# Patient Record
Sex: MALE | Race: WHITE | NOT HISPANIC OR LATINO
[De-identification: names, ages, dates, MRNs, and addresses within clinical notes are randomized per-mention and may not be internally consistent; named-entity substitution may affect disease eponyms.]

---

## 2020-03-09 ENCOUNTER — APPOINTMENT (OUTPATIENT)
Dept: UROLOGY | Facility: CLINIC | Age: 85
End: 2020-03-09
Payer: MEDICARE

## 2020-03-09 DIAGNOSIS — Z86.39 PERSONAL HISTORY OF OTHER ENDOCRINE, NUTRITIONAL AND METABOLIC DISEASE: ICD-10-CM

## 2020-03-09 DIAGNOSIS — Z83.3 FAMILY HISTORY OF DIABETES MELLITUS: ICD-10-CM

## 2020-03-09 DIAGNOSIS — Z86.79 PERSONAL HISTORY OF OTHER DISEASES OF THE CIRCULATORY SYSTEM: ICD-10-CM

## 2020-03-09 DIAGNOSIS — Z78.9 OTHER SPECIFIED HEALTH STATUS: ICD-10-CM

## 2020-03-09 DIAGNOSIS — Z87.81 PERSONAL HISTORY OF (HEALED) TRAUMATIC FRACTURE: ICD-10-CM

## 2020-03-09 PROBLEM — Z00.00 ENCOUNTER FOR PREVENTIVE HEALTH EXAMINATION: Status: ACTIVE | Noted: 2020-03-09

## 2020-03-09 PROCEDURE — 99203 OFFICE O/P NEW LOW 30 MIN: CPT

## 2020-03-09 RX ORDER — TAMSULOSIN HYDROCHLORIDE 0.4 MG/1
0.4 CAPSULE ORAL
Refills: 0 | Status: ACTIVE | COMMUNITY

## 2020-03-09 RX ORDER — FINASTERIDE 5 MG/1
5 TABLET, FILM COATED ORAL
Refills: 0 | Status: ACTIVE | COMMUNITY

## 2020-03-09 NOTE — ASSESSMENT
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

## 2020-03-09 NOTE — HISTORY OF PRESENT ILLNESS
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 \par Patient daughter states he moved from [facility] recently and now needs to re-establish care. Patient states he currently awakens 3-4 times per night to urinarte and  states he feels his stream is weakened. Patient has no other significant  history, specifically denies hx of Prostate CA or biopsies, renal cancer, or bladder cancer and denies any history of hematuria. No family history of prostate cancer. Patient denies history of ureteral stones. \par \par Patient recently released from rehab following right hip fx managed non-operatively. \par Social History: Patient lives with daughter. Denies smoking.\par \par

## 2020-03-09 NOTE — END OF VISIT
[FreeTextEntry3] : Agree with the above documentation as outlined by the resident MD which I have addended as necessary.\par

## 2021-08-31 ENCOUNTER — APPOINTMENT (OUTPATIENT)
Dept: UROLOGY | Facility: CLINIC | Age: 86
End: 2021-08-31
Payer: MEDICARE

## 2021-08-31 VITALS — WEIGHT: 157 LBS | BODY MASS INDEX: 26.8 KG/M2 | HEIGHT: 64 IN

## 2021-08-31 PROCEDURE — 99214 OFFICE O/P EST MOD 30 MIN: CPT

## 2021-09-01 NOTE — PHYSICAL EXAM
[General Appearance - Well Developed] : well developed [General Appearance - Well Nourished] : well nourished [Normal Appearance] : normal appearance [Well Groomed] : well groomed [General Appearance - In No Acute Distress] : no acute distress [Abdomen Soft] : soft [Abdomen Tenderness] : non-tender [Costovertebral Angle Tenderness] : no ~M costovertebral angle tenderness [Urethral Meatus] : meatus normal [Penis Abnormality] : normal uncircumcised penis [Urinary Bladder Findings] : the bladder was normal on palpation [Scrotum] : the scrotum was normal [Testes Tenderness] : no tenderness of the testes [Testes Mass (___cm)] : there were no testicular masses [] : no respiratory distress [Respiration, Rhythm And Depth] : normal respiratory rhythm and effort [Exaggerated Use Of Accessory Muscles For Inspiration] : no accessory muscle use [Oriented To Time, Place, And Person] : oriented to person, place, and time [Affect] : the affect was normal [Mood] : the mood was normal [Not Anxious] : not anxious [FreeTextEntry1] : Ambulating with walker

## 2021-09-01 NOTE — ASSESSMENT
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  Now with phimosis.\par \par \par -Continue tamsulosin and finasteride\par -Start Clotrimazole/betamethasone all usage, dosage, mechanism of action, and adverse events reviewed\par -Follow-up 6 weeks to review\par Consider future office dorsal slit w Dr Thomas, would prefer not to circ to avoid OR

## 2021-09-01 NOTE — HISTORY OF PRESENT ILLNESS
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 \par Social History: Patient lives with daughter. Denies smoking.\par \par

## 2021-10-12 ENCOUNTER — APPOINTMENT (OUTPATIENT)
Dept: UROLOGY | Facility: CLINIC | Age: 86
End: 2021-10-12
Payer: MEDICARE

## 2021-10-12 VITALS — HEIGHT: 64 IN | BODY MASS INDEX: 26.8 KG/M2 | WEIGHT: 157 LBS

## 2021-10-12 DIAGNOSIS — R39.9 UNSPECIFIED SYMPTOMS AND SIGNS INVOLVING THE GENITOURINARY SYSTEM: ICD-10-CM

## 2021-10-12 PROCEDURE — 99214 OFFICE O/P EST MOD 30 MIN: CPT

## 2021-10-12 RX ORDER — CLOPIDOGREL 75 MG/1
75 TABLET, FILM COATED ORAL
Refills: 0 | Status: ACTIVE | COMMUNITY

## 2021-10-12 RX ORDER — SIMVASTATIN 20 MG/1
20 TABLET, FILM COATED ORAL
Refills: 0 | Status: ACTIVE | COMMUNITY

## 2021-10-12 NOTE — ASSESSMENT
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  Now with phimosis improving but not resolving with clotrimazole/betamethasone.\par \par -Continue tamsulosin and finasteride for BPH/LUTS, stable. not having side effects doing well\par -Follow-up with Dr Thomas to discuss dorsal slit procedure

## 2021-10-12 NOTE — HISTORY OF PRESENT ILLNESS
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  He states there is only minimal improvement after taking this medication for roughly 1 month.\par LUTS stable.\par \par Previously:\par Patient presents today complaining of difficulty urinating secondary to his foreskin, which he says is extremely tight and he cannot roll it back.\par \par PVR today 119\par \par Patient recently released from rehab following right hip fx managed non-operatively. \par Social History: Patient lives with daughter. Denies smoking.

## 2021-10-12 NOTE — PHYSICAL EXAM
[General Appearance - Well Developed] : well developed [General Appearance - Well Nourished] : well nourished [Normal Appearance] : normal appearance [Well Groomed] : well groomed [General Appearance - In No Acute Distress] : no acute distress [Abdomen Soft] : soft [Abdomen Tenderness] : non-tender [Costovertebral Angle Tenderness] : no ~M costovertebral angle tenderness [Urethral Meatus] : meatus normal [Penis Abnormality] : normal circumcised penis [Urinary Bladder Findings] : the bladder was normal on palpation [Scrotum] : the scrotum was normal [Testes Mass (___cm)] : there were no testicular masses [] : no respiratory distress [Respiration, Rhythm And Depth] : normal respiratory rhythm and effort [Exaggerated Use Of Accessory Muscles For Inspiration] : no accessory muscle use [Oriented To Time, Place, And Person] : oriented to person, place, and time [Affect] : the affect was normal [Mood] : the mood was normal [Not Anxious] : not anxious [Normal Station and Gait] : the gait and station were normal for the patient's age [No Focal Deficits] : no focal deficits [Femoral Lymph Nodes Enlarged Bilaterally] : femoral [Inguinal Lymph Nodes Enlarged Bilaterally] : inguinal [FreeTextEntry1] : Phimosis

## 2021-12-13 ENCOUNTER — APPOINTMENT (OUTPATIENT)
Dept: UROLOGY | Facility: CLINIC | Age: 86
End: 2021-12-13
Payer: MEDICARE

## 2021-12-13 VITALS
SYSTOLIC BLOOD PRESSURE: 144 MMHG | HEIGHT: 66 IN | DIASTOLIC BLOOD PRESSURE: 64 MMHG | WEIGHT: 156 LBS | BODY MASS INDEX: 25.07 KG/M2 | HEART RATE: 65 BPM

## 2021-12-13 DIAGNOSIS — Z80.9 FAMILY HISTORY OF MALIGNANT NEOPLASM, UNSPECIFIED: ICD-10-CM

## 2021-12-13 PROCEDURE — 99214 OFFICE O/P EST MOD 30 MIN: CPT

## 2021-12-13 RX ORDER — INSULIN GLARGINE 100 [IU]/ML
100 INJECTION, SOLUTION SUBCUTANEOUS
Refills: 0 | Status: COMPLETED | COMMUNITY
End: 2021-12-13

## 2021-12-13 NOTE — LETTER BODY
[Dear  ___] : Dear  [unfilled], [Courtesy Letter:] : I had the pleasure of seeing your patient, [unfilled], in my office today. [Please see my note below.] : Please see my note below. [Sincerely,] : Sincerely, [FreeTextEntry2] : Javan Hoang MD\par [address] [place] [address]\[place] [address]

## 2021-12-13 NOTE — LETTER HEADER
[FreeTextEntry3] : Tariq Thomas M.D.\par Director Emeritus of Urology\par [facility]\par [address] [place] [address], [address]\[place] [address]

## 2021-12-13 NOTE — ASSESSMENT
[FreeTextEntry1] : He would benefit from a dorsal slit.  He understands this is not a circumcision and cosmetically is not the best but at his age he is not worried about cosmesis.  It would enable him to expose the head of the penis to both clean it and take care of any discharge and if coughing that he needs to catheter we be able to access the urethra.\par \par He is on the Plavix but his daughter tells me his cardiac stents and bypass were over 10 years ago so theoretically he should be able to stop it for a week before and a few days after.  Though speak to his internist if necessary his cardiologist and if the answer is yes they will call and we will schedule him for the dorsal slit.\par \par It would take a couple of days to a week to get authorization from his CP anyway and then to rush and do it before the end of the year on Navneet week does not really make much sense.  So we will schedule him for sometime in January preferably sooner than later assuming we get a note from his PCP that he can come off the Plavix.  He will have to be off of Plavix for 7 days probably will be able to start it 2 or 3 days later\par \par Risk benefits and alternatives discussed at length and they like to proceed

## 2021-12-13 NOTE — HISTORY OF PRESENT ILLNESS
[FreeTextEntry1] : Chris is a 89-year-old male being followed by Dr. Wen for almost 9 months.  They have been giving him various ointments to try and relax his phimosis enough that he could pull the skin back and treat the balanitis and does not work.  He is sent here for subspecialty opinion and consideration for dorsal slit [Currently Experiencing ___] :  [unfilled]

## 2021-12-13 NOTE — PHYSICAL EXAM
[General Appearance - Well Developed] : well developed [General Appearance - Well Nourished] : well nourished [Normal Appearance] : normal appearance [Well Groomed] : well groomed [General Appearance - In No Acute Distress] : no acute distress [Testes Tenderness] : no tenderness of the testes [] : no respiratory distress [Respiration, Rhythm And Depth] : normal respiratory rhythm and effort [Exaggerated Use Of Accessory Muscles For Inspiration] : no accessory muscle use [Oriented To Time, Place, And Person] : oriented to person, place, and time [Affect] : the affect was normal [Mood] : the mood was normal [Not Anxious] : not anxious [FreeTextEntry1] : Walks slowly with difficulty using a walker

## 2022-01-04 ENCOUNTER — NON-APPOINTMENT (OUTPATIENT)
Age: 87
End: 2022-01-04

## 2022-01-13 ENCOUNTER — APPOINTMENT (OUTPATIENT)
Dept: UROLOGY | Facility: CLINIC | Age: 87
End: 2022-01-13
Payer: MEDICARE

## 2022-01-13 VITALS
HEIGHT: 62 IN | BODY MASS INDEX: 28.71 KG/M2 | DIASTOLIC BLOOD PRESSURE: 57 MMHG | WEIGHT: 156 LBS | HEART RATE: 71 BPM | SYSTOLIC BLOOD PRESSURE: 133 MMHG

## 2022-01-13 PROCEDURE — 54001 SLITTING OF PREPUCE: CPT

## 2022-01-19 ENCOUNTER — NON-APPOINTMENT (OUTPATIENT)
Age: 87
End: 2022-01-19

## 2022-01-27 ENCOUNTER — APPOINTMENT (OUTPATIENT)
Dept: UROLOGY | Facility: CLINIC | Age: 87
End: 2022-01-27
Payer: MEDICARE

## 2022-01-27 VITALS
HEART RATE: 56 BPM | SYSTOLIC BLOOD PRESSURE: 149 MMHG | WEIGHT: 166 LBS | DIASTOLIC BLOOD PRESSURE: 68 MMHG | HEIGHT: 61 IN | BODY MASS INDEX: 31.34 KG/M2

## 2022-01-27 PROCEDURE — 11042 DBRDMT SUBQ TIS 1ST 20SQCM/<: CPT

## 2022-01-27 PROCEDURE — 99024 POSTOP FOLLOW-UP VISIT: CPT

## 2022-01-27 PROCEDURE — 99213 OFFICE O/P EST LOW 20 MIN: CPT | Mod: 24

## 2022-01-27 NOTE — ASSESSMENT
[FreeTextEntry1] : The area was debrided until getting close to healthy tissue as he is on Plavix I did not want to have bleeding.  I hope to do the rest with wet-to-dry's and I showed him to order to do that.  She will try and change his dressings whenever he urinates.  I will see him back next week to make sure he is healing well

## 2022-01-27 NOTE — LETTER BODY
[Dear  ___] : Dear  [unfilled], [Courtesy Letter:] : I had the pleasure of seeing your patient, [unfilled], in my office today. [Please see my note below.] : Please see my note below. [Sincerely,] : Sincerely, [FreeTextEntry2] : Javan Hoang MD\par [address] [place] [address]\[place] [address]

## 2022-01-27 NOTE — HISTORY OF PRESENT ILLNESS
[FreeTextEntry1] : Chris is a 89-year-old male who underwent a dorsal slit for phimosis and balanitis 2 weeks ago.  The tissue was poorly vascularized with minimal bleeding we had spoke with him by phone he comes in now for postop check.\par \par Daughter tells me is doing well urinating better the wound has some tissue that looks like it is not healing but there is been bleeding. [Currently Experiencing ___] :  [unfilled]

## 2022-01-27 NOTE — PHYSICAL EXAM
[General Appearance - Well Developed] : well developed [General Appearance - Well Nourished] : well nourished [Normal Appearance] : normal appearance [Well Groomed] : well groomed [General Appearance - In No Acute Distress] : no acute distress [] : no respiratory distress [Respiration, Rhythm And Depth] : normal respiratory rhythm and effort [Exaggerated Use Of Accessory Muscles For Inspiration] : no accessory muscle use [Oriented To Time, Place, And Person] : oriented to person, place, and time [Affect] : the affect was normal [Mood] : the mood was normal [Not Anxious] : not anxious [FreeTextEntry1] : Walks slowly with difficulty using a walker

## 2022-01-27 NOTE — LETTER HEADER
[FreeTextEntry3] : Tariq Thomas M.D.\par Director Emeritus of Urology\par [facility]\par [address] [place] [address], [address]\[place] [address]

## 2022-02-11 ENCOUNTER — APPOINTMENT (OUTPATIENT)
Dept: UROLOGY | Facility: CLINIC | Age: 87
End: 2022-02-11
Payer: MEDICARE

## 2022-02-11 VITALS
HEIGHT: 61 IN | HEART RATE: 63 BPM | WEIGHT: 162 LBS | BODY MASS INDEX: 30.58 KG/M2 | SYSTOLIC BLOOD PRESSURE: 138 MMHG | DIASTOLIC BLOOD PRESSURE: 67 MMHG

## 2022-02-11 DIAGNOSIS — N47.1 PHIMOSIS: ICD-10-CM

## 2022-02-11 DIAGNOSIS — N48.1 BALANITIS: ICD-10-CM

## 2022-02-11 DIAGNOSIS — I96 GANGRENE, NOT ELSEWHERE CLASSIFIED: ICD-10-CM

## 2022-02-11 PROCEDURE — 99213 OFFICE O/P EST LOW 20 MIN: CPT

## 2022-02-11 NOTE — LETTER BODY
[Dear  ___] : Dear  [unfilled], [Courtesy Letter:] : I had the pleasure of seeing your patient, [unfilled], in my office today. [Please see my note below.] : Please see my note below. [Sincerely,] : Sincerely, [FreeTextEntry2] : Javan Hoang MD\par [address] [place] [address]\[place] [address]

## 2022-02-11 NOTE — PHYSICAL EXAM
[General Appearance - Well Developed] : well developed [General Appearance - Well Nourished] : well nourished [Normal Appearance] : normal appearance [Well Groomed] : well groomed [General Appearance - In No Acute Distress] : no acute distress [Abdomen Soft] : soft [Abdomen Tenderness] : non-tender [Urethral Meatus] : meatus normal [Urinary Bladder Findings] : the bladder was normal on palpation [Scrotum] : the scrotum was normal [Edema] : no peripheral edema [] : no respiratory distress [Respiration, Rhythm And Depth] : normal respiratory rhythm and effort [Exaggerated Use Of Accessory Muscles For Inspiration] : no accessory muscle use [Oriented To Time, Place, And Person] : oriented to person, place, and time [Affect] : the affect was normal [Mood] : the mood was normal [Not Anxious] : not anxious [FreeTextEntry1] : Ambulating with walker

## 2022-02-11 NOTE — END OF VISIT
[FreeTextEntry3] : I, Dr. Thomas, personally performed the evaluation and management (E/M) services for this established patient who presents today with (a) new problem(s)/exacerbation of (an) existing condition(s).  That E/M includes conducting the examination, assessing all new/exacerbated conditions, and establishing a new plan of care.  Today, my ACP, Pastor Stinson was here to observe my evaluation and management services for this new problem/exacerbated condition to be followed going forward.

## 2022-02-11 NOTE — HISTORY OF PRESENT ILLNESS
[Currently Experiencing ___] :  [unfilled] [FreeTextEntry1] : Chris is a 89-year-old male who underwent a dorsal slit for phimosis and balanitis on 1/13/2022.  The tissue was poorly vascularized with minimal bleeding.  At his last visit on 1/27/2022 he underwent debridement of some dead tissue.\par \par His daughter states that he has been healing well without any issues.  He is satisfied with the procedure and is voiding well much better than before at all., \par

## 2022-02-11 NOTE — LETTER HEADER
[FreeTextEntry3] : Tariq Thomas M.D.\par Director Emeritus of Urology\par [facility]/Cl\par [address] [place] [address], [address]\[place] [address]

## 2022-02-11 NOTE — ASSESSMENT
[FreeTextEntry1] : Significant improvement since last visit.  Area well-healing with some areas that are healing via secondary intention.  Continue bacitracin and follow-up in 1 month.  If it is healed well and not an issue and then not concerned they were told they can opt to cancel the appointment as it is difficult for them to get him in here

## 2022-03-28 ENCOUNTER — APPOINTMENT (OUTPATIENT)
Dept: UROLOGY | Facility: CLINIC | Age: 87
End: 2022-03-28

## 2022-06-24 ENCOUNTER — OUTPATIENT (OUTPATIENT)
Dept: OUTPATIENT SERVICES | Facility: HOSPITAL | Age: 87
LOS: 1 days | Discharge: HOME | End: 2022-06-24

## 2022-06-24 ENCOUNTER — RESULT REVIEW (OUTPATIENT)
Age: 87
End: 2022-06-24

## 2022-06-24 DIAGNOSIS — R05.9 COUGH, UNSPECIFIED: ICD-10-CM

## 2022-06-24 PROCEDURE — 71046 X-RAY EXAM CHEST 2 VIEWS: CPT | Mod: 26

## 2022-12-03 ENCOUNTER — NON-APPOINTMENT (OUTPATIENT)
Age: 87
End: 2022-12-03

## 2022-12-04 ENCOUNTER — INPATIENT (INPATIENT)
Facility: HOSPITAL | Age: 87
LOS: 1 days | Discharge: HOME HEALTH | End: 2022-12-06
Attending: HOSPITALIST | Admitting: HOSPITALIST

## 2022-12-04 VITALS
DIASTOLIC BLOOD PRESSURE: 69 MMHG | HEART RATE: 63 BPM | OXYGEN SATURATION: 98 % | SYSTOLIC BLOOD PRESSURE: 110 MMHG | TEMPERATURE: 97 F | RESPIRATION RATE: 18 BRPM

## 2022-12-04 LAB
ALBUMIN SERPL ELPH-MCNC: 3.6 G/DL — SIGNIFICANT CHANGE UP (ref 3.5–5.2)
ALP SERPL-CCNC: 60 U/L — SIGNIFICANT CHANGE UP (ref 30–115)
ALT FLD-CCNC: 20 U/L — SIGNIFICANT CHANGE UP (ref 0–41)
ANION GAP SERPL CALC-SCNC: 11 MMOL/L — SIGNIFICANT CHANGE UP (ref 7–14)
AST SERPL-CCNC: 31 U/L — SIGNIFICANT CHANGE UP (ref 0–41)
BASOPHILS # BLD AUTO: 0.02 K/UL — SIGNIFICANT CHANGE UP (ref 0–0.2)
BASOPHILS NFR BLD AUTO: 0.2 % — SIGNIFICANT CHANGE UP (ref 0–1)
BILIRUB SERPL-MCNC: 0.5 MG/DL — SIGNIFICANT CHANGE UP (ref 0.2–1.2)
BUN SERPL-MCNC: 53 MG/DL — HIGH (ref 10–20)
CALCIUM SERPL-MCNC: 8.3 MG/DL — LOW (ref 8.4–10.5)
CHLORIDE SERPL-SCNC: 94 MMOL/L — LOW (ref 98–110)
CO2 SERPL-SCNC: 24 MMOL/L — SIGNIFICANT CHANGE UP (ref 17–32)
CREAT SERPL-MCNC: 1.8 MG/DL — HIGH (ref 0.7–1.5)
EGFR: 34 ML/MIN/1.73M2 — LOW
EOSINOPHIL # BLD AUTO: 0.03 K/UL — SIGNIFICANT CHANGE UP (ref 0–0.7)
EOSINOPHIL NFR BLD AUTO: 0.3 % — SIGNIFICANT CHANGE UP (ref 0–8)
FLUAV AG NPH QL: DETECTED
FLUBV AG NPH QL: SIGNIFICANT CHANGE UP
GLUCOSE BLDC GLUCOMTR-MCNC: 253 MG/DL — HIGH (ref 70–99)
GLUCOSE SERPL-MCNC: 232 MG/DL — HIGH (ref 70–99)
HCT VFR BLD CALC: 34 % — LOW (ref 42–52)
HGB BLD-MCNC: 11.5 G/DL — LOW (ref 14–18)
IMM GRANULOCYTES NFR BLD AUTO: 0.6 % — HIGH (ref 0.1–0.3)
LACTATE SERPL-SCNC: 1.4 MMOL/L — SIGNIFICANT CHANGE UP (ref 0.7–2)
LYMPHOCYTES # BLD AUTO: 2.83 K/UL — SIGNIFICANT CHANGE UP (ref 1.2–3.4)
LYMPHOCYTES # BLD AUTO: 27.6 % — SIGNIFICANT CHANGE UP (ref 20.5–51.1)
MAGNESIUM SERPL-MCNC: 1.5 MG/DL — LOW (ref 1.8–2.4)
MCHC RBC-ENTMCNC: 29.3 PG — SIGNIFICANT CHANGE UP (ref 27–31)
MCHC RBC-ENTMCNC: 33.8 G/DL — SIGNIFICANT CHANGE UP (ref 32–37)
MCV RBC AUTO: 86.7 FL — SIGNIFICANT CHANGE UP (ref 80–94)
MONOCYTES # BLD AUTO: 1.06 K/UL — HIGH (ref 0.1–0.6)
MONOCYTES NFR BLD AUTO: 10.3 % — HIGH (ref 1.7–9.3)
NEUTROPHILS # BLD AUTO: 6.26 K/UL — SIGNIFICANT CHANGE UP (ref 1.4–6.5)
NEUTROPHILS NFR BLD AUTO: 61 % — SIGNIFICANT CHANGE UP (ref 42.2–75.2)
NRBC # BLD: 0 /100 WBCS — SIGNIFICANT CHANGE UP (ref 0–0)
NT-PROBNP SERPL-SCNC: 1067 PG/ML — HIGH (ref 0–300)
PLATELET # BLD AUTO: 142 K/UL — SIGNIFICANT CHANGE UP (ref 130–400)
POTASSIUM SERPL-MCNC: 4.5 MMOL/L — SIGNIFICANT CHANGE UP (ref 3.5–5)
POTASSIUM SERPL-SCNC: 4.5 MMOL/L — SIGNIFICANT CHANGE UP (ref 3.5–5)
PROT SERPL-MCNC: 5.7 G/DL — LOW (ref 6–8)
RBC # BLD: 3.92 M/UL — LOW (ref 4.7–6.1)
RBC # FLD: 13.4 % — SIGNIFICANT CHANGE UP (ref 11.5–14.5)
RSV RNA NPH QL NAA+NON-PROBE: SIGNIFICANT CHANGE UP
SARS-COV-2 RNA SPEC QL NAA+PROBE: SIGNIFICANT CHANGE UP
SODIUM SERPL-SCNC: 129 MMOL/L — LOW (ref 135–146)
TROPONIN T SERPL-MCNC: 0.03 NG/ML — CRITICAL HIGH
WBC # BLD: 10.26 K/UL — SIGNIFICANT CHANGE UP (ref 4.8–10.8)
WBC # FLD AUTO: 10.26 K/UL — SIGNIFICANT CHANGE UP (ref 4.8–10.8)

## 2022-12-04 PROCEDURE — 71045 X-RAY EXAM CHEST 1 VIEW: CPT | Mod: 26

## 2022-12-04 PROCEDURE — 93010 ELECTROCARDIOGRAM REPORT: CPT

## 2022-12-04 PROCEDURE — 99285 EMERGENCY DEPT VISIT HI MDM: CPT | Mod: FS

## 2022-12-04 RX ORDER — DULOXETINE HYDROCHLORIDE 30 MG/1
60 CAPSULE, DELAYED RELEASE ORAL DAILY
Refills: 0 | Status: DISCONTINUED | OUTPATIENT
Start: 2022-12-04 | End: 2022-12-06

## 2022-12-04 RX ORDER — TRAMADOL HYDROCHLORIDE 50 MG/1
50 TABLET ORAL
Refills: 0 | Status: DISCONTINUED | OUTPATIENT
Start: 2022-12-04 | End: 2022-12-06

## 2022-12-04 RX ORDER — LEVOTHYROXINE SODIUM 125 MCG
25 TABLET ORAL DAILY
Refills: 0 | Status: DISCONTINUED | OUTPATIENT
Start: 2022-12-04 | End: 2022-12-06

## 2022-12-04 RX ORDER — CHLORHEXIDINE GLUCONATE 213 G/1000ML
1 SOLUTION TOPICAL
Refills: 0 | Status: DISCONTINUED | OUTPATIENT
Start: 2022-12-04 | End: 2022-12-06

## 2022-12-04 RX ORDER — GLUCAGON INJECTION, SOLUTION 0.5 MG/.1ML
1 INJECTION, SOLUTION SUBCUTANEOUS ONCE
Refills: 0 | Status: DISCONTINUED | OUTPATIENT
Start: 2022-12-04 | End: 2022-12-06

## 2022-12-04 RX ORDER — SODIUM CHLORIDE 9 MG/ML
1000 INJECTION, SOLUTION INTRAVENOUS
Refills: 0 | Status: DISCONTINUED | OUTPATIENT
Start: 2022-12-04 | End: 2022-12-06

## 2022-12-04 RX ORDER — TAMSULOSIN HYDROCHLORIDE 0.4 MG/1
0.4 CAPSULE ORAL AT BEDTIME
Refills: 0 | Status: DISCONTINUED | OUTPATIENT
Start: 2022-12-04 | End: 2022-12-06

## 2022-12-04 RX ORDER — CLOPIDOGREL BISULFATE 75 MG/1
75 TABLET, FILM COATED ORAL DAILY
Refills: 0 | Status: DISCONTINUED | OUTPATIENT
Start: 2022-12-04 | End: 2022-12-06

## 2022-12-04 RX ORDER — SODIUM CHLORIDE 9 MG/ML
1000 INJECTION INTRAMUSCULAR; INTRAVENOUS; SUBCUTANEOUS ONCE
Refills: 0 | Status: COMPLETED | OUTPATIENT
Start: 2022-12-04 | End: 2022-12-04

## 2022-12-04 RX ORDER — INSULIN GLARGINE 100 [IU]/ML
10 INJECTION, SOLUTION SUBCUTANEOUS EVERY MORNING
Refills: 0 | Status: DISCONTINUED | OUTPATIENT
Start: 2022-12-04 | End: 2022-12-06

## 2022-12-04 RX ORDER — FINASTERIDE 5 MG/1
5 TABLET, FILM COATED ORAL DAILY
Refills: 0 | Status: DISCONTINUED | OUTPATIENT
Start: 2022-12-04 | End: 2022-12-06

## 2022-12-04 RX ORDER — MAGNESIUM SULFATE 500 MG/ML
2 VIAL (ML) INJECTION ONCE
Refills: 0 | Status: COMPLETED | OUTPATIENT
Start: 2022-12-04 | End: 2022-12-04

## 2022-12-04 RX ORDER — METOPROLOL TARTRATE 50 MG
25 TABLET ORAL
Refills: 0 | Status: DISCONTINUED | OUTPATIENT
Start: 2022-12-04 | End: 2022-12-06

## 2022-12-04 RX ORDER — SIMVASTATIN 20 MG/1
20 TABLET, FILM COATED ORAL AT BEDTIME
Refills: 0 | Status: DISCONTINUED | OUTPATIENT
Start: 2022-12-04 | End: 2022-12-06

## 2022-12-04 RX ORDER — HEPARIN SODIUM 5000 [USP'U]/ML
5000 INJECTION INTRAVENOUS; SUBCUTANEOUS EVERY 8 HOURS
Refills: 0 | Status: DISCONTINUED | OUTPATIENT
Start: 2022-12-04 | End: 2022-12-06

## 2022-12-04 RX ADMIN — HEPARIN SODIUM 5000 UNIT(S): 5000 INJECTION INTRAVENOUS; SUBCUTANEOUS at 22:22

## 2022-12-04 RX ADMIN — Medication 75 MILLIGRAM(S): at 22:18

## 2022-12-04 RX ADMIN — SODIUM CHLORIDE 1000 MILLILITER(S): 9 INJECTION INTRAMUSCULAR; INTRAVENOUS; SUBCUTANEOUS at 19:09

## 2022-12-04 RX ADMIN — SIMVASTATIN 20 MILLIGRAM(S): 20 TABLET, FILM COATED ORAL at 22:18

## 2022-12-04 RX ADMIN — TAMSULOSIN HYDROCHLORIDE 0.4 MILLIGRAM(S): 0.4 CAPSULE ORAL at 22:18

## 2022-12-04 RX ADMIN — Medication 25 GRAM(S): at 17:29

## 2022-12-04 NOTE — ED ADULT TRIAGE NOTE - CHIEF COMPLAINT QUOTE
Patient BIBA from urgent care for hypotension (90/50), cough, and fatigue since Thursday. Patient was hypoxic on scene 88% on RA improved with 4LNC. Patient denies SOB, CP. Daughter reports subjective fever yesterday.

## 2022-12-04 NOTE — ED PROVIDER NOTE - NS ED ATTENDING STATEMENT MOD
Home
This was a shared visit with the CRISTINA. I reviewed and verified the documentation and independently performed the documented:

## 2022-12-04 NOTE — ED PROVIDER NOTE - NS ED ROS FT
Review of Systems:  	•	CONSTITUTIONAL - no fever, no diaphoresis, no chills  	•	SKIN - no rash  	•	HEMATOLOGIC - no bleeding, no bruising  	•	EYES - no eye pain, no blurry vision  	•	ENT - no change in hearing, no sore throat, no ear pain or tinnitus  	•	RESPIRATORY - + shortness of breath, + cough  	•	CARDIAC - no chest pain, no palpitations  	•	GI - no abd pain, no nausea, no vomiting, no diarrhea, no constipation  	•	GENITO-URINARY - no discharge, no dysuria; no hematuria, no increased urinary frequency  	•	MUSCULOSKELETAL - no joint paint, no swelling, no redness  	•	NEUROLOGIC - + weakness, no headache, no paresthesias, no LOC  	•	PSYCH - no anxiety, non suicidal, non homicidal, no hallucination, no depression

## 2022-12-04 NOTE — H&P ADULT - CONVERSATION DETAILS
Goals of Care Conversation done with pt. Discussed FULL CODE VS DNR/DNI. Pt would like to think about DNR/DNI but for now wants to stay full code.  Pt AAOX3 and knows why his is in the hospital

## 2022-12-04 NOTE — H&P ADULT - NSHPPHYSICALEXAM_GEN_ALL_CORE
PHYSICAL EXAM:  GENERAL: NAD, well-groomed, well-developed, on NC  HEAD:  Atraumatic, Normocephalic  EYES: EOMI, conjunctiva and sclera clear  NECK: Supple, No JVD,  HEART: Regular rate and rhythm;   RESPIRATORY: CTA B/L, No W/R/R  ABDOMEN: Soft, Nontender, Nondistended;   NEUROLOGY: A&Ox3,   EXTREMITIES: No clubbing, cyanosis, or edema

## 2022-12-04 NOTE — H&P ADULT - ASSESSMENT
89 yo M with PMHx of HTN, HLD, hypothyroidism, BPH, DM presents to the Ed for evaluation of his SOB.     #Influenza A   SOB, fever, cough   Tamiflu (currently renally dosed)     #BRINA   likely prerenal - patient had diarrhea   check urine studies   IVfs   Monitor BMP     #BPH   c/w home meds    #HTN  c/w home meds     #DM  insulin 10 units once daily   monitor FS     #Misc  - DVT Prophylaxis: heparin   - GI Prophylaxis: not indicated   - Diet: CC  - Activity: AAT  - IV Fluids: NS

## 2022-12-04 NOTE — H&P ADULT - TIME BILLING
HPI as above.  Interval history: Pt seen and examined at bedside. No cp or sob. Pt is feeling better today  Vital Signs (24 Hrs):  T(C): 36.8 (12-05-22 @ 07:26), Max: 36.8 (12-05-22 @ 07:26)  HR: 60 (12-05-22 @ 07:26) (60 - 78)  BP: 116/56 (12-05-22 @ 07:26) (110/69 - 147/66)  RR: 18 (12-05-22 @ 07:26) (18 - 18)  SpO2: 93% (12-05-22 @ 07:26) (93% - 98%)  Wt(kg): --  Daily     Daily     I&O's Summary    PHYSICAL EXAM:  GENERAL: NAD, well-developed  HEAD:  Atraumatic, Normocephalic  EYES: EOMI, PERRLA, conjunctiva and sclera clear  NECK: Supple, No JVD  CHEST/LUNG: ronchi  HEART: Regular rate and rhythm; No murmurs, rubs, or gallops  ABDOMEN: Soft, Nontender, Nondistended; Bowel sounds present  EXTREMITIES:  2+ Peripheral Pulses, No clubbing, cyanosis, or edema  PSYCH: AAOx3  NEUROLOGY: non-focal  SKIN: No rashes or lesions  Labs reviewed  Imaging reviewed independently and reviewed read  < from: Xray Chest 1 View-PORTABLE IMMEDIATE (Xray Chest 1 View-PORTABLE IMMEDIATE .) (12.04.22 @ 16:23) >    Impression:    Low lung volume.    Status post a median sternotomy.    Right basilar atelectatic change.    < end of copied text >      EKG reviewed independently and reviewed read  < from: 12 Lead ECG (12.05.22 @ 01:53) >    Diagnosis Line Normal sinus rhythmwith sinus arrhythmia  Normal ECG    < end of copied text >      Plan  89 yo M with PMHx of HTN, HLD, hypothyroidism, BPH, DM presents to the Ed for evaluation of his SOB.     #Influenza A, no pneumonia on cxr  SOB, fever, cough   Tamiflu (currently renally dosed)   send procal  on 3LNC  wean o2    #DAVID on ckd resolving   likely prerenal - patient had diarrhea   check urine studies   IVfs DCed  Monitor BMP   1.8-->1.3    #troponemia likley 2/2 ischemic demand form David and influenza  - check echo  - dimer  - stable     #BPH   c/w home meds    #HTN  c/w home meds     #DM  insulin 10 units once daily   monitor FS       #Progress Note Handoff  Pending (specify): wean o2, follow up procal, DAVID,e cho, dimer  Family discussion: house staff dw family   Disposition: home vs snf, PT    time spent on review of labs, imaging studies, old records, obtaining history, personally examining patient, counselling and communicating with patient, entering orders for medications/tests/etc, discussions with other health care providers, documentation in electronic health records, independent interpretation of labs, imaging/procedure results and care coordination.

## 2022-12-04 NOTE — ED PROVIDER NOTE - ATTENDING APP SHARED VISIT CONTRIBUTION OF CARE
89yM p/w cough and fatigue x 4d - seen at urgent care and was hypoxic to high 80s and hypotensive to 90s/50s.  Pt afebrile in the ED but family reports fever yesterday.  Pt currently denying CP or SOB.

## 2022-12-04 NOTE — ED PROVIDER NOTE - CLINICAL SUMMARY MEDICAL DECISION MAKING FREE TEXT BOX
89yM p/w SOB, fatigue and new hypoxia.  Pt normotensive and not in resp distress on ED arrival and hypoxia resolved w/ 4L NC.  CXR w/o ptx or pna.  Labs w/ BRINA and flu A+, also hypomagnesemia and elevated troponin.  Mg repleted and pt admitted for further care.

## 2022-12-04 NOTE — H&P ADULT - NSHPLABSRESULTS_GEN_ALL_CORE
11.5   10.26 )-----------( 142      ( 04 Dec 2022 16:24 )             34.0       12-04    129<L>  |  94<L>  |  53<H>  ----------------------------<  232<H>  4.5   |  24  |  1.8<H>    Ca    8.3<L>      04 Dec 2022 16:24  Mg     1.5     12-04    TPro  5.7<L>  /  Alb  3.6  /  TBili  0.5  /  DBili  x   /  AST  31  /  ALT  20  /  AlkPhos  60  12-04                      Lactate Trend  12-04 @ 16:24 Lactate:1.4       CARDIAC MARKERS ( 04 Dec 2022 16:24 )  x     / 0.03 ng/mL / x     / x     / x            CAPILLARY BLOOD GLUCOSE

## 2022-12-04 NOTE — ED PROVIDER NOTE - CARE PLAN
Principal Discharge DX:	Influenza A  Secondary Diagnosis:	Hypoxia  Secondary Diagnosis:	BRINA (acute kidney injury)   1 Principal Discharge DX:	Influenza A  Secondary Diagnosis:	BRINA (acute kidney injury)  Secondary Diagnosis:	Hypomagnesemia  Secondary Diagnosis:	Elevated troponin

## 2022-12-04 NOTE — ED PROVIDER NOTE - OBJECTIVE STATEMENT
89 year old male with pmhx of cabg, stents, on asa and plavix, thyroid disease (on synthroid), dm, htn, and hld present to ed with cough, congestion and sob since thursday. + weakness. Pt went to UC and was noted to have oxygen saturation of 88%. Pt denies chest pain, abd pain, nausea, vomiting, diarrhea, or urinary symptoms.

## 2022-12-04 NOTE — H&P ADULT - HISTORY OF PRESENT ILLNESS
89 yo M with PMHx of HTN, HLD, hypothyroidism, BPH, DM presents to the Ed for evaluation of his SOB. On Thursday 12/1 patient developed diarrhea which resolved in a couple of days. he also had subjective fever on Friday 12/2. He also had a productive cough the past few days and felt sob. Presented to an urgent care who found him to be hypoxic and sent him to Ed.   In Ed, pt was placed on 4L NC, sating 98%. BP and HR stable. Placed on tele which shows frequent PACs. CXR shows stable atelectasis R base and low lung volumes. Influenza A+. Not vaccinated for flu. Labs: Cr 1.8, Mg 1.5, trop 0.03, BNP 1k, Na 129, Hb 11.5.   Received NS bolus and IV mg

## 2022-12-05 LAB
A1C WITH ESTIMATED AVERAGE GLUCOSE RESULT: 10.2 % — HIGH (ref 4–5.6)
ALBUMIN SERPL ELPH-MCNC: 3.3 G/DL — LOW (ref 3.5–5.2)
ALP SERPL-CCNC: 59 U/L — SIGNIFICANT CHANGE UP (ref 30–115)
ALT FLD-CCNC: 17 U/L — SIGNIFICANT CHANGE UP (ref 0–41)
ANION GAP SERPL CALC-SCNC: 10 MMOL/L — SIGNIFICANT CHANGE UP (ref 7–14)
AST SERPL-CCNC: 26 U/L — SIGNIFICANT CHANGE UP (ref 0–41)
BASOPHILS # BLD AUTO: 0.01 K/UL — SIGNIFICANT CHANGE UP (ref 0–0.2)
BASOPHILS NFR BLD AUTO: 0.1 % — SIGNIFICANT CHANGE UP (ref 0–1)
BILIRUB SERPL-MCNC: 0.4 MG/DL — SIGNIFICANT CHANGE UP (ref 0.2–1.2)
BUN SERPL-MCNC: 43 MG/DL — HIGH (ref 10–20)
CALCIUM SERPL-MCNC: 8.1 MG/DL — LOW (ref 8.4–10.5)
CHLORIDE SERPL-SCNC: 98 MMOL/L — SIGNIFICANT CHANGE UP (ref 98–110)
CO2 SERPL-SCNC: 22 MMOL/L — SIGNIFICANT CHANGE UP (ref 17–32)
CREAT SERPL-MCNC: 1.3 MG/DL — SIGNIFICANT CHANGE UP (ref 0.7–1.5)
D DIMER BLD IA.RAPID-MCNC: <150 NG/ML DDU — SIGNIFICANT CHANGE UP (ref 0–230)
EGFR: 50 ML/MIN/1.73M2 — LOW
EOSINOPHIL # BLD AUTO: 0.02 K/UL — SIGNIFICANT CHANGE UP (ref 0–0.7)
EOSINOPHIL NFR BLD AUTO: 0.3 % — SIGNIFICANT CHANGE UP (ref 0–8)
ESTIMATED AVERAGE GLUCOSE: 246 MG/DL — HIGH (ref 68–114)
GLUCOSE BLDC GLUCOMTR-MCNC: 225 MG/DL — HIGH (ref 70–99)
GLUCOSE BLDC GLUCOMTR-MCNC: 253 MG/DL — HIGH (ref 70–99)
GLUCOSE BLDC GLUCOMTR-MCNC: 261 MG/DL — HIGH (ref 70–99)
GLUCOSE SERPL-MCNC: 251 MG/DL — HIGH (ref 70–99)
HCT VFR BLD CALC: 30.7 % — LOW (ref 42–52)
HGB BLD-MCNC: 10.5 G/DL — LOW (ref 14–18)
IMM GRANULOCYTES NFR BLD AUTO: 0.5 % — HIGH (ref 0.1–0.3)
LYMPHOCYTES # BLD AUTO: 2.33 K/UL — SIGNIFICANT CHANGE UP (ref 1.2–3.4)
LYMPHOCYTES # BLD AUTO: 30 % — SIGNIFICANT CHANGE UP (ref 20.5–51.1)
MAGNESIUM SERPL-MCNC: 2.1 MG/DL — SIGNIFICANT CHANGE UP (ref 1.8–2.4)
MCHC RBC-ENTMCNC: 29.7 PG — SIGNIFICANT CHANGE UP (ref 27–31)
MCHC RBC-ENTMCNC: 34.2 G/DL — SIGNIFICANT CHANGE UP (ref 32–37)
MCV RBC AUTO: 87 FL — SIGNIFICANT CHANGE UP (ref 80–94)
MONOCYTES # BLD AUTO: 0.76 K/UL — HIGH (ref 0.1–0.6)
MONOCYTES NFR BLD AUTO: 9.8 % — HIGH (ref 1.7–9.3)
NEUTROPHILS # BLD AUTO: 4.6 K/UL — SIGNIFICANT CHANGE UP (ref 1.4–6.5)
NEUTROPHILS NFR BLD AUTO: 59.3 % — SIGNIFICANT CHANGE UP (ref 42.2–75.2)
NRBC # BLD: 0 /100 WBCS — SIGNIFICANT CHANGE UP (ref 0–0)
PLATELET # BLD AUTO: 144 K/UL — SIGNIFICANT CHANGE UP (ref 130–400)
POTASSIUM SERPL-MCNC: 4.4 MMOL/L — SIGNIFICANT CHANGE UP (ref 3.5–5)
POTASSIUM SERPL-SCNC: 4.4 MMOL/L — SIGNIFICANT CHANGE UP (ref 3.5–5)
PROT SERPL-MCNC: 5.2 G/DL — LOW (ref 6–8)
RBC # BLD: 3.53 M/UL — LOW (ref 4.7–6.1)
RBC # FLD: 13.3 % — SIGNIFICANT CHANGE UP (ref 11.5–14.5)
SODIUM SERPL-SCNC: 130 MMOL/L — LOW (ref 135–146)
T4 FREE SERPL-MCNC: 1.4 NG/DL — SIGNIFICANT CHANGE UP (ref 0.9–1.8)
TROPONIN T SERPL-MCNC: 0.02 NG/ML — HIGH
TROPONIN T SERPL-MCNC: 0.02 NG/ML — HIGH
TSH SERPL-MCNC: 1.35 UIU/ML — SIGNIFICANT CHANGE UP (ref 0.27–4.2)
WBC # BLD: 7.76 K/UL — SIGNIFICANT CHANGE UP (ref 4.8–10.8)
WBC # FLD AUTO: 7.76 K/UL — SIGNIFICANT CHANGE UP (ref 4.8–10.8)

## 2022-12-05 PROCEDURE — 99497 ADVNCD CARE PLAN 30 MIN: CPT | Mod: 25

## 2022-12-05 PROCEDURE — 99233 SBSQ HOSP IP/OBS HIGH 50: CPT

## 2022-12-05 PROCEDURE — 93010 ELECTROCARDIOGRAM REPORT: CPT

## 2022-12-05 RX ORDER — SODIUM CHLORIDE 9 MG/ML
1000 INJECTION, SOLUTION INTRAVENOUS
Refills: 0 | Status: DISCONTINUED | OUTPATIENT
Start: 2022-12-05 | End: 2022-12-05

## 2022-12-05 RX ADMIN — Medication 30 MILLIGRAM(S): at 05:07

## 2022-12-05 RX ADMIN — Medication 25 MICROGRAM(S): at 05:07

## 2022-12-05 RX ADMIN — Medication 25 MILLIGRAM(S): at 17:45

## 2022-12-05 RX ADMIN — Medication 30 MILLIGRAM(S): at 17:45

## 2022-12-05 RX ADMIN — CLOPIDOGREL BISULFATE 75 MILLIGRAM(S): 75 TABLET, FILM COATED ORAL at 11:41

## 2022-12-05 RX ADMIN — INSULIN GLARGINE 10 UNIT(S): 100 INJECTION, SOLUTION SUBCUTANEOUS at 08:34

## 2022-12-05 RX ADMIN — Medication 25 MILLIGRAM(S): at 05:07

## 2022-12-05 RX ADMIN — HEPARIN SODIUM 5000 UNIT(S): 5000 INJECTION INTRAVENOUS; SUBCUTANEOUS at 05:08

## 2022-12-05 RX ADMIN — HEPARIN SODIUM 5000 UNIT(S): 5000 INJECTION INTRAVENOUS; SUBCUTANEOUS at 23:05

## 2022-12-05 RX ADMIN — HEPARIN SODIUM 5000 UNIT(S): 5000 INJECTION INTRAVENOUS; SUBCUTANEOUS at 14:55

## 2022-12-05 RX ADMIN — SODIUM CHLORIDE 50 MILLILITER(S): 9 INJECTION, SOLUTION INTRAVENOUS at 09:49

## 2022-12-05 RX ADMIN — TAMSULOSIN HYDROCHLORIDE 0.4 MILLIGRAM(S): 0.4 CAPSULE ORAL at 23:05

## 2022-12-05 RX ADMIN — FINASTERIDE 5 MILLIGRAM(S): 5 TABLET, FILM COATED ORAL at 11:41

## 2022-12-05 RX ADMIN — DULOXETINE HYDROCHLORIDE 60 MILLIGRAM(S): 30 CAPSULE, DELAYED RELEASE ORAL at 11:42

## 2022-12-05 RX ADMIN — SIMVASTATIN 20 MILLIGRAM(S): 20 TABLET, FILM COATED ORAL at 23:05

## 2022-12-05 NOTE — ED ADULT NURSE REASSESSMENT NOTE - NS ED NURSE REASSESS COMMENT FT1
Received pt from previous nurse,  Pt AxOx3. Safety measures maintained. Call bell within reach. Hourly rounding maintained.

## 2022-12-05 NOTE — ED ADULT NURSE REASSESSMENT NOTE - NS ED NURSE REASSESS COMMENT FT1
PT noted sleeping throughout the night, no complaints or issues noted at this time, hourly rounding done, safety precautions maintained, bed alarm on. all needs attended

## 2022-12-05 NOTE — PHYSICAL THERAPY INITIAL EVALUATION ADULT - PERTINENT HX OF CURRENT PROBLEM, REHAB EVAL
89 yo M with PMHx of HTN, HLD, hypothyroidism, BPH, DM presents to the Ed for evaluation of his SOB. On Thursday 12/1 patient developed diarrhea which resolved in a couple of days. he also had subjective fever on Friday 12/2. He also had a productive cough the past few days and felt sob. Presented to an urgent care who found him to be hypoxic and sent him to Ed.   In Ed, pt was placed on 4L NC, sating 98%. BP and HR stable. Placed on tele which shows frequent PACs. CXR shows stable atelectasis R base and low lung volumes. Influenza A+. Not vaccinated for flu. Labs: Cr 1.8, Mg 1.5, trop 0.03, BNP 1k, Na 129, Hb 11.5.   Received NS bolus and IV mg

## 2022-12-05 NOTE — PHYSICAL THERAPY INITIAL EVALUATION ADULT - GENERAL OBSERVATIONS, REHAB EVAL
[id number] Pt received and left semifowlers in bed, NAD, pt agreeable to PT session +3L O2 NC +tele +SpO2

## 2022-12-05 NOTE — PHYSICAL THERAPY INITIAL EVALUATION ADULT - ADDITIONAL COMMENTS
Pt lives home with daughter, 3 WARREN, remains on 1st floor. Pt has HHA 8 hours/day, daily, requires assistance with ADLs and ambulates with RW.

## 2022-12-06 ENCOUNTER — TRANSCRIPTION ENCOUNTER (OUTPATIENT)
Age: 87
End: 2022-12-06

## 2022-12-06 VITALS
OXYGEN SATURATION: 97 % | DIASTOLIC BLOOD PRESSURE: 79 MMHG | HEART RATE: 69 BPM | SYSTOLIC BLOOD PRESSURE: 171 MMHG | RESPIRATION RATE: 18 BRPM | TEMPERATURE: 97 F

## 2022-12-06 LAB
ALBUMIN SERPL ELPH-MCNC: 3.5 G/DL — SIGNIFICANT CHANGE UP (ref 3.5–5.2)
ALP SERPL-CCNC: 59 U/L — SIGNIFICANT CHANGE UP (ref 30–115)
ALT FLD-CCNC: 19 U/L — SIGNIFICANT CHANGE UP (ref 0–41)
ANION GAP SERPL CALC-SCNC: 12 MMOL/L — SIGNIFICANT CHANGE UP (ref 7–14)
APPEARANCE UR: CLEAR — SIGNIFICANT CHANGE UP
AST SERPL-CCNC: 27 U/L — SIGNIFICANT CHANGE UP (ref 0–41)
BACTERIA # UR AUTO: NEGATIVE — SIGNIFICANT CHANGE UP
BASOPHILS # BLD AUTO: 0.02 K/UL — SIGNIFICANT CHANGE UP (ref 0–0.2)
BASOPHILS NFR BLD AUTO: 0.2 % — SIGNIFICANT CHANGE UP (ref 0–1)
BILIRUB SERPL-MCNC: 0.4 MG/DL — SIGNIFICANT CHANGE UP (ref 0.2–1.2)
BILIRUB UR-MCNC: NEGATIVE — SIGNIFICANT CHANGE UP
BUN SERPL-MCNC: 31 MG/DL — HIGH (ref 10–20)
CALCIUM SERPL-MCNC: 8.6 MG/DL — SIGNIFICANT CHANGE UP (ref 8.4–10.5)
CHLORIDE SERPL-SCNC: 105 MMOL/L — SIGNIFICANT CHANGE UP (ref 98–110)
CO2 SERPL-SCNC: 23 MMOL/L — SIGNIFICANT CHANGE UP (ref 17–32)
COLOR SPEC: YELLOW — SIGNIFICANT CHANGE UP
CREAT SERPL-MCNC: 1 MG/DL — SIGNIFICANT CHANGE UP (ref 0.7–1.5)
DIFF PNL FLD: NEGATIVE — SIGNIFICANT CHANGE UP
EGFR: 68 ML/MIN/1.73M2 — SIGNIFICANT CHANGE UP
EOSINOPHIL # BLD AUTO: 0.03 K/UL — SIGNIFICANT CHANGE UP (ref 0–0.7)
EOSINOPHIL NFR BLD AUTO: 0.3 % — SIGNIFICANT CHANGE UP (ref 0–8)
EPI CELLS # UR: 1 /HPF — SIGNIFICANT CHANGE UP (ref 0–5)
GLUCOSE BLDC GLUCOMTR-MCNC: 194 MG/DL — HIGH (ref 70–99)
GLUCOSE BLDC GLUCOMTR-MCNC: 195 MG/DL — HIGH (ref 70–99)
GLUCOSE BLDC GLUCOMTR-MCNC: 236 MG/DL — HIGH (ref 70–99)
GLUCOSE BLDC GLUCOMTR-MCNC: 253 MG/DL — HIGH (ref 70–99)
GLUCOSE SERPL-MCNC: 206 MG/DL — HIGH (ref 70–99)
GLUCOSE UR QL: ABNORMAL
HCT VFR BLD CALC: 34.9 % — LOW (ref 42–52)
HGB BLD-MCNC: 11.8 G/DL — LOW (ref 14–18)
HYALINE CASTS # UR AUTO: 2 /LPF — SIGNIFICANT CHANGE UP (ref 0–7)
IMM GRANULOCYTES NFR BLD AUTO: 0.8 % — HIGH (ref 0.1–0.3)
KETONES UR-MCNC: SIGNIFICANT CHANGE UP
LEUKOCYTE ESTERASE UR-ACNC: NEGATIVE — SIGNIFICANT CHANGE UP
LYMPHOCYTES # BLD AUTO: 26.8 % — SIGNIFICANT CHANGE UP (ref 20.5–51.1)
LYMPHOCYTES # BLD AUTO: 3.14 K/UL — SIGNIFICANT CHANGE UP (ref 1.2–3.4)
MAGNESIUM SERPL-MCNC: 1.8 MG/DL — SIGNIFICANT CHANGE UP (ref 1.8–2.4)
MCHC RBC-ENTMCNC: 29.4 PG — SIGNIFICANT CHANGE UP (ref 27–31)
MCHC RBC-ENTMCNC: 33.8 G/DL — SIGNIFICANT CHANGE UP (ref 32–37)
MCV RBC AUTO: 87 FL — SIGNIFICANT CHANGE UP (ref 80–94)
MONOCYTES # BLD AUTO: 0.99 K/UL — HIGH (ref 0.1–0.6)
MONOCYTES NFR BLD AUTO: 8.5 % — SIGNIFICANT CHANGE UP (ref 1.7–9.3)
NEUTROPHILS # BLD AUTO: 7.43 K/UL — HIGH (ref 1.4–6.5)
NEUTROPHILS NFR BLD AUTO: 63.4 % — SIGNIFICANT CHANGE UP (ref 42.2–75.2)
NITRITE UR-MCNC: NEGATIVE — SIGNIFICANT CHANGE UP
NRBC # BLD: 0 /100 WBCS — SIGNIFICANT CHANGE UP (ref 0–0)
PH UR: 6 — SIGNIFICANT CHANGE UP (ref 5–8)
PLATELET # BLD AUTO: 174 K/UL — SIGNIFICANT CHANGE UP (ref 130–400)
POTASSIUM SERPL-MCNC: 4.9 MMOL/L — SIGNIFICANT CHANGE UP (ref 3.5–5)
POTASSIUM SERPL-SCNC: 4.9 MMOL/L — SIGNIFICANT CHANGE UP (ref 3.5–5)
PROCALCITONIN SERPL-MCNC: 0.16 NG/ML — HIGH (ref 0.02–0.1)
PROT SERPL-MCNC: 5.6 G/DL — LOW (ref 6–8)
PROT UR-MCNC: ABNORMAL
RBC # BLD: 4.01 M/UL — LOW (ref 4.7–6.1)
RBC # FLD: 13.2 % — SIGNIFICANT CHANGE UP (ref 11.5–14.5)
RBC CASTS # UR COMP ASSIST: 4 /HPF — SIGNIFICANT CHANGE UP (ref 0–4)
SODIUM SERPL-SCNC: 140 MMOL/L — SIGNIFICANT CHANGE UP (ref 135–146)
SP GR SPEC: 1.02 — SIGNIFICANT CHANGE UP (ref 1.01–1.03)
UROBILINOGEN FLD QL: SIGNIFICANT CHANGE UP
WBC # BLD: 11.7 K/UL — HIGH (ref 4.8–10.8)
WBC # FLD AUTO: 11.7 K/UL — HIGH (ref 4.8–10.8)
WBC UR QL: 1 /HPF — SIGNIFICANT CHANGE UP (ref 0–5)

## 2022-12-06 PROCEDURE — 71045 X-RAY EXAM CHEST 1 VIEW: CPT | Mod: 26

## 2022-12-06 PROCEDURE — 99239 HOSP IP/OBS DSCHRG MGMT >30: CPT

## 2022-12-06 RX ORDER — DEXTROSE 50 % IN WATER 50 %
25 SYRINGE (ML) INTRAVENOUS ONCE
Refills: 0 | Status: DISCONTINUED | OUTPATIENT
Start: 2022-12-06 | End: 2022-12-06

## 2022-12-06 RX ORDER — LOPERAMIDE HCL 2 MG
4 TABLET ORAL ONCE
Refills: 0 | Status: COMPLETED | OUTPATIENT
Start: 2022-12-06 | End: 2022-12-06

## 2022-12-06 RX ORDER — DEXTROSE 50 % IN WATER 50 %
12.5 SYRINGE (ML) INTRAVENOUS ONCE
Refills: 0 | Status: DISCONTINUED | OUTPATIENT
Start: 2022-12-06 | End: 2022-12-06

## 2022-12-06 RX ORDER — INSULIN LISPRO 100/ML
VIAL (ML) SUBCUTANEOUS
Refills: 0 | Status: DISCONTINUED | OUTPATIENT
Start: 2022-12-06 | End: 2022-12-06

## 2022-12-06 RX ORDER — DEXTROSE 50 % IN WATER 50 %
15 SYRINGE (ML) INTRAVENOUS ONCE
Refills: 0 | Status: DISCONTINUED | OUTPATIENT
Start: 2022-12-06 | End: 2022-12-06

## 2022-12-06 RX ORDER — LOPERAMIDE HCL 2 MG
2 TABLET ORAL THREE TIMES A DAY
Refills: 0 | Status: DISCONTINUED | OUTPATIENT
Start: 2022-12-06 | End: 2022-12-06

## 2022-12-06 RX ORDER — GUAIFENESIN/DEXTROMETHORPHAN 600MG-30MG
20 TABLET, EXTENDED RELEASE 12 HR ORAL
Qty: 420 | Refills: 0
Start: 2022-12-06 | End: 2022-12-12

## 2022-12-06 RX ADMIN — Medication 4 MILLIGRAM(S): at 17:22

## 2022-12-06 RX ADMIN — Medication 25 MILLIGRAM(S): at 05:54

## 2022-12-06 RX ADMIN — Medication 25 MICROGRAM(S): at 05:54

## 2022-12-06 RX ADMIN — HEPARIN SODIUM 5000 UNIT(S): 5000 INJECTION INTRAVENOUS; SUBCUTANEOUS at 05:53

## 2022-12-06 RX ADMIN — Medication 75 MILLIGRAM(S): at 17:22

## 2022-12-06 RX ADMIN — DULOXETINE HYDROCHLORIDE 60 MILLIGRAM(S): 30 CAPSULE, DELAYED RELEASE ORAL at 13:33

## 2022-12-06 RX ADMIN — Medication 25 MILLIGRAM(S): at 17:22

## 2022-12-06 RX ADMIN — FINASTERIDE 5 MILLIGRAM(S): 5 TABLET, FILM COATED ORAL at 13:34

## 2022-12-06 RX ADMIN — CLOPIDOGREL BISULFATE 75 MILLIGRAM(S): 75 TABLET, FILM COATED ORAL at 13:34

## 2022-12-06 RX ADMIN — Medication 30 MILLIGRAM(S): at 05:55

## 2022-12-06 RX ADMIN — INSULIN GLARGINE 10 UNIT(S): 100 INJECTION, SOLUTION SUBCUTANEOUS at 08:37

## 2022-12-06 RX ADMIN — HEPARIN SODIUM 5000 UNIT(S): 5000 INJECTION INTRAVENOUS; SUBCUTANEOUS at 13:34

## 2022-12-06 RX ADMIN — Medication 3: at 17:22

## 2022-12-06 NOTE — DISCHARGE NOTE NURSING/CASE MANAGEMENT/SOCIAL WORK - MODE OF TRANSPORTATION
Topical Retinoid Pregnancy And Lactation Text: This medication is Pregnancy Category C. It is unknown if this medication is excreted in breast milk. Birth Control Pills Counseling: Birth Control Pill Counseling: I discussed with the patient the potential side effects of OCPs including but not limited to increased risk of stroke, heart attack, thrombophlebitis, deep venous thrombosis, hepatic adenomas, breast changes, GI upset, headaches, and depression.  The patient verbalized understanding of the proper use and possible adverse effects of OCPs. All of the patient's questions and concerns were addressed. Azithromycin Pregnancy And Lactation Text: This medication is considered safe during pregnancy and is also secreted in breast milk. Bactrim Counseling:  I discussed with the patient the risks of sulfa antibiotics including but not limited to GI upset, allergic reaction, drug rash, diarrhea, dizziness, photosensitivity, and yeast infections.  Rarely, more serious reactions can occur including but not limited to aplastic anemia, agranulocytosis, methemoglobinemia, blood dyscrasias, liver or kidney failure, lung infiltrates or desquamative/blistering drug rashes. Minocycline Counseling: Patient advised regarding possible photosensitivity and discoloration of the teeth, skin, lips, tongue and gums.  Patient instructed to avoid sunlight, if possible.  When exposed to sunlight, patients should wear protective clothing, sunglasses, and sunscreen.  The patient was instructed to call the office immediately if the following severe adverse effects occur:  hearing changes, easy bruising/bleeding, severe headache, or vision changes.  The patient verbalized understanding of the proper use and possible adverse effects of minocycline.  All of the patient's questions and concerns were addressed. Doxycycline Pregnancy And Lactation Text: This medication is Pregnancy Category D and not consider safe during pregnancy. It is also excreted in breast milk but is considered safe for shorter treatment courses. Dapsone Pregnancy And Lactation Text: This medication is Pregnancy Category C and is not considered safe during pregnancy or breast feeding. Ambulette Topical Clindamycin Counseling: Patient counseled that this medication may cause skin irritation or allergic reactions.  In the event of skin irritation, the patient was advised to reduce the amount of the drug applied or use it less frequently.   The patient verbalized understanding of the proper use and possible adverse effects of clindamycin.  All of the patient's questions and concerns were addressed. Detail Level: Zone Doxycycline Counseling:  Patient counseled regarding possible photosensitivity and increased risk for sunburn.  Patient instructed to avoid sunlight, if possible.  When exposed to sunlight, patients should wear protective clothing, sunglasses, and sunscreen.  The patient was instructed to call the office immediately if the following severe adverse effects occur:  hearing changes, easy bruising/bleeding, severe headache, or vision changes.  The patient verbalized understanding of the proper use and possible adverse effects of doxycycline.  All of the patient's questions and concerns were addressed. Spironolactone Counseling: Patient advised regarding risks of diarrhea, abdominal pain, hyperkalemia, birth defects (for female patients), liver toxicity and renal toxicity. The patient may need blood work to monitor liver and kidney function and potassium levels while on therapy. The patient verbalized understanding of the proper use and possible adverse effects of spironolactone.  All of the patient's questions and concerns were addressed. Tazorac Pregnancy And Lactation Text: This medication is not safe during pregnancy. It is unknown if this medication is excreted in breast milk. Use Enhanced Medication Counseling?: No Spironolactone Pregnancy And Lactation Text: This medication can cause feminization of the male fetus and should be avoided during pregnancy. The active metabolite is also found in breast milk. Isotretinoin Pregnancy And Lactation Text: This medication is Pregnancy Category X and is considered extremely dangerous during pregnancy. It is unknown if it is excreted in breast milk. Benzoyl Peroxide Counseling: Patient counseled that medicine may cause skin irritation and bleach clothing.  In the event of skin irritation, the patient was advised to reduce the amount of the drug applied or use it less frequently.   The patient verbalized understanding of the proper use and possible adverse effects of benzoyl peroxide.  All of the patient's questions and concerns were addressed. Topical Clindamycin Pregnancy And Lactation Text: This medication is Pregnancy Category B and is considered safe during pregnancy. It is unknown if it is excreted in breast milk. Tetracycline Counseling: Patient counseled regarding possible photosensitivity and increased risk for sunburn.  Patient instructed to avoid sunlight, if possible.  When exposed to sunlight, patients should wear protective clothing, sunglasses, and sunscreen.  The patient was instructed to call the office immediately if the following severe adverse effects occur:  hearing changes, easy bruising/bleeding, severe headache, or vision changes.  The patient verbalized understanding of the proper use and possible adverse effects of tetracycline.  All of the patient's questions and concerns were addressed. Patient understands to avoid pregnancy while on therapy due to potential birth defects. Minocycline Pregnancy And Lactation Text: This medication is Pregnancy Category D and not consider safe during pregnancy. It is also excreted in breast milk. Dapsone Counseling: I discussed with the patient the risks of dapsone including but not limited to hemolytic anemia, agranulocytosis, rashes, methemoglobinemia, kidney failure, peripheral neuropathy, headaches, GI upset, and liver toxicity.  Patients who start dapsone require monitoring including baseline LFTs and weekly CBCs for the first month, then every month thereafter.  The patient verbalized understanding of the proper use and possible adverse effects of dapsone.  All of the patient's questions and concerns were addressed. Erythromycin Counseling:  I discussed with the patient the risks of erythromycin including but not limited to GI upset, allergic reaction, drug rash, diarrhea, increase in liver enzymes, and yeast infections. Tazorac Counseling:  Patient advised that medication is irritating and drying.  Patient may need to apply sparingly and wash off after an hour before eventually leaving it on overnight.  The patient verbalized understanding of the proper use and possible adverse effects of tazorac.  All of the patient's questions and concerns were addressed. Azithromycin Counseling:  I discussed with the patient the risks of azithromycin including but not limited to GI upset, allergic reaction, drug rash, diarrhea, and yeast infections. Topical Retinoid counseling:  Patient advised to apply a pea-sized amount only at bedtime and wait 30 minutes after washing their face before applying.  If too drying, patient may add a non-comedogenic moisturizer. The patient verbalized understanding of the proper use and possible adverse effects of retinoids.  All of the patient's questions and concerns were addressed. Topical Sulfur Applications Pregnancy And Lactation Text: This medication is Pregnancy Category C and has an unknown safety profile during pregnancy. It is unknown if this topical medication is excreted in breast milk. High Dose Vitamin A Pregnancy And Lactation Text: High dose vitamin A therapy is contraindicated during pregnancy and breast feeding. Bactrim Pregnancy And Lactation Text: This medication is Pregnancy Category D and is known to cause fetal risk.  It is also excreted in breast milk. Isotretinoin Counseling: Patient should get monthly blood tests, not donate blood, not drive at night if vision affected, not share medication, and not undergo elective surgery for 6 months after tx completed. Side effects reviewed, pt to contact office should one occur. Birth Control Pills Pregnancy And Lactation Text: This medication should be avoided if pregnant and for the first 30 days post-partum. Erythromycin Pregnancy And Lactation Text: This medication is Pregnancy Category B and is considered safe during pregnancy. It is also excreted in breast milk. Topical Sulfur Applications Counseling: Topical Sulfur Counseling: Patient counseled that this medication may cause skin irritation or allergic reactions.  In the event of skin irritation, the patient was advised to reduce the amount of the drug applied or use it less frequently.   The patient verbalized understanding of the proper use and possible adverse effects of topical sulfur application.  All of the patient's questions and concerns were addressed. Benzoyl Peroxide Pregnancy And Lactation Text: This medication is Pregnancy Category C. It is unknown if benzoyl peroxide is excreted in breast milk. High Dose Vitamin A Counseling: Side effects reviewed, pt to contact office should one occur.

## 2022-12-06 NOTE — DISCHARGE NOTE PROVIDER - ATTENDING DISCHARGE PHYSICAL EXAMINATION:
Attending attestation  Attending DC note  Pt seen and examined at bedside. No cp or sob. Pt feels better. Pt needs o2 87 on RA. ambulated 2-3 Lnc 93-97%  vitals, labs, exam stable  Hospital course as above.  Plan dw pt and agreed to plan  Medically cleared for DC. Med recc completed.  MARY resident. Spent 32 mins on case

## 2022-12-06 NOTE — DISCHARGE NOTE PROVIDER - HOSPITAL COURSE
89 yo M with PMHx of HTN, HLD, hypothyroidism, BPH, DM presents to the Ed for evaluation of his SOB. On Thursday 12/1 patient developed diarrhea which resolved in a couple of days. he also had subjective fever on Friday 12/2. He also had a productive cough the past few days and felt sob. Presented to an urgent care who found him to be hypoxic and sent him to Ed.   In Ed, pt was placed on 4L NC, sating 98%. BP and HR stable. Placed on tele which shows frequent PACs. CXR shows stable atelectasis R base and low lung volumes. Influenza A+. Not vaccinated for flu. Labs: Cr 1.8, Mg 1.5, trop 0.03, BNP 1k, Na 129, Hb 11.5.   Received NS bolus and IV mg  Started tamiflu, renally dosed, sunil resolved, cr down to 1.0 12/6, c/o some urinary frequency ua/cx sent, oxygen 87 ORA at rest, otherwise stable for dc home with home O2 89 yo M with PMHx of HTN, HLD, hypothyroidism, BPH, DM presents to the Ed for evaluation of his SOB. On Thursday 12/1 patient developed diarrhea which resolved in a couple of days. he also had subjective fever on Friday 12/2. He also had a productive cough the past few days and felt sob. Presented to an urgent care who found him to be hypoxic and sent him to Ed.   In Ed, pt was placed on 4L NC, sating 98%. BP and HR stable. Placed on tele which shows frequent PACs. CXR shows stable atelectasis R base and low lung volumes. Influenza A+. Not vaccinated for flu. Labs: Cr 1.8, Mg 1.5, trop 0.03, BNP 1k, Na 129, Hb 11.5.   Received NS bolus and IV mg  Started tamiflu, renally dosed, brina resolved, cr down to 1.0 12/6, c/o some urinary frequency ua/cx sent, oxygen 87 ORA at rest, otherwise stable for dc home with home O2  Troponin remaind stable, joyaley 2/2 ischemic demnd from infection and BRINA, no cp. No events on cardiac telemonitoring. Pt can do outpt echo. dimer neg.

## 2022-12-06 NOTE — DISCHARGE NOTE PROVIDER - CARE PROVIDER_API CALL
Javan Rios)  [address] [facility]  [address] [place] [address]  Phone: [phone]  Fax: [phone]  Follow Up Time: 2 weeks

## 2022-12-06 NOTE — DISCHARGE NOTE PROVIDER - NSDCMRMEDTOKEN_GEN_ALL_CORE_FT
celecoxib 100 mg oral capsule: 1 cap(s) orally once a day, As Needed  clopidogrel 75 mg oral tablet: 1 tab(s) orally once a day  DULoxetine 60 mg oral delayed release capsule: 1 cap(s) orally once a day  finasteride 5 mg oral tablet: 1 tab(s) orally once a day  levothyroxine 25 mcg (0.025 mg) oral tablet: 1 tab(s) orally once a day  Metoprolol Tartrate 25 mg oral tablet: 1 tab(s) orally 2 times a day  oseltamivir 75 mg oral capsule: 1 cap(s) orally 2 times a day  simvastatin 20 mg oral tablet: 1 tab(s) orally once a day (at bedtime)  tamsulosin 0.4 mg oral capsule: 1 cap(s) orally once a day  Toujeo SoloStar 300 units/mL subcutaneous solution: 12 unit(s) subcutaneous once a day  traMADol 50 mg oral tablet: 1 tab(s) orally 2 times a day, As Needed   celecoxib 100 mg oral capsule: 1 cap(s) orally once a day, As Needed  clopidogrel 75 mg oral tablet: 1 tab(s) orally once a day  DULoxetine 60 mg oral delayed release capsule: 1 cap(s) orally once a day  finasteride 5 mg oral tablet: 1 tab(s) orally once a day  levothyroxine 25 mcg (0.025 mg) oral tablet: 1 tab(s) orally once a day  Metoprolol Tartrate 25 mg oral tablet: 1 tab(s) orally 2 times a day  oseltamivir 75 mg oral capsule: 1 cap(s) orally 2 times a day  Robitussin Cough + Chest Congestion DM 20 mg-200 mg/20 mL oral liquid: 20 milliliter(s) orally 3 times a day, As Needed   simvastatin 20 mg oral tablet: 1 tab(s) orally once a day (at bedtime)  tamsulosin 0.4 mg oral capsule: 1 cap(s) orally once a day  Toujeo SoloStar 300 units/mL subcutaneous solution: 12 unit(s) subcutaneous once a day  traMADol 50 mg oral tablet: 1 tab(s) orally 2 times a day, As Needed

## 2022-12-06 NOTE — DISCHARGE NOTE NURSING/CASE MANAGEMENT/SOCIAL WORK - NSDCPEFALRISK_GEN_ALL_CORE
For information on Fall & Injury Prevention, visit: [email or web address] OR  [email or web address] OR  https://www.cdc.gov/steadi/patient.html

## 2022-12-06 NOTE — DISCHARGE NOTE NURSING/CASE MANAGEMENT/SOCIAL WORK - PATIENT PORTAL LINK FT
You can access the FollowMyHealth Patient Portal offered by [facility] by registering at the following website: [email or web address]. By joining [facility]’s FollowMyHealth portal, you will also be able to view your health information using other applications (apps) compatible with our system.

## 2022-12-06 NOTE — DISCHARGE NOTE PROVIDER - NSDCHHATTENDCERT_GEN_ALL_CORE
Problem: Physical Therapy Goal  Goal: Physical Therapy Goal  Description: Goals to be met by: 20     Patient will increase functional independence with mobility by performin. Supine to sit with Stand-by Assistance  2. Sit to supine with Stand-by Assistance  3. Rolling to Left and Right with Modified [place].  4. Sit to stand transfer with Supervision and RW  5. Gait  x 50 feet with Contact Guard Assistance using Rolling Walker.   6. Wheelchair propulsion x200 feet with Supervision using bilateral uppper extremities    Outcome: Ongoing, Progressing   Pt will benefit from further therapy in order to return to PLOF.   My signature below certifies that the above stated patient is homebound and upon completion of the Face-To-Face encounter, has the need for intermittent skilled nursing, physical therapy and/or speech or occupational therapy services in their home for their current diagnosis as outlined in their initial plan of care. These services will continue to be monitored by myself or another physician.

## 2022-12-06 NOTE — DISCHARGE NOTE PROVIDER - NSDCCPCAREPLAN_GEN_ALL_CORE_FT
PRINCIPAL DISCHARGE DIAGNOSIS  Diagnosis: Influenza A  Assessment and Plan of Treatment: Shortness of breath  Shortness of breath (dyspnea) means you have trouble breathing and could indicate a medical problem. Causes include lung disease, heart disease, low amount of red blood cells (anemia), poor physical fitness, being overweight, smoking, etc. Your health care provider today may not be able to find a cause for your shortness of breath after your exam. In this case, it is important to have a follow-up exam with your primary care physician as instructed. If medicines were prescribed, take them as directed for the full length of time directed. Refrain from tobacco products.  SEEK IMMEDIATE MEDICAL CARE IF YOU HAVE ANY OF THE FOLLOWING SYMPTOMS: worsening shortness of breath, chest pain, back pain, abdominal pain, fever, coughing up blood, lightheadedness/dizziness.      SECONDARY DISCHARGE DIAGNOSES  Diagnosis: BRINA (acute kidney injury)  Assessment and Plan of Treatment: You were noted to have a temporary insult to your kidney function either at the time that you arrived at the hospital or during your stay here. We have monitored your kidney function with blood work during your time here and you are at a level that no longer requires continued hospital level care, but we do recommend that you follow up to continually have your kidney function checked. You can follow up with your primary care doctor, or, if recommended in the discharge paperwork, you should follow up with a Kidney specialist called a Nephrologist.     PRINCIPAL DISCHARGE DIAGNOSIS  Diagnosis: Influenza A  Assessment and Plan of Treatment: Shortness of breath  Shortness of breath (dyspnea) means you have trouble breathing and could indicate a medical problem. Causes include lung disease, heart disease, low amount of red blood cells (anemia), poor physical fitness, being overweight, smoking, etc. Your health care provider today may not be able to find a cause for your shortness of breath after your exam. In this case, it is important to have a follow-up exam with your primary care physician as instructed. If medicines were prescribed, take them as directed for the full length of time directed. Refrain from tobacco products.  SEEK IMMEDIATE MEDICAL CARE IF YOU HAVE ANY OF THE FOLLOWING SYMPTOMS: worsening shortness of breath, chest pain, back pain, abdominal pain, fever, coughing up blood, lightheadedness/dizziness.      SECONDARY DISCHARGE DIAGNOSES  Diagnosis: BRINA (acute kidney injury)  Assessment and Plan of Treatment: You were noted to have a temporary insult to your kidney function either at the time that you arrived at the hospital or during your stay here. We have monitored your kidney function with blood work during your time here and you are at a level that no longer requires continued hospital level care, but we do recommend that you follow up to continually have your kidney function checked. You can follow up with your primary care doctor, or, if recommended in the discharge paperwork, you should follow up with a Kidney specialist called a Nephrologist.    Diagnosis: Elevated troponin  Assessment and Plan of Treatment: You had a mild elevation in your heart enzyme which was likely secondary to your kidney injury which resolved and infection. Please follow up with your primary care physician or cardiologist for an echo (heart ultrasound)

## 2022-12-09 LAB
-  AMIKACIN: SIGNIFICANT CHANGE UP
-  AMOXICILLIN/CLAVULANIC ACID: SIGNIFICANT CHANGE UP
-  AMPICILLIN/SULBACTAM: SIGNIFICANT CHANGE UP
-  AMPICILLIN: SIGNIFICANT CHANGE UP
-  AMPICILLIN: SIGNIFICANT CHANGE UP
-  AZTREONAM: SIGNIFICANT CHANGE UP
-  CEFAZOLIN: SIGNIFICANT CHANGE UP
-  CEFEPIME: SIGNIFICANT CHANGE UP
-  CEFOXITIN: SIGNIFICANT CHANGE UP
-  CEFTRIAXONE: SIGNIFICANT CHANGE UP
-  CIPROFLOXACIN: SIGNIFICANT CHANGE UP
-  CIPROFLOXACIN: SIGNIFICANT CHANGE UP
-  ERTAPENEM: SIGNIFICANT CHANGE UP
-  GENTAMICIN: SIGNIFICANT CHANGE UP
-  IMIPENEM: SIGNIFICANT CHANGE UP
-  LEVOFLOXACIN: SIGNIFICANT CHANGE UP
-  LEVOFLOXACIN: SIGNIFICANT CHANGE UP
-  MEROPENEM: SIGNIFICANT CHANGE UP
-  NITROFURANTOIN: SIGNIFICANT CHANGE UP
-  NITROFURANTOIN: SIGNIFICANT CHANGE UP
-  PIPERACILLIN/TAZOBACTAM: SIGNIFICANT CHANGE UP
-  TETRACYCLINE: SIGNIFICANT CHANGE UP
-  TOBRAMYCIN: SIGNIFICANT CHANGE UP
-  TRIMETHOPRIM/SULFAMETHOXAZOLE: SIGNIFICANT CHANGE UP
-  VANCOMYCIN: SIGNIFICANT CHANGE UP
CULTURE RESULTS: SIGNIFICANT CHANGE UP
METHOD TYPE: SIGNIFICANT CHANGE UP
METHOD TYPE: SIGNIFICANT CHANGE UP
ORGANISM # SPEC MICROSCOPIC CNT: SIGNIFICANT CHANGE UP
SPECIMEN SOURCE: SIGNIFICANT CHANGE UP

## 2022-12-10 LAB
CULTURE RESULTS: SIGNIFICANT CHANGE UP
CULTURE RESULTS: SIGNIFICANT CHANGE UP
SPECIMEN SOURCE: SIGNIFICANT CHANGE UP
SPECIMEN SOURCE: SIGNIFICANT CHANGE UP

## 2022-12-12 DIAGNOSIS — E11.9 TYPE 2 DIABETES MELLITUS WITHOUT COMPLICATIONS: ICD-10-CM

## 2022-12-12 DIAGNOSIS — R35.0 FREQUENCY OF MICTURITION: ICD-10-CM

## 2022-12-12 DIAGNOSIS — Z79.890 HORMONE REPLACEMENT THERAPY: ICD-10-CM

## 2022-12-12 DIAGNOSIS — Z20.822 CONTACT WITH AND (SUSPECTED) EXPOSURE TO COVID-19: ICD-10-CM

## 2022-12-12 DIAGNOSIS — E03.9 HYPOTHYROIDISM, UNSPECIFIED: ICD-10-CM

## 2022-12-12 DIAGNOSIS — B97.89 OTHER VIRAL AGENTS AS THE CAUSE OF DISEASES CLASSIFIED ELSEWHERE: ICD-10-CM

## 2022-12-12 DIAGNOSIS — E83.42 HYPOMAGNESEMIA: ICD-10-CM

## 2022-12-12 DIAGNOSIS — R06.02 SHORTNESS OF BREATH: ICD-10-CM

## 2022-12-12 DIAGNOSIS — I10 ESSENTIAL (PRIMARY) HYPERTENSION: ICD-10-CM

## 2022-12-12 DIAGNOSIS — N40.1 BENIGN PROSTATIC HYPERPLASIA WITH LOWER URINARY TRACT SYMPTOMS: ICD-10-CM

## 2022-12-12 DIAGNOSIS — Z95.1 PRESENCE OF AORTOCORONARY BYPASS GRAFT: ICD-10-CM

## 2022-12-12 DIAGNOSIS — Z87.891 PERSONAL HISTORY OF NICOTINE DEPENDENCE: ICD-10-CM

## 2022-12-12 DIAGNOSIS — Z79.82 LONG TERM (CURRENT) USE OF ASPIRIN: ICD-10-CM

## 2022-12-12 DIAGNOSIS — E78.5 HYPERLIPIDEMIA, UNSPECIFIED: ICD-10-CM

## 2022-12-12 DIAGNOSIS — N17.9 ACUTE KIDNEY FAILURE, UNSPECIFIED: ICD-10-CM

## 2022-12-12 DIAGNOSIS — Z79.02 LONG TERM (CURRENT) USE OF ANTITHROMBOTICS/ANTIPLATELETS: ICD-10-CM

## 2022-12-12 DIAGNOSIS — J10.1 INFLUENZA DUE TO OTHER IDENTIFIED INFLUENZA VIRUS WITH OTHER RESPIRATORY MANIFESTATIONS: ICD-10-CM

## 2022-12-13 ENCOUNTER — INPATIENT (INPATIENT)
Facility: HOSPITAL | Age: 87
LOS: 9 days | Discharge: NURSING, REHAB OR LONG-TERM CARE | End: 2022-12-23
Attending: INTERNAL MEDICINE | Admitting: INTERNAL MEDICINE

## 2022-12-13 VITALS
SYSTOLIC BLOOD PRESSURE: 144 MMHG | HEART RATE: 96 BPM | OXYGEN SATURATION: 93 % | DIASTOLIC BLOOD PRESSURE: 67 MMHG | WEIGHT: 149.91 LBS | TEMPERATURE: 97 F | RESPIRATION RATE: 18 BRPM

## 2022-12-13 DIAGNOSIS — E87.1 HYPO-OSMOLALITY AND HYPONATREMIA: ICD-10-CM

## 2022-12-13 DIAGNOSIS — E83.42 HYPOMAGNESEMIA: ICD-10-CM

## 2022-12-13 DIAGNOSIS — Z79.4 LONG TERM (CURRENT) USE OF INSULIN: ICD-10-CM

## 2022-12-13 DIAGNOSIS — Z95.1 PRESENCE OF AORTOCORONARY BYPASS GRAFT: ICD-10-CM

## 2022-12-13 DIAGNOSIS — Z95.1 PRESENCE OF AORTOCORONARY BYPASS GRAFT: Chronic | ICD-10-CM

## 2022-12-13 DIAGNOSIS — J96.01 ACUTE RESPIRATORY FAILURE WITH HYPOXIA: ICD-10-CM

## 2022-12-13 DIAGNOSIS — N40.0 BENIGN PROSTATIC HYPERPLASIA WITHOUT LOWER URINARY TRACT SYMPTOMS: ICD-10-CM

## 2022-12-13 DIAGNOSIS — J15.20 PNEUMONIA DUE TO STAPHYLOCOCCUS, UNSPECIFIED: ICD-10-CM

## 2022-12-13 DIAGNOSIS — J18.9 PNEUMONIA, UNSPECIFIED ORGANISM: ICD-10-CM

## 2022-12-13 DIAGNOSIS — R64 CACHEXIA: ICD-10-CM

## 2022-12-13 DIAGNOSIS — E78.5 HYPERLIPIDEMIA, UNSPECIFIED: ICD-10-CM

## 2022-12-13 DIAGNOSIS — E11.65 TYPE 2 DIABETES MELLITUS WITH HYPERGLYCEMIA: ICD-10-CM

## 2022-12-13 DIAGNOSIS — I10 ESSENTIAL (PRIMARY) HYPERTENSION: ICD-10-CM

## 2022-12-13 DIAGNOSIS — J15.6 PNEUMONIA DUE TO OTHER GRAM-NEGATIVE BACTERIA: ICD-10-CM

## 2022-12-13 DIAGNOSIS — J10.08 INFLUENZA DUE TO OTHER IDENTIFIED INFLUENZA VIRUS WITH OTHER SPECIFIED PNEUMONIA: ICD-10-CM

## 2022-12-13 DIAGNOSIS — E88.09 OTHER DISORDERS OF PLASMA-PROTEIN METABOLISM, NOT ELSEWHERE CLASSIFIED: ICD-10-CM

## 2022-12-13 DIAGNOSIS — N17.9 ACUTE KIDNEY FAILURE, UNSPECIFIED: ICD-10-CM

## 2022-12-13 DIAGNOSIS — Z98.61 CORONARY ANGIOPLASTY STATUS: ICD-10-CM

## 2022-12-13 DIAGNOSIS — Z20.822 CONTACT WITH AND (SUSPECTED) EXPOSURE TO COVID-19: ICD-10-CM

## 2022-12-13 DIAGNOSIS — J15.4 PNEUMONIA DUE TO OTHER STREPTOCOCCI: ICD-10-CM

## 2022-12-13 DIAGNOSIS — I25.10 ATHEROSCLEROTIC HEART DISEASE OF NATIVE CORONARY ARTERY WITHOUT ANGINA PECTORIS: ICD-10-CM

## 2022-12-13 DIAGNOSIS — E03.9 HYPOTHYROIDISM, UNSPECIFIED: ICD-10-CM

## 2022-12-13 DIAGNOSIS — Z87.891 PERSONAL HISTORY OF NICOTINE DEPENDENCE: ICD-10-CM

## 2022-12-13 DIAGNOSIS — E86.1 HYPOVOLEMIA: ICD-10-CM

## 2022-12-13 DIAGNOSIS — A41.9 SEPSIS, UNSPECIFIED ORGANISM: ICD-10-CM

## 2022-12-13 DIAGNOSIS — I35.0 NONRHEUMATIC AORTIC (VALVE) STENOSIS: ICD-10-CM

## 2022-12-13 DIAGNOSIS — R19.7 DIARRHEA, UNSPECIFIED: ICD-10-CM

## 2022-12-13 LAB
ALBUMIN SERPL ELPH-MCNC: 3.3 G/DL — LOW (ref 3.5–5.2)
ALP SERPL-CCNC: 111 U/L — SIGNIFICANT CHANGE UP (ref 30–115)
ALT FLD-CCNC: 15 U/L — SIGNIFICANT CHANGE UP (ref 0–41)
ANION GAP SERPL CALC-SCNC: 13 MMOL/L — SIGNIFICANT CHANGE UP (ref 7–14)
APPEARANCE UR: CLEAR — SIGNIFICANT CHANGE UP
AST SERPL-CCNC: 16 U/L — SIGNIFICANT CHANGE UP (ref 0–41)
BACTERIA # UR AUTO: NEGATIVE — SIGNIFICANT CHANGE UP
BASOPHILS # BLD AUTO: 0 K/UL — SIGNIFICANT CHANGE UP (ref 0–0.2)
BASOPHILS NFR BLD AUTO: 0 % — SIGNIFICANT CHANGE UP (ref 0–1)
BILIRUB SERPL-MCNC: 0.7 MG/DL — SIGNIFICANT CHANGE UP (ref 0.2–1.2)
BILIRUB UR-MCNC: NEGATIVE — SIGNIFICANT CHANGE UP
BUN SERPL-MCNC: 24 MG/DL — HIGH (ref 10–20)
CALCIUM SERPL-MCNC: 9.1 MG/DL — SIGNIFICANT CHANGE UP (ref 8.4–10.5)
CHLORIDE SERPL-SCNC: 96 MMOL/L — LOW (ref 98–110)
CO2 SERPL-SCNC: 25 MMOL/L — SIGNIFICANT CHANGE UP (ref 17–32)
COLOR SPEC: YELLOW — SIGNIFICANT CHANGE UP
CREAT SERPL-MCNC: 1.1 MG/DL — SIGNIFICANT CHANGE UP (ref 0.7–1.5)
DIFF PNL FLD: NEGATIVE — SIGNIFICANT CHANGE UP
EGFR: 61 ML/MIN/1.73M2 — SIGNIFICANT CHANGE UP
EOSINOPHIL # BLD AUTO: 0 K/UL — SIGNIFICANT CHANGE UP (ref 0–0.7)
EOSINOPHIL NFR BLD AUTO: 0 % — SIGNIFICANT CHANGE UP (ref 0–8)
EPI CELLS # UR: 1 /HPF — SIGNIFICANT CHANGE UP (ref 0–5)
FLUAV AG NPH QL: DETECTED
FLUBV AG NPH QL: SIGNIFICANT CHANGE UP
GIANT PLATELETS BLD QL SMEAR: PRESENT — SIGNIFICANT CHANGE UP
GLUCOSE BLDC GLUCOMTR-MCNC: 357 MG/DL — HIGH (ref 70–99)
GLUCOSE SERPL-MCNC: 366 MG/DL — HIGH (ref 70–99)
GLUCOSE UR QL: ABNORMAL
HCT VFR BLD CALC: 34.1 % — LOW (ref 42–52)
HGB BLD-MCNC: 11 G/DL — LOW (ref 14–18)
HYALINE CASTS # UR AUTO: 1 /LPF — SIGNIFICANT CHANGE UP (ref 0–7)
KETONES UR-MCNC: ABNORMAL
LACTATE SERPL-SCNC: 1.6 MMOL/L — SIGNIFICANT CHANGE UP (ref 0.7–2)
LEUKOCYTE ESTERASE UR-ACNC: NEGATIVE — SIGNIFICANT CHANGE UP
LIDOCAIN IGE QN: 13 U/L — SIGNIFICANT CHANGE UP (ref 7–60)
LYMPHOCYTES # BLD AUTO: 12.9 % — LOW (ref 20.5–51.1)
LYMPHOCYTES # BLD AUTO: 3.05 K/UL — SIGNIFICANT CHANGE UP (ref 1.2–3.4)
MAGNESIUM SERPL-MCNC: 1.4 MG/DL — LOW (ref 1.8–2.4)
MANUAL SMEAR VERIFICATION: SIGNIFICANT CHANGE UP
MCHC RBC-ENTMCNC: 28.6 PG — SIGNIFICANT CHANGE UP (ref 27–31)
MCHC RBC-ENTMCNC: 32.3 G/DL — SIGNIFICANT CHANGE UP (ref 32–37)
MCV RBC AUTO: 88.8 FL — SIGNIFICANT CHANGE UP (ref 80–94)
MONOCYTES # BLD AUTO: 1.02 K/UL — HIGH (ref 0.1–0.6)
MONOCYTES NFR BLD AUTO: 4.3 % — SIGNIFICANT CHANGE UP (ref 1.7–9.3)
NEUTROPHILS # BLD AUTO: 19.39 K/UL — HIGH (ref 1.4–6.5)
NEUTROPHILS NFR BLD AUTO: 81.9 % — HIGH (ref 42.2–75.2)
NITRITE UR-MCNC: NEGATIVE — SIGNIFICANT CHANGE UP
PH UR: 6 — SIGNIFICANT CHANGE UP (ref 5–8)
PLAT MORPH BLD: NORMAL — SIGNIFICANT CHANGE UP
PLATELET # BLD AUTO: 355 K/UL — SIGNIFICANT CHANGE UP (ref 130–400)
POTASSIUM SERPL-MCNC: 5 MMOL/L — SIGNIFICANT CHANGE UP (ref 3.5–5)
POTASSIUM SERPL-SCNC: 5 MMOL/L — SIGNIFICANT CHANGE UP (ref 3.5–5)
PROT SERPL-MCNC: 5.8 G/DL — LOW (ref 6–8)
PROT UR-MCNC: ABNORMAL
RBC # BLD: 3.84 M/UL — LOW (ref 4.7–6.1)
RBC # FLD: 13.2 % — SIGNIFICANT CHANGE UP (ref 11.5–14.5)
RBC BLD AUTO: NORMAL — SIGNIFICANT CHANGE UP
RBC CASTS # UR COMP ASSIST: 5 /HPF — HIGH (ref 0–4)
RSV RNA NPH QL NAA+NON-PROBE: SIGNIFICANT CHANGE UP
SARS-COV-2 RNA SPEC QL NAA+PROBE: SIGNIFICANT CHANGE UP
SODIUM SERPL-SCNC: 134 MMOL/L — LOW (ref 135–146)
SP GR SPEC: >1.05 (ref 1.01–1.03)
TROPONIN T SERPL-MCNC: 0.01 NG/ML — SIGNIFICANT CHANGE UP
UROBILINOGEN FLD QL: SIGNIFICANT CHANGE UP
VARIANT LYMPHS # BLD: 0.9 % — SIGNIFICANT CHANGE UP (ref 0–5)
WBC # BLD: 23.67 K/UL — HIGH (ref 4.8–10.8)
WBC # FLD AUTO: 23.67 K/UL — HIGH (ref 4.8–10.8)
WBC UR QL: 1 /HPF — SIGNIFICANT CHANGE UP (ref 0–5)

## 2022-12-13 PROCEDURE — 93010 ELECTROCARDIOGRAM REPORT: CPT

## 2022-12-13 PROCEDURE — 99223 1ST HOSP IP/OBS HIGH 75: CPT

## 2022-12-13 PROCEDURE — 99285 EMERGENCY DEPT VISIT HI MDM: CPT | Mod: FS,CS

## 2022-12-13 PROCEDURE — 74177 CT ABD & PELVIS W/CONTRAST: CPT | Mod: 26,MA

## 2022-12-13 PROCEDURE — 71045 X-RAY EXAM CHEST 1 VIEW: CPT | Mod: 26

## 2022-12-13 RX ORDER — ACETAMINOPHEN 500 MG
650 TABLET ORAL EVERY 6 HOURS
Refills: 0 | Status: DISCONTINUED | OUTPATIENT
Start: 2022-12-13 | End: 2022-12-23

## 2022-12-13 RX ORDER — ENOXAPARIN SODIUM 100 MG/ML
40 INJECTION SUBCUTANEOUS EVERY 24 HOURS
Refills: 0 | Status: DISCONTINUED | OUTPATIENT
Start: 2022-12-13 | End: 2022-12-23

## 2022-12-13 RX ORDER — INSULIN GLARGINE 100 [IU]/ML
12 INJECTION, SOLUTION SUBCUTANEOUS
Qty: 0 | Refills: 0 | DISCHARGE

## 2022-12-13 RX ORDER — CEFEPIME 1 G/1
2000 INJECTION, POWDER, FOR SOLUTION INTRAMUSCULAR; INTRAVENOUS ONCE
Refills: 0 | Status: COMPLETED | OUTPATIENT
Start: 2022-12-13 | End: 2022-12-13

## 2022-12-13 RX ORDER — AZITHROMYCIN 500 MG/1
500 TABLET, FILM COATED ORAL ONCE
Refills: 0 | Status: COMPLETED | OUTPATIENT
Start: 2022-12-13 | End: 2022-12-13

## 2022-12-13 RX ORDER — DULOXETINE HYDROCHLORIDE 30 MG/1
60 CAPSULE, DELAYED RELEASE ORAL DAILY
Refills: 0 | Status: DISCONTINUED | OUTPATIENT
Start: 2022-12-13 | End: 2022-12-23

## 2022-12-13 RX ORDER — TAMSULOSIN HYDROCHLORIDE 0.4 MG/1
1 CAPSULE ORAL
Qty: 0 | Refills: 0 | DISCHARGE

## 2022-12-13 RX ORDER — INSULIN GLARGINE 100 [IU]/ML
15 INJECTION, SOLUTION SUBCUTANEOUS
Qty: 0 | Refills: 0 | DISCHARGE

## 2022-12-13 RX ORDER — TAMSULOSIN HYDROCHLORIDE 0.4 MG/1
0.4 CAPSULE ORAL AT BEDTIME
Refills: 0 | Status: DISCONTINUED | OUTPATIENT
Start: 2022-12-13 | End: 2022-12-23

## 2022-12-13 RX ORDER — MAGNESIUM SULFATE 500 MG/ML
2 VIAL (ML) INJECTION ONCE
Refills: 0 | Status: COMPLETED | OUTPATIENT
Start: 2022-12-13 | End: 2022-12-13

## 2022-12-13 RX ORDER — CLOPIDOGREL BISULFATE 75 MG/1
75 TABLET, FILM COATED ORAL DAILY
Refills: 0 | Status: DISCONTINUED | OUTPATIENT
Start: 2022-12-13 | End: 2022-12-23

## 2022-12-13 RX ORDER — METOPROLOL TARTRATE 50 MG
25 TABLET ORAL
Refills: 0 | Status: DISCONTINUED | OUTPATIENT
Start: 2022-12-13 | End: 2022-12-23

## 2022-12-13 RX ORDER — INSULIN LISPRO 100/ML
5 VIAL (ML) SUBCUTANEOUS
Refills: 0 | Status: DISCONTINUED | OUTPATIENT
Start: 2022-12-13 | End: 2022-12-23

## 2022-12-13 RX ORDER — LEVOTHYROXINE SODIUM 125 MCG
50 TABLET ORAL DAILY
Refills: 0 | Status: DISCONTINUED | OUTPATIENT
Start: 2022-12-13 | End: 2022-12-23

## 2022-12-13 RX ORDER — SIMVASTATIN 20 MG/1
1 TABLET, FILM COATED ORAL
Qty: 0 | Refills: 0 | DISCHARGE

## 2022-12-13 RX ORDER — FINASTERIDE 5 MG/1
1 TABLET, FILM COATED ORAL
Qty: 0 | Refills: 0 | DISCHARGE

## 2022-12-13 RX ORDER — LEVOTHYROXINE SODIUM 125 MCG
1 TABLET ORAL
Qty: 0 | Refills: 0 | DISCHARGE

## 2022-12-13 RX ORDER — INSULIN GLARGINE 100 [IU]/ML
15 INJECTION, SOLUTION SUBCUTANEOUS AT BEDTIME
Refills: 0 | Status: DISCONTINUED | OUTPATIENT
Start: 2022-12-13 | End: 2022-12-23

## 2022-12-13 RX ORDER — CELECOXIB 200 MG/1
100 CAPSULE ORAL DAILY
Refills: 0 | Status: DISCONTINUED | OUTPATIENT
Start: 2022-12-13 | End: 2022-12-23

## 2022-12-13 RX ORDER — CEFEPIME 1 G/1
2000 INJECTION, POWDER, FOR SOLUTION INTRAMUSCULAR; INTRAVENOUS EVERY 12 HOURS
Refills: 0 | Status: DISCONTINUED | OUTPATIENT
Start: 2022-12-14 | End: 2022-12-14

## 2022-12-13 RX ORDER — VANCOMYCIN HCL 1 G
1000 VIAL (EA) INTRAVENOUS EVERY 12 HOURS
Refills: 0 | Status: DISCONTINUED | OUTPATIENT
Start: 2022-12-14 | End: 2022-12-14

## 2022-12-13 RX ORDER — VANCOMYCIN HCL 1 G
1000 VIAL (EA) INTRAVENOUS ONCE
Refills: 0 | Status: COMPLETED | OUTPATIENT
Start: 2022-12-13 | End: 2022-12-13

## 2022-12-13 RX ORDER — SODIUM CHLORIDE 9 MG/ML
1000 INJECTION, SOLUTION INTRAVENOUS ONCE
Refills: 0 | Status: COMPLETED | OUTPATIENT
Start: 2022-12-13 | End: 2022-12-13

## 2022-12-13 RX ORDER — METOPROLOL TARTRATE 50 MG
1 TABLET ORAL
Qty: 0 | Refills: 0 | DISCHARGE

## 2022-12-13 RX ORDER — SIMVASTATIN 20 MG/1
20 TABLET, FILM COATED ORAL AT BEDTIME
Refills: 0 | Status: DISCONTINUED | OUTPATIENT
Start: 2022-12-13 | End: 2022-12-23

## 2022-12-13 RX ORDER — INSULIN LISPRO 100/ML
VIAL (ML) SUBCUTANEOUS
Refills: 0 | Status: DISCONTINUED | OUTPATIENT
Start: 2022-12-13 | End: 2022-12-23

## 2022-12-13 RX ORDER — DULOXETINE HYDROCHLORIDE 30 MG/1
1 CAPSULE, DELAYED RELEASE ORAL
Qty: 0 | Refills: 0 | DISCHARGE

## 2022-12-13 RX ORDER — FINASTERIDE 5 MG/1
5 TABLET, FILM COATED ORAL DAILY
Refills: 0 | Status: DISCONTINUED | OUTPATIENT
Start: 2022-12-13 | End: 2022-12-23

## 2022-12-13 RX ADMIN — SODIUM CHLORIDE 1000 MILLILITER(S): 9 INJECTION, SOLUTION INTRAVENOUS at 13:00

## 2022-12-13 RX ADMIN — Medication 250 MILLIGRAM(S): at 19:14

## 2022-12-13 RX ADMIN — INSULIN GLARGINE 15 UNIT(S): 100 INJECTION, SOLUTION SUBCUTANEOUS at 20:38

## 2022-12-13 RX ADMIN — CLOPIDOGREL BISULFATE 75 MILLIGRAM(S): 75 TABLET, FILM COATED ORAL at 22:44

## 2022-12-13 RX ADMIN — Medication 25 GRAM(S): at 20:14

## 2022-12-13 RX ADMIN — CEFEPIME 100 MILLIGRAM(S): 1 INJECTION, POWDER, FOR SOLUTION INTRAMUSCULAR; INTRAVENOUS at 18:38

## 2022-12-13 RX ADMIN — AZITHROMYCIN 255 MILLIGRAM(S): 500 TABLET, FILM COATED ORAL at 17:40

## 2022-12-13 NOTE — H&P ADULT - NSHPLABSRESULTS_GEN_ALL_CORE
CT Abdomen and Pelvis w/ IV Cont (12.13.22 @ 14:47)  1. No CT evidence of an acute abdominopelvic pathology.  2. Patchy bibasilar consolidations, suspicious for multifocal pneumonia in the appropriate clinical setting. Follow-up to resolution is recommended.

## 2022-12-13 NOTE — H&P ADULT - NSICDXPASTMEDICALHX_GEN_ALL_CORE_FT
PAST MEDICAL HISTORY:  BPH (benign prostatic hyperplasia)     CAD (coronary artery disease)     Diabetes mellitus     HLD (hyperlipidemia)     HTN (hypertension)     Hypothyroidism

## 2022-12-13 NOTE — H&P ADULT - NSHPPHYSICALEXAM_GEN_ALL_CORE
VITALS  T(C): 36.1 (12-13-22 @ 15:26), Max: 36.2 (12-13-22 @ 12:18)  HR: 95 (12-13-22 @ 15:26) (95 - 96)  BP: 160/70 (12-13-22 @ 15:26) (144/67 - 160/70)  RR: 18 (12-13-22 @ 15:26) (18 - 18)  SpO2: 97% (12-13-22 @ 15:26) (93% - 97%)    PHYSICAL EXAM  GENERAL: no acute respiratory distress on 4L NC; lying flat in bed  NEURO: Somnolent, but easily arousable; oriented x2 (place and person), PERRLA, EOMI, moves all extremities  HEAD:  Atraumatic, Normocephalic  EYES: conjunctiva and sclera clear  NECK: Supple, No JVD, no lymphadenopathy, no thyromegaly  CHEST/LUNG: bibasilar rhonchi  HEART: Regular rate and rhythm; No murmurs, rubs, or gallops  ABDOMEN: Soft, Nontender, Nondistended; Bowel sounds present, no masses.  EXTREMITIES:  2+ Peripheral Pulses, No clubbing, cyanosis, or edema  SKIN: Warm, dry, intact, no rashes or lesions

## 2022-12-13 NOTE — H&P ADULT - NSHPSOCIALHISTORY_GEN_ALL_CORE
Currently retired. Lives w/ daughter (Shayna). Ambulates w/ rolling walker. Reportedly able to most if not all ADL's independently. However, pt does have HHA 8hrs x 7days per week.

## 2022-12-13 NOTE — H&P ADULT - ASSESSMENT
89M w/ h/o CAD (s/p CABG and PCI), HTN, DLD, DM, BPH, and hypothyroidism p/w lethargy. Admitted for pneumonia.    #Pneumonia  Initially p/w lethargy. Sepsis present on admission (WBC 23.67, HR 95, Lactate 1.6). CTAP demonstrated bibasilar consolidations in visualized portion of lungs. Admitted last week for Influenza A. Overall concerning for post-viral bacterial pneumonia.  - c/w cefepime 2g IV Q12H (adjusted by CrCl)  - c/w vancomycin 1g IV Q12H (obtain trough before 4th dose on 12/5 AM)  - wean O2 as tolerated (currently on 4L NC)  - obtain MRSA PCR  - obtain strep and legionella urine antigen  - obtain procalcitonin  - ID consulted; f/u recs    #Insulin-Dependent Diabetes Mellitus  A1c 10.2% (Dec 2022). Managed via [facility] 12u qd. Last admission, pt on Lantus 10u qhs, but FS still poorly controlled.  on admission, but low suspicion for DKA (AG 13 and bicarb 25).  - Monitor FS TIDAC and QHS  - c/w Lantus 15U SQ QHS  - c/w Lispro 5U SQ TIDAC  - c/w low-dose ISS     #Coronary Artery Disease  #Hypertension  #Dyslipidemia  Known h/o CAD. S/P PCI x3. S/P CABG in mid-2000's. No reported h/o CHF.  - c/w clopidogrel 75mg PO QD  - c/w simvastatin 20mg PO QHS  - c/w Lopressor 25mg PO BID    #BPH  - c/w tamsulosin 0.4mg PO QHS  - c/w finasteride 5mg PO QD    DVT PPX: Lovenox 40mg SQ QD  GI PPX: none indicated  DIET: DASH/TLC + CC  ACTIVITY: IAT  CODE STATUS: Full Code  DISPOSITION: From Home    PENDING: ID consult; improvement in lethargy and dyspnea 89M w/ h/o CAD (s/p CABG and PCI), HTN, DLD, DM, BPH, and hypothyroidism p/w lethargy. Admitted for pneumonia.    #Pneumonia  Initially p/w lethargy. Sepsis present on admission (WBC 23.67, HR 95, Lactate 1.6). CTAP demonstrated bibasilar consolidations in visualized portion of lungs. Admitted last week for Influenza A. Overall concerning for post-viral bacterial pneumonia.  - c/w cefepime 2g IV Q12H (adjusted by CrCl)  - c/w vancomycin 1g IV Q12H (obtain trough before 4th dose on 12/5 AM)  - wean O2 as tolerated (currently on 4L NC)  - f/u BCx and UCx  - obtain MRSA PCR  - obtain strep and legionella urine antigen  - obtain procalcitonin  - ID consulted; f/u recs    #Insulin-Dependent Diabetes Mellitus  A1c 10.2% (Dec 2022). Managed via [facility]jeo 12u qd. Last admission, pt on Lantus 10u qhs, but FS still poorly controlled.  on admission, but low suspicion for DKA (AG 13 and bicarb 25).  - Monitor FS TIDAC and QHS  - c/w Lantus 15U SQ QHS  - c/w Lispro 5U SQ TIDAC  - c/w low-dose ISS     #Coronary Artery Disease  #Hypertension  #Dyslipidemia  Known h/o CAD. S/P PCI x3. S/P CABG in mid-2000's. No reported h/o CHF.  - c/w clopidogrel 75mg PO QD  - c/w simvastatin 20mg PO QHS  - c/w Lopressor 25mg PO BID    #BPH  - c/w tamsulosin 0.4mg PO QHS  - c/w finasteride 5mg PO QD    DVT PPX: Lovenox 40mg SQ QD  GI PPX: none indicated  DIET: DASH/TLC + CC  ACTIVITY: IAT  CODE STATUS: Full Code  DISPOSITION: From Home    PENDING: ID consult; improvement in lethargy and dyspnea 89M w/ h/o CAD (s/p CABG and PCI), HTN, DLD, DM, BPH, and hypothyroidism p/w lethargy. Admitted for pneumonia.    #Pneumonia  Initially p/w lethargy. Sepsis present on admission (WBC 23.67, HR 95, Lactate 1.6). CTAP demonstrated bibasilar consolidations in visualized portion of lungs. Admitted last week for Influenza A. Overall concerning for post-viral bacterial pneumonia.  - c/w cefepime 2g IV Q12H (adjusted by CrCl)  - c/w vancomycin 1g IV Q12H (obtain trough before 4th dose on 12/5 AM)  - wean O2 as tolerated (currently on 4L NC)  - f/u BCx and UCx  - obtain MRSA PCR  - obtain strep and legionella urine antigen  - obtain procalcitonin, ESR, and CRP  - obtain TTE and BNP (r/o cardiac etiology of pt's dyspnea)  - ID consulted; f/u recs    #Insulin-Dependent Diabetes Mellitus  A1c 10.2% (Dec 2022). Managed via Toujeo 12u qd. Last admission, pt on Lantus 10u qhs, but FS still poorly controlled.  on admission, but low suspicion for DKA (AG 13 and bicarb 25).  - Monitor FS TIDAC and QHS  - c/w Lantus 15U SQ QHS  - c/w Lispro 5U SQ TIDAC  - c/w low-dose ISS     #Coronary Artery Disease  #Hypertension  #Dyslipidemia  Known h/o CAD. S/P PCI x3. S/P CABG in mid-2000's. No reported h/o CHF.  - c/w clopidogrel 75mg PO QD  - c/w simvastatin 20mg PO QHS  - c/w Lopressor 25mg PO BID    #BPH  - c/w tamsulosin 0.4mg PO QHS  - c/w finasteride 5mg PO QD    DVT PPX: Lovenox 40mg SQ QD  GI PPX: none indicated  DIET: DASH/TLC + CC  ACTIVITY: IAT  CODE STATUS: Full Code  DISPOSITION: From Home    PENDING: ID consult; improvement in lethargy and dyspnea

## 2022-12-13 NOTE — ED PROVIDER NOTE - OBJECTIVE STATEMENT
89 yo M CAD, HTN, DLD, DM, BPH, and hypothyroidism recently admitted for pneumoniona presenting with persistent coughing, decreased appetite, abdominal pain, and generalized weakness since being discharged from hospital. Symptoms are moderate. No alleviating/aggravating factors. No cp, sob, fever, chills, nausea, vomiting, diarrhea, back pain, urinary symptoms, headache, dizziness, or paresthesias.

## 2022-12-13 NOTE — ED PROVIDER NOTE - CLINICAL SUMMARY MEDICAL DECISION MAKING FREE TEXT BOX
89-year-old male with a past medical history of hypertension, hyperlipidemia, hypothyroidism, diabetes, presents with abdominal pain for last 4 days as well as worsening cough.  Recently discharged from the hospital after admitted for influenza.  Discharged on 2 L nasal cannula per daughter at bedside.  Patient's oxygen now 93% on 4 L nasal cannula.  Per the daughter has had to increase over the last couple days.  Decreased oral intake but no vomiting.  Also lower abdominal pain.  Has shortness of breath but no chest pain.  On exam frail-appearing, on 4 L nasal cannula, minimal tachypnea, coarse breath sounds bilaterally, no wheezes.  Heart regular no murmurs, abdomen soft, nondistended, minimal bilateral lower quadrant tenderness, no rebound or guarding.  Labs with leukocytosis to 23,000, hyperglycemia without signs of DKA, mild dehydration on labs, imaging consistent with pneumonia.  Will give empiric broad-spectrum antibiotics, IV fluids, admit to medicine.  Work of breathing now improved after IV fluid rehydration and no more tachypnea.  Feel the patient is clinically stable for the floor.  EKG reassuring as above.

## 2022-12-13 NOTE — ED PROVIDER NOTE - PHYSICAL EXAMINATION
VITAL SIGNS: I have reviewed nursing notes and confirm.  CONSTITUTIONAL: Patient is in no acute distress.  SKIN: Skin exam is warm and dry, no acute rash.  HEAD: Normocephalic; atraumatic.  EYES: PERRL, EOM intact; conjunctiva and sclera clear.  ENT: No nasal discharge; airway clear.   NECK: Supple; non tender.  CARD: S1, S2 normal; no murmurs, gallops, or rubs. Regular rate and rhythm.  RESP: Coarse breath sounds bilaterally.   ABD: Normal bowel sounds; soft; non-distended; +mild lower abdominal tenderness. No rebound tenderness or guarding.   EXT: Normal ROM. No edema.  LYMPH: No acute cervical adenopathy.  NEURO: Alert, oriented. Grossly unremarkable. No focal deficits.  PSYCH: Cooperative, appropriate.

## 2022-12-13 NOTE — ED ADULT NURSE NOTE - OBJECTIVE STATEMENT
p/w abd pain x 4 days  also reports a cough  pt lives at home with daughter who is a primary historian

## 2022-12-13 NOTE — H&P ADULT - ATTENDING COMMENTS
provided delia mireles with a list of mortuary/ homes per request 89 YO M w/ a PMH of CAD s/p CABG/PCI, HTN, DLD, DM2, BPH, and hypothyroidism who was BIBEMS for eval of ABD pain for the past x 4 days. Associated w/ worsening non-productive cough and dyspnea. ROS is negative except as above.     In the ED, CT-AP w/ IV contrast was negative for acute process in the ABD, did show bibasilar consolidations of the lungs. UA negative. Pt hypoxic to 93% on RA, started on NC and IV ABXs (Cefe/Vanc/Azithro) in the ED.     FMHx:   -No family Hx of early cardiac death, CAD, asthma, or genetic disorders identified    Physical exam shows pt in NAD. VSS, afebrile, not hypoxic on 3L NC. A&Ox3. Neuro exam without deficits, motor/sensory intact, no dysarthria, no facial asymmetry. Muscle strength/sensation intact. Crackles noted throughout all lung fileds. RRR, no M/G/R. ABD is soft and non-tender, normoactive BSs. LEs without swelling. No rashes. Labs and radiology as above.     Acute hypoxic respiratory failure + cough, concerning for post-viral bacterial pneumonia, rule out cardiac process; Sepsis present on admission. ESR/CRP. Echo. BNP. Procal. Supplemental O2 PRN. Pt received IVFs (LR) in the ED. C/w IV ABXs (Vanc/Cefe). Supplemental O2 PRN.     Diabetes mellitus with hyperglycemia. A1c. FSs. Insulin standing/correctional dosing    Magnesium deficiency. Replace. No QTc prolongation present.     Normocytic anemia, at baseline. Pt denies bleeding symptoms. Replace as necessary.     Hypoalbuminemia, from poor oral intake. Nutrition eval.     Hx of CAD s/p CABG/PCI, HTN, DLD, BPH, and hypothyroidism. Restart home meds, except as stated above. DVT PPX. Inform PCP of pt's admission to hospital. My note supersedes the residents note.     Date seen by Attendin22 89 YO M w/ a PMH of CAD s/p CABG/PCI, HTN, DLD, DM2, BPH, and hypothyroidism who was BIBEMS for eval of ABD pain for the past x 4 days. Associated w/ worsening non-productive cough and dyspnea. ROS is negative except as above.     In the ED, CT-AP w/ IV contrast was negative for acute process in the ABD, did show bibasilar consolidations of the lungs. UA negative. Pt hypoxic to 93% on RA, started on NC and IV ABXs (Cefe/Vanc/Azithro) in the ED.     FMHx:   -No family Hx of early cardiac death, CAD, asthma, or genetic disorders identified    Physical exam shows pt in NAD. VSS, afebrile, not hypoxic on 3L NC. A&Ox3. Neuro exam without deficits, motor/sensory intact, no dysarthria, no facial asymmetry. Muscle strength/sensation intact. Crackles noted throughout all lung fileds. RRR, no M/G/R. ABD is soft and non-tender, normoactive BSs. LEs without swelling. No rashes. Labs and radiology as above.     Acute hypoxic respiratory failure + cough, concerning for post-viral bacterial pneumonia, rule out cardiac process; Sepsis present on admission. ESR/CRP. Echo. BNP. RVP. Procal. Supplemental O2 PRN. Pt received IVFs (LR) in the ED. C/w IV ABXs (Vanc/Cefe). Supplemental O2 PRN.     Diabetes mellitus with hyperglycemia. A1c. FSs. Insulin standing/correctional dosing    Magnesium deficiency. Replace. No QTc prolongation present.     Normocytic anemia, at baseline. Pt denies bleeding symptoms. Replace as necessary.     Hypoalbuminemia, from poor oral intake. Nutrition eval.     Hx of CAD s/p CABG/PCI, HTN, DLD, BPH, and hypothyroidism. Restart home meds, except as stated above. DVT PPX. Inform PCP of pt's admission to hospital. My note supersedes the residents note.     Date seen by Attendin22

## 2022-12-13 NOTE — H&P ADULT - HISTORY OF PRESENT ILLNESS
89M w/ h/o CAD (s/p CABG and PCI), HTN, DLD, DM, BPH, and hypothyroidism p/w lethargy. History largely obtained from daughter at bedside. Patient initially admitted last week for dyspnea. Found to have Influenza A. Treated w/ Tamiflu and then discharged w/ portable oxygen (2L) after two days. Since discharge, daughter reports pt has been lethargic with decreased PO intake and worsening cough. Also reported some abdominal pain earlier today, which has since resolved. No reported fevers, chills, CP, palpitations, n/v/d, dysuria, or LE swelling.    In ED, pt HD stable on vitals. Saturating 93% on 4L NC (increased from 2L by EMS en route to ED). Labs significant for WBC 23.67. CTAP demonstrated no acute intra-abdominal pathology, but demonstrated bibasilar consolidations in visualized portion of lungs, suspicious for multifocal pneumonia. UA negative for infection. Admitted to medicine for pneumonia. 89M w/ h/o CAD (s/p CABG and PCI), HTN, DLD, DM, BPH, and hypothyroidism p/w lethargy. History largely obtained from daughter at bedside. Patient initially admitted last week for dyspnea. Found to have Influenza A. Treated w/ Tamiflu and then discharged w/ portable oxygen (2L) after two days. Since discharge, daughter reports pt has been lethargic with decreased PO intake and worsening cough. Also reported some abdominal pain earlier today, which has since resolved. No reported fevers, chills, CP, palpitations, n/v/d, dysuria, or LE swelling.    In ED, pt HD stable on vitals. Saturating 93% on 4L NC (increased from 2L by EMS en route to ED). Labs significant for WBC 23.67. CTAP demonstrated no acute intra-abdominal pathology, but demonstrated bibasilar consolidations in visualized portion of lungs, suspicious for multifocal pneumonia. UA negative for infection. S/P 1L LR bolus and IV abx (vancomycin cefepime, and azithromycin). Admitted to medicine for pneumonia.

## 2022-12-14 ENCOUNTER — TRANSCRIPTION ENCOUNTER (OUTPATIENT)
Age: 87
End: 2022-12-14

## 2022-12-14 LAB
ALBUMIN SERPL ELPH-MCNC: 3.2 G/DL — LOW (ref 3.5–5.2)
ALP SERPL-CCNC: 134 U/L — HIGH (ref 30–115)
ALT FLD-CCNC: 22 U/L — SIGNIFICANT CHANGE UP (ref 0–41)
ANION GAP SERPL CALC-SCNC: 15 MMOL/L — HIGH (ref 7–14)
AST SERPL-CCNC: 37 U/L — SIGNIFICANT CHANGE UP (ref 0–41)
BASOPHILS # BLD AUTO: 0.05 K/UL — SIGNIFICANT CHANGE UP (ref 0–0.2)
BASOPHILS NFR BLD AUTO: 0.2 % — SIGNIFICANT CHANGE UP (ref 0–1)
BILIRUB SERPL-MCNC: 0.6 MG/DL — SIGNIFICANT CHANGE UP (ref 0.2–1.2)
BUN SERPL-MCNC: 19 MG/DL — SIGNIFICANT CHANGE UP (ref 10–20)
CALCIUM SERPL-MCNC: 9.3 MG/DL — SIGNIFICANT CHANGE UP (ref 8.4–10.5)
CHLORIDE SERPL-SCNC: 94 MMOL/L — LOW (ref 98–110)
CO2 SERPL-SCNC: 25 MMOL/L — SIGNIFICANT CHANGE UP (ref 17–32)
CREAT SERPL-MCNC: 1 MG/DL — SIGNIFICANT CHANGE UP (ref 0.7–1.5)
CRP SERPL-MCNC: 291 MG/L — HIGH
EGFR: 68 ML/MIN/1.73M2 — SIGNIFICANT CHANGE UP
EOSINOPHIL # BLD AUTO: 0.14 K/UL — SIGNIFICANT CHANGE UP (ref 0–0.7)
EOSINOPHIL NFR BLD AUTO: 0.6 % — SIGNIFICANT CHANGE UP (ref 0–8)
ERYTHROCYTE [SEDIMENTATION RATE] IN BLOOD: 118 MM/HR — HIGH (ref 0–10)
GLUCOSE BLDC GLUCOMTR-MCNC: 133 MG/DL — HIGH (ref 70–99)
GLUCOSE BLDC GLUCOMTR-MCNC: 240 MG/DL — HIGH (ref 70–99)
GLUCOSE BLDC GLUCOMTR-MCNC: 260 MG/DL — HIGH (ref 70–99)
GLUCOSE SERPL-MCNC: 276 MG/DL — HIGH (ref 70–99)
HCT VFR BLD CALC: 34.4 % — LOW (ref 42–52)
HGB BLD-MCNC: 11.4 G/DL — LOW (ref 14–18)
IMM GRANULOCYTES NFR BLD AUTO: 1.1 % — HIGH (ref 0.1–0.3)
LYMPHOCYTES # BLD AUTO: 15.8 % — LOW (ref 20.5–51.1)
LYMPHOCYTES # BLD AUTO: 3.71 K/UL — HIGH (ref 1.2–3.4)
MAGNESIUM SERPL-MCNC: 1.9 MG/DL — SIGNIFICANT CHANGE UP (ref 1.8–2.4)
MCHC RBC-ENTMCNC: 29.4 PG — SIGNIFICANT CHANGE UP (ref 27–31)
MCHC RBC-ENTMCNC: 33.1 G/DL — SIGNIFICANT CHANGE UP (ref 32–37)
MCV RBC AUTO: 88.7 FL — SIGNIFICANT CHANGE UP (ref 80–94)
MONOCYTES # BLD AUTO: 1.57 K/UL — HIGH (ref 0.1–0.6)
MONOCYTES NFR BLD AUTO: 6.7 % — SIGNIFICANT CHANGE UP (ref 1.7–9.3)
MRSA PCR RESULT.: NEGATIVE — SIGNIFICANT CHANGE UP
NEUTROPHILS # BLD AUTO: 17.8 K/UL — HIGH (ref 1.4–6.5)
NEUTROPHILS NFR BLD AUTO: 75.6 % — HIGH (ref 42.2–75.2)
NRBC # BLD: 0 /100 WBCS — SIGNIFICANT CHANGE UP (ref 0–0)
NT-PROBNP SERPL-SCNC: 3643 PG/ML — HIGH (ref 0–300)
PHOSPHATE SERPL-MCNC: 2.9 MG/DL — SIGNIFICANT CHANGE UP (ref 2.1–4.9)
PLATELET # BLD AUTO: 345 K/UL — SIGNIFICANT CHANGE UP (ref 130–400)
POTASSIUM SERPL-MCNC: 5 MMOL/L — SIGNIFICANT CHANGE UP (ref 3.5–5)
POTASSIUM SERPL-SCNC: 5 MMOL/L — SIGNIFICANT CHANGE UP (ref 3.5–5)
PROT SERPL-MCNC: 5.9 G/DL — LOW (ref 6–8)
RBC # BLD: 3.88 M/UL — LOW (ref 4.7–6.1)
RBC # FLD: 13.5 % — SIGNIFICANT CHANGE UP (ref 11.5–14.5)
SODIUM SERPL-SCNC: 134 MMOL/L — LOW (ref 135–146)
WBC # BLD: 23.54 K/UL — HIGH (ref 4.8–10.8)
WBC # FLD AUTO: 23.54 K/UL — HIGH (ref 4.8–10.8)

## 2022-12-14 PROCEDURE — 93306 TTE W/DOPPLER COMPLETE: CPT | Mod: 26

## 2022-12-14 PROCEDURE — 99232 SBSQ HOSP IP/OBS MODERATE 35: CPT

## 2022-12-14 RX ORDER — CEFTRIAXONE 500 MG/1
INJECTION, POWDER, FOR SOLUTION INTRAMUSCULAR; INTRAVENOUS
Refills: 0 | Status: COMPLETED | OUTPATIENT
Start: 2022-12-14 | End: 2022-12-20

## 2022-12-14 RX ORDER — CEFTRIAXONE 500 MG/1
1000 INJECTION, POWDER, FOR SOLUTION INTRAMUSCULAR; INTRAVENOUS EVERY 24 HOURS
Refills: 0 | Status: COMPLETED | OUTPATIENT
Start: 2022-12-15 | End: 2022-12-19

## 2022-12-14 RX ORDER — INFLUENZA VIRUS VACCINE 15; 15; 15; 15 UG/.5ML; UG/.5ML; UG/.5ML; UG/.5ML
0.7 SUSPENSION INTRAMUSCULAR ONCE
Refills: 0 | Status: DISCONTINUED | OUTPATIENT
Start: 2022-12-14 | End: 2022-12-23

## 2022-12-14 RX ORDER — AMLODIPINE BESYLATE 2.5 MG/1
5 TABLET ORAL ONCE
Refills: 0 | Status: COMPLETED | OUTPATIENT
Start: 2022-12-14 | End: 2022-12-14

## 2022-12-14 RX ORDER — CEFTRIAXONE 500 MG/1
1000 INJECTION, POWDER, FOR SOLUTION INTRAMUSCULAR; INTRAVENOUS ONCE
Refills: 0 | Status: COMPLETED | OUTPATIENT
Start: 2022-12-14 | End: 2022-12-14

## 2022-12-14 RX ORDER — LINEZOLID 600 MG/300ML
600 INJECTION, SOLUTION INTRAVENOUS ONCE
Refills: 0 | Status: COMPLETED | OUTPATIENT
Start: 2022-12-14 | End: 2022-12-14

## 2022-12-14 RX ORDER — FUROSEMIDE 40 MG
20 TABLET ORAL ONCE
Refills: 0 | Status: COMPLETED | OUTPATIENT
Start: 2022-12-14 | End: 2022-12-14

## 2022-12-14 RX ORDER — LINEZOLID 600 MG/300ML
INJECTION, SOLUTION INTRAVENOUS
Refills: 0 | Status: DISCONTINUED | OUTPATIENT
Start: 2022-12-14 | End: 2022-12-21

## 2022-12-14 RX ORDER — LINEZOLID 600 MG/300ML
600 INJECTION, SOLUTION INTRAVENOUS EVERY 12 HOURS
Refills: 0 | Status: DISCONTINUED | OUTPATIENT
Start: 2022-12-15 | End: 2022-12-21

## 2022-12-14 RX ADMIN — DULOXETINE HYDROCHLORIDE 60 MILLIGRAM(S): 30 CAPSULE, DELAYED RELEASE ORAL at 11:16

## 2022-12-14 RX ADMIN — CEFEPIME 100 MILLIGRAM(S): 1 INJECTION, POWDER, FOR SOLUTION INTRAMUSCULAR; INTRAVENOUS at 05:30

## 2022-12-14 RX ADMIN — Medication 100 MILLIGRAM(S): at 14:14

## 2022-12-14 RX ADMIN — TAMSULOSIN HYDROCHLORIDE 0.4 MILLIGRAM(S): 0.4 CAPSULE ORAL at 22:50

## 2022-12-14 RX ADMIN — FINASTERIDE 5 MILLIGRAM(S): 5 TABLET, FILM COATED ORAL at 11:15

## 2022-12-14 RX ADMIN — AMLODIPINE BESYLATE 5 MILLIGRAM(S): 2.5 TABLET ORAL at 10:39

## 2022-12-14 RX ADMIN — INSULIN GLARGINE 15 UNIT(S): 100 INJECTION, SOLUTION SUBCUTANEOUS at 22:18

## 2022-12-14 RX ADMIN — ENOXAPARIN SODIUM 40 MILLIGRAM(S): 100 INJECTION SUBCUTANEOUS at 05:32

## 2022-12-14 RX ADMIN — CLOPIDOGREL BISULFATE 75 MILLIGRAM(S): 75 TABLET, FILM COATED ORAL at 11:15

## 2022-12-14 RX ADMIN — Medication 25 MILLIGRAM(S): at 17:08

## 2022-12-14 RX ADMIN — Medication 2: at 13:21

## 2022-12-14 RX ADMIN — SIMVASTATIN 20 MILLIGRAM(S): 20 TABLET, FILM COATED ORAL at 22:50

## 2022-12-14 RX ADMIN — LINEZOLID 300 MILLIGRAM(S): 600 INJECTION, SOLUTION INTRAVENOUS at 16:44

## 2022-12-14 RX ADMIN — Medication 4: at 09:04

## 2022-12-14 RX ADMIN — Medication 25 MILLIGRAM(S): at 05:30

## 2022-12-14 RX ADMIN — Medication 5 UNIT(S): at 09:02

## 2022-12-14 RX ADMIN — Medication 5 UNIT(S): at 17:08

## 2022-12-14 RX ADMIN — CEFTRIAXONE 100 MILLIGRAM(S): 500 INJECTION, POWDER, FOR SOLUTION INTRAMUSCULAR; INTRAVENOUS at 16:44

## 2022-12-14 RX ADMIN — Medication 50 MICROGRAM(S): at 05:30

## 2022-12-14 RX ADMIN — Medication 3: at 17:08

## 2022-12-14 RX ADMIN — Medication 20 MILLIGRAM(S): at 10:39

## 2022-12-14 RX ADMIN — Medication 250 MILLIGRAM(S): at 05:29

## 2022-12-14 NOTE — CONSULT NOTE ADULT - ASSESSMENT
89M admitted last week for dyspnea. Found to have Influenza A. Treated w/ Tamiflu and then discharged w/ portable oxygen (2L) after two days. Since discharge lethargic with decreased PO intake and worsening cough. Also reported some abdominal pain earlier today, which has since resolved. No reported fevers, chills.    CTAP : bibasilar consolidations.. 89M admitted last week for dyspnea. Found to have Influenza A. Treated w/ Tamiflu and then discharged w/ portable oxygen (2L) after two days. Since discharge lethargic with decreased PO intake and worsening cough. Also reported some abdominal pain earlier today, which has since resolved. No reported fevers, chills.    CTAP : bibasilar consolidations.    IMPRESSION;  Post influenza bibasilar bacterial PNA    RECOMMENDATIONS;  Urine for strep pneumonia/legionella antigen  Nares ORSA  Sputum gm stain, cultures  BCx  no droplet precautions  Linezolid 600 mg iv q12h  Rocephin 1 gm iv q24h  Doxycycline 100 mg iv q12h

## 2022-12-14 NOTE — CONSULT NOTE ADULT - SUBJECTIVE AND OBJECTIVE BOX
CHEL CROFT  89y, Male  Allergy: No Known Allergies      All historical available data reviewed.    HPI:  89M w/ h/o CAD (s/p CABG and PCI), HTN, DLD, DM, BPH, and hypothyroidism p/w lethargy. History largely obtained from daughter at bedside. Patient initially admitted last week for dyspnea. Found to have Influenza A. Treated w/ Tamiflu and then discharged w/ portable oxygen (2L) after two days. Since discharge, daughter reports pt has been lethargic with decreased PO intake and worsening cough. Also reported some abdominal pain earlier today, which has since resolved. No reported fevers, chills, CP, palpitations, n/v/d, dysuria, or LE swelling.    In ED, pt HD stable on vitals. Saturating 93% on 4L NC (increased from 2L by EMS en route to ED). Labs significant for WBC 23.67. CTAP demonstrated no acute intra-abdominal pathology, but demonstrated bibasilar consolidations in visualized portion of lungs, suspicious for multifocal pneumonia. UA negative for infection. S/P 1L LR bolus and IV abx (vancomycin cefepime, and azithromycin). Admitted to medicine for pneumonia. (13 Dec 2022 22:05)    FAMILY HISTORY:    PAST MEDICAL & SURGICAL HISTORY:  HTN (hypertension)      HLD (hyperlipidemia)      BPH (benign prostatic hyperplasia)      CAD (coronary artery disease)      Diabetes mellitus      Hypothyroidism      S/P CABG (coronary artery bypass graft)            VITALS:  T(F): 98.1, Max: 98.1 (12-14-22 @ 05:56)  HR: 108  BP: 158/88  RR: 18Vital Signs Last 24 Hrs  T(C): 36.7 (14 Dec 2022 05:56), Max: 36.7 (14 Dec 2022 05:56)  T(F): 98.1 (14 Dec 2022 05:56), Max: 98.1 (14 Dec 2022 05:56)  HR: 108 (14 Dec 2022 05:56) (95 - 109)  BP: 158/88 (14 Dec 2022 05:56) (144/67 - 160/70)  BP(mean): --  RR: 18 (14 Dec 2022 05:56) (18 - 18)  SpO2: 98% (14 Dec 2022 05:56) (93% - 98%)    Parameters below as of 13 Dec 2022 15:26    O2 Flow (L/min): 4      TESTS & MEASUREMENTS:                        11.0   23.67 )-----------( 355      ( 13 Dec 2022 12:44 )             34.1     12-13    134<L>  |  96<L>  |  24<H>  ----------------------------<  366<H>  5.0   |  25  |  1.1    Ca    9.1      13 Dec 2022 12:44  Mg     1.4     12-13    TPro  5.8<L>  /  Alb  3.3<L>  /  TBili  0.7  /  DBili  x   /  AST  16  /  ALT  15  /  AlkPhos  111  12-13    LIVER FUNCTIONS - ( 13 Dec 2022 12:44 )  Alb: 3.3 g/dL / Pro: 5.8 g/dL / ALK PHOS: 111 U/L / ALT: 15 U/L / AST: 16 U/L / GGT: x             Urinalysis Basic - ( 13 Dec 2022 18:19 )    Color: Yellow / Appearance: Clear / SG: >1.050 / pH: x  Gluc: x / Ketone: Small  / Bili: Negative / Urobili: <2 mg/dL   Blood: x / Protein: 30 mg/dL / Nitrite: Negative   Leuk Esterase: Negative / RBC: 5 /HPF / WBC 1 /HPF   Sq Epi: x / Non Sq Epi: 1 /HPF / Bacteria: Negative          RADIOLOGY & ADDITIONAL TESTS:  Personal review of radiological diagnostics performed  Echo and EKG results noted when applicable.     MEDICATIONS:  acetaminophen     Tablet .. 650 milliGRAM(s) Oral every 6 hours PRN  cefepime   IVPB 2000 milliGRAM(s) IV Intermittent every 12 hours  celecoxib 100 milliGRAM(s) Oral daily PRN  clopidogrel Tablet 75 milliGRAM(s) Oral daily  DULoxetine 60 milliGRAM(s) Oral daily  enoxaparin Injectable 40 milliGRAM(s) SubCutaneous every 24 hours  finasteride 5 milliGRAM(s) Oral daily  insulin glargine Injectable (LANTUS) 15 Unit(s) SubCutaneous at bedtime  insulin lispro (ADMELOG) corrective regimen sliding scale   SubCutaneous three times a day before meals  insulin lispro Injectable (ADMELOG) 5 Unit(s) SubCutaneous three times a day before meals  levothyroxine 50 MICROGram(s) Oral daily  metoprolol tartrate 25 milliGRAM(s) Oral two times a day  simvastatin 20 milliGRAM(s) Oral at bedtime  tamsulosin 0.4 milliGRAM(s) Oral at bedtime  vancomycin  IVPB 1000 milliGRAM(s) IV Intermittent every 12 hours      ANTIBIOTICS:  cefepime   IVPB 2000 milliGRAM(s) IV Intermittent every 12 hours  vancomycin  IVPB 1000 milliGRAM(s) IV Intermittent every 12 hours

## 2022-12-14 NOTE — CONSULT NOTE ADULT - SKIN/BREAST
ED notes do have pt. Following up today with ENT, Dr. Dalton is overbooked today and pt. Lives in [place] (with Dr. Dalton being on-call last night she was not contacted regarding this patient and it seems a bit far to ask patient to travel next week).  The ED discharge instructions have them to follow up with Dr. Escudero today so will forward to his office to see what Dr. Escudero recommends for visit timing.  Thank you.    Will also forward to pt.'s cardiologist asking them to please contact patient and advise if pt. Can hold coumadin into Monday with his epistaxis.    Pt. Has a left sided rhinorocket in place and was sent home with a nasal clamp in case it rebleeds, Dr. Dalton usually waits at least 3 days to remove nasal packing especially as pt. Is on Warfarin.   negative

## 2022-12-14 NOTE — PATIENT PROFILE ADULT - FALL HARM RISK - HARM RISK INTERVENTIONS

## 2022-12-15 LAB
ALBUMIN SERPL ELPH-MCNC: 2.6 G/DL — LOW (ref 3.5–5.2)
ALP SERPL-CCNC: 103 U/L — SIGNIFICANT CHANGE UP (ref 30–115)
ALT FLD-CCNC: 33 U/L — SIGNIFICANT CHANGE UP (ref 0–41)
ANION GAP SERPL CALC-SCNC: 10 MMOL/L — SIGNIFICANT CHANGE UP (ref 7–14)
AST SERPL-CCNC: 53 U/L — HIGH (ref 0–41)
BASOPHILS # BLD AUTO: 0.03 K/UL — SIGNIFICANT CHANGE UP (ref 0–0.2)
BASOPHILS NFR BLD AUTO: 0.2 % — SIGNIFICANT CHANGE UP (ref 0–1)
BILIRUB SERPL-MCNC: 0.6 MG/DL — SIGNIFICANT CHANGE UP (ref 0.2–1.2)
BUN SERPL-MCNC: 22 MG/DL — HIGH (ref 10–20)
CALCIUM SERPL-MCNC: 8.8 MG/DL — SIGNIFICANT CHANGE UP (ref 8.4–10.5)
CHLORIDE SERPL-SCNC: 95 MMOL/L — LOW (ref 98–110)
CO2 SERPL-SCNC: 24 MMOL/L — SIGNIFICANT CHANGE UP (ref 17–32)
CREAT SERPL-MCNC: 1.1 MG/DL — SIGNIFICANT CHANGE UP (ref 0.7–1.5)
CULTURE RESULTS: SIGNIFICANT CHANGE UP
EGFR: 61 ML/MIN/1.73M2 — SIGNIFICANT CHANGE UP
EOSINOPHIL # BLD AUTO: 0.17 K/UL — SIGNIFICANT CHANGE UP (ref 0–0.7)
EOSINOPHIL NFR BLD AUTO: 0.9 % — SIGNIFICANT CHANGE UP (ref 0–8)
GLUCOSE BLDC GLUCOMTR-MCNC: 128 MG/DL — HIGH (ref 70–99)
GLUCOSE BLDC GLUCOMTR-MCNC: 136 MG/DL — HIGH (ref 70–99)
GLUCOSE BLDC GLUCOMTR-MCNC: 171 MG/DL — HIGH (ref 70–99)
GLUCOSE BLDC GLUCOMTR-MCNC: 246 MG/DL — HIGH (ref 70–99)
GLUCOSE SERPL-MCNC: 221 MG/DL — HIGH (ref 70–99)
HCT VFR BLD CALC: 31.4 % — LOW (ref 42–52)
HGB BLD-MCNC: 10.1 G/DL — LOW (ref 14–18)
IMM GRANULOCYTES NFR BLD AUTO: 1.5 % — HIGH (ref 0.1–0.3)
LYMPHOCYTES # BLD AUTO: 21.2 % — SIGNIFICANT CHANGE UP (ref 20.5–51.1)
LYMPHOCYTES # BLD AUTO: 4.01 K/UL — HIGH (ref 1.2–3.4)
MAGNESIUM SERPL-MCNC: 1.6 MG/DL — LOW (ref 1.8–2.4)
MCHC RBC-ENTMCNC: 28.3 PG — SIGNIFICANT CHANGE UP (ref 27–31)
MCHC RBC-ENTMCNC: 32.2 G/DL — SIGNIFICANT CHANGE UP (ref 32–37)
MCV RBC AUTO: 88 FL — SIGNIFICANT CHANGE UP (ref 80–94)
MONOCYTES # BLD AUTO: 1.42 K/UL — HIGH (ref 0.1–0.6)
MONOCYTES NFR BLD AUTO: 7.5 % — SIGNIFICANT CHANGE UP (ref 1.7–9.3)
NEUTROPHILS # BLD AUTO: 13.01 K/UL — HIGH (ref 1.4–6.5)
NEUTROPHILS NFR BLD AUTO: 68.7 % — SIGNIFICANT CHANGE UP (ref 42.2–75.2)
NRBC # BLD: 0 /100 WBCS — SIGNIFICANT CHANGE UP (ref 0–0)
PLATELET # BLD AUTO: 337 K/UL — SIGNIFICANT CHANGE UP (ref 130–400)
POTASSIUM SERPL-MCNC: 4.6 MMOL/L — SIGNIFICANT CHANGE UP (ref 3.5–5)
POTASSIUM SERPL-SCNC: 4.6 MMOL/L — SIGNIFICANT CHANGE UP (ref 3.5–5)
PROT SERPL-MCNC: 4.9 G/DL — LOW (ref 6–8)
RBC # BLD: 3.57 M/UL — LOW (ref 4.7–6.1)
RBC # FLD: 13.2 % — SIGNIFICANT CHANGE UP (ref 11.5–14.5)
SODIUM SERPL-SCNC: 129 MMOL/L — LOW (ref 135–146)
SPECIMEN SOURCE: SIGNIFICANT CHANGE UP
WBC # BLD: 18.93 K/UL — HIGH (ref 4.8–10.8)
WBC # FLD AUTO: 18.93 K/UL — HIGH (ref 4.8–10.8)

## 2022-12-15 PROCEDURE — 99233 SBSQ HOSP IP/OBS HIGH 50: CPT

## 2022-12-15 RX ORDER — ONDANSETRON 8 MG/1
4 TABLET, FILM COATED ORAL ONCE
Refills: 0 | Status: COMPLETED | OUTPATIENT
Start: 2022-12-15 | End: 2022-12-15

## 2022-12-15 RX ORDER — MAGNESIUM SULFATE 500 MG/ML
2 VIAL (ML) INJECTION ONCE
Refills: 0 | Status: COMPLETED | OUTPATIENT
Start: 2022-12-15 | End: 2022-12-15

## 2022-12-15 RX ADMIN — Medication 100 MILLIGRAM(S): at 05:06

## 2022-12-15 RX ADMIN — LINEZOLID 300 MILLIGRAM(S): 600 INJECTION, SOLUTION INTRAVENOUS at 17:16

## 2022-12-15 RX ADMIN — Medication 25 GRAM(S): at 14:03

## 2022-12-15 RX ADMIN — FINASTERIDE 5 MILLIGRAM(S): 5 TABLET, FILM COATED ORAL at 11:46

## 2022-12-15 RX ADMIN — ENOXAPARIN SODIUM 40 MILLIGRAM(S): 100 INJECTION SUBCUTANEOUS at 05:11

## 2022-12-15 RX ADMIN — CEFTRIAXONE 100 MILLIGRAM(S): 500 INJECTION, POWDER, FOR SOLUTION INTRAMUSCULAR; INTRAVENOUS at 12:34

## 2022-12-15 RX ADMIN — INSULIN GLARGINE 15 UNIT(S): 100 INJECTION, SOLUTION SUBCUTANEOUS at 21:22

## 2022-12-15 RX ADMIN — Medication 25 MILLIGRAM(S): at 05:10

## 2022-12-15 RX ADMIN — Medication 5 UNIT(S): at 08:33

## 2022-12-15 RX ADMIN — Medication 5 UNIT(S): at 12:08

## 2022-12-15 RX ADMIN — ONDANSETRON 4 MILLIGRAM(S): 8 TABLET, FILM COATED ORAL at 06:59

## 2022-12-15 RX ADMIN — CLOPIDOGREL BISULFATE 75 MILLIGRAM(S): 75 TABLET, FILM COATED ORAL at 11:44

## 2022-12-15 RX ADMIN — Medication 100 MILLIGRAM(S): at 17:16

## 2022-12-15 RX ADMIN — Medication 5 UNIT(S): at 17:15

## 2022-12-15 RX ADMIN — DULOXETINE HYDROCHLORIDE 60 MILLIGRAM(S): 30 CAPSULE, DELAYED RELEASE ORAL at 11:45

## 2022-12-15 RX ADMIN — SIMVASTATIN 20 MILLIGRAM(S): 20 TABLET, FILM COATED ORAL at 22:51

## 2022-12-15 RX ADMIN — Medication 2: at 08:33

## 2022-12-15 RX ADMIN — Medication 25 MILLIGRAM(S): at 17:16

## 2022-12-15 RX ADMIN — Medication 50 MICROGRAM(S): at 05:11

## 2022-12-15 RX ADMIN — LINEZOLID 300 MILLIGRAM(S): 600 INJECTION, SOLUTION INTRAVENOUS at 06:22

## 2022-12-15 RX ADMIN — TAMSULOSIN HYDROCHLORIDE 0.4 MILLIGRAM(S): 0.4 CAPSULE ORAL at 22:51

## 2022-12-16 LAB
ANION GAP SERPL CALC-SCNC: 10 MMOL/L — SIGNIFICANT CHANGE UP (ref 7–14)
BASOPHILS # BLD AUTO: 0.08 K/UL — SIGNIFICANT CHANGE UP (ref 0–0.2)
BASOPHILS NFR BLD AUTO: 0.5 % — SIGNIFICANT CHANGE UP (ref 0–1)
BUN SERPL-MCNC: 20 MG/DL — SIGNIFICANT CHANGE UP (ref 10–20)
CALCIUM SERPL-MCNC: 9 MG/DL — SIGNIFICANT CHANGE UP (ref 8.4–10.5)
CHLORIDE SERPL-SCNC: 92 MMOL/L — LOW (ref 98–110)
CO2 SERPL-SCNC: 26 MMOL/L — SIGNIFICANT CHANGE UP (ref 17–32)
CREAT SERPL-MCNC: 1.3 MG/DL — SIGNIFICANT CHANGE UP (ref 0.7–1.5)
EGFR: 50 ML/MIN/1.73M2 — LOW
EOSINOPHIL # BLD AUTO: 0.3 K/UL — SIGNIFICANT CHANGE UP (ref 0–0.7)
EOSINOPHIL NFR BLD AUTO: 1.9 % — SIGNIFICANT CHANGE UP (ref 0–8)
GLUCOSE BLDC GLUCOMTR-MCNC: 103 MG/DL — HIGH (ref 70–99)
GLUCOSE BLDC GLUCOMTR-MCNC: 150 MG/DL — HIGH (ref 70–99)
GLUCOSE BLDC GLUCOMTR-MCNC: 193 MG/DL — HIGH (ref 70–99)
GLUCOSE BLDC GLUCOMTR-MCNC: 232 MG/DL — HIGH (ref 70–99)
GLUCOSE SERPL-MCNC: 187 MG/DL — HIGH (ref 70–99)
HCT VFR BLD CALC: 32.8 % — LOW (ref 42–52)
HGB BLD-MCNC: 10.8 G/DL — LOW (ref 14–18)
IMM GRANULOCYTES NFR BLD AUTO: 2.4 % — HIGH (ref 0.1–0.3)
LYMPHOCYTES # BLD AUTO: 26.9 % — SIGNIFICANT CHANGE UP (ref 20.5–51.1)
LYMPHOCYTES # BLD AUTO: 4.2 K/UL — HIGH (ref 1.2–3.4)
MAGNESIUM SERPL-MCNC: 1.9 MG/DL — SIGNIFICANT CHANGE UP (ref 1.8–2.4)
MCHC RBC-ENTMCNC: 29 PG — SIGNIFICANT CHANGE UP (ref 27–31)
MCHC RBC-ENTMCNC: 32.9 G/DL — SIGNIFICANT CHANGE UP (ref 32–37)
MCV RBC AUTO: 87.9 FL — SIGNIFICANT CHANGE UP (ref 80–94)
MONOCYTES # BLD AUTO: 1.46 K/UL — HIGH (ref 0.1–0.6)
MONOCYTES NFR BLD AUTO: 9.4 % — HIGH (ref 1.7–9.3)
NEUTROPHILS # BLD AUTO: 9.2 K/UL — HIGH (ref 1.4–6.5)
NEUTROPHILS NFR BLD AUTO: 58.9 % — SIGNIFICANT CHANGE UP (ref 42.2–75.2)
NRBC # BLD: 0 /100 WBCS — SIGNIFICANT CHANGE UP (ref 0–0)
PLATELET # BLD AUTO: 355 K/UL — SIGNIFICANT CHANGE UP (ref 130–400)
POTASSIUM SERPL-MCNC: 4.6 MMOL/L — SIGNIFICANT CHANGE UP (ref 3.5–5)
POTASSIUM SERPL-SCNC: 4.6 MMOL/L — SIGNIFICANT CHANGE UP (ref 3.5–5)
PROCALCITONIN SERPL-MCNC: 0.16 NG/ML — HIGH (ref 0.02–0.1)
RBC # BLD: 3.73 M/UL — LOW (ref 4.7–6.1)
RBC # FLD: 13.5 % — SIGNIFICANT CHANGE UP (ref 11.5–14.5)
SODIUM SERPL-SCNC: 128 MMOL/L — LOW (ref 135–146)
WBC # BLD: 15.61 K/UL — HIGH (ref 4.8–10.8)
WBC # FLD AUTO: 15.61 K/UL — HIGH (ref 4.8–10.8)

## 2022-12-16 PROCEDURE — 99233 SBSQ HOSP IP/OBS HIGH 50: CPT

## 2022-12-16 RX ORDER — SODIUM CHLORIDE 9 MG/ML
1000 INJECTION INTRAMUSCULAR; INTRAVENOUS; SUBCUTANEOUS
Refills: 0 | Status: DISCONTINUED | OUTPATIENT
Start: 2022-12-16 | End: 2022-12-23

## 2022-12-16 RX ADMIN — Medication 100 MILLIGRAM(S): at 17:17

## 2022-12-16 RX ADMIN — Medication 600 MILLIGRAM(S): at 01:53

## 2022-12-16 RX ADMIN — Medication 5 UNIT(S): at 11:42

## 2022-12-16 RX ADMIN — Medication 100 MILLIGRAM(S): at 05:12

## 2022-12-16 RX ADMIN — SIMVASTATIN 20 MILLIGRAM(S): 20 TABLET, FILM COATED ORAL at 21:11

## 2022-12-16 RX ADMIN — Medication 25 MILLIGRAM(S): at 17:17

## 2022-12-16 RX ADMIN — FINASTERIDE 5 MILLIGRAM(S): 5 TABLET, FILM COATED ORAL at 11:43

## 2022-12-16 RX ADMIN — Medication 50 MICROGRAM(S): at 05:11

## 2022-12-16 RX ADMIN — SODIUM CHLORIDE 60 MILLILITER(S): 9 INJECTION INTRAMUSCULAR; INTRAVENOUS; SUBCUTANEOUS at 13:53

## 2022-12-16 RX ADMIN — INSULIN GLARGINE 15 UNIT(S): 100 INJECTION, SOLUTION SUBCUTANEOUS at 21:38

## 2022-12-16 RX ADMIN — LINEZOLID 300 MILLIGRAM(S): 600 INJECTION, SOLUTION INTRAVENOUS at 17:17

## 2022-12-16 RX ADMIN — Medication 5 UNIT(S): at 08:41

## 2022-12-16 RX ADMIN — Medication 25 MILLIGRAM(S): at 05:11

## 2022-12-16 RX ADMIN — CLOPIDOGREL BISULFATE 75 MILLIGRAM(S): 75 TABLET, FILM COATED ORAL at 11:44

## 2022-12-16 RX ADMIN — Medication 1: at 08:41

## 2022-12-16 RX ADMIN — TAMSULOSIN HYDROCHLORIDE 0.4 MILLIGRAM(S): 0.4 CAPSULE ORAL at 21:11

## 2022-12-16 RX ADMIN — CEFTRIAXONE 100 MILLIGRAM(S): 500 INJECTION, POWDER, FOR SOLUTION INTRAMUSCULAR; INTRAVENOUS at 12:02

## 2022-12-16 RX ADMIN — DULOXETINE HYDROCHLORIDE 60 MILLIGRAM(S): 30 CAPSULE, DELAYED RELEASE ORAL at 11:43

## 2022-12-16 NOTE — PROGRESS NOTE ADULT - SKIN
warm and dry/color normal/normal/no rashes/no ulcers
warm and dry/color normal/normal/no rashes/no ulcers

## 2022-12-16 NOTE — PROGRESS NOTE ADULT - TIME BILLING
Counseled staff about diagnostic testing and treatment plan.
89M w/ h/o CAD (s/p CABG and PCI), HTN, DLD, DM, BPH, and hypothyroidism p/w lethargy.     Suspected complex PNA  Recent Influenza  CAD  Hypothyroidism    Continue Linezolid 600 mg iv q12h, Rocephin 1 gm iv q24h, Doxycycline 100 mg iv q12h  Send procal  Send urine legio & strep  Supplemental O2 as required to maintain  sat 89-93  Sputum cx   Continue home meds for chronic  comorbid conditions  Get PT  Handoff: Expected inpatient care exceeds 48 hrs.
Counseled patient about diagnostic testing and treatment plan. All questions answered. Abnormal lab/radiographical/microbiological data reviewed.

## 2022-12-16 NOTE — PHYSICAL THERAPY INITIAL EVALUATION ADULT - SPECIFY REASON(S)
PT returned at bedside for therapy however, pt is asleep, daughter requesting if pt can be seen next day. PT explained plan of care, daughter
900 am Pt seen at bedside ; still having breakfast, discussed plan/ goal today. Will f/u to complete eval.

## 2022-12-17 LAB
ANION GAP SERPL CALC-SCNC: 13 MMOL/L — SIGNIFICANT CHANGE UP (ref 7–14)
BASOPHILS # BLD AUTO: 0.07 K/UL — SIGNIFICANT CHANGE UP (ref 0–0.2)
BASOPHILS NFR BLD AUTO: 0.5 % — SIGNIFICANT CHANGE UP (ref 0–1)
BUN SERPL-MCNC: 15 MG/DL — SIGNIFICANT CHANGE UP (ref 10–20)
CALCIUM SERPL-MCNC: 8.5 MG/DL — SIGNIFICANT CHANGE UP (ref 8.4–10.5)
CHLORIDE SERPL-SCNC: 92 MMOL/L — LOW (ref 98–110)
CK SERPL-CCNC: 24 U/L — SIGNIFICANT CHANGE UP (ref 0–225)
CO2 SERPL-SCNC: 24 MMOL/L — SIGNIFICANT CHANGE UP (ref 17–32)
CREAT SERPL-MCNC: 1.1 MG/DL — SIGNIFICANT CHANGE UP (ref 0.7–1.5)
EGFR: 61 ML/MIN/1.73M2 — SIGNIFICANT CHANGE UP
EOSINOPHIL # BLD AUTO: 0.24 K/UL — SIGNIFICANT CHANGE UP (ref 0–0.7)
EOSINOPHIL NFR BLD AUTO: 1.9 % — SIGNIFICANT CHANGE UP (ref 0–8)
GLUCOSE BLDC GLUCOMTR-MCNC: 110 MG/DL — HIGH (ref 70–99)
GLUCOSE BLDC GLUCOMTR-MCNC: 135 MG/DL — HIGH (ref 70–99)
GLUCOSE BLDC GLUCOMTR-MCNC: 188 MG/DL — HIGH (ref 70–99)
GLUCOSE BLDC GLUCOMTR-MCNC: 248 MG/DL — HIGH (ref 70–99)
GLUCOSE SERPL-MCNC: 236 MG/DL — HIGH (ref 70–99)
GRAM STN FLD: SIGNIFICANT CHANGE UP
HCT VFR BLD CALC: 31.4 % — LOW (ref 42–52)
HGB BLD-MCNC: 10.7 G/DL — LOW (ref 14–18)
IMM GRANULOCYTES NFR BLD AUTO: 3 % — HIGH (ref 0.1–0.3)
LYMPHOCYTES # BLD AUTO: 32.2 % — SIGNIFICANT CHANGE UP (ref 20.5–51.1)
LYMPHOCYTES # BLD AUTO: 4.14 K/UL — HIGH (ref 1.2–3.4)
MAGNESIUM SERPL-MCNC: 1.6 MG/DL — LOW (ref 1.8–2.4)
MCHC RBC-ENTMCNC: 29.5 PG — SIGNIFICANT CHANGE UP (ref 27–31)
MCHC RBC-ENTMCNC: 34.1 G/DL — SIGNIFICANT CHANGE UP (ref 32–37)
MCV RBC AUTO: 86.5 FL — SIGNIFICANT CHANGE UP (ref 80–94)
MONOCYTES # BLD AUTO: 1.23 K/UL — HIGH (ref 0.1–0.6)
MONOCYTES NFR BLD AUTO: 9.6 % — HIGH (ref 1.7–9.3)
NEUTROPHILS # BLD AUTO: 6.79 K/UL — HIGH (ref 1.4–6.5)
NEUTROPHILS NFR BLD AUTO: 52.8 % — SIGNIFICANT CHANGE UP (ref 42.2–75.2)
NRBC # BLD: 0 /100 WBCS — SIGNIFICANT CHANGE UP (ref 0–0)
PLATELET # BLD AUTO: 352 K/UL — SIGNIFICANT CHANGE UP (ref 130–400)
POTASSIUM SERPL-MCNC: 4.4 MMOL/L — SIGNIFICANT CHANGE UP (ref 3.5–5)
POTASSIUM SERPL-SCNC: 4.4 MMOL/L — SIGNIFICANT CHANGE UP (ref 3.5–5)
RBC # BLD: 3.63 M/UL — LOW (ref 4.7–6.1)
RBC # FLD: 13.5 % — SIGNIFICANT CHANGE UP (ref 11.5–14.5)
SODIUM SERPL-SCNC: 129 MMOL/L — LOW (ref 135–146)
SPECIMEN SOURCE: SIGNIFICANT CHANGE UP
WBC # BLD: 12.85 K/UL — HIGH (ref 4.8–10.8)
WBC # FLD AUTO: 12.85 K/UL — HIGH (ref 4.8–10.8)

## 2022-12-17 PROCEDURE — 71045 X-RAY EXAM CHEST 1 VIEW: CPT | Mod: 26

## 2022-12-17 PROCEDURE — 99233 SBSQ HOSP IP/OBS HIGH 50: CPT

## 2022-12-17 RX ORDER — MAGNESIUM SULFATE 500 MG/ML
2 VIAL (ML) INJECTION ONCE
Refills: 0 | Status: COMPLETED | OUTPATIENT
Start: 2022-12-17 | End: 2022-12-17

## 2022-12-17 RX ADMIN — INSULIN GLARGINE 15 UNIT(S): 100 INJECTION, SOLUTION SUBCUTANEOUS at 22:10

## 2022-12-17 RX ADMIN — Medication 5 UNIT(S): at 08:36

## 2022-12-17 RX ADMIN — Medication 25 GRAM(S): at 14:09

## 2022-12-17 RX ADMIN — CEFTRIAXONE 100 MILLIGRAM(S): 500 INJECTION, POWDER, FOR SOLUTION INTRAMUSCULAR; INTRAVENOUS at 12:14

## 2022-12-17 RX ADMIN — ENOXAPARIN SODIUM 40 MILLIGRAM(S): 100 INJECTION SUBCUTANEOUS at 04:44

## 2022-12-17 RX ADMIN — CLOPIDOGREL BISULFATE 75 MILLIGRAM(S): 75 TABLET, FILM COATED ORAL at 12:03

## 2022-12-17 RX ADMIN — SIMVASTATIN 20 MILLIGRAM(S): 20 TABLET, FILM COATED ORAL at 22:11

## 2022-12-17 RX ADMIN — LINEZOLID 300 MILLIGRAM(S): 600 INJECTION, SOLUTION INTRAVENOUS at 05:32

## 2022-12-17 RX ADMIN — Medication 50 MICROGRAM(S): at 05:32

## 2022-12-17 RX ADMIN — Medication 25 MILLIGRAM(S): at 17:36

## 2022-12-17 RX ADMIN — Medication 100 MILLIGRAM(S): at 05:32

## 2022-12-17 RX ADMIN — Medication 2: at 08:36

## 2022-12-17 RX ADMIN — DULOXETINE HYDROCHLORIDE 60 MILLIGRAM(S): 30 CAPSULE, DELAYED RELEASE ORAL at 12:03

## 2022-12-17 RX ADMIN — FINASTERIDE 5 MILLIGRAM(S): 5 TABLET, FILM COATED ORAL at 12:03

## 2022-12-17 RX ADMIN — LINEZOLID 300 MILLIGRAM(S): 600 INJECTION, SOLUTION INTRAVENOUS at 17:37

## 2022-12-17 RX ADMIN — Medication 600 MILLIGRAM(S): at 12:30

## 2022-12-17 RX ADMIN — TAMSULOSIN HYDROCHLORIDE 0.4 MILLIGRAM(S): 0.4 CAPSULE ORAL at 22:10

## 2022-12-17 RX ADMIN — Medication 25 MILLIGRAM(S): at 05:32

## 2022-12-17 RX ADMIN — Medication 100 MILLIGRAM(S): at 20:06

## 2022-12-18 LAB
ANION GAP SERPL CALC-SCNC: 10 MMOL/L — SIGNIFICANT CHANGE UP (ref 7–14)
BASOPHILS # BLD AUTO: 0.11 K/UL — SIGNIFICANT CHANGE UP (ref 0–0.2)
BASOPHILS NFR BLD AUTO: 0.7 % — SIGNIFICANT CHANGE UP (ref 0–1)
BUN SERPL-MCNC: 13 MG/DL — SIGNIFICANT CHANGE UP (ref 10–20)
CALCIUM SERPL-MCNC: 8.6 MG/DL — SIGNIFICANT CHANGE UP (ref 8.4–10.5)
CHLORIDE SERPL-SCNC: 92 MMOL/L — LOW (ref 98–110)
CO2 SERPL-SCNC: 27 MMOL/L — SIGNIFICANT CHANGE UP (ref 17–32)
CREAT SERPL-MCNC: 1 MG/DL — SIGNIFICANT CHANGE UP (ref 0.7–1.5)
EGFR: 68 ML/MIN/1.73M2 — SIGNIFICANT CHANGE UP
EOSINOPHIL # BLD AUTO: 0.28 K/UL — SIGNIFICANT CHANGE UP (ref 0–0.7)
EOSINOPHIL NFR BLD AUTO: 1.9 % — SIGNIFICANT CHANGE UP (ref 0–8)
GLUCOSE BLDC GLUCOMTR-MCNC: 121 MG/DL — HIGH (ref 70–99)
GLUCOSE BLDC GLUCOMTR-MCNC: 169 MG/DL — HIGH (ref 70–99)
GLUCOSE BLDC GLUCOMTR-MCNC: 270 MG/DL — HIGH (ref 70–99)
GLUCOSE SERPL-MCNC: 244 MG/DL — HIGH (ref 70–99)
HCT VFR BLD CALC: 32 % — LOW (ref 42–52)
HGB BLD-MCNC: 10.9 G/DL — LOW (ref 14–18)
IMM GRANULOCYTES NFR BLD AUTO: 3.4 % — HIGH (ref 0.1–0.3)
LEGIONELLA AG UR QL: NEGATIVE — SIGNIFICANT CHANGE UP
LYMPHOCYTES # BLD AUTO: 27.4 % — SIGNIFICANT CHANGE UP (ref 20.5–51.1)
LYMPHOCYTES # BLD AUTO: 4.09 K/UL — HIGH (ref 1.2–3.4)
MAGNESIUM SERPL-MCNC: 1.7 MG/DL — LOW (ref 1.8–2.4)
MCHC RBC-ENTMCNC: 29.4 PG — SIGNIFICANT CHANGE UP (ref 27–31)
MCHC RBC-ENTMCNC: 34.1 G/DL — SIGNIFICANT CHANGE UP (ref 32–37)
MCV RBC AUTO: 86.3 FL — SIGNIFICANT CHANGE UP (ref 80–94)
MONOCYTES # BLD AUTO: 1.65 K/UL — HIGH (ref 0.1–0.6)
MONOCYTES NFR BLD AUTO: 11 % — HIGH (ref 1.7–9.3)
NEUTROPHILS # BLD AUTO: 8.3 K/UL — HIGH (ref 1.4–6.5)
NEUTROPHILS NFR BLD AUTO: 55.6 % — SIGNIFICANT CHANGE UP (ref 42.2–75.2)
NRBC # BLD: 0 /100 WBCS — SIGNIFICANT CHANGE UP (ref 0–0)
PLATELET # BLD AUTO: 355 K/UL — SIGNIFICANT CHANGE UP (ref 130–400)
POTASSIUM SERPL-MCNC: 4.6 MMOL/L — SIGNIFICANT CHANGE UP (ref 3.5–5)
POTASSIUM SERPL-SCNC: 4.6 MMOL/L — SIGNIFICANT CHANGE UP (ref 3.5–5)
RBC # BLD: 3.71 M/UL — LOW (ref 4.7–6.1)
RBC # FLD: 13.4 % — SIGNIFICANT CHANGE UP (ref 11.5–14.5)
S PNEUM AG UR QL: NEGATIVE — SIGNIFICANT CHANGE UP
SODIUM SERPL-SCNC: 129 MMOL/L — LOW (ref 135–146)
WBC # BLD: 14.94 K/UL — HIGH (ref 4.8–10.8)
WBC # FLD AUTO: 14.94 K/UL — HIGH (ref 4.8–10.8)

## 2022-12-18 PROCEDURE — 99233 SBSQ HOSP IP/OBS HIGH 50: CPT

## 2022-12-18 RX ORDER — MAGNESIUM SULFATE 500 MG/ML
1 VIAL (ML) INJECTION ONCE
Refills: 0 | Status: COMPLETED | OUTPATIENT
Start: 2022-12-18 | End: 2022-12-18

## 2022-12-18 RX ADMIN — Medication 50 MICROGRAM(S): at 05:27

## 2022-12-18 RX ADMIN — Medication 25 MILLIGRAM(S): at 05:27

## 2022-12-18 RX ADMIN — Medication 100 MILLIGRAM(S): at 05:27

## 2022-12-18 RX ADMIN — Medication 100 MILLIGRAM(S): at 17:01

## 2022-12-18 RX ADMIN — Medication 100 GRAM(S): at 17:02

## 2022-12-18 RX ADMIN — CLOPIDOGREL BISULFATE 75 MILLIGRAM(S): 75 TABLET, FILM COATED ORAL at 14:54

## 2022-12-18 RX ADMIN — Medication 5 UNIT(S): at 08:43

## 2022-12-18 RX ADMIN — DULOXETINE HYDROCHLORIDE 60 MILLIGRAM(S): 30 CAPSULE, DELAYED RELEASE ORAL at 14:55

## 2022-12-18 RX ADMIN — Medication 1: at 12:20

## 2022-12-18 RX ADMIN — FINASTERIDE 5 MILLIGRAM(S): 5 TABLET, FILM COATED ORAL at 14:55

## 2022-12-18 RX ADMIN — LINEZOLID 300 MILLIGRAM(S): 600 INJECTION, SOLUTION INTRAVENOUS at 06:22

## 2022-12-18 RX ADMIN — ENOXAPARIN SODIUM 40 MILLIGRAM(S): 100 INJECTION SUBCUTANEOUS at 05:27

## 2022-12-18 RX ADMIN — CEFTRIAXONE 100 MILLIGRAM(S): 500 INJECTION, POWDER, FOR SOLUTION INTRAMUSCULAR; INTRAVENOUS at 14:58

## 2022-12-18 RX ADMIN — Medication 5 UNIT(S): at 12:20

## 2022-12-18 RX ADMIN — Medication 3: at 08:43

## 2022-12-19 LAB
ANION GAP SERPL CALC-SCNC: 8 MMOL/L — SIGNIFICANT CHANGE UP (ref 7–14)
BASOPHILS # BLD AUTO: 0.1 K/UL — SIGNIFICANT CHANGE UP (ref 0–0.2)
BASOPHILS NFR BLD AUTO: 0.7 % — SIGNIFICANT CHANGE UP (ref 0–1)
BUN SERPL-MCNC: 11 MG/DL — SIGNIFICANT CHANGE UP (ref 10–20)
CALCIUM SERPL-MCNC: 8.9 MG/DL — SIGNIFICANT CHANGE UP (ref 8.4–10.5)
CHLORIDE SERPL-SCNC: 92 MMOL/L — LOW (ref 98–110)
CO2 SERPL-SCNC: 28 MMOL/L — SIGNIFICANT CHANGE UP (ref 17–32)
CREAT SERPL-MCNC: 0.9 MG/DL — SIGNIFICANT CHANGE UP (ref 0.7–1.5)
CULTURE RESULTS: SIGNIFICANT CHANGE UP
EGFR: 77 ML/MIN/1.73M2 — SIGNIFICANT CHANGE UP
EOSINOPHIL # BLD AUTO: 0.26 K/UL — SIGNIFICANT CHANGE UP (ref 0–0.7)
EOSINOPHIL NFR BLD AUTO: 1.8 % — SIGNIFICANT CHANGE UP (ref 0–8)
GLUCOSE BLDC GLUCOMTR-MCNC: 100 MG/DL — HIGH (ref 70–99)
GLUCOSE BLDC GLUCOMTR-MCNC: 150 MG/DL — HIGH (ref 70–99)
GLUCOSE BLDC GLUCOMTR-MCNC: 161 MG/DL — HIGH (ref 70–99)
GLUCOSE BLDC GLUCOMTR-MCNC: 201 MG/DL — HIGH (ref 70–99)
GLUCOSE BLDC GLUCOMTR-MCNC: 244 MG/DL — HIGH (ref 70–99)
GLUCOSE BLDC GLUCOMTR-MCNC: 308 MG/DL — HIGH (ref 70–99)
GLUCOSE SERPL-MCNC: 213 MG/DL — HIGH (ref 70–99)
HCT VFR BLD CALC: 34.1 % — LOW (ref 42–52)
HGB BLD-MCNC: 11.2 G/DL — LOW (ref 14–18)
IMM GRANULOCYTES NFR BLD AUTO: 3.5 % — HIGH (ref 0.1–0.3)
LYMPHOCYTES # BLD AUTO: 29.1 % — SIGNIFICANT CHANGE UP (ref 20.5–51.1)
LYMPHOCYTES # BLD AUTO: 4.28 K/UL — HIGH (ref 1.2–3.4)
MAGNESIUM SERPL-MCNC: 1.5 MG/DL — LOW (ref 1.8–2.4)
MCHC RBC-ENTMCNC: 28.7 PG — SIGNIFICANT CHANGE UP (ref 27–31)
MCHC RBC-ENTMCNC: 32.8 G/DL — SIGNIFICANT CHANGE UP (ref 32–37)
MCV RBC AUTO: 87.4 FL — SIGNIFICANT CHANGE UP (ref 80–94)
MONOCYTES # BLD AUTO: 1.37 K/UL — HIGH (ref 0.1–0.6)
MONOCYTES NFR BLD AUTO: 9.3 % — SIGNIFICANT CHANGE UP (ref 1.7–9.3)
NEUTROPHILS # BLD AUTO: 8.18 K/UL — HIGH (ref 1.4–6.5)
NEUTROPHILS NFR BLD AUTO: 55.6 % — SIGNIFICANT CHANGE UP (ref 42.2–75.2)
NRBC # BLD: 0 /100 WBCS — SIGNIFICANT CHANGE UP (ref 0–0)
PLATELET # BLD AUTO: 370 K/UL — SIGNIFICANT CHANGE UP (ref 130–400)
POTASSIUM SERPL-MCNC: 4.8 MMOL/L — SIGNIFICANT CHANGE UP (ref 3.5–5)
POTASSIUM SERPL-SCNC: 4.8 MMOL/L — SIGNIFICANT CHANGE UP (ref 3.5–5)
RBC # BLD: 3.9 M/UL — LOW (ref 4.7–6.1)
RBC # FLD: 13.3 % — SIGNIFICANT CHANGE UP (ref 11.5–14.5)
SARS-COV-2 RNA SPEC QL NAA+PROBE: SIGNIFICANT CHANGE UP
SODIUM SERPL-SCNC: 128 MMOL/L — LOW (ref 135–146)
SPECIMEN SOURCE: SIGNIFICANT CHANGE UP
WBC # BLD: 14.7 K/UL — HIGH (ref 4.8–10.8)
WBC # FLD AUTO: 14.7 K/UL — HIGH (ref 4.8–10.8)

## 2022-12-19 PROCEDURE — 99233 SBSQ HOSP IP/OBS HIGH 50: CPT

## 2022-12-19 RX ORDER — METHYLPREDNISOLONE 4 MG
1 TABLET ORAL
Qty: 0 | Refills: 0 | DISCHARGE
Start: 2022-12-19 | End: 2022-12-23

## 2022-12-19 RX ORDER — METHYLPREDNISOLONE 4 MG
500 TABLET ORAL THREE TIMES A DAY
Refills: 0 | Status: DISCONTINUED | OUTPATIENT
Start: 2022-12-19 | End: 2022-12-21

## 2022-12-19 RX ADMIN — LINEZOLID 300 MILLIGRAM(S): 600 INJECTION, SOLUTION INTRAVENOUS at 05:17

## 2022-12-19 RX ADMIN — DULOXETINE HYDROCHLORIDE 60 MILLIGRAM(S): 30 CAPSULE, DELAYED RELEASE ORAL at 11:22

## 2022-12-19 RX ADMIN — Medication 500 MILLIGRAM(S): at 18:05

## 2022-12-19 RX ADMIN — SIMVASTATIN 20 MILLIGRAM(S): 20 TABLET, FILM COATED ORAL at 21:45

## 2022-12-19 RX ADMIN — Medication 5 UNIT(S): at 11:31

## 2022-12-19 RX ADMIN — Medication 500 MILLIGRAM(S): at 21:45

## 2022-12-19 RX ADMIN — INSULIN GLARGINE 15 UNIT(S): 100 INJECTION, SOLUTION SUBCUTANEOUS at 00:47

## 2022-12-19 RX ADMIN — Medication 50 MICROGRAM(S): at 05:20

## 2022-12-19 RX ADMIN — TAMSULOSIN HYDROCHLORIDE 0.4 MILLIGRAM(S): 0.4 CAPSULE ORAL at 00:47

## 2022-12-19 RX ADMIN — SIMVASTATIN 20 MILLIGRAM(S): 20 TABLET, FILM COATED ORAL at 00:31

## 2022-12-19 RX ADMIN — CEFTRIAXONE 100 MILLIGRAM(S): 500 INJECTION, POWDER, FOR SOLUTION INTRAMUSCULAR; INTRAVENOUS at 16:17

## 2022-12-19 RX ADMIN — ENOXAPARIN SODIUM 40 MILLIGRAM(S): 100 INJECTION SUBCUTANEOUS at 05:21

## 2022-12-19 RX ADMIN — Medication 500 MILLIGRAM(S): at 11:22

## 2022-12-19 RX ADMIN — Medication 25 MILLIGRAM(S): at 05:21

## 2022-12-19 RX ADMIN — LINEZOLID 300 MILLIGRAM(S): 600 INJECTION, SOLUTION INTRAVENOUS at 18:05

## 2022-12-19 RX ADMIN — INSULIN GLARGINE 15 UNIT(S): 100 INJECTION, SOLUTION SUBCUTANEOUS at 21:44

## 2022-12-19 RX ADMIN — Medication 100 MILLIGRAM(S): at 18:05

## 2022-12-19 RX ADMIN — FINASTERIDE 5 MILLIGRAM(S): 5 TABLET, FILM COATED ORAL at 11:21

## 2022-12-19 RX ADMIN — Medication 2: at 08:56

## 2022-12-19 RX ADMIN — Medication 2: at 11:31

## 2022-12-19 RX ADMIN — CLOPIDOGREL BISULFATE 75 MILLIGRAM(S): 75 TABLET, FILM COATED ORAL at 11:21

## 2022-12-19 RX ADMIN — Medication 100 MILLIGRAM(S): at 06:39

## 2022-12-19 RX ADMIN — TAMSULOSIN HYDROCHLORIDE 0.4 MILLIGRAM(S): 0.4 CAPSULE ORAL at 21:44

## 2022-12-19 RX ADMIN — Medication 25 MILLIGRAM(S): at 18:05

## 2022-12-19 RX ADMIN — Medication 5 UNIT(S): at 08:56

## 2022-12-19 NOTE — PHYSICAL THERAPY INITIAL EVALUATION ADULT - PERTINENT HX OF CURRENT PROBLEM, REHAB EVAL
Pt lives with daughter in private house with 3 steps to enter; has a rolling walker and ambulates independently X short distances at home. Will cont to f/u to complete eval.
89 YO M w/ a PMH of CAD s/p CABG/PCI, HTN, DLD, DM2, BPH, and hypothyroidism who was BIBEMS for eval of ABD pain for the past x 4 days. Associated w/ worsening non-productive cough and dyspnea. ROS is negative except as above.   In the ED, CT-AP w/ IV contrast was negative for acute process in the ABD, did show bibasilar consolidations of the lungs. UA negative. Pt hypoxic to 93% on RA, started on NC and IV ABXs (Cefe/Vanc/Azithro) in the ED.

## 2022-12-19 NOTE — PHYSICAL THERAPY INITIAL EVALUATION ADULT - LEVEL OF INDEPENDENCE, REHAB EVAL
Patient Education   Bronchitis, Antibiotic Treatment (Adult)    Bronchitis is an infection of the air passages (bronchial tubes) in your lungs. It often occurs when you have a cold. This illness is contagious during the first few days and is spread through the air by coughing and sneezing, or by direct contact (touching the sick person and then touching your own eyes, nose, or mouth).  Symptoms of bronchitis include cough with mucus (phlegm) and low-grade fever. Bronchitis usually lasts 7 to 14 days. Mild cases can be treated with simple home remedies. More severe infection is treated with an antibiotic.  Home care  Follow these guidelines when caring for yourself at home:    If your symptoms are severe, rest at home for the first 2 to 3 days. When you go back to your usual activities, don't let yourself get too tired.    Don't smoke. Also stay away from secondhand smoke.    You may use over-the-counter medicines to control fever or pain (tylenol only), unless another medicine was prescribed.    Your appetite may be low, so a light diet is fine. Stay well hydrated by drinking 6 to 8 glasses of fluids per day. This includes water, soft drinks, sports drinks, juices, tea, or soup. Extra fluids will help loosen mucus in your nose and lungs.    Over-the-counter cough, cold, and sore-throat medicines will not shorten the length of the illness, but they may be helpful to reduce your symptoms. Don't use decongestants if you have high blood pressure.  I recommend mucinex (guaifenesin) to help loosen the mucous.    Finish all antibiotic medicine. Do this even if you are feeling better after only a few days.    Use the inhaler four times daily as needed for wheezing or dry cough.  Follow-up care  Follow up with your healthcare provider, or as advised. If you had an X-ray or ECG (electrocardiogram), a specialist will review it. You will be told of any new test results that may affect your care.  If you are age 65 or older,  if you smoke, or if you have a chronic lung disease or condition that affects your immune system, ask your healthcare provider about getting a pneumococcal vaccine and a yearly flu shot (influenza vaccine).  When to seek medical advice  Call your healthcare provider right away if any of these occur:    Fever of 100.4 F (38 C) or higher, or as directed by your healthcare provider    Coughing up more sputum    Weakness, drowsiness, headache, facial pain, ear pain, or a stiff neck     Call 911  Call 911 if any of these occur.    Coughing up blood    Weakness, drowsiness, headache, or stiff neck that get worse    Trouble breathing, wheezing, or pain with breathing   Date Last Reviewed: 6/1/2018 2000-2018 [employer]. [address] [place] [address], [place] [address]. All rights reserved. This information is not intended as a substitute for professional medical care. Always follow your healthcare professional's instructions.          moderate assist (50% patients effort)

## 2022-12-19 NOTE — PHYSICAL THERAPY INITIAL EVALUATION ADULT - FUNCTIONAL LIMITATIONS, PT EVAL
Resent lt green and gold top tube per lab request. Pt sitting up in bed, medicated as ordered. Sitter at bedside   self-care/home management/community/leisure

## 2022-12-19 NOTE — PHYSICAL THERAPY INITIAL EVALUATION ADULT - GENERAL OBSERVATIONS, REHAB EVAL
Patient encountered lying in bed. Daughter in room. Requires verbal encouragement needed to participate

## 2022-12-20 LAB
ANION GAP SERPL CALC-SCNC: 13 MMOL/L — SIGNIFICANT CHANGE UP (ref 7–14)
BUN SERPL-MCNC: 12 MG/DL — SIGNIFICANT CHANGE UP (ref 10–20)
CALCIUM SERPL-MCNC: 8.8 MG/DL — SIGNIFICANT CHANGE UP (ref 8.4–10.5)
CHLORIDE SERPL-SCNC: 94 MMOL/L — LOW (ref 98–110)
CO2 SERPL-SCNC: 24 MMOL/L — SIGNIFICANT CHANGE UP (ref 17–32)
CREAT SERPL-MCNC: 0.9 MG/DL — SIGNIFICANT CHANGE UP (ref 0.7–1.5)
EGFR: 77 ML/MIN/1.73M2 — SIGNIFICANT CHANGE UP
GLUCOSE BLDC GLUCOMTR-MCNC: 167 MG/DL — HIGH (ref 70–99)
GLUCOSE BLDC GLUCOMTR-MCNC: 211 MG/DL — HIGH (ref 70–99)
GLUCOSE BLDC GLUCOMTR-MCNC: 223 MG/DL — HIGH (ref 70–99)
GLUCOSE BLDC GLUCOMTR-MCNC: 81 MG/DL — SIGNIFICANT CHANGE UP (ref 70–99)
GLUCOSE SERPL-MCNC: 132 MG/DL — HIGH (ref 70–99)
HCT VFR BLD CALC: 34.6 % — LOW (ref 42–52)
HGB BLD-MCNC: 10.8 G/DL — LOW (ref 14–18)
MCHC RBC-ENTMCNC: 28.2 PG — SIGNIFICANT CHANGE UP (ref 27–31)
MCHC RBC-ENTMCNC: 31.2 G/DL — LOW (ref 32–37)
MCV RBC AUTO: 90.3 FL — SIGNIFICANT CHANGE UP (ref 80–94)
NRBC # BLD: 0 /100 WBCS — SIGNIFICANT CHANGE UP (ref 0–0)
PLATELET # BLD AUTO: 353 K/UL — SIGNIFICANT CHANGE UP (ref 130–400)
POTASSIUM SERPL-MCNC: 4.9 MMOL/L — SIGNIFICANT CHANGE UP (ref 3.5–5)
POTASSIUM SERPL-SCNC: 4.9 MMOL/L — SIGNIFICANT CHANGE UP (ref 3.5–5)
RBC # BLD: 3.83 M/UL — LOW (ref 4.7–6.1)
RBC # FLD: 13.3 % — SIGNIFICANT CHANGE UP (ref 11.5–14.5)
SODIUM SERPL-SCNC: 131 MMOL/L — LOW (ref 135–146)
WBC # BLD: 14.75 K/UL — HIGH (ref 4.8–10.8)
WBC # FLD AUTO: 14.75 K/UL — HIGH (ref 4.8–10.8)

## 2022-12-20 PROCEDURE — 99233 SBSQ HOSP IP/OBS HIGH 50: CPT

## 2022-12-20 RX ORDER — CEFTRIAXONE 500 MG/1
1000 INJECTION, POWDER, FOR SOLUTION INTRAMUSCULAR; INTRAVENOUS ONCE
Refills: 0 | Status: COMPLETED | OUTPATIENT
Start: 2022-12-20 | End: 2022-12-20

## 2022-12-20 RX ORDER — CEFTRIAXONE 500 MG/1
1000 INJECTION, POWDER, FOR SOLUTION INTRAMUSCULAR; INTRAVENOUS EVERY 24 HOURS
Refills: 0 | Status: DISCONTINUED | OUTPATIENT
Start: 2022-12-21 | End: 2022-12-21

## 2022-12-20 RX ORDER — LACTOBACILLUS ACIDOPHILUS 100MM CELL
1 CAPSULE ORAL
Qty: 0 | Refills: 0 | DISCHARGE
Start: 2022-12-20

## 2022-12-20 RX ORDER — CEFTRIAXONE 500 MG/1
INJECTION, POWDER, FOR SOLUTION INTRAMUSCULAR; INTRAVENOUS
Refills: 0 | Status: DISCONTINUED | OUTPATIENT
Start: 2022-12-20 | End: 2022-12-21

## 2022-12-20 RX ORDER — LACTOBACILLUS ACIDOPHILUS 100MM CELL
1 CAPSULE ORAL DAILY
Refills: 0 | Status: DISCONTINUED | OUTPATIENT
Start: 2022-12-20 | End: 2022-12-23

## 2022-12-20 RX ORDER — LOPERAMIDE HCL 2 MG
2 TABLET ORAL THREE TIMES A DAY
Refills: 0 | Status: DISCONTINUED | OUTPATIENT
Start: 2022-12-20 | End: 2022-12-21

## 2022-12-20 RX ORDER — CEFTRIAXONE 500 MG/1
1 INJECTION, POWDER, FOR SOLUTION INTRAMUSCULAR; INTRAVENOUS
Qty: 0 | Refills: 0 | DISCHARGE
Start: 2022-12-20 | End: 2022-12-21

## 2022-12-20 RX ORDER — LOPERAMIDE HCL 2 MG
4 TABLET ORAL ONCE
Refills: 0 | Status: COMPLETED | OUTPATIENT
Start: 2022-12-20 | End: 2022-12-20

## 2022-12-20 RX ADMIN — TAMSULOSIN HYDROCHLORIDE 0.4 MILLIGRAM(S): 0.4 CAPSULE ORAL at 21:22

## 2022-12-20 RX ADMIN — Medication 4 MILLIGRAM(S): at 11:19

## 2022-12-20 RX ADMIN — Medication 2: at 08:47

## 2022-12-20 RX ADMIN — Medication 2: at 11:21

## 2022-12-20 RX ADMIN — LINEZOLID 300 MILLIGRAM(S): 600 INJECTION, SOLUTION INTRAVENOUS at 05:15

## 2022-12-20 RX ADMIN — DULOXETINE HYDROCHLORIDE 60 MILLIGRAM(S): 30 CAPSULE, DELAYED RELEASE ORAL at 11:23

## 2022-12-20 RX ADMIN — INSULIN GLARGINE 15 UNIT(S): 100 INJECTION, SOLUTION SUBCUTANEOUS at 21:21

## 2022-12-20 RX ADMIN — FINASTERIDE 5 MILLIGRAM(S): 5 TABLET, FILM COATED ORAL at 11:22

## 2022-12-20 RX ADMIN — ENOXAPARIN SODIUM 40 MILLIGRAM(S): 100 INJECTION SUBCUTANEOUS at 05:14

## 2022-12-20 RX ADMIN — Medication 100 MILLIGRAM(S): at 18:37

## 2022-12-20 RX ADMIN — CEFTRIAXONE 1000 MILLIGRAM(S): 500 INJECTION, POWDER, FOR SOLUTION INTRAMUSCULAR; INTRAVENOUS at 11:18

## 2022-12-20 RX ADMIN — Medication 1 TABLET(S): at 11:27

## 2022-12-20 RX ADMIN — Medication 50 MICROGRAM(S): at 05:15

## 2022-12-20 RX ADMIN — Medication 5 UNIT(S): at 11:21

## 2022-12-20 RX ADMIN — Medication 100 MILLIGRAM(S): at 05:15

## 2022-12-20 RX ADMIN — LINEZOLID 300 MILLIGRAM(S): 600 INJECTION, SOLUTION INTRAVENOUS at 18:37

## 2022-12-20 RX ADMIN — Medication 5 UNIT(S): at 08:46

## 2022-12-20 RX ADMIN — Medication 500 MILLIGRAM(S): at 05:15

## 2022-12-20 RX ADMIN — SIMVASTATIN 20 MILLIGRAM(S): 20 TABLET, FILM COATED ORAL at 21:22

## 2022-12-20 RX ADMIN — CLOPIDOGREL BISULFATE 75 MILLIGRAM(S): 75 TABLET, FILM COATED ORAL at 11:22

## 2022-12-20 RX ADMIN — Medication 25 MILLIGRAM(S): at 05:15

## 2022-12-20 RX ADMIN — Medication 500 MILLIGRAM(S): at 15:10

## 2022-12-21 LAB
ANION GAP SERPL CALC-SCNC: 13 MMOL/L — SIGNIFICANT CHANGE UP (ref 7–14)
ANISOCYTOSIS BLD QL: SLIGHT — SIGNIFICANT CHANGE UP
BASOPHILS # BLD AUTO: 0 K/UL — SIGNIFICANT CHANGE UP (ref 0–0.2)
BASOPHILS NFR BLD AUTO: 0 % — SIGNIFICANT CHANGE UP (ref 0–1)
BUN SERPL-MCNC: 14 MG/DL — SIGNIFICANT CHANGE UP (ref 10–20)
CALCIUM SERPL-MCNC: 8.6 MG/DL — SIGNIFICANT CHANGE UP (ref 8.4–10.5)
CHLORIDE SERPL-SCNC: 91 MMOL/L — LOW (ref 98–110)
CO2 SERPL-SCNC: 25 MMOL/L — SIGNIFICANT CHANGE UP (ref 17–32)
CREAT SERPL-MCNC: 1 MG/DL — SIGNIFICANT CHANGE UP (ref 0.7–1.5)
EGFR: 68 ML/MIN/1.73M2 — SIGNIFICANT CHANGE UP
EOSINOPHIL # BLD AUTO: 0.14 K/UL — SIGNIFICANT CHANGE UP (ref 0–0.7)
EOSINOPHIL NFR BLD AUTO: 0.9 % — SIGNIFICANT CHANGE UP (ref 0–8)
GLUCOSE BLDC GLUCOMTR-MCNC: 105 MG/DL — HIGH (ref 70–99)
GLUCOSE BLDC GLUCOMTR-MCNC: 141 MG/DL — HIGH (ref 70–99)
GLUCOSE BLDC GLUCOMTR-MCNC: 191 MG/DL — HIGH (ref 70–99)
GLUCOSE BLDC GLUCOMTR-MCNC: 71 MG/DL — SIGNIFICANT CHANGE UP (ref 70–99)
GLUCOSE SERPL-MCNC: 171 MG/DL — HIGH (ref 70–99)
HCT VFR BLD CALC: 33.5 % — LOW (ref 42–52)
HGB BLD-MCNC: 10.5 G/DL — LOW (ref 14–18)
LYMPHOCYTES # BLD AUTO: 21.7 % — SIGNIFICANT CHANGE UP (ref 20.5–51.1)
LYMPHOCYTES # BLD AUTO: 3.5 K/UL — HIGH (ref 1.2–3.4)
MAGNESIUM SERPL-MCNC: 1.3 MG/DL — LOW (ref 1.8–2.4)
MANUAL SMEAR VERIFICATION: SIGNIFICANT CHANGE UP
MCHC RBC-ENTMCNC: 28.2 PG — SIGNIFICANT CHANGE UP (ref 27–31)
MCHC RBC-ENTMCNC: 31.3 G/DL — LOW (ref 32–37)
MCV RBC AUTO: 90.1 FL — SIGNIFICANT CHANGE UP (ref 80–94)
METAMYELOCYTES # FLD: 1.7 % — HIGH (ref 0–0)
MICROCYTES BLD QL: SLIGHT — SIGNIFICANT CHANGE UP
MONOCYTES # BLD AUTO: 1.26 K/UL — HIGH (ref 0.1–0.6)
MONOCYTES NFR BLD AUTO: 7.8 % — SIGNIFICANT CHANGE UP (ref 1.7–9.3)
MYELOCYTES NFR BLD: 0.9 % — HIGH (ref 0–0)
NEUTROPHILS # BLD AUTO: 10.79 K/UL — HIGH (ref 1.4–6.5)
NEUTROPHILS NFR BLD AUTO: 66.1 % — SIGNIFICANT CHANGE UP (ref 42.2–75.2)
NEUTS BAND # BLD: 0.9 % — SIGNIFICANT CHANGE UP (ref 0–6)
PLAT MORPH BLD: NORMAL — SIGNIFICANT CHANGE UP
PLATELET # BLD AUTO: 349 K/UL — SIGNIFICANT CHANGE UP (ref 130–400)
POIKILOCYTOSIS BLD QL AUTO: SLIGHT — SIGNIFICANT CHANGE UP
POLYCHROMASIA BLD QL SMEAR: SLIGHT — SIGNIFICANT CHANGE UP
POTASSIUM SERPL-MCNC: 4.9 MMOL/L — SIGNIFICANT CHANGE UP (ref 3.5–5)
POTASSIUM SERPL-SCNC: 4.9 MMOL/L — SIGNIFICANT CHANGE UP (ref 3.5–5)
RBC # BLD: 3.72 M/UL — LOW (ref 4.7–6.1)
RBC # FLD: 13.4 % — SIGNIFICANT CHANGE UP (ref 11.5–14.5)
RBC BLD AUTO: ABNORMAL
SODIUM SERPL-SCNC: 129 MMOL/L — LOW (ref 135–146)
SPHEROCYTES BLD QL SMEAR: SLIGHT — SIGNIFICANT CHANGE UP
WBC # BLD: 16.11 K/UL — HIGH (ref 4.8–10.8)
WBC # FLD AUTO: 16.11 K/UL — HIGH (ref 4.8–10.8)

## 2022-12-21 PROCEDURE — 99233 SBSQ HOSP IP/OBS HIGH 50: CPT

## 2022-12-21 RX ORDER — MAGNESIUM SULFATE 500 MG/ML
2 VIAL (ML) INJECTION ONCE
Refills: 0 | Status: COMPLETED | OUTPATIENT
Start: 2022-12-21 | End: 2022-12-21

## 2022-12-21 RX ADMIN — Medication 600 MILLIGRAM(S): at 05:19

## 2022-12-21 RX ADMIN — Medication 25 MILLIGRAM(S): at 18:37

## 2022-12-21 RX ADMIN — Medication 500 MILLIGRAM(S): at 05:16

## 2022-12-21 RX ADMIN — LINEZOLID 300 MILLIGRAM(S): 600 INJECTION, SOLUTION INTRAVENOUS at 05:17

## 2022-12-21 RX ADMIN — FINASTERIDE 5 MILLIGRAM(S): 5 TABLET, FILM COATED ORAL at 12:36

## 2022-12-21 RX ADMIN — Medication 25 GRAM(S): at 14:18

## 2022-12-21 RX ADMIN — Medication 500 MILLIGRAM(S): at 14:18

## 2022-12-21 RX ADMIN — Medication 50 MICROGRAM(S): at 05:16

## 2022-12-21 RX ADMIN — Medication 1: at 08:27

## 2022-12-21 RX ADMIN — Medication 100 MILLIGRAM(S): at 05:18

## 2022-12-21 RX ADMIN — ENOXAPARIN SODIUM 40 MILLIGRAM(S): 100 INJECTION SUBCUTANEOUS at 05:16

## 2022-12-21 RX ADMIN — TAMSULOSIN HYDROCHLORIDE 0.4 MILLIGRAM(S): 0.4 CAPSULE ORAL at 22:12

## 2022-12-21 RX ADMIN — Medication 25 MILLIGRAM(S): at 05:15

## 2022-12-21 RX ADMIN — SIMVASTATIN 20 MILLIGRAM(S): 20 TABLET, FILM COATED ORAL at 22:13

## 2022-12-21 RX ADMIN — CLOPIDOGREL BISULFATE 75 MILLIGRAM(S): 75 TABLET, FILM COATED ORAL at 12:35

## 2022-12-21 RX ADMIN — Medication 5 UNIT(S): at 08:28

## 2022-12-21 RX ADMIN — Medication 1 TABLET(S): at 12:35

## 2022-12-21 RX ADMIN — DULOXETINE HYDROCHLORIDE 60 MILLIGRAM(S): 30 CAPSULE, DELAYED RELEASE ORAL at 12:35

## 2022-12-21 RX ADMIN — INSULIN GLARGINE 15 UNIT(S): 100 INJECTION, SOLUTION SUBCUTANEOUS at 22:13

## 2022-12-22 ENCOUNTER — TRANSCRIPTION ENCOUNTER (OUTPATIENT)
Age: 87
End: 2022-12-22

## 2022-12-22 LAB
ANION GAP SERPL CALC-SCNC: 8 MMOL/L — SIGNIFICANT CHANGE UP (ref 7–14)
BASOPHILS # BLD AUTO: 0.1 K/UL — SIGNIFICANT CHANGE UP (ref 0–0.2)
BASOPHILS NFR BLD AUTO: 0.6 % — SIGNIFICANT CHANGE UP (ref 0–1)
BUN SERPL-MCNC: 17 MG/DL — SIGNIFICANT CHANGE UP (ref 10–20)
C DIFF BY PCR RESULT: SIGNIFICANT CHANGE UP
CALCIUM SERPL-MCNC: 8.8 MG/DL — SIGNIFICANT CHANGE UP (ref 8.4–10.4)
CHLORIDE SERPL-SCNC: 92 MMOL/L — LOW (ref 98–110)
CO2 SERPL-SCNC: 27 MMOL/L — SIGNIFICANT CHANGE UP (ref 17–32)
CREAT SERPL-MCNC: 1.1 MG/DL — SIGNIFICANT CHANGE UP (ref 0.7–1.5)
EGFR: 61 ML/MIN/1.73M2 — SIGNIFICANT CHANGE UP
EOSINOPHIL # BLD AUTO: 0.23 K/UL — SIGNIFICANT CHANGE UP (ref 0–0.7)
EOSINOPHIL NFR BLD AUTO: 1.3 % — SIGNIFICANT CHANGE UP (ref 0–8)
GI PCR PANEL: SIGNIFICANT CHANGE UP
GLUCOSE BLDC GLUCOMTR-MCNC: 134 MG/DL — HIGH (ref 70–99)
GLUCOSE BLDC GLUCOMTR-MCNC: 204 MG/DL — HIGH (ref 70–99)
GLUCOSE BLDC GLUCOMTR-MCNC: 75 MG/DL — SIGNIFICANT CHANGE UP (ref 70–99)
GLUCOSE BLDC GLUCOMTR-MCNC: 96 MG/DL — SIGNIFICANT CHANGE UP (ref 70–99)
GLUCOSE SERPL-MCNC: 110 MG/DL — HIGH (ref 70–99)
HCT VFR BLD CALC: 35.3 % — LOW (ref 42–52)
HGB BLD-MCNC: 11.2 G/DL — LOW (ref 14–18)
IMM GRANULOCYTES NFR BLD AUTO: 2.9 % — HIGH (ref 0.1–0.3)
LYMPHOCYTES # BLD AUTO: 29 % — SIGNIFICANT CHANGE UP (ref 20.5–51.1)
LYMPHOCYTES # BLD AUTO: 4.96 K/UL — HIGH (ref 1.2–3.4)
MAGNESIUM SERPL-MCNC: 1.8 MG/DL — SIGNIFICANT CHANGE UP (ref 1.8–2.4)
MCHC RBC-ENTMCNC: 28.4 PG — SIGNIFICANT CHANGE UP (ref 27–31)
MCHC RBC-ENTMCNC: 31.7 G/DL — LOW (ref 32–37)
MCV RBC AUTO: 89.6 FL — SIGNIFICANT CHANGE UP (ref 80–94)
MONOCYTES # BLD AUTO: 1.59 K/UL — HIGH (ref 0.1–0.6)
MONOCYTES NFR BLD AUTO: 9.3 % — SIGNIFICANT CHANGE UP (ref 1.7–9.3)
NEUTROPHILS # BLD AUTO: 9.76 K/UL — HIGH (ref 1.4–6.5)
NEUTROPHILS NFR BLD AUTO: 56.9 % — SIGNIFICANT CHANGE UP (ref 42.2–75.2)
NRBC # BLD: 0 /100 WBCS — SIGNIFICANT CHANGE UP (ref 0–0)
PLATELET # BLD AUTO: 378 K/UL — SIGNIFICANT CHANGE UP (ref 130–400)
POTASSIUM SERPL-MCNC: 4.9 MMOL/L — SIGNIFICANT CHANGE UP (ref 3.5–5)
POTASSIUM SERPL-SCNC: 4.9 MMOL/L — SIGNIFICANT CHANGE UP (ref 3.5–5)
RBC # BLD: 3.94 M/UL — LOW (ref 4.7–6.1)
RBC # FLD: 13.5 % — SIGNIFICANT CHANGE UP (ref 11.5–14.5)
SODIUM SERPL-SCNC: 127 MMOL/L — LOW (ref 135–146)
WBC # BLD: 17.13 K/UL — HIGH (ref 4.8–10.8)
WBC # FLD AUTO: 17.13 K/UL — HIGH (ref 4.8–10.8)

## 2022-12-22 PROCEDURE — 99232 SBSQ HOSP IP/OBS MODERATE 35: CPT

## 2022-12-22 RX ORDER — SODIUM CHLORIDE 9 MG/ML
1000 INJECTION INTRAMUSCULAR; INTRAVENOUS; SUBCUTANEOUS
Refills: 0 | Status: DISCONTINUED | OUTPATIENT
Start: 2022-12-22 | End: 2022-12-23

## 2022-12-22 RX ADMIN — ENOXAPARIN SODIUM 40 MILLIGRAM(S): 100 INJECTION SUBCUTANEOUS at 06:38

## 2022-12-22 RX ADMIN — CLOPIDOGREL BISULFATE 75 MILLIGRAM(S): 75 TABLET, FILM COATED ORAL at 13:08

## 2022-12-22 RX ADMIN — FINASTERIDE 5 MILLIGRAM(S): 5 TABLET, FILM COATED ORAL at 13:08

## 2022-12-22 RX ADMIN — Medication 50 MICROGRAM(S): at 06:37

## 2022-12-22 RX ADMIN — SIMVASTATIN 20 MILLIGRAM(S): 20 TABLET, FILM COATED ORAL at 21:44

## 2022-12-22 RX ADMIN — Medication 25 MILLIGRAM(S): at 06:37

## 2022-12-22 RX ADMIN — Medication 25 MILLIGRAM(S): at 17:40

## 2022-12-22 RX ADMIN — DULOXETINE HYDROCHLORIDE 60 MILLIGRAM(S): 30 CAPSULE, DELAYED RELEASE ORAL at 13:08

## 2022-12-22 RX ADMIN — TAMSULOSIN HYDROCHLORIDE 0.4 MILLIGRAM(S): 0.4 CAPSULE ORAL at 21:43

## 2022-12-22 RX ADMIN — Medication 1 TABLET(S): at 13:07

## 2022-12-22 RX ADMIN — Medication 2: at 17:37

## 2022-12-22 RX ADMIN — SODIUM CHLORIDE 75 MILLILITER(S): 9 INJECTION INTRAMUSCULAR; INTRAVENOUS; SUBCUTANEOUS at 21:44

## 2022-12-22 NOTE — DISCHARGE NOTE PROVIDER - CARE PROVIDER_API CALL
Javan Rios)  [address] [facility]  [address] [place] [address]  Phone: [phone]  Fax: [phone]  Follow Up Time: 1 week

## 2022-12-22 NOTE — DISCHARGE NOTE PROVIDER - NSDCMRMEDTOKEN_GEN_ALL_CORE_FT
Pt called in reporting she continues to have pain from fissure. Pt reports she has been using compound ointment for approx 5 days now. Educated pt on continuing HF diet, increasing water intake, will send colace to preferred pharmacy to aid with continued hard stools and warm sitz. Pt to f/u with MD 04/30. Pt verbalized all understanding.   celecoxib 100 mg oral capsule: 1 cap(s) orally once a day, As Needed  clopidogrel 75 mg oral tablet: 1 tab(s) orally once a day  DULoxetine 60 mg oral delayed release capsule: 1 cap(s) orally once a day  finasteride 5 mg oral tablet: 1 tab(s) orally once a day  guaiFENesin 600 mg oral tablet, extended release: 1 tab(s) orally every 12 hours, As needed, Cough  lactobacillus acidophilus oral capsule: 1 tab(s) orally once a day  levothyroxine 50 mcg (0.05 mg) oral tablet: 1 tab(s) orally once a day  magnesium gluconate 500 mg oral tablet: 1 tab(s) orally 3 times a day  Metoprolol Tartrate 25 mg oral tablet: 1 tab(s) orally 2 times a day  simvastatin 20 mg oral tablet: 1 tab(s) orally once a day (at bedtime)  tamsulosin 0.4 mg oral capsule: 1 cap(s) orally once a day  [place] SoloStar 300 units/mL subcutaneous solution: 12 unit(s) subcutaneous once a day (in the morning)  traMADol 50 mg oral tablet: 1 tab(s) orally 2 times a day, As Needed   celecoxib 100 mg oral capsule: 1 cap(s) orally once a day, As Needed  clopidogrel 75 mg oral tablet: 1 tab(s) orally once a day  DULoxetine 60 mg oral delayed release capsule: 1 cap(s) orally once a day  finasteride 5 mg oral tablet: 1 tab(s) orally once a day  guaiFENesin 600 mg oral tablet, extended release: 1 tab(s) orally every 12 hours, As needed, Cough  lactobacillus acidophilus oral capsule: 1 tab(s) orally once a day  levothyroxine 50 mcg (0.05 mg) oral tablet: 1 tab(s) orally once a day  Metoprolol Tartrate 25 mg oral tablet: 1 tab(s) orally 2 times a day  simvastatin 20 mg oral tablet: 1 tab(s) orally once a day (at bedtime)  tamsulosin 0.4 mg oral capsule: 1 cap(s) orally once a day  [place] SoloStar 300 units/mL subcutaneous solution: 12 unit(s) subcutaneous once a day (in the morning)  traMADol 50 mg oral tablet: 1 tab(s) orally 2 times a day, As Needed

## 2022-12-22 NOTE — DISCHARGE NOTE PROVIDER - HOSPITAL COURSE
89 yo M with PMHx of HTN, HLD, hypothyroidism, BPH, DM presents to the Ed for evaluation of his SOB. On Thursday 12/1 patient developed diarrhea which resolved in a couple of days. he also had subjective fever on Friday 12/2. He also had a productive cough the past few days and felt sob. Presented to an urgent care who found him to be hypoxic and sent him to Ed.   In Ed, pt was placed on 4L NC, sating 98%. BP and HR stable. Placed on tele which shows frequent PACs. CXR shows stable atelectasis R base and low lung volumes. Influenza A+. Not vaccinated for flu. Labs: Cr 1.8, Mg 1.5, trop 0.03, BNP 1k, Na 129, Hb 11.5.   Received NS bolus and IV mg    Hospital Course:  Patient presented with hypoxic resp failure secondary to bilateral bacterial PNA. Received a course of Doxy/Rocephin/Linezolid and his PNA resolved, with a   slight increase his home O2 (2 -->3L). Patient noted to be hyponatremic however it is mild and stable, likely chronic. He developed non-septic  diarrhea after the ABx course (negative for C. Diff) .

## 2022-12-22 NOTE — DISCHARGE NOTE PROVIDER - NSDCCPCAREPLAN_GEN_ALL_CORE_FT
PRINCIPAL DISCHARGE DIAGNOSIS  Diagnosis: Multifocal pneumonia  Assessment and Plan of Treatment:   Please take your medications as directed. Don’t skip doses. Follow up with your primary care physician within 7 days.    Coughing up mucus is normal. Don’t use medicines to suppress your cough unless your cough is dry, painful, or interferes with your sleep. Get plenty of rest until your fever, shortness of breath, and chest pain go away. Plan to get a flu shot every year. Ask your primary care doctor about pneumonia vaccines.  Seek immediate medical attention if you experience chest pain, trouble breathing, blue lips or fingernails, fever of 100.4°F  (38°C) or higher, yellow, green, bloody, or smelly sputum, more than normal mucus production, vomiting or diarrhea.

## 2022-12-23 ENCOUNTER — TRANSCRIPTION ENCOUNTER (OUTPATIENT)
Age: 87
End: 2022-12-23

## 2022-12-23 VITALS
RESPIRATION RATE: 18 BRPM | TEMPERATURE: 98 F | OXYGEN SATURATION: 97 % | DIASTOLIC BLOOD PRESSURE: 69 MMHG | SYSTOLIC BLOOD PRESSURE: 162 MMHG | HEART RATE: 86 BPM

## 2022-12-23 LAB
ANION GAP SERPL CALC-SCNC: 11 MMOL/L — SIGNIFICANT CHANGE UP (ref 7–14)
BASOPHILS # BLD AUTO: 0.11 K/UL — SIGNIFICANT CHANGE UP (ref 0–0.2)
BASOPHILS NFR BLD AUTO: 0.6 % — SIGNIFICANT CHANGE UP (ref 0–1)
BUN SERPL-MCNC: 16 MG/DL — SIGNIFICANT CHANGE UP (ref 10–20)
CALCIUM SERPL-MCNC: 8.7 MG/DL — SIGNIFICANT CHANGE UP (ref 8.4–10.5)
CHLORIDE SERPL-SCNC: 97 MMOL/L — LOW (ref 98–110)
CO2 SERPL-SCNC: 25 MMOL/L — SIGNIFICANT CHANGE UP (ref 17–32)
CREAT SERPL-MCNC: 1 MG/DL — SIGNIFICANT CHANGE UP (ref 0.7–1.5)
EGFR: 68 ML/MIN/1.73M2 — SIGNIFICANT CHANGE UP
EOSINOPHIL # BLD AUTO: 0.27 K/UL — SIGNIFICANT CHANGE UP (ref 0–0.7)
EOSINOPHIL NFR BLD AUTO: 1.6 % — SIGNIFICANT CHANGE UP (ref 0–8)
GLUCOSE BLDC GLUCOMTR-MCNC: 152 MG/DL — HIGH (ref 70–99)
GLUCOSE BLDC GLUCOMTR-MCNC: 155 MG/DL — HIGH (ref 70–99)
GLUCOSE BLDC GLUCOMTR-MCNC: 194 MG/DL — HIGH (ref 70–99)
GLUCOSE BLDC GLUCOMTR-MCNC: 95 MG/DL — SIGNIFICANT CHANGE UP (ref 70–99)
GLUCOSE SERPL-MCNC: 71 MG/DL — SIGNIFICANT CHANGE UP (ref 70–99)
HCT VFR BLD CALC: 34 % — LOW (ref 42–52)
HGB BLD-MCNC: 10.6 G/DL — LOW (ref 14–18)
IMM GRANULOCYTES NFR BLD AUTO: 2.8 % — HIGH (ref 0.1–0.3)
LYMPHOCYTES # BLD AUTO: 32 % — SIGNIFICANT CHANGE UP (ref 20.5–51.1)
LYMPHOCYTES # BLD AUTO: 5.47 K/UL — HIGH (ref 1.2–3.4)
MAGNESIUM SERPL-MCNC: 1.7 MG/DL — LOW (ref 1.8–2.4)
MCHC RBC-ENTMCNC: 28.3 PG — SIGNIFICANT CHANGE UP (ref 27–31)
MCHC RBC-ENTMCNC: 31.2 G/DL — LOW (ref 32–37)
MCV RBC AUTO: 90.9 FL — SIGNIFICANT CHANGE UP (ref 80–94)
MONOCYTES # BLD AUTO: 1.52 K/UL — HIGH (ref 0.1–0.6)
MONOCYTES NFR BLD AUTO: 8.9 % — SIGNIFICANT CHANGE UP (ref 1.7–9.3)
NEUTROPHILS # BLD AUTO: 9.23 K/UL — HIGH (ref 1.4–6.5)
NEUTROPHILS NFR BLD AUTO: 54.1 % — SIGNIFICANT CHANGE UP (ref 42.2–75.2)
NRBC # BLD: 0 /100 WBCS — SIGNIFICANT CHANGE UP (ref 0–0)
PLATELET # BLD AUTO: 350 K/UL — SIGNIFICANT CHANGE UP (ref 130–400)
POTASSIUM SERPL-MCNC: 4.7 MMOL/L — SIGNIFICANT CHANGE UP (ref 3.5–5)
POTASSIUM SERPL-SCNC: 4.7 MMOL/L — SIGNIFICANT CHANGE UP (ref 3.5–5)
RBC # BLD: 3.74 M/UL — LOW (ref 4.7–6.1)
RBC # FLD: 13.4 % — SIGNIFICANT CHANGE UP (ref 11.5–14.5)
SARS-COV-2 RNA SPEC QL NAA+PROBE: SIGNIFICANT CHANGE UP
SODIUM SERPL-SCNC: 133 MMOL/L — LOW (ref 135–146)
WBC # BLD: 17.07 K/UL — HIGH (ref 4.8–10.8)
WBC # FLD AUTO: 17.07 K/UL — HIGH (ref 4.8–10.8)

## 2022-12-23 PROCEDURE — 99239 HOSP IP/OBS DSCHRG MGMT >30: CPT

## 2022-12-23 RX ADMIN — Medication 30 MILLILITER(S): at 13:12

## 2022-12-23 RX ADMIN — FINASTERIDE 5 MILLIGRAM(S): 5 TABLET, FILM COATED ORAL at 13:11

## 2022-12-23 RX ADMIN — SIMVASTATIN 20 MILLIGRAM(S): 20 TABLET, FILM COATED ORAL at 21:39

## 2022-12-23 RX ADMIN — TAMSULOSIN HYDROCHLORIDE 0.4 MILLIGRAM(S): 0.4 CAPSULE ORAL at 21:39

## 2022-12-23 RX ADMIN — Medication 5 UNIT(S): at 17:31

## 2022-12-23 RX ADMIN — SODIUM CHLORIDE 75 MILLILITER(S): 9 INJECTION INTRAMUSCULAR; INTRAVENOUS; SUBCUTANEOUS at 05:33

## 2022-12-23 RX ADMIN — CLOPIDOGREL BISULFATE 75 MILLIGRAM(S): 75 TABLET, FILM COATED ORAL at 13:11

## 2022-12-23 RX ADMIN — DULOXETINE HYDROCHLORIDE 60 MILLIGRAM(S): 30 CAPSULE, DELAYED RELEASE ORAL at 13:11

## 2022-12-23 RX ADMIN — Medication 25 MILLIGRAM(S): at 05:33

## 2022-12-23 RX ADMIN — INSULIN GLARGINE 15 UNIT(S): 100 INJECTION, SOLUTION SUBCUTANEOUS at 22:06

## 2022-12-23 RX ADMIN — ENOXAPARIN SODIUM 40 MILLIGRAM(S): 100 INJECTION SUBCUTANEOUS at 05:39

## 2022-12-23 RX ADMIN — Medication 50 MICROGRAM(S): at 05:33

## 2022-12-23 RX ADMIN — Medication 1 TABLET(S): at 13:11

## 2022-12-23 RX ADMIN — Medication 1: at 17:30

## 2022-12-23 NOTE — PROGRESS NOTE ADULT - SUBJECTIVE AND OBJECTIVE BOX
CHEL CROFT 89y Male  MRN#: [id number]   Hospital Day: 9d    SUBJECTIVE  Patient is a 89y old Male who presents with a chief complaint of lethargy (22 Dec 2022 14:37)  Currently admitted to medicine with the primary diagnosis of Multifocal pneumonia      INTERVAL HPI AND OVERNIGHT EVENTS:  Patient was examined and seen at bedside. This morning he is resting comfortably in bed and reports no issues or overnight events.    OBJECTIVE  PAST MEDICAL & SURGICAL HISTORY  HTN (hypertension)    HLD (hyperlipidemia)    BPH (benign prostatic hyperplasia)    CAD (coronary artery disease)    Diabetes mellitus    Hypothyroidism    S/P CABG (coronary artery bypass graft)      ALLERGIES:  No Known Allergies    MEDICATIONS:  STANDING MEDICATIONS  clopidogrel Tablet 75 milliGRAM(s) Oral daily  DULoxetine 60 milliGRAM(s) Oral daily  enoxaparin Injectable 40 milliGRAM(s) SubCutaneous every 24 hours  finasteride 5 milliGRAM(s) Oral daily  influenza  Vaccine (HIGH DOSE) 0.7 milliLiter(s) IntraMuscular once  insulin glargine Injectable (LANTUS) 15 Unit(s) SubCutaneous at bedtime  insulin lispro (ADMELOG) corrective regimen sliding scale   SubCutaneous three times a day before meals  insulin lispro Injectable (ADMELOG) 5 Unit(s) SubCutaneous three times a day before meals  lactobacillus acidophilus 1 Tablet(s) Oral daily  levothyroxine 50 MICROGram(s) Oral daily  metoprolol tartrate 25 milliGRAM(s) Oral two times a day  simvastatin 20 milliGRAM(s) Oral at bedtime  sodium chloride 0.9%. 1000 milliLiter(s) IV Continuous <Continuous>  sodium chloride 0.9%. 1000 milliLiter(s) IV Continuous <Continuous>  tamsulosin 0.4 milliGRAM(s) Oral at bedtime    PRN MEDICATIONS  acetaminophen     Tablet .. 650 milliGRAM(s) Oral every 6 hours PRN  celecoxib 100 milliGRAM(s) Oral daily PRN  guaiFENesin  milliGRAM(s) Oral every 12 hours PRN      VITAL SIGNS: Last 24 Hours  T(C): 36.8 (22 Dec 2022 08:37), Max: 36.8 (22 Dec 2022 08:37)  T(F): 98.3 (22 Dec 2022 08:37), Max: 98.3 (22 Dec 2022 08:37)  HR: 77 (22 Dec 2022 08:37) (70 - 82)  BP: 102/57 (22 Dec 2022 08:37) (102/57 - 129/59)  BP(mean): --  RR: 18 (22 Dec 2022 08:37) (18 - 18)  SpO2: 98% (22 Dec 2022 00:17) (98% - 98%)    LABS:                        11.2   17.13 )-----------( 378      ( 22 Dec 2022 06:58 )             35.3     12-22    127<L>  |  92<L>  |  17  ----------------------------<  110<H>  4.9   |  27  |  1.1    Ca    8.8      22 Dec 2022 06:58  Mg     1.8     12-22        RADIOLOGY:  < from: Xray Chest 1 View- PORTABLE-Routine (Xray Chest 1 View- PORTABLE-Routine in AM.) (12.17.22 @ 08:55) >  FINDINGS/  IMPRESSION:    Low lung volumes. Bibasal opacities similar to prior exam. No   pneumothorax.    Stable cardiomediastinal silhouette.    Unchanged osseous structures.      < end of copied text >      PHYSICAL EXAM:  CONSTITUTIONAL: Nauseous today, AAOx1  HEAD: Atraumatic, normocephalic  EYES: EOM intact, PERRLA, conjunctiva and sclera clear  ENT: moist mucous membranes  PULMONARY: Clear to auscultation bilaterally  CARDIOVASCULAR: Regular rate and rhythm  GASTROINTESTINAL: Soft, non-tender, non-distended; bowel sounds present  MUSCULOSKELETAL: no edema  NEUROLOGY: non-focal  SKIN: warm and dry    ASSESSMENT and PLAN    89M w/ h/o CAD (s/p CABG and PCI), HTN, DLD, DM, BPH, and hypothyroidism p/w lethargy. Admitted for pneumonia.      #Hyponatremia  - Stable   - Na+ low but stable 129-131 ( 12/16 and 12/19). Na 131 (12/20)  - Monitor BMP  - Monitor fluid status     #Diarrhea  - First episode yesterday. None Today. Likely secondary to magnesium supplementation. Mag d/c'd  - slight  white count increase, afebrile, non tender abdomen. C-Diff (-). Likely secondary to diarrhea.     # Post-Influenza A Bilateral Pneumonia  # Acute hypoxic respiratory failure, discharge on 2L but now requiring 3-L  - On 3L NC  - Slight uptrend today on WBC likely related to GI process.   - completed IV Linezolid 600 mg iv q12h (total of 7 days)  - completed Rocephin 1 gm iv q24h (total of 7 days)  - completed Doxycycline 100 mg iv q12h (total of 7 days)  - Blood Cx: negative   - Urine Cx: > 3 organisms growing but probably contaminants   - MRSA negative   - strep and legionella urine antigen (-)  -  CXR stable  - Updated daughter bedside today     #Insulin-Dependent Diabetes Mellitus  - A1c 10.2% (Dec 2022). Managed via Toujeo 12u qd. Last admission, pt on Lantus 10u qhs, but FS still poorly controlled.  on admission, but low suspicion for DKA (AG 13 and bicarb 25).  - Monitor FS TIDAC and QHS  - c/w Lantus 15U SQ QHS  - c/w Lispro 5U SQ TIDAC  - c/w low-dose ISS     # Hypothyroidism  - TSH WNL, c/w levothyroxine     #Coronary Artery Disease  #Hypertension  #Dyslipidemia  - Known h/o CAD. S/P PCI x3. S/P CABG in mid-2000's. No reported h/o CHF.  - c/w clopidogrel 75mg PO QD  - c/w simvastatin 20mg PO QHS  - c/w Lopressor 25mg PO BID    #BPH  - c/w tamsulosin 0.4mg PO QHS  - c/w finasteride 5mg PO QD    #DVT PPX: Lovenox 40mg SQ QD  #GI PPX: none indicated  #DIET: DASH/TLC + CC  #ACTIVITY: IAT  #CODE STATUS: Full Code; daughter at bedside wants everything to be done for her father  #DISPOSITION: Anticipated discharge for tomorrow. Family updated bedside.       
Progress Note:  Provider Speciality                            Hospitalist      CHEL CROFT MRN-[id number] 89y Male     CHIEF PRESENTING COMPLAINT:  Patient is a 89y old  Male who presents with a chief complaint of lethargy (16 Dec 2022 09:37)        SUBJECTIVE:  Patient was seen and examined at bedside.  No new complaints described by patient in morning rounds.   No significant overnight events reported.     HISTORY OF PRESENTING ILLNESS:  HPI:  89M w/ h/o CAD (s/p CABG and PCI), HTN, DLD, DM, BPH, and hypothyroidism p/w lethargy. History largely obtained from daughter at bedside. Patient initially admitted last week for dyspnea. Found to have Influenza A. Treated w/ Tamiflu and then discharged w/ portable oxygen (2L) after two days. Since discharge, daughter reports pt has been lethargic with decreased PO intake and worsening cough. Also reported some abdominal pain earlier today, which has since resolved. No reported fevers, chills, CP, palpitations, n/v/d, dysuria, or LE swelling.    In ED, pt HD stable on vitals. Saturating 93% on 4L NC (increased from 2L by EMS en route to ED). Labs significant for WBC 23.67. CTAP demonstrated no acute intra-abdominal pathology, but demonstrated bibasilar consolidations in visualized portion of lungs, suspicious for multifocal pneumonia. UA negative for infection. S/P 1L LR bolus and IV abx (vancomycin cefepime, and azithromycin). Admitted to medicine for pneumonia. (13 Dec 2022 22:05)        REVIEW OF SYSTEMS:  Patient denies  headache, fever, chills. Denies chest pain, shortness of breath, palpitation. Denies nausea, vomiting, abdominal pain or diarrhoea, Denies dysuria.   At least 10 systems were reviewed in ROS. All systems reviewed  are within normal limits except for the complaints as described in Subjective.    PAST MEDICAL & SURGICAL HISTORY:  PAST MEDICAL & SURGICAL HISTORY:  HTN (hypertension)      HLD (hyperlipidemia)      BPH (benign prostatic hyperplasia)      CAD (coronary artery disease)      Diabetes mellitus      Hypothyroidism      S/P CABG (coronary artery bypass graft)              VITAL SIGNS:  Vital Signs Last 24 Hrs  T(C): 35.6 (20 Dec 2022 08:07), Max: 36.9 (20 Dec 2022 00:34)  T(F): 96.1 (20 Dec 2022 08:07), Max: 98.4 (20 Dec 2022 00:34)  HR: 74 (20 Dec 2022 08:07) (64 - 75)  BP: 148/64 (20 Dec 2022 08:07) (134/68 - 148/64)  BP(mean): --  RR: 18 (20 Dec 2022 08:07) (18 - 18)  SpO2: --            PHYSICAL EXAMINATION:  Not in acute distress  General: No icterus  HEENT:   no JVD.  Heart: S1+S2 audible  Lungs: bilateral  moderate air entry, no wheezing, no crepitations.  Abdomen: Soft, non-tender, non-distended , no  rigidity or guarding.  CNS: Awake , oriented to name, demented  Extremities:  No edema            CONSULTS:  Consultant(s) Notes Reviewed by me.   Care Discussed with Consultants/Other Providers where required.        MEDICATIONS:  MEDICATIONS  (STANDING):  cefTRIAXone   IVPB      cefTRIAXone   IVPB 1000 milliGRAM(s) IV Intermittent every 24 hours  clopidogrel Tablet 75 milliGRAM(s) Oral daily  doxycycline IVPB      doxycycline IVPB 100 milliGRAM(s) IV Intermittent every 12 hours  DULoxetine 60 milliGRAM(s) Oral daily  enoxaparin Injectable 40 milliGRAM(s) SubCutaneous every 24 hours  finasteride 5 milliGRAM(s) Oral daily  influenza  Vaccine (HIGH DOSE) 0.7 milliLiter(s) IntraMuscular once  insulin glargine Injectable (LANTUS) 15 Unit(s) SubCutaneous at bedtime  insulin lispro (ADMELOG) corrective regimen sliding scale   SubCutaneous three times a day before meals  insulin lispro Injectable (ADMELOG) 5 Unit(s) SubCutaneous three times a day before meals  levothyroxine 50 MICROGram(s) Oral daily  linezolid  IVPB 600 milliGRAM(s) IV Intermittent every 12 hours  linezolid  IVPB      metoprolol tartrate 25 milliGRAM(s) Oral two times a day  simvastatin 20 milliGRAM(s) Oral at bedtime  sodium chloride 0.9%. 1000 milliLiter(s) (60 mL/Hr) IV Continuous <Continuous>  tamsulosin 0.4 milliGRAM(s) Oral at bedtime    MEDICATIONS  (PRN):  acetaminophen     Tablet .. 650 milliGRAM(s) Oral every 6 hours PRN Temp greater or equal to 38C (100.4F), Mild Pain (1 - 3)  celecoxib 100 milliGRAM(s) Oral daily PRN Severe Pain (7 - 10)  guaiFENesin  milliGRAM(s) Oral every 12 hours PRN Cough            ASSESSMENT:  89M w/ h/o CAD (s/p CABG and PCI), HTN, DLD, DM, BPH, and hypothyroidism p/w lethargy.     Suspected complex PNA  BRINA-prerenal  Hyponatremia, hypovolemic  Hypomagnesemia  Recent Influenza  CAD  Hypothyroidism    Linezolid 600 mg iv q12h, Rocephin 1 gm iv q24h, Doxycycline 100 mg iv q12h.  Completed for 7 days on 12/19  Leucocytosis  improved from 23 to 14. Procal 0.18  New onset Diarrhoea 4 bouts today. Will avoid sending C diff for now as low clinical suspicion, if gets worse despite supportive care then will rule out   Urine legio & strep, sputum cx which are are unremarkable for acute findings   Supplemental O2 as required to maintain  sat 89-93  BRINA-prerenal & Hyponatremia, hypovolemic. Improved  Continue home meds for chronic  comorbid conditions  Get PT follow up today  Handoff: Anticipated for discharge tomorrow to SNF , held for new onset diarrhoea today    Total time spent to complete patient's bedside assessment, physical examination, review medical chart including labs & imaging, discuss medical plan of care with housestaff was more than 35 minutes        
CHEL CROFT 89y Male  MRN#: [id number]   Hospital Day: 6d    SUBJECTIVE  Patient is a 89y old Male who presents with a chief complaint of lethargy (19 Dec 2022 14:27)  Currently admitted to medicine with the primary diagnosis of Multifocal pneumonia      INTERVAL HPI AND OVERNIGHT EVENTS:  Patient was examined and seen at bedside. This morning he is resting comfortably in bed and reports no issues or overnight events.    OBJECTIVE  PAST MEDICAL & SURGICAL HISTORY  HTN (hypertension)    HLD (hyperlipidemia)    BPH (benign prostatic hyperplasia)    CAD (coronary artery disease)    Diabetes mellitus    Hypothyroidism    S/P CABG (coronary artery bypass graft)      ALLERGIES:  No Known Allergies    MEDICATIONS:  STANDING MEDICATIONS  clopidogrel Tablet 75 milliGRAM(s) Oral daily  doxycycline IVPB      doxycycline IVPB 100 milliGRAM(s) IV Intermittent every 12 hours  DULoxetine 60 milliGRAM(s) Oral daily  enoxaparin Injectable 40 milliGRAM(s) SubCutaneous every 24 hours  finasteride 5 milliGRAM(s) Oral daily  influenza  Vaccine (HIGH DOSE) 0.7 milliLiter(s) IntraMuscular once  insulin glargine Injectable (LANTUS) 15 Unit(s) SubCutaneous at bedtime  insulin lispro (ADMELOG) corrective regimen sliding scale   SubCutaneous three times a day before meals  insulin lispro Injectable (ADMELOG) 5 Unit(s) SubCutaneous three times a day before meals  levothyroxine 50 MICROGram(s) Oral daily  linezolid  IVPB      linezolid  IVPB 600 milliGRAM(s) IV Intermittent every 12 hours  magnesium gluconate 500 milliGRAM(s) Oral three times a day  metoprolol tartrate 25 milliGRAM(s) Oral two times a day  simvastatin 20 milliGRAM(s) Oral at bedtime  sodium chloride 0.9%. 1000 milliLiter(s) IV Continuous <Continuous>  tamsulosin 0.4 milliGRAM(s) Oral at bedtime    PRN MEDICATIONS  acetaminophen     Tablet .. 650 milliGRAM(s) Oral every 6 hours PRN  celecoxib 100 milliGRAM(s) Oral daily PRN  guaiFENesin  milliGRAM(s) Oral every 12 hours PRN      VITAL SIGNS: Last 24 Hours  T(C): 36.1 (19 Dec 2022 09:30), Max: 36.3 (19 Dec 2022 05:40)  T(F): 97 (19 Dec 2022 09:30), Max: 97.4 (19 Dec 2022 05:40)  HR: 65 (19 Dec 2022 09:30) (65 - 79)  BP: 132/69 (19 Dec 2022 09:30) (130/- - 132/69)  BP(mean): --  RR: 18 (19 Dec 2022 09:30) (18 - 18)  SpO2: --    LABS:                        11.2   14.70 )-----------( 370      ( 19 Dec 2022 06:11 )             34.1     12-19    128<L>  |  92<L>  |  11  ----------------------------<  213<H>  4.8   |  28  |  0.9    Ca    8.9      19 Dec 2022 06:11  Mg     1.5     12-19                Culture - Sputum (collected 17 Dec 2022 13:49)  Source: .Sputum Sputum  Gram Stain (17 Dec 2022 22:32):    Few Squamous epithelial cells per low power field    Few polymorphonuclear leukocytes per low power field    Few Yeast per oil power field  Final Report (19 Dec 2022 16:19):    Normal Respiratory Roxana present          RADIOLOGY:  < from: Xray Chest 1 View- PORTABLE-Routine (Xray Chest 1 View- PORTABLE-Routine in AM.) (12.17.22 @ 08:55) >  FINDINGS/  IMPRESSION:    Low lung volumes. Bibasal opacities similar to prior exam. No   pneumothorax.    Stable cardiomediastinal silhouette.    Unchanged osseous structures.      < end of copied text >      PHYSICAL EXAM:  CONSTITUTIONAL: No acute distress, AAOx1  HEAD: Atraumatic, normocephalic  EYES: EOM intact, PERRLA, conjunctiva and sclera clear  ENT: moist mucous membranes  PULMONARY: Clear to auscultation bilaterally  CARDIOVASCULAR: Regular rate and rhythm  GASTROINTESTINAL: Soft, non-tender, non-distended; bowel sounds present  MUSCULOSKELETAL: no edema  NEUROLOGY: non-focal  SKIN: warm and dry    ASSESSMENT and PLAN    89M w/ h/o CAD (s/p CABG and PCI), HTN, DLD, DM, BPH, and hypothyroidism p/w lethargy. Admitted for pneumonia.      #Hyponatremia  - Stable   - Na+ 128 ( 12/16 and 12/19).   - Monitor BMP  - Monitor fluid status     # Post-Influenza A Bilateral Pneumonia  # Acute hypoxic respiratory failure, discharge on 2L but now requiring 3-L  - On 3L NC  - WBC trending down   - c/w IV Linezolid 600 mg iv q12h (total of 7 days)  - c/w Rocephin 1 gm iv q24h (total of 7 days)  - c/w Doxycycline 100 mg iv q12h (total of 7 days)  - Blood Cx: negative   - Urine Cx: > 3 organisms growing but probably contaminants   - MRSA negative   - strep and legionella urine antigen (-)  -  CXR stable  - Updated daughter bedside today     #Insulin-Dependent Diabetes Mellitus  - A1c 10.2% (Dec 2022). Managed via Toujeo 12u qd. Last admission, pt on Lantus 10u qhs, but FS still poorly controlled.  on admission, but low suspicion for DKA (AG 13 and bicarb 25).  - Monitor FS TIDAC and QHS  - c/w Lantus 15U SQ QHS  - c/w Lispro 5U SQ TIDAC  - c/w low-dose ISS     # Hypothyroidism  - TSH WNL, c/w levothyroxine     #Coronary Artery Disease  #Hypertension  #Dyslipidemia  - Known h/o CAD. S/P PCI x3. S/P CABG in mid-2000's. No reported h/o CHF.  - c/w clopidogrel 75mg PO QD  - c/w simvastatin 20mg PO QHS  - c/w Lopressor 25mg PO BID    #BPH  - c/w tamsulosin 0.4mg PO QHS  - c/w finasteride 5mg PO QD    #DVT PPX: Lovenox 40mg SQ QD  #GI PPX: none indicated  #DIET: DASH/TLC + CC  #ACTIVITY: IAT  #CODE STATUS: Full Code; daughter at bedside wants everything to be done for her father  #DISPOSITION: Pending placement  
Progress Note:  Provider Speciality                            Hospitalist      CHEL CROFT MRN-[id number] 89y Male     CHIEF PRESENTING COMPLAINT:  Patient is a 89y old  Male who presents with a chief complaint of lethargy (16 Dec 2022 09:37)        SUBJECTIVE:  Patient was seen and examined at bedside.  No new complaints described by patient in morning rounds.   No significant overnight events reported.     HISTORY OF PRESENTING ILLNESS:  HPI:  89M w/ h/o CAD (s/p CABG and PCI), HTN, DLD, DM, BPH, and hypothyroidism p/w lethargy. History largely obtained from daughter at bedside. Patient initially admitted last week for dyspnea. Found to have Influenza A. Treated w/ Tamiflu and then discharged w/ portable oxygen (2L) after two days. Since discharge, daughter reports pt has been lethargic with decreased PO intake and worsening cough. Also reported some abdominal pain earlier today, which has since resolved. No reported fevers, chills, CP, palpitations, n/v/d, dysuria, or LE swelling.    In ED, pt HD stable on vitals. Saturating 93% on 4L NC (increased from 2L by EMS en route to ED). Labs significant for WBC 23.67. CTAP demonstrated no acute intra-abdominal pathology, but demonstrated bibasilar consolidations in visualized portion of lungs, suspicious for multifocal pneumonia. UA negative for infection. S/P 1L LR bolus and IV abx (vancomycin cefepime, and azithromycin). Admitted to medicine for pneumonia. (13 Dec 2022 22:05)        REVIEW OF SYSTEMS:  Patient denies  headache, fever, chills. Denies chest pain, shortness of breath, palpitation. Denies nausea, vomiting, abdominal pain or diarrhoea, Denies dysuria.   At least 10 systems were reviewed in ROS. All systems reviewed  are within normal limits except for the complaints as described in Subjective.    PAST MEDICAL & SURGICAL HISTORY:  PAST MEDICAL & SURGICAL HISTORY:  HTN (hypertension)      HLD (hyperlipidemia)      BPH (benign prostatic hyperplasia)      CAD (coronary artery disease)      Diabetes mellitus      Hypothyroidism      S/P CABG (coronary artery bypass graft)              VITAL SIGNS:  Vital Signs Last 24 Hrs  T(C): 36.6 (17 Dec 2022 08:00), Max: 36.6 (17 Dec 2022 08:00)  T(F): 97.9 (17 Dec 2022 08:00), Max: 97.9 (17 Dec 2022 08:00)  HR: 72 (17 Dec 2022 08:00) (70 - 89)  BP: 147/97 (17 Dec 2022 08:00) (136/61 - 147/97)  BP(mean): --  RR: 18 (17 Dec 2022 08:00) (18 - 18)  SpO2: 95% (17 Dec 2022 08:00) (95% - 98%)    Parameters below as of 17 Dec 2022 08:00  Patient On (Oxygen Delivery Method): nasal cannula  O2 Flow (L/min): 3            PHYSICAL EXAMINATION:  Not in acute distress  General: No icterus  HEENT:   no JVD.  Heart: S1+S2 audible  Lungs: bilateral  moderate air entry, no wheezing, no crepitations.  Abdomen: Soft, non-tender, non-distended , no  rigidity or guarding.  CNS: Awake , oriented to name, demented  Extremities:  No edema            CONSULTS:  Consultant(s) Notes Reviewed by me.   Care Discussed with Consultants/Other Providers where required.        MEDICATIONS:  MEDICATIONS  (STANDING):  cefTRIAXone   IVPB      cefTRIAXone   IVPB 1000 milliGRAM(s) IV Intermittent every 24 hours  clopidogrel Tablet 75 milliGRAM(s) Oral daily  doxycycline IVPB      doxycycline IVPB 100 milliGRAM(s) IV Intermittent every 12 hours  DULoxetine 60 milliGRAM(s) Oral daily  enoxaparin Injectable 40 milliGRAM(s) SubCutaneous every 24 hours  finasteride 5 milliGRAM(s) Oral daily  influenza  Vaccine (HIGH DOSE) 0.7 milliLiter(s) IntraMuscular once  insulin glargine Injectable (LANTUS) 15 Unit(s) SubCutaneous at bedtime  insulin lispro (ADMELOG) corrective regimen sliding scale   SubCutaneous three times a day before meals  insulin lispro Injectable (ADMELOG) 5 Unit(s) SubCutaneous three times a day before meals  levothyroxine 50 MICROGram(s) Oral daily  linezolid  IVPB 600 milliGRAM(s) IV Intermittent every 12 hours  linezolid  IVPB      metoprolol tartrate 25 milliGRAM(s) Oral two times a day  simvastatin 20 milliGRAM(s) Oral at bedtime  sodium chloride 0.9%. 1000 milliLiter(s) (60 mL/Hr) IV Continuous <Continuous>  tamsulosin 0.4 milliGRAM(s) Oral at bedtime    MEDICATIONS  (PRN):  acetaminophen     Tablet .. 650 milliGRAM(s) Oral every 6 hours PRN Temp greater or equal to 38C (100.4F), Mild Pain (1 - 3)  celecoxib 100 milliGRAM(s) Oral daily PRN Severe Pain (7 - 10)  guaiFENesin  milliGRAM(s) Oral every 12 hours PRN Cough            ASSESSMENT:  89M w/ h/o CAD (s/p CABG and PCI), HTN, DLD, DM, BPH, and hypothyroidism p/w lethargy.     Suspected complex PNA  BRINA-prerenal  Hyponatremia, hypovolemic  Hypomagnesemia  Recent Influenza  CAD  Hypothyroidism    Continue Linezolid 600 mg iv q12h, Rocephin 1 gm iv q24h, Doxycycline 100 mg iv q12h  Leucocytosis  improving from 23 to 13. Procal 0.18  Follow urine legio & strep, sputum cx which are still pending  Supplemental O2 as required to maintain  sat 89-93  BRINA-prerenal & Hyponatremia, hypovolemic. Improved  Continue home meds for chronic  comorbid conditions  Get PT  Handoff: Anticipated for discharge in 24-48 for SNF pending medical improvement of dyspnea    Total time spent to complete patient's bedside assessment, physical examination, review medical chart including labs & imaging, discuss medical plan of care with housestaff was more than 35 minutes        
CHEL CROFT 89y Male  MRN#: [id number]   Hospital Day: 7d    SUBJECTIVE  Patient is a 89y old Male who presents with a chief complaint of lethargy (20 Dec 2022 14:26)  Currently admitted to medicine with the primary diagnosis of Multifocal pneumonia      INTERVAL HPI AND OVERNIGHT EVENTS:  Patient was examined and seen at bedside. This morning he is resting comfortably in bed and reports no issues or overnight events.    OBJECTIVE  PAST MEDICAL & SURGICAL HISTORY  HTN (hypertension)    HLD (hyperlipidemia)    BPH (benign prostatic hyperplasia)    CAD (coronary artery disease)    Diabetes mellitus    Hypothyroidism    S/P CABG (coronary artery bypass graft)      ALLERGIES:  No Known Allergies    MEDICATIONS:  STANDING MEDICATIONS  cefTRIAXone   IVPB      clopidogrel Tablet 75 milliGRAM(s) Oral daily  doxycycline IVPB      doxycycline IVPB 100 milliGRAM(s) IV Intermittent every 12 hours  DULoxetine 60 milliGRAM(s) Oral daily  enoxaparin Injectable 40 milliGRAM(s) SubCutaneous every 24 hours  finasteride 5 milliGRAM(s) Oral daily  influenza  Vaccine (HIGH DOSE) 0.7 milliLiter(s) IntraMuscular once  insulin glargine Injectable (LANTUS) 15 Unit(s) SubCutaneous at bedtime  insulin lispro (ADMELOG) corrective regimen sliding scale   SubCutaneous three times a day before meals  insulin lispro Injectable (ADMELOG) 5 Unit(s) SubCutaneous three times a day before meals  lactobacillus acidophilus 1 Tablet(s) Oral daily  levothyroxine 50 MICROGram(s) Oral daily  linezolid  IVPB 600 milliGRAM(s) IV Intermittent every 12 hours  linezolid  IVPB      magnesium gluconate 500 milliGRAM(s) Oral three times a day  metoprolol tartrate 25 milliGRAM(s) Oral two times a day  simvastatin 20 milliGRAM(s) Oral at bedtime  sodium chloride 0.9%. 1000 milliLiter(s) IV Continuous <Continuous>  tamsulosin 0.4 milliGRAM(s) Oral at bedtime    PRN MEDICATIONS  acetaminophen     Tablet .. 650 milliGRAM(s) Oral every 6 hours PRN  celecoxib 100 milliGRAM(s) Oral daily PRN  guaiFENesin  milliGRAM(s) Oral every 12 hours PRN  loperamide Liquid 2 milliGRAM(s) Oral three times a day PRN      VITAL SIGNS: Last 24 Hours  T(C): 35.6 (20 Dec 2022 08:07), Max: 36.9 (20 Dec 2022 00:34)  T(F): 96.1 (20 Dec 2022 08:07), Max: 98.4 (20 Dec 2022 00:34)  HR: 74 (20 Dec 2022 08:07) (64 - 75)  BP: 148/64 (20 Dec 2022 08:07) (134/68 - 148/64)  BP(mean): --  RR: 18 (20 Dec 2022 08:07) (18 - 18)  SpO2: --    LABS:                        10.8   14.75 )-----------( 353      ( 20 Dec 2022 06:34 )             34.6     12-20    131<L>  |  94<L>  |  12  ----------------------------<  132<H>  4.9   |  24  |  0.9    Ca    8.8      20 Dec 2022 06:34  Mg     1.5     12-19        RADIOLOGY:  < from: Xray Chest 1 View- PORTABLE-Routine (Xray Chest 1 View- PORTABLE-Routine in AM.) (12.17.22 @ 08:55) >  FINDINGS/  IMPRESSION:    Low lung volumes. Bibasal opacities similar to prior exam. No   pneumothorax.    Stable cardiomediastinal silhouette.    Unchanged osseous structures.      < end of copied text >      PHYSICAL EXAM:  CONSTITUTIONAL: No acute distress, AAOx1  HEAD: Atraumatic, normocephalic  EYES: EOM intact, PERRLA, conjunctiva and sclera clear  ENT: moist mucous membranes  PULMONARY: Clear to auscultation bilaterally  CARDIOVASCULAR: Regular rate and rhythm  GASTROINTESTINAL: Soft, non-tender, non-distended; bowel sounds present  MUSCULOSKELETAL: no edema  NEUROLOGY: non-focal  SKIN: warm and dry    ASSESSMENT and PLAN    89M w/ h/o CAD (s/p CABG and PCI), HTN, DLD, DM, BPH, and hypothyroidism p/w lethargy. Admitted for pneumonia.      #Hyponatremia  - Stable   - Na+ 128 ( 12/16 and 12/19). Na 131 (12/20)  - Monitor BMP  - Monitor fluid status   - Diarrhea today. Stable white count, afebrile, non tender abdomen. Low suspicion for C-Diff. will monitor     # Post-Influenza A Bilateral Pneumonia  # Acute hypoxic respiratory failure, discharge on 2L but now requiring 3-L  - On 3L NC  - WBC trending down   - c/w IV Linezolid 600 mg iv q12h (total of 7 days)  - c/w Rocephin 1 gm iv q24h (total of 7 days)  - c/w Doxycycline 100 mg iv q12h (total of 7 days)  - Blood Cx: negative   - Urine Cx: > 3 organisms growing but probably contaminants   - MRSA negative   - strep and legionella urine antigen (-)  -  CXR stable  - Updated daughter bedside today     #Insulin-Dependent Diabetes Mellitus  - A1c 10.2% (Dec 2022). Managed via Toujeo 12u qd. Last admission, pt on Lantus 10u qhs, but FS still poorly controlled.  on admission, but low suspicion for DKA (AG 13 and bicarb 25).  - Monitor FS TIDAC and QHS  - c/w Lantus 15U SQ QHS  - c/w Lispro 5U SQ TIDAC  - c/w low-dose ISS     # Hypothyroidism  - TSH WNL, c/w levothyroxine     #Coronary Artery Disease  #Hypertension  #Dyslipidemia  - Known h/o CAD. S/P PCI x3. S/P CABG in mid-2000's. No reported h/o CHF.  - c/w clopidogrel 75mg PO QD  - c/w simvastatin 20mg PO QHS  - c/w Lopressor 25mg PO BID    #BPH  - c/w tamsulosin 0.4mg PO QHS  - c/w finasteride 5mg PO QD    #DVT PPX: Lovenox 40mg SQ QD  #GI PPX: none indicated  #DIET: DASH/TLC + CC  #ACTIVITY: IAT  #CODE STATUS: Full Code; daughter at bedside wants everything to be done for her father  #DISPOSITION: Pending placement  
CHEL CROFT 89y Male  MRN#: [id number]   CODE STATUS:__Full______    Hospital Day: 4d    Patient is currently admitted with the primary diagnosis of secondary bacterial pneumonia.       SUBJECTIVE:    Patient seen and examined at bedside this am. Patient is still having wet cough. However, is stable on 3L of NC.     OBJECTIVE:  PAST MEDICAL & SURGICAL HISTORY:  HTN (hypertension)    HLD (hyperlipidemia)    BPH (benign prostatic hyperplasia)    CAD (coronary artery disease)    Diabetes mellitus    Hypothyroidism    S/P CABG (coronary artery bypass graft)              ALLERGIES:  No Known Allergies                                HOME MEDICATIONS:  Home Medications:  celecoxib 100 mg oral capsule: 1 cap(s) orally once a day, As Needed (13 Dec 2022 22:03)  clopidogrel 75 mg oral tablet: 1 tab(s) orally once a day (13 Dec 2022 22:03)  DULoxetine 60 mg oral delayed release capsule: 1 cap(s) orally once a day (13 Dec 2022 22:03)  finasteride 5 mg oral tablet: 1 tab(s) orally once a day (13 Dec 2022 22:03)  levothyroxine 50 mcg (0.05 mg) oral tablet: 1 tab(s) orally once a day (13 Dec 2022 22:03)  Metoprolol Tartrate 25 mg oral tablet: 1 tab(s) orally 2 times a day (13 Dec 2022 22:03)  simvastatin 20 mg oral tablet: 1 tab(s) orally once a day (at bedtime) (13 Dec 2022 22:03)  tamsulosin 0.4 mg oral capsule: 1 cap(s) orally once a day (13 Dec 2022 22:03)  Toujeo SoloStar 300 units/mL subcutaneous solution: 12 unit(s) subcutaneous once a day (in the morning) (13 Dec 2022 22:03)  traMADol 50 mg oral tablet: 1 tab(s) orally 2 times a day, As Needed (13 Dec 2022 22:03)                           MEDICATIONS:  STANDING MEDICATIONS  cefTRIAXone   IVPB      cefTRIAXone   IVPB 1000 milliGRAM(s) IV Intermittent every 24 hours  clopidogrel Tablet 75 milliGRAM(s) Oral daily  doxycycline IVPB      doxycycline IVPB 100 milliGRAM(s) IV Intermittent every 12 hours  DULoxetine 60 milliGRAM(s) Oral daily  enoxaparin Injectable 40 milliGRAM(s) SubCutaneous every 24 hours  finasteride 5 milliGRAM(s) Oral daily  influenza  Vaccine (HIGH DOSE) 0.7 milliLiter(s) IntraMuscular once  insulin glargine Injectable (LANTUS) 15 Unit(s) SubCutaneous at bedtime  insulin lispro (ADMELOG) corrective regimen sliding scale   SubCutaneous three times a day before meals  insulin lispro Injectable (ADMELOG) 5 Unit(s) SubCutaneous three times a day before meals  levothyroxine 50 MICROGram(s) Oral daily  linezolid  IVPB 600 milliGRAM(s) IV Intermittent every 12 hours  linezolid  IVPB      metoprolol tartrate 25 milliGRAM(s) Oral two times a day  simvastatin 20 milliGRAM(s) Oral at bedtime  sodium chloride 0.9%. 1000 milliLiter(s) IV Continuous <Continuous>  tamsulosin 0.4 milliGRAM(s) Oral at bedtime    PRN MEDICATIONS  acetaminophen     Tablet .. 650 milliGRAM(s) Oral every 6 hours PRN  celecoxib 100 milliGRAM(s) Oral daily PRN  guaiFENesin  milliGRAM(s) Oral every 12 hours PRN    VITAL SIGNS: Last 24 Hours  T(C): 36.6 (17 Dec 2022 08:00), Max: 36.6 (17 Dec 2022 08:00)  T(F): 97.9 (17 Dec 2022 08:00), Max: 97.9 (17 Dec 2022 08:00)  HR: 72 (17 Dec 2022 08:00) (70 - 89)  BP: 147/97 (17 Dec 2022 08:00) (136/61 - 147/97)  BP(mean): --  RR: 18 (17 Dec 2022 08:00) (18 - 18)  SpO2: 95% (17 Dec 2022 08:00) (95% - 98%)      12-16-22 @ 07:01  -  12-17-22 @ 07:00  --------------------------------------------------------  IN: 420 mL / OUT: 0 mL / NET: 420 mL      LABS:                        10.8   15.61 )-----------( 355      ( 16 Dec 2022 08:50 )             32.8     12-16    128<L>  |  92<L>  |  20  ----------------------------<  187<H>  4.6   |  26  |  1.3    Ca    9.0      16 Dec 2022 08:50  Mg     1.9     12-16      PHYSICAL EXAM:  General: Resting comfortably in no acute painful distress  HEENT: Normocephalic, atraumatic  LUNGS: Clear to auscultation bilaterally, no wheezes, rales, rhonchi  HEART: S1S2 present, regular rate and rhythm, no murmurs, rubs, gallops  ABDOMEN: Soft, nontender, nondistended  EXT: No edema                                       
CHEL CROFT 89y Male  MRN#: [id number]   Hospital Day: 8d    SUBJECTIVE  Patient is a 89y old Male who presents with a chief complaint of lethargy (20 Dec 2022 16:04)  Currently admitted to medicine with the primary diagnosis of Multifocal pneumonia      INTERVAL HPI AND OVERNIGHT EVENTS:  Patient was examined and seen at bedside. This morning he is resting comfortably in bed and reports no issues or overnight events.    OBJECTIVE  PAST MEDICAL & SURGICAL HISTORY  HTN (hypertension)    HLD (hyperlipidemia)    BPH (benign prostatic hyperplasia)    CAD (coronary artery disease)    Diabetes mellitus    Hypothyroidism    S/P CABG (coronary artery bypass graft)      ALLERGIES:  No Known Allergies    MEDICATIONS:  STANDING MEDICATIONS  clopidogrel Tablet 75 milliGRAM(s) Oral daily  DULoxetine 60 milliGRAM(s) Oral daily  enoxaparin Injectable 40 milliGRAM(s) SubCutaneous every 24 hours  finasteride 5 milliGRAM(s) Oral daily  influenza  Vaccine (HIGH DOSE) 0.7 milliLiter(s) IntraMuscular once  insulin glargine Injectable (LANTUS) 15 Unit(s) SubCutaneous at bedtime  insulin lispro (ADMELOG) corrective regimen sliding scale   SubCutaneous three times a day before meals  insulin lispro Injectable (ADMELOG) 5 Unit(s) SubCutaneous three times a day before meals  lactobacillus acidophilus 1 Tablet(s) Oral daily  levothyroxine 50 MICROGram(s) Oral daily  magnesium gluconate 500 milliGRAM(s) Oral three times a day  metoprolol tartrate 25 milliGRAM(s) Oral two times a day  simvastatin 20 milliGRAM(s) Oral at bedtime  sodium chloride 0.9%. 1000 milliLiter(s) IV Continuous <Continuous>  tamsulosin 0.4 milliGRAM(s) Oral at bedtime    PRN MEDICATIONS  acetaminophen     Tablet .. 650 milliGRAM(s) Oral every 6 hours PRN  celecoxib 100 milliGRAM(s) Oral daily PRN  guaiFENesin  milliGRAM(s) Oral every 12 hours PRN      VITAL SIGNS: Last 24 Hours  T(C): 35.7 (21 Dec 2022 07:14), Max: 36.7 (20 Dec 2022 16:00)  T(F): 96.3 (21 Dec 2022 07:14), Max: 98 (20 Dec 2022 16:00)  HR: 90 (21 Dec 2022 07:14) (89 - 91)  BP: 131/63 (21 Dec 2022 07:14) (112/59 - 131/63)  BP(mean): --  RR: 18 (21 Dec 2022 07:14) (18 - 18)  SpO2: --    LABS:                        10.5   16.11 )-----------( 349      ( 21 Dec 2022 07:08 )             33.5     12-21    129<L>  |  91<L>  |  14  ----------------------------<  171<H>  4.9   |  25  |  1.0    Ca    8.6      21 Dec 2022 07:08  Mg     1.3     12-21              RADIOLOGY:  < from: Xray Chest 1 View- PORTABLE-Routine (Xray Chest 1 View- PORTABLE-Routine in AM.) (12.17.22 @ 08:55) >  FINDINGS/  IMPRESSION:    Low lung volumes. Bibasal opacities similar to prior exam. No   pneumothorax.    Stable cardiomediastinal silhouette.    Unchanged osseous structures.      < end of copied text >      PHYSICAL EXAM:  CONSTITUTIONAL: Nauseous today, AAOx1  HEAD: Atraumatic, normocephalic  EYES: EOM intact, PERRLA, conjunctiva and sclera clear  ENT: moist mucous membranes  PULMONARY: Clear to auscultation bilaterally  CARDIOVASCULAR: Regular rate and rhythm  GASTROINTESTINAL: Soft, non-tender, non-distended; bowel sounds present  MUSCULOSKELETAL: no edema  NEUROLOGY: non-focal  SKIN: warm and dry    ASSESSMENT and PLAN    89M w/ h/o CAD (s/p CABG and PCI), HTN, DLD, DM, BPH, and hypothyroidism p/w lethargy. Admitted for pneumonia.      #Hyponatremia  - Stable   - Na+ stable 129-131 ( 12/16 and 12/19). Na 131 (12/20)  - Monitor BMP  - Monitor fluid status     #Diarrhea  - First episode yesterday.  - slight  white count increase, afebrile, non tender abdomen. C-Diff r/o    # Post-Influenza A Bilateral Pneumonia  # Acute hypoxic respiratory failure, discharge on 2L but now requiring 3-L  - On 3L NC  - Slight uptrend today on WBC likely related to GI process.   - c/w IV Linezolid 600 mg iv q12h (total of 7 days)  - c/w Rocephin 1 gm iv q24h (total of 7 days)  - c/w Doxycycline 100 mg iv q12h (total of 7 days)  - Blood Cx: negative   - Urine Cx: > 3 organisms growing but probably contaminants   - MRSA negative   - strep and legionella urine antigen (-)  -  CXR stable  - Updated daughter bedside today     #Insulin-Dependent Diabetes Mellitus  - A1c 10.2% (Dec 2022). Managed via Toujeo 12u qd. Last admission, pt on Lantus 10u qhs, but FS still poorly controlled.  on admission, but low suspicion for DKA (AG 13 and bicarb 25).  - Monitor FS TIDAC and QHS  - c/w Lantus 15U SQ QHS  - c/w Lispro 5U SQ TIDAC  - c/w low-dose ISS     # Hypothyroidism  - TSH WNL, c/w levothyroxine     #Coronary Artery Disease  #Hypertension  #Dyslipidemia  - Known h/o CAD. S/P PCI x3. S/P CABG in mid-2000's. No reported h/o CHF.  - c/w clopidogrel 75mg PO QD  - c/w simvastatin 20mg PO QHS  - c/w Lopressor 25mg PO BID    #BPH  - c/w tamsulosin 0.4mg PO QHS  - c/w finasteride 5mg PO QD    #DVT PPX: Lovenox 40mg SQ QD  #GI PPX: none indicated  #DIET: DASH/TLC + CC  #ACTIVITY: IAT  #CODE STATUS: Full Code; daughter at bedside wants everything to be done for her father  #DISPOSITION: Pending placement  
Progress Note:  Provider Speciality                            Hospitalist      CHEL CROFT MRN-[id number] 89y Male     CHIEF PRESENTING COMPLAINT:  Patient is a 89y old  Male who presents with a chief complaint of lethargy (16 Dec 2022 09:37)        SUBJECTIVE:  Patient was seen and examined at bedside.  No new complaints described by patient in morning rounds.   No significant overnight events reported.     HISTORY OF PRESENTING ILLNESS:  HPI:  89M w/ h/o CAD (s/p CABG and PCI), HTN, DLD, DM, BPH, and hypothyroidism p/w lethargy. History largely obtained from daughter at bedside. Patient initially admitted last week for dyspnea. Found to have Influenza A. Treated w/ Tamiflu and then discharged w/ portable oxygen (2L) after two days. Since discharge, daughter reports pt has been lethargic with decreased PO intake and worsening cough. Also reported some abdominal pain earlier today, which has since resolved. No reported fevers, chills, CP, palpitations, n/v/d, dysuria, or LE swelling.    In ED, pt HD stable on vitals. Saturating 93% on 4L NC (increased from 2L by EMS en route to ED). Labs significant for WBC 23.67. CTAP demonstrated no acute intra-abdominal pathology, but demonstrated bibasilar consolidations in visualized portion of lungs, suspicious for multifocal pneumonia. UA negative for infection. S/P 1L LR bolus and IV abx (vancomycin cefepime, and azithromycin). Admitted to medicine for pneumonia. (13 Dec 2022 22:05)        REVIEW OF SYSTEMS:  Patient denies  headache, fever, chills. Denies chest pain, shortness of breath, palpitation. Denies nausea, vomiting, abdominal pain or diarrhoea, Denies dysuria.   At least 10 systems were reviewed in ROS. All systems reviewed  are within normal limits except for the complaints as described in Subjective.    PAST MEDICAL & SURGICAL HISTORY:  PAST MEDICAL & SURGICAL HISTORY:  HTN (hypertension)      HLD (hyperlipidemia)      BPH (benign prostatic hyperplasia)      CAD (coronary artery disease)      Diabetes mellitus      Hypothyroidism      S/P CABG (coronary artery bypass graft)              VITAL SIGNS:  Vital Signs Last 24 Hrs  T(C): 36.2 (18 Dec 2022 00:00), Max: 36.3 (17 Dec 2022 15:39)  T(F): 97.2 (18 Dec 2022 00:00), Max: 97.3 (17 Dec 2022 15:39)  HR: 91 (18 Dec 2022 05:25) (70 - 95)  BP: 143/66 (18 Dec 2022 05:25) (143/66 - 172/75)  BP(mean): --  RR: 18 (18 Dec 2022 00:00) (18 - 20)  SpO2: 98% (17 Dec 2022 15:39) (98% - 98%)    Parameters below as of 17 Dec 2022 15:39  Patient On (Oxygen Delivery Method): nasal cannula  O2 Flow (L/min): 3            PHYSICAL EXAMINATION:  Not in acute distress  General: No icterus  HEENT:   no JVD.  Heart: S1+S2 audible  Lungs: bilateral  moderate air entry, no wheezing, no crepitations.  Abdomen: Soft, non-tender, non-distended , no  rigidity or guarding.  CNS: Awake , oriented to name, demented  Extremities:  No edema            CONSULTS:  Consultant(s) Notes Reviewed by me.   Care Discussed with Consultants/Other Providers where required.        MEDICATIONS:  MEDICATIONS  (STANDING):  cefTRIAXone   IVPB      cefTRIAXone   IVPB 1000 milliGRAM(s) IV Intermittent every 24 hours  clopidogrel Tablet 75 milliGRAM(s) Oral daily  doxycycline IVPB      doxycycline IVPB 100 milliGRAM(s) IV Intermittent every 12 hours  DULoxetine 60 milliGRAM(s) Oral daily  enoxaparin Injectable 40 milliGRAM(s) SubCutaneous every 24 hours  finasteride 5 milliGRAM(s) Oral daily  influenza  Vaccine (HIGH DOSE) 0.7 milliLiter(s) IntraMuscular once  insulin glargine Injectable (LANTUS) 15 Unit(s) SubCutaneous at bedtime  insulin lispro (ADMELOG) corrective regimen sliding scale   SubCutaneous three times a day before meals  insulin lispro Injectable (ADMELOG) 5 Unit(s) SubCutaneous three times a day before meals  levothyroxine 50 MICROGram(s) Oral daily  linezolid  IVPB 600 milliGRAM(s) IV Intermittent every 12 hours  linezolid  IVPB      metoprolol tartrate 25 milliGRAM(s) Oral two times a day  simvastatin 20 milliGRAM(s) Oral at bedtime  sodium chloride 0.9%. 1000 milliLiter(s) (60 mL/Hr) IV Continuous <Continuous>  tamsulosin 0.4 milliGRAM(s) Oral at bedtime    MEDICATIONS  (PRN):  acetaminophen     Tablet .. 650 milliGRAM(s) Oral every 6 hours PRN Temp greater or equal to 38C (100.4F), Mild Pain (1 - 3)  celecoxib 100 milliGRAM(s) Oral daily PRN Severe Pain (7 - 10)  guaiFENesin  milliGRAM(s) Oral every 12 hours PRN Cough            ASSESSMENT:  89M w/ h/o CAD (s/p CABG and PCI), HTN, DLD, DM, BPH, and hypothyroidism p/w lethargy.     Suspected complex PNA  BRINA-prerenal  Hyponatremia, hypovolemic  Hypomagnesemia  Recent Influenza  CAD  Hypothyroidism    Continue Linezolid 600 mg iv q12h, Rocephin 1 gm iv q24h, Doxycycline 100 mg iv q12h  Leucocytosis  improving from 23 to 14. Procal 0.18  Follow urine legio & strep, sputum cx which are still pending  Supplemental O2 as required to maintain  sat 89-93  BRINA-prerenal & Hyponatremia, hypovolemic. Improved  Continue home meds for chronic  comorbid conditions  Get PT  Handoff: Anticipated for discharge tomorrow to SNF pending medical improvement of dyspnea    Total time spent to complete patient's bedside assessment, physical examination, review medical chart including labs & imaging, discuss medical plan of care with housestaff was more than 35 minutes        
Progress Note:  Provider Speciality                            Hospitalist      CHEL CROFT MRN-[id number] 89y Male     CHIEF PRESENTING COMPLAINT:  Patient is a 89y old  Male who presents with a chief complaint of lethargy (16 Dec 2022 09:37)        SUBJECTIVE:  Patient was seen and examined at bedside.  No new complaints described by patient in morning rounds.   No significant overnight events reported.     HISTORY OF PRESENTING ILLNESS:  HPI:  89M w/ h/o CAD (s/p CABG and PCI), HTN, DLD, DM, BPH, and hypothyroidism p/w lethargy. History largely obtained from daughter at bedside. Patient initially admitted last week for dyspnea. Found to have Influenza A. Treated w/ Tamiflu and then discharged w/ portable oxygen (2L) after two days. Since discharge, daughter reports pt has been lethargic with decreased PO intake and worsening cough. Also reported some abdominal pain earlier today, which has since resolved. No reported fevers, chills, CP, palpitations, n/v/d, dysuria, or LE swelling.    In ED, pt HD stable on vitals. Saturating 93% on 4L NC (increased from 2L by EMS en route to ED). Labs significant for WBC 23.67. CTAP demonstrated no acute intra-abdominal pathology, but demonstrated bibasilar consolidations in visualized portion of lungs, suspicious for multifocal pneumonia. UA negative for infection. S/P 1L LR bolus and IV abx (vancomycin cefepime, and azithromycin). Admitted to medicine for pneumonia. (13 Dec 2022 22:05)        REVIEW OF SYSTEMS:  Patient denies  headache, fever, chills. Denies chest pain, shortness of breath, palpitation. Denies nausea, vomiting, abdominal pain or diarrhoea, Denies dysuria.   At least 10 systems were reviewed in ROS. All systems reviewed  are within normal limits except for the complaints as described in Subjective.    PAST MEDICAL & SURGICAL HISTORY:  PAST MEDICAL & SURGICAL HISTORY:  HTN (hypertension)      HLD (hyperlipidemia)      BPH (benign prostatic hyperplasia)      CAD (coronary artery disease)      Diabetes mellitus      Hypothyroidism      S/P CABG (coronary artery bypass graft)              VITAL SIGNS:  Vital Signs Last 24 Hrs  T(C): 36.3 (16 Dec 2022 08:00), Max: 36.8 (16 Dec 2022 00:00)  T(F): 97.4 (16 Dec 2022 08:00), Max: 98.3 (16 Dec 2022 00:00)  HR: 67 (16 Dec 2022 08:00) (67 - 90)  BP: 145/67 (16 Dec 2022 08:00) (140/63 - 182/82)  BP(mean): --  RR: 18 (16 Dec 2022 08:00) (18 - 18)  SpO2: 96% (16 Dec 2022 08:00) (95% - 96%)    Parameters below as of 16 Dec 2022 08:00  Patient On (Oxygen Delivery Method): nasal cannula  O2 Flow (L/min): 3            PHYSICAL EXAMINATION:  Not in acute distress  General: No icterus  HEENT:   no JVD.  Heart: S1+S2 audible  Lungs: bilateral  moderate air entry, no wheezing, no crepitations.  Abdomen: Soft, non-tender, non-distended , no  rigidity or guarding.  CNS: Awake , oriented to name, demented  Extremities:  No edema            CONSULTS:  Consultant(s) Notes Reviewed by me.   Care Discussed with Consultants/Other Providers where required.        MEDICATIONS:  MEDICATIONS  (STANDING):  cefTRIAXone   IVPB      cefTRIAXone   IVPB 1000 milliGRAM(s) IV Intermittent every 24 hours  clopidogrel Tablet 75 milliGRAM(s) Oral daily  doxycycline IVPB      doxycycline IVPB 100 milliGRAM(s) IV Intermittent every 12 hours  DULoxetine 60 milliGRAM(s) Oral daily  enoxaparin Injectable 40 milliGRAM(s) SubCutaneous every 24 hours  finasteride 5 milliGRAM(s) Oral daily  influenza  Vaccine (HIGH DOSE) 0.7 milliLiter(s) IntraMuscular once  insulin glargine Injectable (LANTUS) 15 Unit(s) SubCutaneous at bedtime  insulin lispro (ADMELOG) corrective regimen sliding scale   SubCutaneous three times a day before meals  insulin lispro Injectable (ADMELOG) 5 Unit(s) SubCutaneous three times a day before meals  levothyroxine 50 MICROGram(s) Oral daily  linezolid  IVPB 600 milliGRAM(s) IV Intermittent every 12 hours  linezolid  IVPB      metoprolol tartrate 25 milliGRAM(s) Oral two times a day  simvastatin 20 milliGRAM(s) Oral at bedtime  sodium chloride 0.9%. 1000 milliLiter(s) (60 mL/Hr) IV Continuous <Continuous>  tamsulosin 0.4 milliGRAM(s) Oral at bedtime    MEDICATIONS  (PRN):  acetaminophen     Tablet .. 650 milliGRAM(s) Oral every 6 hours PRN Temp greater or equal to 38C (100.4F), Mild Pain (1 - 3)  celecoxib 100 milliGRAM(s) Oral daily PRN Severe Pain (7 - 10)  guaiFENesin  milliGRAM(s) Oral every 12 hours PRN Cough            ASSESSMENT:  89M w/ h/o CAD (s/p CABG and PCI), HTN, DLD, DM, BPH, and hypothyroidism p/w lethargy.     Suspected complex PNA  BRINA-prerenal  Hyponatremia, hypovolemic  Recent Influenza  CAD  Hypothyroidism    Continue Linezolid 600 mg iv q12h, Rocephin 1 gm iv q24h, Doxycycline 100 mg iv q12h  Procal 0.18  Follow urine legio & strep  Supplemental O2 as required to maintain  sat 89-93  Sputum cx   BRINA-prerenal & Hyponatremia, hypovolemic. NS for 10 hrs only  Continue home meds for chronic  comorbid conditions  Get PT  Handoff: Expected inpatient care exceeds further  48 hrs.    Total time spent to complete patient's bedside assessment, physical examination, review medical chart including labs & imaging, discuss medical plan of care with housestaff was more than 35 minutes        
Progress Note:  Provider Speciality                            Hospitalist      CHEL CROFT MRN-[id number] 89y Male     CHIEF PRESENTING COMPLAINT:  Patient is a 89y old  Male who presents with a chief complaint of lethargy (16 Dec 2022 09:37)        SUBJECTIVE:  Patient was seen and examined at bedside.  No new complaints described by patient in morning rounds.   No significant overnight events reported.     HISTORY OF PRESENTING ILLNESS:  HPI:  89M w/ h/o CAD (s/p CABG and PCI), HTN, DLD, DM, BPH, and hypothyroidism p/w lethargy. History largely obtained from daughter at bedside. Patient initially admitted last week for dyspnea. Found to have Influenza A. Treated w/ Tamiflu and then discharged w/ portable oxygen (2L) after two days. Since discharge, daughter reports pt has been lethargic with decreased PO intake and worsening cough. Also reported some abdominal pain earlier today, which has since resolved. No reported fevers, chills, CP, palpitations, n/v/d, dysuria, or LE swelling.    In ED, pt HD stable on vitals. Saturating 93% on 4L NC (increased from 2L by EMS en route to ED). Labs significant for WBC 23.67. CTAP demonstrated no acute intra-abdominal pathology, but demonstrated bibasilar consolidations in visualized portion of lungs, suspicious for multifocal pneumonia. UA negative for infection. S/P 1L LR bolus and IV abx (vancomycin cefepime, and azithromycin). Admitted to medicine for pneumonia. (13 Dec 2022 22:05)        REVIEW OF SYSTEMS:  Patient denies  headache, fever, chills. Denies chest pain, shortness of breath, palpitation. Denies nausea, vomiting, abdominal pain or diarrhoea, Denies dysuria.   At least 10 systems were reviewed in ROS. All systems reviewed  are within normal limits except for the complaints as described in Subjective.    PAST MEDICAL & SURGICAL HISTORY:  PAST MEDICAL & SURGICAL HISTORY:  HTN (hypertension)      HLD (hyperlipidemia)      BPH (benign prostatic hyperplasia)      CAD (coronary artery disease)      Diabetes mellitus      Hypothyroidism      S/P CABG (coronary artery bypass graft)              VITAL SIGNS:  Vital Signs Last 24 Hrs  T(C): 36.1 (19 Dec 2022 09:30), Max: 36.5 (18 Dec 2022 15:47)  T(F): 97 (19 Dec 2022 09:30), Max: 97.7 (18 Dec 2022 15:47)  HR: 65 (19 Dec 2022 09:30) (65 - 84)  BP: 132/69 (19 Dec 2022 09:30) (130/- - 141/67)  BP(mean): --  RR: 18 (19 Dec 2022 09:30) (18 - 18)  SpO2: --          PHYSICAL EXAMINATION:  Not in acute distress  General: No icterus  HEENT:   no JVD.  Heart: S1+S2 audible  Lungs: bilateral  moderate air entry, no wheezing, no crepitations.  Abdomen: Soft, non-tender, non-distended , no  rigidity or guarding.  CNS: Awake , oriented to name, demented  Extremities:  No edema            CONSULTS:  Consultant(s) Notes Reviewed by me.   Care Discussed with Consultants/Other Providers where required.        MEDICATIONS:  MEDICATIONS  (STANDING):  cefTRIAXone   IVPB      cefTRIAXone   IVPB 1000 milliGRAM(s) IV Intermittent every 24 hours  clopidogrel Tablet 75 milliGRAM(s) Oral daily  doxycycline IVPB      doxycycline IVPB 100 milliGRAM(s) IV Intermittent every 12 hours  DULoxetine 60 milliGRAM(s) Oral daily  enoxaparin Injectable 40 milliGRAM(s) SubCutaneous every 24 hours  finasteride 5 milliGRAM(s) Oral daily  influenza  Vaccine (HIGH DOSE) 0.7 milliLiter(s) IntraMuscular once  insulin glargine Injectable (LANTUS) 15 Unit(s) SubCutaneous at bedtime  insulin lispro (ADMELOG) corrective regimen sliding scale   SubCutaneous three times a day before meals  insulin lispro Injectable (ADMELOG) 5 Unit(s) SubCutaneous three times a day before meals  levothyroxine 50 MICROGram(s) Oral daily  linezolid  IVPB 600 milliGRAM(s) IV Intermittent every 12 hours  linezolid  IVPB      metoprolol tartrate 25 milliGRAM(s) Oral two times a day  simvastatin 20 milliGRAM(s) Oral at bedtime  sodium chloride 0.9%. 1000 milliLiter(s) (60 mL/Hr) IV Continuous <Continuous>  tamsulosin 0.4 milliGRAM(s) Oral at bedtime    MEDICATIONS  (PRN):  acetaminophen     Tablet .. 650 milliGRAM(s) Oral every 6 hours PRN Temp greater or equal to 38C (100.4F), Mild Pain (1 - 3)  celecoxib 100 milliGRAM(s) Oral daily PRN Severe Pain (7 - 10)  guaiFENesin  milliGRAM(s) Oral every 12 hours PRN Cough            ASSESSMENT:  89M w/ h/o CAD (s/p CABG and PCI), HTN, DLD, DM, BPH, and hypothyroidism p/w lethargy.     Suspected complex PNA  BRINA-prerenal  Hyponatremia, hypovolemic  Hypomagnesemia  Recent Influenza  CAD  Hypothyroidism    Continue Linezolid 600 mg iv q12h, Rocephin 1 gm iv q24h, Doxycycline 100 mg iv q12h. ID follow up today  Leucocytosis  improving from 23 to 14. Procal 0.18  Follow urine legio & strep, sputum cx which are still pending  Supplemental O2 as required to maintain  sat 89-93  BRINA-prerenal & Hyponatremia, hypovolemic. Improved  Continue home meds for chronic  comorbid conditions  Get PT follow up today  Handoff: Anticipated for discharge today to SNF vs [facility] pending PT, ID recs    Total time spent to complete patient's bedside assessment, physical examination, review medical chart including labs & imaging, discuss medical plan of care with housestaff was more than 35 minutes        
  LANG CHEL  89y Male    Patient is a 89y old  Male who presents with a chief complaint of SOB and lethargy    INTERVAL HPI/OVERNIGHT EVENTS:    ROS: Pt denies fever, chills, SOB, chest pain but admits to lethargy.    Overnight events: No acute events overnight.    Vital Signs Last 24 Hrs  T(C): 35.3 (14 Dec 2022 07:21), Max: 36.7 (14 Dec 2022 05:56)  T(F): 95.6 (14 Dec 2022 07:21), Max: 98.1 (14 Dec 2022 05:56)  HR: 94 (14 Dec 2022 14:28) (81 - 109)  BP: 148/62 (14 Dec 2022 14:28) (148/62 - 177/78)  BP(mean): --  RR: 18 (14 Dec 2022 14:28) (18 - 20)  SpO2: 95% (14 Dec 2022 14:28) (95% - 100%)    Parameters below as of 14 Dec 2022 14:28  Patient On (Oxygen Delivery Method): nasal cannula  O2 Flow (L/min): 3    No Known Allergies    MEDICATIONS  (STANDING):  cefTRIAXone   IVPB      clopidogrel Tablet 75 milliGRAM(s) Oral daily  doxycycline IVPB      DULoxetine 60 milliGRAM(s) Oral daily  enoxaparin Injectable 40 milliGRAM(s) SubCutaneous every 24 hours  finasteride 5 milliGRAM(s) Oral daily  influenza  Vaccine (HIGH DOSE) 0.7 milliLiter(s) IntraMuscular once  insulin glargine Injectable (LANTUS) 15 Unit(s) SubCutaneous at bedtime  insulin lispro (ADMELOG) corrective regimen sliding scale   SubCutaneous three times a day before meals  insulin lispro Injectable (ADMELOG) 5 Unit(s) SubCutaneous three times a day before meals  levothyroxine 50 MICROGram(s) Oral daily  linezolid  IVPB      metoprolol tartrate 25 milliGRAM(s) Oral two times a day  simvastatin 20 milliGRAM(s) Oral at bedtime  tamsulosin 0.4 milliGRAM(s) Oral at bedtime    MEDICATIONS  (PRN):  acetaminophen     Tablet .. 650 milliGRAM(s) Oral every 6 hours PRN Temp greater or equal to 38C (100.4F), Mild Pain (1 - 3)  celecoxib 100 milliGRAM(s) Oral daily PRN Severe Pain (7 - 10)      PHYSICAL EXAM:  General Appearance: NAD on 3L NC, normal for age and gender. 	  Neck: Normal JVP, no bruit.   Eyes: Conjunctiva clear, Extra Ocular muscles intact. No scleral icterus.  ENMT: Moist oral mucosa. No oral lesion.  Cardiovascular: Regular rate and rhythm S1 S2, No JVD. Systolic murmur.  Respiratory: Decreased breath sounds at bases. Poor air entry. No wheezes, rales or rhonchi.  Psychiatry: Alert and oriented x 3, Mood & affect appropriate.  Gastrointestinal:  Soft, Non-tender, Non-distended.  Neurologic: Non-focal deficits.  Musculoskeletal/ extremities: Normal range of motion, No clubbing, cyanosis or edema.  Vascular: Peripheral pulses palpable bilaterally.  Skin/Integumen: No rashes, No ecchymoses, No cyanosis.    LABS:                        11.4   23.54 )-----------( 345      ( 14 Dec 2022 05:42 )             34.4     12-14    134<L>  |  94<L>  |  19  ----------------------------<  276<H>  5.0   |  25  |  1.0    Ca    9.3      14 Dec 2022 05:42  Phos  2.9     12-14  Mg     1.9     12-14    TPro  5.9<L>  /  Alb  3.2<L>  /  TBili  0.6  /  DBili  x   /  AST  37  /  ALT  22  /  AlkPhos  134<H>  12-14          RADIOLOGY & ADDITIONAL TESTS:            
CHEL CROFT 89y Male  MRN#: [id number]   CODE STATUS:__Full______    Hospital Day: 2d    Patient is currently admitted with the primary diagnosis of multifocal secondary bacterial pneumonia.     SUBJECTIVE:    Patient seen and examined at bedside this am. Patient is stable and has no new complaints.     OBJECTIVE:  PAST MEDICAL & SURGICAL HISTORY:  HTN (hypertension)    HLD (hyperlipidemia)    BPH (benign prostatic hyperplasia)    CAD (coronary artery disease)    Diabetes mellitus    Hypothyroidism    S/P CABG (coronary artery bypass graft)                              ALLERGIES:  No Known Allergies                               HOME MEDICATIONS:  Home Medications:  celecoxib 100 mg oral capsule: 1 cap(s) orally once a day, As Needed (13 Dec 2022 22:03)  clopidogrel 75 mg oral tablet: 1 tab(s) orally once a day (13 Dec 2022 22:03)  DULoxetine 60 mg oral delayed release capsule: 1 cap(s) orally once a day (13 Dec 2022 22:03)  finasteride 5 mg oral tablet: 1 tab(s) orally once a day (13 Dec 2022 22:03)  levothyroxine 50 mcg (0.05 mg) oral tablet: 1 tab(s) orally once a day (13 Dec 2022 22:03)  Metoprolol Tartrate 25 mg oral tablet: 1 tab(s) orally 2 times a day (13 Dec 2022 22:03)  simvastatin 20 mg oral tablet: 1 tab(s) orally once a day (at bedtime) (13 Dec 2022 22:03)  tamsulosin 0.4 mg oral capsule: 1 cap(s) orally once a day (13 Dec 2022 22:03)  Toujeo SoloStar 300 units/mL subcutaneous solution: 12 unit(s) subcutaneous once a day (in the morning) (13 Dec 2022 22:03)  traMADol 50 mg oral tablet: 1 tab(s) orally 2 times a day, As Needed (13 Dec 2022 22:03)                           MEDICATIONS:  STANDING MEDICATIONS  cefTRIAXone   IVPB      cefTRIAXone   IVPB 1000 milliGRAM(s) IV Intermittent every 24 hours  clopidogrel Tablet 75 milliGRAM(s) Oral daily  doxycycline IVPB      doxycycline IVPB 100 milliGRAM(s) IV Intermittent every 12 hours  DULoxetine 60 milliGRAM(s) Oral daily  enoxaparin Injectable 40 milliGRAM(s) SubCutaneous every 24 hours  finasteride 5 milliGRAM(s) Oral daily  influenza  Vaccine (HIGH DOSE) 0.7 milliLiter(s) IntraMuscular once  insulin glargine Injectable (LANTUS) 15 Unit(s) SubCutaneous at bedtime  insulin lispro (ADMELOG) corrective regimen sliding scale   SubCutaneous three times a day before meals  insulin lispro Injectable (ADMELOG) 5 Unit(s) SubCutaneous three times a day before meals  levothyroxine 50 MICROGram(s) Oral daily  linezolid  IVPB 600 milliGRAM(s) IV Intermittent every 12 hours  linezolid  IVPB      metoprolol tartrate 25 milliGRAM(s) Oral two times a day  simvastatin 20 milliGRAM(s) Oral at bedtime  tamsulosin 0.4 milliGRAM(s) Oral at bedtime    PRN MEDICATIONS  acetaminophen     Tablet .. 650 milliGRAM(s) Oral every 6 hours PRN  celecoxib 100 milliGRAM(s) Oral daily PRN                             VITAL SIGNS: Last 24 Hours  T(C): 36.2 (15 Dec 2022 08:00), Max: 36.8 (15 Dec 2022 00:00)  T(F): 97.2 (15 Dec 2022 08:00), Max: 98.2 (15 Dec 2022 00:00)  HR: 72 (15 Dec 2022 08:00) (72 - 97)  BP: 131/61 (15 Dec 2022 08:00) (128/60 - 148/62)  BP(mean): --  RR: 18 (15 Dec 2022 08:00) (18 - 18)  SpO2: 96% (15 Dec 2022 08:00) (94% - 96%)      12-14-22 @ 07:01  -  12-15-22 @ 07:00  --------------------------------------------------------  IN: 0 mL / OUT: 600 mL / NET: -600 mL    12-15-22 @ 07:01  -  12-15-22 @ 11:28  --------------------------------------------------------  IN: 180 mL / OUT: 0 mL / NET: 180 mL                                  LABS:                        10.1   18.93 )-----------( 337      ( 15 Dec 2022 08:21 )             31.4     12-15    129<L>  |  95<L>  |  22<H>  ----------------------------<  221<H>  4.6   |  24  |  1.1    Ca    8.8      15 Dec 2022 08:21  Phos  2.9     12-14  Mg     1.6     12-15    TPro  4.9<L>  /  Alb  2.6<L>  /  TBili  0.6  /  DBili  x   /  AST  53<H>  /  ALT  33  /  AlkPhos  103  12-15      Urinalysis Basic - ( 13 Dec 2022 18:19 )    Color: Yellow / Appearance: Clear / SG: >1.050 / pH: x  Gluc: x / Ketone: Small  / Bili: Negative / Urobili: <2 mg/dL   Blood: x / Protein: 30 mg/dL / Nitrite: Negative   Leuk Esterase: Negative / RBC: 5 /HPF / WBC 1 /HPF   Sq Epi: x / Non Sq Epi: 1 /HPF / Bacteria: Negative    Culture - Blood (collected 13 Dec 2022 17:58)  Source: .Blood Blood  Preliminary Report (15 Dec 2022 01:02):    No growth to date.    Culture - Blood (collected 13 Dec 2022 17:58)  Source: .Blood Blood  Preliminary Report (15 Dec 2022 01:02):    No growth to date.        CARDIAC MARKERS ( 13 Dec 2022 12:44 )  x     / 0.01 ng/mL / x     / x     / x                     PHYSICAL EXAM:  General: Resting comfortably in no acute painful distress  HEENT: Normocephalic, atraumatic  LUNGS: Clear to auscultation bilaterally, no wheezes, rales, rhonchi  HEART: S1S2 present, regular rate and rhythm, no murmurs, rubs, gallops  ABDOMEN: Soft, nontender, nondistended  EXT: No edema                                        
SUBJECTIVE:    Patient is a 89y old Male who presents with a chief complaint of lethargy (21 Dec 2022 11:17)    Currently admitted to medicine with the primary diagnosis of Multifocal pneumonia       Today is hospital day 8d.     PAST MEDICAL & SURGICAL HISTORY  HTN (hypertension)    HLD (hyperlipidemia)    BPH (benign prostatic hyperplasia)    CAD (coronary artery disease)    Diabetes mellitus    Hypothyroidism    S/P CABG (coronary artery bypass graft)      ALLERGIES:  No Known Allergies    MEDICATIONS:  STANDING MEDICATIONS  clopidogrel Tablet 75 milliGRAM(s) Oral daily  DULoxetine 60 milliGRAM(s) Oral daily  enoxaparin Injectable 40 milliGRAM(s) SubCutaneous every 24 hours  finasteride 5 milliGRAM(s) Oral daily  influenza  Vaccine (HIGH DOSE) 0.7 milliLiter(s) IntraMuscular once  insulin glargine Injectable (LANTUS) 15 Unit(s) SubCutaneous at bedtime  insulin lispro (ADMELOG) corrective regimen sliding scale   SubCutaneous three times a day before meals  insulin lispro Injectable (ADMELOG) 5 Unit(s) SubCutaneous three times a day before meals  lactobacillus acidophilus 1 Tablet(s) Oral daily  levothyroxine 50 MICROGram(s) Oral daily  magnesium gluconate 500 milliGRAM(s) Oral three times a day  metoprolol tartrate 25 milliGRAM(s) Oral two times a day  simvastatin 20 milliGRAM(s) Oral at bedtime  sodium chloride 0.9%. 1000 milliLiter(s) IV Continuous <Continuous>  tamsulosin 0.4 milliGRAM(s) Oral at bedtime    PRN MEDICATIONS  acetaminophen     Tablet .. 650 milliGRAM(s) Oral every 6 hours PRN  celecoxib 100 milliGRAM(s) Oral daily PRN  guaiFENesin  milliGRAM(s) Oral every 12 hours PRN    VITALS:   T(F): 98.1  HR: 82  BP: 129/59  RR: 18  SpO2: --    LABS:                        10.5   16.11 )-----------( 349      ( 21 Dec 2022 07:08 )             33.5     12-21    129<L>  |  91<L>  |  14  ----------------------------<  171<H>  4.9   |  25  |  1.0    Ca    8.6      21 Dec 2022 07:08  Mg     1.3     12-21                    RADIOLOGY:    PHYSICAL EXAM:  GEN: No acute distress  LUNGS: Clear to auscultation bilaterally   HEART: S1/S2 present. RRR.   ABD/ GI: Soft, non-tender, non-distended. Bowel sounds present  EXT: NC/NC/NE/2+PP/JIMENES  NEURO: AAOX3    
SUBJECTIVE:    Patient is a 89y old Male who presents with a chief complaint of lethargy (22 Dec 2022 10:43)    Currently admitted to medicine with the primary diagnosis of Multifocal pneumonia       Today is hospital day 9d.     PAST MEDICAL & SURGICAL HISTORY  HTN (hypertension)    HLD (hyperlipidemia)    BPH (benign prostatic hyperplasia)    CAD (coronary artery disease)    Diabetes mellitus    Hypothyroidism    S/P CABG (coronary artery bypass graft)      ALLERGIES:  No Known Allergies    MEDICATIONS:  STANDING MEDICATIONS  clopidogrel Tablet 75 milliGRAM(s) Oral daily  DULoxetine 60 milliGRAM(s) Oral daily  enoxaparin Injectable 40 milliGRAM(s) SubCutaneous every 24 hours  finasteride 5 milliGRAM(s) Oral daily  influenza  Vaccine (HIGH DOSE) 0.7 milliLiter(s) IntraMuscular once  insulin glargine Injectable (LANTUS) 15 Unit(s) SubCutaneous at bedtime  insulin lispro (ADMELOG) corrective regimen sliding scale   SubCutaneous three times a day before meals  insulin lispro Injectable (ADMELOG) 5 Unit(s) SubCutaneous three times a day before meals  lactobacillus acidophilus 1 Tablet(s) Oral daily  levothyroxine 50 MICROGram(s) Oral daily  metoprolol tartrate 25 milliGRAM(s) Oral two times a day  simvastatin 20 milliGRAM(s) Oral at bedtime  sodium chloride 0.9%. 1000 milliLiter(s) IV Continuous <Continuous>  tamsulosin 0.4 milliGRAM(s) Oral at bedtime    PRN MEDICATIONS  acetaminophen     Tablet .. 650 milliGRAM(s) Oral every 6 hours PRN  celecoxib 100 milliGRAM(s) Oral daily PRN  guaiFENesin  milliGRAM(s) Oral every 12 hours PRN    VITALS:   T(F): 98.3  HR: 77  BP: 102/57  RR: 18  SpO2: 98%    LABS:                        11.2   17.13 )-----------( 378      ( 22 Dec 2022 06:58 )             35.3     12-22    127<L>  |  92<L>  |  17  ----------------------------<  110<H>  4.9   |  27  |  1.1    Ca    8.8      22 Dec 2022 06:58  Mg     1.8     12-22                    RADIOLOGY:    PHYSICAL EXAM:  GEN: No acute distress  LUNGS: Clear to auscultation bilaterally   HEART: S1/S2 present. RRR.   ABD/ GI: Soft, non-tender, non-distended. Bowel sounds present  EXT: NC/NC/NE/2+PP/JIMENES  NEURO: AAOX3    
SUBJECTIVE:    Patient is a 89y old Male who presents with a chief complaint of lethargy (22 Dec 2022 15:50)    Currently admitted to medicine with the primary diagnosis of Multifocal pneumonia       Today is hospital day 10d.     PAST MEDICAL & SURGICAL HISTORY  HTN (hypertension)    HLD (hyperlipidemia)    BPH (benign prostatic hyperplasia)    CAD (coronary artery disease)    Diabetes mellitus    Hypothyroidism    S/P CABG (coronary artery bypass graft)      ALLERGIES:  No Known Allergies    MEDICATIONS:  STANDING MEDICATIONS  clopidogrel Tablet 75 milliGRAM(s) Oral daily  DULoxetine 60 milliGRAM(s) Oral daily  enoxaparin Injectable 40 milliGRAM(s) SubCutaneous every 24 hours  finasteride 5 milliGRAM(s) Oral daily  influenza  Vaccine (HIGH DOSE) 0.7 milliLiter(s) IntraMuscular once  insulin glargine Injectable (LANTUS) 15 Unit(s) SubCutaneous at bedtime  insulin lispro (ADMELOG) corrective regimen sliding scale   SubCutaneous three times a day before meals  insulin lispro Injectable (ADMELOG) 5 Unit(s) SubCutaneous three times a day before meals  lactobacillus acidophilus 1 Tablet(s) Oral daily  levothyroxine 50 MICROGram(s) Oral daily  metoprolol tartrate 25 milliGRAM(s) Oral two times a day  simvastatin 20 milliGRAM(s) Oral at bedtime  sodium chloride 0.9%. 1000 milliLiter(s) IV Continuous <Continuous>  sodium chloride 0.9%. 1000 milliLiter(s) IV Continuous <Continuous>  tamsulosin 0.4 milliGRAM(s) Oral at bedtime    PRN MEDICATIONS  acetaminophen     Tablet .. 650 milliGRAM(s) Oral every 6 hours PRN  aluminum hydroxide/magnesium hydroxide/simethicone Suspension 30 milliLiter(s) Oral every 6 hours PRN  celecoxib 100 milliGRAM(s) Oral daily PRN  guaiFENesin  milliGRAM(s) Oral every 12 hours PRN    VITALS:   T(F): 97  HR: 64  BP: 115/55  RR: 18  SpO2: 97%    LABS:                        10.6   17.07 )-----------( 350      ( 23 Dec 2022 04:30 )             34.0     12-23    133<L>  |  97<L>  |  16  ----------------------------<  71  4.7   |  25  |  1.0    Ca    8.7      23 Dec 2022 04:30  Mg     1.7     12-23                    RADIOLOGY:    PHYSICAL EXAM:  GEN: No acute distress  LUNGS: Clear to auscultation bilaterally   HEART: S1/S2 present. RRR. systolic murmur  ABD/ GI: Soft, non-tender, non-distended. Bowel sounds present  EXT: NC/NC/NE/2+PP/JIMNEES  NEURO: AAOX3    
  CHEL CROFT  89y, Male    All available historical data reviewed    OVERNIGHT EVENTS:  feels well and has no new complaints  No fevers     ROS:  General: Denies rigors, nightsweats  HEENT: Denies headache, rhinorrhea, sore throat, eye pain  CV: Denies CP, palpitations  PULM: Denies wheezing, hemoptysis  GI: Denies hematemesis, hematochezia, melena  : Denies discharge, hematuria  MSK: Denies arthralgias, myalgias  SKIN: Denies rash, lesions  NEURO: Denies paresthesias, weakness  PSYCH: Denies depression, anxiety    VITALS:  T(F): 98.2, Max: 98.2 (12-15-22 @ 00:00)  HR: 95  BP: 135/64  RR: 18Vital Signs Last 24 Hrs  T(C): 36.8 (15 Dec 2022 00:00), Max: 36.8 (15 Dec 2022 00:00)  T(F): 98.2 (15 Dec 2022 00:00), Max: 98.2 (15 Dec 2022 00:00)  HR: 95 (15 Dec 2022 05:00) (84 - 97)  BP: 135/64 (15 Dec 2022 05:00) (128/60 - 148/62)  BP(mean): --  RR: 18 (15 Dec 2022 00:00) (18 - 18)  SpO2: 95% (15 Dec 2022 05:00) (94% - 96%)    Parameters below as of 15 Dec 2022 05:00  Patient On (Oxygen Delivery Method): nasal cannula  O2 Flow (L/min): 3      TESTS & MEASUREMENTS:                        11.4   23.54 )-----------( 345      ( 14 Dec 2022 05:42 )             34.4     12-14    134<L>  |  94<L>  |  19  ----------------------------<  276<H>  5.0   |  25  |  1.0    Ca    9.3      14 Dec 2022 05:42  Phos  2.9     12-14  Mg     1.9     12-14    TPro  5.9<L>  /  Alb  3.2<L>  /  TBili  0.6  /  DBili  x   /  AST  37  /  ALT  22  /  AlkPhos  134<H>  12-14    LIVER FUNCTIONS - ( 14 Dec 2022 05:42 )  Alb: 3.2 g/dL / Pro: 5.9 g/dL / ALK PHOS: 134 U/L / ALT: 22 U/L / AST: 37 U/L / GGT: x             Culture - Blood (collected 12-13-22 @ 17:58)  Source: .Blood Blood  Preliminary Report (12-15-22 @ 01:02):    No growth to date.    Culture - Blood (collected 12-13-22 @ 17:58)  Source: .Blood Blood  Preliminary Report (12-15-22 @ 01:02):    No growth to date.      Urinalysis Basic - ( 13 Dec 2022 18:19 )    Color: Yellow / Appearance: Clear / SG: >1.050 / pH: x  Gluc: x / Ketone: Small  / Bili: Negative / Urobili: <2 mg/dL   Blood: x / Protein: 30 mg/dL / Nitrite: Negative   Leuk Esterase: Negative / RBC: 5 /HPF / WBC 1 /HPF   Sq Epi: x / Non Sq Epi: 1 /HPF / Bacteria: Negative          RADIOLOGY & ADDITIONAL TESTS:  Personal review of radiological diagnostics performed  Echo and EKG results noted when applicable.     MEDICATIONS:  acetaminophen     Tablet .. 650 milliGRAM(s) Oral every 6 hours PRN  cefTRIAXone   IVPB      cefTRIAXone   IVPB 1000 milliGRAM(s) IV Intermittent every 24 hours  celecoxib 100 milliGRAM(s) Oral daily PRN  clopidogrel Tablet 75 milliGRAM(s) Oral daily  doxycycline IVPB      doxycycline IVPB 100 milliGRAM(s) IV Intermittent every 12 hours  DULoxetine 60 milliGRAM(s) Oral daily  enoxaparin Injectable 40 milliGRAM(s) SubCutaneous every 24 hours  finasteride 5 milliGRAM(s) Oral daily  influenza  Vaccine (HIGH DOSE) 0.7 milliLiter(s) IntraMuscular once  insulin glargine Injectable (LANTUS) 15 Unit(s) SubCutaneous at bedtime  insulin lispro (ADMELOG) corrective regimen sliding scale   SubCutaneous three times a day before meals  insulin lispro Injectable (ADMELOG) 5 Unit(s) SubCutaneous three times a day before meals  levothyroxine 50 MICROGram(s) Oral daily  linezolid  IVPB 600 milliGRAM(s) IV Intermittent every 12 hours  linezolid  IVPB      metoprolol tartrate 25 milliGRAM(s) Oral two times a day  simvastatin 20 milliGRAM(s) Oral at bedtime  tamsulosin 0.4 milliGRAM(s) Oral at bedtime      ANTIBIOTICS:  cefTRIAXone   IVPB      cefTRIAXone   IVPB 1000 milliGRAM(s) IV Intermittent every 24 hours  doxycycline IVPB      doxycycline IVPB 100 milliGRAM(s) IV Intermittent every 12 hours  linezolid  IVPB 600 milliGRAM(s) IV Intermittent every 12 hours  linezolid  IVPB        
CHEL CROFT 89y Male  MRN#: [id number]   CODE STATUS:__Full______    Hospital Day: 3d    Patient is currently admitted with the primary diagnosis of secondary bacterial pneumonia.     SUBJECTIVE:    Patient seen and examined at bedside this am. Patient is stable.     OBJECTIVE:  PAST MEDICAL & SURGICAL HISTORY:  HTN (hypertension)    HLD (hyperlipidemia)    BPH (benign prostatic hyperplasia)    CAD (coronary artery disease)    Diabetes mellitus    Hypothyroidism    S/P CABG (coronary artery bypass graft)                                           ALLERGIES:  No Known Allergies                             HOME MEDICATIONS:  Home Medications:  celecoxib 100 mg oral capsule: 1 cap(s) orally once a day, As Needed (13 Dec 2022 22:03)  clopidogrel 75 mg oral tablet: 1 tab(s) orally once a day (13 Dec 2022 22:03)  DULoxetine 60 mg oral delayed release capsule: 1 cap(s) orally once a day (13 Dec 2022 22:03)  finasteride 5 mg oral tablet: 1 tab(s) orally once a day (13 Dec 2022 22:03)  levothyroxine 50 mcg (0.05 mg) oral tablet: 1 tab(s) orally once a day (13 Dec 2022 22:03)  Metoprolol Tartrate 25 mg oral tablet: 1 tab(s) orally 2 times a day (13 Dec 2022 22:03)  simvastatin 20 mg oral tablet: 1 tab(s) orally once a day (at bedtime) (13 Dec 2022 22:03)  tamsulosin 0.4 mg oral capsule: 1 cap(s) orally once a day (13 Dec 2022 22:03)  Toujeo SoloStar 300 units/mL subcutaneous solution: 12 unit(s) subcutaneous once a day (in the morning) (13 Dec 2022 22:03)  traMADol 50 mg oral tablet: 1 tab(s) orally 2 times a day, As Needed (13 Dec 2022 22:03)                           MEDICATIONS:  STANDING MEDICATIONS  cefTRIAXone   IVPB      cefTRIAXone   IVPB 1000 milliGRAM(s) IV Intermittent every 24 hours  clopidogrel Tablet 75 milliGRAM(s) Oral daily  doxycycline IVPB      doxycycline IVPB 100 milliGRAM(s) IV Intermittent every 12 hours  DULoxetine 60 milliGRAM(s) Oral daily  enoxaparin Injectable 40 milliGRAM(s) SubCutaneous every 24 hours  finasteride 5 milliGRAM(s) Oral daily  influenza  Vaccine (HIGH DOSE) 0.7 milliLiter(s) IntraMuscular once  insulin glargine Injectable (LANTUS) 15 Unit(s) SubCutaneous at bedtime  insulin lispro (ADMELOG) corrective regimen sliding scale   SubCutaneous three times a day before meals  insulin lispro Injectable (ADMELOG) 5 Unit(s) SubCutaneous three times a day before meals  levothyroxine 50 MICROGram(s) Oral daily  linezolid  IVPB 600 milliGRAM(s) IV Intermittent every 12 hours  linezolid  IVPB      metoprolol tartrate 25 milliGRAM(s) Oral two times a day  simvastatin 20 milliGRAM(s) Oral at bedtime  sodium chloride 0.9%. 1000 milliLiter(s) IV Continuous <Continuous>  tamsulosin 0.4 milliGRAM(s) Oral at bedtime    PRN MEDICATIONS  acetaminophen     Tablet .. 650 milliGRAM(s) Oral every 6 hours PRN  celecoxib 100 milliGRAM(s) Oral daily PRN  guaiFENesin  milliGRAM(s) Oral every 12 hours PRN                                        VITAL SIGNS: Last 24 Hours  T(C): 36.3 (16 Dec 2022 08:00), Max: 36.8 (16 Dec 2022 00:00)  T(F): 97.4 (16 Dec 2022 08:00), Max: 98.3 (16 Dec 2022 00:00)  HR: 67 (16 Dec 2022 08:00) (67 - 90)  BP: 145/67 (16 Dec 2022 08:00) (140/63 - 182/82)  BP(mean): --  RR: 18 (16 Dec 2022 08:00) (18 - 18)  SpO2: 96% (16 Dec 2022 08:00) (95% - 96%)      12-15-22 @ 07:01  -  12-16-22 @ 07:00  --------------------------------------------------------  IN: 420 mL / OUT: 0 mL / NET: 420 mL    12-16-22 @ 07:01  -  12-16-22 @ 15:53  --------------------------------------------------------  IN: 180 mL / OUT: 0 mL / NET: 180 mL                                        LABS:                        10.8   15.61 )-----------( 355      ( 16 Dec 2022 08:50 )             32.8     12-16    128<L>  |  92<L>  |  20  ----------------------------<  187<H>  4.6   |  26  |  1.3    Ca    9.0      16 Dec 2022 08:50  Mg     1.9     12-16    TPro  4.9<L>  /  Alb  2.6<L>  /  TBili  0.6  /  DBili  x   /  AST  53<H>  /  ALT  33  /  AlkPhos  103  12-15      Culture - Urine (collected 13 Dec 2022 18:19)  Source: Clean Catch Clean Catch (Midstream)  Final Report (15 Dec 2022 12:58):    >=3 organisms. Probable collection contamination.    Culture - Blood (collected 13 Dec 2022 17:58)  Source: .Blood Blood  Preliminary Report (15 Dec 2022 01:02):    No growth to date.    Culture - Blood (collected 13 Dec 2022 17:58)  Source: .Blood Blood  Preliminary Report (15 Dec 2022 01:02):    No growth to date.      PHYSICAL EXAM:  General: Resting comfortably in no acute painful distress  HEENT: Normocephalic, atraumatic  LUNGS: Clear to auscultation bilaterally, no wheezes, rales, rhonchi  HEART: S1S2 present, regular rate and rhythm, no murmurs, rubs, gallops  ABDOMEN: Soft, nontender, nondistended  EXT: No edema                                         
  CHEL CROFT  89y, Male    All available historical data reviewed    OVERNIGHT EVENTS:  feels well and has no new complaints  No fevers     ROS:  General: Denies rigors, nightsweats  HEENT: Denies headache, rhinorrhea, sore throat, eye pain  CV: Denies CP, palpitations  PULM: Denies wheezing, hemoptysis  GI: Denies hematemesis, hematochezia, melena  : Denies discharge, hematuria  MSK: Denies arthralgias, myalgias  SKIN: Denies rash, lesions  NEURO: Denies paresthesias, weakness  PSYCH: Denies depression, anxiety    VITALS:  T(F): 97.4, Max: 98.3 (12-16-22 @ 00:00)  HR: 67  BP: 145/67  RR: 18Vital Signs Last 24 Hrs  T(C): 36.3 (16 Dec 2022 08:00), Max: 36.8 (16 Dec 2022 00:00)  T(F): 97.4 (16 Dec 2022 08:00), Max: 98.3 (16 Dec 2022 00:00)  HR: 67 (16 Dec 2022 08:00) (67 - 90)  BP: 145/67 (16 Dec 2022 08:00) (140/63 - 182/82)  BP(mean): --  RR: 18 (16 Dec 2022 08:00) (18 - 18)  SpO2: 96% (16 Dec 2022 08:00) (95% - 96%)    Parameters below as of 16 Dec 2022 08:00  Patient On (Oxygen Delivery Method): nasal cannula  O2 Flow (L/min): 3      TESTS & MEASUREMENTS:                        10.1   18.93 )-----------( 337      ( 15 Dec 2022 08:21 )             31.4     12-15    129<L>  |  95<L>  |  22<H>  ----------------------------<  221<H>  4.6   |  24  |  1.1    Ca    8.8      15 Dec 2022 08:21  Mg     1.6     12-15    TPro  4.9<L>  /  Alb  2.6<L>  /  TBili  0.6  /  DBili  x   /  AST  53<H>  /  ALT  33  /  AlkPhos  103  12-15    LIVER FUNCTIONS - ( 15 Dec 2022 08:21 )  Alb: 2.6 g/dL / Pro: 4.9 g/dL / ALK PHOS: 103 U/L / ALT: 33 U/L / AST: 53 U/L / GGT: x             Culture - Urine (collected 12-13-22 @ 18:19)  Source: Clean Catch Clean Catch (Midstream)  Final Report (12-15-22 @ 12:58):    >=3 organisms. Probable collection contamination.    Culture - Blood (collected 12-13-22 @ 17:58)  Source: .Blood Blood  Preliminary Report (12-15-22 @ 01:02):    No growth to date.    Culture - Blood (collected 12-13-22 @ 17:58)  Source: .Blood Blood  Preliminary Report (12-15-22 @ 01:02):    No growth to date.            RADIOLOGY & ADDITIONAL TESTS:  Personal review of radiological diagnostics performed  Echo and EKG results noted when applicable.     MEDICATIONS:  acetaminophen     Tablet .. 650 milliGRAM(s) Oral every 6 hours PRN  cefTRIAXone   IVPB      cefTRIAXone   IVPB 1000 milliGRAM(s) IV Intermittent every 24 hours  celecoxib 100 milliGRAM(s) Oral daily PRN  clopidogrel Tablet 75 milliGRAM(s) Oral daily  doxycycline IVPB      doxycycline IVPB 100 milliGRAM(s) IV Intermittent every 12 hours  DULoxetine 60 milliGRAM(s) Oral daily  enoxaparin Injectable 40 milliGRAM(s) SubCutaneous every 24 hours  finasteride 5 milliGRAM(s) Oral daily  guaiFENesin  milliGRAM(s) Oral every 12 hours PRN  influenza  Vaccine (HIGH DOSE) 0.7 milliLiter(s) IntraMuscular once  insulin glargine Injectable (LANTUS) 15 Unit(s) SubCutaneous at bedtime  insulin lispro (ADMELOG) corrective regimen sliding scale   SubCutaneous three times a day before meals  insulin lispro Injectable (ADMELOG) 5 Unit(s) SubCutaneous three times a day before meals  levothyroxine 50 MICROGram(s) Oral daily  linezolid  IVPB 600 milliGRAM(s) IV Intermittent every 12 hours  linezolid  IVPB      metoprolol tartrate 25 milliGRAM(s) Oral two times a day  simvastatin 20 milliGRAM(s) Oral at bedtime  tamsulosin 0.4 milliGRAM(s) Oral at bedtime      ANTIBIOTICS:  cefTRIAXone   IVPB      cefTRIAXone   IVPB 1000 milliGRAM(s) IV Intermittent every 24 hours  doxycycline IVPB      doxycycline IVPB 100 milliGRAM(s) IV Intermittent every 12 hours  linezolid  IVPB 600 milliGRAM(s) IV Intermittent every 12 hours  linezolid  IVPB

## 2022-12-23 NOTE — DISCHARGE NOTE NURSING/CASE MANAGEMENT/SOCIAL WORK - PATIENT PORTAL LINK FT
You can access the FollowMyHealth Patient Portal offered by [facility] by registering at the following website: [email or web address]. By joining [employer]’s FollowMyHealth portal, you will also be able to view your health information using other applications (apps) compatible with our system.

## 2022-12-23 NOTE — PROGRESS NOTE ADULT - ASSESSMENT
89M w/ h/o CAD (s/p CABG and PCI), HTN, DLD, DM, BPH, and hypothyroidism p/w lethargy.     Suspected complex PNA  BRINA-prerenal  Hyponatremia, hypovolemic  Hypomagnesemia  Recent Influenza  CAD  Hypothyroidism    Linezolid 600 mg iv q12h, Rocephin 1 gm iv q24h, Doxycycline 100 mg iv q12h.  Completed for 7 days on 12/19  Leucocytosis  improved from 23 to 14. Procal 0.18  New onset Diarrhoea 4 bouts today.  sending C diff for now. was on mag gluconate Dced  Urine legio & strep, sputum cx which are are unremarkable for acute findings   Supplemental O2 as required to maintain  sat 89-93  BRINA-prerenal & Hyponatremia, hypovolemic. Improved  Continue home meds for chronic  comorbid conditions      DC plan if no C diff  
89M w/ h/o CAD (s/p CABG and PCI), HTN, DLD, DM, BPH, and hypothyroidism p/w lethargy.     Suspected complex PNA  BRINA-prerenal  Hyponatremia, hypovolemic  Hypomagnesemia  Recent Influenza  CAD  Hypothyroidism    Linezolid 600 mg iv q12h, Rocephin 1 gm iv q24h, Doxycycline 100 mg iv q12h.  Completed for 7 days on 12/19  Leucocytosis  still present. Procal 0.18-- patient is not coughing--likely reactive due to diarrhea  New onset Diarrhoea . C diff negative for now. was on mag gluconate Dced------ likely from magnesium gluconate po.  Urine legio & strep, sputum cx which are are unremarkable for acute findings   Supplemental O2 as required to maintain  sat 89-93  BRINA-prerenal & Hyponatremia, hypovolemic.    Hyponatremia-- due to diarrhea-- resolved  Moderate to severe aortic stenosis    DC plan to SNF-- spent more than 30mins--pending covid results    spoke with daughter at bedside-- 
89M w/ h/o CAD (s/p CABG and PCI), HTN, DLD, DM, BPH, and hypothyroidism p/w lethargy.     Suspected complex PNA  BRINA-prerenal  Hyponatremia, hypovolemic  Hypomagnesemia  Recent Influenza  CAD  Hypothyroidism    Linezolid 600 mg iv q12h, Rocephin 1 gm iv q24h, Doxycycline 100 mg iv q12h.  Completed for 7 days on 12/19  Leucocytosis  still present. Procal 0.18-- patient is not coughing--likely reactive due to diarrhea  New onset Diarrhoea 4 bouts today.  sending C diff for now. was on mag gluconate Dced---- C diff was negative-- likely from magnesium gluconate po.  Urine legio & strep, sputum cx which are are unremarkable for acute findings   Supplemental O2 as required to maintain  sat 89-93  BRINA-prerenal & Hyponatremia, hypovolemic. can give fluid   Hyponatremia-- due to diarrhea-- 250cc of fluid before discharge  Moderate to severe aortic stenosis    DC plan to SNF-- spent more than 30mins    spoke with daughter at bedside-- she is trying to record conversation.                  
89M w/ h/o CAD (s/p CABG and PCI), HTN, DLD, DM, BPH, and hypothyroidism p/w lethargy. Admitted for pneumonia.    # Post-Influenza A Bilateral Pneumonia  # Acute hypoxic respiratory failure, discharge on 2L but now requiring 3-4L  - c/w IV antibiotics as per ID  - f/u blood and urine culture  - obtain MRSA PCR  - obtain strep and legionella urine antigen    # HTN, stable  - c/w home BP meds    # DM with hyperglycemia, elevated fsg  - monitor fingerstick glucose, c/w insulin regimen, titrate up for better control    # Coronary Artery Disease  # DLD   - c/w plavix, lopressor, statin    # Hypothyroidism  - TSH WNL, c/w levothyroxine     # BPH  - c/w tamsulosin, finasteride    # DVT prophylaxis - on lovenox subcut  # Code status - Full Code  
89M w/ h/o CAD (s/p CABG and PCI), HTN, DLD, DM, BPH, and hypothyroidism p/w lethargy. Admitted for pneumonia.    PLAN    #Hyponatremia  - Na+ 128 ( 12/16)   - will give gentle hydration   - NS 60 cc/hr for 10 hours   - Monitor BMP  - Monitor fluid status     # Post-Influenza A Bilateral Pneumonia  # Acute hypoxic respiratory failure, discharge on 2L but now requiring 3-4L  - On 3L NC  - WBC trending down   - c/w IV Linezolid 600 mg iv q12h  - c/w Rocephin 1 gm iv q24h  - c/w Doxycycline 100 mg iv q12h  - Blood Cx: negative   - Urine Cx: > 3 organisms growing but probably contaminants   - MRSA negative   - f/u strep and legionella urine antigen  - f/u AM CXR     #Insulin-Dependent Diabetes Mellitus  - A1c 10.2% (Dec 2022). Managed via [facility] 12u qd. Last admission, pt on Lantus 10u qhs, but FS still poorly controlled.  on admission, but low suspicion for DKA (AG 13 and bicarb 25).  - Monitor FS TIDAC and QHS  - c/w Lantus 15U SQ QHS  - c/w Lispro 5U SQ TIDAC  - c/w low-dose ISS     # Hypothyroidism  - TSH WNL, c/w levothyroxine     #Coronary Artery Disease  #Hypertension  #Dyslipidemia  - Known h/o CAD. S/P PCI x3. S/P CABG in mid-2000's. No reported h/o CHF.  - c/w clopidogrel 75mg PO QD  - c/w simvastatin 20mg PO QHS  - c/w Lopressor 25mg PO BID    #BPH  - c/w tamsulosin 0.4mg PO QHS  - c/w finasteride 5mg PO QD    #DVT PPX: Lovenox 40mg SQ QD  #GI PPX: none indicated  #DIET: DASH/TLC + CC  #ACTIVITY: IAT  #CODE STATUS: Full Code; daughter at bedside wants everything to be done for her father  #DISPOSITION: From Home  
· Assessment	  89M admitted last week for dyspnea. Found to have Influenza A. Treated w/ Tamiflu and then discharged w/ portable oxygen (2L) after two days. Since discharge lethargic with decreased PO intake and worsening cough. Also reported some abdominal pain earlier today, which has since resolved. No reported fevers, chills.    CTAP : bibasilar consolidations.    IMPRESSION;  Post influenza bibasilar bacterial PNA  WBC 23.5  Nares ORSA NG  12/13 BCx NG    RECOMMENDATIONS;  Urine for strep pneumonia/legionella antigen  Sputum gm stain, cultures  no droplet precautions  No change in ABx  Linezolid 600 mg iv q12h  Rocephin 1 gm iv q24h  Doxycycline 100 mg iv q12h
89M w/ h/o CAD (s/p CABG and PCI), HTN, DLD, DM, BPH, and hypothyroidism p/w lethargy. Admitted for pneumonia.    PLAN    # Post-Influenza A Bilateral Pneumonia  # Acute hypoxic respiratory failure, discharge on 2L but now requiring 3-4L  - c/w IV Linezolid 600 mg iv q12h  - c/w Rocephin 1 gm iv q24h  - c/w Doxycycline 100 mg iv q12h  - f/u blood and urine culture  - MRSA negative   - f/u strep and legionella urine antigen    #Insulin-Dependent Diabetes Mellitus  - A1c 10.2% (Dec 2022). Managed via [facility] 12u qd. Last admission, pt on Lantus 10u qhs, but FS still poorly controlled.  on admission, but low suspicion for DKA (AG 13 and bicarb 25).  - Monitor FS TIDAC and QHS  - c/w Lantus 15U SQ QHS  - c/w Lispro 5U SQ TIDAC  - c/w low-dose ISS     # Hypothyroidism  - TSH WNL, c/w levothyroxine     #Coronary Artery Disease  #Hypertension  #Dyslipidemia  - Known h/o CAD. S/P PCI x3. S/P CABG in mid-2000's. No reported h/o CHF.  - c/w clopidogrel 75mg PO QD  - c/w simvastatin 20mg PO QHS  - c/w Lopressor 25mg PO BID    #BPH  - c/w tamsulosin 0.4mg PO QHS  - c/w finasteride 5mg PO QD    #DVT PPX: Lovenox 40mg SQ QD  #GI PPX: none indicated  #DIET: DASH/TLC + CC  #ACTIVITY: IAT  #CODE STATUS: Full Code  #DISPOSITION: From Home      
89M w/ h/o CAD (s/p CABG and PCI), HTN, DLD, DM, BPH, and hypothyroidism p/w lethargy. Admitted for pneumonia.    PLAN    #Hyponatremia  - Na+ 128 ( 12/16)   - will give gentle hydration   - NS 60 cc/hr for 10 hours   - Monitor BMP  - Monitor fluid status     # Post-Influenza A Bilateral Pneumonia  # Acute hypoxic respiratory failure, discharge on 2L but now requiring 3-4L  - On 3L NC  - WBC trending down   - c/w IV Linezolid 600 mg iv q12h  - c/w Rocephin 1 gm iv q24h  - c/w Doxycycline 100 mg iv q12h  - Blood Cx: negative   - Urine Cx: > 3 organisms growing but probably contaminants   - MRSA negative   - f/u strep and legionella urine antigen  - f/u AM CXR     #Insulin-Dependent Diabetes Mellitus  - A1c 10.2% (Dec 2022). Managed via [facility] 12u qd. Last admission, pt on Lantus 10u qhs, but FS still poorly controlled.  on admission, but low suspicion for DKA (AG 13 and bicarb 25).  - Monitor FS TIDAC and QHS  - c/w Lantus 15U SQ QHS  - c/w Lispro 5U SQ TIDAC  - c/w low-dose ISS     # Hypothyroidism  - TSH WNL, c/w levothyroxine     #Coronary Artery Disease  #Hypertension  #Dyslipidemia  - Known h/o CAD. S/P PCI x3. S/P CABG in mid-2000's. No reported h/o CHF.  - c/w clopidogrel 75mg PO QD  - c/w simvastatin 20mg PO QHS  - c/w Lopressor 25mg PO BID    #BPH  - c/w tamsulosin 0.4mg PO QHS  - c/w finasteride 5mg PO QD    #DVT PPX: Lovenox 40mg SQ QD  #GI PPX: none indicated  #DIET: DASH/TLC + CC  #ACTIVITY: IAT  #CODE STATUS: Full Code; daughter at bedside wants everything to be done for her father  #DISPOSITION: From Home    
· Assessment	  89M admitted last week for dyspnea. Found to have Influenza A. Treated w/ Tamiflu and then discharged w/ portable oxygen (2L) after two days. Since discharge lethargic with decreased PO intake and worsening cough. Also reported some abdominal pain earlier today, which has since resolved. No reported fevers, chills.    CTAP : bibasilar consolidations.    IMPRESSION;  Clinically doing well  Post influenza bibasilar bacterial PNA  WBC 23.5>18.9  Kishore BENSON NG  12/13 BCx NG    RECOMMENDATIONS;  7 days of present ABx  No change in ABx  Linezolid 600 mg iv q12h  Rocephin 1 gm iv q24h  Doxycycline 100 mg iv q12h  Please do not hesitate to recall ID if any questions arise either through [employer] or through [employer]

## 2022-12-23 NOTE — PROGRESS NOTE ADULT - REASON FOR ADMISSION
lethargy

## 2022-12-23 NOTE — PROGRESS NOTE ADULT - PROVIDER SPECIALTY LIST ADULT
Hospitalist
Infectious Disease
Internal Medicine
Hospitalist
Internal Medicine
Infectious Disease

## 2022-12-24 LAB
CULTURE RESULTS: SIGNIFICANT CHANGE UP
SPECIMEN SOURCE: SIGNIFICANT CHANGE UP

## 2023-01-04 NOTE — CDI QUERY NOTE - NSCDIOTHERTXTBX_GEN_ALL_CORE_HH
DOCUMENTATION CLARIFICATION FORM   Encounter #: [id number]                             Patient’s Name: Chris José  Medical Record #: [id number]                   Admit Date: 2022  : 3-                                        Discharged: 2022  CDI Specialist/: Jeremie                    Contact #: [phone]    Dear Dr. Marinelli ,                  Date: 2023    The Physician’s or Provider’s documentation of the patient’s presentation, evaluation and  medical management, as identified below, may support a diagnosis that is not documented in the medical record.  In order to accurately capture all diagnoses to the greatest degree of specificity reflecting the patient’s actual severity of illness, the documentation in this patient’s medical record requires additional clarification.  Please include more specific documentation, either known or suspected, of a corresponding diagnosis associated with the clinical information described below in your Progress Note and/or Discharge Summary.    CLINICAL INDICATORS   DOCUMENTATION  •	12-3 ED:... 89 yo M...recently admitted for pneumonia presenting with persistent coughing, decreased appetite, abdominal pain, and generalized weakness since being discharged from hospital... imaging consistent with pneumonia.  •	H&P:... Acute hypoxic respiratory failure + cough, concerning for post-viral bacterial pneumonia, rule out cardiac process  •	 ID Consult:... Post influenza bibasilar bacterial PNA  •	12-15 through  Medicine:.... Suspected complex PNA...Recent Influenza  •	 DC Summary:... hypoxic resp failure secondary to bilateral bacterial PNA... Diagnosis: Multifocal pneumonia    IMAGING:  •	 XR Chest Radiology Impression: Basilar opacifications, follow-up as needed.    TREATMENT  •	 Lactated Ringers 1000ml IVF bolus  •	 Vancomycin 1000mg IVPB x 2 doses indicated for PNA  •	 cefepime 2000mg IVPB x 1 dose indicated for PNA  •	 azithromycin 500mg IVPB x 1 dose indicated for PNA  •	 through  Linezolid 600 mg iv q12h indicated for PNA  •	 through  Rocephin 1 gm iv q24h indicated for PNA  •	 through  Doxycycline 100 mg iv q12h indicated for PNA    QUERY  Based on your professional judgment and the clinical indicators, please clarify if post influenza bibasilar bacterial PNA can be further specified as:  •	Post influenza bibasilar bacterial PNA suspected to be associated with Gram Positive Cocci  •	Post influenza bibasilar bacterial PNA suspected to be associated with Gram Negative Bacilli  •	Other (please specify):  •	Unable to clinically determine     Documentation clarification is required for compliance, accuracy in coding and billing, and reporting severity of illness, quality data   and risk of mortality.  --------------------------------------------------------------------------------------------------------------------------------------------  DO NOT REMOVE THIS RECORD WITHOUT FIRST NOTIFYING THE CDI SPECIALIST  This form is NOT a part of the permanent Medical Record.

## 2023-01-04 NOTE — CDI QUERY NOTE - NSCDIOTHERTXTBX_GEN_ALL_CORE_HH
DOCUMENTATION CLARIFICATION FORM     Encounter #: [id number]                             Patient’s Name: Chris José  Medical Record #: [id number]                          Admit Date: 2022  : 3-                                            Discharged: 2022  CDI Specialist/: Jeremie                         Contact #: [phone]    Dear Dr. Burrell,                      Date: 2023                  The Physician’s or Provider’s documentation of the patient’s presentation, evaluation and  medical management, as identified below, may support a diagnosis that is not documented in the medical record.  In order to accurately capture all diagnoses to the greatest degree of specificity reflecting the patient’s actual severity of illness, the documentation in this patient’s medical record requires additional clarification.  Please include more specific documentation, either known or suspected, of a corresponding diagnosis associated with the clinical information described below in your Progress Note and/or Discharge Summary.  CLINICAL INDICATORS    •	12-3 ED:... 89 yo M...recently admitted for pneumonia presenting with persistent coughing, decreased appetite, abdominal pain, and generalized weakness since being discharged from hospital... imaging consistent with pneumonia.  •	H&P:... Acute hypoxic respiratory failure + cough, concerning for post-viral bacterial pneumonia, rule out cardiac process; Sepsis present on admission.  •	 ID Consult:... Post influenza bibasilar bacterial PNA  •	12-15 through  Medicine:.... Suspected complex PNA...Recent Influenza  •	 DC Summary:... hypoxic resp failure secondary to bilateral bacterial PNA... Diagnosis: Multifocal pneumonia    Labs/VS  •	 WBCs 23.67 > 23.54 > 18.93 > 15.61 > 12.85   •	 Lactate level 1.6  •	 T 97.1 HR 96 > T 96.9  HR 95  •	 Influenza A: Positive  Treatment  •	 Lactated Ringers 1000ml IVF bolus  •	 Vancomycin 1000mg IVPB x 2 doses indicated for PNA  •	 cefepime 2000mg IVPB x 1 dose indicated for PNA  •	 azithromycin 500mg IVPB x 1 dose indicated for PNA  •	 through  Linezolid 600 mg iv q12h indicated for PNA  •	 through  Rocephin 1 gm iv q24h indicated for PNA  •	12-14 through 12-21 Doxycycline 100 mg iv q12h indicated for PNA    QUERY  Based on your clinical judgment and the above clinical indicators, please clarify if sepsis documented in the H&P can be further specified as:  •	Sepsis was present on admission and resolved by the time of discharge  •	Sepsis was evaluated and ruled out  •	Other(please specify)_______  •	Unable to clinically determine       Documentation clarification is required for compliance, accuracy in coding and billing, and reporting severity of illness, quality data   and risk of mortality.  --------------------------------------------------------------------------------------------------------------------------------------------  DO NOT REMOVE THIS RECORD WITHOUT FIRST NOTIFYING THE CDI SPECIALIST  This form is NOT a part of the permanent Medical Record.

## 2023-01-04 NOTE — CDI QUERY NOTE - NSCDIOTHERTXTBX_GEN_ALL_CORE_HH
DOCUMENTATION CLARIFICATION FORM   Encounter #: [id number]                             Patient’s Name: Chris José  Medical Record #: [id number]                   Admit Date: 2022  : 3-                                        Discharged: 2022  CDI Specialist/: Jeremie                    Contact #: [phone]    Dear Dr. Marinelli ,                  Date: 2023    The Physician’s or Provider’s documentation of the patient’s presentation, evaluation and  medical management, as identified below, may support a diagnosis that is not documented in the medical record.  In order to accurately capture all diagnoses to the greatest degree of specificity reflecting the patient’s actual severity of illness, the documentation in this patient’s medical record requires additional clarification.  Please include more specific documentation, either known or suspected, of a corresponding diagnosis associated with the clinical information described below in your Progress Note and/or Discharge Summary.    CLINICAL INDICATORS   DOCUMENTATION  •	12-3 ED:... 89 yo M...recently admitted for pneumonia presenting with persistent coughing, decreased appetite, abdominal pain, and generalized weakness since being discharged from hospital... imaging consistent with pneumonia.  •	H&P:... Acute hypoxic respiratory failure + cough, concerning for post-viral bacterial pneumonia, rule out cardiac process  •	 ID Consult:... Post influenza bibasilar bacterial PNA  •	12-15 through  Medicine:.... Suspected complex PNA...Recent Influenza  •	 DC Summary:... hypoxic resp failure secondary to bilateral bacterial PNA... Diagnosis: Multifocal pneumonia    IMAGING:  •	 XR Chest Radiology Impression: Basilar opacifications, follow-up as needed.    Labs  •	 Influenza A: Positive    TREATMENT  •	 Lactated Ringers 1000ml IVF bolus  •	 Vancomycin 1000mg IVPB x 2 doses indicated for PNA  •	 cefepime 2000mg IVPB x 1 dose indicated for PNA  •	 azithromycin 500mg IVPB x 1 dose indicated for PNA  •	 through  Linezolid 600 mg iv q12h indicated for PNA  •	 through  Rocephin 1 gm iv q24h indicated for PNA  •	 through  Doxycycline 100 mg iv q12h indicated for PNA    QUERY  Based on your professional judgment and the clinical indicators, please clarify if the condition for which linezolid, ceftriaxone and doxycycline can be further specified as:  •	Linezolid, ceftriaxone and doxycycline were prescribed for suspected multifactorial pneumonia possibly due to streptococcal,  staphylococcal, and gram negative bacterial infection with active influenza infection that was resolving at the time of discharge  •	Linezolid, ceftriaxone and doxycycline were prescribed for suspected multifactorial pneumonia possibly due to streptococcal,  staphylococcal, and gram negative bacterial infection without active influenza infection that was resolving at the time of discharge  •	Other (please specify):  •	Unable to clinically determine     Documentation clarification is required for compliance, accuracy in coding and billing, and reporting severity of illness, quality data   and risk of mortality.  --------------------------------------------------------------------------------------------------------------------------------------------  DO NOT REMOVE THIS RECORD WITHOUT FIRST NOTIFYING THE CDI SPECIALIST  This form is NOT a part of the permanent Medical Record. DOCUMENTATION CLARIFICATION FORM   Encounter #: [id number]                             Patient’s Name: Chris José  Medical Record #: [id number]                   Admit Date: 2022  : 3-                                        Discharged: 2022  CDI Specialist/: Jeremie                    Contact #: [phone]    Dear Dr. Campbell ,                  Date: 2023    The Physician’s or Provider’s documentation of the patient’s presentation, evaluation and  medical management, as identified below, may support a diagnosis that is not documented in the medical record.  In order to accurately capture all diagnoses to the greatest degree of specificity reflecting the patient’s actual severity of illness, the documentation in this patient’s medical record requires additional clarification.  Please include more specific documentation, either known or suspected, of a corresponding diagnosis associated with the clinical information described below in your Progress Note and/or Discharge Summary.    CLINICAL INDICATORS   DOCUMENTATION  •	12-3 ED:... 89 yo M...recently admitted for pneumonia presenting with persistent coughing, decreased appetite, abdominal pain, and generalized weakness since being discharged from hospital... imaging consistent with pneumonia.  •	H&P:... Acute hypoxic respiratory failure + cough, concerning for post-viral bacterial pneumonia, rule out cardiac process  •	 ID Consult:... Post influenza bibasilar bacterial PNA  •	12-15 through  Medicine:.... Suspected complex PNA...Recent Influenza  •	 DC Summary:... hypoxic resp failure secondary to bilateral bacterial PNA... Diagnosis: Multifocal pneumonia    IMAGING:  •	 XR Chest Radiology Impression: Basilar opacifications, follow-up as needed.    Labs  •	 Influenza A: Positive    TREATMENT  •	 Lactated Ringers 1000ml IVF bolus  •	 Vancomycin 1000mg IVPB x 2 doses indicated for PNA  •	 cefepime 2000mg IVPB x 1 dose indicated for PNA  •	 azithromycin 500mg IVPB x 1 dose indicated for PNA  •	 through  Linezolid 600 mg iv q12h indicated for PNA  •	 through  Rocephin 1 gm iv q24h indicated for PNA  •	 through  Doxycycline 100 mg iv q12h indicated for PNA    QUERY  Based on your professional judgment and the clinical indicators, please clarify if the condition for which linezolid, ceftriaxone and doxycycline can be further specified as:  •	Linezolid, ceftriaxone and doxycycline were prescribed for suspected multifactorial pneumonia possibly due to streptococcal,  staphylococcal, and gram negative bacterial infection with active influenza infection that was resolving at the time of discharge  •	Linezolid, ceftriaxone and doxycycline were prescribed for suspected multifactorial pneumonia possibly due to streptococcal,  staphylococcal, and gram negative bacterial infection without active influenza infection that was resolving at the time of discharge  •	Other (please specify):  •	Unable to clinically determine     Documentation clarification is required for compliance, accuracy in coding and billing, and reporting severity of illness, quality data   and risk of mortality.  --------------------------------------------------------------------------------------------------------------------------------------------  DO NOT REMOVE THIS RECORD WITHOUT FIRST NOTIFYING THE CDI SPECIALIST  This form is NOT a part of the permanent Medical Record.

## 2023-01-04 NOTE — CDI QUERY NOTE - NSCDITREATMDFT_GEN_ALL_CORE_HH
Active Hospital Problems    Diagnosis  POA    *Encephalopathy, metabolic [G93.41]  Yes    Atrial fibrillation with RVR [I48.91]  Yes    Sepsis [A41.9]  Yes    PEDRO (acute kidney injury) [N17.9]  Yes    Iron deficiency anemia [D50.9]  Yes    HTN (hypertension) [I10]  Yes     Patient remains in sinus rhythm.  Increase metoprolol to 50 mg p.o. t.i.d. for improved blood pressure and rate control.  Continue amiodarone and Eliquis at current dosing regimen  Continue to monitor on telemetry.     Dr. Marinelli,

## 2023-01-04 NOTE — CDI QUERY NOTE - NSCDINOTEPOA_GEN_ALL_CORE_FT
Was the condition present on admission? If so, please document in the chart that “(the condition) was present on admission.”

## 2023-01-05 ENCOUNTER — TRANSCRIPTION ENCOUNTER (OUTPATIENT)
Age: 88
End: 2023-01-05

## 2023-03-28 ENCOUNTER — INPATIENT (INPATIENT)
Facility: HOSPITAL | Age: 88
LOS: 5 days | Discharge: NURSING, REHAB OR LONG-TERM CARE | DRG: 52 | End: 2023-04-03
Attending: SURGERY | Admitting: SURGERY
Payer: MEDICARE

## 2023-03-28 VITALS
SYSTOLIC BLOOD PRESSURE: 210 MMHG | OXYGEN SATURATION: 95 % | RESPIRATION RATE: 20 BRPM | DIASTOLIC BLOOD PRESSURE: 70 MMHG | HEART RATE: 66 BPM

## 2023-03-28 DIAGNOSIS — Z95.1 PRESENCE OF AORTOCORONARY BYPASS GRAFT: Chronic | ICD-10-CM

## 2023-03-28 LAB
ALBUMIN SERPL ELPH-MCNC: 4 G/DL — SIGNIFICANT CHANGE UP (ref 3.5–5.2)
ALP SERPL-CCNC: 74 U/L — SIGNIFICANT CHANGE UP (ref 30–115)
ALT FLD-CCNC: 11 U/L — SIGNIFICANT CHANGE UP (ref 0–41)
ANION GAP SERPL CALC-SCNC: 12 MMOL/L — SIGNIFICANT CHANGE UP (ref 7–14)
APTT BLD: 31.4 SEC — SIGNIFICANT CHANGE UP (ref 27–39.2)
AST SERPL-CCNC: 21 U/L — SIGNIFICANT CHANGE UP (ref 0–41)
BASOPHILS # BLD AUTO: 0.08 K/UL — SIGNIFICANT CHANGE UP (ref 0–0.2)
BASOPHILS NFR BLD AUTO: 0.8 % — SIGNIFICANT CHANGE UP (ref 0–1)
BILIRUB SERPL-MCNC: <0.2 MG/DL — SIGNIFICANT CHANGE UP (ref 0.2–1.2)
BUN SERPL-MCNC: 19 MG/DL — SIGNIFICANT CHANGE UP (ref 10–20)
CALCIUM SERPL-MCNC: 8.9 MG/DL — SIGNIFICANT CHANGE UP (ref 8.4–10.5)
CHLORIDE SERPL-SCNC: 102 MMOL/L — SIGNIFICANT CHANGE UP (ref 98–110)
CO2 SERPL-SCNC: 22 MMOL/L — SIGNIFICANT CHANGE UP (ref 17–32)
CREAT SERPL-MCNC: 0.9 MG/DL — SIGNIFICANT CHANGE UP (ref 0.7–1.5)
EGFR: 77 ML/MIN/1.73M2 — SIGNIFICANT CHANGE UP
EOSINOPHIL # BLD AUTO: 0.47 K/UL — SIGNIFICANT CHANGE UP (ref 0–0.7)
EOSINOPHIL NFR BLD AUTO: 4.7 % — SIGNIFICANT CHANGE UP (ref 0–8)
ETHANOL SERPL-MCNC: <10 MG/DL — SIGNIFICANT CHANGE UP
GAS PNL BLDV: SIGNIFICANT CHANGE UP
GLUCOSE SERPL-MCNC: 259 MG/DL — HIGH (ref 70–99)
HCT VFR BLD CALC: 36.8 % — LOW (ref 42–52)
HGB BLD-MCNC: 12.1 G/DL — LOW (ref 14–18)
IMM GRANULOCYTES NFR BLD AUTO: 1.7 % — HIGH (ref 0.1–0.3)
INR BLD: 1.18 RATIO — SIGNIFICANT CHANGE UP (ref 0.65–1.3)
LACTATE SERPL-SCNC: 3.6 MMOL/L — HIGH (ref 0.7–2)
LIDOCAIN IGE QN: 22 U/L — SIGNIFICANT CHANGE UP (ref 7–60)
LYMPHOCYTES # BLD AUTO: 4.22 K/UL — HIGH (ref 1.2–3.4)
LYMPHOCYTES # BLD AUTO: 41.9 % — SIGNIFICANT CHANGE UP (ref 20.5–51.1)
MAGNESIUM SERPL-MCNC: 1.6 MG/DL — LOW (ref 1.8–2.4)
MCHC RBC-ENTMCNC: 29.9 PG — SIGNIFICANT CHANGE UP (ref 27–31)
MCHC RBC-ENTMCNC: 32.9 G/DL — SIGNIFICANT CHANGE UP (ref 32–37)
MCV RBC AUTO: 90.9 FL — SIGNIFICANT CHANGE UP (ref 80–94)
MONOCYTES # BLD AUTO: 0.93 K/UL — HIGH (ref 0.1–0.6)
MONOCYTES NFR BLD AUTO: 9.2 % — SIGNIFICANT CHANGE UP (ref 1.7–9.3)
NEUTROPHILS # BLD AUTO: 4.2 K/UL — SIGNIFICANT CHANGE UP (ref 1.4–6.5)
NEUTROPHILS NFR BLD AUTO: 41.7 % — LOW (ref 42.2–75.2)
NRBC # BLD: 0 /100 WBCS — SIGNIFICANT CHANGE UP (ref 0–0)
NT-PROBNP SERPL-SCNC: 540 PG/ML — HIGH (ref 0–300)
PLATELET # BLD AUTO: 200 K/UL — SIGNIFICANT CHANGE UP (ref 130–400)
POTASSIUM SERPL-MCNC: 5.1 MMOL/L — HIGH (ref 3.5–5)
POTASSIUM SERPL-SCNC: 5.1 MMOL/L — HIGH (ref 3.5–5)
PROT SERPL-MCNC: 6.2 G/DL — SIGNIFICANT CHANGE UP (ref 6–8)
PROTHROM AB SERPL-ACNC: 13.5 SEC — HIGH (ref 9.95–12.87)
RBC # BLD: 4.05 M/UL — LOW (ref 4.7–6.1)
RBC # FLD: 13.7 % — SIGNIFICANT CHANGE UP (ref 11.5–14.5)
SARS-COV-2 RNA SPEC QL NAA+PROBE: SIGNIFICANT CHANGE UP
SODIUM SERPL-SCNC: 136 MMOL/L — SIGNIFICANT CHANGE UP (ref 135–146)
TROPONIN T SERPL-MCNC: <0.01 NG/ML — SIGNIFICANT CHANGE UP
WBC # BLD: 10.07 K/UL — SIGNIFICANT CHANGE UP (ref 4.8–10.8)
WBC # FLD AUTO: 10.07 K/UL — SIGNIFICANT CHANGE UP (ref 4.8–10.8)

## 2023-03-28 PROCEDURE — 73590 X-RAY EXAM OF LOWER LEG: CPT | Mod: 26,RT

## 2023-03-28 PROCEDURE — 71260 CT THORAX DX C+: CPT | Mod: 26,MA

## 2023-03-28 PROCEDURE — 99285 EMERGENCY DEPT VISIT HI MDM: CPT | Mod: FS

## 2023-03-28 PROCEDURE — 72170 X-RAY EXAM OF PELVIS: CPT | Mod: 26

## 2023-03-28 PROCEDURE — 93010 ELECTROCARDIOGRAM REPORT: CPT

## 2023-03-28 PROCEDURE — 73130 X-RAY EXAM OF HAND: CPT | Mod: 26,LT

## 2023-03-28 PROCEDURE — 71045 X-RAY EXAM CHEST 1 VIEW: CPT | Mod: 26

## 2023-03-28 PROCEDURE — 70450 CT HEAD/BRAIN W/O DYE: CPT | Mod: 26,MA

## 2023-03-28 PROCEDURE — 72125 CT NECK SPINE W/O DYE: CPT | Mod: 26,MA

## 2023-03-28 PROCEDURE — 74177 CT ABD & PELVIS W/CONTRAST: CPT | Mod: 26,MA

## 2023-03-28 RX ORDER — TETANUS TOXOID, REDUCED DIPHTHERIA TOXOID AND ACELLULAR PERTUSSIS VACCINE, ADSORBED 5; 2.5; 8; 8; 2.5 [IU]/.5ML; [IU]/.5ML; UG/.5ML; UG/.5ML; UG/.5ML
0.5 SUSPENSION INTRAMUSCULAR ONCE
Refills: 0 | Status: COMPLETED | OUTPATIENT
Start: 2023-03-28 | End: 2023-03-28

## 2023-03-28 RX ORDER — MAGNESIUM SULFATE 500 MG/ML
2 VIAL (ML) INJECTION ONCE
Refills: 0 | Status: COMPLETED | OUTPATIENT
Start: 2023-03-28 | End: 2023-03-28

## 2023-03-28 RX ORDER — METOPROLOL TARTRATE 50 MG
25 TABLET ORAL ONCE
Refills: 0 | Status: COMPLETED | OUTPATIENT
Start: 2023-03-28 | End: 2023-03-28

## 2023-03-28 RX ORDER — ACETAMINOPHEN 500 MG
650 TABLET ORAL ONCE
Refills: 0 | Status: COMPLETED | OUTPATIENT
Start: 2023-03-28 | End: 2023-03-28

## 2023-03-28 RX ADMIN — TETANUS TOXOID, REDUCED DIPHTHERIA TOXOID AND ACELLULAR PERTUSSIS VACCINE, ADSORBED 0.5 MILLILITER(S): 5; 2.5; 8; 8; 2.5 SUSPENSION INTRAMUSCULAR at 22:40

## 2023-03-28 RX ADMIN — Medication 650 MILLIGRAM(S): at 23:12

## 2023-03-28 RX ADMIN — Medication 25 MILLIGRAM(S): at 23:14

## 2023-03-28 RX ADMIN — Medication 650 MILLIGRAM(S): at 23:28

## 2023-03-28 RX ADMIN — Medication 25 GRAM(S): at 23:11

## 2023-03-28 NOTE — CONSULT NOTE ADULT - NS ATTEND AMEND GEN_ALL_CORE FT
Pt with C1 fracture and now cervical epidural hematoma seen on MRI. Pt is doing well in hard c-collar on exam today. MAEW appropriate for age. Cont to hold plavix. Would clarify need for plavix in 89 yo M with cardiology, however, if needed can be restarted in 7 days post injury. Will cont hard c collar for now with likely transition to soft collar. Pt can be OOB with PT in C-collar. No acute intervention. Will cont to follow.

## 2023-03-28 NOTE — CONSULT NOTE ADULT - SUBJECTIVE AND OBJECTIVE BOX
TRAUMA ACTIVATION LEVEL:  CODE / ALERT  / CONSULT  ACTIVATED BY: EMS**  /  ED**  INTUBATED: YES** / NO**      MECHANISM OF INJURY:   [] Blunt     [] MVC	  [x] Fall	  [] Pedestrian Struck	  [] Motorcycle     [] Assault     [] Bicycle collision    [] Sports injury    [] Penetrating    [] Gun Shot Wound      [] Stab Wound    GCS: 15 	E: 4	V: 5	M: 6    HPI:    89yM w/ PMHx of HLD, HTN, BPH, quadruple bypass, and CAD s/p 4 stent placement and DM seen as (Code Trauma / Trauma Alert / Trauma Consult) s/p fall down 10 stairs with +HT, ?LOC, and -AC (patient is on Plavix and ASA). Patient fell down 10 wooden steps while daughter was showering as patient is not supposed to go down steps without supervision Patient is hard of hearing but denies any pain on arrival to ED but had a 5cm hematoma to right frontal scalp along with a skin tear on left dorsal hand and superficial abrasion to right shin. Patient denies any nausea, vomiting, chest pain, or other concerning symptoms. Trauma assessment in ED: ABCs intact , GCS 15 , AAOx3.    Patient is hard of hearing but answers appropriately when questioned.     PAST MEDICAL & SURGICAL HISTORY:  HTN (hypertension)      HLD (hyperlipidemia)      BPH (benign prostatic hyperplasia)      CAD (coronary artery disease)      Diabetes mellitus      Hypothyroidism      S/P CABG (coronary artery bypass graft)          Allergies    No Known Allergies    Intolerances        Home Medications:  celecoxib 100 mg oral capsule: 1 cap(s) orally once a day, As Needed (13 Dec 2022 22:03)  clopidogrel 75 mg oral tablet: 1 tab(s) orally once a day (13 Dec 2022 22:03)  DULoxetine 60 mg oral delayed release capsule: 1 cap(s) orally once a day (13 Dec 2022 22:03)  finasteride 5 mg oral tablet: 1 tab(s) orally once a day (13 Dec 2022 22:03)  guaiFENesin 600 mg oral tablet, extended release: 1 tab(s) orally every 12 hours, As needed, Cough (20 Dec 2022 10:18)  lactobacillus acidophilus oral capsule: 1 tab(s) orally once a day (20 Dec 2022 10:18)  levothyroxine 50 mcg (0.05 mg) oral tablet: 1 tab(s) orally once a day (13 Dec 2022 22:03)  Metoprolol Tartrate 25 mg oral tablet: 1 tab(s) orally 2 times a day (13 Dec 2022 22:03)  simvastatin 20 mg oral tablet: 1 tab(s) orally once a day (at bedtime) (13 Dec 2022 22:03)  tamsulosin 0.4 mg oral capsule: 1 cap(s) orally once a day (13 Dec 2022 22:03)  Toujeo SoloStar 300 units/mL subcutaneous solution: 12 unit(s) subcutaneous once a day (in the morning) (13 Dec 2022 22:03)  traMADol 50 mg oral tablet: 1 tab(s) orally 2 times a day, As Needed (13 Dec 2022 22:03)      ROS: 10-system review is otherwise negative except HPI above.      Primary Survey:    A - airway intact  B - bilateral breath sounds and good chest rise  C - palpable pulses in all extremities  D - GCS 15 on arrival, JIMENES  Exposure obtained    Vital Signs Last 24 Hrs  T(C): 35.2 (28 Mar 2023 22:40), Max: 35.2 (28 Mar 2023 22:40)  T(F): 95.3 (28 Mar 2023 22:40), Max: 95.3 (28 Mar 2023 22:40)  HR: 66 (28 Mar 2023 21:41) (66 - 66)  BP: 210/70 (28 Mar 2023 21:41) (210/70 - 210/70)  BP(mean): --  RR: 20 (28 Mar 2023 21:41) (20 - 20)  SpO2: 95% (28 Mar 2023 21:41) (95% - 95%)    Parameters below as of 28 Mar 2023 21:41  Patient On (Oxygen Delivery Method): nasal cannula  O2 Flow (L/min): 3      Secondary Survey:   General: NAD  HEENT: Normocephalic, atraumatic, EOMI, PEERLA. 5cm right frontal hematoma   Neck: Soft, midline trachea. no c-spine tenderness  Chest: No chest wall tenderness, no subcutaneous emphysema   Cardiac: S1, S2, RRR  Respiratory: Bilateral breath sounds, clear and equal bilaterally  Abdomen: Soft, non-distended, non-tender, no rebound, no guarding.  Groin: Normal appearing, pelvis stable   Ext:  Moving b/l upper and lower extremities. Palpable Radial b/l UE, b/l DP palpable in LE. Left hand dorsal skin tear. Superficial right shin abrasion.   Back: No T/L/S spine tenderness, No palpable runoff/stepoff/deformity  Rectal: No alondra blood, good tone      Labs:  CAPILLARY BLOOD GLUCOSE                              12.1   10.07 )-----------( 200      ( 28 Mar 2023 21:55 )             36.8       Auto Neutrophil %: 41.7 % (03-28-23 @ 21:55)  Auto Immature Granulocyte %: 1.7 % (03-28-23 @ 21:55)    03-28    136  |  102  |  19  ----------------------------<  259<H>  5.1<H>   |  22  |  0.9      Magnesium, Serum: 1.6 mg/dL (03-28-23 @ 21:56)      LFTs:             6.2  | <0.2 | 21       ------------------[74      ( 28 Mar 2023 21:55 )  4.0  | x    | 11          Lipase:22     Amylase:x         Lactate, Blood: 3.6 mmol/L (03-28-23 @ 21:55)  Blood Gas Venous - Lactate: 1.70 mmol/L (03-28-23 @ 21:42)      Coags:     13.50  ----< 1.18    ( 28 Mar 2023 21:55 )     31.4        CARDIAC MARKERS ( 28 Mar 2023 21:56 )  x     / <0.01 ng/mL / x     / x     / x            Alcohol, Blood: <10 mg/dL (03-28-23 @ 21:55)            Alcohol, Blood: <10 mg/dL (03-28-23 @ 21:55)      RADIOLOGY & ADDITIONAL STUDIES:  ---------------------------------------------------------------------------------------

## 2023-03-28 NOTE — ED PROVIDER NOTE - CLINICAL SUMMARY MEDICAL DECISION MAKING FREE TEXT BOX
89-year-old male with history of CAD status post CABG, HTN, HLD, BPH, DM, on Eliquis, presents with fall.  Per EMS, patient fell down 10 steps while his daughter was in the bathroom, patient not supposed to ambulate to the area reportedly.  Patient states he feels fine and denies all pain.  On exam, afebrile, hemodynamically stable, saturating 87% on room air, NAD, elderly, nontoxic appearing, laying comfortably in bed, no WOB, speaking full sentences, head NCAT other than right forehead hematoma, EOMI, anicteric, MMM, no JVD, RRR, nml S1/S2, no m/r/g, lungs CTAB, no w/r/r, abd soft, NT, ND, nml BS, no rebound or guarding, AAO, CN's 3-12 grossly intact, JIMENES spontaneously, no leg cyanosis or edema, left dorsal hand superficial skin tear, right leg abrasion, skin warm, dry, no rashes or hives.  Trauma alert called prior to arrival and comanaged with trauma team.  Low suspicion for extremity fracture, and x-rays negative. Character low suspicion for CVA and no focal or localizing findings. No significant arrhythmia or e/o aortic outflow obstruction. No significant anemia or bleeding or significant gross electrolyte abnormalities. No CP/SOB to suggest ACS or e/o DVT to suggest PE. No fever or specific infectious symptoms. Noted hypoxia, pt with no e/o fluid overload/PNA/PTX or evident cardiopulmonary process at this time. Noted C1 fx, no e/o cord compression, NSGY consulted and no acute intervention. Patient nontoxic appearing, hemodynamically stable. Admitted to trauma for further monitoring, w/u, and care.

## 2023-03-28 NOTE — ED PROVIDER NOTE - CARE PLAN
1 Principal Discharge DX:	Accidental fall on or from stairs or steps  Secondary Diagnosis:	Hypoxia   Principal Discharge DX:	Accidental fall on or from stairs or steps  Secondary Diagnosis:	Hypoxia  Secondary Diagnosis:	Closed C1 fracture   Principal Discharge DX:	Accidental fall on or from stairs or steps  Secondary Diagnosis:	Hypoxia  Secondary Diagnosis:	Closed C1 fracture  Secondary Diagnosis:	Scalp hematoma  Secondary Diagnosis:	Skin abrasion  Secondary Diagnosis:	Hypomagnesemia

## 2023-03-28 NOTE — CONSULT NOTE ADULT - ATTENDING COMMENTS
Patient seen and examined with surgery trauma team in ED and discussed management plans.  History reviewed. Patient awake with obvious localized R frontal hematoma pupils equal and reactive, hard of hearing but answers slowly and appropriately.  Bilateral lower leg contusion  and abrasions no bony deformity. L hand contusion and abrasion.   Final imaging results pending.

## 2023-03-28 NOTE — ED PROVIDER NOTE - PHYSICAL EXAMINATION
Physical Exam    Vital Signs: I have reviewed the initial vital signs.  Constitutional: well-nourished, appears stated age, no acute distress  Eyes: Conjunctiva pink, Sclera clear, EOMI without pain, (+) right pupil is 4mm and left pupil is 2mm with cataract.   Cardiovascular: S1 and S2, regular rate, regular rhythm, well-perfused extremities, radial and pedal pulses equal and 2+ b/l.   Respiratory: unlabored respiratory effort, clear to auscultation bilaterally no wheezing, rales and rhonchi. pt is speaking full sentences. no accessory muscle use.   Gastrointestinal: soft, non-tender, nondistended abdomen, no pulsatile mass, normal bowl sounds, no rebound, no guarding.   Musculoskeletal: supple neck, no lower extremity edema, no calf tenderness, no midline tenderness, no palpable spinal step offs. FROM of b/l upper and lower extremities without pain. no tenderness over the hips.   Integumentary: warm, dry, no rash. (+) skin tear over the left dorsum of the hand, and abrasion over the right shin.   Neurologic: awake, alert, extremities’ motor and sensory functions grossly intact.  Psychiatric: appropriate mood, appropriate affect Physical Exam    Vital Signs: I have reviewed the initial vital signs.  Constitutional: well-nourished, appears stated age, no acute distress  Eyes: Conjunctiva pink, Sclera clear, EOMI without pain, (+) right pupil is 4mm and left pupil is 2mm with cataract.   Cardiovascular: S1 and S2, regular rate, regular rhythm, well-perfused extremities, radial and pedal pulses equal and 2+ b/l.   Respiratory: unlabored respiratory effort, clear to auscultation bilaterally no wheezing, rales and rhonchi. pt is speaking full sentences. no accessory muscle use.   Gastrointestinal: soft, non-tender, nondistended abdomen, no pulsatile mass, normal bowl sounds, no rebound, no guarding.   Musculoskeletal: supple neck, no lower extremity edema, no calf tenderness, no midline tenderness, no palpable spinal step offs. FROM of b/l upper and lower extremities without pain. no tenderness over the hips.   Integumentary: warm, dry, no rash. (+) right frontal scalp hematoma, skin tear over the left dorsum of the hand, and abrasion over the right shin.   Neurologic: awake, alert, extremities’ motor and sensory functions grossly intact.  Psychiatric: appropriate mood, appropriate affect

## 2023-03-28 NOTE — CONSULT NOTE ADULT - SUBJECTIVE AND OBJECTIVE BOX
Neurosurgery  Consult    Patient is a 89y old  Male who presents with a chief complaint of fall    HPI:  89 y/o male with a PMH of CAD s/p CABG on plavix, HTN, HLD, hypothyroidism, BPH, hard of hearing, and DM presents to the ED for evaluation of fall down ten stairs. as per EMS patient was with his daughter and is not allowed to go down the stairs without her but she went to the bathroom and he tried to walk down the stairs himself and fell down ten steps. unknown loc. unknown last tetanus. pt denies all pain and reports he feels fine.          PAST MEDICAL & SURGICAL HISTORY:  HTN (hypertension)  HLD (hyperlipidemia)  BPH (benign prostatic hyperplasia)  CAD (coronary artery disease)  Diabetes mellitus  Hypothyroidism  S/P CABG (coronary artery bypass graft)          FAMILY HISTORY:      Social History: (-) x 3    Allergies    No Known Allergies    Intolerances        MEDICATIONS  (STANDING):    MEDICATIONS  (PRN):      Review of systems:    Constitutional: No fever, weight loss or fatigue    Eyes: No eye pain or discharge  ENMT:  No difficulty hearing; No sinus or throat pain  Neck: No pain or stiffness  Respiratory: No cough, wheezing, chills or hemoptysis  Cardiovascular: No chest pain, palpitations, shortness of breath, dyspnea on exertion  Gastrointestinal: No abdominal pain, nausea, vomiting or hematemesis; No diarrhea or constipation.   Genitourinary: No dysuria, frequency, hematuria or incontinence  Neurological: As per HPI  Skin: No rashes or lesions   Endocrine: No heat or cold intolerance; No hair loss  Musculoskeletal: No joint pain or swelling  Psychiatric: No depression, anxiety, mood swings  Heme/Lymph: No easy bruising or bleeding gums    Vital Signs Last 24 Hrs  T(C): 35.2 (28 Mar 2023 22:40), Max: 35.2 (28 Mar 2023 22:40)  T(F): 95.3 (28 Mar 2023 22:40), Max: 95.3 (28 Mar 2023 22:40)  HR: 71 (28 Mar 2023 23:01) (66 - 71)  BP: 171/75 (28 Mar 2023 23:31) (171/75 - 221/101)  BP(mean): --  RR: 19 (28 Mar 2023 23:01) (19 - 20)  SpO2: 98% (28 Mar 2023 23:01) (95% - 98%)    Parameters below as of 28 Mar 2023 23:01  Patient On (Oxygen Delivery Method): nasal cannula  O2 Flow (L/min): 3      Neurologic Examination:  General:  Appearance is consistent with chronologic age.  No abnormal facies.   General: The patient is oriented to person, place, time and date.  Language with normal repetition, comprehension and naming.    Cranial nerves: intact VA, VFF.  EOMI w/o nystagmus, skew or reported double vision.  PERRL.  No ptosis/weakness of eyelid closure.  Facial sensation is normal with normal bite.  No facial asymmetry.  Very Hard of Hearing  Motor examination:   Normal tone, bulk and range of motion.  No tenderness, twitching, tremors or involuntary movements.  Formal Muscle Strength Testing: (MRC grade R/L) 5/5 UE; 5/5 LE.  No observable drift.  Sensory examination:   Intact to light touch and pinprick, pain, in all extremities.      Labs:   CBC Full  -  ( 28 Mar 2023 21:55 )  WBC Count : 10.07 K/uL  RBC Count : 4.05 M/uL  Hemoglobin : 12.1 g/dL  Hematocrit : 36.8 %  Platelet Count - Automated : 200 K/uL  Mean Cell Volume : 90.9 fL  Mean Cell Hemoglobin : 29.9 pg  Mean Cell Hemoglobin Concentration : 32.9 g/dL  Auto Neutrophil # : 4.20 K/uL  Auto Lymphocyte # : 4.22 K/uL  Auto Monocyte # : 0.93 K/uL  Auto Eosinophil # : 0.47 K/uL  Auto Basophil # : 0.08 K/uL  Auto Neutrophil % : 41.7 %  Auto Lymphocyte % : 41.9 %  Auto Monocyte % : 9.2 %  Auto Eosinophil % : 4.7 %  Auto Basophil % : 0.8 %    03-28    136  |  102  |  19  ----------------------------<  259<H>  5.1<H>   |  22  |  0.9    Ca    8.9      28 Mar 2023 21:55  Mg     1.6     03-28    TPro  6.2  /  Alb  4.0  /  TBili  <0.2  /  DBili  x   /  AST  21  /  ALT  11  /  AlkPhos  74  03-28    LIVER FUNCTIONS - ( 28 Mar 2023 21:55 )  Alb: 4.0 g/dL / Pro: 6.2 g/dL / ALK PHOS: 74 U/L / ALT: 11 U/L / AST: 21 U/L / GGT: x           PT/INR - ( 28 Mar 2023 21:55 )   PT: 13.50 sec;   INR: 1.18 ratio         PTT - ( 28 Mar 2023 21:55 )  PTT:31.4 sec        Neuroimaging:  NCHCT: CT Head No Cont:   ACC: [id number] EXAM:  CT BRAIN   ORDERED BY: MARY JANE KING     PROCEDURE DATE:  03/28/2023          INTERPRETATION:  Clinical History/Reason For Exam: Scalp hematoma.    Technique: Multiple contiguous axial CT images were obtained from the   baseof the skull to the vertex without administration of intravenous   contrast. Axial, coronal and sagittal images were reviewed.    No studies are available for direct comparison.    Findings:    The ventricles, basal cisterns and sulcal pattern are prominent and   compatible with parenchymal volume loss.    The gray-white matter differentiation is preserved.    Scattered areas of hypoattenuation within the periventricular white   matter, nonspecific but likely representing mild microvascular ischemic   changes.    There is no acute mass effect, midline shift or intracranial hemorrhage.    The imaged paranasal sinuses and bilateral mastoid complexes are   unremarkable.    No evidence of a depressed skull fracture. There is a right frontal scalp  hematoma.    Beam hardening artifact is noted overlying the brain stem and posterior   fossa which is inherent to CT in this location.      Impression:  No evidence of intracranial hemorrhage, territorial infarct, or mass   effect.    --- End of Report ---  C SPINE CT SCan  IMPRESSION: C spine CT  Bilateral C1 posterior arch nondisplaced fractures.  Extensive soft tissue calcifications and thickening of the odontoid transverse ligament with associated at least mild to moderate spinal canal stenosis at the same level  Prevertebral soft tissue swelling      Findings were discussed with ISH King on 3/28/2023 at 10:45 PM    --- End of Report ---    TORI HUBBARD MD; Attending Radiologist  This document has been electronically signed. Mar 28 2023 10:37PM (03-28-23 @ 22:23)        Assessment:  This is a 89y Male with h/o CAD s/p  CABG on plavix, HTN, HLD, hypothyroidism, BPH, hard of hearing, and DM prD presents after fall found to have a C1 posterior ring fracture non displaced    Plan: No surgical intervention at this time recommend conservative trt -  Pt should remain in Collar ([place]) at all time for at least 8-12wks          Pain control and muscle relaxant for spasms           Neuro checks q 2-4H          HOB at 30 degrees     Case discussed with Dr Chan  -   03-28-23 @ 23:32           Neurosurgery  Consult    Patient is a 89y old  Male who presents with a chief complaint of fall    HPI:  89 y/o male with a PMH of CAD s/p CABG on plavix, HTN, HLD, hypothyroidism, BPH, hard of hearing, and DM presents to the ED for evaluation of fall down ten stairs. as per EMS patient was with his daughter and is not allowed to go down the stairs without her but she went to the bathroom and he tried to walk down the stairs himself and fell down ten steps. unknown loc. unknown last tetanus. pt denies all pain and reports he feels fine.    Interval Hx; Shortly upon arrival to Trauma bay - pt was moving all extremiites and denied  having pain. Pt was resistant to the [place] collar and attempting to take it off. Now pt is calmer with daughter at bedside. he currently c/o neck pain and headache.           PAST MEDICAL & SURGICAL HISTORY:  HTN (hypertension)  HLD (hyperlipidemia)  BPH (benign prostatic hyperplasia)  CAD (coronary artery disease)  Diabetes mellitus  Hypothyroidism  S/P CABG (coronary artery bypass graft)          FAMILY HISTORY:      Social History: (-) x 3    Allergies    No Known Allergies    Intolerances        MEDICATIONS  (STANDING):    MEDICATIONS  (PRN):      Review of systems:    Constitutional: No fever, weight loss or fatigue    Eyes: No eye pain or discharge  ENMT:  No difficulty hearing; No sinus or throat pain  Neck: No pain or stiffness  Respiratory: No cough, wheezing, chills or hemoptysis  Cardiovascular: No chest pain, palpitations, shortness of breath, dyspnea on exertion  Gastrointestinal: No abdominal pain, nausea, vomiting or hematemesis; No diarrhea or constipation.   Genitourinary: No dysuria, frequency, hematuria or incontinence  Neurological: As per HPI  Skin: No rashes or lesions   Endocrine: No heat or cold intolerance; No hair loss  Musculoskeletal: No joint pain or swelling  Psychiatric: No depression, anxiety, mood swings  Heme/Lymph: No easy bruising or bleeding gums    Vital Signs Last 24 Hrs  T(C): 35.2 (28 Mar 2023 22:40), Max: 35.2 (28 Mar 2023 22:40)  T(F): 95.3 (28 Mar 2023 22:40), Max: 95.3 (28 Mar 2023 22:40)  HR: 71 (28 Mar 2023 23:01) (66 - 71)  BP: 171/75 (28 Mar 2023 23:31) (171/75 - 221/101)  BP(mean): --  RR: 19 (28 Mar 2023 23:01) (19 - 20)  SpO2: 98% (28 Mar 2023 23:01) (95% - 98%)    Parameters below as of 28 Mar 2023 23:01  Patient On (Oxygen Delivery Method): nasal cannula  O2 Flow (L/min): 3      Neurologic Examination:  General:  Appearance is consistent with chronologic age.  No abnormal facies.   General: The patient is oriented to person, place, time and date.  Language with normal repetition, comprehension and naming.    Cranial nerves: intact VA, VFF.  EOMI w/o nystagmus, skew or reported double vision.  PERRL.  No ptosis/weakness of eyelid closure.  Facial sensation is normal with normal bite.  No facial asymmetry.  Very Hard of Hearing  Motor examination:   Normal tone, bulk and range of motion.  No tenderness, twitching, tremors or involuntary movements.  Formal Muscle Strength Testing: (MRC grade R/L) 5/5 UE; 5/5 LE.  No observable drift.  Sensory examination:   Intact to light touch and pinprick, pain, in all extremities.      Labs:   CBC Full  -  ( 28 Mar 2023 21:55 )  WBC Count : 10.07 K/uL  RBC Count : 4.05 M/uL  Hemoglobin : 12.1 g/dL  Hematocrit : 36.8 %  Platelet Count - Automated : 200 K/uL  Mean Cell Volume : 90.9 fL  Mean Cell Hemoglobin : 29.9 pg  Mean Cell Hemoglobin Concentration : 32.9 g/dL  Auto Neutrophil # : 4.20 K/uL  Auto Lymphocyte # : 4.22 K/uL  Auto Monocyte # : 0.93 K/uL  Auto Eosinophil # : 0.47 K/uL  Auto Basophil # : 0.08 K/uL  Auto Neutrophil % : 41.7 %  Auto Lymphocyte % : 41.9 %  Auto Monocyte % : 9.2 %  Auto Eosinophil % : 4.7 %  Auto Basophil % : 0.8 %    03-28    136  |  102  |  19  ----------------------------<  259<H>  5.1<H>   |  22  |  0.9    Ca    8.9      28 Mar 2023 21:55  Mg     1.6     03-28    TPro  6.2  /  Alb  4.0  /  TBili  <0.2  /  DBili  x   /  AST  21  /  ALT  11  /  AlkPhos  74  03-28    LIVER FUNCTIONS - ( 28 Mar 2023 21:55 )  Alb: 4.0 g/dL / Pro: 6.2 g/dL / ALK PHOS: 74 U/L / ALT: 11 U/L / AST: 21 U/L / GGT: x           PT/INR - ( 28 Mar 2023 21:55 )   PT: 13.50 sec;   INR: 1.18 ratio         PTT - ( 28 Mar 2023 21:55 )  PTT:31.4 sec        Neuroimaging:  NCHCT: CT Head No Cont:   ACC: [id number] EXAM:  CT BRAIN   ORDERED BY: MARY JANE HONG     PROCEDURE DATE:  03/28/2023          INTERPRETATION:  Clinical History/Reason For Exam: Scalp hematoma.    Technique: Multiple contiguous axial CT images were obtained from the   baseof the skull to the vertex without administration of intravenous   contrast. Axial, coronal and sagittal images were reviewed.    No studies are available for direct comparison.    Findings:    The ventricles, basal cisterns and sulcal pattern are prominent and   compatible with parenchymal volume loss.    The gray-white matter differentiation is preserved.    Scattered areas of hypoattenuation within the periventricular white   matter, nonspecific but likely representing mild microvascular ischemic   changes.    There is no acute mass effect, midline shift or intracranial hemorrhage.    The imaged paranasal sinuses and bilateral mastoid complexes are   unremarkable.    No evidence of a depressed skull fracture. There is a right frontal scalp  hematoma.    Beam hardening artifact is noted overlying the brain stem and posterior   fossa which is inherent to CT in this location.      Impression:  No evidence of intracranial hemorrhage, territorial infarct, or mass   effect.    --- End of Report ---  C SPINE CT SCan  IMPRESSION: C spine CT  Bilateral C1 posterior arch nondisplaced fractures.  Extensive soft tissue calcifications and thickening of the odontoid transverse ligament with associated at least mild to moderate spinal canal stenosis at the same level  Prevertebral soft tissue swelling      Findings were discussed with ISH Hong on 3/28/2023 at 10:45 PM    --- End of Report ---    TORI HUBBARD MD; Attending Radiologist  This document has been electronically signed. Mar 28 2023 10:37PM (03-28-23 @ 22:23)        Assessment:  This is a 89y Male with h/o CAD s/p  CABG on plavix, HTN, HLD, hypothyroidism, BPH, hard of hearing, and DM prD presents after fall found to have a C1 posterior ring fracture non displaced    Plan: No surgical intervention at this time recommend conservative trt -  Pt should remain in Collar ([employer]) at all time for at least 8-12wks          Pain control and muscle relaxant for spasms           Neuro checks q 2-4H          HOB at 30 degrees     Case discussed with Dr Chan  -   03-28-23 @ 23:32           Neurosurgery  Consult    Patient is a 89y old  Male who presents with a chief complaint of fall    HPI:  89 y/o male with a PMH of CAD s/p CABG on plavix, HTN, HLD, hypothyroidism, BPH, hard of hearing, and DM presents to the ED for evaluation of fall down ten stairs. as per EMS patient was with his daughter and is not allowed to go down the stairs without her but she went to the bathroom and he tried to walk down the stairs himself and fell down ten steps. unknown loc. unknown last tetanus. pt denies all pain and reports he feels fine.    Interval Hx; Shortly upon arrival to Trauma bay - pt was moving all extremities and denied  having pain. Pt was resistant to the [place] collar and attempting to take it off. Now pt is calmer with daughter at bedside. he currently c/o neck pain and headache.   pt denies any pain radiating into arms or tingling in fingers.        PAST MEDICAL & SURGICAL HISTORY:  HTN (hypertension)  HLD (hyperlipidemia)  BPH (benign prostatic hyperplasia)  CAD (coronary artery disease)  Diabetes mellitus  Hypothyroidism  S/P CABG (coronary artery bypass graft)          FAMILY HISTORY:      Social History: (-) x 3    Allergies    No Known Allergies    Intolerances        MEDICATIONS  (STANDING):    MEDICATIONS  (PRN):      Review of systems:    Constitutional: No fever, weight loss or fatigue    Eyes: No eye pain or discharge  ENMT:  No difficulty hearing; No sinus or throat pain  Neck: No pain or stiffness  Respiratory: No cough, wheezing, chills or hemoptysis  Cardiovascular: No chest pain, palpitations, shortness of breath, dyspnea on exertion  Gastrointestinal: No abdominal pain, nausea, vomiting or hematemesis; No diarrhea or constipation.   Genitourinary: No dysuria, frequency, hematuria or incontinence  Neurological: As per HPI  Skin: No rashes or lesions   Endocrine: No heat or cold intolerance; No hair loss  Musculoskeletal: No joint pain or swelling  Psychiatric: No depression, anxiety, mood swings  Heme/Lymph: No easy bruising or bleeding gums    Vital Signs Last 24 Hrs  T(C): 35.2 (28 Mar 2023 22:40), Max: 35.2 (28 Mar 2023 22:40)  T(F): 95.3 (28 Mar 2023 22:40), Max: 95.3 (28 Mar 2023 22:40)  HR: 71 (28 Mar 2023 23:01) (66 - 71)  BP: 171/75 (28 Mar 2023 23:31) (171/75 - 221/101)  BP(mean): --  RR: 19 (28 Mar 2023 23:01) (19 - 20)  SpO2: 98% (28 Mar 2023 23:01) (95% - 98%)    Parameters below as of 28 Mar 2023 23:01  Patient On (Oxygen Delivery Method): nasal cannula  O2 Flow (L/min): 3      Neurologic Examination:  General:  Appearance is consistent with chronologic age.  No abnormal facies.   General: The patient is oriented to person, place, time and date.  Language with normal repetition, comprehension and naming.    Cranial nerves: intact VA, VFF.  EOMI w/o nystagmus, skew or reported double vision.  PERRL.  No ptosis/weakness of eyelid closure.  Facial sensation is normal with normal bite.  No facial asymmetry.  Very Hard of Hearing  Currently in [place] collar. (+) ecchymosis and swelling on right temple area.  Motor examination:   Normal tone, bulk and range of motion.  No tenderness, twitching, tremors or involuntary movements.  Formal Muscle Strength Testing: (MRC grade R/L) 5/5 UE; 5/5 LE.  No observable drift.  Sensory examination:   Intact to light touch and pinprick, pain, in all extremities.      Labs:   CBC Full  -  ( 28 Mar 2023 21:55 )  WBC Count : 10.07 K/uL  RBC Count : 4.05 M/uL  Hemoglobin : 12.1 g/dL  Hematocrit : 36.8 %  Platelet Count - Automated : 200 K/uL  Mean Cell Volume : 90.9 fL  Mean Cell Hemoglobin : 29.9 pg  Mean Cell Hemoglobin Concentration : 32.9 g/dL  Auto Neutrophil # : 4.20 K/uL  Auto Lymphocyte # : 4.22 K/uL  Auto Monocyte # : 0.93 K/uL  Auto Eosinophil # : 0.47 K/uL  Auto Basophil # : 0.08 K/uL  Auto Neutrophil % : 41.7 %  Auto Lymphocyte % : 41.9 %  Auto Monocyte % : 9.2 %  Auto Eosinophil % : 4.7 %  Auto Basophil % : 0.8 %    03-28    136  |  102  |  19  ----------------------------<  259<H>  5.1<H>   |  22  |  0.9    Ca    8.9      28 Mar 2023 21:55  Mg     1.6     03-28    TPro  6.2  /  Alb  4.0  /  TBili  <0.2  /  DBili  x   /  AST  21  /  ALT  11  /  AlkPhos  74  03-28    LIVER FUNCTIONS - ( 28 Mar 2023 21:55 )  Alb: 4.0 g/dL / Pro: 6.2 g/dL / ALK PHOS: 74 U/L / ALT: 11 U/L / AST: 21 U/L / GGT: x           PT/INR - ( 28 Mar 2023 21:55 )   PT: 13.50 sec;   INR: 1.18 ratio         PTT - ( 28 Mar 2023 21:55 )  PTT:31.4 sec        Neuroimaging:  NCHCT: CT Head No Cont:   ACC: [id number] EXAM:  CT BRAIN   ORDERED BY: MARY JANE KING     PROCEDURE DATE:  03/28/2023          INTERPRETATION:  Clinical History/Reason For Exam: Scalp hematoma.    Technique: Multiple contiguous axial CT images were obtained from the   baseof the skull to the vertex without administration of intravenous   contrast. Axial, coronal and sagittal images were reviewed.    No studies are available for direct comparison.    Findings:    The ventricles, basal cisterns and sulcal pattern are prominent and   compatible with parenchymal volume loss.    The gray-white matter differentiation is preserved.    Scattered areas of hypoattenuation within the periventricular white   matter, nonspecific but likely representing mild microvascular ischemic   changes.    There is no acute mass effect, midline shift or intracranial hemorrhage.    The imaged paranasal sinuses and bilateral mastoid complexes are   unremarkable.    No evidence of a depressed skull fracture. There is a right frontal scalp  hematoma.    Beam hardening artifact is noted overlying the brain stem and posterior   fossa which is inherent to CT in this location.      Impression:  No evidence of intracranial hemorrhage, territorial infarct, or mass   effect.    --- End of Report ---  C SPINE CT SCan  IMPRESSION: C spine CT  Bilateral C1 posterior arch nondisplaced fractures.  Extensive soft tissue calcifications and thickening of the odontoid transverse ligament with associated at least mild to moderate spinal canal stenosis at the same level  Prevertebral soft tissue swelling      Findings were discussed with ISH King on 3/28/2023 at 10:45 PM    --- End of Report ---    TORI HUBBARD MD; Attending Radiologist  This document has been electronically signed. Mar 28 2023 10:37PM (03-28-23 @ 22:23)        Assessment:  This is a 89y Male with h/o CAD s/p  CABG on plavix, HTN, HLD, hypothyroidism, BPH, hard of hearing, and DM prD presents after fall found to have a C1 posterior ring fracture non displaced    Plan: No surgical intervention at this time recommend conservative trt -  Pt should remain in Collar ([place] [employer]) at all time for at least 8-12wks          Pain control and muscle relaxant for spasms           Neuro checks q 2-4H          HOB at 30 degrees     Case discussed with Dr. Chan  -   03-28-23 @ 23:32

## 2023-03-28 NOTE — ED PROVIDER NOTE - OBJECTIVE STATEMENT
89 y/o male with a PMH of CAD s/p CABG, on eliquis, HTN, HLD, hypothyroidism, BPH, hard of hearing, and DM presents to the ED for evaluation of fall down ten stairs. as per EMS patient was with his daughter and is not allowed to go down the stairs without her but she went to the bathroom and he tried to walk down the stairs himself and fell down ten steps. unknown loc. unknown last tetanus. pt denies all pain and reports he feels fine. 89 y/o male with a PMH of CAD s/p CABG on plavix, HTN, HLD, hypothyroidism, BPH, hard of hearing, and DM presents to the ED for evaluation of fall down ten stairs. as per EMS patient was with his daughter and is not allowed to go down the stairs without her but she went to the bathroom and he tried to walk down the stairs himself and fell down ten steps. unknown loc. unknown last tetanus. pt denies all pain and reports he feels fine.

## 2023-03-28 NOTE — ED PROVIDER NOTE - ATTENDING APP SHARED VISIT CONTRIBUTION OF CARE
89-year-old male with history of CAD status post CABG, HTN, HLD, BPH, DM, on Eliquis, presents with fall.  Per EMS, patient fell down 10 steps while his daughter was in the bathroom, patient not supposed to ambulate to the area reportedly.  Patient states he feels fine and denies all pain.  On exam, afebrile, hemodynamically stable, saturating 87% on room air, NAD, elderly, nontoxic appearing, laying comfortably in bed, no WOB, speaking full sentences, head NCAT other than right forehead hematoma, EOMI, anicteric, MMM, no JVD, RRR, nml S1/S2, no m/r/g, lungs CTAB, no w/r/r, abd soft, NT, ND, nml BS, no rebound or guarding, AAO, CN's 3-12 grossly intact, JIMENES spontaneously, no leg cyanosis or edema, left dorsal hand superficial skin tear, right leg abrasion, skin warm, dry, no rashes or hives.  Trauma alert called prior to arrival and comanaged with trauma team.  Low suspicion for extremity fracture, and x-rays _______. 89-year-old male with history of CAD status post CABG, HTN, HLD, BPH, DM, on Eliquis, presents with fall.  Per EMS, patient fell down 10 steps while his daughter was in the bathroom, patient not supposed to ambulate to the area reportedly.  Patient states he feels fine and denies all pain.  On exam, afebrile, hemodynamically stable, saturating 87% on room air, NAD, elderly, nontoxic appearing, laying comfortably in bed, no WOB, speaking full sentences, head NCAT other than right forehead hematoma, EOMI, anicteric, MMM, no JVD, RRR, nml S1/S2, no m/r/g, lungs CTAB, no w/r/r, abd soft, NT, ND, nml BS, no rebound or guarding, AAO, CN's 3-12 grossly intact, JIMENES spontaneously, no leg cyanosis or edema, left dorsal hand superficial skin tear, right leg abrasion, skin warm, dry, no rashes or hives.  Trauma alert called prior to arrival and comanaged with trauma team.  Low suspicion for extremity fracture, and x-rays negative. Character low suspicion for CVA and no focal or localizing findings. No significant arrhythmia or e/o aortic outflow obstruction. No significant anemia or bleeding or significant gross electrolyte abnormalities. No CP/SOB to suggest ACS or e/o DVT to suggest PE. No fever or specific infectious symptoms. Noted hypoxia, pt with no e/o fluid overload/PNA/PTX or evident cardiopulmonary process at this time. 89-year-old male with history of CAD status post CABG, HTN, HLD, BPH, DM, on Eliquis, presents with fall.  Per EMS, patient fell down 10 steps while his daughter was in the bathroom, patient not supposed to ambulate to the area reportedly.  Patient states he feels fine and denies all pain.  On exam, afebrile, hemodynamically stable, saturating 87% on room air, NAD, elderly, nontoxic appearing, laying comfortably in bed, no WOB, speaking full sentences, head NCAT other than right forehead hematoma, EOMI, anicteric, MMM, no JVD, RRR, nml S1/S2, no m/r/g, lungs CTAB, no w/r/r, abd soft, NT, ND, nml BS, no rebound or guarding, AAO, CN's 3-12 grossly intact, JIMENES spontaneously, no leg cyanosis or edema, left dorsal hand superficial skin tear, right leg abrasion, skin warm, dry, no rashes or hives.  Trauma alert called prior to arrival and comanaged with trauma team.  Low suspicion for extremity fracture, and x-rays negative. Character low suspicion for CVA and no focal or localizing findings. No significant arrhythmia or e/o aortic outflow obstruction. No significant anemia or bleeding or significant gross electrolyte abnormalities. No CP/SOB to suggest ACS or e/o DVT to suggest PE. No fever or specific infectious symptoms. Noted hypoxia, pt with no e/o fluid overload/PNA/PTX or evident cardiopulmonary process at this time. Noted C1 fx, no e/o cord compression, NSGY consulted and 89-year-old male with history of CAD status post CABG, HTN, HLD, BPH, DM, on Eliquis, presents with fall.  Per EMS, patient fell down 10 steps while his daughter was in the bathroom, patient not supposed to ambulate to the area reportedly.  Patient states he feels fine and denies all pain.  On exam, afebrile, hemodynamically stable, saturating 87% on room air, NAD, elderly, nontoxic appearing, laying comfortably in bed, no WOB, speaking full sentences, head NCAT other than right forehead hematoma, EOMI, anicteric, MMM, no JVD, RRR, nml S1/S2, no m/r/g, lungs CTAB, no w/r/r, abd soft, NT, ND, nml BS, no rebound or guarding, AAO, CN's 3-12 grossly intact, JIMENES spontaneously, no leg cyanosis or edema, left dorsal hand superficial skin tear, right leg abrasion, skin warm, dry, no rashes or hives.  Trauma alert called prior to arrival and comanaged with trauma team.  Low suspicion for extremity fracture, and x-rays negative. Character low suspicion for CVA and no focal or localizing findings. No significant arrhythmia or e/o aortic outflow obstruction. No significant anemia or bleeding or significant gross electrolyte abnormalities. No CP/SOB to suggest ACS or e/o DVT to suggest PE. No fever or specific infectious symptoms. Noted hypoxia, pt with no e/o fluid overload/PNA/PTX or evident cardiopulmonary process at this time. Noted C1 fx, no e/o cord compression, NSGY consulted and no acute intervention. Patient nontoxic appearing, hemodynamically stable. Admitted to trauma for further monitoring, w/u, and care.

## 2023-03-28 NOTE — ED PROVIDER NOTE - PROGRESS NOTE DETAILS
FF: pt bp is elevated and he has not yet taken his night time dose of metoprolol tartate 25mg, will order. FF: I spoke with surg report can admit under dr. dykes

## 2023-03-28 NOTE — ED ADULT NURSE NOTE - NSIMPLEMENTINTERV_GEN_ALL_ED
Implemented All Fall with Harm Risk Interventions:  [place] to call system. Call bell, personal items and telephone within reach. Instruct patient to call for assistance. Room bathroom lighting operational. Non-slip footwear when patient is off stretcher. Physically safe environment: no spills, clutter or unnecessary equipment. Stretcher in lowest position, wheels locked, appropriate side rails in place. Provide visual cue, wrist band, yellow gown, etc. Monitor gait and stability. Monitor for mental status changes and reorient to person, place, and time. Review medications for side effects contributing to fall risk. Reinforce activity limits and safety measures with patient and family. Provide visual clues: red socks.

## 2023-03-28 NOTE — CONSULT NOTE ADULT - ASSESSMENT
ASSESSMENT:  89yM w/ PMHx of HLD, HTN, BPH, quadruple bypass, and CAD s/p 4 stent placement and DM seen as (Code Trauma / Trauma Alert / Trauma Consult) s/p fall down 10 stairs with +HT, ?LOC, and -AC (patient is on Plavix and ASA). Patient fell down 10 wooden steps while daughter was showering as patient is not supposed to go down steps without supervision Patient is hard of hearing but denies any pain on arrival to ED but had a 5cm hematoma to right frontal scalp along with a skin tear on left dorsal hand and superficial abrasion to right shin. Patient denies any nausea, vomiting, chest pain, or other concerning symptoms. Trauma assessment in ED: ABCs intact , GCS 15 , AAOx3.      Injuries identified:   - right frontal 5cm hematoma   - left dorsal hand skin tear  -     PLAN:   - Trauma Labs: (CBC, BMP, Coags, T&S, UA, EtOH level)  Additional studies:  EKG  Utox    Trauma Imaging to include the following:  - CXR, Pelvic Xray  - CT Head,  CT C-spine, CT Max/Face, CT Chest, CT Abd/Pelvis  - Extremity films: left hand, right tib/fib     Additional consultations:  - pending    Disposition pending results of above labs and imaging  Above plan discussed with Trauma attending, Dr. Hunter  , patient, patient family, and ED team  --------------------------------------------------------------------------------------  03-28-23 @ 22:58    TRAUMA SENIOR SPECTRA: [id number]  TRAUMA TEAM SPECTRA: [id number]
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
## 2023-03-29 PROBLEM — I25.10 ATHEROSCLEROTIC HEART DISEASE OF NATIVE CORONARY ARTERY WITHOUT ANGINA PECTORIS: Chronic | Status: ACTIVE | Noted: 2022-12-13

## 2023-03-29 PROBLEM — E11.9 TYPE 2 DIABETES MELLITUS WITHOUT COMPLICATIONS: Chronic | Status: ACTIVE | Noted: 2022-12-13

## 2023-03-29 PROBLEM — E03.9 HYPOTHYROIDISM, UNSPECIFIED: Chronic | Status: ACTIVE | Noted: 2022-12-13

## 2023-03-29 LAB
A1C WITH ESTIMATED AVERAGE GLUCOSE RESULT: 7.6 % — HIGH (ref 4–5.6)
ANION GAP SERPL CALC-SCNC: 10 MMOL/L — SIGNIFICANT CHANGE UP (ref 7–14)
APPEARANCE UR: CLEAR — SIGNIFICANT CHANGE UP
BASOPHILS # BLD AUTO: 0.05 K/UL — SIGNIFICANT CHANGE UP (ref 0–0.2)
BASOPHILS NFR BLD AUTO: 0.5 % — SIGNIFICANT CHANGE UP (ref 0–1)
BILIRUB UR-MCNC: NEGATIVE — SIGNIFICANT CHANGE UP
BUN SERPL-MCNC: 28 MG/DL — HIGH (ref 10–20)
CALCIUM SERPL-MCNC: 8.4 MG/DL — SIGNIFICANT CHANGE UP (ref 8.4–10.5)
CHLORIDE SERPL-SCNC: 100 MMOL/L — SIGNIFICANT CHANGE UP (ref 98–110)
CO2 SERPL-SCNC: 23 MMOL/L — SIGNIFICANT CHANGE UP (ref 17–32)
COLOR SPEC: SIGNIFICANT CHANGE UP
CREAT SERPL-MCNC: 1.5 MG/DL — SIGNIFICANT CHANGE UP (ref 0.7–1.5)
DIFF PNL FLD: ABNORMAL
EGFR: 42 ML/MIN/1.73M2 — LOW
EOSINOPHIL # BLD AUTO: 0.13 K/UL — SIGNIFICANT CHANGE UP (ref 0–0.7)
EOSINOPHIL NFR BLD AUTO: 1.3 % — SIGNIFICANT CHANGE UP (ref 0–8)
ESTIMATED AVERAGE GLUCOSE: 171 MG/DL — HIGH (ref 68–114)
GLUCOSE BLDC GLUCOMTR-MCNC: 253 MG/DL — HIGH (ref 70–99)
GLUCOSE BLDC GLUCOMTR-MCNC: 256 MG/DL — HIGH (ref 70–99)
GLUCOSE BLDC GLUCOMTR-MCNC: 314 MG/DL — HIGH (ref 70–99)
GLUCOSE SERPL-MCNC: 304 MG/DL — HIGH (ref 70–99)
GLUCOSE UR QL: ABNORMAL
HCT VFR BLD CALC: 25.4 % — LOW (ref 42–52)
HGB BLD-MCNC: 8.2 G/DL — LOW (ref 14–18)
IMM GRANULOCYTES NFR BLD AUTO: 0.4 % — HIGH (ref 0.1–0.3)
KETONES UR-MCNC: SIGNIFICANT CHANGE UP
LEUKOCYTE ESTERASE UR-ACNC: NEGATIVE — SIGNIFICANT CHANGE UP
LYMPHOCYTES # BLD AUTO: 2.8 K/UL — SIGNIFICANT CHANGE UP (ref 1.2–3.4)
LYMPHOCYTES # BLD AUTO: 28.6 % — SIGNIFICANT CHANGE UP (ref 20.5–51.1)
MAGNESIUM SERPL-MCNC: 1.9 MG/DL — SIGNIFICANT CHANGE UP (ref 1.8–2.4)
MCHC RBC-ENTMCNC: 29.3 PG — SIGNIFICANT CHANGE UP (ref 27–31)
MCHC RBC-ENTMCNC: 32.3 G/DL — SIGNIFICANT CHANGE UP (ref 32–37)
MCV RBC AUTO: 90.7 FL — SIGNIFICANT CHANGE UP (ref 80–94)
MONOCYTES # BLD AUTO: 1.43 K/UL — HIGH (ref 0.1–0.6)
MONOCYTES NFR BLD AUTO: 14.6 % — HIGH (ref 1.7–9.3)
NEUTROPHILS # BLD AUTO: 5.33 K/UL — SIGNIFICANT CHANGE UP (ref 1.4–6.5)
NEUTROPHILS NFR BLD AUTO: 54.6 % — SIGNIFICANT CHANGE UP (ref 42.2–75.2)
NITRITE UR-MCNC: NEGATIVE — SIGNIFICANT CHANGE UP
NRBC # BLD: 0 /100 WBCS — SIGNIFICANT CHANGE UP (ref 0–0)
PH UR: 7.5 — SIGNIFICANT CHANGE UP (ref 5–8)
PHOSPHATE SERPL-MCNC: 3.9 MG/DL — SIGNIFICANT CHANGE UP (ref 2.1–4.9)
PLATELET # BLD AUTO: 177 K/UL — SIGNIFICANT CHANGE UP (ref 130–400)
POTASSIUM SERPL-MCNC: 5.6 MMOL/L — HIGH (ref 3.5–5)
POTASSIUM SERPL-SCNC: 5.6 MMOL/L — HIGH (ref 3.5–5)
PROT UR-MCNC: ABNORMAL
RBC # BLD: 2.8 M/UL — LOW (ref 4.7–6.1)
RBC # FLD: 13.9 % — SIGNIFICANT CHANGE UP (ref 11.5–14.5)
SODIUM SERPL-SCNC: 133 MMOL/L — LOW (ref 135–146)
SP GR SPEC: 1.02 — SIGNIFICANT CHANGE UP (ref 1.01–1.03)
UROBILINOGEN FLD QL: SIGNIFICANT CHANGE UP
WBC # BLD: 9.78 K/UL — SIGNIFICANT CHANGE UP (ref 4.8–10.8)
WBC # FLD AUTO: 9.78 K/UL — SIGNIFICANT CHANGE UP (ref 4.8–10.8)

## 2023-03-29 PROCEDURE — 83735 ASSAY OF MAGNESIUM: CPT

## 2023-03-29 PROCEDURE — 84133 ASSAY OF URINE POTASSIUM: CPT

## 2023-03-29 PROCEDURE — 70498 CT ANGIOGRAPHY NECK: CPT

## 2023-03-29 PROCEDURE — 36430 TRANSFUSION BLD/BLD COMPNT: CPT

## 2023-03-29 PROCEDURE — 97166 OT EVAL MOD COMPLEX 45 MIN: CPT | Mod: GO

## 2023-03-29 PROCEDURE — 80048 BASIC METABOLIC PNL TOTAL CA: CPT

## 2023-03-29 PROCEDURE — 84156 ASSAY OF PROTEIN URINE: CPT

## 2023-03-29 PROCEDURE — U0005: CPT

## 2023-03-29 PROCEDURE — 82962 GLUCOSE BLOOD TEST: CPT

## 2023-03-29 PROCEDURE — 70496 CT ANGIOGRAPHY HEAD: CPT

## 2023-03-29 PROCEDURE — 72141 MRI NECK SPINE W/O DYE: CPT | Mod: 26

## 2023-03-29 PROCEDURE — 70498 CT ANGIOGRAPHY NECK: CPT | Mod: 26

## 2023-03-29 PROCEDURE — 97530 THERAPEUTIC ACTIVITIES: CPT | Mod: GP

## 2023-03-29 PROCEDURE — 99291 CRITICAL CARE FIRST HOUR: CPT

## 2023-03-29 PROCEDURE — 93005 ELECTROCARDIOGRAM TRACING: CPT

## 2023-03-29 PROCEDURE — 97163 PT EVAL HIGH COMPLEX 45 MIN: CPT | Mod: GP

## 2023-03-29 PROCEDURE — 99222 1ST HOSP IP/OBS MODERATE 55: CPT

## 2023-03-29 PROCEDURE — 93010 ELECTROCARDIOGRAM REPORT: CPT

## 2023-03-29 PROCEDURE — 86923 COMPATIBILITY TEST ELECTRIC: CPT

## 2023-03-29 PROCEDURE — P9016: CPT

## 2023-03-29 PROCEDURE — 72141 MRI NECK SPINE W/O DYE: CPT

## 2023-03-29 PROCEDURE — 86850 RBC ANTIBODY SCREEN: CPT

## 2023-03-29 PROCEDURE — 84540 ASSAY OF URINE/UREA-N: CPT

## 2023-03-29 PROCEDURE — 81001 URINALYSIS AUTO W/SCOPE: CPT

## 2023-03-29 PROCEDURE — 36415 COLL VENOUS BLD VENIPUNCTURE: CPT

## 2023-03-29 PROCEDURE — 97110 THERAPEUTIC EXERCISES: CPT | Mod: GP

## 2023-03-29 PROCEDURE — 85025 COMPLETE CBC W/AUTO DIFF WBC: CPT

## 2023-03-29 PROCEDURE — 83036 HEMOGLOBIN GLYCOSYLATED A1C: CPT

## 2023-03-29 PROCEDURE — 84100 ASSAY OF PHOSPHORUS: CPT

## 2023-03-29 PROCEDURE — 84300 ASSAY OF URINE SODIUM: CPT

## 2023-03-29 PROCEDURE — 86900 BLOOD TYPING SEROLOGIC ABO: CPT

## 2023-03-29 PROCEDURE — 71045 X-RAY EXAM CHEST 1 VIEW: CPT

## 2023-03-29 PROCEDURE — 82570 ASSAY OF URINE CREATININE: CPT

## 2023-03-29 PROCEDURE — 70496 CT ANGIOGRAPHY HEAD: CPT | Mod: 26

## 2023-03-29 PROCEDURE — U0003: CPT

## 2023-03-29 PROCEDURE — 83935 ASSAY OF URINE OSMOLALITY: CPT

## 2023-03-29 PROCEDURE — 86901 BLOOD TYPING SEROLOGIC RH(D): CPT

## 2023-03-29 RX ORDER — INSULIN LISPRO 100/ML
VIAL (ML) SUBCUTANEOUS AT BEDTIME
Refills: 0 | Status: DISCONTINUED | OUTPATIENT
Start: 2023-03-29 | End: 2023-03-29

## 2023-03-29 RX ORDER — TAMSULOSIN HYDROCHLORIDE 0.4 MG/1
0.4 CAPSULE ORAL AT BEDTIME
Refills: 0 | Status: DISCONTINUED | OUTPATIENT
Start: 2023-03-29 | End: 2023-04-03

## 2023-03-29 RX ORDER — INSULIN LISPRO 100/ML
VIAL (ML) SUBCUTANEOUS AT BEDTIME
Refills: 0 | Status: DISCONTINUED | OUTPATIENT
Start: 2023-03-29 | End: 2023-03-30

## 2023-03-29 RX ORDER — TRAMADOL HYDROCHLORIDE 50 MG/1
50 TABLET ORAL
Refills: 0 | Status: DISCONTINUED | OUTPATIENT
Start: 2023-03-29 | End: 2023-04-03

## 2023-03-29 RX ORDER — SODIUM CHLORIDE 9 MG/ML
1000 INJECTION, SOLUTION INTRAVENOUS
Refills: 0 | Status: DISCONTINUED | OUTPATIENT
Start: 2023-03-29 | End: 2023-03-30

## 2023-03-29 RX ORDER — METOPROLOL TARTRATE 50 MG
25 TABLET ORAL
Refills: 0 | Status: DISCONTINUED | OUTPATIENT
Start: 2023-03-29 | End: 2023-04-03

## 2023-03-29 RX ORDER — LEVOTHYROXINE SODIUM 125 MCG
50 TABLET ORAL DAILY
Refills: 0 | Status: DISCONTINUED | OUTPATIENT
Start: 2023-03-29 | End: 2023-04-03

## 2023-03-29 RX ORDER — ACETAMINOPHEN 500 MG
650 TABLET ORAL EVERY 6 HOURS
Refills: 0 | Status: DISCONTINUED | OUTPATIENT
Start: 2023-03-29 | End: 2023-04-03

## 2023-03-29 RX ORDER — SODIUM CHLORIDE 9 MG/ML
1000 INJECTION, SOLUTION INTRAVENOUS
Refills: 0 | Status: DISCONTINUED | OUTPATIENT
Start: 2023-03-29 | End: 2023-03-29

## 2023-03-29 RX ORDER — ENOXAPARIN SODIUM 100 MG/ML
40 INJECTION SUBCUTANEOUS EVERY 24 HOURS
Refills: 0 | Status: DISCONTINUED | OUTPATIENT
Start: 2023-03-29 | End: 2023-03-29

## 2023-03-29 RX ORDER — CLOPIDOGREL BISULFATE 75 MG/1
75 TABLET, FILM COATED ORAL DAILY
Refills: 0 | Status: DISCONTINUED | OUTPATIENT
Start: 2023-03-29 | End: 2023-03-29

## 2023-03-29 RX ORDER — SIMVASTATIN 20 MG/1
20 TABLET, FILM COATED ORAL AT BEDTIME
Refills: 0 | Status: DISCONTINUED | OUTPATIENT
Start: 2023-03-29 | End: 2023-04-03

## 2023-03-29 RX ORDER — INSULIN GLARGINE 100 [IU]/ML
12 INJECTION, SOLUTION SUBCUTANEOUS AT BEDTIME
Refills: 0 | Status: DISCONTINUED | OUTPATIENT
Start: 2023-03-29 | End: 2023-03-30

## 2023-03-29 RX ORDER — FINASTERIDE 5 MG/1
5 TABLET, FILM COATED ORAL DAILY
Refills: 0 | Status: DISCONTINUED | OUTPATIENT
Start: 2023-03-29 | End: 2023-04-03

## 2023-03-29 RX ORDER — METOPROLOL TARTRATE 50 MG
25 TABLET ORAL
Refills: 0 | Status: DISCONTINUED | OUTPATIENT
Start: 2023-03-29 | End: 2023-03-29

## 2023-03-29 RX ORDER — GLUCAGON INJECTION, SOLUTION 0.5 MG/.1ML
1 INJECTION, SOLUTION SUBCUTANEOUS ONCE
Refills: 0 | Status: DISCONTINUED | OUTPATIENT
Start: 2023-03-29 | End: 2023-03-29

## 2023-03-29 RX ORDER — PANTOPRAZOLE SODIUM 20 MG/1
40 TABLET, DELAYED RELEASE ORAL
Refills: 0 | Status: DISCONTINUED | OUTPATIENT
Start: 2023-03-29 | End: 2023-03-29

## 2023-03-29 RX ORDER — CELECOXIB 200 MG/1
100 CAPSULE ORAL DAILY
Refills: 0 | Status: DISCONTINUED | OUTPATIENT
Start: 2023-03-29 | End: 2023-03-30

## 2023-03-29 RX ORDER — CHLORHEXIDINE GLUCONATE 213 G/1000ML
1 SOLUTION TOPICAL
Refills: 0 | Status: DISCONTINUED | OUTPATIENT
Start: 2023-03-29 | End: 2023-04-03

## 2023-03-29 RX ORDER — DEXTROSE 50 % IN WATER 50 %
15 SYRINGE (ML) INTRAVENOUS ONCE
Refills: 0 | Status: DISCONTINUED | OUTPATIENT
Start: 2023-03-29 | End: 2023-03-29

## 2023-03-29 RX ORDER — DULOXETINE HYDROCHLORIDE 30 MG/1
60 CAPSULE, DELAYED RELEASE ORAL DAILY
Refills: 0 | Status: DISCONTINUED | OUTPATIENT
Start: 2023-03-29 | End: 2023-04-03

## 2023-03-29 RX ORDER — DEXTROSE 50 % IN WATER 50 %
25 SYRINGE (ML) INTRAVENOUS ONCE
Refills: 0 | Status: DISCONTINUED | OUTPATIENT
Start: 2023-03-29 | End: 2023-03-29

## 2023-03-29 RX ADMIN — Medication 650 MILLIGRAM(S): at 18:30

## 2023-03-29 RX ADMIN — CHLORHEXIDINE GLUCONATE 1 APPLICATION(S): 213 SOLUTION TOPICAL at 05:48

## 2023-03-29 RX ADMIN — Medication 650 MILLIGRAM(S): at 12:35

## 2023-03-29 RX ADMIN — CELECOXIB 100 MILLIGRAM(S): 200 CAPSULE ORAL at 12:35

## 2023-03-29 RX ADMIN — Medication 650 MILLIGRAM(S): at 12:34

## 2023-03-29 RX ADMIN — Medication 50 MICROGRAM(S): at 05:48

## 2023-03-29 RX ADMIN — SODIUM CHLORIDE 70 MILLILITER(S): 9 INJECTION, SOLUTION INTRAVENOUS at 13:02

## 2023-03-29 RX ADMIN — Medication 650 MILLIGRAM(S): at 05:48

## 2023-03-29 RX ADMIN — Medication 25 MILLIGRAM(S): at 05:48

## 2023-03-29 RX ADMIN — Medication 650 MILLIGRAM(S): at 23:00

## 2023-03-29 RX ADMIN — CLOPIDOGREL BISULFATE 75 MILLIGRAM(S): 75 TABLET, FILM COATED ORAL at 12:34

## 2023-03-29 RX ADMIN — Medication 2.5 MILLIGRAM(S): at 03:34

## 2023-03-29 RX ADMIN — SIMVASTATIN 20 MILLIGRAM(S): 20 TABLET, FILM COATED ORAL at 22:59

## 2023-03-29 RX ADMIN — ENOXAPARIN SODIUM 40 MILLIGRAM(S): 100 INJECTION SUBCUTANEOUS at 05:48

## 2023-03-29 RX ADMIN — CELECOXIB 100 MILLIGRAM(S): 200 CAPSULE ORAL at 12:34

## 2023-03-29 RX ADMIN — FINASTERIDE 5 MILLIGRAM(S): 5 TABLET, FILM COATED ORAL at 12:34

## 2023-03-29 RX ADMIN — Medication 8: at 22:58

## 2023-03-29 RX ADMIN — Medication 650 MILLIGRAM(S): at 05:50

## 2023-03-29 RX ADMIN — INSULIN GLARGINE 12 UNIT(S): 100 INJECTION, SOLUTION SUBCUTANEOUS at 22:50

## 2023-03-29 RX ADMIN — TAMSULOSIN HYDROCHLORIDE 0.4 MILLIGRAM(S): 0.4 CAPSULE ORAL at 22:59

## 2023-03-29 RX ADMIN — DULOXETINE HYDROCHLORIDE 60 MILLIGRAM(S): 30 CAPSULE, DELAYED RELEASE ORAL at 12:34

## 2023-03-29 NOTE — CONSULT NOTE ADULT - ASSESSMENT
89y Male Q2 neuro checks s/p fall with Bilateral C1 posterior arch nondisplaced fractures.    NEURO:  #Acute pain    -APAP prn    -tramadol 50 bid    -celecoxib 100 milliGRAM(s) Oral daily    -DULoxetine 60 milliGRAM(s) Oral daily    RESP:   #Oxygenation    -wean off NC to RA as tolerated  #Activity    -increase as tolerated    CARDS:   #HTN    -Metoprolol tartrate 25mg PO BID  Labs: Trop neg x1  #HLD    -simvastatin 20mg QD    GI/NUTR:   #Diet, Regular, Soft and Bite Sized     -aspiration precautions, HOB 30  #GI Prophylaxis    -not indicated  #Bowel regimen    -not indicated     /RENAL:   #maintain euvolemia    Labs:          BUN/Cr- 19/0.9  -->          Electrolytes-Na -- // K -- // Mg 1.6 //  Phos -- (03-28 @ 21:56)  #BPH    -Finesteride 5mg QD    -Flomax 0.4 QD       HEME/ONC:   #DVT prophylaxis    -on home plavix, holding home asa    -enoxaparin Injectable    -SCDs    Labs: Hb/Hct:  12.1/36.8  -->                      Plts:  200  -->                 PTT/INR:  31.4/1.18  --->       T&S pending    ID:  #monitor for leukocytosis  WBC- 10.07  --->>  Temp trend- 24hrs T(F): 98.6 (03-29 @ 02:59), Max: 98.6 (03-29 @ 02:59)    ENDO:  #DM    -holding     SG q6 if NPO or Tube feeds    -Glucose goal 140-180    -if above 180 start ISS     HA1C     MSK:     Activity - Ambulate as Tolerated:     Time/Priority:  Routine (03-29-23 @ 02:16)      HOME Medications:      LINES/DRAINS:  PIV, [place]    ADVANCED DIRECTIVES:  Full Code    HCP/Emergency Contact-    INDICATION FOR SICU/SDU: Fall (on) (from) other stairs and steps, initial encounter             DISPO:   Case discussed with attending   89y Male Q2 neuro checks s/p fall with Bilateral C1 posterior arch nondisplaced fractures.    NEURO:  #Acute pain    -APAP prn    -tramadol 50 bid    -celecoxib 100 milliGRAM(s) Oral daily    -DULoxetine 60 milliGRAM(s) Oral daily    RESP:   #Oxygenation    -wean off NC to RA as tolerated  #Activity    -increase as tolerated    CARDS:   #HTN    -Metoprolol tartrate 25mg PO BID  Labs: Trop neg x1  #HLD    -simvastatin 20mg QD    GI/NUTR:   #Diet, Regular, Soft and Bite Sized     -aspiration precautions, HOB 30  #GI Prophylaxis    -not indicated  #Bowel regimen    -not indicated     /RENAL:   #maintain euvolemia    Labs:          BUN/Cr- 19/0.9  -->          Electrolytes-Na -- // K -- // Mg 1.6 //  Phos -- (03-28 @ 21:56)  #BPH    -Finesteride 5mg QD    -Flomax 0.4 QD       HEME/ONC:   #DVT prophylaxis    -on home plavix, holding home asa    -enoxaparin Injectable    -SCDs    Labs: Hb/Hct:  12.1/36.8  -->                      Plts:  200  -->                 PTT/INR:  31.4/1.18  --->       T&S pending    ID:  #monitor for leukocytosis  WBC- 10.07  --->>  Temp trend- 24hrs T(F): 98.6 (03-29 @ 02:59), Max: 98.6 (03-29 @ 02:59)    ENDO:  #DM    -on ISS    -f/u restarting home Toujeo long-acting       HA1C 10.2 (12/22)    MSK:  #Activity - Ambulate as Tolerated:       LINES/DRAINS:  PIV,    ADVANCED DIRECTIVES:  Full Code    HCP/Emergency Contact- Son Miguel Angel [phone]    INDICATION FOR SDU: Q2 neuro checks s/p fall             DISPO:   Case discussed with attending   89y Male Q2 neuro checks s/p fall with Bilateral C1 posterior arch nondisplaced fractures.    NEURO:  #Acute pain    -APAP prn    -tramadol 50 bid    -celecoxib 100 milliGRAM(s) Oral daily    -DULoxetine 60 milliGRAM(s) Oral daily    RESP:   #Oxygenation    -wean off NC to RA as tolerated  #Activity    -increase as tolerated    CARDS:   #HTN    -Metoprolol tartrate 25mg PO BID  Labs: Trop neg x1  #HLD    -simvastatin 20mg QD    GI/NUTR:   #Diet, Regular, Soft and Bite Sized     -aspiration precautions, HOB 30  #GI Prophylaxis    -not indicated  #Bowel regimen    -not indicated     /RENAL:   #maintain euvolemia    Labs:          BUN/Cr- 19/0.9  -->          Electrolytes-Na -- // K -- // Mg 1.6 //  Phos -- (03-28 @ 21:56)  UA: positive trace ketones, proteins, glucose  #BPH    -Finesteride 5mg QD    -Flomax 0.4 QD       HEME/ONC:   #DVT prophylaxis    -on home plavix, holding home asa    -enoxaparin Injectable    -SCDs    Labs: Hb/Hct:  12.1/36.8  -->                      Plts:  200  -->                 PTT/INR:  31.4/1.18  --->       T&S pending    ID:  #monitor for leukocytosis  WBC- 10.07  --->>  Temp trend- 24hrs T(F): 98.6 (03-29 @ 02:59), Max: 98.6 (03-29 @ 02:59)    ENDO:  #DM    -on ISS    -restarted Lantus (Toujeo at home)       HA1C 10.2 (12/22)    MSK:  #Activity - Ambulate as Tolerated:       LINES/DRAINS:  PIV,    ADVANCED DIRECTIVES:  Full Code    HCP/Emergency Contact- Son Miguel Angel [phone]    INDICATION FOR SDU: Q2 neuro checks s/p fall           DISPO:   Case discussed with attending Dr. Cash 89y Male Q2 neuro checks s/p fall with Bilateral C1 posterior arch nondisplaced fractures.    NEURO:  #Acute pain    -APAP prn    -tramadol 50 bid    -celecoxib 100 milliGRAM(s) Oral daily    -DULoxetine 60 milliGRAM(s) Oral daily    RESP:   #Oxygenation    -wean off NC to RA as tolerated  #Activity    -increase as tolerated    CARDS:   #HTN    -Metoprolol tartrate 25mg PO BID  Labs: Trop neg x1  #HLD    -simvastatin 20mg QD    GI/NUTR:   #Diet, Regular, Soft and Bite Sized     -aspiration precautions, HOB 30  #GI Prophylaxis    -not indicated  #Bowel regimen    -not indicated     /RENAL:   #maintain euvolemia    Labs:          BUN/Cr- 19/0.9  -->          Electrolytes-Na -- // K -- // Mg 1.6 //  Phos -- (03-28 @ 21:56)  UA: positive trace ketones, proteins, glucose  #BPH    -Finesteride 5mg QD    -Flomax 0.4 QD       HEME/ONC:   #DVT prophylaxis    -on home plavix, holding home asa    -enoxaparin Injectable    -SCDs    Labs: Hb/Hct:  12.1/36.8  -->                      Plts:  200  -->                 PTT/INR:  31.4/1.18  --->       T&S pending    ID:  #monitor for leukocytosis  WBC- 10.07  --->>  Temp trend- 24hrs T(F): 98.6 (03-29 @ 02:59), Max: 98.6 (03-29 @ 02:59)    ENDO:  #DM    -on ISS    -restarted Lantus (Toujeo at home)       HA1C 10.2 (12/22)  #Hypothyroidism    -Synthroid 50 mcg QD    MSK:  #Activity - Ambulate as Tolerated:       LINES/DRAINS:  PIV,    ADVANCED DIRECTIVES:  Full Code    HCP/Emergency Contact- Son Miguel Angel [phone]    INDICATION FOR SDU: Q2 neuro checks s/p fall           DISPO:   Case discussed with attending Dr. Cash 89y Male Q2 neuro checks s/p fall with Bilateral C1 posterior arch nondisplaced fractures.    NEURO:  #Acute pain    -APAP prn    -tramadol 50 bid    -celecoxib 100 milliGRAM(s) Oral daily    -DULoxetine 60 milliGRAM(s) Oral daily    RESP:   #Oxygenation    -wean off NC to RA as tolerated  #Activity    -increase as tolerated    CARDS:   #HTN    -Metoprolol tartrate 25mg PO BID  Labs: Trop neg x1  #HLD    -simvastatin 20mg QD    GI/NUTR:   #Diet, Regular, Soft and Bite Sized     -aspiration precautions, HOB 30  #GI Prophylaxis    -not indicated  #Bowel regimen    -not indicated     /RENAL:   #maintain euvolemia    Labs:          BUN/Cr- 19/0.9  -->          Electrolytes-Na -- // K -- // Mg 1.6 //  Phos -- (03-28 @ 21:56)  UA: positive trace ketones, proteins, glucose  #BPH    -Finesteride 5mg QD    -Flomax 0.4 QD       HEME/ONC:   #DVT prophylaxis    -on home plavix, holding home asa    -enoxaparin Injectable    -SCDs    Labs: Hb/Hct:  12.1/36.8  -->                      Plts:  200  -->                 PTT/INR:  31.4/1.18  --->       T&S pending    ID:  #monitor for leukocytosis  WBC- 10.07  --->>  Temp trend- 24hrs T(F): 98.6 (03-29 @ 02:59), Max: 98.6 (03-29 @ 02:59)    ENDO:  #DM    -on ISS    -restarted Lantus (Toujeo at home)       HA1C 10.2 (12/22)  #Hypothyroidism    -Synthroid 50 mcg QD    MSK:  #Activity - Ambulate as Tolerated:       LINES/DRAINS:  PIV,    ADVANCED DIRECTIVES:  Full Code    HCP/Emergency Contact- Son Miguel Angel [phone]    INDICATION FOR SDU: Q2 neuro checks s/p fall           DISPO:   Case discussed with attending Dr. Cash  Signed out to primary team, please refer to transfer note

## 2023-03-29 NOTE — H&P ADULT - NSHPPHYSICALEXAM_GEN_ALL_CORE
General: NAD  HEENT: Normocephalic, atraumatic, EOMI, PEERLA. 5cm right frontal hematoma   Neck: Soft, midline trachea. no c-spine tenderness  Chest: No chest wall tenderness, no subcutaneous emphysema   Cardiac: S1, S2, RRR  Respiratory: Bilateral breath sounds, clear and equal bilaterally  Abdomen: Soft, non-distended, non-tender, no rebound, no guarding.  Groin: Normal appearing, pelvis stable   Ext:  Moving b/l upper and lower extremities. Palpable Radial b/l UE, b/l DP palpable in LE. Left hand dorsal skin tear. Superficial right shin abrasion.   Back: No T/L/S spine tenderness, No palpable runoff/stepoff/deformity  Rectal: No alondra blood, good tone

## 2023-03-29 NOTE — PHYSICAL THERAPY INITIAL EVALUATION ADULT - IMPAIRMENTS CONTRIBUTING IMPAIRED BED MOBILITY, REHAB EVAL
c/o dizziness upon sitting at EOB Pt unable to tramaine sitting position, returned back to bed /60 mmHg.  BLANCO Meza made aware./impaired balance/decreased flexibility/pain/decreased strength

## 2023-03-29 NOTE — H&P ADULT - ASSESSMENT
ASSESSMENT:  89yM w/ PMHx of HLD, HTN, BPH, quadruple bypass, and CAD s/p 4 stent placement and DM seen as (Code Trauma / Trauma Alert / Trauma Consult) s/p fall down 10 stairs with +HT, ?LOC, and -AC (patient is on Plavix and ASA). Patient fell down 10 wooden steps while daughter was showering as patient is not supposed to go down steps without supervision Patient is hard of hearing but denies any pain on arrival to ED but had a 5cm hematoma to right frontal scalp along with a skin tear on left dorsal hand and superficial abrasion to right shin. Patient denies any nausea, vomiting, chest pain, or other concerning symptoms. Trauma assessment in ED: ABCs intact , GCS 15 , AAOx3.      Injuries identified:   - right frontal 5cm hematoma   - left dorsal hand skin tear  - bilateral C1 posterior arch nondisplaced fractures    PLAN:   - Admit to surgery   - Neurosurgery recs: no surgical intervention. Faulkner J at all times for about 8-12 weeks. Pain control and muscle relaxants. Neurochecks q2-q4 hrs. HOB 30.   - soft and bite sized diet   - Restart home medications  - DVT ppx  - PT/ rehab   - SW   - IS  - Ambulate as tolerated     Disposition pending results of above labs and imaging  Above plan discussed with Trauma attending, Dr. Hunter  , patient, patient family, and ED team  --------------------------------------------------------------------------------------  03-28-23 @ 22:58    TRAUMA SENIOR SPECTRA: [id number]  TRAUMA TEAM SPECTRA: [id number]

## 2023-03-29 NOTE — H&P ADULT - ATTENDING COMMENTS
Patient seen and examined with surgery trauma team in ED and discussed management plans. see consult.  radiology findings of C1 fracture seen by neuro and admitted to trauma with C collar.

## 2023-03-29 NOTE — CONSULT NOTE ADULT - ASSESSMENT
[facility]  [facility] [address] [place] [address]      Michelle Jang :1950 MRN:[id number]    2022 Time Session Began: 12:00 pm  Time Session Ended: 12:50 pm    Due to COVID-19 precautions, this visit was performed via live interactive two-way Telephone visit with patient's verbal consent.   Clinician Location:Home.  Patient Location: Home.  Verified patient identity:  [x] Yes    Session Type:Therapy 38-52 minutes (11129)    Others Present:     Intervention: Behavioral    Suicide/Homicide/Violence Ideation: No    If Yes, explain:     Current Outpatient Medications   Medication Sig   • venlafaxine XR (EFFEXOR XR) 150 MG 24 hr capsule Take 1 capsule by mouth daily.   • simvastatin (ZOCOR) 40 MG tablet Take 40 mg by mouth nightly.   • nitroGLYcerin (NITROSTAT) 0.4 MG SL tablet Place 0.4 mg under the tongue every 5 minutes as needed.   • aspirin 81 MG chewable tablet Chew 81 mg by mouth every 48 hours.     • carvedilol (COREG) 3.125 MG tablet Take 3.125 mg by mouth 2 times daily as needed.       No current facility-administered medications for this visit.       Change in Medication(s) Reported: No  If Yes, explain:     Patient/Family Education Provided: Yes  Patient/Family Displays Understanding: Yes    If No, explain:     Chief complaint in patient's own words: \"I have been struggling with multiple medical concerns ------- along with the frustrations I am having with doctors who give me too little time to address these issues or answer my questions.\"    Progress Note containing chief complaint and symptoms/problems related to the complaint:    (Data/Action/Response/Plan)    D: Pt struggling with questions about various medical issues.  Is frustrated that some of the doctors she has consulted with do not listen to her or provide needed information.     A: Processed thoughts and feelings.  Explored various options for discussing her concerns  ----- suzi with her PCP and / or her cardiologist.    R: Pt says that she has not been sufficiently assertive in her visits with her doctors and that she needs to be more insistent upon getting the information she requires.    P: Follow up Sept. 1st    Need for Community Resources Assessed: No    Resources Needed: No    If Yes, what resources:     Primary Diagnosis: Generalized Anxiety Disorder    Treatment Plan: Unchanged    Discharge Plan: Strategies Discussed to Maintain Gains    Next Appointment: 9/1/22  Kelvin Youssef LCSW   89M PMHx HTN, BPH, CAD s/p CABG, stent x4, DM2 here with fall, found to have C1 fracture.    IMP  #Unwitnessed fall    cth neg    ct c spine with c1 post arch fxs, mild-mod spinal stenosis    cta neck with possible epidural hematoma, severe stenosis R ICA, R ext carotid  #HTN  #BPH  #CAD  #DM2    PLAN  consider mri c-spine; f/u neurosx to r/o epidural hematoma  trend lactate  orthostatics  PT    Vee  [phone]

## 2023-03-29 NOTE — PHYSICAL THERAPY INITIAL EVALUATION ADULT - ADDITIONAL COMMENTS
Lives with daughter in a pvt house with 3 steps to enter. As per Daughter Pt stays on leveled floor.

## 2023-03-29 NOTE — PHYSICAL THERAPY INITIAL EVALUATION ADULT - PERTINENT HX OF CURRENT PROBLEM, REHAB EVAL
89yM w/ PMHx of HLD, HTN, BPH, quadruple bypass, and CAD s/p 4 stent placement and DM seen as (Code Trauma / Trauma Alert / Trauma Consult) s/p fall down 10 stairs with +HT, ?LOC, and -AC (patient is on Plavix and ASA). Patient fell down 10 wooden steps while daughter was showering as patient is not supposed to go down steps without supervision Patient is hard of hearing but denies any pain on arrival to ED but had a 5cm hematoma to right frontal scalp along with a skin tear on left dorsal hand and superficial abrasion to right shin. Patient denies any nausea, vomiting, chest pain, or other concerning symptoms. Trauma assessment in ED: ABCs intact , GCS 15 , AAOx3. Patient is hard of hearing but answers appropriately when questioned.   SICU consulted for Q2 neuro checks s/p fall with Bilateral C1 posterior arch nondisplaced fractures. Patient states he was walking up the stairs and lost his balance but denies any dizziness prior to the fall. Patient denies LOC. Patient seen on the floor with [place] collar in place. Patient A&O x3, JIMENES with b/l equal strength. Tongue deviation to the right, Left pupil reactive, right pupil nonreactive.  Right frontal hematoma noted. Left dorsal hand dressing in place with skin tear. VS at bedside were HR: 54, /80, O2 100% on 2L NC.

## 2023-03-29 NOTE — PHYSICAL THERAPY INITIAL EVALUATION ADULT - GENERAL OBSERVATIONS, REHAB EVAL
Pt seen from [id number]. Pt encountered in the bed, Craig, +[place] Cervical collar, tele, pulse oxi, BP cuff, O2 @ 2l/min via NC, agreeable for b/s PT, Daughter at b/s.

## 2023-03-29 NOTE — CONSULT NOTE ADULT - SUBJECTIVE AND OBJECTIVE BOX
INTERVAL HPI/OVERNIGHT EVENTS:    SUBJECTIVE: Patient seen and examined at bedside.     89yM w/ PMHx of HLD, HTN, BPH, quadruple bypass, and CAD s/p 4 stent placement and DM seen as (Code Trauma / Trauma Alert / Trauma Consult) s/p fall down 10 stairs with +HT, ?LOC, and -AC (patient is on Plavix and ASA). Patient fell down 10 wooden steps while daughter was showering as patient is not supposed to go down steps without supervision Patient is hard of hearing but denies any pain on arrival to ED but had a 5cm hematoma to right frontal scalp along with a skin tear on left dorsal hand and superficial abrasion to right shin. Patient denies any nausea, vomiting, chest pain, or other concerning symptoms. Trauma assessment in ED: ABCs intact , GCS 15 , AAOx3.    Patient is hard of hearing but answers appropriately when questioned.     OBJECTIVE:    VITAL SIGNS:  Vital Signs Last 24 Hrs  T(C): 36.1 (29 Mar 2023 16:10), Max: 37 (29 Mar 2023 02:59)  T(F): 97 (29 Mar 2023 16:10), Max: 98.6 (29 Mar 2023 02:59)  HR: 58 (29 Mar 2023 16:10) (58 - 78)  BP: 127/66 (29 Mar 2023 16:10) (115/56 - 221/101)  BP(mean): 87 (29 Mar 2023 16:10) (81 - 105)  RR: 18 (29 Mar 2023 16:10) (18 - 30)  SpO2: 97% (29 Mar 2023 11:13) (95% - 100%)    Parameters below as of 29 Mar 2023 11:13  Patient On (Oxygen Delivery Method): nasal cannula  O2 Flow (L/min): 3        PHYSICAL EXAM:    General: NAD  HEENT: NC/AT; PERRL, clear conjunctiva  Neck: supple  Respiratory: CTA b/l  Cardiovascular: +S1/S2; RRR  Abdomen: soft, NT/ND; +BS x4  Extremities: WWP, 2+ peripheral pulses b/l; no LE edema  Skin: normal color and turgor; no rash  Neurological:    MEDICATIONS:  MEDICATIONS  (STANDING):  acetaminophen     Tablet .. 650 milliGRAM(s) Oral every 6 hours  celecoxib 100 milliGRAM(s) Oral daily  chlorhexidine 4% Liquid 1 Application(s) Topical <User Schedule>  DULoxetine 60 milliGRAM(s) Oral daily  finasteride 5 milliGRAM(s) Oral daily  insulin glargine Injectable (LANTUS) 12 Unit(s) SubCutaneous at bedtime  insulin lispro (ADMELOG) corrective regimen sliding scale   SubCutaneous at bedtime  lactated ringers. 1000 milliLiter(s) (70 mL/Hr) IV Continuous <Continuous>  levothyroxine 50 MICROGram(s) Oral daily  metoprolol tartrate 25 milliGRAM(s) Oral two times a day  simvastatin 20 milliGRAM(s) Oral at bedtime  tamsulosin 0.4 milliGRAM(s) Oral at bedtime    MEDICATIONS  (PRN):  guaiFENesin  milliGRAM(s) Oral every 12 hours PRN Cough  traMADol 50 milliGRAM(s) Oral two times a day PRN Moderate Pain (4 - 6)      ALLERGIES:  Allergies    No Known Allergies    Intolerances        LABS:                        12.1   10.07 )-----------( 200      ( 28 Mar 2023 21:55 )             36.8     Hemoglobin: 12.1 g/dL ( @ 21:55)    CBC Full  -  ( 28 Mar 2023 21:55 )  WBC Count : 10.07 K/uL  RBC Count : 4.05 M/uL  Hemoglobin : 12.1 g/dL  Hematocrit : 36.8 %  Platelet Count - Automated : 200 K/uL  Mean Cell Volume : 90.9 fL  Mean Cell Hemoglobin : 29.9 pg  Mean Cell Hemoglobin Concentration : 32.9 g/dL  Auto Neutrophil # : 4.20 K/uL  Auto Lymphocyte # : 4.22 K/uL  Auto Monocyte # : 0.93 K/uL  Auto Eosinophil # : 0.47 K/uL  Auto Basophil # : 0.08 K/uL  Auto Neutrophil % : 41.7 %  Auto Lymphocyte % : 41.9 %  Auto Monocyte % : 9.2 %  Auto Eosinophil % : 4.7 %  Auto Basophil % : 0.8 %        136  |  102  |  19  ----------------------------<  259<H>  5.1<H>   |  22  |  0.9    Ca    8.9      28 Mar 2023 21:55  Mg     1.6     03-28    TPro  6.2  /  Alb  4.0  /  TBili  <0.2  /  DBili  x   /  AST  21  /  ALT  11  /  AlkPhos  74  -    Creatinine Trend: 0.9<--  LIVER FUNCTIONS - ( 28 Mar 2023 21:55 )  Alb: 4.0 g/dL / Pro: 6.2 g/dL / ALK PHOS: 74 U/L / ALT: 11 U/L / AST: 21 U/L / GGT: x           PT/INR - ( 28 Mar 2023 21:55 )   PT: 13.50 sec;   INR: 1.18 ratio         PTT - ( 28 Mar 2023 21:55 )  PTT:31.4 sec    hs Troponin:        21:42 - VBG - pH: 7.31  | pCO2: 48    | pO2: 58    | Lactate: 1.70       Urinalysis Basic - ( 29 Mar 2023 00:05 )    Color: Light Yellow / Appearance: Clear / S.021 / pH: x  Gluc: x / Ketone: Trace  / Bili: Negative / Urobili: <2 mg/dL   Blood: x / Protein: 30 mg/dL / Nitrite: Negative   Leuk Esterase: Negative / RBC: 1 /HPF / WBC 0 /HPF   Sq Epi: x / Non Sq Epi: 0 /HPF / Bacteria: Negative      CSF:                      EKG:   MICROBIOLOGY:    IMAGING:      Labs, imaging, EKG personally reviewed    RADIOLOGY & ADDITIONAL TESTS: Reviewed.

## 2023-03-29 NOTE — H&P ADULT - HISTORY OF PRESENT ILLNESS
TRAUMA ACTIVATION LEVEL:  CODE / ALERT  / CONSULT  ACTIVATED BY: EMS**  /  ED**  INTUBATED: YES** / NO**      MECHANISM OF INJURY:   [] Blunt     [] MVC	  [x] Fall	  [] Pedestrian Struck	  [] Motorcycle     [] Assault     [] Bicycle collision    [] Sports injury    [] Penetrating    [] Gun Shot Wound      [] Stab Wound    GCS: 15 	E: 4	V: 5	M: 6    HPI:    89yM w/ PMHx of HLD, HTN, BPH, quadruple bypass, and CAD s/p 4 stent placement and DM seen as (Code Trauma / Trauma Alert / Trauma Consult) s/p fall down 10 stairs with +HT, ?LOC, and -AC (patient is on Plavix and ASA). Patient fell down 10 wooden steps while daughter was showering as patient is not supposed to go down steps without supervision Patient is hard of hearing but denies any pain on arrival to ED but had a 5cm hematoma to right frontal scalp along with a skin tear on left dorsal hand and superficial abrasion to right shin. Patient denies any nausea, vomiting, chest pain, or other concerning symptoms. Trauma assessment in ED: ABCs intact , GCS 15 , AAOx3.    Patient is hard of hearing but answers appropriately when questioned.     PAST MEDICAL & SURGICAL HISTORY:  HTN (hypertension)      HLD (hyperlipidemia)      BPH (benign prostatic hyperplasia)      CAD (coronary artery disease)      Diabetes mellitus      Hypothyroidism      S/P CABG (coronary artery bypass graft)          Allergies    No Known Allergies    Intolerances        Home Medications:  celecoxib 100 mg oral capsule: 1 cap(s) orally once a day, As Needed (13 Dec 2022 22:03)  clopidogrel 75 mg oral tablet: 1 tab(s) orally once a day (13 Dec 2022 22:03)  DULoxetine 60 mg oral delayed release capsule: 1 cap(s) orally once a day (13 Dec 2022 22:03)  finasteride 5 mg oral tablet: 1 tab(s) orally once a day (13 Dec 2022 22:03)  guaiFENesin 600 mg oral tablet, extended release: 1 tab(s) orally every 12 hours, As needed, Cough (20 Dec 2022 10:18)  lactobacillus acidophilus oral capsule: 1 tab(s) orally once a day (20 Dec 2022 10:18)  levothyroxine 50 mcg (0.05 mg) oral tablet: 1 tab(s) orally once a day (13 Dec 2022 22:03)  Metoprolol Tartrate 25 mg oral tablet: 1 tab(s) orally 2 times a day (13 Dec 2022 22:03)  simvastatin 20 mg oral tablet: 1 tab(s) orally once a day (at bedtime) (13 Dec 2022 22:03)  tamsulosin 0.4 mg oral capsule: 1 cap(s) orally once a day (13 Dec 2022 22:03)  Toujeo SoloStar 300 units/mL subcutaneous solution: 12 unit(s) subcutaneous once a day (in the morning) (13 Dec 2022 22:03)  traMADol 50 mg oral tablet: 1 tab(s) orally 2 times a day, As Needed (13 Dec 2022 22:03)      ROS: 10-system review is otherwise negative except HPI above.      Primary Survey:    A - airway intact  B - bilateral breath sounds and good chest rise  C - palpable pulses in all extremities  D - GCS 15 on arrival, JIMENES  Exposure obtained    Vital Signs Last 24 Hrs  T(C): 35.2 (28 Mar 2023 22:40), Max: 35.2 (28 Mar 2023 22:40)  T(F): 95.3 (28 Mar 2023 22:40), Max: 95.3 (28 Mar 2023 22:40)  HR: 66 (28 Mar 2023 21:41) (66 - 66)  BP: 210/70 (28 Mar 2023 21:41) (210/70 - 210/70)  BP(mean): --  RR: 20 (28 Mar 2023 21:41) (20 - 20)  SpO2: 95% (28 Mar 2023 21:41) (95% - 95%)    Parameters below as of 28 Mar 2023 21:41  Patient On (Oxygen Delivery Method): nasal cannula  O2 Flow (L/min): 3

## 2023-03-29 NOTE — H&P ADULT - NSHPLABSRESULTS_GEN_ALL_CORE
Labs:  CAPILLARY BLOOD GLUCOSE                              12.1   10.07 )-----------( 200      ( 28 Mar 2023 21:55 )             36.8       Auto Neutrophil %: 41.7 % (03-28-23 @ 21:55)  Auto Immature Granulocyte %: 1.7 % (03-28-23 @ 21:55)    03-28    136  |  102  |  19  ----------------------------<  259<H>  5.1<H>   |  22  |  0.9      Magnesium, Serum: 1.6 mg/dL (03-28-23 @ 21:56)      LFTs:             6.2  | <0.2 | 21       ------------------[74      ( 28 Mar 2023 21:55 )  4.0  | x    | 11          Lipase:22     Amylase:x         Lactate, Blood: 3.6 mmol/L (03-28-23 @ 21:55)  Blood Gas Venous - Lactate: 1.70 mmol/L (03-28-23 @ 21:42)      Coags:     13.50  ----< 1.18    ( 28 Mar 2023 21:55 )     31.4        CARDIAC MARKERS ( 28 Mar 2023 21:56 )  x     / <0.01 ng/mL / x     / x     / x            Alcohol, Blood: <10 mg/dL (03-28-23 @ 21:55)            Alcohol, Blood: <10 mg/dL (03-28-23 @ 21:55)      RADIOLOGY & ADDITIONAL STUDIES:    < from: CT Abdomen and Pelvis w/ IV Cont (03.28.23 @ 22:34) >    IMPRESSION:      1.  No CT evidence of an acute thoracic or abdominopelvic pathology.    < end of copied text >    < from: Xray Tibia + Fibula 2 Views, Right (03.28.23 @ 22:27) >    Impression:      No acute fracture oracute articular abnormality.    < end of copied text >    < from: CT Head No Cont (03.28.23 @ 22:23) >    Impression:      No evidence of intracranial hemorrhage, territorial infarct, or mass   effect.    < end of copied text >    < from: CT Cervical Spine No Cont (03.28.23 @ 22:23) >    IMPRESSION:      Bilateral C1 posterior arch nondisplaced fractures.    Extensive soft tissue calcifications and thickening of the odontoid   transverse ligament with associatedat least mild to moderate spinal   canal stenosis at the same level    Prevertebral soft tissue swelling  ---------------------------------------------------------------------------------------

## 2023-03-29 NOTE — CONSULT NOTE ADULT - SUBJECTIVE AND OBJECTIVE BOX
CHEL CROFT     [id number]   [birth year]  89yM    Admit Date/LOS: 03-29  Indication for SDU/SICU:        ============================  HPI   89yM w/ PMHx of HLD, HTN, BPH, quadruple bypass, and CAD s/p 4 stent placement and DM seen as (Code Trauma / Trauma Alert / Trauma Consult) s/p fall down 10 stairs with +HT, ?LOC, and -AC (patient is on Plavix and ASA). Patient fell down 10 wooden steps while daughter was showering as patient is not supposed to go down steps without supervision Patient is hard of hearing but denies any pain on arrival to ED but had a 5cm hematoma to right frontal scalp along with a skin tear on left dorsal hand and superficial abrasion to right shin. Patient denies any nausea, vomiting, chest pain, or other concerning symptoms. Trauma assessment in ED: ABCs intact , GCS 15 , AAOx3.    Patient is hard of hearing but answers appropriately when questioned.     SICU consulted for Q      PAST MEDICAL & SURGICAL HISTORY:  HTN (hypertension)      HLD (hyperlipidemia)      BPH (benign prostatic hyperplasia)      CAD (coronary artery disease)      Diabetes mellitus      Hypothyroidism      S/P CABG (coronary artery bypass graft)          Allergies    No Known Allergies    Intolerances        Home Medications:  celecoxib 100 mg oral capsule: 1 cap(s) orally once a day, As Needed (13 Dec 2022 22:03)  clopidogrel 75 mg oral tablet: 1 tab(s) orally once a day (13 Dec 2022 22:03)  DULoxetine 60 mg oral delayed release capsule: 1 cap(s) orally once a day (13 Dec 2022 22:03)  finasteride 5 mg oral tablet: 1 tab(s) orally once a day (13 Dec 2022 22:03)  guaiFENesin 600 mg oral tablet, extended release: 1 tab(s) orally every 12 hours, As needed, Cough (20 Dec 2022 10:18)  lactobacillus acidophilus oral capsule: 1 tab(s) orally once a day (20 Dec 2022 10:18)  levothyroxine 50 mcg (0.05 mg) oral tablet: 1 tab(s) orally once a day (13 Dec 2022 22:03)  Metoprolol Tartrate 25 mg oral tablet: 1 tab(s) orally 2 times a day (13 Dec 2022 22:03)  simvastatin 20 mg oral tablet: 1 tab(s) orally once a day (at bedtime) (13 Dec 2022 22:03)  tamsulosin 0.4 mg oral capsule: 1 cap(s) orally once a day (13 Dec 2022 22:03)  Toujeo SoloStar 300 units/mL subcutaneous solution: 12 unit(s) subcutaneous once a day (in the morning) (13 Dec 2022 22:03)  traMADol 50 mg oral tablet: 1 tab(s) orally 2 times a day, As Needed (13 Dec 2022 22:03)      ROS: 10-system review is otherwise negative except HPI above.      Primary Survey:    A - airway intact  B - bilateral breath sounds and good chest rise  C - palpable pulses in all extremities  D - GCS 15 on arrival, JIMENES  Exposure obtained    Vital Signs Last 24 Hrs  T(C): 35.2 (28 Mar 2023 22:40), Max: 35.2 (28 Mar 2023 22:40)  T(F): 95.3 (28 Mar 2023 22:40), Max: 95.3 (28 Mar 2023 22:40)  HR: 66 (28 Mar 2023 21:41) (66 - 66)  BP: 210/70 (28 Mar 2023 21:41) (210/70 - 210/70)  BP(mean): --  RR: 20 (28 Mar 2023 21:41) (20 - 20)  SpO2: 95% (28 Mar 2023 21:41) (95% - 95%)    Parameters below as of 28 Mar 2023 21:41  Patient On (Oxygen Delivery Method): nasal cannula  O2 Flow (L/min): 3 (29 Mar 2023 01:52)          Consults-  Consult Note Adult-Neurosurgery Physician Assistant [KEREN Nelson] (03-28-23)  Consult Note Adult-Trauma Surgery Resident/Attending [FAY Stephens] (03-28-23)  Consult Note Adult-Infectious Disease Attending [CESAR Campbell] (12-14-22)      Procedure:  OR Stats  OR Time:    EBL:   IV Fluids:  Blood Products:  UOP:      Findings-       24 Hour Events  -Admission under SICU service      [X] A ten-point review of systems was otherwise negative except as noted above.  [  ] Due to altered mental status/intubation, subjective information was not attained from the patient. History was obtained, to the extent possible, from review of the chart and collateral sources of information.    =========================================================================================================================================      PMH  PAST MEDICAL & SURGICAL HISTORY:  HTN (hypertension)      HLD (hyperlipidemia)      BPH (benign prostatic hyperplasia)      CAD (coronary artery disease)      Diabetes mellitus      Hypothyroidism      S/P CABG (coronary artery bypass graft)        Home Meds:  Home Medications:  celecoxib 100 mg oral capsule: 1 cap(s) orally once a day, As Needed (13 Dec 2022 22:03)  clopidogrel 75 mg oral tablet: 1 tab(s) orally once a day (13 Dec 2022 22:03)  DULoxetine 60 mg oral delayed release capsule: 1 cap(s) orally once a day (13 Dec 2022 22:03)  finasteride 5 mg oral tablet: 1 tab(s) orally once a day (13 Dec 2022 22:03)  guaiFENesin 600 mg oral tablet, extended release: 1 tab(s) orally every 12 hours, As needed, Cough (20 Dec 2022 10:18)  lactobacillus acidophilus oral capsule: 1 tab(s) orally once a day (20 Dec 2022 10:18)  levothyroxine 50 mcg (0.05 mg) oral tablet: 1 tab(s) orally once a day (13 Dec 2022 22:03)  Metoprolol Tartrate 25 mg oral tablet: 1 tab(s) orally 2 times a day (13 Dec 2022 22:03)  simvastatin 20 mg oral tablet: 1 tab(s) orally once a day (at bedtime) (13 Dec 2022 22:03)  tamsulosin 0.4 mg oral capsule: 1 cap(s) orally once a day (13 Dec 2022 22:03)  Toujeo SoloStar 300 units/mL subcutaneous solution: 12 unit(s) subcutaneous once a day (in the morning) (13 Dec 2022 22:03)  traMADol 50 mg oral tablet: 1 tab(s) orally 2 times a day, As Needed (13 Dec 2022 22:03)     Allergies  Allergies    No Known Allergies    Intolerances       Current Medications:  acetaminophen     Tablet .. 650 milliGRAM(s) Oral every 6 hours  celecoxib 100 milliGRAM(s) Oral daily  chlorhexidine 4% Liquid 1 Application(s) Topical <User Schedule>  clopidogrel Tablet 75 milliGRAM(s) Oral daily  dextrose 5%. 1000 milliLiter(s) (50 mL/Hr) IV Continuous <Continuous>  dextrose 50% Injectable 25 Gram(s) IV Push once  dextrose Oral Gel 15 Gram(s) Oral once PRN Blood Glucose LESS THAN 70 milliGRAM(s)/deciliter  DULoxetine 60 milliGRAM(s) Oral daily  enoxaparin Injectable 40 milliGRAM(s) SubCutaneous every 24 hours  finasteride 5 milliGRAM(s) Oral daily  glucagon  Injectable 1 milliGRAM(s) IntraMuscular once  guaiFENesin  milliGRAM(s) Oral every 12 hours PRN Cough  insulin lispro (ADMELOG) corrective regimen sliding scale   SubCutaneous at bedtime  levothyroxine 50 MICROGram(s) Oral daily  metoprolol tartrate 25 milliGRAM(s) Oral two times a day  pantoprazole    Tablet 40 milliGRAM(s) Oral before breakfast  simvastatin 20 milliGRAM(s) Oral at bedtime  traMADol 50 milliGRAM(s) Oral two times a day PRN Moderate Pain (4 - 6)      VITAL SIGNS, INS/OUTS (Last 24Hours)  ICU Vital Signs Last 24 Hrs  T(C): 37 (29 Mar 2023 02:59), Max: 37 (29 Mar 2023 02:59)  T(F): 98.6 (29 Mar 2023 02:59), Max: 98.6 (29 Mar 2023 02:59)  HR: 64 (29 Mar 2023 02:59) (64 - 71)  BP: 184/80 (29 Mar 2023 02:59) (171/75 - 221/101)  BP(mean): --  ABP: --  ABP(mean): --  RR: 18 (29 Mar 2023 02:59) (18 - 20)  SpO2: 100% (29 Mar 2023 02:59) (95% - 100%)    O2 Parameters below as of 28 Mar 2023 23:01  Patient On (Oxygen Delivery Method): nasal cannula  O2 Flow (L/min): 3        I&O's Summary          Physical Exam:   ----------------------------------------------------------------------------------------------------------  GCS:      Exam: A&Ox3, no focal deficits    RESPIRATORY:  Normal expansion/effort  Mechanical Ventilation    CARDIOVASCULAR:   S1/S2.  RRR  No peripheral edema    GASTROINTESTINAL:  Abdomen soft, non-tender, non-distended    MUSCULOSKELETAL:  Extremities warm, pink, well-perfused.    DERM:  No skin breakdown     :   Exam: Hoover catheter in place.       Weight (kg): 78.1 (03-29-23)    Tubes/Lines/Drains   ----------------------------------------------------------------------------------------------------------  [x] Peripheral IV  [X] Urinary Catheter Hoover                                             Date Placed:   [X] Central Venous Line          IJ/Femoral             Date Placed:    [X] Arterial Line		      Radial/Femoral    Date Placed:        CHEL CROFT     [id number]   [birth year]  89yM    Admit Date/LOS: 03-29  Indication for SDU: Q2 neuro checks s/p fall        ============================  HPI   89yM w/ PMHx of HLD, HTN, BPH, quadruple bypass, and CAD s/p 4 stent placement and DM seen as (Code Trauma / Trauma Alert / Trauma Consult) s/p fall down 10 stairs with +HT, ?LOC, and -AC (patient is on Plavix and ASA). Patient fell down 10 wooden steps while daughter was showering as patient is not supposed to go down steps without supervision Patient is hard of hearing but denies any pain on arrival to ED but had a 5cm hematoma to right frontal scalp along with a skin tear on left dorsal hand and superficial abrasion to right shin. Patient denies any nausea, vomiting, chest pain, or other concerning symptoms. Trauma assessment in ED: ABCs intact , GCS 15 , AAOx3.    Patient is hard of hearing but answers appropriately when questioned.     SICU consulted for Q      PAST MEDICAL & SURGICAL HISTORY:  HTN (hypertension)      HLD (hyperlipidemia)      BPH (benign prostatic hyperplasia)      CAD (coronary artery disease)      Diabetes mellitus      Hypothyroidism      S/P CABG (coronary artery bypass graft)          Allergies    No Known Allergies    Intolerances        Home Medications:  celecoxib 100 mg oral capsule: 1 cap(s) orally once a day, As Needed (13 Dec 2022 22:03)  clopidogrel 75 mg oral tablet: 1 tab(s) orally once a day (13 Dec 2022 22:03)  DULoxetine 60 mg oral delayed release capsule: 1 cap(s) orally once a day (13 Dec 2022 22:03)  finasteride 5 mg oral tablet: 1 tab(s) orally once a day (13 Dec 2022 22:03)  guaiFENesin 600 mg oral tablet, extended release: 1 tab(s) orally every 12 hours, As needed, Cough (20 Dec 2022 10:18)  lactobacillus acidophilus oral capsule: 1 tab(s) orally once a day (20 Dec 2022 10:18)  levothyroxine 50 mcg (0.05 mg) oral tablet: 1 tab(s) orally once a day (13 Dec 2022 22:03)  Metoprolol Tartrate 25 mg oral tablet: 1 tab(s) orally 2 times a day (13 Dec 2022 22:03)  simvastatin 20 mg oral tablet: 1 tab(s) orally once a day (at bedtime) (13 Dec 2022 22:03)  tamsulosin 0.4 mg oral capsule: 1 cap(s) orally once a day (13 Dec 2022 22:03)  Toujeo SoloStar 300 units/mL subcutaneous solution: 12 unit(s) subcutaneous once a day (in the morning) (13 Dec 2022 22:03)  traMADol 50 mg oral tablet: 1 tab(s) orally 2 times a day, As Needed (13 Dec 2022 22:03)      ROS: 10-system review is otherwise negative except HPI above.      Primary Survey:    A - airway intact  B - bilateral breath sounds and good chest rise  C - palpable pulses in all extremities  D - GCS 15 on arrival, JIMENES  Exposure obtained    Vital Signs Last 24 Hrs  T(C): 35.2 (28 Mar 2023 22:40), Max: 35.2 (28 Mar 2023 22:40)  T(F): 95.3 (28 Mar 2023 22:40), Max: 95.3 (28 Mar 2023 22:40)  HR: 66 (28 Mar 2023 21:41) (66 - 66)  BP: 210/70 (28 Mar 2023 21:41) (210/70 - 210/70)  BP(mean): --  RR: 20 (28 Mar 2023 21:41) (20 - 20)  SpO2: 95% (28 Mar 2023 21:41) (95% - 95%)    Parameters below as of 28 Mar 2023 21:41  Patient On (Oxygen Delivery Method): nasal cannula  O2 Flow (L/min): 3 (29 Mar 2023 01:52)          Consults-  Consult Note Adult-Neurosurgery Physician Assistant [KEREN Nelson] (03-28-23)  Consult Note Adult-Trauma Surgery Resident/Attending [FAY Stephens] (03-28-23)  Consult Note Adult-Infectious Disease Attending [CESAR Campbell] (12-14-22)      Procedure:  OR Stats  OR Time:    EBL:   IV Fluids:  Blood Products:  UOP:      Findings-       24 Hour Events  -Admission under SICU service      [X] A ten-point review of systems was otherwise negative except as noted above.  [  ] Due to altered mental status/intubation, subjective information was not attained from the patient. History was obtained, to the extent possible, from review of the chart and collateral sources of information.    =========================================================================================================================================      PMH  PAST MEDICAL & SURGICAL HISTORY:  HTN (hypertension)      HLD (hyperlipidemia)      BPH (benign prostatic hyperplasia)      CAD (coronary artery disease)      Diabetes mellitus      Hypothyroidism      S/P CABG (coronary artery bypass graft)        Home Meds:  Home Medications:  celecoxib 100 mg oral capsule: 1 cap(s) orally once a day, As Needed (13 Dec 2022 22:03)  clopidogrel 75 mg oral tablet: 1 tab(s) orally once a day (13 Dec 2022 22:03)  DULoxetine 60 mg oral delayed release capsule: 1 cap(s) orally once a day (13 Dec 2022 22:03)  finasteride 5 mg oral tablet: 1 tab(s) orally once a day (13 Dec 2022 22:03)  guaiFENesin 600 mg oral tablet, extended release: 1 tab(s) orally every 12 hours, As needed, Cough (20 Dec 2022 10:18)  lactobacillus acidophilus oral capsule: 1 tab(s) orally once a day (20 Dec 2022 10:18)  levothyroxine 50 mcg (0.05 mg) oral tablet: 1 tab(s) orally once a day (13 Dec 2022 22:03)  Metoprolol Tartrate 25 mg oral tablet: 1 tab(s) orally 2 times a day (13 Dec 2022 22:03)  simvastatin 20 mg oral tablet: 1 tab(s) orally once a day (at bedtime) (13 Dec 2022 22:03)  tamsulosin 0.4 mg oral capsule: 1 cap(s) orally once a day (13 Dec 2022 22:03)  Toujeo SoloStar 300 units/mL subcutaneous solution: 12 unit(s) subcutaneous once a day (in the morning) (13 Dec 2022 22:03)  traMADol 50 mg oral tablet: 1 tab(s) orally 2 times a day, As Needed (13 Dec 2022 22:03)     Allergies  Allergies    No Known Allergies    Intolerances       Current Medications:  acetaminophen     Tablet .. 650 milliGRAM(s) Oral every 6 hours  celecoxib 100 milliGRAM(s) Oral daily  chlorhexidine 4% Liquid 1 Application(s) Topical <User Schedule>  clopidogrel Tablet 75 milliGRAM(s) Oral daily  dextrose 5%. 1000 milliLiter(s) (50 mL/Hr) IV Continuous <Continuous>  dextrose 50% Injectable 25 Gram(s) IV Push once  dextrose Oral Gel 15 Gram(s) Oral once PRN Blood Glucose LESS THAN 70 milliGRAM(s)/deciliter  DULoxetine 60 milliGRAM(s) Oral daily  enoxaparin Injectable 40 milliGRAM(s) SubCutaneous every 24 hours  finasteride 5 milliGRAM(s) Oral daily  glucagon  Injectable 1 milliGRAM(s) IntraMuscular once  guaiFENesin  milliGRAM(s) Oral every 12 hours PRN Cough  insulin lispro (ADMELOG) corrective regimen sliding scale   SubCutaneous at bedtime  levothyroxine 50 MICROGram(s) Oral daily  metoprolol tartrate 25 milliGRAM(s) Oral two times a day  pantoprazole    Tablet 40 milliGRAM(s) Oral before breakfast  simvastatin 20 milliGRAM(s) Oral at bedtime  traMADol 50 milliGRAM(s) Oral two times a day PRN Moderate Pain (4 - 6)      VITAL SIGNS, INS/OUTS (Last 24Hours)  ICU Vital Signs Last 24 Hrs  T(C): 37 (29 Mar 2023 02:59), Max: 37 (29 Mar 2023 02:59)  T(F): 98.6 (29 Mar 2023 02:59), Max: 98.6 (29 Mar 2023 02:59)  HR: 64 (29 Mar 2023 02:59) (64 - 71)  BP: 184/80 (29 Mar 2023 02:59) (171/75 - 221/101)  BP(mean): --  ABP: --  ABP(mean): --  RR: 18 (29 Mar 2023 02:59) (18 - 20)  SpO2: 100% (29 Mar 2023 02:59) (95% - 100%)    O2 Parameters below as of 28 Mar 2023 23:01  Patient On (Oxygen Delivery Method): nasal cannula  O2 Flow (L/min): 3        I&O's Summary          Physical Exam:   ----------------------------------------------------------------------------------------------------------  GCS:      Exam: A&Ox3, no focal deficits    RESPIRATORY:  Normal expansion/effort  Mechanical Ventilation    CARDIOVASCULAR:   S1/S2.  RRR  No peripheral edema    GASTROINTESTINAL:  Abdomen soft, non-tender, non-distended    MUSCULOSKELETAL:  Extremities warm, pink, well-perfused.    DERM:  No skin breakdown     :   Exam: Hoover catheter in place.       Weight (kg): 78.1 (03-29-23)    Tubes/Lines/Drains   ----------------------------------------------------------------------------------------------------------  [x] Peripheral IV  [X] Urinary Catheter Hoover                                             Date Placed:   [X] Central Venous Line          IJ/Femoral             Date Placed:    [X] Arterial Line		      Radial/Femoral    Date Placed:        CHEL CROFT     [id number]   [birth year]  89yM    Admit Date/LOS: 03-29  Indication for SDU: Q2 neuro checks s/p fall        ============================  HPI   89yM w/ PMHx of HLD, HTN, BPH, quadruple bypass, and CAD s/p 4 stent placement and DM seen as (Code Trauma / Trauma Alert / Trauma Consult) s/p fall down 10 stairs with +HT, ?LOC, and -AC (patient is on Plavix and ASA). Patient fell down 10 wooden steps while daughter was showering as patient is not supposed to go down steps without supervision Patient is hard of hearing but denies any pain on arrival to ED but had a 5cm hematoma to right frontal scalp along with a skin tear on left dorsal hand and superficial abrasion to right shin. Patient denies any nausea, vomiting, chest pain, or other concerning symptoms. Trauma assessment in ED: ABCs intact , GCS 15 , AAOx3. Patient is hard of hearing but answers appropriately when questioned.     SICU consulted for Q2 neuro checks s/p fall with Bilateral C1 posterior arch nondisplaced fractures. Patient states he was walking up the stairs and lost his balance but denies any dizziness prior to the fall. Patient denies LOC. Patient seen on the floor [place] collar in place.     CT Head: negative  CT Cervical Spine: Bilateral C1 posterior arch nondisplaced fractures. Extensive soft tissue calcifications and thickening of the odontoid transverse ligament with associated at least mild to moderate spinal canal stenosis at the same level. Prevertebral soft tissue swelling  CT Chest: negative  CT abd/pelv: negative        PAST MEDICAL & SURGICAL HISTORY:  HTN (hypertension)  HLD (hyperlipidemia)  BPH (benign prostatic hyperplasia)  CAD (coronary artery disease)  Diabetes mellitus  Hypothyroidism  S/P CABG (coronary artery bypass graft)      Allergies    No Known Allergies    Intolerances        Home Medications:  celecoxib 100 mg oral capsule: 1 cap(s) orally once a day, As Needed (13 Dec 2022 22:03)  clopidogrel 75 mg oral tablet: 1 tab(s) orally once a day (13 Dec 2022 22:03)  DULoxetine 60 mg oral delayed release capsule: 1 cap(s) orally once a day (13 Dec 2022 22:03)  finasteride 5 mg oral tablet: 1 tab(s) orally once a day (13 Dec 2022 22:03)  guaiFENesin 600 mg oral tablet, extended release: 1 tab(s) orally every 12 hours, As needed, Cough (20 Dec 2022 10:18)  lactobacillus acidophilus oral capsule: 1 tab(s) orally once a day (20 Dec 2022 10:18)  levothyroxine 50 mcg (0.05 mg) oral tablet: 1 tab(s) orally once a day (13 Dec 2022 22:03)  Metoprolol Tartrate 25 mg oral tablet: 1 tab(s) orally 2 times a day (13 Dec 2022 22:03)  simvastatin 20 mg oral tablet: 1 tab(s) orally once a day (at bedtime) (13 Dec 2022 22:03)  tamsulosin 0.4 mg oral capsule: 1 cap(s) orally once a day (13 Dec 2022 22:03)  Toujeo SoloStar 300 units/mL subcutaneous solution: 12 unit(s) subcutaneous once a day (in the morning) (13 Dec 2022 22:03)  traMADol 50 mg oral tablet: 1 tab(s) orally 2 times a day, As Needed (13 Dec 2022 22:03)      ROS: 10-system review is otherwise negative except HPI above.      Primary Survey:    A - airway intact  B - bilateral breath sounds and good chest rise  C - palpable pulses in all extremities  D - GCS 15 on arrival, JIMENES  Exposure obtained    Vital Signs Last 24 Hrs  T(C): 35.2 (28 Mar 2023 22:40), Max: 35.2 (28 Mar 2023 22:40)  T(F): 95.3 (28 Mar 2023 22:40), Max: 95.3 (28 Mar 2023 22:40)  HR: 66 (28 Mar 2023 21:41) (66 - 66)  BP: 210/70 (28 Mar 2023 21:41) (210/70 - 210/70)  BP(mean): --  RR: 20 (28 Mar 2023 21:41) (20 - 20)  SpO2: 95% (28 Mar 2023 21:41) (95% - 95%)    Parameters below as of 28 Mar 2023 21:41  Patient On (Oxygen Delivery Method): nasal cannula  O2 Flow (L/min): 3 (29 Mar 2023 01:52)          Consults-  Consult Note Adult-Neurosurgery Physician Assistant [KEREN Nelson] (03-28-23)  Consult Note Adult-Trauma Surgery Resident/Attending [FAY Stephens] (03-28-23)  Consult Note Adult-Infectious Disease Attending [CESAR Campbell] (12-14-22)      Procedure:  OR Stats  OR Time:    EBL:   IV Fluids:  Blood Products:  UOP:      Findings-       24 Hour Events  -Admission under SICU service      [X] A ten-point review of systems was otherwise negative except as noted above.  [  ] Due to altered mental status/intubation, subjective information was not attained from the patient. History was obtained, to the extent possible, from review of the chart and collateral sources of information.    =========================================================================================================================================      PMH  PAST MEDICAL & SURGICAL HISTORY:  HTN (hypertension)      HLD (hyperlipidemia)      BPH (benign prostatic hyperplasia)      CAD (coronary artery disease)      Diabetes mellitus      Hypothyroidism      S/P CABG (coronary artery bypass graft)        Home Meds:  Home Medications:  celecoxib 100 mg oral capsule: 1 cap(s) orally once a day, As Needed (13 Dec 2022 22:03)  clopidogrel 75 mg oral tablet: 1 tab(s) orally once a day (13 Dec 2022 22:03)  DULoxetine 60 mg oral delayed release capsule: 1 cap(s) orally once a day (13 Dec 2022 22:03)  finasteride 5 mg oral tablet: 1 tab(s) orally once a day (13 Dec 2022 22:03)  guaiFENesin 600 mg oral tablet, extended release: 1 tab(s) orally every 12 hours, As needed, Cough (20 Dec 2022 10:18)  lactobacillus acidophilus oral capsule: 1 tab(s) orally once a day (20 Dec 2022 10:18)  levothyroxine 50 mcg (0.05 mg) oral tablet: 1 tab(s) orally once a day (13 Dec 2022 22:03)  Metoprolol Tartrate 25 mg oral tablet: 1 tab(s) orally 2 times a day (13 Dec 2022 22:03)  simvastatin 20 mg oral tablet: 1 tab(s) orally once a day (at bedtime) (13 Dec 2022 22:03)  tamsulosin 0.4 mg oral capsule: 1 cap(s) orally once a day (13 Dec 2022 22:03)  Toujeo SoloStar 300 units/mL subcutaneous solution: 12 unit(s) subcutaneous once a day (in the morning) (13 Dec 2022 22:03)  traMADol 50 mg oral tablet: 1 tab(s) orally 2 times a day, As Needed (13 Dec 2022 22:03)     Allergies  Allergies    No Known Allergies    Intolerances       Current Medications:  acetaminophen     Tablet .. 650 milliGRAM(s) Oral every 6 hours  celecoxib 100 milliGRAM(s) Oral daily  chlorhexidine 4% Liquid 1 Application(s) Topical <User Schedule>  clopidogrel Tablet 75 milliGRAM(s) Oral daily  dextrose 5%. 1000 milliLiter(s) (50 mL/Hr) IV Continuous <Continuous>  dextrose 50% Injectable 25 Gram(s) IV Push once  dextrose Oral Gel 15 Gram(s) Oral once PRN Blood Glucose LESS THAN 70 milliGRAM(s)/deciliter  DULoxetine 60 milliGRAM(s) Oral daily  enoxaparin Injectable 40 milliGRAM(s) SubCutaneous every 24 hours  finasteride 5 milliGRAM(s) Oral daily  glucagon  Injectable 1 milliGRAM(s) IntraMuscular once  guaiFENesin  milliGRAM(s) Oral every 12 hours PRN Cough  insulin lispro (ADMELOG) corrective regimen sliding scale   SubCutaneous at bedtime  levothyroxine 50 MICROGram(s) Oral daily  metoprolol tartrate 25 milliGRAM(s) Oral two times a day  pantoprazole    Tablet 40 milliGRAM(s) Oral before breakfast  simvastatin 20 milliGRAM(s) Oral at bedtime  traMADol 50 milliGRAM(s) Oral two times a day PRN Moderate Pain (4 - 6)      VITAL SIGNS, INS/OUTS (Last 24Hours)  ICU Vital Signs Last 24 Hrs  T(C): 37 (29 Mar 2023 02:59), Max: 37 (29 Mar 2023 02:59)  T(F): 98.6 (29 Mar 2023 02:59), Max: 98.6 (29 Mar 2023 02:59)  HR: 64 (29 Mar 2023 02:59) (64 - 71)  BP: 184/80 (29 Mar 2023 02:59) (171/75 - 221/101)  BP(mean): --  ABP: --  ABP(mean): --  RR: 18 (29 Mar 2023 02:59) (18 - 20)  SpO2: 100% (29 Mar 2023 02:59) (95% - 100%)    O2 Parameters below as of 28 Mar 2023 23:01  Patient On (Oxygen Delivery Method): nasal cannula  O2 Flow (L/min): 3        I&O's Summary          Physical Exam:   ----------------------------------------------------------------------------------------------------------  GCS:      Exam: A&Ox3, no focal deficits    RESPIRATORY:  Normal expansion/effort  Mechanical Ventilation    CARDIOVASCULAR:   S1/S2.  RRR  No peripheral edema    GASTROINTESTINAL:  Abdomen soft, non-tender, non-distended    MUSCULOSKELETAL:  Extremities warm, pink, well-perfused.    DERM:  No skin breakdown     :   Exam: Hoover catheter in place.       Weight (kg): 78.1 (03-29-23)    Tubes/Lines/Drains   ----------------------------------------------------------------------------------------------------------  [x] Peripheral IV  [X] Urinary Catheter Hoover                                             Date Placed:   [X] Central Venous Line          IJ/Femoral             Date Placed:    [X] Arterial Line		      Radial/Femoral    Date Placed:        CHEL CROFT     [id number]   [birth year]  89yM    Admit Date/LOS: 03-29  Indication for SDU: Q2 neuro checks s/p fall        ============================  HPI   89yM w/ PMHx of HLD, HTN, BPH, quadruple bypass, and CAD s/p 4 stent placement and DM seen as (Code Trauma / Trauma Alert / Trauma Consult) s/p fall down 10 stairs with +HT, ?LOC, and -AC (patient is on Plavix and ASA). Patient fell down 10 wooden steps while daughter was showering as patient is not supposed to go down steps without supervision Patient is hard of hearing but denies any pain on arrival to ED but had a 5cm hematoma to right frontal scalp along with a skin tear on left dorsal hand and superficial abrasion to right shin. Patient denies any nausea, vomiting, chest pain, or other concerning symptoms. Trauma assessment in ED: ABCs intact , GCS 15 , AAOx3. Patient is hard of hearing but answers appropriately when questioned.     SICU consulted for Q2 neuro checks s/p fall with Bilateral C1 posterior arch nondisplaced fractures. Patient states he was walking up the stairs and lost his balance but denies any dizziness prior to the fall. Patient denies LOC. Patient seen on the floor with [place] collar in place. Patient A&O x3, JIMENES with b/l equal strength. Tongue deviation to the right, Left pupil reactive, right pupil nonreactive.  Right frontal hematoma noted. Left dorsal hand dressing in place with skin tear. VS at bedside were HR: 54, /80, O2 100% on 2L NC. Pan scan revealed the following results:    CT Head: negative  CT Cervical Spine: Bilateral C1 posterior arch nondisplaced fractures. Extensive soft tissue calcifications and thickening of the odontoid transverse ligament with associated at least mild to moderate spinal canal stenosis at the same level. Prevertebral soft tissue swelling  CT Chest: negative  CT abd/pelv: negative         24 Hour Events  -Admission under SICU service      [X] A ten-point review of systems was otherwise negative except as noted above.  [  ] Due to altered mental status/intubation, subjective information was not attained from the patient. History was obtained, to the extent possible, from review of the chart and collateral sources of information.    =========================================================================================================================================      PMH  PAST MEDICAL & SURGICAL HISTORY:  HTN (hypertension)  HLD (hyperlipidemia)  BPH (benign prostatic hyperplasia)  CAD (coronary artery disease)  Diabetes mellitus  Hypothyroidism  S/P CABG (coronary artery bypass graft)      Home Meds:  Home Medications:  celecoxib 100 mg oral capsule: 1 cap(s) orally once a day, As Needed (13 Dec 2022 22:03)  clopidogrel 75 mg oral tablet: 1 tab(s) orally once a day (13 Dec 2022 22:03)  DULoxetine 60 mg oral delayed release capsule: 1 cap(s) orally once a day (13 Dec 2022 22:03)  finasteride 5 mg oral tablet: 1 tab(s) orally once a day (13 Dec 2022 22:03)  guaiFENesin 600 mg oral tablet, extended release: 1 tab(s) orally every 12 hours, As needed, Cough (20 Dec 2022 10:18)  lactobacillus acidophilus oral capsule: 1 tab(s) orally once a day (20 Dec 2022 10:18)  levothyroxine 50 mcg (0.05 mg) oral tablet: 1 tab(s) orally once a day (13 Dec 2022 22:03)  Metoprolol Tartrate 25 mg oral tablet: 1 tab(s) orally 2 times a day (13 Dec 2022 22:03)  simvastatin 20 mg oral tablet: 1 tab(s) orally once a day (at bedtime) (13 Dec 2022 22:03)  tamsulosin 0.4 mg oral capsule: 1 cap(s) orally once a day (13 Dec 2022 22:03)  Toujeo SoloStar 300 units/mL subcutaneous solution: 12 unit(s) subcutaneous once a day (in the morning) (13 Dec 2022 22:03)  traMADol 50 mg oral tablet: 1 tab(s) orally 2 times a day, As Needed (13 Dec 2022 22:03)    Allergies  No Known Allergies       Current Medications:  acetaminophen     Tablet .. 650 milliGRAM(s) Oral every 6 hours  celecoxib 100 milliGRAM(s) Oral daily  chlorhexidine 4% Liquid 1 Application(s) Topical <User Schedule>  clopidogrel Tablet 75 milliGRAM(s) Oral daily  dextrose 5%. 1000 milliLiter(s) (50 mL/Hr) IV Continuous <Continuous>  dextrose 50% Injectable 25 Gram(s) IV Push once  dextrose Oral Gel 15 Gram(s) Oral once PRN Blood Glucose LESS THAN 70 milliGRAM(s)/deciliter  DULoxetine 60 milliGRAM(s) Oral daily  enoxaparin Injectable 40 milliGRAM(s) SubCutaneous every 24 hours  finasteride 5 milliGRAM(s) Oral daily  glucagon  Injectable 1 milliGRAM(s) IntraMuscular once  guaiFENesin  milliGRAM(s) Oral every 12 hours PRN Cough  insulin lispro (ADMELOG) corrective regimen sliding scale   SubCutaneous at bedtime  levothyroxine 50 MICROGram(s) Oral daily  metoprolol tartrate 25 milliGRAM(s) Oral two times a day  pantoprazole    Tablet 40 milliGRAM(s) Oral before breakfast  simvastatin 20 milliGRAM(s) Oral at bedtime  traMADol 50 milliGRAM(s) Oral two times a day PRN Moderate Pain (4 - 6)      VITAL SIGNS, INS/OUTS (Last 24Hours)  ICU Vital Signs Last 24 Hrs  T(C): 37 (29 Mar 2023 02:59), Max: 37 (29 Mar 2023 02:59)  T(F): 98.6 (29 Mar 2023 02:59), Max: 98.6 (29 Mar 2023 02:59)  HR: 64 (29 Mar 2023 02:59) (64 - 71)  BP: 184/80 (29 Mar 2023 02:59) (171/75 - 221/101)  BP(mean): --  ABP: --  ABP(mean): --  RR: 18 (29 Mar 2023 02:59) (18 - 20)  SpO2: 100% (29 Mar 2023 02:59) (95% - 100%)    O2 Parameters below as of 28 Mar 2023 23:01  Patient On (Oxygen Delivery Method): nasal cannula  O2 Flow (L/min): 3        Physical Exam:   ----------------------------------------------------------------------------------------------------------  GCS: 15  Exam: A&Ox3, right sided tongue deviation. Right pupil nonreactive, left pupil reactive to light. JIMENES with b/l equal strength.     HEAD:  Right frontal hematoma noted. Tongue deviation to the right, Left pupil reactive, right pupil nonreactive. Right frontal hematoma noted.    RESPIRATORY:  Normal expansion/effort, satting 100% on RA    CARDIOVASCULAR:   S1/S2.  RRR  No peripheral edema    GASTROINTESTINAL:  Abdomen soft, non-tender, non-distended with bowel sounds present    MUSCULOSKELETAL:  Extremities warm, pink, well-perfused.    DERM:  No skin breakdown     :   Exam: no h[place]go       Weight (kg): 78.1 (03-29-23)    Tubes/Lines/Drains   ----------------------------------------------------------------------------------------------------------  [x] Peripheral IV

## 2023-03-29 NOTE — CHART NOTE - NSCHARTNOTEFT_GEN_A_CORE
SICU Transfer Note    CHEL CROFT  89y (12-Mar-1924)  [id number]      Transfer from: SICU  Transfer to: Surgery-BLUE    89yM w/ PMHx of HLD, HTN, BPH, quadruple bypass, and CAD s/p 4 stent placement and DM seen as (Code Trauma / Trauma Alert / Trauma Consult) s/p fall down 10 stairs with +HT, ?LOC, and -AC (patient is on Plavix and ASA). Patient fell down 10 wooden steps while daughter was showering as patient is not supposed to go down steps without supervision Patient is hard of hearing but denies any pain on arrival to ED but had a 5cm hematoma to right frontal scalp along with a skin tear on left dorsal hand and superficial abrasion to right shin. Patient denies any nausea, vomiting, chest pain, or other concerning symptoms. Trauma assessment in ED: ABCs intact , GCS 15 , AAOx3. Patient is hard of hearing but answers appropriately when questioned.     SICU consulted for Q2 neuro checks s/p fall with Bilateral C1 posterior arch nondisplaced fractures. Patient states he was walking up the stairs and lost his balance but denies any dizziness prior to the fall. Patient denies LOC. Patient seen on the floor with [place] collar in place. Patient A&O x3, JIMENES with b/l equal strength. Tongue deviation to the right, Left pupil reactive, right pupil nonreactive.  Right frontal hematoma noted. Left dorsal hand dressing in place with skin tear. VS at bedside were HR: 54, /80, O2 100% on 2L NC. Pan scan revealed the following results:    CT Head: negative  CT Cervical Spine: Bilateral C1 posterior arch nondisplaced fractures. Extensive soft tissue calcifications and thickening of the odontoid transverse ligament with associated at least mild to moderate spinal canal stenosis at the same level. Prevertebral soft tissue swelling  CT Chest: negative  CT abd/pelv: negative    Patient approved for stepdown by Dr. Cash    PAST MEDICAL & SURGICAL HISTORY:  HTN (hypertension)  HLD (hyperlipidemia)  BPH (benign prostatic hyperplasia)  CAD (coronary artery disease)  Diabetes mellitus  Hypothyroidism  S/P CABG (coronary artery bypass graft)    Allergies  No Known Allergies    MEDICATIONS  (STANDING):  acetaminophen     Tablet .. 650 milliGRAM(s) Oral every 6 hours  celecoxib 100 milliGRAM(s) Oral daily  chlorhexidine 4% Liquid 1 Application(s) Topical <User Schedule>  clopidogrel Tablet 75 milliGRAM(s) Oral daily  DULoxetine 60 milliGRAM(s) Oral daily  enoxaparin Injectable 40 milliGRAM(s) SubCutaneous every 24 hours  finasteride 5 milliGRAM(s) Oral daily  insulin glargine Injectable (LANTUS) 12 Unit(s) SubCutaneous at bedtime  insulin lispro (ADMELOG) corrective regimen sliding scale   SubCutaneous at bedtime  levothyroxine 50 MICROGram(s) Oral daily  metoprolol tartrate 25 milliGRAM(s) Oral two times a day  simvastatin 20 milliGRAM(s) Oral at bedtime  tamsulosin 0.4 milliGRAM(s) Oral at bedtime    MEDICATIONS  (PRN):  guaiFENesin  milliGRAM(s) Oral every 12 hours PRN Cough  traMADol 50 milliGRAM(s) Oral two times a day PRN Moderate Pain (4 - 6)      Vital Signs Last 24 Hrs  T(C): 37 (29 Mar 2023 02:59), Max: 37 (29 Mar 2023 02:59)  T(F): 98.6 (29 Mar 2023 02:59), Max: 98.6 (29 Mar 2023 02:59)  HR: 78 (29 Mar 2023 04:40) (64 - 78)  BP: 127/87 (29 Mar 2023 04:40) (127/87 - 221/101)  BP(mean): --  RR: 18 (29 Mar 2023 02:59) (18 - 20)  SpO2: 100% (29 Mar 2023 02:59) (95% - 100%)    Parameters below as of 28 Mar 2023 23:01  Patient On (Oxygen Delivery Method): nasal cannula  O2 Flow (L/min): 3    I&O's Summary    LABS:                        12.1   10.07 )-----------( 200      ( 28 Mar 2023 21:55 )             36.8       03-    136  |  102  |  19  ----------------------------<  259<H>  5.1<H>   |  22  |  0.9    Ca    8.9      28 Mar 2023 21:55  Mg     1.6         TPro  6.2  /  Alb  4.0  /  TBili  <0.2  /  DBili  x   /  AST  21  /  ALT  11  /  AlkPhos  74        PT/INR - ( 28 Mar 2023 21:55 )   PT: 13.50 sec;   INR: 1.18 ratio         PTT - ( 28 Mar 2023 21:55 )  PTT:31.4 sec  CARDIAC MARKERS ( 28 Mar 2023 21:56 )  x     / <0.01 ng/mL / x     / x     / x            Urinalysis Basic - ( 29 Mar 2023 00:05 )    Color: Light Yellow / Appearance: Clear / S.021 / pH: x  Gluc: x / Ketone: Trace  / Bili: Negative / Urobili: <2 mg/dL   Blood: x / Protein: 30 mg/dL / Nitrite: Negative   Leuk Esterase: Negative / RBC: 1 /HPF / WBC 0 /HPF   Sq Epi: x / Non Sq Epi: 0 /HPF / Bacteria: Negative      COVID-19 PCR: NotDetec (23 @ 21:55)        Assessment & Plan:  89y Male Q2 neuro checks s/p fall with Bilateral C1 posterior arch nondisplaced fractures.    NEURO:  #Acute pain    -APAP prn    -tramadol 50 bid    -celecoxib 100 milliGRAM(s) Oral daily    -DULoxetine 60 milliGRAM(s) Oral daily    RESP:   #Oxygenation    -wean off NC to RA as tolerated  #Activity    -increase as tolerated    CARDS:   #HTN    -Metoprolol tartrate 25mg PO BID  Labs: Trop neg x1  #HLD    -simvastatin 20mg QD    GI/NUTR:   #Diet, Regular, Soft and Bite Sized     -aspiration precautions, HOB 30  #GI Prophylaxis    -not indicated  #Bowel regimen    -not indicated     /RENAL:   #maintain euvolemia    Labs:          BUN/Cr- 19/0.9  -->          Electrolytes-Na -- // K -- // Mg 1.6 //  Phos -- ( @ 21:56)  UA: positive trace ketones, proteins, glucose  #BPH    -Finesteride 5mg QD    -Flomax 0.4 QD       HEME/ONC:   #DVT prophylaxis    -on home plavix, holding home asa    -enoxaparin Injectable    -SCDs    Labs: Hb/Hct:  12.1/36.8  -->                      Plts:  200  -->                 PTT/INR:  31.4/1.18  --->       T&S pending    ID:  #monitor for leukocytosis  WBC- 10.07  --->>  Temp trend- 24hrs T(F): 98.6 ( @ 02:59), Max: 98.6 ( @ 02:59)    ENDO:  #DM    -on ISS    -restarted Lantus ([place] at home)       HA1C 10.2 ()    MSK:  #Activity - Ambulate as Tolerated:       LINES/DRAINS:  PIV,    ADVANCED DIRECTIVES:  Full Code    HCP/Emergency Contact- Russ Michelle [phone]    INDICATION FOR SDU: Q2 neuro checks s/p fall      DISPO:   Case discussed with attending Dr. Cash      Follow Up:  - labs  -f/u stat BMP        Signed out to:  Date:  Time: SICU Transfer Note    CHEL CROFT  89y (12-Mar-1924)  [id number]      Transfer from: SICU  Transfer to: Surgery-BLUE    89yM w/ PMHx of HLD, HTN, BPH, quadruple bypass, and CAD s/p 4 stent placement and DM seen as (Code Trauma / Trauma Alert / Trauma Consult) s/p fall down 10 stairs with +HT, ?LOC, and -AC (patient is on Plavix and ASA). Patient fell down 10 wooden steps while daughter was showering as patient is not supposed to go down steps without supervision Patient is hard of hearing but denies any pain on arrival to ED but had a 5cm hematoma to right frontal scalp along with a skin tear on left dorsal hand and superficial abrasion to right shin. Patient denies any nausea, vomiting, chest pain, or other concerning symptoms. Trauma assessment in ED: ABCs intact , GCS 15 , AAOx3. Patient is hard of hearing but answers appropriately when questioned.     SICU consulted for Q2 neuro checks s/p fall with Bilateral C1 posterior arch nondisplaced fractures. Patient states he was walking up the stairs and lost his balance but denies any dizziness prior to the fall. Patient denies LOC. Patient seen on the floor with [place] collar in place. Patient A&O x3, JIMENES with b/l equal strength. Tongue deviation to the right, Left pupil reactive, right pupil nonreactive.  Right frontal hematoma noted. Left dorsal hand dressing in place with skin tear. VS at bedside were HR: 54, /80, O2 100% on 2L NC. Pan scan revealed the following results:    CT Head: negative  CT Cervical Spine: Bilateral C1 posterior arch nondisplaced fractures. Extensive soft tissue calcifications and thickening of the odontoid transverse ligament with associated at least mild to moderate spinal canal stenosis at the same level. Prevertebral soft tissue swelling  CT Chest: negative  CT abd/pelv: negative    Patient approved for stepdown by Dr. Cash    PAST MEDICAL & SURGICAL HISTORY:  HTN (hypertension)  HLD (hyperlipidemia)  BPH (benign prostatic hyperplasia)  CAD (coronary artery disease)  Diabetes mellitus  Hypothyroidism  S/P CABG (coronary artery bypass graft)    Allergies  No Known Allergies    MEDICATIONS  (STANDING):  acetaminophen     Tablet .. 650 milliGRAM(s) Oral every 6 hours  celecoxib 100 milliGRAM(s) Oral daily  chlorhexidine 4% Liquid 1 Application(s) Topical <User Schedule>  clopidogrel Tablet 75 milliGRAM(s) Oral daily  DULoxetine 60 milliGRAM(s) Oral daily  enoxaparin Injectable 40 milliGRAM(s) SubCutaneous every 24 hours  finasteride 5 milliGRAM(s) Oral daily  insulin glargine Injectable (LANTUS) 12 Unit(s) SubCutaneous at bedtime  insulin lispro (ADMELOG) corrective regimen sliding scale   SubCutaneous at bedtime  levothyroxine 50 MICROGram(s) Oral daily  metoprolol tartrate 25 milliGRAM(s) Oral two times a day  simvastatin 20 milliGRAM(s) Oral at bedtime  tamsulosin 0.4 milliGRAM(s) Oral at bedtime    MEDICATIONS  (PRN):  guaiFENesin  milliGRAM(s) Oral every 12 hours PRN Cough  traMADol 50 milliGRAM(s) Oral two times a day PRN Moderate Pain (4 - 6)      Vital Signs Last 24 Hrs  T(C): 37 (29 Mar 2023 02:59), Max: 37 (29 Mar 2023 02:59)  T(F): 98.6 (29 Mar 2023 02:59), Max: 98.6 (29 Mar 2023 02:59)  HR: 78 (29 Mar 2023 04:40) (64 - 78)  BP: 127/87 (29 Mar 2023 04:40) (127/87 - 221/101)  BP(mean): --  RR: 18 (29 Mar 2023 02:59) (18 - 20)  SpO2: 100% (29 Mar 2023 02:59) (95% - 100%)    Parameters below as of 28 Mar 2023 23:01  Patient On (Oxygen Delivery Method): nasal cannula  O2 Flow (L/min): 3    I&O's Summary    LABS:                        12.1   10.07 )-----------( 200      ( 28 Mar 2023 21:55 )             36.8       03-    136  |  102  |  19  ----------------------------<  259<H>  5.1<H>   |  22  |  0.9    Ca    8.9      28 Mar 2023 21:55  Mg     1.6         TPro  6.2  /  Alb  4.0  /  TBili  <0.2  /  DBili  x   /  AST  21  /  ALT  11  /  AlkPhos  74        PT/INR - ( 28 Mar 2023 21:55 )   PT: 13.50 sec;   INR: 1.18 ratio         PTT - ( 28 Mar 2023 21:55 )  PTT:31.4 sec  CARDIAC MARKERS ( 28 Mar 2023 21:56 )  x     / <0.01 ng/mL / x     / x     / x            Urinalysis Basic - ( 29 Mar 2023 00:05 )    Color: Light Yellow / Appearance: Clear / S.021 / pH: x  Gluc: x / Ketone: Trace  / Bili: Negative / Urobili: <2 mg/dL   Blood: x / Protein: 30 mg/dL / Nitrite: Negative   Leuk Esterase: Negative / RBC: 1 /HPF / WBC 0 /HPF   Sq Epi: x / Non Sq Epi: 0 /HPF / Bacteria: Negative      COVID-19 PCR: NotDetec (23 @ 21:55)        Assessment & Plan:  89y Male Q2 neuro checks s/p fall with Bilateral C1 posterior arch nondisplaced fractures.    NEURO:  #Acute pain    -APAP prn    -tramadol 50 bid    -celecoxib 100 milliGRAM(s) Oral daily    -DULoxetine 60 milliGRAM(s) Oral daily    RESP:   #Oxygenation    -wean off NC to RA as tolerated  #Activity    -increase as tolerated    CARDS:   #HTN    -Metoprolol tartrate 25mg PO BID  Labs: Trop neg x1  #HLD    -simvastatin 20mg QD    GI/NUTR:   #Diet, Regular, Soft and Bite Sized     -aspiration precautions, HOB 30  #GI Prophylaxis    -not indicated  #Bowel regimen    -not indicated     /RENAL:   #maintain euvolemia    Labs:          BUN/Cr- 19/0.9  -->          Electrolytes-Na -- // K -- // Mg 1.6 //  Phos -- ( @ 21:56)  UA: positive trace ketones, proteins, glucose  #BPH    -Finesteride 5mg QD    -Flomax 0.4 QD       HEME/ONC:   #DVT prophylaxis    -on home plavix, holding home asa    -enoxaparin Injectable    -SCDs    Labs: Hb/Hct:  12.1/36.8  -->                      Plts:  200  -->                 PTT/INR:  31.4/1.18  --->       T&S pending    ID:  #monitor for leukocytosis  WBC- 10.07  --->>  Temp trend- 24hrs T(F): 98.6 ( @ 02:59), Max: 98.6 ( @ 02:59)    ENDO:  #DM    -on ISS    -restarted Lantus ([place] at home)       HA1C 10.2 ()  #Hypothyroidism    -Synthroid 50 mcg QD    MSK:  #Activity - Ambulate as Tolerated:       LINES/DRAINS:  PIV,    ADVANCED DIRECTIVES:  Full Code    HCP/Emergency Contact- Russ Michelle [phone]    INDICATION FOR SDU: Q2 neuro checks s/p fall      DISPO:   Case discussed with attending Dr. Cash      Follow Up:  - labs  -f/u stat BMP, EKG for hyperK        Signed out to:  Date:  Time: SICU Transfer Note    CHEL CROFT  89y (12-Mar-1924)  [id number]      Transfer from: SICU  Transfer to: Surgery-Trauma    89yM w/ PMHx of HLD, HTN, BPH, quadruple bypass, and CAD s/p 4 stent placement and DM seen as (Code Trauma / Trauma Alert / Trauma Consult) s/p fall down 10 stairs with +HT, ?LOC, and -AC (patient is on Plavix and ASA). Patient fell down 10 wooden steps while daughter was showering as patient is not supposed to go down steps without supervision Patient is hard of hearing but denies any pain on arrival to ED but had a 5cm hematoma to right frontal scalp along with a skin tear on left dorsal hand and superficial abrasion to right shin. Patient denies any nausea, vomiting, chest pain, or other concerning symptoms. Trauma assessment in ED: ABCs intact , GCS 15 , AAOx3. Patient is hard of hearing but answers appropriately when questioned.     SICU consulted for Q2 neuro checks s/p fall with Bilateral C1 posterior arch nondisplaced fractures. Patient states he was walking up the stairs and lost his balance but denies any dizziness prior to the fall. Patient denies LOC. Patient seen on the floor with [place] collar in place. Patient A&O x3, JIMENES with b/l equal strength. Tongue deviation to the right, Left pupil reactive, right pupil nonreactive.  Right frontal hematoma noted. Left dorsal hand dressing in place with skin tear. VS at bedside were HR: 54, /80, O2 100% on 2L NC. Pan scan revealed the following results:    CT Head: negative  CT Cervical Spine: Bilateral C1 posterior arch nondisplaced fractures. Extensive soft tissue calcifications and thickening of the odontoid transverse ligament with associated at least mild to moderate spinal canal stenosis at the same level. Prevertebral soft tissue swelling  CT Chest: negative  CT abd/pelv: negative    Patient approved for stepdown by Dr. Cash    PAST MEDICAL & SURGICAL HISTORY:  HTN (hypertension)  HLD (hyperlipidemia)  BPH (benign prostatic hyperplasia)  CAD (coronary artery disease)  Diabetes mellitus  Hypothyroidism  S/P CABG (coronary artery bypass graft)    Allergies  No Known Allergies    MEDICATIONS  (STANDING):  acetaminophen     Tablet .. 650 milliGRAM(s) Oral every 6 hours  celecoxib 100 milliGRAM(s) Oral daily  chlorhexidine 4% Liquid 1 Application(s) Topical <User Schedule>  clopidogrel Tablet 75 milliGRAM(s) Oral daily  DULoxetine 60 milliGRAM(s) Oral daily  enoxaparin Injectable 40 milliGRAM(s) SubCutaneous every 24 hours  finasteride 5 milliGRAM(s) Oral daily  insulin glargine Injectable (LANTUS) 12 Unit(s) SubCutaneous at bedtime  insulin lispro (ADMELOG) corrective regimen sliding scale   SubCutaneous at bedtime  levothyroxine 50 MICROGram(s) Oral daily  metoprolol tartrate 25 milliGRAM(s) Oral two times a day  simvastatin 20 milliGRAM(s) Oral at bedtime  tamsulosin 0.4 milliGRAM(s) Oral at bedtime    MEDICATIONS  (PRN):  guaiFENesin  milliGRAM(s) Oral every 12 hours PRN Cough  traMADol 50 milliGRAM(s) Oral two times a day PRN Moderate Pain (4 - 6)      Vital Signs Last 24 Hrs  T(C): 37 (29 Mar 2023 02:59), Max: 37 (29 Mar 2023 02:59)  T(F): 98.6 (29 Mar 2023 02:59), Max: 98.6 (29 Mar 2023 02:59)  HR: 78 (29 Mar 2023 04:40) (64 - 78)  BP: 127/87 (29 Mar 2023 04:40) (127/87 - 221/101)  BP(mean): --  RR: 18 (29 Mar 2023 02:59) (18 - 20)  SpO2: 100% (29 Mar 2023 02:59) (95% - 100%)    Parameters below as of 28 Mar 2023 23:01  Patient On (Oxygen Delivery Method): nasal cannula  O2 Flow (L/min): 3    I&O's Summary    LABS:                        12.1   10.07 )-----------( 200      ( 28 Mar 2023 21:55 )             36.8       03-    136  |  102  |  19  ----------------------------<  259<H>  5.1<H>   |  22  |  0.9    Ca    8.9      28 Mar 2023 21:55  Mg     1.6         TPro  6.2  /  Alb  4.0  /  TBili  <0.2  /  DBili  x   /  AST  21  /  ALT  11  /  AlkPhos  74        PT/INR - ( 28 Mar 2023 21:55 )   PT: 13.50 sec;   INR: 1.18 ratio         PTT - ( 28 Mar 2023 21:55 )  PTT:31.4 sec  CARDIAC MARKERS ( 28 Mar 2023 21:56 )  x     / <0.01 ng/mL / x     / x     / x            Urinalysis Basic - ( 29 Mar 2023 00:05 )    Color: Light Yellow / Appearance: Clear / S.021 / pH: x  Gluc: x / Ketone: Trace  / Bili: Negative / Urobili: <2 mg/dL   Blood: x / Protein: 30 mg/dL / Nitrite: Negative   Leuk Esterase: Negative / RBC: 1 /HPF / WBC 0 /HPF   Sq Epi: x / Non Sq Epi: 0 /HPF / Bacteria: Negative      COVID-19 PCR: NotDetec (23 @ 21:55)        Assessment & Plan:  89y Male Q2 neuro checks s/p fall with Bilateral C1 posterior arch nondisplaced fractures.    NEURO:  #Acute pain    -APAP prn    -tramadol 50 bid    -celecoxib 100 milliGRAM(s) Oral daily    -DULoxetine 60 milliGRAM(s) Oral daily    RESP:   #Oxygenation    -wean off NC to RA as tolerated  #Activity    -increase as tolerated    CARDS:   #HTN    -Metoprolol tartrate 25mg PO BID  Labs: Trop neg x1  #HLD    -simvastatin 20mg QD    GI/NUTR:   #Diet, Regular, Soft and Bite Sized     -aspiration precautions, HOB 30  #GI Prophylaxis    -not indicated  #Bowel regimen    -not indicated     /RENAL:   #maintain euvolemia    Labs:          BUN/Cr- 19/0.9  -->          Electrolytes-Na -- // K -- // Mg 1.6 //  Phos -- ( @ 21:56)  UA: positive trace ketones, proteins, glucose  #BPH    -Finesteride 5mg QD    -Flomax 0.4 QD       HEME/ONC:   #DVT prophylaxis    -on home plavix, holding home asa    -enoxaparin Injectable    -SCDs    Labs: Hb/Hct:  12.1/36.8  -->                      Plts:  200  -->                 PTT/INR:  31.4/1.18  --->       T&S pending    ID:  #monitor for leukocytosis  WBC- 10.07  --->>  Temp trend- 24hrs T(F): 98.6 ( @ 02:59), Max: 98.6 ( @ 02:59)    ENDO:  #DM    -on ISS    -restarted Lantus ([place] at home)       HA1C 10.2 ()  #Hypothyroidism    -Synthroid 50 mcg QD    MSK:  #Activity - Ambulate as Tolerated:       LINES/DRAINS:  PIV,    ADVANCED DIRECTIVES:  Full Code    HCP/Emergency Contact- Russ Michelle [phone]    INDICATION FOR SDU: Q2 neuro checks s/p fall      DISPO:   Case discussed with attending Dr. Cash      Follow Up:  - labs  -f/u stat BMP, EKG for hyperK        Signed out to:  Date:  Time: SICU Transfer Note    CHEL CROFT  89y (12-Mar-1924)  [id number]      Transfer from: SICU  Transfer to: Surgery-Trauma    89yM w/ PMHx of HLD, HTN, BPH, quadruple bypass, and CAD s/p 4 stent placement and DM seen as (Code Trauma / Trauma Alert / Trauma Consult) s/p fall down 10 stairs with +HT, ?LOC, and -AC (patient is on Plavix and ASA). Patient fell down 10 wooden steps while daughter was showering as patient is not supposed to go down steps without supervision Patient is hard of hearing but denies any pain on arrival to ED but had a 5cm hematoma to right frontal scalp along with a skin tear on left dorsal hand and superficial abrasion to right shin. Patient denies any nausea, vomiting, chest pain, or other concerning symptoms. Trauma assessment in ED: ABCs intact , GCS 15 , AAOx3. Patient is hard of hearing but answers appropriately when questioned.     SICU consulted for Q2 neuro checks s/p fall with Bilateral C1 posterior arch nondisplaced fractures. Patient states he was walking up the stairs and lost his balance but denies any dizziness prior to the fall. Patient denies LOC. Patient seen on the floor with [place] collar in place. Patient A&O x3, JIMENES with b/l equal strength. Tongue deviation to the right, Left pupil reactive, right pupil nonreactive.  Right frontal hematoma noted. Left dorsal hand dressing in place with skin tear. VS at bedside were HR: 54, /80, O2 100% on 2L NC. Pan scan revealed the following results:    CT Head: negative  CT Cervical Spine: Bilateral C1 posterior arch nondisplaced fractures. Extensive soft tissue calcifications and thickening of the odontoid transverse ligament with associated at least mild to moderate spinal canal stenosis at the same level. Prevertebral soft tissue swelling  CT Chest: negative  CT abd/pelv: negative    Patient approved for stepdown by Dr. Cash    PAST MEDICAL & SURGICAL HISTORY:  HTN (hypertension)  HLD (hyperlipidemia)  BPH (benign prostatic hyperplasia)  CAD (coronary artery disease)  Diabetes mellitus  Hypothyroidism  S/P CABG (coronary artery bypass graft)    Allergies  No Known Allergies    MEDICATIONS  (STANDING):  acetaminophen     Tablet .. 650 milliGRAM(s) Oral every 6 hours  celecoxib 100 milliGRAM(s) Oral daily  chlorhexidine 4% Liquid 1 Application(s) Topical <User Schedule>  clopidogrel Tablet 75 milliGRAM(s) Oral daily  DULoxetine 60 milliGRAM(s) Oral daily  enoxaparin Injectable 40 milliGRAM(s) SubCutaneous every 24 hours  finasteride 5 milliGRAM(s) Oral daily  insulin glargine Injectable (LANTUS) 12 Unit(s) SubCutaneous at bedtime  insulin lispro (ADMELOG) corrective regimen sliding scale   SubCutaneous at bedtime  levothyroxine 50 MICROGram(s) Oral daily  metoprolol tartrate 25 milliGRAM(s) Oral two times a day  simvastatin 20 milliGRAM(s) Oral at bedtime  tamsulosin 0.4 milliGRAM(s) Oral at bedtime    MEDICATIONS  (PRN):  guaiFENesin  milliGRAM(s) Oral every 12 hours PRN Cough  traMADol 50 milliGRAM(s) Oral two times a day PRN Moderate Pain (4 - 6)      Vital Signs Last 24 Hrs  T(C): 37 (29 Mar 2023 02:59), Max: 37 (29 Mar 2023 02:59)  T(F): 98.6 (29 Mar 2023 02:59), Max: 98.6 (29 Mar 2023 02:59)  HR: 78 (29 Mar 2023 04:40) (64 - 78)  BP: 127/87 (29 Mar 2023 04:40) (127/87 - 221/101)  BP(mean): --  RR: 18 (29 Mar 2023 02:59) (18 - 20)  SpO2: 100% (29 Mar 2023 02:59) (95% - 100%)    Parameters below as of 28 Mar 2023 23:01  Patient On (Oxygen Delivery Method): nasal cannula  O2 Flow (L/min): 3    I&O's Summary    LABS:                        12.1   10.07 )-----------( 200      ( 28 Mar 2023 21:55 )             36.8       03-    136  |  102  |  19  ----------------------------<  259<H>  5.1<H>   |  22  |  0.9    Ca    8.9      28 Mar 2023 21:55  Mg     1.6         TPro  6.2  /  Alb  4.0  /  TBili  <0.2  /  DBili  x   /  AST  21  /  ALT  11  /  AlkPhos  74        PT/INR - ( 28 Mar 2023 21:55 )   PT: 13.50 sec;   INR: 1.18 ratio         PTT - ( 28 Mar 2023 21:55 )  PTT:31.4 sec  CARDIAC MARKERS ( 28 Mar 2023 21:56 )  x     / <0.01 ng/mL / x     / x     / x            Urinalysis Basic - ( 29 Mar 2023 00:05 )    Color: Light Yellow / Appearance: Clear / S.021 / pH: x  Gluc: x / Ketone: Trace  / Bili: Negative / Urobili: <2 mg/dL   Blood: x / Protein: 30 mg/dL / Nitrite: Negative   Leuk Esterase: Negative / RBC: 1 /HPF / WBC 0 /HPF   Sq Epi: x / Non Sq Epi: 0 /HPF / Bacteria: Negative      COVID-19 PCR: NotDetec (23 @ 21:55)        Assessment & Plan:  89y Male Q2 neuro checks s/p fall with Bilateral C1 posterior arch nondisplaced fractures.    NEURO:  #Acute pain    -APAP prn    -tramadol 50 bid    -celecoxib 100 milliGRAM(s) Oral daily    -DULoxetine 60 milliGRAM(s) Oral daily    RESP:   #Oxygenation    -wean off NC to RA as tolerated  #Activity    -increase as tolerated    CARDS:   #HTN    -Metoprolol tartrate 25mg PO BID  Labs: Trop neg x1  #HLD    -simvastatin 20mg QD    GI/NUTR:   #Diet, Regular, Soft and Bite Sized     -aspiration precautions, HOB 30  #GI Prophylaxis    -not indicated  #Bowel regimen    -not indicated     /RENAL:   #maintain euvolemia    Labs:          BUN/Cr- 19/0.9  -->          Electrolytes-Na -- // K -- // Mg 1.6 //  Phos -- ( @ 21:56)  UA: positive trace ketones, proteins, glucose  #BPH    -Finesteride 5mg QD    -Flomax 0.4 QD       HEME/ONC:   #DVT prophylaxis    -on home plavix, holding home asa    -enoxaparin Injectable    -SCDs    Labs: Hb/Hct:  12.1/36.8  -->                      Plts:  200  -->                 PTT/INR:  31.4/1.18  --->       T&S pending    ID:  #monitor for leukocytosis  WBC- 10.07  --->>  Temp trend- 24hrs T(F): 98.6 ( @ 02:59), Max: 98.6 ( @ 02:59)    ENDO:  #DM    -on ISS    -restarted Lantus ([place] at home)       HA1C 10.2 ()  #Hypothyroidism    -Synthroid 50 mcg QD    MSK:  #Activity - Ambulate as Tolerated:       LINES/DRAINS:  PIV,    ADVANCED DIRECTIVES:  Full Code    HCP/Emergency Contact- Russ Michelle [phone]    INDICATION FOR SDU: Q2 neuro checks s/p fall      DISPO:   Case discussed with attending Dr. Cash      Follow Up:  - labs  -f/u stat BMP, EKG for hyperK        Signed out to: Blu Sloan MD  Date: 3/29/23  Time: 7:47

## 2023-03-29 NOTE — CONSULT NOTE ADULT - CONSULT REASON
Q2 neuro checks s/p fall Q2 neuro checks s/p fall with Bilateral C1 posterior arch nondisplaced fractures.

## 2023-03-30 LAB
A1C WITH ESTIMATED AVERAGE GLUCOSE RESULT: 7.9 % — HIGH (ref 4–5.6)
ANION GAP SERPL CALC-SCNC: 11 MMOL/L — SIGNIFICANT CHANGE UP (ref 7–14)
ANION GAP SERPL CALC-SCNC: 7 MMOL/L — SIGNIFICANT CHANGE UP (ref 7–14)
ANION GAP SERPL CALC-SCNC: 9 MMOL/L — SIGNIFICANT CHANGE UP (ref 7–14)
APPEARANCE UR: CLEAR — SIGNIFICANT CHANGE UP
BASOPHILS # BLD AUTO: 0.04 K/UL — SIGNIFICANT CHANGE UP (ref 0–0.2)
BASOPHILS NFR BLD AUTO: 0.4 % — SIGNIFICANT CHANGE UP (ref 0–1)
BILIRUB UR-MCNC: NEGATIVE — SIGNIFICANT CHANGE UP
BUN SERPL-MCNC: 29 MG/DL — HIGH (ref 10–20)
BUN SERPL-MCNC: 30 MG/DL — HIGH (ref 10–20)
BUN SERPL-MCNC: 30 MG/DL — HIGH (ref 10–20)
BUN SERPL-MCNC: 32 MG/DL — HIGH (ref 10–20)
BUN SERPL-MCNC: 33 MG/DL — HIGH (ref 10–20)
CALCIUM SERPL-MCNC: 6.9 MG/DL — LOW (ref 8.4–10.5)
CALCIUM SERPL-MCNC: 8.5 MG/DL — SIGNIFICANT CHANGE UP (ref 8.4–10.5)
CALCIUM SERPL-MCNC: 8.6 MG/DL — SIGNIFICANT CHANGE UP (ref 8.4–10.4)
CALCIUM SERPL-MCNC: 8.7 MG/DL — SIGNIFICANT CHANGE UP (ref 8.4–10.4)
CALCIUM SERPL-MCNC: 8.8 MG/DL — SIGNIFICANT CHANGE UP (ref 8.4–10.4)
CHLORIDE SERPL-SCNC: 101 MMOL/L — SIGNIFICANT CHANGE UP (ref 98–110)
CHLORIDE SERPL-SCNC: 102 MMOL/L — SIGNIFICANT CHANGE UP (ref 98–110)
CHLORIDE SERPL-SCNC: 106 MMOL/L — SIGNIFICANT CHANGE UP (ref 98–110)
CO2 SERPL-SCNC: 20 MMOL/L — SIGNIFICANT CHANGE UP (ref 17–32)
CO2 SERPL-SCNC: 22 MMOL/L — SIGNIFICANT CHANGE UP (ref 17–32)
CO2 SERPL-SCNC: 23 MMOL/L — SIGNIFICANT CHANGE UP (ref 17–32)
COLOR SPEC: YELLOW — SIGNIFICANT CHANGE UP
CREAT ?TM UR-MCNC: 82 MG/DL — SIGNIFICANT CHANGE UP
CREAT SERPL-MCNC: 1.9 MG/DL — HIGH (ref 0.7–1.5)
CREAT SERPL-MCNC: 1.9 MG/DL — HIGH (ref 0.7–1.5)
CREAT SERPL-MCNC: 2 MG/DL — HIGH (ref 0.7–1.5)
CREAT SERPL-MCNC: 2.2 MG/DL — HIGH (ref 0.7–1.5)
CREAT SERPL-MCNC: 2.2 MG/DL — HIGH (ref 0.7–1.5)
DIFF PNL FLD: ABNORMAL
EGFR: 26 ML/MIN/1.73M2 — LOW
EGFR: 26 ML/MIN/1.73M2 — LOW
EGFR: 29 ML/MIN/1.73M2 — LOW
EGFR: 31 ML/MIN/1.73M2 — LOW
EGFR: 31 ML/MIN/1.73M2 — LOW
EOSINOPHIL # BLD AUTO: 0.11 K/UL — SIGNIFICANT CHANGE UP (ref 0–0.7)
EOSINOPHIL NFR BLD AUTO: 1 % — SIGNIFICANT CHANGE UP (ref 0–8)
ESTIMATED AVERAGE GLUCOSE: 180 MG/DL — HIGH (ref 68–114)
GLUCOSE BLDC GLUCOMTR-MCNC: 101 MG/DL — HIGH (ref 70–99)
GLUCOSE BLDC GLUCOMTR-MCNC: 188 MG/DL — HIGH (ref 70–99)
GLUCOSE BLDC GLUCOMTR-MCNC: 282 MG/DL — HIGH (ref 70–99)
GLUCOSE BLDC GLUCOMTR-MCNC: 97 MG/DL — SIGNIFICANT CHANGE UP (ref 70–99)
GLUCOSE SERPL-MCNC: 125 MG/DL — HIGH (ref 70–99)
GLUCOSE SERPL-MCNC: 175 MG/DL — HIGH (ref 70–99)
GLUCOSE SERPL-MCNC: 177 MG/DL — HIGH (ref 70–99)
GLUCOSE SERPL-MCNC: 275 MG/DL — HIGH (ref 70–99)
GLUCOSE SERPL-MCNC: 69 MG/DL — LOW (ref 70–99)
GLUCOSE UR QL: ABNORMAL
HCT VFR BLD CALC: 21 % — LOW (ref 42–52)
HGB BLD-MCNC: 6.7 G/DL — CRITICAL LOW (ref 14–18)
IMM GRANULOCYTES NFR BLD AUTO: 0.4 % — HIGH (ref 0.1–0.3)
KETONES UR-MCNC: SIGNIFICANT CHANGE UP
LEUKOCYTE ESTERASE UR-ACNC: NEGATIVE — SIGNIFICANT CHANGE UP
LYMPHOCYTES # BLD AUTO: 1.71 K/UL — SIGNIFICANT CHANGE UP (ref 1.2–3.4)
LYMPHOCYTES # BLD AUTO: 16.3 % — LOW (ref 20.5–51.1)
MAGNESIUM SERPL-MCNC: 1.5 MG/DL — LOW (ref 1.8–2.4)
MCHC RBC-ENTMCNC: 29.8 PG — SIGNIFICANT CHANGE UP (ref 27–31)
MCHC RBC-ENTMCNC: 31.9 G/DL — LOW (ref 32–37)
MCV RBC AUTO: 93.3 FL — SIGNIFICANT CHANGE UP (ref 80–94)
MONOCYTES # BLD AUTO: 1.27 K/UL — HIGH (ref 0.1–0.6)
MONOCYTES NFR BLD AUTO: 12.1 % — HIGH (ref 1.7–9.3)
NEUTROPHILS # BLD AUTO: 7.34 K/UL — HIGH (ref 1.4–6.5)
NEUTROPHILS NFR BLD AUTO: 69.8 % — SIGNIFICANT CHANGE UP (ref 42.2–75.2)
NITRITE UR-MCNC: NEGATIVE — SIGNIFICANT CHANGE UP
NRBC # BLD: 0 /100 WBCS — SIGNIFICANT CHANGE UP (ref 0–0)
OSMOLALITY UR: 404 MOS/KG — SIGNIFICANT CHANGE UP (ref 50–1200)
PH UR: 5.5 — SIGNIFICANT CHANGE UP (ref 5–8)
PHOSPHATE SERPL-MCNC: 3.3 MG/DL — SIGNIFICANT CHANGE UP (ref 2.1–4.9)
PLATELET # BLD AUTO: 128 K/UL — LOW (ref 130–400)
POTASSIUM SERPL-MCNC: 5 MMOL/L — SIGNIFICANT CHANGE UP (ref 3.5–5)
POTASSIUM SERPL-MCNC: 5.4 MMOL/L — HIGH (ref 3.5–5)
POTASSIUM SERPL-MCNC: 5.4 MMOL/L — HIGH (ref 3.5–5)
POTASSIUM SERPL-MCNC: 5.7 MMOL/L — HIGH (ref 3.5–5)
POTASSIUM SERPL-MCNC: 5.7 MMOL/L — HIGH (ref 3.5–5)
POTASSIUM SERPL-SCNC: 5 MMOL/L — SIGNIFICANT CHANGE UP (ref 3.5–5)
POTASSIUM SERPL-SCNC: 5.4 MMOL/L — HIGH (ref 3.5–5)
POTASSIUM SERPL-SCNC: 5.4 MMOL/L — HIGH (ref 3.5–5)
POTASSIUM SERPL-SCNC: 5.7 MMOL/L — HIGH (ref 3.5–5)
POTASSIUM SERPL-SCNC: 5.7 MMOL/L — HIGH (ref 3.5–5)
POTASSIUM UR-SCNC: 56 MMOL/L — SIGNIFICANT CHANGE UP
PROT UR-MCNC: ABNORMAL
RBC # BLD: 2.25 M/UL — LOW (ref 4.7–6.1)
RBC # FLD: 14.2 % — SIGNIFICANT CHANGE UP (ref 11.5–14.5)
SODIUM SERPL-SCNC: 133 MMOL/L — LOW (ref 135–146)
SODIUM SERPL-SCNC: 134 MMOL/L — LOW (ref 135–146)
SODIUM UR-SCNC: 48 MMOL/L — SIGNIFICANT CHANGE UP
SP GR SPEC: 1.05 — HIGH (ref 1.01–1.03)
UROBILINOGEN FLD QL: SIGNIFICANT CHANGE UP
UUN UR-MCNC: 182 MG/DL — SIGNIFICANT CHANGE UP
WBC # BLD: 10.51 K/UL — SIGNIFICANT CHANGE UP (ref 4.8–10.8)
WBC # FLD AUTO: 10.51 K/UL — SIGNIFICANT CHANGE UP (ref 4.8–10.8)

## 2023-03-30 PROCEDURE — 99233 SBSQ HOSP IP/OBS HIGH 50: CPT

## 2023-03-30 PROCEDURE — 93010 ELECTROCARDIOGRAM REPORT: CPT | Mod: 76

## 2023-03-30 RX ORDER — HEPARIN SODIUM 5000 [USP'U]/ML
5000 INJECTION INTRAVENOUS; SUBCUTANEOUS EVERY 8 HOURS
Refills: 0 | Status: DISCONTINUED | OUTPATIENT
Start: 2023-03-30 | End: 2023-04-03

## 2023-03-30 RX ORDER — SODIUM CHLORIDE 9 MG/ML
1000 INJECTION INTRAMUSCULAR; INTRAVENOUS; SUBCUTANEOUS
Refills: 0 | Status: DISCONTINUED | OUTPATIENT
Start: 2023-03-30 | End: 2023-03-31

## 2023-03-30 RX ORDER — SODIUM CHLORIDE 9 MG/ML
1000 INJECTION INTRAMUSCULAR; INTRAVENOUS; SUBCUTANEOUS
Refills: 0 | Status: DISCONTINUED | OUTPATIENT
Start: 2023-03-30 | End: 2023-03-30

## 2023-03-30 RX ORDER — DEXTROSE 50 % IN WATER 50 %
50 SYRINGE (ML) INTRAVENOUS ONCE
Refills: 0 | Status: COMPLETED | OUTPATIENT
Start: 2023-03-30 | End: 2023-03-30

## 2023-03-30 RX ORDER — SODIUM ZIRCONIUM CYCLOSILICATE 10 G/10G
5 POWDER, FOR SUSPENSION ORAL
Refills: 0 | Status: DISCONTINUED | OUTPATIENT
Start: 2023-03-30 | End: 2023-04-03

## 2023-03-30 RX ORDER — INSULIN GLARGINE 100 [IU]/ML
15 INJECTION, SOLUTION SUBCUTANEOUS AT BEDTIME
Refills: 0 | Status: DISCONTINUED | OUTPATIENT
Start: 2023-03-30 | End: 2023-03-31

## 2023-03-30 RX ORDER — INSULIN LISPRO 100/ML
VIAL (ML) SUBCUTANEOUS AT BEDTIME
Refills: 0 | Status: DISCONTINUED | OUTPATIENT
Start: 2023-03-30 | End: 2023-03-31

## 2023-03-30 RX ORDER — INSULIN HUMAN 100 [IU]/ML
10 INJECTION, SOLUTION SUBCUTANEOUS ONCE
Refills: 0 | Status: COMPLETED | OUTPATIENT
Start: 2023-03-30 | End: 2023-03-30

## 2023-03-30 RX ADMIN — Medication 650 MILLIGRAM(S): at 18:26

## 2023-03-30 RX ADMIN — DULOXETINE HYDROCHLORIDE 60 MILLIGRAM(S): 30 CAPSULE, DELAYED RELEASE ORAL at 12:44

## 2023-03-30 RX ADMIN — HEPARIN SODIUM 5000 UNIT(S): 5000 INJECTION INTRAVENOUS; SUBCUTANEOUS at 21:39

## 2023-03-30 RX ADMIN — Medication 25 MILLIGRAM(S): at 06:29

## 2023-03-30 RX ADMIN — HEPARIN SODIUM 5000 UNIT(S): 5000 INJECTION INTRAVENOUS; SUBCUTANEOUS at 14:22

## 2023-03-30 RX ADMIN — Medication 650 MILLIGRAM(S): at 12:44

## 2023-03-30 RX ADMIN — Medication 50 MILLILITER(S): at 06:29

## 2023-03-30 RX ADMIN — CELECOXIB 100 MILLIGRAM(S): 200 CAPSULE ORAL at 14:30

## 2023-03-30 RX ADMIN — Medication 650 MILLIGRAM(S): at 14:30

## 2023-03-30 RX ADMIN — Medication 650 MILLIGRAM(S): at 00:00

## 2023-03-30 RX ADMIN — TAMSULOSIN HYDROCHLORIDE 0.4 MILLIGRAM(S): 0.4 CAPSULE ORAL at 21:34

## 2023-03-30 RX ADMIN — INSULIN HUMAN 10 UNIT(S): 100 INJECTION, SOLUTION SUBCUTANEOUS at 06:28

## 2023-03-30 RX ADMIN — Medication 50 MICROGRAM(S): at 06:29

## 2023-03-30 RX ADMIN — Medication 25 MILLIGRAM(S): at 18:26

## 2023-03-30 RX ADMIN — CELECOXIB 100 MILLIGRAM(S): 200 CAPSULE ORAL at 12:44

## 2023-03-30 RX ADMIN — FINASTERIDE 5 MILLIGRAM(S): 5 TABLET, FILM COATED ORAL at 12:44

## 2023-03-30 RX ADMIN — SODIUM CHLORIDE 110 MILLILITER(S): 9 INJECTION INTRAMUSCULAR; INTRAVENOUS; SUBCUTANEOUS at 12:44

## 2023-03-30 RX ADMIN — TRAMADOL HYDROCHLORIDE 50 MILLIGRAM(S): 50 TABLET ORAL at 07:56

## 2023-03-30 RX ADMIN — SIMVASTATIN 20 MILLIGRAM(S): 20 TABLET, FILM COATED ORAL at 21:34

## 2023-03-30 RX ADMIN — Medication 650 MILLIGRAM(S): at 06:28

## 2023-03-30 RX ADMIN — SODIUM ZIRCONIUM CYCLOSILICATE 5 GRAM(S): 10 POWDER, FOR SUSPENSION ORAL at 18:37

## 2023-03-30 NOTE — CONSULT NOTE ADULT - ASSESSMENT
IMPRESSION: Rehab of multitrauma / s/p fall / C1 fx, right forehead hematoma, left hand laceration / HTN, HLD, BPH, CAD, s/p CABG, DM, BPH    PRECAUTIONS: [   ] Cardiac  [   ] Respiratory  [   ] Seizures [   ] Contact Isolation  [   ] Droplet Isolation  [   ] Other    Weight Bearing Status:     RECOMMENDATION: adequate pain treatment     Out of Bed to Chair     DVT/Decubiti Prophylaxis    REHAB PLAN:     [  x  ] Bedside P/T 3-5 times a week   [ x   ]   Bedside O/T  2-3 times a week             [    ] Speech Therapy               [    ]  No Rehab Therapy Indicated   Conditioning/ROM                                    ADL  Bed Mobility                                               Conditioning/ROM  Transfers                                                     Bed Mobility  Sitting /Standing Balance                         Transfers                                        Gait Training                                               Sitting/Standing Balance  Stair Training [   ]Applicable                    Home equipment Eval                                                                        Splinting  [   ] Only      GOALS:   ADL   [ x   ]   Independent                    Transfers  [ x   ] Independent                          Ambulation  [  x  ] Independent     [   x  ] With device                            [    ]  CG                                                         [    ]  CG                                                                  [    ] CG                            [    ] Min A                                                   [    ] Min A                                                              [    ] Min  A          DISCHARGE PLAN:   [    ]  Good candidate for Intensive Rehabilitation/Hospital based                                             Will tolerate 3hrs Intensive Rehab Daily                                       [  x   ]  Short Term Rehab in Skilled Nursing Facility                                       [     ]  Home with Outpatient or VN services                                         [     ]  Possible Candidate for Intensive Hospital based Rehab

## 2023-03-30 NOTE — PROGRESS NOTE ADULT - ATTENDING COMMENTS
Patient is neuro intact. C-collar in place.  Creat 2.2 today from baseline 1.5.  K 5.7, treated.   Patient is incontinent, no adequate UOP recorded.  Had MRI C-spine that shows C2 to C5 Spinal calan epidural hematoma.    ASSESSMENT:  89 y/o male, S/P Fall.  Concussion with brief LOC.  C1 posterior arch fracture.  At risk for neuro deficit.  C-spinal canal epidural hematoma.  BRINA. Hyperkalemia.    PLAN:  - intermittent neuro checks  - NS f/u needed  - keep C-collar at all time  - aspiration precautions  - keep normotensive  - S&S eval to start po diet  - ivf  - Strict I/Os  - Nephrology eval  - DVT prophylaxis  - PT eval  Continue SDU care Patient is neuro intact. C-collar in place.  Creat 2.2 today from baseline 1.5.  K 5.7, treated.   Patient is incontinent, no adequate UOP recorded.  Had MRI C-spine that shows C2 to C5 Spinal calan epidural hematoma.    ASSESSMENT:  89 y/o male, S/P Fall.  Concussion with brief LOC.  C1 posterior arch fracture.  At risk for neuro deficit.  C-spinal canal epidural hematoma.  Cervical stenosis of the spinal canal.  BRINA. Hyperkalemia.    PLAN:  - intermittent neuro checks  - NS f/u needed  - keep C-collar at all time  - aspiration precautions  - keep normotensive  - S&S eval to start po diet  - ivf  - Strict I/Os  - Nephrology eval  - DVT prophylaxis  - PT eval  Continue SDU care

## 2023-03-30 NOTE — PROGRESS NOTE ADULT - SUBJECTIVE AND OBJECTIVE BOX
INTERVAL HPI/OVERNIGHT EVENTS:    SUBJECTIVE: Patient seen and examined at bedside.     cc: fall  no cp, sob, abd pain, fever  +neck pain, no numbness, tingling, pareshtesias    OBJECTIVE:    VITAL SIGNS:  Vital Signs Last 24 Hrs  T(C): 36.2 (30 Mar 2023 08:00), Max: 36.4 (29 Mar 2023 20:00)  T(F): 97.1 (30 Mar 2023 08:00), Max: 97.5 (29 Mar 2023 20:00)  HR: 77 (30 Mar 2023 08:00) (58 - 77)  BP: 123/83 (30 Mar 2023 08:00) (101/54 - 148/67)  BP(mean): 95 (30 Mar 2023 08:00) (75 - 97)  RR: 22 (30 Mar 2023 08:00) (18 - 24)  SpO2: 99% (30 Mar 2023 08:00) (97% - 99%)    Parameters below as of 30 Mar 2023 08:00  Patient On (Oxygen Delivery Method): nasal cannula  O2 Flow (L/min): 2        PHYSICAL EXAM:    General: NAD  HEENT: NC/AT; PERRL, clear conjunctiva  Neck: supple  Respiratory: CTA b/l  Cardiovascular: +S1/S2; RRR  Abdomen: soft, NT/ND; +BS x4  Extremities: WWP, 2+ peripheral pulses b/l; no LE edema  Skin: normal color and turgor; no rash  Neurological:    MEDICATIONS:  MEDICATIONS  (STANDING):  acetaminophen     Tablet .. 650 milliGRAM(s) Oral every 6 hours  celecoxib 100 milliGRAM(s) Oral daily  chlorhexidine 4% Liquid 1 Application(s) Topical <User Schedule>  DULoxetine 60 milliGRAM(s) Oral daily  finasteride 5 milliGRAM(s) Oral daily  insulin glargine Injectable (LANTUS) 15 Unit(s) SubCutaneous at bedtime  insulin lispro (ADMELOG) corrective regimen sliding scale   SubCutaneous at bedtime  levothyroxine 50 MICROGram(s) Oral daily  metoprolol tartrate 25 milliGRAM(s) Oral two times a day  simvastatin 20 milliGRAM(s) Oral at bedtime  sodium chloride 0.9%. 1000 milliLiter(s) (110 mL/Hr) IV Continuous <Continuous>  tamsulosin 0.4 milliGRAM(s) Oral at bedtime    MEDICATIONS  (PRN):  guaiFENesin  milliGRAM(s) Oral every 12 hours PRN Cough  traMADol 50 milliGRAM(s) Oral two times a day PRN Moderate Pain (4 - 6)      ALLERGIES:  Allergies    No Known Allergies    Intolerances        LABS:                        8.2    9.78  )-----------( 177      ( 29 Mar 2023 22:47 )             25.4     Hemoglobin: 8.2 g/dL ( @ 22:47)  Hemoglobin: 12.1 g/dL ( @ 21:55)    CBC Full  -  ( 29 Mar 2023 22:47 )  WBC Count : 9.78 K/uL  RBC Count : 2.80 M/uL  Hemoglobin : 8.2 g/dL  Hematocrit : 25.4 %  Platelet Count - Automated : 177 K/uL  Mean Cell Volume : 90.7 fL  Mean Cell Hemoglobin : 29.3 pg  Mean Cell Hemoglobin Concentration : 32.3 g/dL  Auto Neutrophil # : 5.33 K/uL  Auto Lymphocyte # : 2.80 K/uL  Auto Monocyte # : 1.43 K/uL  Auto Eosinophil # : 0.13 K/uL  Auto Basophil # : 0.05 K/uL  Auto Neutrophil % : 54.6 %  Auto Lymphocyte % : 28.6 %  Auto Monocyte % : 14.6 %  Auto Eosinophil % : 1.3 %  Auto Basophil % : 0.5 %        134<L>  |  101  |  29<H>  ----------------------------<  175<H>  5.4<H>   |  22  |  1.9<H>    Ca    8.7      30 Mar 2023 05:35  Phos  3.9       Mg     1.9         TPro  6.2  /  Alb  4.0  /  TBili  <0.2  /  DBili  x   /  AST  21  /  ALT  11  /  AlkPhos  74      Creatinine Trend: 1.9<--, 1.9<--, 1.5<--, 0.9<--  LIVER FUNCTIONS - ( 28 Mar 2023 21:55 )  Alb: 4.0 g/dL / Pro: 6.2 g/dL / ALK PHOS: 74 U/L / ALT: 11 U/L / AST: 21 U/L / GGT: x           PT/INR - ( 28 Mar 2023 21:55 )   PT: 13.50 sec;   INR: 1.18 ratio         PTT - ( 28 Mar 2023 21:55 )  PTT:31.4 sec    hs Troponin:            Urinalysis Basic - ( 29 Mar 2023 00:05 )    Color: Light Yellow / Appearance: Clear / S.021 / pH: x  Gluc: x / Ketone: Trace  / Bili: Negative / Urobili: <2 mg/dL   Blood: x / Protein: 30 mg/dL / Nitrite: Negative   Leuk Esterase: Negative / RBC: 1 /HPF / WBC 0 /HPF   Sq Epi: x / Non Sq Epi: 0 /HPF / Bacteria: Negative      CSF:                      EKG:   MICROBIOLOGY:    IMAGING:      Labs, imaging, EKG personally reviewed    RADIOLOGY & ADDITIONAL TESTS: Reviewed.

## 2023-03-30 NOTE — CONSULT NOTE ADULT - SUBJECTIVE AND OBJECTIVE BOX
HPI:    89yM w/ PMHx of HLD, HTN, BPH, quadruple bypass, and CAD s/p 4 stent placement and DM seen as (Code Trauma / Trauma Alert / Trauma Consult) s/p fall down 10 stairs with +HT, ?LOC, and -AC (patient is on Plavix and ASA). Patient fell down 10 wooden steps while daughter was showering as patient is not supposed to go down steps without supervision Patient is hard of hearing but denies any pain on arrival to ED but had a 5cm hematoma to right frontal scalp along with a skin tear on left dorsal hand and superficial abrasion to right shin. Patient denies any nausea, vomiting, chest pain, or other concerning symptoms. Trauma assessment in ED: ABCs intact , GCS 15 , AAOx3.    Patient is hard of hearing but answers appropriately when questioned.     < from: CT Abdomen and Pelvis w/ IV Cont (23 @ 22:34) >    IMPRESSION:      1.  No CT evidence of an acute thoracic or abdominopelvic pathology.    < end of copied text >    < from: Xray Tibia + Fibula 2 Views, Right (23 @ 22:27) >    Impression:      No acute fracture oracute articular abnormality.    < end of copied text >    < from: CT Head No Cont (23 @ 22:23) >    Impression:      No evidence of intracranial hemorrhage, territorial infarct, or mass   effect.    < end of copied text >    < from: CT Cervical Spine No Cont (23 @ 22:23) >    IMPRESSION:      Bilateral C1 posterior arch nondisplaced fractures.    Extensive soft tissue calcifications and thickening of the odontoid   transverse ligament with associated at least mild to moderate spinal   canal stenosis at the same level    Prevertebral soft tissue swelling    Neurosurgery eval - recommend hard neck collar and no neurosurgical intervention.     Medical charts / labs / imaging studies / PT notes reviewed       PAST MEDICAL & SURGICAL HISTORY:  HTN (hypertension)      HLD (hyperlipidemia)      BPH (benign prostatic hyperplasia)      CAD (coronary artery disease)      Diabetes mellitus      Hypothyroidism      S/P CABG (coronary artery bypass graft)          Hospital Course:    TODAY'S SUBJECTIVE & REVIEW OF SYMPTOMS:     Constitutional WNL   Cardio WNL   Resp WNL   GI WNL  Heme WNL  Endo WNL  Skin WNL  MSK pain   Neuro WNL  Cognitive WNL  Psych WNL      MEDICATIONS  (STANDING):  acetaminophen     Tablet .. 650 milliGRAM(s) Oral every 6 hours  celecoxib 100 milliGRAM(s) Oral daily  chlorhexidine 4% Liquid 1 Application(s) Topical <User Schedule>  DULoxetine 60 milliGRAM(s) Oral daily  finasteride 5 milliGRAM(s) Oral daily  insulin glargine Injectable (LANTUS) 12 Unit(s) SubCutaneous at bedtime  insulin lispro (ADMELOG) corrective regimen sliding scale   SubCutaneous at bedtime  levothyroxine 50 MICROGram(s) Oral daily  metoprolol tartrate 25 milliGRAM(s) Oral two times a day  simvastatin 20 milliGRAM(s) Oral at bedtime  sodium chloride 0.9%. 1000 milliLiter(s) (40 mL/Hr) IV Continuous <Continuous>  tamsulosin 0.4 milliGRAM(s) Oral at bedtime    MEDICATIONS  (PRN):  guaiFENesin  milliGRAM(s) Oral every 12 hours PRN Cough  traMADol 50 milliGRAM(s) Oral two times a day PRN Moderate Pain (4 - 6)      FAMILY HISTORY:      Allergies    No Known Allergies    Intolerances        SOCIAL HISTORY:    [  ] Etoh  [  ] Smoking  [  ] Substance abuse     Home Environment:  [   ] Home Alone  [  x ] Lives with Family  [   ] Home Health Aid    Dwelling:  [   ] Apartment  [ x  ] Private House  [   ] Adult Home  [   ] Skilled Nursing Facility      [   ] Short Term  [   ] Long Term  [ x  ] Stairs       Elevator [   ]    FUNCTIONAL STATUS PTA: (Check all that apply)  Ambulation: [  x  ]Independent    [   ] Dependent     [   ] Non-Ambulatory  Assistive Device: [   ] SA Cane  [   ]  Q Cane  [ x  ] Walker  [   ]  Wheelchair  ADL : [ x  ] Independent  [    ]  Dependent       Vital Signs Last 24 Hrs  T(C): 36.4 (29 Mar 2023 20:00), Max: 36.4 (29 Mar 2023 20:00)  T(F): 97.5 (29 Mar 2023 20:00), Max: 97.5 (29 Mar 2023 20:00)  HR: 63 (30 Mar 2023 04:00) (58 - 69)  BP: 115/55 (30 Mar 2023 04:00) (101/54 - 148/67)  BP(mean): 79 (30 Mar 2023 04:00) (75 - 97)  RR: 20 (30 Mar 2023 04:00) (18 - 30)  SpO2: 97% (30 Mar 2023 04:00) (97% - 98%)    Parameters below as of 30 Mar 2023 04:00  Patient On (Oxygen Delivery Method): nasal cannula          PHYSICAL EXAM: Awake & Alert  GENERAL: NAD  HEAD:  Normocephalic, right forehead hematoma   CHEST/LUNG: Clear   HEART: S1S2+  ABDOMEN: Soft, Nontender  EXTREMITIES:  no calf tenderness, left hand laceration     NERVOUS SYSTEM:  Cranial Nerves 2-12 intact [   ] Abnormal  [   ]  ROM: WFL all extremities [ x  ]  Abnormal [   ]  Motor Strength: WFL all extremities  [ x  ]  Abnormal [   ]  Sensation: intact to light touch [ x  ] Abnormal [   ]    FUNCTIONAL STATUS:  Bed Mobility: Independent [   ]  Supervision [   ]  Needs Assistance [ x  ]  N/A [   ]  Transfers: Independent [   ]  Supervision [   ]  Needs Assistance [   ]  N/A [   ]   Ambulation: Independent [   ]  Supervision [   ]  Needs Assistance [   ]  N/A [   ]  ADL: Independent [   ] Requires Assistance [   ] N/A [   ]      LABS:                        8.2    9.78  )-----------( 177      ( 29 Mar 2023 22:47 )             25.4     03-30    134<L>  |  101  |  29<H>  ----------------------------<  175<H>  5.4<H>   |  22  |  1.9<H>    Ca    8.7      30 Mar 2023 05:35  Phos  3.9     03-29  Mg     1.9     03-29    TPro  6.2  /  Alb  4.0  /  TBili  <0.2  /  DBili  x   /  AST  21  /  ALT  11  /  AlkPhos  74  03-28    PT/INR - ( 28 Mar 2023 21:55 )   PT: 13.50 sec;   INR: 1.18 ratio         PTT - ( 28 Mar 2023 21:55 )  PTT:31.4 sec  Urinalysis Basic - ( 29 Mar 2023 00:05 )    Color: Light Yellow / Appearance: Clear / S.021 / pH: x  Gluc: x / Ketone: Trace  / Bili: Negative / Urobili: <2 mg/dL   Blood: x / Protein: 30 mg/dL / Nitrite: Negative   Leuk Esterase: Negative / RBC: 1 /HPF / WBC 0 /HPF   Sq Epi: x / Non Sq Epi: 0 /HPF / Bacteria: Negative        RADIOLOGY & ADDITIONAL STUDIES:

## 2023-03-30 NOTE — CHART NOTE - NSCHARTNOTEFT_GEN_A_CORE
Mr. José is a 89 YO M who fell down a flight of stairs and was found to have bilateral C1 posterior arch nondisplaced fractures.  He was seen and evaluated at bedside in the SICU in no acute distress.  He continues to complain of pain with active ROM of the head in all directions.  No Neurosurgical intervention.  He should remain in the Hard collar and follow up in the office in 1 week.    Case discussed with Dr. Chan

## 2023-03-30 NOTE — CHART NOTE - NSCHARTNOTEFT_GEN_A_CORE
Discussed case with neurosurgery PA [id number]. Per neurosurgery recommendations, no acute neurosurgical intervention at this time. Clear for Q4 neurochecks, SQH, OOB with PT in cervical collar, can resume plavix 7 days post injury. Will plan for soft collar as outpatient.

## 2023-03-30 NOTE — PROGRESS NOTE ADULT - SUBJECTIVE AND OBJECTIVE BOX
TRAUMA PROGRESS NOTE    Admission: Fall (on) (from) other stairs and steps, initial encounter    Hospital Day: 3    24 Hour Events:  Worked with PT, SANTY- 2/2 to dizziness  Hyperkalemia - EKG w NSR and no peaked T waves, treated     Vitals:  T(F): 97.5 (23 @ 20:00), Max: 97.5 (23 @ 20:00)  HR: 63 (23 @ 04:00)  BP: 115/55 (23 @ 04:00)  RR: 20 (23 @ 04:00)  SpO2: 97% (23 @ 04:00)    Diet, Regular:   Consistent Carbohydrate No Snacks    Fluids: sodium chloride 0.9%.: Solution, 1000 milliLiter(s) infuse at 40 mL/Hr    I & O's:    23 @ 07:01  -  23 @ 07:00  --------------------------------------------------------  IN:    Lactated Ringers: 1190 mL    Oral Fluid: 250 mL  Total IN: 1440 mL    OUT:  Total OUT: 0 mL    Total NET: 1440 mL    PHYSICAL EXAM:  General: NAD, mi[place] J in place  Cardiac: RRR, S1/S2 identified, nmrg  Respiratory: unlabored breathing at rest, clear to auscultation bilaterally  Abdomen: Soft, non-distended, non-tender, no rebound, no guarding.  Musculoskeletal: FROM in b/l UE and LE  Neuro: Sensation grossly intact and equal throughout, no focal deficits  Skin: Warm/dry, normal color, no jaundice    MEDICATIONS  (STANDING):  acetaminophen     Tablet .. 650 milliGRAM(s) Oral every 6 hours  celecoxib 100 milliGRAM(s) Oral daily  chlorhexidine 4% Liquid 1 Application(s) Topical <User Schedule>  DULoxetine 60 milliGRAM(s) Oral daily  finasteride 5 milliGRAM(s) Oral daily  insulin glargine Injectable (LANTUS) 12 Unit(s) SubCutaneous at bedtime  insulin lispro (ADMELOG) corrective regimen sliding scale   SubCutaneous at bedtime  levothyroxine 50 MICROGram(s) Oral daily  metoprolol tartrate 25 milliGRAM(s) Oral two times a day  simvastatin 20 milliGRAM(s) Oral at bedtime  sodium chloride 0.9%. 1000 milliLiter(s) (40 mL/Hr) IV Continuous <Continuous>  tamsulosin 0.4 milliGRAM(s) Oral at bedtime    MEDICATIONS  (PRN):  guaiFENesin  milliGRAM(s) Oral every 12 hours PRN Cough  traMADol 50 milliGRAM(s) Oral two times a day PRN Moderate Pain (4 - 6)    LAB/STUDIES:  Labs:  CAPILLARY BLOOD GLUCOSE    POCT Blood Glucose.: 188 mg/dL (30 Mar 2023 06:09)  POCT Blood Glucose.: 314 mg/dL (29 Mar 2023 22:39)  POCT Blood Glucose.: 253 mg/dL (29 Mar 2023 17:48)  POCT Blood Glucose.: 256 mg/dL (29 Mar 2023 11:11)                 8.2    9.78  )-----------( 177      ( 29 Mar 2023 22:47 )             25.4       Auto Neutrophil %: 54.6 % (23 @ 22:47)  Auto Immature Granulocyte %: 0.4 % (23 @ 22:47)        134<L>  |  101  |  29<H>  ----------------------------<  175<H>  5.4<H>   |  22  |  1.9<H>    Calcium, Total Serum: 8.7 mg/dL (23 @ 05:35)    LFTs:             6.2  | <0.2 | 21       ------------------[74      ( 28 Mar 2023 21:55 )  4.0  | x    | 11          Lipase:22     Amylase:x         Lactate, Blood: 3.6 mmol/L (23 @ 21:55)  Blood Gas Venous - Lactate: 1.70 mmol/L (23 @ 21:42)    Coags:     13.50  ----< 1.18    ( 28 Mar 2023 21:55 )     31.4      CARDIAC MARKERS ( 28 Mar 2023 21:56 )  x     / <0.01 ng/mL / x     / x     / x        Urinalysis Basic - ( 29 Mar 2023 00:05 )    Color: Light Yellow / Appearance: Clear / S.021 / pH: x  Gluc: x / Ketone: Trace  / Bili: Negative / Urobili: <2 mg/dL   Blood: x / Protein: 30 mg/dL / Nitrite: Negative   Leuk Esterase: Negative / RBC: 1 /HPF / WBC 0 /HPF   Sq Epi: x / Non Sq Epi: 0 /HPF / Bacteria: Negative

## 2023-03-31 LAB
ANION GAP SERPL CALC-SCNC: 11 MMOL/L — SIGNIFICANT CHANGE UP (ref 7–14)
ANION GAP SERPL CALC-SCNC: 9 MMOL/L — SIGNIFICANT CHANGE UP (ref 7–14)
BASOPHILS # BLD AUTO: 0.05 K/UL — SIGNIFICANT CHANGE UP (ref 0–0.2)
BASOPHILS # BLD AUTO: 0.05 K/UL — SIGNIFICANT CHANGE UP (ref 0–0.2)
BASOPHILS NFR BLD AUTO: 0.4 % — SIGNIFICANT CHANGE UP (ref 0–1)
BASOPHILS NFR BLD AUTO: 0.5 % — SIGNIFICANT CHANGE UP (ref 0–1)
BUN SERPL-MCNC: 37 MG/DL — HIGH (ref 10–20)
BUN SERPL-MCNC: 40 MG/DL — HIGH (ref 10–20)
CALCIUM SERPL-MCNC: 8.1 MG/DL — LOW (ref 8.4–10.5)
CALCIUM SERPL-MCNC: 8.2 MG/DL — LOW (ref 8.4–10.5)
CHLORIDE SERPL-SCNC: 101 MMOL/L — SIGNIFICANT CHANGE UP (ref 98–110)
CHLORIDE SERPL-SCNC: 102 MMOL/L — SIGNIFICANT CHANGE UP (ref 98–110)
CO2 SERPL-SCNC: 20 MMOL/L — SIGNIFICANT CHANGE UP (ref 17–32)
CO2 SERPL-SCNC: 22 MMOL/L — SIGNIFICANT CHANGE UP (ref 17–32)
CREAT SERPL-MCNC: 2.4 MG/DL — HIGH (ref 0.7–1.5)
CREAT SERPL-MCNC: 2.5 MG/DL — HIGH (ref 0.7–1.5)
EGFR: 23 ML/MIN/1.73M2 — LOW
EGFR: 24 ML/MIN/1.73M2 — LOW
EOSINOPHIL # BLD AUTO: 0.28 K/UL — SIGNIFICANT CHANGE UP (ref 0–0.7)
EOSINOPHIL # BLD AUTO: 0.43 K/UL — SIGNIFICANT CHANGE UP (ref 0–0.7)
EOSINOPHIL NFR BLD AUTO: 2.1 % — SIGNIFICANT CHANGE UP (ref 0–8)
EOSINOPHIL NFR BLD AUTO: 4.1 % — SIGNIFICANT CHANGE UP (ref 0–8)
GLUCOSE BLDC GLUCOMTR-MCNC: 125 MG/DL — HIGH (ref 70–99)
GLUCOSE BLDC GLUCOMTR-MCNC: 253 MG/DL — HIGH (ref 70–99)
GLUCOSE BLDC GLUCOMTR-MCNC: 253 MG/DL — HIGH (ref 70–99)
GLUCOSE BLDC GLUCOMTR-MCNC: 289 MG/DL — HIGH (ref 70–99)
GLUCOSE SERPL-MCNC: 133 MG/DL — HIGH (ref 70–99)
GLUCOSE SERPL-MCNC: 188 MG/DL — HIGH (ref 70–99)
HCT VFR BLD CALC: 23.4 % — LOW (ref 42–52)
HCT VFR BLD CALC: 26.7 % — LOW (ref 42–52)
HGB BLD-MCNC: 7.4 G/DL — LOW (ref 14–18)
HGB BLD-MCNC: 8.5 G/DL — LOW (ref 14–18)
IMM GRANULOCYTES NFR BLD AUTO: 0.4 % — HIGH (ref 0.1–0.3)
IMM GRANULOCYTES NFR BLD AUTO: 0.6 % — HIGH (ref 0.1–0.3)
LYMPHOCYTES # BLD AUTO: 2.21 K/UL — SIGNIFICANT CHANGE UP (ref 1.2–3.4)
LYMPHOCYTES # BLD AUTO: 20.9 % — SIGNIFICANT CHANGE UP (ref 20.5–51.1)
LYMPHOCYTES # BLD AUTO: 22.9 % — SIGNIFICANT CHANGE UP (ref 20.5–51.1)
LYMPHOCYTES # BLD AUTO: 3.09 K/UL — SIGNIFICANT CHANGE UP (ref 1.2–3.4)
MAGNESIUM SERPL-MCNC: 1.8 MG/DL — SIGNIFICANT CHANGE UP (ref 1.8–2.4)
MCHC RBC-ENTMCNC: 29.3 PG — SIGNIFICANT CHANGE UP (ref 27–31)
MCHC RBC-ENTMCNC: 29.6 PG — SIGNIFICANT CHANGE UP (ref 27–31)
MCHC RBC-ENTMCNC: 31.6 G/DL — LOW (ref 32–37)
MCHC RBC-ENTMCNC: 31.8 G/DL — LOW (ref 32–37)
MCV RBC AUTO: 92.1 FL — SIGNIFICANT CHANGE UP (ref 80–94)
MCV RBC AUTO: 93.6 FL — SIGNIFICANT CHANGE UP (ref 80–94)
MONOCYTES # BLD AUTO: 1.1 K/UL — HIGH (ref 0.1–0.6)
MONOCYTES # BLD AUTO: 1.43 K/UL — HIGH (ref 0.1–0.6)
MONOCYTES NFR BLD AUTO: 10.4 % — HIGH (ref 1.7–9.3)
MONOCYTES NFR BLD AUTO: 10.6 % — HIGH (ref 1.7–9.3)
NEUTROPHILS # BLD AUTO: 6.71 K/UL — HIGH (ref 1.4–6.5)
NEUTROPHILS # BLD AUTO: 8.61 K/UL — HIGH (ref 1.4–6.5)
NEUTROPHILS NFR BLD AUTO: 63.5 % — SIGNIFICANT CHANGE UP (ref 42.2–75.2)
NEUTROPHILS NFR BLD AUTO: 63.6 % — SIGNIFICANT CHANGE UP (ref 42.2–75.2)
NRBC # BLD: 0 /100 WBCS — SIGNIFICANT CHANGE UP (ref 0–0)
NRBC # BLD: 0 /100 WBCS — SIGNIFICANT CHANGE UP (ref 0–0)
PHOSPHATE SERPL-MCNC: 4.6 MG/DL — SIGNIFICANT CHANGE UP (ref 2.1–4.9)
PLATELET # BLD AUTO: 149 K/UL — SIGNIFICANT CHANGE UP (ref 130–400)
PLATELET # BLD AUTO: 152 K/UL — SIGNIFICANT CHANGE UP (ref 130–400)
POTASSIUM SERPL-MCNC: 5.5 MMOL/L — HIGH (ref 3.5–5)
POTASSIUM SERPL-MCNC: 6 MMOL/L — CRITICAL HIGH (ref 3.5–5)
POTASSIUM SERPL-SCNC: 5.5 MMOL/L — HIGH (ref 3.5–5)
POTASSIUM SERPL-SCNC: 6 MMOL/L — CRITICAL HIGH (ref 3.5–5)
RBC # BLD: 2.5 M/UL — LOW (ref 4.7–6.1)
RBC # BLD: 2.9 M/UL — LOW (ref 4.7–6.1)
RBC # FLD: 14 % — SIGNIFICANT CHANGE UP (ref 11.5–14.5)
RBC # FLD: 14.2 % — SIGNIFICANT CHANGE UP (ref 11.5–14.5)
SODIUM SERPL-SCNC: 132 MMOL/L — LOW (ref 135–146)
SODIUM SERPL-SCNC: 133 MMOL/L — LOW (ref 135–146)
WBC # BLD: 10.56 K/UL — SIGNIFICANT CHANGE UP (ref 4.8–10.8)
WBC # BLD: 13.52 K/UL — HIGH (ref 4.8–10.8)
WBC # FLD AUTO: 10.56 K/UL — SIGNIFICANT CHANGE UP (ref 4.8–10.8)
WBC # FLD AUTO: 13.52 K/UL — HIGH (ref 4.8–10.8)

## 2023-03-31 PROCEDURE — 99233 SBSQ HOSP IP/OBS HIGH 50: CPT

## 2023-03-31 PROCEDURE — 93010 ELECTROCARDIOGRAM REPORT: CPT

## 2023-03-31 RX ORDER — INSULIN GLARGINE 100 [IU]/ML
15 INJECTION, SOLUTION SUBCUTANEOUS AT BEDTIME
Refills: 0 | Status: DISCONTINUED | OUTPATIENT
Start: 2023-03-31 | End: 2023-04-03

## 2023-03-31 RX ORDER — INSULIN HUMAN 100 [IU]/ML
10 INJECTION, SOLUTION SUBCUTANEOUS ONCE
Refills: 0 | Status: COMPLETED | OUTPATIENT
Start: 2023-03-31 | End: 2023-03-31

## 2023-03-31 RX ORDER — DEXTROSE 50 % IN WATER 50 %
50 SYRINGE (ML) INTRAVENOUS ONCE
Refills: 0 | Status: COMPLETED | OUTPATIENT
Start: 2023-03-31 | End: 2023-03-31

## 2023-03-31 RX ORDER — SODIUM CHLORIDE 9 MG/ML
1000 INJECTION INTRAMUSCULAR; INTRAVENOUS; SUBCUTANEOUS
Refills: 0 | Status: DISCONTINUED | OUTPATIENT
Start: 2023-03-31 | End: 2023-04-03

## 2023-03-31 RX ORDER — SODIUM CHLORIDE 9 MG/ML
1000 INJECTION INTRAMUSCULAR; INTRAVENOUS; SUBCUTANEOUS
Refills: 0 | Status: DISCONTINUED | OUTPATIENT
Start: 2023-03-31 | End: 2023-03-31

## 2023-03-31 RX ORDER — INSULIN LISPRO 100/ML
VIAL (ML) SUBCUTANEOUS AT BEDTIME
Refills: 0 | Status: DISCONTINUED | OUTPATIENT
Start: 2023-03-31 | End: 2023-04-01

## 2023-03-31 RX ORDER — SODIUM CHLORIDE 9 MG/ML
250 INJECTION, SOLUTION INTRAVENOUS ONCE
Refills: 0 | Status: COMPLETED | OUTPATIENT
Start: 2023-03-31 | End: 2023-03-31

## 2023-03-31 RX ORDER — CALCIUM GLUCONATE 100 MG/ML
2 VIAL (ML) INTRAVENOUS ONCE
Refills: 0 | Status: COMPLETED | OUTPATIENT
Start: 2023-03-31 | End: 2023-03-31

## 2023-03-31 RX ADMIN — HEPARIN SODIUM 5000 UNIT(S): 5000 INJECTION INTRAVENOUS; SUBCUTANEOUS at 06:41

## 2023-03-31 RX ADMIN — DULOXETINE HYDROCHLORIDE 60 MILLIGRAM(S): 30 CAPSULE, DELAYED RELEASE ORAL at 11:35

## 2023-03-31 RX ADMIN — CHLORHEXIDINE GLUCONATE 1 APPLICATION(S): 213 SOLUTION TOPICAL at 06:41

## 2023-03-31 RX ADMIN — FINASTERIDE 5 MILLIGRAM(S): 5 TABLET, FILM COATED ORAL at 11:35

## 2023-03-31 RX ADMIN — INSULIN GLARGINE 15 UNIT(S): 100 INJECTION, SOLUTION SUBCUTANEOUS at 21:24

## 2023-03-31 RX ADMIN — INSULIN HUMAN 10 UNIT(S): 100 INJECTION, SOLUTION SUBCUTANEOUS at 19:39

## 2023-03-31 RX ADMIN — SODIUM ZIRCONIUM CYCLOSILICATE 5 GRAM(S): 10 POWDER, FOR SUSPENSION ORAL at 17:44

## 2023-03-31 RX ADMIN — Medication 650 MILLIGRAM(S): at 12:00

## 2023-03-31 RX ADMIN — Medication 650 MILLIGRAM(S): at 23:03

## 2023-03-31 RX ADMIN — Medication 650 MILLIGRAM(S): at 07:38

## 2023-03-31 RX ADMIN — Medication 650 MILLIGRAM(S): at 06:41

## 2023-03-31 RX ADMIN — Medication 9: at 21:24

## 2023-03-31 RX ADMIN — Medication 50 MILLILITER(S): at 19:56

## 2023-03-31 RX ADMIN — Medication 200 GRAM(S): at 19:56

## 2023-03-31 RX ADMIN — SODIUM CHLORIDE 50 MILLILITER(S): 9 INJECTION INTRAMUSCULAR; INTRAVENOUS; SUBCUTANEOUS at 11:00

## 2023-03-31 RX ADMIN — Medication 25 MILLIGRAM(S): at 17:44

## 2023-03-31 RX ADMIN — Medication 650 MILLIGRAM(S): at 11:35

## 2023-03-31 RX ADMIN — HEPARIN SODIUM 5000 UNIT(S): 5000 INJECTION INTRAVENOUS; SUBCUTANEOUS at 21:24

## 2023-03-31 RX ADMIN — SODIUM CHLORIDE 110 MILLILITER(S): 9 INJECTION INTRAMUSCULAR; INTRAVENOUS; SUBCUTANEOUS at 21:26

## 2023-03-31 RX ADMIN — HEPARIN SODIUM 5000 UNIT(S): 5000 INJECTION INTRAVENOUS; SUBCUTANEOUS at 13:26

## 2023-03-31 RX ADMIN — TAMSULOSIN HYDROCHLORIDE 0.4 MILLIGRAM(S): 0.4 CAPSULE ORAL at 21:24

## 2023-03-31 RX ADMIN — Medication 25 MILLIGRAM(S): at 06:42

## 2023-03-31 RX ADMIN — Medication 50 MICROGRAM(S): at 06:42

## 2023-03-31 RX ADMIN — SODIUM CHLORIDE 250 MILLILITER(S): 9 INJECTION, SOLUTION INTRAVENOUS at 18:12

## 2023-03-31 RX ADMIN — SIMVASTATIN 20 MILLIGRAM(S): 20 TABLET, FILM COATED ORAL at 21:24

## 2023-03-31 RX ADMIN — Medication 650 MILLIGRAM(S): at 17:44

## 2023-03-31 RX ADMIN — Medication 650 MILLIGRAM(S): at 23:31

## 2023-03-31 RX ADMIN — SODIUM ZIRCONIUM CYCLOSILICATE 5 GRAM(S): 10 POWDER, FOR SUSPENSION ORAL at 06:42

## 2023-03-31 NOTE — DIETITIAN INITIAL EVALUATION ADULT - PERSON TAUGHT/METHOD
reviewed current diet order, which is warranted given hx HTN and DM, but will allow liberalization when needed given age/verbal instruction/daughter instructed

## 2023-03-31 NOTE — PROGRESS NOTE ADULT - ATTENDING COMMENTS
Patient has Hb 7.4 this am.  Creat 2.4  AAO x4  GCS 15.  Has C-collar on.    ASSESSMENT:  89 y/o male, S/P Fall.  Concussion with brief LOC.  C1 posterior arch fracture.  At risk for neuro deficit.  C-spinal canal epidural hematoma.  Cervical stenosis of the spinal canal.  BRINA. Hyperkalemia.    PLAN:  - transfuse 1 unit PRBC  - iv hydration  - follow UOP  - follow K  - Nephrology f/u needed  - DVT prophylaxis  - needs aggressive PT  - IS

## 2023-03-31 NOTE — OCCUPATIONAL THERAPY INITIAL EVALUATION ADULT - PERTINENT HX OF CURRENT PROBLEM, REHAB EVAL
89yM w/ PMHx of HLD, HTN, BPH, quadruple bypass, and CAD s/p 4 stent placement and DM seen as (Code Trauma / Trauma Alert / Trauma Consult) s/p fall down 10 stairs with +HT, ?LOC, and -AC (patient is on Plavix and ASA). Patient fell down 10 wooden steps while daughter was showering as patient is not supposed to go down steps without supervision Patient is hard of hearing but denies any pain on arrival to ED but had a 5cm hematoma to right frontal scalp along with a skin tear on left dorsal hand and superficial abrasion to right shin. Patient denies any nausea, vomiting, chest pain, or other concerning symptoms. Trauma assessment in ED: ABCs intact , GCS 15 , AAOx3.

## 2023-03-31 NOTE — OCCUPATIONAL THERAPY INITIAL EVALUATION ADULT - RANGE OF MOTION EXAMINATION, UPPER EXTREMITY
Pt with old left shoulder injury. left shoulder 0-60 flex and abd/Right UE Active ROM was WFL (within functional limits)

## 2023-03-31 NOTE — OCCUPATIONAL THERAPY INITIAL EVALUATION ADULT - LEVEL OF INDEPENDENCE: SUPINE/SIT, REHAB EVAL
Debrox 7 drops to left ear BID x4 days Pt with increase in pain in neck and severe onset of dizziness once seated at bedside. Pt returned to supine immediately. /65 in supine 135/69 and return to supine after dizziness sitting eob. Pt placed in bed in chair mode. /69. Pt asymptomatic in bed in chair mode/minimum assist (75% patients effort)

## 2023-03-31 NOTE — DIETITIAN INITIAL EVALUATION ADULT - OTHER INFO
Pt presented s/p fall down 10 stairs, found to have right frontal 5cm hematoma,  left dorsal hand skin tear and bilateral C1 posterior arch nondisplaced fractures.

## 2023-03-31 NOTE — DIETITIAN INITIAL EVALUATION ADULT - ORAL INTAKE PTA/DIET HISTORY
Hx obtained from pt's daughter at bedside. Reports pt always had a good appetite. Pt had a prolonged hospitalization in December 2022 and was discharged to rehab after, subsequently had weight loss. Pt's UBW was 150-150lbs. Height is 5'1". After returning home, daughter had been managing pt's diet; she tries to limit salt and sugar. Pt eats cut up food at home; he was able to cut food up by himself. Takes vitamin D,  vitamin B12, multivitamin, vitamin C and glucosamine everyday. No food allergy/intolerance. No chewing/swallowing issues.

## 2023-03-31 NOTE — DIETITIAN INITIAL EVALUATION ADULT - NSFNSGIIOFT_GEN_A_CORE
Weight above using UBW.    I&O's Detail    30 Mar 2023 07:01  -  31 Mar 2023 07:00  --------------------------------------------------------  IN:    sodium chloride 0.9%: 1980 mL    sodium chloride 0.9%: 240 mL  Total IN: 2220 mL    OUT:    Indwelling Catheter - Urethral (mL): 225 mL  Total OUT: 225 mL    Total NET: 1995 mL

## 2023-03-31 NOTE — DIETITIAN INITIAL EVALUATION ADULT - PERTINENT MEDS FT
MEDICATIONS  (STANDING):  acetaminophen     Tablet .. 650 milliGRAM(s) Oral every 6 hours  chlorhexidine 4% Liquid 1 Application(s) Topical <User Schedule>  DULoxetine 60 milliGRAM(s) Oral daily  finasteride 5 milliGRAM(s) Oral daily  heparin   Injectable 5000 Unit(s) SubCutaneous every 8 hours  insulin glargine Injectable (LANTUS) 15 Unit(s) SubCutaneous at bedtime  insulin lispro (ADMELOG) corrective regimen sliding scale   SubCutaneous at bedtime  levothyroxine 50 MICROGram(s) Oral daily  metoprolol tartrate 25 milliGRAM(s) Oral two times a day  simvastatin 20 milliGRAM(s) Oral at bedtime  sodium chloride 0.9%. 1000 milliLiter(s) (50 mL/Hr) IV Continuous <Continuous>  sodium zirconium cyclosilicate 5 Gram(s) Oral two times a day  tamsulosin 0.4 milliGRAM(s) Oral at bedtime    MEDICATIONS  (PRN):  guaiFENesin  milliGRAM(s) Oral every 12 hours PRN Cough  traMADol 50 milliGRAM(s) Oral two times a day PRN Moderate Pain (4 - 6)

## 2023-03-31 NOTE — DIETITIAN INITIAL EVALUATION ADULT - DIET TYPE
add note in diet order to provide soft and bite sized food for easy chewing, but allow mixed consistencies and bread given pt with no dysphagia

## 2023-03-31 NOTE — DIETITIAN INITIAL EVALUATION ADULT - NS FNS DIET ORDER
Diet, Regular:   Consistent Carbohydrate {No Snacks}  No Concentrated Potassium (03-30-23 @ 13:54)

## 2023-03-31 NOTE — DIETITIAN INITIAL EVALUATION ADULT - RD TO REMAIN AVAILABLE
Patricia [phone], also available on Teams Pt deemed low nutrition risk; RD to follow in 7-10 days, or sooner if warranted by condition change.   Patricia [phone], also available on Teams

## 2023-03-31 NOTE — DIETITIAN INITIAL EVALUATION ADULT - PERTINENT LABORATORY DATA
03-31    133<L>  |  102  |  37<H>  ----------------------------<  133<H>  5.5<H>   |  20  |  2.4<H>    Ca    8.2<L>      31 Mar 2023 07:16  Phos  3.3     03-30  Mg     1.5     03-30    CAPILLARY BLOOD GLUCOSE  POCT Blood Glucose.: 289 mg/dL (31 Mar 2023 11:32)  POCT Blood Glucose.: 125 mg/dL (31 Mar 2023 07:02)  POCT Blood Glucose.: 101 mg/dL (30 Mar 2023 21:30)  POCT Blood Glucose.: 97 mg/dL (30 Mar 2023 17:45)    A1C with Estimated Average Glucose Result: 7.9 % (03-30-23 @ 05:35)  A1C with Estimated Average Glucose Result: 7.6 % (03-29-23 @ 04:20)  A1C with Estimated Average Glucose Result: 10.2 % (12-05-22 @ 07:42)

## 2023-03-31 NOTE — PROGRESS NOTE ADULT - SUBJECTIVE AND OBJECTIVE BOX
TRAUMA SURGERY PROGRESS NOTE    Patient: CHEL CROFT , 89y (24)Male   MRN: [id number]  Location: [place] [address] A  Visit: 23 Inpatient  Date: 23 @ 08:24    Patient seen and evaluated at bedside. Patient had a hidalgo in place with tea colored urine. Patient denies pain and has no complaints. Patient had hemoglobin 6.7 that increased to 7.4 on repeat. Voiding spontaneously.     PAST MEDICAL & SURGICAL HISTORY:  HTN (hypertension)  HLD (hyperlipidemia)  BPH (benign prostatic hyperplasia)  CAD (coronary artery disease)  Diabetes mellitus  Hypothyroidism    S/P CABG (coronary artery bypass graft)    Vitals:   T(F): 96.5 (23 @ 00:00), Max: 97.3 (23 @ 12:00)  HR: 59 (23 @ 04:00)  BP: 138/64 (23 @ 04:00)  RR: 15 (23 @ 04:00)  SpO2: 99% (23 @ 04:00)    Diet, Regular:   Consistent Carbohydrate No Snacks  No Concentrated Potassium    Fluids: sodium chloride 0.9%.: Solution, 1000 milliLiter(s) infuse at 110 mL/Hr    I & O's:    23 @ 07:01  -  23 @ 07:00  --------------------------------------------------------  IN:    sodium chloride 0.9%: 1980 mL    sodium chloride 0.9%: 240 mL  Total IN: 2220 mL    OUT:    Indwelling Catheter - Urethral (mL): 225 mL  Total OUT: 225 mL    Total NET:  mL    PHYSICAL EXAM:  General: NAD, AAOx3, calm and cooperative  HEENT: NCAT, Monique LOMAS in place   Cardiac: No peripheral cyanosis or pallor, extremities well perfused   Respiratory: Non-labored breathing, equal chest rise bilaterally   Abdomen: Soft, non-distended, non-tender, no rebound, no guarding  Musculoskeletal: Strength 5/5 BL UE/LE, ROM intact, compartments soft  Neuro: Sensation grossly intact and equal throughout, no focal deficits  Vascular: Pulses 2+ throughout, extremities well perfused  Skin: Warm/dry, normal color, no jaundice    MEDICATIONS  (STANDING):  acetaminophen     Tablet .. 650 milliGRAM(s) Oral every 6 hours  chlorhexidine 4% Liquid 1 Application(s) Topical <User Schedule>  DULoxetine 60 milliGRAM(s) Oral daily  finasteride 5 milliGRAM(s) Oral daily  heparin   Injectable 5000 Unit(s) SubCutaneous every 8 hours  insulin glargine Injectable (LANTUS) 15 Unit(s) SubCutaneous at bedtime  insulin lispro (ADMELOG) corrective regimen sliding scale   SubCutaneous at bedtime  levothyroxine 50 MICROGram(s) Oral daily  metoprolol tartrate 25 milliGRAM(s) Oral two times a day  simvastatin 20 milliGRAM(s) Oral at bedtime  sodium chloride 0.9%. 1000 milliLiter(s) (110 mL/Hr) IV Continuous <Continuous>  sodium zirconium cyclosilicate 5 Gram(s) Oral two times a day  tamsulosin 0.4 milliGRAM(s) Oral at bedtime    MEDICATIONS  (PRN):  guaiFENesin  milliGRAM(s) Oral every 12 hours PRN Cough  traMADol 50 milliGRAM(s) Oral two times a day PRN Moderate Pain (4 - 6)    DVT PROPHYLAXIS: SCDs, heparin   Injectable 5000 Unit(s) SubCutaneous every 8 hours    GI PROPHYLAXIS:   ANTICOAGULATION:   ANTIBIOTICS:     LAB/STUDIES:  Labs:  CAPILLARY BLOOD GLUCOSE    POCT Blood Glucose.: 125 mg/dL (31 Mar 2023 07:02)  POCT Blood Glucose.: 101 mg/dL (30 Mar 2023 21:30)  POCT Blood Glucose.: 97 mg/dL (30 Mar 2023 17:45)  POCT Blood Glucose.: 282 mg/dL (30 Mar 2023 11:04)                        7.4    13.52 )-----------( 149      ( 30 Mar 2023 23:41 )             23.4       Auto Neutrophil %: 63.6 % (23 @ 23:41)  Auto Immature Granulocyte %: 0.4 % (23 @ 23:41)  Auto Neutrophil %: 69.8 % (23 @ 22:07)  Auto Immature Granulocyte %: 0.4 % (23 @ 22:07)    03-31    133<L>  |  102  |  37<H>  ----------------------------<  133<H>  5.5<H>   |  20  |  2.4<H>    Calcium, Total Serum: 8.2 mg/dL (23 @ 07:16)    LFTs:     Lactate, Blood: 3.6 mmol/L (23 @ 21:55)  Blood Gas Venous - Lactate: 1.70 mmol/L (23 @ 21:42)    Coags:    Urinalysis Basic - ( 30 Mar 2023 19:25 )    Color: Yellow / Appearance: Clear / S.047 / pH: x  Gluc: x / Ketone: Trace  / Bili: Negative / Urobili: <2 mg/dL   Blood: x / Protein: 30 mg/dL / Nitrite: Negative   Leuk Esterase: Negative / RBC: 124 /HPF / WBC 2 /HPF   Sq Epi: x / Non Sq Epi: 1 /HPF / Bacteria: Negative    IMAGING:    ACCESS DEVICES:  [ x] Peripheral IV   TRAUMA SURGERY PROGRESS NOTE    Patient: CHEL CROFT , 89y (24)Male   MRN: [id number]  Location: [place] [address] A  Visit: 23 Inpatient  Date: 23 @ 08:24    Patient seen and evaluated at bedside. Patient had a Hoover in place with tea colored urine. Patient denies pain and has no complaints. Patient had hemoglobin 6.7 that increased to 7.4 on repeat. Voiding spontaneously.     PAST MEDICAL & SURGICAL HISTORY:  HTN (hypertension)  HLD (hyperlipidemia)  BPH (benign prostatic hyperplasia)  CAD (coronary artery disease)  Diabetes mellitus  Hypothyroidism    S/P CABG (coronary artery bypass graft)    Vitals:   T(F): 96.5 (23 @ 00:00), Max: 97.3 (23 @ 12:00)  HR: 59 (23 @ 04:00)  BP: 138/64 (23 @ 04:00)  RR: 15 (23 @ 04:00)  SpO2: 99% (23 @ 04:00)    Diet, Regular:   Consistent Carbohydrate No Snacks  No Concentrated Potassium    Fluids: sodium chloride 0.9%.: Solution, 1000 milliLiter(s) infuse at 110 mL/Hr    I & O's:    23 @ 07:01  -  23 @ 07:00  --------------------------------------------------------  IN:    sodium chloride 0.9%: 1980 mL    sodium chloride 0.9%: 240 mL  Total IN: 2220 mL    OUT:    Indwelling Catheter - Urethral (mL): 225 mL  Total OUT: 225 mL    Total NET:  mL    PHYSICAL EXAM:  General: NAD, AAOx3, calm and cooperative  HEENT: NCAT, Monique LOMAS in place   Cardiac: No peripheral cyanosis or pallor, extremities well perfused   Respiratory: Non-labored breathing, equal chest rise bilaterally   Abdomen: Soft, non-distended, non-tender, no rebound, no guarding  Musculoskeletal: Strength 5/5 BL UE/LE, ROM intact, compartments soft  Neuro: Sensation grossly intact and equal throughout, no focal deficits  Vascular: Pulses 2+ throughout, extremities well perfused  Skin: Warm/dry, normal color, no jaundice    MEDICATIONS  (STANDING):  acetaminophen     Tablet .. 650 milliGRAM(s) Oral every 6 hours  chlorhexidine 4% Liquid 1 Application(s) Topical <User Schedule>  DULoxetine 60 milliGRAM(s) Oral daily  finasteride 5 milliGRAM(s) Oral daily  heparin   Injectable 5000 Unit(s) SubCutaneous every 8 hours  insulin glargine Injectable (LANTUS) 15 Unit(s) SubCutaneous at bedtime  insulin lispro (ADMELOG) corrective regimen sliding scale   SubCutaneous at bedtime  levothyroxine 50 MICROGram(s) Oral daily  metoprolol tartrate 25 milliGRAM(s) Oral two times a day  simvastatin 20 milliGRAM(s) Oral at bedtime  sodium chloride 0.9%. 1000 milliLiter(s) (110 mL/Hr) IV Continuous <Continuous>  sodium zirconium cyclosilicate 5 Gram(s) Oral two times a day  tamsulosin 0.4 milliGRAM(s) Oral at bedtime    MEDICATIONS  (PRN):  guaiFENesin  milliGRAM(s) Oral every 12 hours PRN Cough  traMADol 50 milliGRAM(s) Oral two times a day PRN Moderate Pain (4 - 6)    DVT PROPHYLAXIS: SCDs, heparin   Injectable 5000 Unit(s) SubCutaneous every 8 hours    GI PROPHYLAXIS:   ANTICOAGULATION:   ANTIBIOTICS:     LAB/STUDIES:  Labs:  CAPILLARY BLOOD GLUCOSE    POCT Blood Glucose.: 125 mg/dL (31 Mar 2023 07:02)  POCT Blood Glucose.: 101 mg/dL (30 Mar 2023 21:30)  POCT Blood Glucose.: 97 mg/dL (30 Mar 2023 17:45)  POCT Blood Glucose.: 282 mg/dL (30 Mar 2023 11:04)                        7.4    13.52 )-----------( 149      ( 30 Mar 2023 23:41 )             23.4       Auto Neutrophil %: 63.6 % (23 @ 23:41)  Auto Immature Granulocyte %: 0.4 % (23 @ 23:41)  Auto Neutrophil %: 69.8 % (23 @ 22:07)  Auto Immature Granulocyte %: 0.4 % (23 @ 22:07)    03-31    133<L>  |  102  |  37<H>  ----------------------------<  133<H>  5.5<H>   |  20  |  2.4<H>    Calcium, Total Serum: 8.2 mg/dL (23 @ 07:16)    LFTs:     Lactate, Blood: 3.6 mmol/L (23 @ 21:55)  Blood Gas Venous - Lactate: 1.70 mmol/L (23 @ 21:42)    Coags:    Urinalysis Basic - ( 30 Mar 2023 19:25 )    Color: Yellow / Appearance: Clear / S.047 / pH: x  Gluc: x / Ketone: Trace  / Bili: Negative / Urobili: <2 mg/dL   Blood: x / Protein: 30 mg/dL / Nitrite: Negative   Leuk Esterase: Negative / RBC: 124 /HPF / WBC 2 /HPF   Sq Epi: x / Non Sq Epi: 1 /HPF / Bacteria: Negative    IMAGING:    ACCESS DEVICES:  [ x] Peripheral IV

## 2023-03-31 NOTE — OCCUPATIONAL THERAPY INITIAL EVALUATION ADULT - NSOTDISCHREC_GEN_A_CORE
Patient requires assistance with activities of daily living. OT recommends D/C to rehabilitation facility when medically appropriate. Refer to evaluation/treatment for details./Acute Inpatient Rehab

## 2023-03-31 NOTE — DIETITIAN INITIAL EVALUATION ADULT - ENERGY INTAKE
Reports good tray intake in-house. No report of GI issues. Daughter agrees with pt to be provided with cut up/bite sized food in-house, although she will also be at bedside most of the time during pt's hospitalization to provide feeding assistance.

## 2023-04-01 LAB
ANION GAP SERPL CALC-SCNC: 10 MMOL/L — SIGNIFICANT CHANGE UP (ref 7–14)
ANION GAP SERPL CALC-SCNC: 11 MMOL/L — SIGNIFICANT CHANGE UP (ref 7–14)
ANION GAP SERPL CALC-SCNC: 11 MMOL/L — SIGNIFICANT CHANGE UP (ref 7–14)
APPEARANCE UR: ABNORMAL
BACTERIA # UR AUTO: ABNORMAL
BASOPHILS # BLD AUTO: 0.03 K/UL — SIGNIFICANT CHANGE UP (ref 0–0.2)
BASOPHILS NFR BLD AUTO: 0.3 % — SIGNIFICANT CHANGE UP (ref 0–1)
BILIRUB UR-MCNC: NEGATIVE — SIGNIFICANT CHANGE UP
BUN SERPL-MCNC: 39 MG/DL — HIGH (ref 10–20)
CALCIUM SERPL-MCNC: 7.5 MG/DL — LOW (ref 8.4–10.5)
CALCIUM SERPL-MCNC: 7.8 MG/DL — LOW (ref 8.4–10.5)
CALCIUM SERPL-MCNC: 8.5 MG/DL — SIGNIFICANT CHANGE UP (ref 8.4–10.4)
CHLORIDE SERPL-SCNC: 102 MMOL/L — SIGNIFICANT CHANGE UP (ref 98–110)
CHLORIDE SERPL-SCNC: 103 MMOL/L — SIGNIFICANT CHANGE UP (ref 98–110)
CHLORIDE SERPL-SCNC: 104 MMOL/L — SIGNIFICANT CHANGE UP (ref 98–110)
CO2 SERPL-SCNC: 18 MMOL/L — SIGNIFICANT CHANGE UP (ref 17–32)
CO2 SERPL-SCNC: 20 MMOL/L — SIGNIFICANT CHANGE UP (ref 17–32)
CO2 SERPL-SCNC: 21 MMOL/L — SIGNIFICANT CHANGE UP (ref 17–32)
COLOR SPEC: YELLOW — SIGNIFICANT CHANGE UP
CREAT ?TM UR-MCNC: 107 MG/DL — SIGNIFICANT CHANGE UP
CREAT SERPL-MCNC: 2.6 MG/DL — HIGH (ref 0.7–1.5)
CREAT SERPL-MCNC: 2.8 MG/DL — HIGH (ref 0.7–1.5)
CREAT SERPL-MCNC: 2.9 MG/DL — HIGH (ref 0.7–1.5)
DIFF PNL FLD: ABNORMAL
EGFR: 19 ML/MIN/1.73M2 — LOW
EGFR: 20 ML/MIN/1.73M2 — LOW
EGFR: 21 ML/MIN/1.73M2 — LOW
EOSINOPHIL # BLD AUTO: 0.35 K/UL — SIGNIFICANT CHANGE UP (ref 0–0.7)
EOSINOPHIL NFR BLD AUTO: 3.9 % — SIGNIFICANT CHANGE UP (ref 0–8)
EPI CELLS # UR: 25 /HPF — HIGH (ref 0–5)
GLUCOSE BLDC GLUCOMTR-MCNC: 123 MG/DL — HIGH (ref 70–99)
GLUCOSE BLDC GLUCOMTR-MCNC: 171 MG/DL — HIGH (ref 70–99)
GLUCOSE BLDC GLUCOMTR-MCNC: 234 MG/DL — HIGH (ref 70–99)
GLUCOSE BLDC GLUCOMTR-MCNC: 263 MG/DL — HIGH (ref 70–99)
GLUCOSE SERPL-MCNC: 131 MG/DL — HIGH (ref 70–99)
GLUCOSE SERPL-MCNC: 229 MG/DL — HIGH (ref 70–99)
GLUCOSE SERPL-MCNC: 55 MG/DL — LOW (ref 70–99)
GLUCOSE UR QL: ABNORMAL
GRAN CASTS # UR COMP ASSIST: 42 /LPF — HIGH
HCT VFR BLD CALC: 24.6 % — LOW (ref 42–52)
HGB BLD-MCNC: 8 G/DL — LOW (ref 14–18)
HYALINE CASTS # UR AUTO: 33 /LPF — HIGH (ref 0–7)
IMM GRANULOCYTES NFR BLD AUTO: 0.7 % — HIGH (ref 0.1–0.3)
KETONES UR-MCNC: NEGATIVE — SIGNIFICANT CHANGE UP
LEUKOCYTE ESTERASE UR-ACNC: ABNORMAL
LYMPHOCYTES # BLD AUTO: 1.95 K/UL — SIGNIFICANT CHANGE UP (ref 1.2–3.4)
LYMPHOCYTES # BLD AUTO: 21.7 % — SIGNIFICANT CHANGE UP (ref 20.5–51.1)
MAGNESIUM SERPL-MCNC: 1.7 MG/DL — LOW (ref 1.8–2.4)
MAGNESIUM SERPL-MCNC: 2.1 MG/DL — SIGNIFICANT CHANGE UP (ref 1.8–2.4)
MCHC RBC-ENTMCNC: 30.1 PG — SIGNIFICANT CHANGE UP (ref 27–31)
MCHC RBC-ENTMCNC: 32.5 G/DL — SIGNIFICANT CHANGE UP (ref 32–37)
MCV RBC AUTO: 92.5 FL — SIGNIFICANT CHANGE UP (ref 80–94)
MONOCYTES # BLD AUTO: 1.09 K/UL — HIGH (ref 0.1–0.6)
MONOCYTES NFR BLD AUTO: 12.1 % — HIGH (ref 1.7–9.3)
NEUTROPHILS # BLD AUTO: 5.52 K/UL — SIGNIFICANT CHANGE UP (ref 1.4–6.5)
NEUTROPHILS NFR BLD AUTO: 61.3 % — SIGNIFICANT CHANGE UP (ref 42.2–75.2)
NITRITE UR-MCNC: NEGATIVE — SIGNIFICANT CHANGE UP
NRBC # BLD: 0 /100 WBCS — SIGNIFICANT CHANGE UP (ref 0–0)
OSMOLALITY UR: 420 MOS/KG — SIGNIFICANT CHANGE UP (ref 50–1200)
PH UR: 6 — SIGNIFICANT CHANGE UP (ref 5–8)
PHOSPHATE SERPL-MCNC: 4.2 MG/DL — SIGNIFICANT CHANGE UP (ref 2.1–4.9)
PHOSPHATE SERPL-MCNC: 4.2 MG/DL — SIGNIFICANT CHANGE UP (ref 2.1–4.9)
PLATELET # BLD AUTO: 151 K/UL — SIGNIFICANT CHANGE UP (ref 130–400)
POTASSIUM SERPL-MCNC: 4.8 MMOL/L — SIGNIFICANT CHANGE UP (ref 3.5–5)
POTASSIUM SERPL-MCNC: 5.2 MMOL/L — HIGH (ref 3.5–5)
POTASSIUM SERPL-MCNC: 5.5 MMOL/L — HIGH (ref 3.5–5)
POTASSIUM SERPL-SCNC: 4.8 MMOL/L — SIGNIFICANT CHANGE UP (ref 3.5–5)
POTASSIUM SERPL-SCNC: 5.2 MMOL/L — HIGH (ref 3.5–5)
POTASSIUM SERPL-SCNC: 5.5 MMOL/L — HIGH (ref 3.5–5)
POTASSIUM UR-SCNC: 44 MMOL/L — SIGNIFICANT CHANGE UP
PROT UR-MCNC: ABNORMAL
RBC # BLD: 2.66 M/UL — LOW (ref 4.7–6.1)
RBC # FLD: 14.7 % — HIGH (ref 11.5–14.5)
RBC CASTS # UR COMP ASSIST: 15 /HPF — HIGH (ref 0–4)
SODIUM SERPL-SCNC: 132 MMOL/L — LOW (ref 135–146)
SODIUM SERPL-SCNC: 134 MMOL/L — LOW (ref 135–146)
SODIUM SERPL-SCNC: 134 MMOL/L — LOW (ref 135–146)
SODIUM UR-SCNC: 50 MMOL/L — SIGNIFICANT CHANGE UP
SP GR SPEC: 1.03 — SIGNIFICANT CHANGE UP (ref 1.01–1.03)
UROBILINOGEN FLD QL: SIGNIFICANT CHANGE UP
WBC # BLD: 9 K/UL — SIGNIFICANT CHANGE UP (ref 4.8–10.8)
WBC # FLD AUTO: 9 K/UL — SIGNIFICANT CHANGE UP (ref 4.8–10.8)
WBC UR QL: 59 /HPF — HIGH (ref 0–5)

## 2023-04-01 PROCEDURE — 99232 SBSQ HOSP IP/OBS MODERATE 35: CPT

## 2023-04-01 PROCEDURE — 71045 X-RAY EXAM CHEST 1 VIEW: CPT | Mod: 26

## 2023-04-01 RX ORDER — DEXTROSE 50 % IN WATER 50 %
25 SYRINGE (ML) INTRAVENOUS ONCE
Refills: 0 | Status: DISCONTINUED | OUTPATIENT
Start: 2023-04-01 | End: 2023-04-03

## 2023-04-01 RX ORDER — INSULIN LISPRO 100/ML
VIAL (ML) SUBCUTANEOUS AT BEDTIME
Refills: 0 | Status: DISCONTINUED | OUTPATIENT
Start: 2023-04-01 | End: 2023-04-02

## 2023-04-01 RX ORDER — INSULIN LISPRO 100/ML
VIAL (ML) SUBCUTANEOUS
Refills: 0 | Status: DISCONTINUED | OUTPATIENT
Start: 2023-04-01 | End: 2023-04-03

## 2023-04-01 RX ORDER — INSULIN LISPRO 100/ML
VIAL (ML) SUBCUTANEOUS AT BEDTIME
Refills: 0 | Status: DISCONTINUED | OUTPATIENT
Start: 2023-04-01 | End: 2023-04-01

## 2023-04-01 RX ORDER — SODIUM CHLORIDE 9 MG/ML
1000 INJECTION, SOLUTION INTRAVENOUS
Refills: 0 | Status: DISCONTINUED | OUTPATIENT
Start: 2023-04-01 | End: 2023-04-03

## 2023-04-01 RX ORDER — SODIUM CHLORIDE 9 MG/ML
500 INJECTION INTRAMUSCULAR; INTRAVENOUS; SUBCUTANEOUS ONCE
Refills: 0 | Status: COMPLETED | OUTPATIENT
Start: 2023-04-01 | End: 2023-04-01

## 2023-04-01 RX ORDER — INSULIN LISPRO 100/ML
VIAL (ML) SUBCUTANEOUS
Refills: 0 | Status: DISCONTINUED | OUTPATIENT
Start: 2023-04-01 | End: 2023-04-01

## 2023-04-01 RX ADMIN — Medication 650 MILLIGRAM(S): at 05:52

## 2023-04-01 RX ADMIN — SODIUM CHLORIDE 1000 MILLILITER(S): 9 INJECTION INTRAMUSCULAR; INTRAVENOUS; SUBCUTANEOUS at 05:53

## 2023-04-01 RX ADMIN — SIMVASTATIN 20 MILLIGRAM(S): 20 TABLET, FILM COATED ORAL at 21:08

## 2023-04-01 RX ADMIN — Medication 650 MILLIGRAM(S): at 05:01

## 2023-04-01 RX ADMIN — Medication 50 MICROGRAM(S): at 05:02

## 2023-04-01 RX ADMIN — Medication 650 MILLIGRAM(S): at 17:35

## 2023-04-01 RX ADMIN — HEPARIN SODIUM 5000 UNIT(S): 5000 INJECTION INTRAVENOUS; SUBCUTANEOUS at 21:08

## 2023-04-01 RX ADMIN — SODIUM CHLORIDE 110 MILLILITER(S): 9 INJECTION INTRAMUSCULAR; INTRAVENOUS; SUBCUTANEOUS at 15:54

## 2023-04-01 RX ADMIN — HEPARIN SODIUM 5000 UNIT(S): 5000 INJECTION INTRAVENOUS; SUBCUTANEOUS at 05:01

## 2023-04-01 RX ADMIN — Medication 650 MILLIGRAM(S): at 17:03

## 2023-04-01 RX ADMIN — SODIUM ZIRCONIUM CYCLOSILICATE 5 GRAM(S): 10 POWDER, FOR SUSPENSION ORAL at 05:02

## 2023-04-01 RX ADMIN — SODIUM CHLORIDE 110 MILLILITER(S): 9 INJECTION INTRAMUSCULAR; INTRAVENOUS; SUBCUTANEOUS at 16:02

## 2023-04-01 RX ADMIN — Medication 650 MILLIGRAM(S): at 11:50

## 2023-04-01 RX ADMIN — TRAMADOL HYDROCHLORIDE 50 MILLIGRAM(S): 50 TABLET ORAL at 05:53

## 2023-04-01 RX ADMIN — SODIUM CHLORIDE 500 MILLILITER(S): 9 INJECTION INTRAMUSCULAR; INTRAVENOUS; SUBCUTANEOUS at 16:03

## 2023-04-01 RX ADMIN — TAMSULOSIN HYDROCHLORIDE 0.4 MILLIGRAM(S): 0.4 CAPSULE ORAL at 21:08

## 2023-04-01 RX ADMIN — FINASTERIDE 5 MILLIGRAM(S): 5 TABLET, FILM COATED ORAL at 11:23

## 2023-04-01 RX ADMIN — TRAMADOL HYDROCHLORIDE 50 MILLIGRAM(S): 50 TABLET ORAL at 05:11

## 2023-04-01 RX ADMIN — DULOXETINE HYDROCHLORIDE 60 MILLIGRAM(S): 30 CAPSULE, DELAYED RELEASE ORAL at 11:23

## 2023-04-01 RX ADMIN — Medication 650 MILLIGRAM(S): at 11:23

## 2023-04-01 RX ADMIN — HEPARIN SODIUM 5000 UNIT(S): 5000 INJECTION INTRAVENOUS; SUBCUTANEOUS at 13:44

## 2023-04-01 RX ADMIN — INSULIN GLARGINE 15 UNIT(S): 100 INJECTION, SOLUTION SUBCUTANEOUS at 21:09

## 2023-04-01 RX ADMIN — Medication 25 MILLIGRAM(S): at 17:03

## 2023-04-01 RX ADMIN — SODIUM ZIRCONIUM CYCLOSILICATE 5 GRAM(S): 10 POWDER, FOR SUSPENSION ORAL at 17:04

## 2023-04-01 RX ADMIN — Medication 4: at 17:02

## 2023-04-01 RX ADMIN — Medication 25 MILLIGRAM(S): at 05:02

## 2023-04-01 NOTE — PROGRESS NOTE ADULT - SUBJECTIVE AND OBJECTIVE BOX
TRAUMA SURGERY PROGRESS NOTE    Patient: CHEL CROFT , 89y (24)Male   MRN: [id number]  Location: [address] ([address]) 0[address]  Visit: 23 Inpatient  Date: 23 @ 02:09    Patient seen and evaluated at bedside. His potassium was elevated to 6.0 and patient was treated appropriately with 11:30PM labs pending. Pain is well controlled. He is hemodynamically stable, afebrile, and voiding spontaneously.     PAST MEDICAL & SURGICAL HISTORY:  HTN (hypertension)      HLD (hyperlipidemia)      BPH (benign prostatic hyperplasia)      CAD (coronary artery disease)      Diabetes mellitus      Hypothyroidism      S/P CABG (coronary artery bypass graft)          Vitals:   T(F): 96.4 (23 @ 00:38), Max: 96.8 (23 @ 08:00)  HR: 73 (23 @ 00:38)  BP: 142/68 (23 @ 00:38)  RR: 18 (23 @ 00:38)  SpO2: 96% (23 @ 00:38)      Diet, Regular:   Consistent Carbohydrate No Snacks  No Concentrated Potassium  Free Water Flush Instructions:  Please provide soft and bite sized food for easy chewing; can give mixed consistencies and breads      Fluids: sodium chloride 0.9%.: Solution, 1000 milliLiter(s) infuse at 110 mL/Hr      I & O's:    23 @ 07:01  -  23 @ 07:00  --------------------------------------------------------  IN:    sodium chloride 0.9%: 1980 mL    sodium chloride 0.9%: 240 mL  Total IN: 2220 mL    OUT:    Indwelling Catheter - Urethral (mL): 225 mL  Total OUT: 225 mL    Total NET: 1995 mL    PHYSICAL EXAM:  General: NAD, AAOx3, calm and cooperative  HEENT: NCAT, [place] J collar in place   Cardiac: No peripheral cyanosis or pallor, extremities well perfused   Respiratory: Non-labored breathing, equal chest rise bilaterally   Abdomen: Soft, non-distended, non-tender, no rebound, no guarding  Musculoskeletal: Strength 5/5 BL UE/LE, ROM intact, compartments soft  Neuro: Sensation grossly intact and equal throughout, no focal deficits  Vascular: Pulses 2+ throughout, extremities well perfused  Skin: Warm/dry, normal color, no jaundice    MEDICATIONS  (STANDING):  acetaminophen     Tablet .. 650 milliGRAM(s) Oral every 6 hours  chlorhexidine 4% Liquid 1 Application(s) Topical <User Schedule>  DULoxetine 60 milliGRAM(s) Oral daily  finasteride 5 milliGRAM(s) Oral daily  heparin   Injectable 5000 Unit(s) SubCutaneous every 8 hours  insulin glargine Injectable (LANTUS) 15 Unit(s) SubCutaneous at bedtime  insulin lispro (ADMELOG) corrective regimen sliding scale   SubCutaneous at bedtime  levothyroxine 50 MICROGram(s) Oral daily  metoprolol tartrate 25 milliGRAM(s) Oral two times a day  simvastatin 20 milliGRAM(s) Oral at bedtime  sodium chloride 0.9%. 1000 milliLiter(s) (110 mL/Hr) IV Continuous <Continuous>  sodium zirconium cyclosilicate 5 Gram(s) Oral two times a day  tamsulosin 0.4 milliGRAM(s) Oral at bedtime    MEDICATIONS  (PRN):  guaiFENesin  milliGRAM(s) Oral every 12 hours PRN Cough  traMADol 50 milliGRAM(s) Oral two times a day PRN Moderate Pain (4 - 6)      DVT PROPHYLAXIS: SCDs, heparin   Injectable 5000 Unit(s) SubCutaneous every 8 hours    GI PROPHYLAXIS:   ANTICOAGULATION:   ANTIBIOTICS:           LAB/STUDIES:  Labs:  CAPILLARY BLOOD GLUCOSE      POCT Blood Glucose.: 253 mg/dL (31 Mar 2023 21:21)  POCT Blood Glucose.: 253 mg/dL (31 Mar 2023 19:35)  POCT Blood Glucose.: 289 mg/dL (31 Mar 2023 11:32)  POCT Blood Glucose.: 125 mg/dL (31 Mar 2023 07:02)                          8.5    10.56 )-----------( 152      ( 31 Mar 2023 17:58 )             26.7       Auto Neutrophil %: 63.5 % (23 @ 17:58)  Auto Immature Granulocyte %: 0.6 % (23 @ 17:58)        132<L>  |  101  |  40<H>  ----------------------------<  188<H>  6.0<HH>   |  22  |  2.5<H>      Calcium, Total Serum: 8.1 mg/dL (23 @ 17:58)      LFTs:         Coags:            Urinalysis Basic - ( 30 Mar 2023 19:25 )    Color: Yellow / Appearance: Clear / S.047 / pH: x  Gluc: x / Ketone: Trace  / Bili: Negative / Urobili: <2 mg/dL   Blood: x / Protein: 30 mg/dL / Nitrite: Negative   Leuk Esterase: Negative / RBC: 124 /HPF / WBC 2 /HPF   Sq Epi: x / Non Sq Epi: 1 /HPF / Bacteria: Negative                IMAGING:      ACCESS DEVICES:  [x] Peripheral IV  [ ] Central Venous Line	[ ] R	[ ] L	[ ] IJ	[ ] Fem	[ ] SC	Placed:   [ ] Arterial Line		[ ] R	[ ] L	[ ] Fem	[ ] Rad	[ ] Ax	Placed:   [ ] PICC:					[ ] Mediport  [ ] Urinary Catheter,  Date Placed:   [ ] Chest tube: [ ] Right, [ ] Left  [ ] JOVAN/Geovany Drains

## 2023-04-01 NOTE — PROGRESS NOTE ADULT - ATTENDING COMMENTS
Creat up to 2.9.  K 4.8  Continue C-collar  IV htdration  Follow serum electrolytes and UOP  Nephrology eval  PT  DVT prophylaxis

## 2023-04-02 LAB
ANION GAP SERPL CALC-SCNC: 10 MMOL/L — SIGNIFICANT CHANGE UP (ref 7–14)
APPEARANCE UR: CLEAR — SIGNIFICANT CHANGE UP
BACTERIA # UR AUTO: NEGATIVE — SIGNIFICANT CHANGE UP
BASOPHILS # BLD AUTO: 0.04 K/UL — SIGNIFICANT CHANGE UP (ref 0–0.2)
BASOPHILS NFR BLD AUTO: 0.5 % — SIGNIFICANT CHANGE UP (ref 0–1)
BILIRUB UR-MCNC: NEGATIVE — SIGNIFICANT CHANGE UP
BUN SERPL-MCNC: 34 MG/DL — HIGH (ref 10–20)
CALCIUM SERPL-MCNC: 8 MG/DL — LOW (ref 8.4–10.5)
CHLORIDE SERPL-SCNC: 108 MMOL/L — SIGNIFICANT CHANGE UP (ref 98–110)
CO2 SERPL-SCNC: 18 MMOL/L — SIGNIFICANT CHANGE UP (ref 17–32)
COLOR SPEC: SIGNIFICANT CHANGE UP
CREAT SERPL-MCNC: 2 MG/DL — HIGH (ref 0.7–1.5)
DIFF PNL FLD: ABNORMAL
EGFR: 29 ML/MIN/1.73M2 — LOW
EOSINOPHIL # BLD AUTO: 0.46 K/UL — SIGNIFICANT CHANGE UP (ref 0–0.7)
EOSINOPHIL NFR BLD AUTO: 5.7 % — SIGNIFICANT CHANGE UP (ref 0–8)
EPI CELLS # UR: 3 /HPF — SIGNIFICANT CHANGE UP (ref 0–5)
GLUCOSE BLDC GLUCOMTR-MCNC: 108 MG/DL — HIGH (ref 70–99)
GLUCOSE BLDC GLUCOMTR-MCNC: 143 MG/DL — HIGH (ref 70–99)
GLUCOSE BLDC GLUCOMTR-MCNC: 156 MG/DL — HIGH (ref 70–99)
GLUCOSE BLDC GLUCOMTR-MCNC: 64 MG/DL — LOW (ref 70–99)
GLUCOSE SERPL-MCNC: 92 MG/DL — SIGNIFICANT CHANGE UP (ref 70–99)
GLUCOSE UR QL: NEGATIVE — SIGNIFICANT CHANGE UP
HCT VFR BLD CALC: 24.4 % — LOW (ref 42–52)
HGB BLD-MCNC: 7.6 G/DL — LOW (ref 14–18)
HYALINE CASTS # UR AUTO: 1 /LPF — SIGNIFICANT CHANGE UP (ref 0–7)
IMM GRANULOCYTES NFR BLD AUTO: 0.9 % — HIGH (ref 0.1–0.3)
KETONES UR-MCNC: NEGATIVE — SIGNIFICANT CHANGE UP
LEUKOCYTE ESTERASE UR-ACNC: ABNORMAL
LYMPHOCYTES # BLD AUTO: 1.94 K/UL — SIGNIFICANT CHANGE UP (ref 1.2–3.4)
LYMPHOCYTES # BLD AUTO: 23.9 % — SIGNIFICANT CHANGE UP (ref 20.5–51.1)
MAGNESIUM SERPL-MCNC: 2 MG/DL — SIGNIFICANT CHANGE UP (ref 1.8–2.4)
MCHC RBC-ENTMCNC: 28.8 PG — SIGNIFICANT CHANGE UP (ref 27–31)
MCHC RBC-ENTMCNC: 31.1 G/DL — LOW (ref 32–37)
MCV RBC AUTO: 92.4 FL — SIGNIFICANT CHANGE UP (ref 80–94)
MONOCYTES # BLD AUTO: 1.01 K/UL — HIGH (ref 0.1–0.6)
MONOCYTES NFR BLD AUTO: 12.5 % — HIGH (ref 1.7–9.3)
NEUTROPHILS # BLD AUTO: 4.59 K/UL — SIGNIFICANT CHANGE UP (ref 1.4–6.5)
NEUTROPHILS NFR BLD AUTO: 56.5 % — SIGNIFICANT CHANGE UP (ref 42.2–75.2)
NITRITE UR-MCNC: NEGATIVE — SIGNIFICANT CHANGE UP
NRBC # BLD: 0 /100 WBCS — SIGNIFICANT CHANGE UP (ref 0–0)
OSMOLALITY UR: 358 MOS/KG — SIGNIFICANT CHANGE UP (ref 50–1200)
PH UR: 6 — SIGNIFICANT CHANGE UP (ref 5–8)
PHOSPHATE SERPL-MCNC: 4 MG/DL — SIGNIFICANT CHANGE UP (ref 2.1–4.9)
PLATELET # BLD AUTO: 178 K/UL — SIGNIFICANT CHANGE UP (ref 130–400)
POTASSIUM SERPL-MCNC: 5.3 MMOL/L — HIGH (ref 3.5–5)
POTASSIUM SERPL-SCNC: 5.3 MMOL/L — HIGH (ref 3.5–5)
POTASSIUM UR-SCNC: 19 MMOL/L — SIGNIFICANT CHANGE UP
PROT UR-MCNC: SIGNIFICANT CHANGE UP
RBC # BLD: 2.64 M/UL — LOW (ref 4.7–6.1)
RBC # FLD: 14.6 % — HIGH (ref 11.5–14.5)
RBC CASTS # UR COMP ASSIST: 5 /HPF — HIGH (ref 0–4)
SARS-COV-2 RNA SPEC QL NAA+PROBE: SIGNIFICANT CHANGE UP
SODIUM SERPL-SCNC: 136 MMOL/L — SIGNIFICANT CHANGE UP (ref 135–146)
SODIUM UR-SCNC: 97 MMOL/L — SIGNIFICANT CHANGE UP
SP GR SPEC: 1.01 — SIGNIFICANT CHANGE UP (ref 1.01–1.03)
UROBILINOGEN FLD QL: SIGNIFICANT CHANGE UP
UUN UR-MCNC: 425 MG/DL — SIGNIFICANT CHANGE UP
WBC # BLD: 8.11 K/UL — SIGNIFICANT CHANGE UP (ref 4.8–10.8)
WBC # FLD AUTO: 8.11 K/UL — SIGNIFICANT CHANGE UP (ref 4.8–10.8)
WBC UR QL: 5 /HPF — SIGNIFICANT CHANGE UP (ref 0–5)

## 2023-04-02 PROCEDURE — 99232 SBSQ HOSP IP/OBS MODERATE 35: CPT | Mod: 24,25

## 2023-04-02 PROCEDURE — 71045 X-RAY EXAM CHEST 1 VIEW: CPT | Mod: 26

## 2023-04-02 RX ORDER — MAGNESIUM SULFATE 500 MG/ML
2 VIAL (ML) INJECTION ONCE
Refills: 0 | Status: COMPLETED | OUTPATIENT
Start: 2023-04-02 | End: 2023-04-02

## 2023-04-02 RX ADMIN — Medication 25 MILLIGRAM(S): at 17:04

## 2023-04-02 RX ADMIN — Medication 50 MICROGRAM(S): at 05:48

## 2023-04-02 RX ADMIN — DULOXETINE HYDROCHLORIDE 60 MILLIGRAM(S): 30 CAPSULE, DELAYED RELEASE ORAL at 11:51

## 2023-04-02 RX ADMIN — SODIUM ZIRCONIUM CYCLOSILICATE 5 GRAM(S): 10 POWDER, FOR SUSPENSION ORAL at 17:05

## 2023-04-02 RX ADMIN — TRAMADOL HYDROCHLORIDE 50 MILLIGRAM(S): 50 TABLET ORAL at 20:38

## 2023-04-02 RX ADMIN — TAMSULOSIN HYDROCHLORIDE 0.4 MILLIGRAM(S): 0.4 CAPSULE ORAL at 21:23

## 2023-04-02 RX ADMIN — Medication 650 MILLIGRAM(S): at 12:50

## 2023-04-02 RX ADMIN — Medication 25 MILLIGRAM(S): at 05:49

## 2023-04-02 RX ADMIN — Medication 2: at 12:09

## 2023-04-02 RX ADMIN — FINASTERIDE 5 MILLIGRAM(S): 5 TABLET, FILM COATED ORAL at 11:51

## 2023-04-02 RX ADMIN — Medication 650 MILLIGRAM(S): at 11:51

## 2023-04-02 RX ADMIN — Medication 25 GRAM(S): at 01:24

## 2023-04-02 RX ADMIN — Medication 650 MILLIGRAM(S): at 05:49

## 2023-04-02 RX ADMIN — HEPARIN SODIUM 5000 UNIT(S): 5000 INJECTION INTRAVENOUS; SUBCUTANEOUS at 05:48

## 2023-04-02 RX ADMIN — HEPARIN SODIUM 5000 UNIT(S): 5000 INJECTION INTRAVENOUS; SUBCUTANEOUS at 14:23

## 2023-04-02 RX ADMIN — SODIUM CHLORIDE 110 MILLILITER(S): 9 INJECTION INTRAMUSCULAR; INTRAVENOUS; SUBCUTANEOUS at 05:49

## 2023-04-02 RX ADMIN — SODIUM ZIRCONIUM CYCLOSILICATE 5 GRAM(S): 10 POWDER, FOR SUSPENSION ORAL at 05:49

## 2023-04-02 RX ADMIN — SODIUM CHLORIDE 110 MILLILITER(S): 9 INJECTION INTRAMUSCULAR; INTRAVENOUS; SUBCUTANEOUS at 20:39

## 2023-04-02 RX ADMIN — SIMVASTATIN 20 MILLIGRAM(S): 20 TABLET, FILM COATED ORAL at 21:23

## 2023-04-02 RX ADMIN — HEPARIN SODIUM 5000 UNIT(S): 5000 INJECTION INTRAVENOUS; SUBCUTANEOUS at 21:23

## 2023-04-02 RX ADMIN — Medication 650 MILLIGRAM(S): at 17:04

## 2023-04-02 NOTE — PROGRESS NOTE ADULT - SUBJECTIVE AND OBJECTIVE BOX
GENERAL SURGERY PROGRESS NOTE    Patient: CHEL CROFT , 89y (24)Male   MRN: [id number]  Location: [address] ([address]) 016 B  Visit: 23 Inpatient  Date: 23 @ 08:10    Procedure/Dx/Injuries:  B/L C1 POSTERIOR ARCH NON-DISPLACED FX  MILD TO MODERATE SPINAL CANAL STENOSIS  C2-5 EPIDURAL HEMATOMA W/OUT CORD COMPRESSION    Events of past 24 hours:  BRINA worsening, Cr 2.8 from 2.6. Hoover placed for strict Is and Os.  overnight. Nephro consult pending.     PAST MEDICAL & SURGICAL HISTORY:  HTN (hypertension)      HLD (hyperlipidemia)      BPH (benign prostatic hyperplasia)      CAD (coronary artery disease)      Diabetes mellitus      Hypothyroidism      S/P CABG (coronary artery bypass graft)          Vitals:   T(F): 96.4 (23 @ 04:43), Max: 98.3 (23 @ 20:57)  HR: 69 (23 @ 06:54)  BP: 165/71 (23 @ 06:54)  RR: 18 (23 @ 04:43)  SpO2: 98% (23 @ 04:43)          Fluids:     I & O's:    23 @ 07:01  -  23 @ 07:00  --------------------------------------------------------  IN:  Total IN: 0 mL    OUT:    Indwelling Catheter - Urethral (mL): 800 mL  Total OUT: 800 mL    Total NET: -800 mL      PHYSICAL EXAM:  General: NAD  HEENT: NCAT, +[place] j  Cardiac: S1, S2 present  Respiratory: no labored breathing, normal respiratory effort  Abdomen: Soft, non-distended, non-tender  Skin: Warm/dry, normal color, no jaundice    MEDICATIONS  (STANDING):  acetaminophen     Tablet .. 650 milliGRAM(s) Oral every 6 hours  chlorhexidine 4% Liquid 1 Application(s) Topical <User Schedule>  dextrose 5%. 1000 milliLiter(s) (50 mL/Hr) IV Continuous <Continuous>  dextrose 50% Injectable 25 Gram(s) IV Push once  DULoxetine 60 milliGRAM(s) Oral daily  finasteride 5 milliGRAM(s) Oral daily  heparin   Injectable 5000 Unit(s) SubCutaneous every 8 hours  insulin glargine Injectable (LANTUS) 15 Unit(s) SubCutaneous at bedtime  insulin lispro (ADMELOG) corrective regimen sliding scale   SubCutaneous three times a day before meals  levothyroxine 50 MICROGram(s) Oral daily  metoprolol tartrate 25 milliGRAM(s) Oral two times a day  simvastatin 20 milliGRAM(s) Oral at bedtime  sodium chloride 0.9%. 1000 milliLiter(s) (110 mL/Hr) IV Continuous <Continuous>  sodium zirconium cyclosilicate 5 Gram(s) Oral two times a day  tamsulosin 0.4 milliGRAM(s) Oral at bedtime    MEDICATIONS  (PRN):  guaiFENesin  milliGRAM(s) Oral every 12 hours PRN Cough  traMADol 50 milliGRAM(s) Oral two times a day PRN Moderate Pain (4 - 6)      DVT PROPHYLAXIS: heparin   Injectable 5000 Unit(s) SubCutaneous every 8 hours    GI PROPHYLAXIS:   ANTICOAGULATION:   ANTIBIOTICS:            LAB/STUDIES:  Labs:  CAPILLARY BLOOD GLUCOSE      POCT Blood Glucose.: 64 mg/dL (2023 07:44)  POCT Blood Glucose.: 171 mg/dL (2023 20:54)  POCT Blood Glucose.: 234 mg/dL (2023 16:34)  POCT Blood Glucose.: 263 mg/dL (2023 11:26)                          8.0    9.00  )-----------( 151      ( 2023 21:03 )             24.6       Auto Neutrophil %: 61.3 % (23 @ 21:03)  Auto Immature Granulocyte %: 0.7 % (23 @ 21:03)        132<L>  |  103  |  39<H>  ----------------------------<  131<H>  5.2<H>   |  18  |  2.8<H>      Calcium, Total Serum: 7.8 mg/dL (23 @ 21:03)      LFTs:         Coags:            Urinalysis Basic - ( 2023 14:30 )    Color: Yellow / Appearance: Slightly Turbid / S.028 / pH: x  Gluc: x / Ketone: Negative  / Bili: Negative / Urobili: <2 mg/dL   Blood: x / Protein: 30 mg/dL / Nitrite: Negative   Leuk Esterase: Large / RBC: 15 /HPF / WBC 59 /HPF   Sq Epi: x / Non Sq Epi: 25 /HPF / Bacteria: Few

## 2023-04-02 NOTE — CONSULT NOTE ADULT - REASON FOR ADMISSION
S/P fall +HT, ?LOC, -AC (on Plavix and celecoxib)
s/p fall
S/P fall +HT, ?LOC, -AC (on Plavix and celecoxib)

## 2023-04-02 NOTE — CONSULT NOTE ADULT - SUBJECTIVE AND OBJECTIVE BOX
NEPHROLOGY CONSULTATION NOTE  89yM w/ PMHx of HLD, HTN, BPH, quadruple bypass, and CAD s/p 4 stent placement and DM seen as (Code Trauma / Trauma Alert / Trauma Consult) s/p fall down 10 stairs with +HT, ?LOC, and -AC (patient is on Plavix and ASA). Patient fell down 10 wooden steps while daughter was showering as patient is not supposed to go down steps without supervision Patient is hard of hearing but denies any pain on arrival to ED but had a 5cm hematoma to right frontal scalp along with a skin tear on left dorsal hand and superficial abrasion to right shin. Patient denies any nausea, vomiting, chest pain, or other concerning symptoms. Trauma assessment in ED: ABCs intact , GCS 15 , AAOx3.    Above obtained from record, confirmed w/ daughter bedside    No hx of CKD presented 3/28 PM w/ Cr 0.9 day later was 1.5 Peaked 2.9 yesterday now stable  non-oliguric     UA w/ few RBC's, protein    PAST MEDICAL & SURGICAL HISTORY:  HTN (hypertension)      HLD (hyperlipidemia)      BPH (benign prostatic hyperplasia)      CAD (coronary artery disease)      Diabetes mellitus      Hypothyroidism      S/P CABG (coronary artery bypass graft)        Allergies:  No Known Allergies    Home Medications Reviewed  Hospital Medications:   MEDICATIONS  (STANDING):  acetaminophen     Tablet .. 650 milliGRAM(s) Oral every 6 hours  chlorhexidine 4% Liquid 1 Application(s) Topical <User Schedule>  dextrose 5%. 1000 milliLiter(s) (50 mL/Hr) IV Continuous <Continuous>  dextrose 50% Injectable 25 Gram(s) IV Push once  DULoxetine 60 milliGRAM(s) Oral daily  finasteride 5 milliGRAM(s) Oral daily  heparin   Injectable 5000 Unit(s) SubCutaneous every 8 hours  insulin glargine Injectable (LANTUS) 15 Unit(s) SubCutaneous at bedtime  insulin lispro (ADMELOG) corrective regimen sliding scale   SubCutaneous three times a day before meals  levothyroxine 50 MICROGram(s) Oral daily  metoprolol tartrate 25 milliGRAM(s) Oral two times a day  simvastatin 20 milliGRAM(s) Oral at bedtime  sodium chloride 0.9%. 1000 milliLiter(s) (110 mL/Hr) IV Continuous <Continuous>  sodium zirconium cyclosilicate 5 Gram(s) Oral two times a day  tamsulosin 0.4 milliGRAM(s) Oral at bedtime      SOCIAL HISTORY:  Denies ETOH,Smoking,   FAMILY HISTORY:        REVIEW OF SYSTEMS:  unable to obtain  lethargic   VITALS:  T(F): 96 (23 @ 07:44), Max: 98.3 (23 @ 20:57)  HR: 59 (23 @ 07:44)  BP: 147/67 (23 @ 07:44)  RR: 18 (23 @ 07:44)  SpO2: 94% (23 @ 12:00)     07:  -   @ 07:00  --------------------------------------------------------  IN: 0 mL / OUT: 800 mL / NET: -800 mL          23 @ 07:01  -  23 @ 07:00  --------------------------------------------------------  IN: 0 mL / OUT: 800 mL / NET: -800 mL      I&O's Detail    2023 07:01  -  2023 07:00  --------------------------------------------------------  IN:  Total IN: 0 mL    OUT:    Indwelling Catheter - Urethral (mL): 800 mL  Total OUT: 800 mL    Total NET: -800 mL            PHYSICAL EXAM:  Constitutional: NAD  HEENT: trach collar M  Neck: No JVD  Respiratory: CTAB, no wheezes, rales or rhonchi  Cardiovascular: S1, S2, RRR  Gastrointestinal: BS+, soft, NT/ND  Extremities: No cyanosis or clubbing. No peripheral edema  Neurological: A/O x 3, no focal deficits  Psychiatric: Normal mood, normal affect  : No CVA tenderness. + hidalgo.   Skin: No rashes  Vascular Access:    LABS:      132<L>  |  103  |  39<H>  ----------------------------<  131<H>  5.2<H>   |  18  |  2.8<H>    Ca    7.8<L>      2023 21:03  Phos  4.2       Mg     1.7           Creatinine Trend: 2.8 <--, 2.6 <--, 2.9 <--, 2.5 <--, 2.4 <--, 2.0 <--, 2.2 <--, 2.2 <--, 1.9 <--, 1.9 <--, 1.5 <--, 0.9 <--                        8.0    9.00  )-----------( 151      ( 2023 21:03 )             24.6     Urine Studies:  Urinalysis Basic - ( 2023 14:30 )    Color: Yellow / Appearance: Slightly Turbid / S.028 / pH:   Gluc:  / Ketone: Negative  / Bili: Negative / Urobili: <2 mg/dL   Blood:  / Protein: 30 mg/dL / Nitrite: Negative   Leuk Esterase: Large / RBC: 15 /HPF / WBC 59 /HPF   Sq Epi:  / Non Sq Epi: 25 /HPF / Bacteria: Few      Sodium, Random Urine: 50.0 mmoL/L ( @ 14:30)  Creatinine, Random Urine: 107 mg/dL ( @ 14:30)  Osmolality, Random Urine: 420 mos/kg ( @ 14:30)  Potassium, Random Urine: 44 mmol/L ( @ 14:30)  Sodium, Random Urine: 48.0 mmoL/L ( @ 19:25)  Creatinine, Random Urine: 82 mg/dL ( @ 19:25)  Osmolality, Random Urine: 404 mos/kg ( @ 19:25)  Potassium, Random Urine: 56 mmol/L ( @ 19:25)            RADIOLOGY & ADDITIONAL STUDIES:

## 2023-04-02 NOTE — CONSULT NOTE ADULT - ASSESSMENT
89M PMHx HTN, BPH, CAD s/p CABG, stent x4, DM2 here with fall, found to have C1 fracture.    Recieved contrast and Celexoxib on 3/28 and again 3/29  although JEFF timing is a bit early it likely is playing a role  Non-oliguric and Cr stabilizing expect will imrpove in coming days    UA noted, foubt unfortuante enough to have developed an acute GN in settign of Fall, but if Cr not improving will need workup    cehck Renal US to esnure no iobstruciton  Cotn IVF for now  trend Cr   avoid further contrast NSAIDS as able

## 2023-04-02 NOTE — PROGRESS NOTE ADULT - ATTENDING COMMENTS
Trauma/ACS Attending  Note Attestation    Patient is examined and evaluated at the bedside with the residents/PAs. Treatment plan discussed with the team, nurses, and consulting physicians and consulting teams. Medications, radiological studies and all other relevant studies reviewed.     CHEL CROFT Patient is a 89y old  Male who presents with a chief complaint of S/P fall +HT, ?LOC, -AC (on Plavix and celecoxib) sustained C1 arch fracture with spinal epidural hematoma at C5-6 levels, Renal insufficiency     Diagnosis: Fall with cervical spine fracture                  BRINA with mild elevation of creatinine     Plan:	  - continue with C-caller   - supportive care  - pain management  - incentive spirometer   - DVT prophylaxis       [x ] SCDs [x ] Lovenox [ ] Heparins SQ  [ ] None  - GI prophylaxis  - follow up consults -> neurosurgery  - repeat studies as needed  - PT evaluation and follow up discharge planning  - replace electrolytes  - case management evaluation     Sarai Correia MD, FACS  Trauma/ACS/Surgical Critical Care Attending

## 2023-04-03 ENCOUNTER — TRANSCRIPTION ENCOUNTER (OUTPATIENT)
Age: 88
End: 2023-04-03

## 2023-04-03 VITALS — DIASTOLIC BLOOD PRESSURE: 70 MMHG | SYSTOLIC BLOOD PRESSURE: 170 MMHG

## 2023-04-03 LAB
CREAT ?TM UR-MCNC: 35 MG/DL — SIGNIFICANT CHANGE UP
GLUCOSE BLDC GLUCOMTR-MCNC: 107 MG/DL — HIGH (ref 70–99)
GLUCOSE BLDC GLUCOMTR-MCNC: 151 MG/DL — HIGH (ref 70–99)
PROT ?TM UR-MCNC: 17 MG/DLG/24H — SIGNIFICANT CHANGE UP
PROT/CREAT UR-RTO: 0.5 RATIO — HIGH (ref 0–0.2)
SARS-COV-2 RNA SPEC QL NAA+PROBE: SIGNIFICANT CHANGE UP
UUN UR-MCNC: 300 MG/DL — SIGNIFICANT CHANGE UP

## 2023-04-03 RX ORDER — NIFEDIPINE 30 MG
30 TABLET, EXTENDED RELEASE 24 HR ORAL DAILY
Refills: 0 | Status: DISCONTINUED | OUTPATIENT
Start: 2023-04-03 | End: 2023-04-03

## 2023-04-03 RX ORDER — HYDRALAZINE HCL 50 MG
5 TABLET ORAL ONCE
Refills: 0 | Status: COMPLETED | OUTPATIENT
Start: 2023-04-03 | End: 2023-04-03

## 2023-04-03 RX ORDER — CLOPIDOGREL BISULFATE 75 MG/1
1 TABLET, FILM COATED ORAL
Qty: 0 | Refills: 0 | DISCHARGE

## 2023-04-03 RX ORDER — CELECOXIB 200 MG/1
1 CAPSULE ORAL
Qty: 0 | Refills: 0 | DISCHARGE

## 2023-04-03 RX ORDER — NIFEDIPINE 30 MG
1 TABLET, EXTENDED RELEASE 24 HR ORAL
Qty: 0 | Refills: 0 | DISCHARGE
Start: 2023-04-03

## 2023-04-03 RX ADMIN — DULOXETINE HYDROCHLORIDE 60 MILLIGRAM(S): 30 CAPSULE, DELAYED RELEASE ORAL at 12:13

## 2023-04-03 RX ADMIN — SODIUM ZIRCONIUM CYCLOSILICATE 5 GRAM(S): 10 POWDER, FOR SUSPENSION ORAL at 05:28

## 2023-04-03 RX ADMIN — Medication 30 MILLIGRAM(S): at 14:25

## 2023-04-03 RX ADMIN — Medication 50 MICROGRAM(S): at 05:26

## 2023-04-03 RX ADMIN — HEPARIN SODIUM 5000 UNIT(S): 5000 INJECTION INTRAVENOUS; SUBCUTANEOUS at 14:24

## 2023-04-03 RX ADMIN — Medication 25 MILLIGRAM(S): at 05:26

## 2023-04-03 RX ADMIN — Medication 650 MILLIGRAM(S): at 05:26

## 2023-04-03 RX ADMIN — Medication 5 MILLIGRAM(S): at 02:20

## 2023-04-03 RX ADMIN — TRAMADOL HYDROCHLORIDE 50 MILLIGRAM(S): 50 TABLET ORAL at 05:30

## 2023-04-03 RX ADMIN — Medication 2: at 12:16

## 2023-04-03 RX ADMIN — Medication 650 MILLIGRAM(S): at 12:14

## 2023-04-03 RX ADMIN — HEPARIN SODIUM 5000 UNIT(S): 5000 INJECTION INTRAVENOUS; SUBCUTANEOUS at 05:26

## 2023-04-03 RX ADMIN — SODIUM CHLORIDE 110 MILLILITER(S): 9 INJECTION INTRAMUSCULAR; INTRAVENOUS; SUBCUTANEOUS at 05:25

## 2023-04-03 RX ADMIN — FINASTERIDE 5 MILLIGRAM(S): 5 TABLET, FILM COATED ORAL at 12:13

## 2023-04-03 NOTE — DISCHARGE NOTE PROVIDER - NSDCMRMEDTOKEN_GEN_ALL_CORE_FT
DULoxetine 60 mg oral delayed release capsule: 1 cap(s) orally once a day  finasteride 5 mg oral tablet: 1 tab(s) orally once a day  guaiFENesin 600 mg oral tablet, extended release: 1 tab(s) orally every 12 hours, As needed, Cough  lactobacillus acidophilus oral capsule: 1 tab(s) orally once a day  levothyroxine 50 mcg (0.05 mg) oral tablet: 1 tab(s) orally once a day  Metoprolol Tartrate 25 mg oral tablet: 1 tab(s) orally 2 times a day  NIFEdipine 30 mg oral tablet, extended release: 1 tab(s) orally once a day  simvastatin 20 mg oral tablet: 1 tab(s) orally once a day (at bedtime)  tamsulosin 0.4 mg oral capsule: 1 cap(s) orally once a day  Toujeo SoloStar 300 units/mL subcutaneous solution: 12 unit(s) subcutaneous once a day (in the morning)  traMADol 50 mg oral tablet: 1 tab(s) orally 2 times a day, As Needed

## 2023-04-03 NOTE — DISCHARGE NOTE NURSING/CASE MANAGEMENT/SOCIAL WORK - NSDCPEFALRISK_GEN_ALL_CORE
For information on Fall & Injury Prevention, visit: [email or web address] OR  [email or web address] OR  https://www.cdc.gov/steadi/patient.html

## 2023-04-03 NOTE — DISCHARGE NOTE PROVIDER - HOSPITAL COURSE
3/29/23: 89yM w/ PMHx of HLD, HTN, BPH, quadruple bypass, and CAD s/p 4 stent placement and DM seen as Trauma Alert s/p fall down 10 stairs with +HT, ?LOC, and -AC (patient is on Plavix and ASA). Patient fell down 10 wooden steps while daughter was showering as patient is not supposed to go down steps without supervision Patient is hard of hearing but denies any pain on arrival to ED but had a 5cm hematoma to right frontal scalp along with a skin tear on left dorsal hand and superficial abrasion to right shin. Patient denies any nausea, vomiting, chest pain, or other concerning symptoms. Trauma assessment in ED: ABCs intact , GCS 15 , AAOx3.    Injuries identified:   - right frontal 5cm hematoma   - left dorsal hand skin tear  - bilateral C1 posterior arch nondisplaced fractures    Surgical ICU consulted for Q2 neuro-checks following bilateral C1 posterior arch nondisplaced fractures. Patient was accepted to surgical step-down.     3/30/23: Physiatry consulted for patient with recommendations for STR. No acute events reported. Neurosurgery cleared patient for Q4 neurochecks, SQH chemical DVT prophylaxis, clearance for physical therapy in cervical collar, and resumption of Plavix on 4/4/23. Patient will receive soft collar by neurosurgery when able.     3/31/23-4/2/23: Occupational therapy saw patient, recommended rehabilitation facility when medically appropriate. Patient potassium level up to 6.0 treated with calcium gluconate, dextrose, and insulin. Patient has urinary retention and hidalgo catheter was placed for strict ins/outs to monitor urine output. Patient was mildly hypertensive for which he was started on Nifedipine 30mg XL per nephrology. Additionally, patient receiving Lokelma to maintain potassium levels.     4/3/23: Patient accepted to [facility] and is medically cleared for discharge. Patient has urinary hidalgo in place due to retention and will be discharged to [facility] with nursing home.

## 2023-04-03 NOTE — PROGRESS NOTE ADULT - SUBJECTIVE AND OBJECTIVE BOX
Patient is a 89y old  Male who presents with a chief complaint of S/P fall +HT, ?LOC, -AC (on Plavix and celecoxib)           HPI:    89yM w/ PMHx of HLD, HTN, BPH, quadruple bypass, and CAD s/p 4 stent placement and DM seen as (Code Trauma / Trauma Alert / Trauma Consult) s/p fall down 10 stairs with +HT, ?LOC, and -AC (patient is on Plavix and ASA). Patient fell down 10 wooden steps while daughter was showering as patient is not supposed to go down steps without supervision Patient is hard of hearing but denies any pain on arrival to ED but had a 5cm hematoma to right frontal scalp along with a skin tear on left dorsal hand and superficial abrasion to right shin. Patient denies any nausea, vomiting, chest pain, or other concerning symptoms. Trauma assessment in ED: ABCs intact , GCS 15 , AAOx3.    Patient is hard of hearing but answers appropriately when questioned.     PAST MEDICAL & SURGICAL HISTORY:  HTN (hypertension)      HLD (hyperlipidemia)      BPH (benign prostatic hyperplasia)      CAD (coronary artery disease)      Diabetes mellitus      Hypothyroidism      S/P CABG (coronary artery bypass graft)          Allergies    No Known Allergies    Intolerances        Home Medications:  celecoxib 100 mg oral capsule: 1 cap(s) orally once a day, As Needed (13 Dec 2022 22:03)  clopidogrel 75 mg oral tablet: 1 tab(s) orally once a day (13 Dec 2022 22:03)  DULoxetine 60 mg oral delayed release capsule: 1 cap(s) orally once a day (13 Dec 2022 22:03)  finasteride 5 mg oral tablet: 1 tab(s) orally once a day (13 Dec 2022 22:03)  guaiFENesin 600 mg oral tablet, extended release: 1 tab(s) orally every 12 hours, As needed, Cough (20 Dec 2022 10:18)  lactobacillus acidophilus oral capsule: 1 tab(s) orally once a day (20 Dec 2022 10:18)  levothyroxine 50 mcg (0.05 mg) oral tablet: 1 tab(s) orally once a day (13 Dec 2022 22:03)  Metoprolol Tartrate 25 mg oral tablet: 1 tab(s) orally 2 times a day (13 Dec 2022 22:03)  simvastatin 20 mg oral tablet: 1 tab(s) orally once a day (at bedtime) (13 Dec 2022 22:03)  tamsulosin 0.4 mg oral capsule: 1 cap(s) orally once a day (13 Dec 2022 22:03)  Toujeo SoloStar 300 units/mL subcutaneous solution: 12 unit(s) subcutaneous once a day (in the morning) (13 Dec 2022 22:03)  traMADol 50 mg oral tablet: 1 tab(s) orally 2 times a day, As Needed (13 Dec 2022 22:03)      ROS: 10-system review is otherwise negative except HPI above.      Primary Survey:    A - airway intact  B - bilateral breath sounds and good chest rise  C - palpable pulses in all extremities  D - GCS 15 on arrival, JIMENES  Exposure obtained    Vital Signs Last 24 Hrs  T(C): 35.2 (28 Mar 2023 22:40), Max: 35.2 (28 Mar 2023 22:40)  T(F): 95.3 (28 Mar 2023 22:40), Max: 95.3 (28 Mar 2023 22:40)  HR: 66 (28 Mar 2023 21:41) (66 - 66)  BP: 210/70 (28 Mar 2023 21:41) (210/70 - 210/70)  BP(mean): --  RR: 20 (28 Mar 2023 21:41) (20 - 20)  SpO2: 95% (28 Mar 2023 21:41) (95% - 95%)    Parameters below as of 28 Mar 2023 21:41  Patient On (Oxygen Delivery Method): nasal cannula  O2 Flow (L/min): 3     < from: CT Abdomen and Pelvis w/ IV Cont (23 @ 22:34) >    IMPRESSION:      1.  No CT evidence of an acute thoracic or abdominopelvic pathology.    < end of copied text >    < from: Xray Tibia + Fibula 2 Views, Right (23 @ 22:27) >    Impression:      No acute fracture oracute articular abnormality.    < end of copied text >    < from: CT Head No Cont (23 @ 22:23) >    Impression:      No evidence of intracranial hemorrhage, territorial infarct, or mass   effect.    < end of copied text >    < from: CT Cervical Spine No Cont (23 @ 22:23) >    IMPRESSION:      Bilateral C1 posterior arch nondisplaced fractures.    Extensive soft tissue calcifications and thickening of the odontoid   transverse ligament with associated at least mild to moderate spinal   canal stenosis at the same level    Prevertebral soft tissue swelling    Neurosurgery eval - recommend hard neck collar and no neurosurgical intervention.     Medical charts / labs / imaging studies / PT notes reviewed         PHYSICAL EXAM    Vital Signs Last 24 Hrs  T(C): 37.1 (2023 08:00), Max: 37.1 (2023 08:00)  T(F): 98.8 (2023 08:00), Max: 98.8 (2023 08:00)  HR: 88 (2023 08:00) (60 - 90)  BP: 160/80 (2023 08:11) (160/80 - 208/82)  BP(mean): --  RR: 18 (2023 05:34) (18 - 18)  SpO2: 95% (2023 05:34) (94% - 96%)    Parameters below as of 2023 05:34  Patient On (Oxygen Delivery Method): room air        Constitutional - NAD  Chest - CTA  Cardiovascular - S1S2+  Abdomen -  Soft  Extremities -  No calf tenderness   Function : bed mobility mod A / unable to transfer       acetaminophen     Tablet .. 650 milliGRAM(s) Oral every 6 hours  chlorhexidine 4% Liquid 1 Application(s) Topical <User Schedule>  dextrose 5%. 1000 milliLiter(s) IV Continuous <Continuous>  dextrose 50% Injectable 25 Gram(s) IV Push once  DULoxetine 60 milliGRAM(s) Oral daily  finasteride 5 milliGRAM(s) Oral daily  guaiFENesin  milliGRAM(s) Oral every 12 hours PRN  heparin   Injectable 5000 Unit(s) SubCutaneous every 8 hours  insulin glargine Injectable (LANTUS) 15 Unit(s) SubCutaneous at bedtime  insulin lispro (ADMELOG) corrective regimen sliding scale   SubCutaneous three times a day before meals  levothyroxine 50 MICROGram(s) Oral daily  metoprolol tartrate 25 milliGRAM(s) Oral two times a day  simvastatin 20 milliGRAM(s) Oral at bedtime  sodium chloride 0.9%. 1000 milliLiter(s) IV Continuous <Continuous>  sodium zirconium cyclosilicate 5 Gram(s) Oral two times a day  tamsulosin 0.4 milliGRAM(s) Oral at bedtime  traMADol 50 milliGRAM(s) Oral two times a day PRN      RECENT LABS/IMAGING                        7.6    8.11  )-----------( 178      ( 2023 20:45 )             24.4     04-02    136  |  108  |  34<H>  ----------------------------<  92  5.3<H>   |  18  |  2.0<H>    Ca    8.0<L>      2023 20:45  Phos  4.0     04-02  Mg     2.0     04-02        Urinalysis Basic - ( 2023 22:02 )    Color: Light Yellow / Appearance: Clear / S.013 / pH: x  Gluc: x / Ketone: Negative  / Bili: Negative / Urobili: <2 mg/dL   Blood: x / Protein: Trace / Nitrite: Negative   Leuk Esterase: Small / RBC: 5 /HPF / WBC 5 /HPF   Sq Epi: x / Non Sq Epi: 3 /HPF / Bacteria: Negative

## 2023-04-03 NOTE — PROGRESS NOTE ADULT - REASON FOR ADMISSION
S/P fall +HT, ?LOC, -AC (on Plavix and celecoxib)

## 2023-04-03 NOTE — PROGRESS NOTE ADULT - PROVIDER SPECIALTY LIST ADULT
Trauma Surgery
Internal Medicine
Trauma Surgery
Nephrology
Physiatry
Trauma Surgery

## 2023-04-03 NOTE — PROGRESS NOTE ADULT - SUBJECTIVE AND OBJECTIVE BOX
seen and examined  24 h events noted       PAST HISTORY  --------------------------------------------------------------------------------  No significant changes to PMH, PSH, FHx, SHx, unless otherwise noted    ALLERGIES & MEDICATIONS  --------------------------------------------------------------------------------  Allergies    No Known Allergies    Intolerances      Standing Inpatient Medications  acetaminophen     Tablet .. 650 milliGRAM(s) Oral every 6 hours  chlorhexidine 4% Liquid 1 Application(s) Topical <User Schedule>  dextrose 5%. 1000 milliLiter(s) IV Continuous <Continuous>  dextrose 50% Injectable 25 Gram(s) IV Push once  DULoxetine 60 milliGRAM(s) Oral daily  finasteride 5 milliGRAM(s) Oral daily  heparin   Injectable 5000 Unit(s) SubCutaneous every 8 hours  insulin glargine Injectable (LANTUS) 15 Unit(s) SubCutaneous at bedtime  insulin lispro (ADMELOG) corrective regimen sliding scale   SubCutaneous three times a day before meals  levothyroxine 50 MICROGram(s) Oral daily  metoprolol tartrate 25 milliGRAM(s) Oral two times a day  simvastatin 20 milliGRAM(s) Oral at bedtime  sodium chloride 0.9%. 1000 milliLiter(s) IV Continuous <Continuous>  sodium zirconium cyclosilicate 5 Gram(s) Oral two times a day  tamsulosin 0.4 milliGRAM(s) Oral at bedtime    PRN Inpatient Medications  guaiFENesin  milliGRAM(s) Oral every 12 hours PRN  traMADol 50 milliGRAM(s) Oral two times a day PRN          VITALS/PHYSICAL EXAM  --------------------------------------------------------------------------------  T(C): 37.1 (04-03-23 @ 08:00), Max: 37.1 (04-03-23 @ 08:00)  HR: 88 (04-03-23 @ 08:00) (60 - 90)  BP: 160/80 (04-03-23 @ 08:11) (160/80 - 208/82)  RR: 18 (04-03-23 @ 05:34) (18 - 18)  SpO2: 95% (04-03-23 @ 05:34) (94% - 96%)  Wt(kg): --        04-02-23 @ 07:01  -  04-03-23 @ 07:00  --------------------------------------------------------  IN: 1210 mL / OUT: 800 mL / NET: 410 mL      Physical Exam:  	Gen: NAD, c collar   	Pulm: CTA B/L  	CV: S1S2; no rub  	LE: no edema      LABS/STUDIES  --------------------------------------------------------------------------------              7.6    8.11  >-----------<  178      [04-02-23 @ 20:45]              24.4     136  |  108  |  34  ----------------------------<  92      [04-02-23 @ 20:45]  5.3   |  18  |  2.0        Ca     8.0     [04-02-23 @ 20:45]      Mg     2.0     [04-02-23 @ 20:45]      Phos  4.0     [04-02-23 @ 20:45]        Creatinine Trend:  SCr 2.0 [04-02 @ 20:45]  SCr 2.8 [04-01 @ 21:03]  SCr 2.6 [04-01 @ 11:35]  SCr 2.9 [04-01 @ 02:01]  SCr 2.5 [03-31 @ 17:58]    Urinalysis - [04-02-23 @ 22:02]      Color Light Yellow / Appearance Clear / SG 1.013 / pH 6.0      Gluc Negative / Ketone Negative  / Bili Negative / Urobili <2 mg/dL       Blood Small / Protein Trace / Leuk Est Small / Nitrite Negative      RBC 5 / WBC 5 / Hyaline 1 / Gran  / Sq Epi  / Non Sq Epi 3 / Bacteria Negative    Urine Creatinine 107      [04-01-23 @ 14:30]  Urine Sodium 97.0      [04-02-23 @ 22:02]  Urine Urea Nitrogen 425      [04-01-23 @ 14:30]  Urine Potassium 19      [04-02-23 @ 22:02]  Urine Osmolality 358      [04-02-23 @ 22:02]    TSH 1.35      [12-05-22 @ 07:42]

## 2023-04-03 NOTE — DISCHARGE NOTE PROVIDER - NSFOLLOWUPCLINICS_GEN_ALL_ED_FT
[facility]  Trauma Surgery  [address] [place] [address]  Phone: [phone]  Fax:     [facility]  Urology  .  [place]   Phone: [phone]  Fax:   Follow Up Time: 1 week

## 2023-04-03 NOTE — DISCHARGE NOTE PROVIDER - CARE PROVIDERS DIRECT ADDRESSES
[email or web address]

## 2023-04-03 NOTE — DISCHARGE NOTE PROVIDER - PROVIDER TOKENS
PROVIDER:[TOKEN:[[id number]],FOLLOWUP:[1 week]],PROVIDER:[TOKEN:[[id number]],FOLLOWUP:[1 week]]

## 2023-04-03 NOTE — PROGRESS NOTE ADULT - ASSESSMENT
89M PMHx HTN, BPH, CAD s/p CABG, stent x4, DM2 here with fall, found to have C1 fracture.    IMP  #Unwitnessed fall    cth neg    ct c spine with c1 post arch fxs, mild-mod spinal stenosis    cta neck with possible epidural hematoma, severe stenosis R ICA, R ext carotid    mri with c2-5 epidural hematoma/ edema; severe bl foramen stenosis c5-6, c3-4, c4-5, c7-t1  #BRIAN, possible summer  #HTN  #BPH  #CAD  #DM2    PLAN  start low potassium diet  start lokelma 5 bid  d/c celecoxib  repeat cxr  trend cbc, lacate  cont ns 110 cc/hr for now  f/u neurosx re: epidural hematoma    Vee  1244
89M PMHx HTN, BPH, CAD s/p CABG, stent x4, DM2 here with fall, found to have C1 fracture.  Recieved contrast and Celexoxib on 3/28 and again 3/29  cr improving   no hydro on CT   continue lokelma , can d/c if repeated k <5  ph at goal   non oliguric   continue sodium bicarbonate po   d/c iv fluids if positive po intake  if repeated bp remains elevated start nifedipine xl 30   will follow 
ASSESSMENT:  89yM w/ PMHx of HLD, HTN, BPH, quadruple bypass, and CAD s/p 4 stent placement and DM seen as (Code Trauma / Trauma Alert / Trauma Consult) s/p fall down 10 stairs with +HT, ?LOC, and -AC (patient is on Plavix and ASA). Patient fell down 10 wooden steps while daughter was showering as patient is not supposed to go down steps without supervision Patient is hard of hearing but denies any pain on arrival to ED but had a 5cm hematoma to right frontal scalp along with a skin tear on left dorsal hand and superficial abrasion to right shin. Patient denies any nausea, vomiting, chest pain, or other concerning symptoms. Trauma assessment in ED: ABCs intact , GCS 15 , AAOx3.    Injuries identified:   - right frontal 5cm hematoma   - left dorsal hand skin tear  - bilateral C1 posterior arch nondisplaced fractures    PLAN:   - NSx: no surgical intervention. [place] J for 8-12w. Pain control and muscle relaxants. Neurochecks q4 hrs. HOB 30.   - Soft and bite sized diet   - Restart home medications  - DVT ppx, holding home plavix and celexocib   - PT/ rehab   - SW   - IS  - Ambulate as tolerated   - Monitor hyperkalemia (previously 6.0, EKG normal sinus rhythm, calcium gluconate, dextrose, and insulin given)  - Following up 2330 labs, will treat appropriately  - Dispo pending clinical course     Trauma/ACS  SPECTRA [id number]  
ASSESSMENT:  89yM w/ PMHx of HLD, HTN, BPH, quadruple bypass, and CAD s/p 4 stent placement and DM seen as (Code Trauma / Trauma Alert / Trauma Consult) s/p fall down 10 stairs with +HT, ?LOC, and -AC (patient is on Plavix and ASA). Patient fell down 10 wooden steps while daughter was showering as patient is not supposed to go down steps without supervision Patient is hard of hearing but denies any pain on arrival to ED but had a 5cm hematoma to right frontal scalp along with a skin tear on left dorsal hand and superficial abrasion to right shin. Patient denies any nausea, vomiting, chest pain, or other concerning symptoms. Trauma assessment in ED: ABCs intact , GCS 15 , AAOx3.    Injuries identified:   - right frontal 5cm hematoma   - left dorsal hand skin tear  - bilateral C1 posterior arch nondisplaced fractures    PLAN:   - NSx: no surgical intervention. [place] J for 8-12w. Pain control and muscle relaxants. Neurochecks q4 hrs. HOB 30.   - Soft and bite sized diet   - Restart home medications  - DVT ppx  - PT/ rehab   - SW   - IS  - Ambulate as tolerated     Trauma/ACS  SPECTRA [id number]
ASSESSMENT:  89yM w/ PMHx of HLD, HTN, BPH, quadruple bypass, and CAD s/p 4 stent placement and DM seen as (Code Trauma / Trauma Alert / Trauma Consult) s/p fall down 10 stairs with +HT, ?LOC, and -AC (patient is on Plavix and ASA). Patient fell down 10 wooden steps while daughter was showering as patient is not supposed to go down steps without supervision Patient is hard of hearing but denies any pain on arrival to ED but had a 5cm hematoma to right frontal scalp along with a skin tear on left dorsal hand and superficial abrasion to right shin. Patient denies any nausea, vomiting, chest pain, or other concerning symptoms. Trauma assessment in ED: ABCs intact , GCS 15 , AAOx3.    Injuries identified:   - right frontal 5cm hematoma   - left dorsal hand skin tear  - bilateral C1 posterior arch nondisplaced fractures    PLAN:   - NSx: no surgical intervention. [place] J for 8-12w. Pain control and muscle relaxants. Neurochecks q4 hrs. HOB 30.   - Soft and bite sized diet   - Restart home medications  - DVT ppx, holding home plavix and celexocib   - PT/ rehab   - SW   - IS  - Ambulate as tolerated     Trauma/ACS  SPECTRA [id number]  
ASSESSMENT:  89yM w/ PMHx of HLD, HTN, BPH, quadruple bypass, and CAD s/p 4 stent placement and DM seen as (Code Trauma / Trauma Alert / Trauma Consult) s/p fall down 10 stairs with +HT, ?LOC, and -AC (patient is on Plavix and ASA). Patient fell down 10 wooden steps while daughter was showering as patient is not supposed to go down steps without supervision Patient is hard of hearing but denies any pain on arrival to ED but had a 5cm hematoma to right frontal scalp along with a skin tear on left dorsal hand and superficial abrasion to right shin. Patient denies any nausea, vomiting, chest pain, or other concerning symptoms. Trauma assessment in ED: ABCs intact , GCS 15 , AAOx3.    PLAN:  - HD monitoring  - Strict Is and Os  - F/u Nephro consult for BRINA  - Trend Cr  - Soft and bite sized diet  - DVT ppx  - IS encouraged  - Ambulate as tolerated  - Dispo planning    [phone]  
89yM w/ PMHx of HLD, HTN, BPH, quadruple bypass, and CAD s/p 4 stent placement and DM seen as (Code Trauma / Trauma Alert / Trauma Consult) s/p fall down 10 stairs with +HT, ?LOC, and -AC (patient is on Plavix and ASA). Patient fell down 10 wooden steps while daughter was showering as patient is not supposed to go down steps without supervision Patient is hard of hearing but denies any pain on arrival to ED but had a 5cm hematoma to right frontal scalp along with a skin tear on left dorsal hand and superficial abrasion to right shin. Patient denies any nausea, vomiting, chest pain, or other concerning symptoms. Trauma assessment in ED: ABCs intact , GCS 15 , AAOx3.    PLAN:  - Strict Is and Os  - F/u Nephro consult: Renal US to esnure no obstruciton, Cotn IVF for now, trend Cr, avoid further contrast NSAIDS as able - Trend Cr  - Soft and bite sized diet  - DVT ppx  - IS encouraged  - Ambulate as tolerated  - Dispo planning    [phone]
Imp: Rehab of multitrauma / s/p fall / C1 fx, right forehead hematoma, left hand laceration / HTN, HLD, BPH, CAD, s/p CABG, DM, BPH  Plan: continue bedside therapy as tolerated / limited therapy tolerance           consider d/c to STR when medically stable

## 2023-04-03 NOTE — DISCHARGE NOTE NURSING/CASE MANAGEMENT/SOCIAL WORK - NSDCVIVACCINE_GEN_ALL_CORE_FT
Tdap; 28-Mar-2023 22:40; Jackie Cyr (RN); [employer]; [id number] (Exp. Date: 15-Feb-2025); IntraMuscular; Deltoid Right.; 0.5 milliLiter(s); VIS (VIS Published: 09-May-2013, VIS Presented: 28-Mar-2023);

## 2023-04-03 NOTE — PROGRESS NOTE ADULT - SUBJECTIVE AND OBJECTIVE BOX
GENERAL SURGERY PROGRESS NOTE    Patient: CHEL CROFT , 89y (24)Male   MRN: [id number]  Location: [address] (Back) 016 B  Visit: 23 Inpatient  Date: 23 @ 08:46      Procedure/Dx/Injuries:   B/L C1 POSTERIOR ARCH NON-DISPLACED FX  MILD TO MODERATE SPINAL CANAL STENOSIS  C2-5 EPIDURAL HEMATOMA W/OUT CORD COMPRESSION    Events of past 24 hours: no acute events, nephro evaluated patient    PAST MEDICAL & SURGICAL HISTORY:  HTN (hypertension)      HLD (hyperlipidemia)      BPH (benign prostatic hyperplasia)      CAD (coronary artery disease)      Diabetes mellitus      Hypothyroidism      S/P CABG (coronary artery bypass graft)          Vitals:   T(F): 98.8 (23 @ 08:00), Max: 98.8 (23 @ 08:00)  HR: 88 (23 @ 08:00)  BP: 160/80 (23 @ 08:11)  RR: 18 (23 @ 05:34)  SpO2: 95% (23 @ 05:34)          Fluids:     I & O's:    23 @ 07:01  -  23 @ 07:00  --------------------------------------------------------  IN:    sodium chloride 0.9%: 1210 mL  Total IN: 1210 mL    OUT:    Indwelling Catheter - Urethral (mL): 800 mL  Total OUT: 800 mL    Total NET: 410 mL          PHYSICAL EXAM:  General Appearance: NAD, Ax1  Heart: RRR  Lungs: CTAX2  Abdomen:  soft, non tender, non distended  MSK/Extremities:  mobile      MEDICATIONS  (STANDING):  acetaminophen     Tablet .. 650 milliGRAM(s) Oral every 6 hours  chlorhexidine 4% Liquid 1 Application(s) Topical <User Schedule>  dextrose 5%. 1000 milliLiter(s) (50 mL/Hr) IV Continuous <Continuous>  dextrose 50% Injectable 25 Gram(s) IV Push once  DULoxetine 60 milliGRAM(s) Oral daily  finasteride 5 milliGRAM(s) Oral daily  heparin   Injectable 5000 Unit(s) SubCutaneous every 8 hours  insulin glargine Injectable (LANTUS) 15 Unit(s) SubCutaneous at bedtime  insulin lispro (ADMELOG) corrective regimen sliding scale   SubCutaneous three times a day before meals  levothyroxine 50 MICROGram(s) Oral daily  metoprolol tartrate 25 milliGRAM(s) Oral two times a day  simvastatin 20 milliGRAM(s) Oral at bedtime  sodium chloride 0.9%. 1000 milliLiter(s) (110 mL/Hr) IV Continuous <Continuous>  sodium zirconium cyclosilicate 5 Gram(s) Oral two times a day  tamsulosin 0.4 milliGRAM(s) Oral at bedtime    MEDICATIONS  (PRN):  guaiFENesin  milliGRAM(s) Oral every 12 hours PRN Cough  traMADol 50 milliGRAM(s) Oral two times a day PRN Moderate Pain (4 - 6)      DVT PROPHYLAXIS: heparin   Injectable 5000 Unit(s) SubCutaneous every 8 hours    GI PROPHYLAXIS:   ANTICOAGULATION:   ANTIBIOTICS:            LAB/STUDIES:  Labs:  CAPILLARY BLOOD GLUCOSE      POCT Blood Glucose.: 107 mg/dL (2023 08:18)  POCT Blood Glucose.: 108 mg/dL (2023 20:46)  POCT Blood Glucose.: 143 mg/dL (2023 16:56)  POCT Blood Glucose.: 156 mg/dL (2023 11:23)                          7.6    8.11  )-----------( 178      ( 2023 20:45 )             24.4       Auto Neutrophil %: 56.5 % (23 @ 20:45)  Auto Immature Granulocyte %: 0.9 % (23 @ 20:45)        136  |  108  |  34<H>  ----------------------------<  92  5.3<H>   |  18  |  2.0<H>      Calcium, Total Serum: 8.0 mg/dL (23 @ 20:45)      LFTs:         Coags:            Urinalysis Basic - ( 2023 22:02 )    Color: Light Yellow / Appearance: Clear / S.013 / pH: x  Gluc: x / Ketone: Negative  / Bili: Negative / Urobili: <2 mg/dL   Blood: x / Protein: Trace / Nitrite: Negative   Leuk Esterase: Small / RBC: 5 /HPF / WBC 5 /HPF   Sq Epi: x / Non Sq Epi: 3 /HPF / Bacteria: Negative                IMAGING:      ACCESS/ DEVICES:  [ x] Peripheral IV  [ ] Central Venous Line	[ ] R	[ ] L	[ ] IJ	[ ] Fem	[ ] SC	Placed:   [ ] Arterial Line		[ ] R	[ ] L	[ ] Fem	[ ] Rad	[ ] Ax	Placed:   [ ] PICC:					[ ] Mediport  [ ] Urinary Catheter,  Date Placed:   [ ] Chest tube: [ ] Right, [ ] Left  [ ] JOVAN/Geovany Drains       GENERAL SURGERY PROGRESS NOTE    Patient: CHEL CROFT , 89y (24)Male   MRN: [id number]  Location: [address] (Back) 016 B  Visit: 23 Inpatient  Date: 23 @ 08:46      Procedure/Dx/Injuries:   B/L C1 POSTERIOR ARCH NON-DISPLACED FX  MILD TO MODERATE SPINAL CANAL STENOSIS  C2-5 EPIDURAL HEMATOMA W/OUT CORD COMPRESSION    Events of past 24 hours: no acute events, nephro evaluated patient, smaller hard collar placed    PAST MEDICAL & SURGICAL HISTORY:  HTN (hypertension)      HLD (hyperlipidemia)      BPH (benign prostatic hyperplasia)      CAD (coronary artery disease)      Diabetes mellitus      Hypothyroidism      S/P CABG (coronary artery bypass graft)          Vitals:   T(F): 98.8 (23 @ 08:00), Max: 98.8 (23 @ 08:00)  HR: 88 (23 @ 08:00)  BP: 160/80 (23 @ 08:11)  RR: 18 (23 @ 05:34)  SpO2: 95% (23 @ 05:34)          Fluids:     I & O's:    23 @ 07:01  -  23 @ 07:00  --------------------------------------------------------  IN:    sodium chloride 0.9%: 1210 mL  Total IN: 1210 mL    OUT:    Indwelling Catheter - Urethral (mL): 800 mL  Total OUT: 800 mL    Total NET: 410 mL          PHYSICAL EXAM:  General Appearance: NAD, AAOX3, collar in place  Heart: RRR  Lungs: CTAX2  Abdomen:  soft, non tender, non distended  MSK/Extremities:  mobile      MEDICATIONS  (STANDING):  acetaminophen     Tablet .. 650 milliGRAM(s) Oral every 6 hours  chlorhexidine 4% Liquid 1 Application(s) Topical <User Schedule>  dextrose 5%. 1000 milliLiter(s) (50 mL/Hr) IV Continuous <Continuous>  dextrose 50% Injectable 25 Gram(s) IV Push once  DULoxetine 60 milliGRAM(s) Oral daily  finasteride 5 milliGRAM(s) Oral daily  heparin   Injectable 5000 Unit(s) SubCutaneous every 8 hours  insulin glargine Injectable (LANTUS) 15 Unit(s) SubCutaneous at bedtime  insulin lispro (ADMELOG) corrective regimen sliding scale   SubCutaneous three times a day before meals  levothyroxine 50 MICROGram(s) Oral daily  metoprolol tartrate 25 milliGRAM(s) Oral two times a day  simvastatin 20 milliGRAM(s) Oral at bedtime  sodium chloride 0.9%. 1000 milliLiter(s) (110 mL/Hr) IV Continuous <Continuous>  sodium zirconium cyclosilicate 5 Gram(s) Oral two times a day  tamsulosin 0.4 milliGRAM(s) Oral at bedtime    MEDICATIONS  (PRN):  guaiFENesin  milliGRAM(s) Oral every 12 hours PRN Cough  traMADol 50 milliGRAM(s) Oral two times a day PRN Moderate Pain (4 - 6)      DVT PROPHYLAXIS: heparin   Injectable 5000 Unit(s) SubCutaneous every 8 hours    GI PROPHYLAXIS:   ANTICOAGULATION:   ANTIBIOTICS:            LAB/STUDIES:  Labs:  CAPILLARY BLOOD GLUCOSE      POCT Blood Glucose.: 107 mg/dL (2023 08:18)  POCT Blood Glucose.: 108 mg/dL (2023 20:46)  POCT Blood Glucose.: 143 mg/dL (2023 16:56)  POCT Blood Glucose.: 156 mg/dL (2023 11:23)                          7.6    8.11  )-----------( 178      ( 2023 20:45 )             24.4       Auto Neutrophil %: 56.5 % (23 @ 20:45)  Auto Immature Granulocyte %: 0.9 % (23 @ 20:45)        136  |  108  |  34<H>  ----------------------------<  92  5.3<H>   |  18  |  2.0<H>      Calcium, Total Serum: 8.0 mg/dL (23 @ 20:45)      LFTs:         Coags:            Urinalysis Basic - ( 2023 22:02 )    Color: Light Yellow / Appearance: Clear / S.013 / pH: x  Gluc: x / Ketone: Negative  / Bili: Negative / Urobili: <2 mg/dL   Blood: x / Protein: Trace / Nitrite: Negative   Leuk Esterase: Small / RBC: 5 /HPF / WBC 5 /HPF   Sq Epi: x / Non Sq Epi: 3 /HPF / Bacteria: Negative                IMAGING:      ACCESS/ DEVICES:  [ x] Peripheral IV  [ ] Central Venous Line	[ ] R	[ ] L	[ ] IJ	[ ] Fem	[ ] SC	Placed:   [ ] Arterial Line		[ ] R	[ ] L	[ ] Fem	[ ] Rad	[ ] Ax	Placed:   [ ] PICC:					[ ] Mediport  [ ] Urinary Catheter,  Date Placed:   [ ] Chest tube: [ ] Right, [ ] Left  [ ] JOVAN/Geovany Drains

## 2023-04-03 NOTE — DISCHARGE NOTE PROVIDER - NSDCCPCAREPLAN_GEN_ALL_CORE_FT
PRINCIPAL DISCHARGE DIAGNOSIS  Diagnosis: Accidental fall on or from stairs or steps  Assessment and Plan of Treatment:       SECONDARY DISCHARGE DIAGNOSES  Diagnosis: Hypoxia  Assessment and Plan of Treatment:     Diagnosis: Closed C1 fracture  Assessment and Plan of Treatment:     Diagnosis: Scalp hematoma  Assessment and Plan of Treatment:     Diagnosis: Skin abrasion  Assessment and Plan of Treatment:     Diagnosis: Hypomagnesemia  Assessment and Plan of Treatment:

## 2023-04-03 NOTE — DISCHARGE NOTE PROVIDER - NSDCFUADDINST_GEN_ALL_CORE_FT
Follow up:  These are the clinics that you should follow up after your discharge from the hospital:   - Your primary care physician or hospitalist Dr. Baxter for follow up, especially regarding your elevated blood pressure with the addition of a new medication nifedipine  - Urology: due to your retention, you have been discharged with a hidalgo catheter in place. Follow up with the Urology so that the hidalgo can be removed if you continue to fail trial of voids.   - Surgery: Please schedule appointment with outpatient Trauma clinic in 1-2 weeks.   - Please follow up with Neuro Surgery so that they have continued option.     Activity: As tolerated, but maintain hard collar on at all times. You will be switched to soft collar by neurosurgery after follow.     YOU MAY RESUME PLAVIX TOMORROW 4/4/23. Continue holding tonight 4/3/23. Per neurosurgery recommendations, you may resume Plavix tomorrow 4/4/23.

## 2023-04-03 NOTE — DISCHARGE NOTE NURSING/CASE MANAGEMENT/SOCIAL WORK - PATIENT PORTAL LINK FT
You can access the FollowMyHealth Patient Portal offered by [facility] by registering at the following website: [email or web address]. By joining [employer]’s FollowMyHealth portal, you will also be able to view your health information using other applications (apps) compatible with our system.

## 2023-04-03 NOTE — DISCHARGE NOTE PROVIDER - CARE PROVIDER_API CALL
Iman Chan)  Surgery  Neurosurgery  [address], [address]  [place] [address]  Phone: [phone]  Fax: [phone]  Follow Up Time: 1 week    Cristino Baxter)  Hospitalist  [address] [place] [address]  Phone: [phone]  Fax: [phone]  Follow Up Time: 1 week

## 2023-04-04 LAB — GLUCOSE BLDC GLUCOMTR-MCNC: 238 MG/DL — HIGH (ref 70–99)

## 2023-04-06 DIAGNOSIS — S06.0X1A CONCUSSION WITH LOSS OF CONSCIOUSNESS OF 30 MINUTES OR LESS, INITIAL ENCOUNTER: ICD-10-CM

## 2023-04-06 DIAGNOSIS — E87.5 HYPERKALEMIA: ICD-10-CM

## 2023-04-06 DIAGNOSIS — I10 ESSENTIAL (PRIMARY) HYPERTENSION: ICD-10-CM

## 2023-04-06 DIAGNOSIS — R09.02 HYPOXEMIA: ICD-10-CM

## 2023-04-06 DIAGNOSIS — R53.1 WEAKNESS: ICD-10-CM

## 2023-04-06 DIAGNOSIS — S14.0XXA CONCUSSION AND EDEMA OF CERVICAL SPINAL CORD, INITIAL ENCOUNTER: ICD-10-CM

## 2023-04-06 DIAGNOSIS — E83.42 HYPOMAGNESEMIA: ICD-10-CM
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
## 2023-04-06 DIAGNOSIS — E03.9 HYPOTHYROIDISM, UNSPECIFIED: ICD-10-CM
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
## 2023-04-06 DIAGNOSIS — S00.03XA CONTUSION OF SCALP, INITIAL ENCOUNTER: ICD-10-CM

## 2023-04-06 DIAGNOSIS — Z95.1 PRESENCE OF AORTOCORONARY BYPASS GRAFT: ICD-10-CM

## 2023-04-06 DIAGNOSIS — R32 UNSPECIFIED URINARY INCONTINENCE: ICD-10-CM

## 2023-04-06 DIAGNOSIS — M48.02 SPINAL STENOSIS, CERVICAL REGION: ICD-10-CM

## 2023-04-06 DIAGNOSIS — S12.001A UNSPECIFIED NONDISPLACED FRACTURE OF FIRST CERVICAL VERTEBRA, INITIAL ENCOUNTER FOR CLOSED FRACTURE: ICD-10-CM

## 2023-04-06 DIAGNOSIS — N40.0 BENIGN PROSTATIC HYPERPLASIA WITHOUT LOWER URINARY TRACT SYMPTOMS: ICD-10-CM

## 2023-04-06 DIAGNOSIS — N17.9 ACUTE KIDNEY FAILURE, UNSPECIFIED: ICD-10-CM

## 2023-04-06 DIAGNOSIS — Z95.5 PRESENCE OF CORONARY ANGIOPLASTY IMPLANT AND GRAFT: ICD-10-CM

## 2023-04-06 DIAGNOSIS — I25.10 ATHEROSCLEROTIC HEART DISEASE OF NATIVE CORONARY ARTERY WITHOUT ANGINA PECTORIS: ICD-10-CM

## 2023-04-06 DIAGNOSIS — E11.9 TYPE 2 DIABETES MELLITUS WITHOUT COMPLICATIONS: ICD-10-CM

## 2023-04-21 ENCOUNTER — APPOINTMENT (OUTPATIENT)
Dept: SURGERY | Facility: CLINIC | Age: 88
End: 2023-04-21
Payer: MEDICARE

## 2023-04-21 VITALS — HEART RATE: 66 BPM | OXYGEN SATURATION: 95 %
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
## 2023-04-21 PROCEDURE — 99213 OFFICE O/P EST LOW 20 MIN: CPT

## 2023-04-21 NOTE — HISTORY OF PRESENT ILLNESS
[de-identified] : This is 89 y/o male S/P fall and C1 cervical fracture. Patient is STR at this time. Tolerates PT/OT. Minimal discomfort on exam of cervical spine

## 2023-04-21 NOTE — PHYSICAL EXAM
[JVD] : no jugular venous distention  [Carotid Bruits] : no carotid bruits [Normal Breath Sounds] : Normal breath sounds [Respiratory Effort] : normal respiratory effort [Normal Heart Sounds] : normal heart sounds [Normal Rate and Rhythm] : normal rate and rhythm [Abdominal Masses] : No abdominal masses [Abdomen Tenderness] : ~T ~M No abdominal tenderness [Tender] : was nontender [Enlarged] : not enlarged [No Rash or Lesion] : No rash or lesion [Alert] : alert [Oriented to Person] : oriented to person [Oriented to Place] : oriented to place [Oriented to Time] : disoriented to time [Calm] : calm [de-identified] : well nourished, well developed [de-identified] : HUGO

## 2023-04-21 NOTE — REVIEW OF SYSTEMS
[Fever] : no fever [Chills] : no chills [Feeling Poorly] : not feeling poorly [Feeling Tired] : not feeling tired [Heart Rate Is Slow] : the heart rate was not slow [Heart Rate Is Fast] : the heart rate was not fast [Chest Pain] : no chest pain [Palpitations] : no palpitations [Shortness Of Breath] : no shortness of breath [Lower Ext Edema] : no extremity edema [Wheezing] : no wheezing [Cough] : no cough [SOB on Exertion] : no shortness of breath during exertion [Incontinence] : incontinence [Dysuria] : dysuria [Arthralgias] : no arthralgias [Joint Swelling] : no joint swelling [Joint Stiffness] : joint stiffness [Limb Pain] : no limb pain [Limb Swelling] : no limb swelling [Negative] : Heme/Lymph [FreeTextEntry8] : Hoover in place

## 2023-04-27 ENCOUNTER — TRANSCRIPTION ENCOUNTER (OUTPATIENT)
Age: 88
End: 2023-04-27

## 2023-04-28 ENCOUNTER — APPOINTMENT (OUTPATIENT)
Dept: NEUROSURGERY | Facility: CLINIC | Age: 88
End: 2023-04-28
Payer: MEDICARE

## 2023-04-28 VITALS — WEIGHT: 162 LBS | BODY MASS INDEX: 30.58 KG/M2 | HEIGHT: 61 IN

## 2023-04-28 DIAGNOSIS — M25.512 PAIN IN LEFT SHOULDER: ICD-10-CM

## 2023-04-28 PROCEDURE — 99213 OFFICE O/P EST LOW 20 MIN: CPT

## 2023-04-28 NOTE — HISTORY OF PRESENT ILLNESS
[FreeTextEntry1] : Briefly, Mr. José, is a 89yM w/ PMHx of HLD, HTN, BPH, quadruple bypass, and CAD s/p 4 stent placement and DM seen as Trauma Alert on 3/29/23, s/p fall down 10 stairs with +HT, ?LOC, and +AC (patient is on Plavix and ASA). Found on MRI cervical Redemonstrated nondisplaced fractures in the bilateral posterior arches of C1 with mild paraspinal edema\par \par Today he presents accompanied by his daughter, resides at [facility]. He reports of neck pain associated with muscle spasms with ROM to the left. Continues to wear hard cervical collar, however, patient daughter states that he sometimes removes collar. Uses a walker to ambulate during PT.\par Denies radicular arm pain, numbness, tingling, and weakness\par \par Patient daughter reports on tuesday night the rehab center called stating he is complaining of left shoulder pain. He is pending xray of the shoulder. \par \par \par On exam : he is able to move all extremities, appears to guard the LUE\par Has full ROM with pain on left

## 2023-04-28 NOTE — HISTORY OF PRESENT ILLNESS
[FreeTextEntry1] : Briefly, Mr. José, is a 89yM w/ PMHx of HLD, HTN, BPH, quadruple bypass, and CAD s/p 4 stent placement and DM seen as Trauma Alert on 3/29/23, s/p fall down 10 stairs with +HT, ?LOC, and +AC (patient is on Plavix and ASA). Found on MRI cervical Redemonstrated nondisplaced fractures in the bilateral posterior arches of C1 with mild paraspinal edema\par \par Today he presents accompanied by his daughter, resides at [facility]. He reports of neck pain associated with muscle spasms with ROM to the left. Continues to wear hard cervical collar, however, patient daughter states that he sometimes removes collar. Uses a walker to ambulate during PT.\par Denies radicular arm pain, numbness, tingling, and weakness\par \par Patient daughter reports on tuesday night the rehab center called stating he is complaining of left shoulder pain. He is pending xray of the shoulder. \par \par \par On exam : he is able to move all extremities, appears to guard the LUE\par Has full ROM with pain on left

## 2023-05-11 ENCOUNTER — TRANSCRIPTION ENCOUNTER (OUTPATIENT)
Age: 88
End: 2023-05-11

## 2023-05-18 ENCOUNTER — TRANSCRIPTION ENCOUNTER (OUTPATIENT)
Age: 88
End: 2023-05-18

## 2023-05-23 ENCOUNTER — NON-APPOINTMENT (OUTPATIENT)
Age: 88
End: 2023-05-23

## 2023-06-08 ENCOUNTER — APPOINTMENT (OUTPATIENT)
Dept: UROLOGY | Facility: CLINIC | Age: 88
End: 2023-06-08

## 2023-06-20 PROBLEM — S12.000A: Status: ACTIVE | Noted: 2023-04-21

## 2023-06-20 PROBLEM — I25.10 CAD IN NATIVE ARTERY: Status: ACTIVE | Noted: 2023-06-20

## 2023-06-20 PROBLEM — N40.1 BENIGN LOCALIZED HYPERPLASIA OF PROSTATE WITH URINARY OBSTRUCTION: Status: ACTIVE | Noted: 2020-03-09

## 2023-06-20 PROBLEM — I25.10 CAD S/P PERCUTANEOUS CORONARY ANGIOPLASTY: Status: ACTIVE | Noted: 2023-06-20

## 2023-06-20 PROBLEM — N18.4 CKD (CHRONIC KIDNEY DISEASE), STAGE IV: Status: ACTIVE | Noted: 2023-06-20

## 2023-06-21 ENCOUNTER — APPOINTMENT (OUTPATIENT)
Dept: GERIATRICS | Facility: HOME HEALTH | Age: 88
End: 2023-06-21
Payer: MEDICARE

## 2023-06-21 VITALS
OXYGEN SATURATION: 100 % | HEART RATE: 92 BPM | TEMPERATURE: 98.2 F | RESPIRATION RATE: 16 BRPM | SYSTOLIC BLOOD PRESSURE: 120 MMHG | DIASTOLIC BLOOD PRESSURE: 54 MMHG

## 2023-06-21 DIAGNOSIS — Z98.61 ATHEROSCLEROTIC HEART DISEASE OF NATIVE CORONARY ARTERY W/OUT ANGINA PECTORIS: ICD-10-CM

## 2023-06-21 DIAGNOSIS — N18.4 CHRONIC KIDNEY DISEASE, STAGE 4 (SEVERE): ICD-10-CM

## 2023-06-21 DIAGNOSIS — I25.10 ATHEROSCLEROTIC HEART DISEASE OF NATIVE CORONARY ARTERY W/OUT ANGINA PECTORIS: ICD-10-CM

## 2023-06-21 DIAGNOSIS — N13.8 BENIGN PROSTATIC HYPERPLASIA WITH LOWER URINARY TRACT SYMPMS: ICD-10-CM

## 2023-06-21 DIAGNOSIS — R52 PAIN, UNSPECIFIED: ICD-10-CM

## 2023-06-21 DIAGNOSIS — S12.000A UNSPECIFIED DISPLACED FRACTURE OF FIRST CERVICAL VERTEBRA, INITIAL ENCOUNTER FOR CLOSED FRACTURE: ICD-10-CM

## 2023-06-21 DIAGNOSIS — N40.1 BENIGN PROSTATIC HYPERPLASIA WITH LOWER URINARY TRACT SYMPMS: ICD-10-CM

## 2023-06-21 DIAGNOSIS — R39.9 UNSPECIFIED SYMPTOMS AND SIGNS INVOLVING THE GENITOURINARY SYSTEM: ICD-10-CM

## 2023-06-21 PROCEDURE — 99342 HOME/RES VST NEW LOW MDM 30: CPT

## 2023-06-21 RX ORDER — DULOXETINE HYDROCHLORIDE 60 MG/1
60 CAPSULE, DELAYED RELEASE PELLETS ORAL
Qty: 30 | Refills: 11 | Status: ACTIVE | COMMUNITY

## 2023-06-21 RX ORDER — INSULIN GLARGINE 300 U/ML
300 INJECTION, SOLUTION SUBCUTANEOUS
Qty: 2100 | Refills: 0 | Status: ACTIVE | COMMUNITY

## 2023-06-21 RX ORDER — CELECOXIB 100 MG/1
100 CAPSULE ORAL
Refills: 0 | Status: DISCONTINUED | COMMUNITY
End: 2023-06-21

## 2023-06-21 RX ORDER — METOPROLOL TARTRATE 25 MG/1
25 TABLET, FILM COATED ORAL
Refills: 0 | Status: ACTIVE | COMMUNITY

## 2023-06-21 RX ORDER — CLOTRIMAZOLE AND BETAMETHASONE DIPROPIONATE 10; .5 MG/G; MG/G
1-0.05 CREAM TOPICAL TWICE DAILY
Qty: 1 | Refills: 1 | Status: DISCONTINUED | COMMUNITY
Start: 2021-08-31 | End: 2023-06-21

## 2023-06-21 RX ORDER — DOXAZOSIN 8 MG/1
8 TABLET ORAL
Refills: 0 | Status: DISCONTINUED | COMMUNITY
End: 2023-06-21

## 2023-06-21 NOTE — HISTORY OF PRESENT ILLNESS
[FreeTextEntry1] : Patient seen and examined at home.  Patient is homebound 2/2 generalized weakness/debility and advanced age.  HHA and DTR present during visit and provides Hx.  89M w/PMHx of HLD/HTN, BPH, CAD s/p CABG x 4 and PCI, DM2, s/p recent fall 3/23 w/C1 cervical fracture.  Patient treated and sent to [facility].  Patient just returned home, 5/26.  Reporting dysuria x 5 days.  Urine reported as malodorous and cloudy.  No frequency.  No fever/chills.  No confusion.  No CP/SOB.  No abdominal complaints.  No falls since discharge.  Patient has PU to sacral area  Patient lives with Dtr.  Patient has HHA 9a-8p and Dtr stays at night.  Patient uses a walker for ambulation.  Currently getting PT/OT.  \par \par ADL Assessment:\par - Patient is able to ambulate/transfer with assistance\par - Patient is able to perform toileting with assistance\par - Patient is able to perform personal hygiene/grooming with assistance\par - Patient is able to cook with assistance\par - Patient is able to eat without assistance\par - Patient is able to dress with assistance

## 2023-06-21 NOTE — PHYSICAL EXAM
[Alert] : alert [No Acute Distress] : in no acute distress [Sclera] : the sclera and conjunctiva were normal [Normal Outer Ear/Nose] : the ears and nose were normal in appearance [Normal Appearance] : the appearance of the neck was normal [Supple] : the neck was supple [No Respiratory Distress] : no respiratory distress [No Acc Muscle Use] : no accessory muscle use [Respiration, Rhythm And Depth] : normal respiratory rhythm and effort [Auscultation Breath Sounds / Voice Sounds] : lungs were clear to auscultation bilaterally [Normal S1, S2] : normal S1 and S2 [Heart Rate And Rhythm] : heart rate was normal and rhythm regular [Edema] : edema was not present [Abdomen Tenderness] : non-tender [Abdomen Soft] : soft [No Spinal Tenderness] : no spinal tenderness [No Clubbing, Cyanosis] : no clubbing or cyanosis of the fingernails [Involuntary Movements] : no involuntary movements were seen [] : no rash [Skin Lesions] : no skin lesions [No Focal Deficits] : no focal deficits [Oriented To Time, Place, And Person] : oriented to person, place, and time

## 2023-06-21 NOTE — ASSESSMENT
[FreeTextEntry1] : #Dysuria\par - will treat empirically with Vantin 200mg PO QD (renally dosed) x 5 days\par \par #CAD s/p CABG x 4 and PCI\par - c/w Plavix/Statin/BB\par - outpatient Cards FU\par \par #BPH\par - no s/s UR\par - c/w Flomax\par \par #DM2 w/PN\par - c/w Basal insulin\par - A1C 7.9% 3/23\par - c/w Cymbalta\par \par #CKD 4 \par - baseline sCr approx 2.5\par - avoid Nephrotoxics\par - Dtr reports no known Hx, wants to FU with renal.  Will recheck labs\par - outpatient Renal FU, Dtr will arrange after labs\par \par #Generalized Weakness\par #Gait Instability\par #s/p fall w/posterior C1 Fx 3/23\par - outpatient NSGY FU\par - DME for ambulation\par - pain control, using Tramadol 50mg BID, RF PRN\par - fall precautions\par - c/w PT/OT\par

## 2023-06-23 ENCOUNTER — INPATIENT (INPATIENT)
Facility: HOSPITAL | Age: 88
LOS: 24 days | Discharge: HOME HEALTH | DRG: 673 | End: 2023-07-18
Attending: HOSPITALIST | Admitting: INTERNAL MEDICINE
Payer: MEDICARE

## 2023-06-23 ENCOUNTER — NON-APPOINTMENT (OUTPATIENT)
Age: 88
End: 2023-06-23

## 2023-06-23 VITALS
WEIGHT: 119.93 LBS | HEART RATE: 80 BPM | SYSTOLIC BLOOD PRESSURE: 121 MMHG | RESPIRATION RATE: 16 BRPM | DIASTOLIC BLOOD PRESSURE: 60 MMHG | TEMPERATURE: 98 F | OXYGEN SATURATION: 96 % | HEIGHT: 63 IN

## 2023-06-23 DIAGNOSIS — Z95.1 PRESENCE OF AORTOCORONARY BYPASS GRAFT: Chronic | ICD-10-CM

## 2023-06-23 DIAGNOSIS — E87.5 HYPERKALEMIA: ICD-10-CM

## 2023-06-23 LAB
ALBUMIN SERPL ELPH-MCNC: 3 G/DL — LOW (ref 3.5–5.2)
ALBUMIN SERPL ELPH-MCNC: 3.1 G/DL
ALP BLD-CCNC: 115 U/L
ALP SERPL-CCNC: 121 U/L — HIGH (ref 30–115)
ALT FLD-CCNC: 10 U/L — SIGNIFICANT CHANGE UP (ref 0–41)
ALT SERPL-CCNC: 6 U/L
ANION GAP SERPL CALC-SCNC: 11 MMOL/L — SIGNIFICANT CHANGE UP (ref 7–14)
ANION GAP SERPL CALC-SCNC: 14 MMOL/L — SIGNIFICANT CHANGE UP (ref 7–14)
ANION GAP SERPL CALC-SCNC: 15 MMOL/L
ANION GAP SERPL CALC-SCNC: 9 MMOL/L — SIGNIFICANT CHANGE UP (ref 7–14)
APPEARANCE UR: ABNORMAL
AST SERPL-CCNC: 10 U/L
AST SERPL-CCNC: 36 U/L — SIGNIFICANT CHANGE UP (ref 0–41)
BACTERIA # UR AUTO: ABNORMAL
BASE EXCESS BLDV CALC-SCNC: -3.7 MMOL/L — LOW (ref -2–3)
BASOPHILS # BLD AUTO: 0.05 K/UL — SIGNIFICANT CHANGE UP (ref 0–0.2)
BASOPHILS NFR BLD AUTO: 0.3 % — SIGNIFICANT CHANGE UP (ref 0–1)
BILIRUB SERPL-MCNC: 0.3 MG/DL
BILIRUB SERPL-MCNC: 0.3 MG/DL — SIGNIFICANT CHANGE UP (ref 0.2–1.2)
BILIRUB UR-MCNC: NEGATIVE — SIGNIFICANT CHANGE UP
BUN SERPL-MCNC: 24 MG/DL — HIGH (ref 10–20)
BUN SERPL-MCNC: 26 MG/DL — HIGH (ref 10–20)
BUN SERPL-MCNC: 28 MG/DL
BUN SERPL-MCNC: 28 MG/DL — HIGH (ref 10–20)
CA-I SERPL-SCNC: 1.37 MMOL/L — HIGH (ref 1.15–1.33)
CALCIUM SERPL-MCNC: 8.9 MG/DL — SIGNIFICANT CHANGE UP (ref 8.4–10.5)
CALCIUM SERPL-MCNC: 9 MG/DL
CALCIUM SERPL-MCNC: 9.1 MG/DL — SIGNIFICANT CHANGE UP (ref 8.4–10.5)
CALCIUM SERPL-MCNC: 9.2 MG/DL — SIGNIFICANT CHANGE UP (ref 8.4–10.5)
CHLORIDE SERPL-SCNC: 100 MMOL/L — SIGNIFICANT CHANGE UP (ref 98–110)
CHLORIDE SERPL-SCNC: 97 MMOL/L
CHLORIDE SERPL-SCNC: 97 MMOL/L — LOW (ref 98–110)
CHLORIDE SERPL-SCNC: 98 MMOL/L — SIGNIFICANT CHANGE UP (ref 98–110)
CO2 SERPL-SCNC: 19 MMOL/L
CO2 SERPL-SCNC: 20 MMOL/L — SIGNIFICANT CHANGE UP (ref 17–32)
COLOR SPEC: ABNORMAL
CREAT SERPL-MCNC: 1.1 MG/DL — SIGNIFICANT CHANGE UP (ref 0.7–1.5)
CREAT SERPL-MCNC: 1.1 MG/DL — SIGNIFICANT CHANGE UP (ref 0.7–1.5)
CREAT SERPL-MCNC: 1.2 MG/DL
CREAT SERPL-MCNC: 1.3 MG/DL — SIGNIFICANT CHANGE UP (ref 0.7–1.5)
DIFF PNL FLD: ABNORMAL
EGFR: 49 ML/MIN/1.73M2 — LOW
EGFR: 54 ML/MIN/1.73M2
EGFR: 60 ML/MIN/1.73M2 — SIGNIFICANT CHANGE UP
EGFR: 60 ML/MIN/1.73M2 — SIGNIFICANT CHANGE UP
EOSINOPHIL # BLD AUTO: 0.03 K/UL — SIGNIFICANT CHANGE UP (ref 0–0.7)
EOSINOPHIL NFR BLD AUTO: 0.2 % — SIGNIFICANT CHANGE UP (ref 0–8)
EPI CELLS # UR: 3 /HPF — SIGNIFICANT CHANGE UP (ref 0–5)
ESTIMATED AVERAGE GLUCOSE: 214 MG/DL
GAS PNL BLDV: 130 MMOL/L — LOW (ref 136–145)
GAS PNL BLDV: SIGNIFICANT CHANGE UP
GLUCOSE BLDC GLUCOMTR-MCNC: 162 MG/DL — HIGH (ref 70–99)
GLUCOSE SERPL-MCNC: 124 MG/DL
GLUCOSE SERPL-MCNC: 136 MG/DL — HIGH (ref 70–99)
GLUCOSE SERPL-MCNC: 148 MG/DL — HIGH (ref 70–99)
GLUCOSE SERPL-MCNC: 280 MG/DL — HIGH (ref 70–99)
GLUCOSE UR QL: SIGNIFICANT CHANGE UP
HBA1C MFR BLD HPLC: 9.1 %
HCO3 BLDV-SCNC: 23 MMOL/L — SIGNIFICANT CHANGE UP (ref 22–29)
HCT VFR BLD CALC: 31.1 % — LOW (ref 42–52)
HCT VFR BLDA CALC: 28 % — LOW (ref 39–51)
HGB BLD CALC-MCNC: 9.3 G/DL — LOW (ref 12.6–17.4)
HGB BLD-MCNC: 9.9 G/DL — LOW (ref 14–18)
HYALINE CASTS # UR AUTO: 28 /LPF — HIGH (ref 0–7)
IMM GRANULOCYTES NFR BLD AUTO: 1.2 % — HIGH (ref 0.1–0.3)
KETONES UR-MCNC: NEGATIVE — SIGNIFICANT CHANGE UP
LACTATE BLDV-MCNC: 2.3 MMOL/L — HIGH (ref 0.5–2)
LEUKOCYTE ESTERASE UR-ACNC: ABNORMAL
LYMPHOCYTES # BLD AUTO: 20.7 % — SIGNIFICANT CHANGE UP (ref 20.5–51.1)
LYMPHOCYTES # BLD AUTO: 3.88 K/UL — HIGH (ref 1.2–3.4)
MAGNESIUM SERPL-MCNC: 1.4 MG/DL — LOW (ref 1.8–2.4)
MCHC RBC-ENTMCNC: 26.6 PG — LOW (ref 27–31)
MCHC RBC-ENTMCNC: 31.8 G/DL — LOW (ref 32–37)
MCV RBC AUTO: 83.6 FL — SIGNIFICANT CHANGE UP (ref 80–94)
MONOCYTES # BLD AUTO: 1.55 K/UL — HIGH (ref 0.1–0.6)
MONOCYTES NFR BLD AUTO: 8.3 % — SIGNIFICANT CHANGE UP (ref 1.7–9.3)
NEUTROPHILS # BLD AUTO: 13.02 K/UL — HIGH (ref 1.4–6.5)
NEUTROPHILS NFR BLD AUTO: 69.3 % — SIGNIFICANT CHANGE UP (ref 42.2–75.2)
NITRITE UR-MCNC: NEGATIVE — SIGNIFICANT CHANGE UP
NRBC # BLD: 0 /100 WBCS — SIGNIFICANT CHANGE UP (ref 0–0)
PCO2 BLDV: 46 MMHG — SIGNIFICANT CHANGE UP (ref 42–55)
PH BLDV: 7.3 — LOW (ref 7.32–7.43)
PH UR: 6 — SIGNIFICANT CHANGE UP (ref 5–8)
PLATELET # BLD AUTO: 363 K/UL — SIGNIFICANT CHANGE UP (ref 130–400)
PMV BLD: 9.2 FL — SIGNIFICANT CHANGE UP (ref 7.4–10.4)
PO2 BLDV: 22 MMHG — SIGNIFICANT CHANGE UP
POTASSIUM BLDV-SCNC: 5.4 MMOL/L — HIGH (ref 3.5–5.1)
POTASSIUM BLDV-SCNC: 6.4 MMOL/L — CRITICAL HIGH (ref 3.5–5.1)
POTASSIUM SERPL-MCNC: 5.4 MMOL/L — HIGH (ref 3.5–5)
POTASSIUM SERPL-MCNC: 5.7 MMOL/L — HIGH (ref 3.5–5)
POTASSIUM SERPL-MCNC: SIGNIFICANT CHANGE UP MMOL/L (ref 3.5–5)
POTASSIUM SERPL-SCNC: 5.4 MMOL/L — HIGH (ref 3.5–5)
POTASSIUM SERPL-SCNC: 5.7 MMOL/L — HIGH (ref 3.5–5)
POTASSIUM SERPL-SCNC: 6.1 MMOL/L
POTASSIUM SERPL-SCNC: SIGNIFICANT CHANGE UP MMOL/L (ref 3.5–5)
PROT SERPL-MCNC: 5.8 G/DL
PROT SERPL-MCNC: 6.2 G/DL — SIGNIFICANT CHANGE UP (ref 6–8)
PROT UR-MCNC: ABNORMAL
RBC # BLD: 3.72 M/UL — LOW (ref 4.7–6.1)
RBC # FLD: 15.6 % — HIGH (ref 11.5–14.5)
RBC CASTS # UR COMP ASSIST: 14 /HPF — HIGH (ref 0–4)
SAO2 % BLDV: 29.9 % — SIGNIFICANT CHANGE UP
SODIUM SERPL-SCNC: 126 MMOL/L — LOW (ref 135–146)
SODIUM SERPL-SCNC: 131 MMOL/L
SODIUM SERPL-SCNC: 131 MMOL/L — LOW (ref 135–146)
SODIUM SERPL-SCNC: 132 MMOL/L — LOW (ref 135–146)
SP GR SPEC: 1.01 — SIGNIFICANT CHANGE UP (ref 1.01–1.03)
UROBILINOGEN FLD QL: SIGNIFICANT CHANGE UP
WBC # BLD: 18.75 K/UL — HIGH (ref 4.8–10.8)
WBC # FLD AUTO: 18.75 K/UL — HIGH (ref 4.8–10.8)
WBC UR QL: >720 /HPF — HIGH (ref 0–5)

## 2023-06-23 PROCEDURE — 95700 EEG CONT REC W/VID EEG TECH: CPT

## 2023-06-23 PROCEDURE — 71045 X-RAY EXAM CHEST 1 VIEW: CPT

## 2023-06-23 PROCEDURE — 86901 BLOOD TYPING SEROLOGIC RH(D): CPT

## 2023-06-23 PROCEDURE — 70498 CT ANGIOGRAPHY NECK: CPT

## 2023-06-23 PROCEDURE — 87077 CULTURE AEROBIC IDENTIFY: CPT

## 2023-06-23 PROCEDURE — 84133 ASSAY OF URINE POTASSIUM: CPT

## 2023-06-23 PROCEDURE — 85027 COMPLETE CBC AUTOMATED: CPT

## 2023-06-23 PROCEDURE — 87070 CULTURE OTHR SPECIMN AEROBIC: CPT

## 2023-06-23 PROCEDURE — 92610 EVALUATE SWALLOWING FUNCTION: CPT | Mod: GN

## 2023-06-23 PROCEDURE — 36415 COLL VENOUS BLD VENIPUNCTURE: CPT

## 2023-06-23 PROCEDURE — 73030 X-RAY EXAM OF SHOULDER: CPT | Mod: LT

## 2023-06-23 PROCEDURE — 82088 ASSAY OF ALDOSTERONE: CPT

## 2023-06-23 PROCEDURE — 87205 SMEAR GRAM STAIN: CPT

## 2023-06-23 PROCEDURE — 83036 HEMOGLOBIN GLYCOSYLATED A1C: CPT

## 2023-06-23 PROCEDURE — 83735 ASSAY OF MAGNESIUM: CPT

## 2023-06-23 PROCEDURE — A9579: CPT

## 2023-06-23 PROCEDURE — 70496 CT ANGIOGRAPHY HEAD: CPT

## 2023-06-23 PROCEDURE — 86850 RBC ANTIBODY SCREEN: CPT

## 2023-06-23 PROCEDURE — 95714 VEEG EA 12-26 HR UNMNTR: CPT

## 2023-06-23 PROCEDURE — 84244 ASSAY OF RENIN: CPT

## 2023-06-23 PROCEDURE — 93005 ELECTROCARDIOGRAM TRACING: CPT

## 2023-06-23 PROCEDURE — 97110 THERAPEUTIC EXERCISES: CPT | Mod: GP

## 2023-06-23 PROCEDURE — 86900 BLOOD TYPING SEROLOGIC ABO: CPT

## 2023-06-23 PROCEDURE — 83935 ASSAY OF URINE OSMOLALITY: CPT

## 2023-06-23 PROCEDURE — 85025 COMPLETE CBC W/AUTO DIFF WBC: CPT

## 2023-06-23 PROCEDURE — 83550 IRON BINDING TEST: CPT

## 2023-06-23 PROCEDURE — 0042T: CPT

## 2023-06-23 PROCEDURE — 84443 ASSAY THYROID STIM HORMONE: CPT

## 2023-06-23 PROCEDURE — 93010 ELECTROCARDIOGRAM REPORT: CPT

## 2023-06-23 PROCEDURE — 80053 COMPREHEN METABOLIC PANEL: CPT

## 2023-06-23 PROCEDURE — 76870 US EXAM SCROTUM: CPT

## 2023-06-23 PROCEDURE — 87086 URINE CULTURE/COLONY COUNT: CPT

## 2023-06-23 PROCEDURE — 99285 EMERGENCY DEPT VISIT HI MDM: CPT

## 2023-06-23 PROCEDURE — 84484 ASSAY OF TROPONIN QUANT: CPT

## 2023-06-23 PROCEDURE — 70553 MRI BRAIN STEM W/O & W/DYE: CPT

## 2023-06-23 PROCEDURE — 82728 ASSAY OF FERRITIN: CPT

## 2023-06-23 PROCEDURE — 87186 SC STD MICRODIL/AGAR DIL: CPT

## 2023-06-23 PROCEDURE — 84540 ASSAY OF URINE/UREA-N: CPT

## 2023-06-23 PROCEDURE — 99223 1ST HOSP IP/OBS HIGH 75: CPT

## 2023-06-23 PROCEDURE — 81001 URINALYSIS AUTO W/SCOPE: CPT

## 2023-06-23 PROCEDURE — 84156 ASSAY OF PROTEIN URINE: CPT

## 2023-06-23 PROCEDURE — 85610 PROTHROMBIN TIME: CPT

## 2023-06-23 PROCEDURE — 84550 ASSAY OF BLOOD/URIC ACID: CPT

## 2023-06-23 PROCEDURE — 76770 US EXAM ABDO BACK WALL COMP: CPT

## 2023-06-23 PROCEDURE — 83605 ASSAY OF LACTIC ACID: CPT

## 2023-06-23 PROCEDURE — 87040 BLOOD CULTURE FOR BACTERIA: CPT

## 2023-06-23 PROCEDURE — 82570 ASSAY OF URINE CREATININE: CPT

## 2023-06-23 PROCEDURE — 85730 THROMBOPLASTIN TIME PARTIAL: CPT

## 2023-06-23 PROCEDURE — 87150 DNA/RNA AMPLIFIED PROBE: CPT

## 2023-06-23 PROCEDURE — 80048 BASIC METABOLIC PNL TOTAL CA: CPT

## 2023-06-23 PROCEDURE — 71045 X-RAY EXAM CHEST 1 VIEW: CPT | Mod: 26

## 2023-06-23 PROCEDURE — 70450 CT HEAD/BRAIN W/O DYE: CPT

## 2023-06-23 PROCEDURE — 76770 US EXAM ABDO BACK WALL COMP: CPT | Mod: 26

## 2023-06-23 PROCEDURE — 97116 GAIT TRAINING THERAPY: CPT | Mod: GP

## 2023-06-23 PROCEDURE — 83930 ASSAY OF BLOOD OSMOLALITY: CPT

## 2023-06-23 PROCEDURE — 84300 ASSAY OF URINE SODIUM: CPT

## 2023-06-23 PROCEDURE — 97162 PT EVAL MOD COMPLEX 30 MIN: CPT | Mod: GP

## 2023-06-23 PROCEDURE — 83540 ASSAY OF IRON: CPT

## 2023-06-23 PROCEDURE — 82962 GLUCOSE BLOOD TEST: CPT

## 2023-06-23 PROCEDURE — 95819 EEG AWAKE AND ASLEEP: CPT

## 2023-06-23 RX ORDER — DULOXETINE HYDROCHLORIDE 30 MG/1
60 CAPSULE, DELAYED RELEASE ORAL DAILY
Refills: 0 | Status: DISCONTINUED | OUTPATIENT
Start: 2023-06-23 | End: 2023-07-18

## 2023-06-23 RX ORDER — SIMVASTATIN 20 MG/1
20 TABLET, FILM COATED ORAL AT BEDTIME
Refills: 0 | Status: DISCONTINUED | OUTPATIENT
Start: 2023-06-23 | End: 2023-07-18

## 2023-06-23 RX ORDER — DEXTROSE 50 % IN WATER 50 %
25 SYRINGE (ML) INTRAVENOUS ONCE
Refills: 0 | Status: DISCONTINUED | OUTPATIENT
Start: 2023-06-23 | End: 2023-07-18

## 2023-06-23 RX ORDER — TAMSULOSIN HYDROCHLORIDE 0.4 MG/1
0.4 CAPSULE ORAL AT BEDTIME
Refills: 0 | Status: DISCONTINUED | OUTPATIENT
Start: 2023-06-23 | End: 2023-07-18

## 2023-06-23 RX ORDER — CLOPIDOGREL BISULFATE 75 MG/1
75 TABLET, FILM COATED ORAL DAILY
Refills: 0 | Status: DISCONTINUED | OUTPATIENT
Start: 2023-06-23 | End: 2023-07-18

## 2023-06-23 RX ORDER — MAGNESIUM SULFATE 500 MG/ML
2 VIAL (ML) INJECTION
Refills: 0 | Status: COMPLETED | OUTPATIENT
Start: 2023-06-23 | End: 2023-06-23

## 2023-06-23 RX ORDER — GLUCAGON INJECTION, SOLUTION 0.5 MG/.1ML
1 INJECTION, SOLUTION SUBCUTANEOUS ONCE
Refills: 0 | Status: DISCONTINUED | OUTPATIENT
Start: 2023-06-23 | End: 2023-07-18

## 2023-06-23 RX ORDER — CLOPIDOGREL BISULFATE 75 MG/1
1 TABLET, FILM COATED ORAL
Refills: 0 | DISCHARGE

## 2023-06-23 RX ORDER — TRAMADOL HYDROCHLORIDE 50 MG/1
1 TABLET ORAL
Qty: 0 | Refills: 0 | DISCHARGE

## 2023-06-23 RX ORDER — SODIUM CHLORIDE 9 MG/ML
1000 INJECTION, SOLUTION INTRAVENOUS
Refills: 0 | Status: DISCONTINUED | OUTPATIENT
Start: 2023-06-23 | End: 2023-07-18

## 2023-06-23 RX ORDER — DEXTROSE 50 % IN WATER 50 %
12.5 SYRINGE (ML) INTRAVENOUS ONCE
Refills: 0 | Status: DISCONTINUED | OUTPATIENT
Start: 2023-06-23 | End: 2023-07-18

## 2023-06-23 RX ORDER — INSULIN LISPRO 100/ML
VIAL (ML) SUBCUTANEOUS AT BEDTIME
Refills: 0 | Status: DISCONTINUED | OUTPATIENT
Start: 2023-06-23 | End: 2023-07-18

## 2023-06-23 RX ORDER — PHENAZOPYRIDINE HCL 100 MG
100 TABLET ORAL ONCE
Refills: 0 | Status: COMPLETED | OUTPATIENT
Start: 2023-06-23 | End: 2023-06-23

## 2023-06-23 RX ORDER — INSULIN GLARGINE 100 [IU]/ML
12 INJECTION, SOLUTION SUBCUTANEOUS EVERY MORNING
Refills: 0 | Status: DISCONTINUED | OUTPATIENT
Start: 2023-06-23 | End: 2023-07-18

## 2023-06-23 RX ORDER — CALCIUM GLUCONATE 100 MG/ML
1 VIAL (ML) INTRAVENOUS ONCE
Refills: 0 | Status: COMPLETED | OUTPATIENT
Start: 2023-06-23 | End: 2023-06-23

## 2023-06-23 RX ORDER — SODIUM BICARBONATE 1 MEQ/ML
50 SYRINGE (ML) INTRAVENOUS ONCE
Refills: 0 | Status: COMPLETED | OUTPATIENT
Start: 2023-06-23 | End: 2023-06-23

## 2023-06-23 RX ORDER — CEFTRIAXONE 500 MG/1
1000 INJECTION, POWDER, FOR SOLUTION INTRAMUSCULAR; INTRAVENOUS ONCE
Refills: 0 | Status: COMPLETED | OUTPATIENT
Start: 2023-06-23 | End: 2023-06-23

## 2023-06-23 RX ORDER — SODIUM CHLORIDE 9 MG/ML
500 INJECTION INTRAMUSCULAR; INTRAVENOUS; SUBCUTANEOUS ONCE
Refills: 0 | Status: COMPLETED | OUTPATIENT
Start: 2023-06-23 | End: 2023-06-23

## 2023-06-23 RX ORDER — FINASTERIDE 5 MG/1
5 TABLET, FILM COATED ORAL DAILY
Refills: 0 | Status: DISCONTINUED | OUTPATIENT
Start: 2023-06-23 | End: 2023-07-18

## 2023-06-23 RX ORDER — ACETAMINOPHEN 500 MG
650 TABLET ORAL EVERY 6 HOURS
Refills: 0 | Status: DISCONTINUED | OUTPATIENT
Start: 2023-06-23 | End: 2023-07-14

## 2023-06-23 RX ORDER — DEXTROSE 50 % IN WATER 50 %
15 SYRINGE (ML) INTRAVENOUS ONCE
Refills: 0 | Status: DISCONTINUED | OUTPATIENT
Start: 2023-06-23 | End: 2023-07-18

## 2023-06-23 RX ORDER — PANTOPRAZOLE SODIUM 20 MG/1
40 TABLET, DELAYED RELEASE ORAL
Refills: 0 | Status: DISCONTINUED | OUTPATIENT
Start: 2023-06-23 | End: 2023-06-28

## 2023-06-23 RX ORDER — DEXTROSE 50 % IN WATER 50 %
50 SYRINGE (ML) INTRAVENOUS ONCE
Refills: 0 | Status: COMPLETED | OUTPATIENT
Start: 2023-06-23 | End: 2023-06-23

## 2023-06-23 RX ORDER — MAGNESIUM SULFATE 500 MG/ML
2 VIAL (ML) INJECTION ONCE
Refills: 0 | Status: COMPLETED | OUTPATIENT
Start: 2023-06-23 | End: 2023-06-23

## 2023-06-23 RX ORDER — INSULIN HUMAN 100 [IU]/ML
5 INJECTION, SOLUTION SUBCUTANEOUS ONCE
Refills: 0 | Status: COMPLETED | OUTPATIENT
Start: 2023-06-23 | End: 2023-06-23

## 2023-06-23 RX ORDER — SODIUM CHLORIDE 9 MG/ML
500 INJECTION, SOLUTION INTRAVENOUS ONCE
Refills: 0 | Status: COMPLETED | OUTPATIENT
Start: 2023-06-23 | End: 2023-06-23

## 2023-06-23 RX ORDER — LACTOBACILLUS ACIDOPHILUS 100MM CELL
1 CAPSULE ORAL
Refills: 0 | Status: DISCONTINUED | OUTPATIENT
Start: 2023-06-23 | End: 2023-07-18

## 2023-06-23 RX ORDER — INSULIN LISPRO 100/ML
VIAL (ML) SUBCUTANEOUS
Refills: 0 | Status: DISCONTINUED | OUTPATIENT
Start: 2023-06-23 | End: 2023-07-08

## 2023-06-23 RX ORDER — METOPROLOL TARTRATE 50 MG
25 TABLET ORAL
Refills: 0 | Status: DISCONTINUED | OUTPATIENT
Start: 2023-06-23 | End: 2023-06-27

## 2023-06-23 RX ORDER — ONDANSETRON 8 MG/1
4 TABLET, FILM COATED ORAL EVERY 8 HOURS
Refills: 0 | Status: DISCONTINUED | OUTPATIENT
Start: 2023-06-23 | End: 2023-07-14

## 2023-06-23 RX ORDER — ALBUTEROL 90 UG/1
5 AEROSOL, METERED ORAL ONCE
Refills: 0 | Status: COMPLETED | OUTPATIENT
Start: 2023-06-23 | End: 2023-06-23

## 2023-06-23 RX ORDER — SODIUM ZIRCONIUM CYCLOSILICATE 10 G/10G
10 POWDER, FOR SUSPENSION ORAL ONCE
Refills: 0 | Status: COMPLETED | OUTPATIENT
Start: 2023-06-23 | End: 2023-06-23

## 2023-06-23 RX ORDER — LEVOTHYROXINE SODIUM 125 MCG
50 TABLET ORAL DAILY
Refills: 0 | Status: DISCONTINUED | OUTPATIENT
Start: 2023-06-23 | End: 2023-07-18

## 2023-06-23 RX ORDER — LANOLIN ALCOHOL/MO/W.PET/CERES
3 CREAM (GRAM) TOPICAL AT BEDTIME
Refills: 0 | Status: DISCONTINUED | OUTPATIENT
Start: 2023-06-23 | End: 2023-07-14

## 2023-06-23 RX ADMIN — SODIUM CHLORIDE 500 MILLILITER(S): 9 INJECTION, SOLUTION INTRAVENOUS at 15:52

## 2023-06-23 RX ADMIN — SIMVASTATIN 20 MILLIGRAM(S): 20 TABLET, FILM COATED ORAL at 22:59

## 2023-06-23 RX ADMIN — CEFTRIAXONE 100 MILLIGRAM(S): 500 INJECTION, POWDER, FOR SOLUTION INTRAMUSCULAR; INTRAVENOUS at 15:52

## 2023-06-23 RX ADMIN — Medication 50 MILLIEQUIVALENT(S): at 16:46

## 2023-06-23 RX ADMIN — FINASTERIDE 5 MILLIGRAM(S): 5 TABLET, FILM COATED ORAL at 22:59

## 2023-06-23 RX ADMIN — INSULIN HUMAN 5 UNIT(S): 100 INJECTION, SOLUTION SUBCUTANEOUS at 16:48

## 2023-06-23 RX ADMIN — Medication 25 GRAM(S): at 16:46

## 2023-06-23 RX ADMIN — SODIUM ZIRCONIUM CYCLOSILICATE 10 GRAM(S): 10 POWDER, FOR SUSPENSION ORAL at 16:50

## 2023-06-23 RX ADMIN — Medication 200 GRAM(S): at 16:46

## 2023-06-23 RX ADMIN — Medication 25 GRAM(S): at 19:34

## 2023-06-23 RX ADMIN — TAMSULOSIN HYDROCHLORIDE 0.4 MILLIGRAM(S): 0.4 CAPSULE ORAL at 22:59

## 2023-06-23 RX ADMIN — Medication 25 MILLIGRAM(S): at 22:59

## 2023-06-23 RX ADMIN — Medication 50 GRAM(S): at 16:46

## 2023-06-23 RX ADMIN — Medication 25 GRAM(S): at 21:54

## 2023-06-23 RX ADMIN — SODIUM CHLORIDE 1000 MILLILITER(S): 9 INJECTION INTRAMUSCULAR; INTRAVENOUS; SUBCUTANEOUS at 17:05

## 2023-06-23 RX ADMIN — ALBUTEROL 5 MILLIGRAM(S): 90 AEROSOL, METERED ORAL at 16:47

## 2023-06-23 RX ADMIN — Medication 25 GRAM(S): at 18:51

## 2023-06-23 RX ADMIN — Medication 100 MILLIGRAM(S): at 15:53

## 2023-06-23 NOTE — H&P ADULT - ASSESSMENT
89 yo M with hx of HTN, HLD, DM II BPH and CAD s/p CABG x 4, Hypothyroidism presents to ED for abnormal labs result (hyperkalemia and hyponatremia), admitted for UTI c/b electrolyte abnormalities.     #Acute cystitis   #CAUTI from [facility]  #Sepsis presents on admission (WBC and HR)  - Per family patient had hidalgo catheter at nursing home  - Since DC on May 26 patient's urine has been dark and milky   - UA grossly positive    Plan:   - start on ceftriaxone   - f/u UCx and BCx    #Hyperkalemia  - Potassium on outpatient labs TNP  - Repeat in ED 5.1   - s/p calcium gluconate, insulin and dextrose, sodium bicarb and lokalma in ED    Plan:   - f/u repeat BMP   - Monitor daily BMP until resolution     #Hyponatremia (improving)  - On outpatient labs sodium 126  - Repeat in    - s/p NS 500cc and LR 500cc x 1 in ED    Plan:   - check serum osmolarity, urine osmolarity, urine electrolytes   - monitor BMP.     #Hypomagnesemia  - replete prn    #HTN  #HLD   -c/w home med    #DM II   - monitor FS AC HS  - c/w Lantus 12 in the AM      #CAD s/p CABG   - c/w home med    #BPH   - c/w home med    #Hypothyroidism   - c/w synthroid      #Misc  - DVT Prophylaxis: Lovenox  - GI Prophylaxis: Protonix  - Diet: Low potassium diet  - Activity: As tolerated  - IV Fluids: NA  - Code Status: Full code    Dispo: Acute    89 yo M with hx of HTN, HLD, DM II BPH and CAD s/p CABG x 4, Hypothyroidism presents to ED for abnormal labs result (hyperkalemia and hyponatremia), admitted for UTI c/b electrolyte abnormalities.     #Acute cystitis   #CAUTI from [facility]  #Sepsis presents on admission (WBC and HR)  - Per family patient had hidalgo catheter at nursing home  - Since DC on May 26 patient's urine has been dark and milky   - UA grossly positive  - Previous culture from December growing E coli and Enterococcus pansensitive     Plan:   - start on ceftriaxone   - f/u UCx and BCx    #Hyperkalemia  - Potassium on outpatient labs TNP  - Repeat in ED 5.1   - s/p calcium gluconate, insulin and dextrose, sodium bicarb and lokalma in ED    Plan:   - f/u repeat BMP   - Monitor daily BMP until resolution     #Hyponatremia (improving)  - On outpatient labs sodium 126  - Repeat in    - s/p NS 500cc and LR 500cc x 1 in ED    Plan:   - check serum osmolarity, urine osmolarity, urine electrolytes   - monitor BMP.     #Hypomagnesemia  - replete prn    #HTN  #HLD   -c/w home med    #DM II   - monitor FS AC HS  - c/w Lantus 12 in the AM      #CAD s/p CABG   - c/w home med    #BPH   - c/w home med    #Hypothyroidism   - c/w synthroid      #Misc  - DVT Prophylaxis: Lovenox  - GI Prophylaxis: Protonix  - Diet: Low potassium diet  - Activity: As tolerated  - IV Fluids: NA  - Code Status: Full code    Dispo: Acute

## 2023-06-23 NOTE — ED ADULT TRIAGE NOTE - CHIEF COMPLAINT QUOTE
BIBA from home, sent to ED for abnormal labs, as per EMS, pt's A1C and potassium levels "were off". pt uses home O2 BIBA from home, sent to ED for abnormal labs, as per pt's daughter, pt's A1C, potassium, and RBC levels were elevated, pt's sodium level was low. pt uses home O2

## 2023-06-23 NOTE — ED ADULT NURSE NOTE - CHIEF COMPLAINT QUOTE
BIBA from home, sent to ED for abnormal labs, as per pt's daughter, pt's A1C, potassium, and RBC levels were elevated, pt's sodium level was low. pt uses home O2

## 2023-06-23 NOTE — ED PROVIDER NOTE - CADM POA PRESS ULCER STAGE
[employer]ler grid at home. MVI/AREDS discussed. Patient to call if any changes in vision or grid card. stage II

## 2023-06-23 NOTE — ED PROVIDER NOTE - DIFFERENTIAL DIAGNOSIS
The differential diagnosis for patients clinical presentation includes but is not limited to:  UTI, pyelonephritis, BRINA, sepsis, BPH, urine obstruction Differential Diagnosis

## 2023-06-23 NOTE — ED ADULT NURSE NOTE - OBJECTIVE STATEMENT
89 yr M BIBA from home with wide, pt's A1C, potassium, and RBC levels were elevated, pt's sodium level was low. pt uses home O2

## 2023-06-23 NOTE — ED PROVIDER NOTE - RE-EVALUATION DATE/TIME:
3/1/2019 11:26 AM 
 
Ms. Dena Price [address] [phone] To whom this may concern. Please have Ms. Shankar Veras take Sertraline (Zoloft) 25mg tablet by mouth once each evening. If there are any questions or concerns, feel free to contact the office.  
 
 
 
Sincerely, 
 
 
Elise Shetty MD 
 
 23-Jun-2023 18:18

## 2023-06-23 NOTE — ED PROVIDER NOTE - CCCP TRG CHIEF CMPLNT
Problem: ABCDS Injury Assessment  Goal: Absence of physical injury  Outcome: Completed     Problem: Skin/Tissue Integrity  Goal: Absence of new skin breakdown  Description: 1. Monitor for areas of redness and/or skin breakdown  2. Assess vascular access sites hourly  3. Every 4-6 hours minimum:  Change oxygen saturation probe site  4. Every 4-6 hours:  If on nasal continuous positive airway pressure, respiratory therapy assess nares and determine need for appliance change or resting period.   Outcome: Completed     Problem: Discharge Planning  Goal: Discharge to home or other facility with appropriate resources  Outcome: Completed     Problem: Safety - Adult  Goal: Free from fall injury  Outcome: Completed     Problem: Chronic Conditions and Co-morbidities  Goal: Patient's chronic conditions and co-morbidity symptoms are monitored and maintained or improved  Outcome: Completed     Problem: Nutrition Deficit:  Goal: Optimize nutritional status  Outcome: Completed abnormal lab result

## 2023-06-23 NOTE — H&P ADULT - HISTORY OF PRESENT ILLNESS
89 yo M with hx of HTN, HLD, DM II BPH and CAD s/p CABG x 4, Hypothyroidism presents to ED for abnormal labs result (hyperkalemia and hyponatremia). Patient is a poor historian, however patient's daughter was at bedside for collateral. Per the daughter, patient was recently admitted for fall and discharged to [facility]. When patient was discharged from [facility] he did not have a hidalgo in. Per the daughter at the nursing home patient had a hidalgo catheter in despite family requesting removal of it multiple times. Patient was discharged from [facility] on May 26th. Per the daughter patient was on antibiotics at [facility] for UTI, but she is unsure which antibiotic it was.     Since discharge patient's daughter says the patient has been extremely lethargic and weak. His urine has been milky, dark since May 26th when he was discharged from [facility]. Over the past week patient has become incontinent, no able to make it to the restroom, and complaining that he has pain with urination. On Wednesday a doctor came to the house to examine the patient, at that time he ordered lab tests, which the patient had preformed today. Labs were significant for Na 126 and a K TNP. They were instructed to come straight to the ED.     In the ED labs were significant for a Na 131, K+ 5.1, Hgb 9.9 (up from previous admission), and a WBC count of 18K.     Vital Signs Last 24 Hrs  T(F): 98.7 (23 Jun 2023 17:00), Max: 98.7 (23 Jun 2023 17:00)  HR: 95 (23 Jun 2023 19:30) (80 - 95)  BP: 157/78 (23 Jun 2023 19:30) (121/60 - 157/78)  BP(mean): --  RR: 20 (23 Jun 2023 19:30) (16 - 20)  SpO2: 99% (23 Jun 2023 19:30) (96% - 99%)  Patient On (Oxygen Delivery Method): nasal cannula  O2 Flow (L/min): 2    UA is positive for many bacteria >720 WBC, moderate blood and large leukocyte esterase.

## 2023-06-23 NOTE — ED PROVIDER NOTE - OBJECTIVE STATEMENT
89-year-old male past medical history of hypertension hyperlipidemia BPH quadruple bypass CAD coming in here for abnormal lab results.  Patient had elevated potassium and low sodium.  Patient also being treated for UTI outpatient.  Brought here for eval.  Patient here has no other complaints besides urinary frequency. 89-year-old male past medical history of hypertension hyperlipidemia BPH quadruple bypass CAD coming in here for abnormal lab results.  Patient had elevated potassium and low sodium.  Patient also being treated for UTI outpatient.  Brought here for eval.  Patient here has no other complaints besides urinary frequency. OF note pt also has recent cervical fracture and has a c-collar on currently.

## 2023-06-23 NOTE — ED PROVIDER NOTE - PHYSICAL EXAMINATION
CONSTITUTIONAL:  in no acute distress.   SKIN: warm, dry  HEAD: Normocephalic; atraumatic., c collar in place  EYES: PERRL, EOMI, normal sclera and conjunctiva   ENT: No nasal discharge; airway clear.  NECK: Supple; non tender.  CARD:  Regular rate and rhythm.   RESP: NO inc WOB   ABD: soft ntnd  EXT: Normal ROM.    LYMPH: No acute cervical adenopathy.  NEURO: Alert, oriented, grossly unremarkable  PSYCH: Cooperative, appropriate.

## 2023-06-23 NOTE — ED PROVIDER NOTE - ATTENDING CONTRIBUTION TO CARE
no
89-year-old male past medical history of hypertension, dyslipidemia, diabetes, BPH, CAD status post CABG, hypothyroidism presents to the emergency department with abnormal outpatient labs drawn this morning.  Patient found to have hyperkalemia with couple other lab abnormalities.  Additional history obtained from patient's daughter and prior charts.  Patient had a recent fall and C1 fracture and went for rehab at [facility].  Daughter reports that since discharge patient has been more lethargic and weak and had decreased p.o. intake.  Patient's reported dysuria and burning on urine with dark milky urine.    On exam, vital signs reviewed.  Patient is nontoxic-appearing.  Immediate EKG performed and patient has no hyperkalemic changes.  Patient appears dry on exam and has clear lungs.  Gross neurological exam is unremarkable.  Patient has no CVA tenderness.  Patient has a stage II right buttock decubitus that does not appear infected.  IV placed and labs sent, urine sent.  Patient found to have an elevated white blood cell count to 18,000 and and a grossly positive urine for UTI.  Patient also found to have hyponatremia.  Patient was treated for sepsis but was given careful IV fluid resuscitation given age and comorbidities.  Labs confirm hyperkalemia and patient was given medical treatment for his hyperkalemia.  Patient given hydration and is making urine.  Initial lactate was 2.3.  Labs were repeated and show improving potassium to 5.4.  ED work-up reviewed and will admit to telemetry.      ED work up reviewed and results and plan of care discussed with patient. Patient requires admission for further work up, monitoring, and management. Need for admission discussed with patient.

## 2023-06-23 NOTE — ED PROVIDER NOTE - CARE PLAN
Principal Discharge DX:	Hyperkalemia  Secondary Diagnosis:	Hyponatremia  Secondary Diagnosis:	Acute UTI   1

## 2023-06-23 NOTE — H&P ADULT - ATTENDING COMMENTS
Patient seen and examined at bedside independently of the residents. I read the resident's note and agree with the plan with the additions and corrections as noted below. My note supersedes the resident's note.     REVIEW OF SYSTEMS:  Negative except in HPI.    PMH: HTN, HLD, DM II BPH and CAD s/p CABG x 4, Hypothyroidism    FHx: Reviewed. No fhx of asthma/copd, No fhx of liver and pulmonary disease. No fhx of hematological disorder.     Physical Exam:  GEN: No acute distress. Awake, Alert and oriented x 3.   Head: Atraumatic, Normocephalic.   Eye: PEERLA. No sclera icterus. EOMI.   ENT: Normal oropharynx, no thyromegaly, no mass, no lymphadenopathy.   LUNGS: Clear to auscultation bilaterally. No wheeze/rales/crackles.   HEART: Normal. S1/S2 present. RRR. No murmur/gallops.   ABD: Soft, non-tender, non-distended. Bowel sounds present.   EXT: No pitting edema. No erythema. No tenderness.  Integumentary: No rash, No sore, No petechia.   NEURO: CN III-XII intact. Strength: 5/5 b/l ULE. Sensory intact b/l ULE.     Vital Signs Last 24 Hrs  T(C): 37.1 (2023 17:00), Max: 37.1 (2023 17:00)  T(F): 98.7 (2023 17:00), Max: 98.7 (2023 17:00)  HR: 95 (2023 19:30) (80 - 95)  BP: 157/78 (2023 19:30) (121/60 - 157/78)  BP(mean): --  RR: 20 (2023 19:30) (16 - 20)  SpO2: 99% (2023 19:30) (96% - 99%)    Parameters below as of 2023 19:30  Patient On (Oxygen Delivery Method): nasal cannula  O2 Flow (L/min): 2L      Please see the above notes for Labs and radiology.     Assessment and Plan:     89 yo M with hx of HTN, HLD, DM II BPH and CAD s/p CABG x 4, Hypothyroidism presents to ED for abnormal labs result (hyperkalemia and hyponatremia).     Acute cystitis   - sepsis presents on admission (WBC and HR)  - UA grossly positive.   - start on ceftriaxone   - f/u UCx and BCx    Hyperkalemia  - s/p calcium gluconate, insulin and dextrose, sodium bicarb, lokalma in ED.  - repeat BMP     Hyponatremia (improving)  - s/p NS 500cc and LR 500cc x 1 in ED.   - check serum osmolarity, urine osmolarity, urine electrolytes   - monitor BMP.     Hypomagnesemia - replete prn  HTN/HLD -c/w home med  DM II - monitor FS AC HS. Insulin regimen.   CAD s/p CABG - c/w home med  BPH - c/w home med  Hypothyroidism - c/w synthroid.    DVT ppx: Lovenox SC  GI ppx: PPI  Diet: Low K diet   Activity: as tolerated.     Date seen by the attendin2023  Total time spent: 75 minutes. Patient seen and examined at bedside independently of the residents. I read the resident's note and agree with the plan with the additions and corrections as noted below. My note supersedes the resident's note.     REVIEW OF SYSTEMS:  Negative except in HPI.    PMH: HTN, HLD, DM II BPH and CAD s/p CABG x 4, Hypothyroidism., C-spine fracture     FHx: Reviewed. No fhx of asthma/copd, No fhx of liver and pulmonary disease. No fhx of hematological disorder.     Physical Exam:  GEN: No acute distress. Awake, Alert and oriented x 3.   Head: Atraumatic, Normocephalic. C-collar in place.  Eye: PEERLA. No sclera icterus. EOMI.   ENT: Normal oropharynx, no thyromegaly, no mass, no lymphadenopathy.   LUNGS: Clear to auscultation bilaterally. No wheeze/rales/crackles.   HEART: Normal. S1/S2 present. RRR. No murmur/gallops.   ABD: Soft, non-tender, non-distended. Bowel sounds present.   EXT: No pitting edema. No erythema. No tenderness.  Integumentary: No rash, No sore, No petechia.   NEURO: CN III-XII intact. Strength: 5/5 b/l ULE. Sensory intact b/l ULE.     Vital Signs Last 24 Hrs  T(C): 37.1 (2023 17:00), Max: 37.1 (2023 17:00)  T(F): 98.7 (2023 17:00), Max: 98.7 (2023 17:00)  HR: 95 (2023 19:30) (80 - 95)  BP: 157/78 (2023 19:30) (121/60 - 157/78)  BP(mean): --  RR: 20 (2023 19:30) (16 - 20)  SpO2: 99% (2023 19:30) (96% - 99%)    Parameters below as of 2023 19:30  Patient On (Oxygen Delivery Method): nasal cannula  O2 Flow (L/min): 2L      Please see the above notes for Labs and radiology.     Assessment and Plan:     89 yo M with hx of HTN, HLD, DM II BPH and CAD s/p CABG x 4, Hypothyroidism, Recent C-spine fracture presents to ED for abnormal labs result (hyperkalemia and hyponatremia).     Acute cystitis   - sepsis presents on admission (WBC and HR)  - UA grossly positive.   - start on ceftriaxone   - f/u UCx and BCx    Hyperkalemia  - s/p calcium gluconate, insulin and dextrose, sodium bicarb, lokalma in ED.  - repeat BMP     Hyponatremia (improving)  - s/p NS 500cc and LR 500cc x 1 in ED.   - check serum osmolarity, urine osmolarity, urine electrolytes   - monitor BMP.     Hypomagnesemia - replete prn    Recent C-spine fracture - on C-collar.     HTN/HLD -c/w home med  DM II - monitor FS AC HS. Insulin regimen.   CAD s/p CABG - c/w home med  BPH - c/w home med  Hypothyroidism - c/w synthroid.    DVT ppx: Lovenox SC  GI ppx: PPI  Diet: Low K diet   Activity: as tolerated.     Date seen by the attendin2023  Total time spent: 75 minutes.

## 2023-06-23 NOTE — ED PROVIDER NOTE - CLINICAL SUMMARY MEDICAL DECISION MAKING FREE TEXT BOX
89-year-old male past medical history of hypertension, dyslipidemia, diabetes, BPH, CAD status post CABG, hypothyroidism presents to the emergency department with abnormal outpatient labs drawn this morning.  Patient found to have hyperkalemia with couple other lab abnormalities.  Additional history obtained from patient's daughter and prior charts.  Patient had a recent fall and C1 fracture and went for rehab at [facility].  Daughter reports that since discharge patient has been more lethargic and weak and had decreased p.o. intake.  Patient's reported dysuria and burning on urine with dark milky urine.    On exam, vital signs reviewed.  Patient is nontoxic-appearing.  Immediate EKG performed and patient has no hyperkalemic changes.  Patient appears dry on exam and has clear lungs.  Gross neurological exam is unremarkable.  Patient has no CVA tenderness.  Patient has a stage II right buttock decubitus that does not appear infected.  IV placed and labs sent, urine sent.  Patient found to have an elevated white blood cell count to 18,000 and and a grossly positive urine for UTI.  Patient also found to have hyponatremia.  Patient was treated for sepsis but was given careful IV fluid resuscitation given age and comorbidities.  Labs confirm hyperkalemia and patient was given medical treatment for his hyperkalemia.  Patient given hydration and is making urine.  Initial lactate was 2.3.  Labs were repeated and show improving potassium to 5.4.  ED work-up reviewed and will admit to telemetry.      ED work up reviewed and results and plan of care discussed with patient. Patient requires admission for further work up, monitoring, and management. Need for admission discussed with patient.

## 2023-06-23 NOTE — ED ADULT NURSE NOTE - NSFALLUNIVINTERV_ED_ALL_ED
Bed/Stretcher in lowest position, wheels locked, appropriate side rails in place/Call bell, personal items and telephone in reach/Instruct patient to call for assistance before getting out of bed/chair/stretcher/Non-slip footwear applied when patient is off stretcher/[place] to call system/Physically safe environment - no spills, clutter or unnecessary equipment/Purposeful proactive rounding/Room/bathroom lighting operational, light cord in reach

## 2023-06-23 NOTE — H&P ADULT - NSHPLABSRESULTS_GEN_ALL_CORE
Complete Blood Count + Automated Diff (06.23.23 @ 15:33)    WBC Count: 18.75 K/uL    RBC Count: 3.72 M/uL    Hemoglobin: 9.9 g/dL    Hematocrit: 31.1 %    Mean Cell Volume: 83.6 fL    Mean Cell Hemoglobin: 26.6 pg    Mean Cell Hemoglobin Conc: 31.8 g/dL    Red Cell Distrib Width: 15.6 %    Platelet Count - Automated: 363 K/uL    MPV: 9.2 fL    Auto Neutrophil #: 13.02 K/uL    Auto Lymphocyte #: 3.88 K/uL    Auto Monocyte #: 1.55 K/uL    Auto Eosinophil #: 0.03 K/uL    Auto Basophil #: 0.05 K/uL    Auto Neutrophil %: 69.3: Differential percentages must be correlated with absolute numbers for  clinical significance. %    Auto Lymphocyte %: 20.7 %    Auto Monocyte %: 8.3 %    Auto Eosinophil %: 0.2 %    Auto Basophil %: 0.3 %    Auto Immature Granulocyte %: 1.2: (Includes meta, myelo and promyelocytes). Mild elevations in immature  granulocytes may be seen with many inflammatory processes and pregnancy;  clinical correlation suggested. %    Nucleated RBC: 0 /100 WBCs    Comprehensive Metabolic Panel (06.23.23 @ 15:33)    Sodium: 126 mmol/L    Potassium: TNP: specimen grossly hemolyzed mmol/L    Chloride: 97 mmol/L    Carbon Dioxide: 20 mmol/L    Anion Gap: 9 mmol/L    Blood Urea Nitrogen: 28 mg/dL    Creatinine: 1.3 mg/dL    Glucose: 280 mg/dL    Calcium: 8.9 mg/dL    Protein Total: 6.2 g/dL    Albumin: 3.0 g/dL    Bilirubin Total: 0.3 mg/dL    Alkaline Phosphatase: 121: Hemolyzed. Interpret with caution U/L    Aspartate Aminotransferase (AST/SGOT): 36: Hemolyzed. Interpret with caution U/L    Alanine Aminotransferase (ALT/SGPT): 10: Hemolyzed. Interpret with caution U/L    eGFR: 49: The estimated glomerular filtration rate (eGFR) is calculated using the  2021 CKD-EPI creatinine equation, which does not have a coefficient for  race and is validated in individuals 18 years of age and older (N Engl J  Med 2021; 385:8867-2086). Creatinine-based eGFR may be inaccurate in  various situations including but not limited to extremes of muscle mass,  altered dietary protein intake, or medications that affect renal tubular  creatinine secretion. mL/min/1.73m2    Urinalysis (06.23.23 @ 15:45)    Glucose Qualitative, Urine: Trace    Blood, Urine: Moderate    pH Urine: 6.0    Color: Orange    Urine Appearance: Turbid    Bilirubin: Negative    Ketone - Urine: Negative    Specific Gravity: 1.013    Protein, Urine: 100 mg/dL    Urobilinogen: <2 mg/dL    Nitrite: Negative    Leukocyte Esterase Concentration: Large

## 2023-06-24 DIAGNOSIS — E03.9 HYPOTHYROIDISM, UNSPECIFIED: ICD-10-CM

## 2023-06-24 DIAGNOSIS — N39.0 URINARY TRACT INFECTION, SITE NOT SPECIFIED: ICD-10-CM

## 2023-06-24 DIAGNOSIS — E87.5 HYPERKALEMIA: ICD-10-CM

## 2023-06-24 DIAGNOSIS — I25.10 ATHEROSCLEROTIC HEART DISEASE OF NATIVE CORONARY ARTERY WITHOUT ANGINA PECTORIS: ICD-10-CM

## 2023-06-24 DIAGNOSIS — I10 ESSENTIAL (PRIMARY) HYPERTENSION: ICD-10-CM

## 2023-06-24 DIAGNOSIS — Z79.899 OTHER LONG TERM (CURRENT) DRUG THERAPY: ICD-10-CM

## 2023-06-24 DIAGNOSIS — Z87.438 PERSONAL HISTORY OF OTHER DISEASES OF MALE GENITAL ORGANS: ICD-10-CM

## 2023-06-24 DIAGNOSIS — E11.9 TYPE 2 DIABETES MELLITUS WITHOUT COMPLICATIONS: ICD-10-CM

## 2023-06-24 LAB
A1C WITH ESTIMATED AVERAGE GLUCOSE RESULT: 9 % — HIGH (ref 4–5.6)
ALBUMIN SERPL ELPH-MCNC: 2.4 G/DL — LOW (ref 3.5–5.2)
ALP SERPL-CCNC: 105 U/L — SIGNIFICANT CHANGE UP (ref 30–115)
ALT FLD-CCNC: 6 U/L — SIGNIFICANT CHANGE UP (ref 0–41)
ANION GAP SERPL CALC-SCNC: 10 MMOL/L — SIGNIFICANT CHANGE UP (ref 7–14)
ANION GAP SERPL CALC-SCNC: 11 MMOL/L — SIGNIFICANT CHANGE UP (ref 7–14)
ANION GAP SERPL CALC-SCNC: 12 MMOL/L — SIGNIFICANT CHANGE UP (ref 7–14)
ANION GAP SERPL CALC-SCNC: 13 MMOL/L — SIGNIFICANT CHANGE UP (ref 7–14)
AST SERPL-CCNC: 22 U/L — SIGNIFICANT CHANGE UP (ref 0–41)
BASOPHILS # BLD AUTO: 0.03 K/UL — SIGNIFICANT CHANGE UP (ref 0–0.2)
BASOPHILS NFR BLD AUTO: 0.2 % — SIGNIFICANT CHANGE UP (ref 0–1)
BILIRUB SERPL-MCNC: <0.2 MG/DL — SIGNIFICANT CHANGE UP (ref 0.2–1.2)
BUN SERPL-MCNC: 21 MG/DL — HIGH (ref 10–20)
BUN SERPL-MCNC: 22 MG/DL — HIGH (ref 10–20)
BUN SERPL-MCNC: 22 MG/DL — HIGH (ref 10–20)
BUN SERPL-MCNC: 23 MG/DL — HIGH (ref 10–20)
CALCIUM SERPL-MCNC: 8.5 MG/DL — SIGNIFICANT CHANGE UP (ref 8.4–10.5)
CALCIUM SERPL-MCNC: 8.8 MG/DL — SIGNIFICANT CHANGE UP (ref 8.4–10.5)
CALCIUM SERPL-MCNC: 8.9 MG/DL — SIGNIFICANT CHANGE UP (ref 8.4–10.4)
CALCIUM SERPL-MCNC: 8.9 MG/DL — SIGNIFICANT CHANGE UP (ref 8.4–10.4)
CHLORIDE SERPL-SCNC: 98 MMOL/L — SIGNIFICANT CHANGE UP (ref 98–110)
CHLORIDE SERPL-SCNC: 99 MMOL/L — SIGNIFICANT CHANGE UP (ref 98–110)
CO2 SERPL-SCNC: 20 MMOL/L — SIGNIFICANT CHANGE UP (ref 17–32)
CO2 SERPL-SCNC: 21 MMOL/L — SIGNIFICANT CHANGE UP (ref 17–32)
CREAT SERPL-MCNC: 1 MG/DL — SIGNIFICANT CHANGE UP (ref 0.7–1.5)
CREAT SERPL-MCNC: 1.1 MG/DL — SIGNIFICANT CHANGE UP (ref 0.7–1.5)
EGFR: 60 ML/MIN/1.73M2 — SIGNIFICANT CHANGE UP
EGFR: 68 ML/MIN/1.73M2 — SIGNIFICANT CHANGE UP
EOSINOPHIL # BLD AUTO: 0.03 K/UL — SIGNIFICANT CHANGE UP (ref 0–0.7)
EOSINOPHIL NFR BLD AUTO: 0.2 % — SIGNIFICANT CHANGE UP (ref 0–8)
ESTIMATED AVERAGE GLUCOSE: 212 MG/DL — HIGH (ref 68–114)
GLUCOSE BLDC GLUCOMTR-MCNC: 100 MG/DL — HIGH (ref 70–99)
GLUCOSE BLDC GLUCOMTR-MCNC: 118 MG/DL — HIGH (ref 70–99)
GLUCOSE BLDC GLUCOMTR-MCNC: 157 MG/DL — HIGH (ref 70–99)
GLUCOSE BLDC GLUCOMTR-MCNC: 220 MG/DL — HIGH (ref 70–99)
GLUCOSE BLDC GLUCOMTR-MCNC: 82 MG/DL — SIGNIFICANT CHANGE UP (ref 70–99)
GLUCOSE SERPL-MCNC: 100 MG/DL — HIGH (ref 70–99)
GLUCOSE SERPL-MCNC: 145 MG/DL — HIGH (ref 70–99)
GLUCOSE SERPL-MCNC: 227 MG/DL — HIGH (ref 70–99)
GLUCOSE SERPL-MCNC: 94 MG/DL — SIGNIFICANT CHANGE UP (ref 70–99)
GRAM STN FLD: SIGNIFICANT CHANGE UP
HCT VFR BLD CALC: 25.6 % — LOW (ref 42–52)
HGB BLD-MCNC: 8.2 G/DL — LOW (ref 14–18)
IMM GRANULOCYTES NFR BLD AUTO: 1.1 % — HIGH (ref 0.1–0.3)
LYMPHOCYTES # BLD AUTO: 21.1 % — SIGNIFICANT CHANGE UP (ref 20.5–51.1)
LYMPHOCYTES # BLD AUTO: 3.58 K/UL — HIGH (ref 1.2–3.4)
MCHC RBC-ENTMCNC: 26.3 PG — LOW (ref 27–31)
MCHC RBC-ENTMCNC: 32 G/DL — SIGNIFICANT CHANGE UP (ref 32–37)
MCV RBC AUTO: 82.1 FL — SIGNIFICANT CHANGE UP (ref 80–94)
METHOD TYPE: SIGNIFICANT CHANGE UP
MONOCYTES # BLD AUTO: 1.7 K/UL — HIGH (ref 0.1–0.6)
MONOCYTES NFR BLD AUTO: 10 % — HIGH (ref 1.7–9.3)
NEUTROPHILS # BLD AUTO: 11.47 K/UL — HIGH (ref 1.4–6.5)
NEUTROPHILS NFR BLD AUTO: 67.4 % — SIGNIFICANT CHANGE UP (ref 42.2–75.2)
NRBC # BLD: 0 /100 WBCS — SIGNIFICANT CHANGE UP (ref 0–0)
P AERUGINOSA DNA BLD POS NAA+NON-PROBE: SIGNIFICANT CHANGE UP
PLATELET # BLD AUTO: 351 K/UL — SIGNIFICANT CHANGE UP (ref 130–400)
PMV BLD: 9.1 FL — SIGNIFICANT CHANGE UP (ref 7.4–10.4)
POTASSIUM SERPL-MCNC: 5.1 MMOL/L — HIGH (ref 3.5–5)
POTASSIUM SERPL-MCNC: 5.5 MMOL/L — HIGH (ref 3.5–5)
POTASSIUM SERPL-MCNC: 5.6 MMOL/L — HIGH (ref 3.5–5)
POTASSIUM SERPL-MCNC: 5.6 MMOL/L — HIGH (ref 3.5–5)
POTASSIUM SERPL-SCNC: 5.1 MMOL/L — HIGH (ref 3.5–5)
POTASSIUM SERPL-SCNC: 5.5 MMOL/L — HIGH (ref 3.5–5)
POTASSIUM SERPL-SCNC: 5.6 MMOL/L — HIGH (ref 3.5–5)
POTASSIUM SERPL-SCNC: 5.6 MMOL/L — HIGH (ref 3.5–5)
PROT SERPL-MCNC: 5 G/DL — LOW (ref 6–8)
RBC # BLD: 3.12 M/UL — LOW (ref 4.7–6.1)
RBC # FLD: 15.6 % — HIGH (ref 11.5–14.5)
SODIUM SERPL-SCNC: 129 MMOL/L — LOW (ref 135–146)
SODIUM SERPL-SCNC: 130 MMOL/L — LOW (ref 135–146)
SODIUM SERPL-SCNC: 130 MMOL/L — LOW (ref 135–146)
SODIUM SERPL-SCNC: 131 MMOL/L — LOW (ref 135–146)
SPECIMEN SOURCE: SIGNIFICANT CHANGE UP
WBC # BLD: 17 K/UL — HIGH (ref 4.8–10.8)
WBC # FLD AUTO: 17 K/UL — HIGH (ref 4.8–10.8)

## 2023-06-24 PROCEDURE — 93010 ELECTROCARDIOGRAM REPORT: CPT | Mod: 77

## 2023-06-24 PROCEDURE — 93010 ELECTROCARDIOGRAM REPORT: CPT

## 2023-06-24 PROCEDURE — 99233 SBSQ HOSP IP/OBS HIGH 50: CPT

## 2023-06-24 RX ORDER — SODIUM ZIRCONIUM CYCLOSILICATE 10 G/10G
10 POWDER, FOR SUSPENSION ORAL
Refills: 0 | Status: COMPLETED | OUTPATIENT
Start: 2023-06-24 | End: 2023-06-25

## 2023-06-24 RX ORDER — CEFTRIAXONE 500 MG/1
1000 INJECTION, POWDER, FOR SOLUTION INTRAMUSCULAR; INTRAVENOUS EVERY 24 HOURS
Refills: 0 | Status: DISCONTINUED | OUTPATIENT
Start: 2023-06-25 | End: 2023-06-25

## 2023-06-24 RX ORDER — HEPARIN SODIUM 5000 [USP'U]/ML
5000 INJECTION INTRAVENOUS; SUBCUTANEOUS EVERY 8 HOURS
Refills: 0 | Status: DISCONTINUED | OUTPATIENT
Start: 2023-06-24 | End: 2023-07-12

## 2023-06-24 RX ORDER — CEFTRIAXONE 500 MG/1
INJECTION, POWDER, FOR SOLUTION INTRAMUSCULAR; INTRAVENOUS
Refills: 0 | Status: DISCONTINUED | OUTPATIENT
Start: 2023-06-24 | End: 2023-06-25

## 2023-06-24 RX ORDER — CEFTRIAXONE 500 MG/1
1000 INJECTION, POWDER, FOR SOLUTION INTRAMUSCULAR; INTRAVENOUS ONCE
Refills: 0 | Status: COMPLETED | OUTPATIENT
Start: 2023-06-24 | End: 2023-06-24

## 2023-06-24 RX ADMIN — CLOPIDOGREL BISULFATE 75 MILLIGRAM(S): 75 TABLET, FILM COATED ORAL at 12:09

## 2023-06-24 RX ADMIN — CEFTRIAXONE 100 MILLIGRAM(S): 500 INJECTION, POWDER, FOR SOLUTION INTRAMUSCULAR; INTRAVENOUS at 05:49

## 2023-06-24 RX ADMIN — TAMSULOSIN HYDROCHLORIDE 0.4 MILLIGRAM(S): 0.4 CAPSULE ORAL at 22:25

## 2023-06-24 RX ADMIN — Medication 25 MILLIGRAM(S): at 12:09

## 2023-06-24 RX ADMIN — DULOXETINE HYDROCHLORIDE 60 MILLIGRAM(S): 30 CAPSULE, DELAYED RELEASE ORAL at 12:09

## 2023-06-24 RX ADMIN — INSULIN GLARGINE 12 UNIT(S): 100 INJECTION, SOLUTION SUBCUTANEOUS at 09:33

## 2023-06-24 RX ADMIN — SIMVASTATIN 20 MILLIGRAM(S): 20 TABLET, FILM COATED ORAL at 22:25

## 2023-06-24 RX ADMIN — HEPARIN SODIUM 5000 UNIT(S): 5000 INJECTION INTRAVENOUS; SUBCUTANEOUS at 22:25

## 2023-06-24 RX ADMIN — PANTOPRAZOLE SODIUM 40 MILLIGRAM(S): 20 TABLET, DELAYED RELEASE ORAL at 07:04

## 2023-06-24 RX ADMIN — SODIUM ZIRCONIUM CYCLOSILICATE 10 GRAM(S): 10 POWDER, FOR SUSPENSION ORAL at 17:34

## 2023-06-24 RX ADMIN — FINASTERIDE 5 MILLIGRAM(S): 5 TABLET, FILM COATED ORAL at 12:09

## 2023-06-24 RX ADMIN — Medication 1: at 17:30

## 2023-06-24 RX ADMIN — Medication 25 MILLIGRAM(S): at 22:25

## 2023-06-24 RX ADMIN — Medication 50 MICROGRAM(S): at 05:49

## 2023-06-24 RX ADMIN — HEPARIN SODIUM 5000 UNIT(S): 5000 INJECTION INTRAVENOUS; SUBCUTANEOUS at 17:31

## 2023-06-24 RX ADMIN — Medication 2: at 12:16

## 2023-06-24 NOTE — PROGRESS NOTE ADULT - SUBJECTIVE AND OBJECTIVE BOX
INTERVAL HPI/OVERNIGHT EVENTS:    SUBJECTIVE: Patient seen and examined at bedside.     cc: hyperkalemia  no cp, sob, abd pain, fever; confused    OBJECTIVE:    VITAL SIGNS:  Vital Signs Last 24 Hrs  T(C): 36.3 (24 Jun 2023 07:32), Max: 37.1 (23 Jun 2023 17:00)  T(F): 97.4 (24 Jun 2023 07:32), Max: 98.7 (23 Jun 2023 17:00)  HR: 67 (24 Jun 2023 07:32) (67 - 95)  BP: 116/58 (24 Jun 2023 07:32) (116/58 - 157/78)  BP(mean): --  RR: 19 (24 Jun 2023 07:32) (16 - 20)  SpO2: 99% (24 Jun 2023 07:32) (96% - 99%)    Parameters below as of 24 Jun 2023 07:32  Patient On (Oxygen Delivery Method): nasal cannula  O2 Flow (L/min): 2        PHYSICAL EXAM:    General: NAD  HEENT: NC/AT; PERRL, clear conjunctiva  Neck: supple  Respiratory: CTA b/l  Cardiovascular: +S1/S2; RRR  Abdomen: soft, NT/ND; +BS x4  Extremities: WWP, 2+ peripheral pulses b/l; no LE edema  Skin: normal color and turgor; no rash  Neurological:    MEDICATIONS:  MEDICATIONS  (STANDING):  cefTRIAXone   IVPB      clopidogrel Tablet 75 milliGRAM(s) Oral daily  dextrose 5%. 1000 milliLiter(s) (100 mL/Hr) IV Continuous <Continuous>  dextrose 5%. 1000 milliLiter(s) (50 mL/Hr) IV Continuous <Continuous>  dextrose 50% Injectable 25 Gram(s) IV Push once  dextrose 50% Injectable 25 Gram(s) IV Push once  dextrose 50% Injectable 12.5 Gram(s) IV Push once  DULoxetine 60 milliGRAM(s) Oral daily  finasteride 5 milliGRAM(s) Oral daily  glucagon  Injectable 1 milliGRAM(s) IntraMuscular once  heparin   Injectable 5000 Unit(s) SubCutaneous every 8 hours  insulin glargine Injectable (LANTUS) 12 Unit(s) SubCutaneous every morning  insulin lispro (ADMELOG) corrective regimen sliding scale   SubCutaneous at bedtime  insulin lispro (ADMELOG) corrective regimen sliding scale   SubCutaneous three times a day before meals  lactobacillus acidophilus 1 Tablet(s) Oral with breakfast  levothyroxine 50 MICROGram(s) Oral daily  metoprolol tartrate 25 milliGRAM(s) Oral two times a day  pantoprazole    Tablet 40 milliGRAM(s) Oral before breakfast  simvastatin 20 milliGRAM(s) Oral at bedtime  tamsulosin 0.4 milliGRAM(s) Oral at bedtime    MEDICATIONS  (PRN):  acetaminophen     Tablet .. 650 milliGRAM(s) Oral every 6 hours PRN Temp greater or equal to 38C (100.4F), Mild Pain (1 - 3)  aluminum hydroxide/magnesium hydroxide/simethicone Suspension 30 milliLiter(s) Oral every 4 hours PRN Dyspepsia  dextrose Oral Gel 15 Gram(s) Oral once PRN Blood Glucose LESS THAN 70 milliGRAM(s)/deciliter  melatonin 3 milliGRAM(s) Oral at bedtime PRN Insomnia  ondansetron Injectable 4 milliGRAM(s) IV Push every 8 hours PRN Nausea and/or Vomiting      ALLERGIES:  Allergies    No Known Allergies    Intolerances        LABS:                        8.2    17.00 )-----------( 351      ( 24 Jun 2023 05:59 )             25.6     Hemoglobin: 8.2 g/dL (06-24 @ 05:59)  Hemoglobin: 9.9 g/dL (06-23 @ 15:33)    CBC Full  -  ( 24 Jun 2023 05:59 )  WBC Count : 17.00 K/uL  RBC Count : 3.12 M/uL  Hemoglobin : 8.2 g/dL  Hematocrit : 25.6 %  Platelet Count - Automated : 351 K/uL  Mean Cell Volume : 82.1 fL  Mean Cell Hemoglobin : 26.3 pg  Mean Cell Hemoglobin Concentration : 32.0 g/dL  Auto Neutrophil # : 11.47 K/uL  Auto Lymphocyte # : 3.58 K/uL  Auto Monocyte # : 1.70 K/uL  Auto Eosinophil # : 0.03 K/uL  Auto Basophil # : 0.03 K/uL  Auto Neutrophil % : 67.4 %  Auto Lymphocyte % : 21.1 %  Auto Monocyte % : 10.0 %  Auto Eosinophil % : 0.2 %  Auto Basophil % : 0.2 %    06-24    131<L>  |  98  |  22<H>  ----------------------------<  100<H>  5.5<H>   |  20  |  1.1    Ca    8.8      24 Jun 2023 05:59  Mg     1.4     06-23    TPro  5.0<L>  /  Alb  2.4<L>  /  TBili  <0.2  /  DBili  x   /  AST  22  /  ALT  6   /  AlkPhos  105  06-24    Creatinine Trend: 1.1<--, 1.0<--, 1.1<--, 1.1<--, 1.3<--  LIVER FUNCTIONS - ( 24 Jun 2023 05:59 )  Alb: 2.4 g/dL / Pro: 5.0 g/dL / ALK PHOS: 105 U/L / ALT: 6 U/L / AST: 22 U/L / GGT: x               hs Troponin:        18:18 - VBG - pH: 7.30  | pCO2: 46    | pO2: 22    | Lactate: 2.30       Urinalysis Basic - ( 24 Jun 2023 05:59 )    Color: x / Appearance: x / SG: x / pH: x  Gluc: 100 mg/dL / Ketone: x  / Bili: x / Urobili: x   Blood: x / Protein: x / Nitrite: x   Leuk Esterase: x / RBC: x / WBC x   Sq Epi: x / Non Sq Epi: x / Bacteria: x      CSF:                      EKG:   MICROBIOLOGY:    IMAGING:      Labs, imaging, EKG personally reviewed    RADIOLOGY & ADDITIONAL TESTS: Reviewed.

## 2023-06-24 NOTE — PROGRESS NOTE ADULT - PROBLEM SELECTOR PLAN 1
in setting of recent hidalgo  ua positive; f/u ucx  ceftriaxone  tov in setting of recent hidalgo in snf; now out  ua positive; f/u ucx  ceftriaxone

## 2023-06-24 NOTE — PROGRESS NOTE ADULT - ASSESSMENT
89M PMHx HTN, DM2, BPH, CAD s/p CABG, hypothyroidism here with hyperkalemia, hyponatremia noted as outpt. UTI.

## 2023-06-25 DIAGNOSIS — R78.81 BACTEREMIA: ICD-10-CM

## 2023-06-25 LAB
ALBUMIN SERPL ELPH-MCNC: 2.5 G/DL — LOW (ref 3.5–5.2)
ALP SERPL-CCNC: 101 U/L — SIGNIFICANT CHANGE UP (ref 30–115)
ALT FLD-CCNC: 6 U/L — SIGNIFICANT CHANGE UP (ref 0–41)
ANION GAP SERPL CALC-SCNC: 10 MMOL/L — SIGNIFICANT CHANGE UP (ref 7–14)
AST SERPL-CCNC: 11 U/L — SIGNIFICANT CHANGE UP (ref 0–41)
BASOPHILS # BLD AUTO: 0.04 K/UL — SIGNIFICANT CHANGE UP (ref 0–0.2)
BASOPHILS NFR BLD AUTO: 0.3 % — SIGNIFICANT CHANGE UP (ref 0–1)
BILIRUB SERPL-MCNC: 0.2 MG/DL — SIGNIFICANT CHANGE UP (ref 0.2–1.2)
BUN SERPL-MCNC: 18 MG/DL — SIGNIFICANT CHANGE UP (ref 10–20)
CALCIUM SERPL-MCNC: 8.6 MG/DL — SIGNIFICANT CHANGE UP (ref 8.4–10.5)
CHLORIDE SERPL-SCNC: 98 MMOL/L — SIGNIFICANT CHANGE UP (ref 98–110)
CO2 SERPL-SCNC: 21 MMOL/L — SIGNIFICANT CHANGE UP (ref 17–32)
CREAT SERPL-MCNC: 1 MG/DL — SIGNIFICANT CHANGE UP (ref 0.7–1.5)
CULTURE RESULTS: SIGNIFICANT CHANGE UP
EGFR: 68 ML/MIN/1.73M2 — SIGNIFICANT CHANGE UP
EOSINOPHIL # BLD AUTO: 0.17 K/UL — SIGNIFICANT CHANGE UP (ref 0–0.7)
EOSINOPHIL NFR BLD AUTO: 1.1 % — SIGNIFICANT CHANGE UP (ref 0–8)
GLUCOSE BLDC GLUCOMTR-MCNC: 115 MG/DL — HIGH (ref 70–99)
GLUCOSE BLDC GLUCOMTR-MCNC: 123 MG/DL — HIGH (ref 70–99)
GLUCOSE BLDC GLUCOMTR-MCNC: 130 MG/DL — HIGH (ref 70–99)
GLUCOSE BLDC GLUCOMTR-MCNC: 261 MG/DL — HIGH (ref 70–99)
GLUCOSE SERPL-MCNC: 87 MG/DL — SIGNIFICANT CHANGE UP (ref 70–99)
GRAM STN FLD: SIGNIFICANT CHANGE UP
HCT VFR BLD CALC: 27.2 % — LOW (ref 42–52)
HGB BLD-MCNC: 8.6 G/DL — LOW (ref 14–18)
IMM GRANULOCYTES NFR BLD AUTO: 1.4 % — HIGH (ref 0.1–0.3)
LACTATE SERPL-SCNC: 1.1 MMOL/L — SIGNIFICANT CHANGE UP (ref 0.7–2)
LYMPHOCYTES # BLD AUTO: 24.5 % — SIGNIFICANT CHANGE UP (ref 20.5–51.1)
LYMPHOCYTES # BLD AUTO: 3.79 K/UL — HIGH (ref 1.2–3.4)
MAGNESIUM SERPL-MCNC: 2.1 MG/DL — SIGNIFICANT CHANGE UP (ref 1.8–2.4)
MCHC RBC-ENTMCNC: 26 PG — LOW (ref 27–31)
MCHC RBC-ENTMCNC: 31.6 G/DL — LOW (ref 32–37)
MCV RBC AUTO: 82.2 FL — SIGNIFICANT CHANGE UP (ref 80–94)
MONOCYTES # BLD AUTO: 1.68 K/UL — HIGH (ref 0.1–0.6)
MONOCYTES NFR BLD AUTO: 10.8 % — HIGH (ref 1.7–9.3)
NEUTROPHILS # BLD AUTO: 9.61 K/UL — HIGH (ref 1.4–6.5)
NEUTROPHILS NFR BLD AUTO: 61.9 % — SIGNIFICANT CHANGE UP (ref 42.2–75.2)
NRBC # BLD: 0 /100 WBCS — SIGNIFICANT CHANGE UP (ref 0–0)
PLATELET # BLD AUTO: 349 K/UL — SIGNIFICANT CHANGE UP (ref 130–400)
PMV BLD: 9.4 FL — SIGNIFICANT CHANGE UP (ref 7.4–10.4)
POTASSIUM SERPL-MCNC: 4.5 MMOL/L — SIGNIFICANT CHANGE UP (ref 3.5–5)
POTASSIUM SERPL-SCNC: 4.5 MMOL/L — SIGNIFICANT CHANGE UP (ref 3.5–5)
PROT SERPL-MCNC: 4.9 G/DL — LOW (ref 6–8)
RBC # BLD: 3.31 M/UL — LOW (ref 4.7–6.1)
RBC # FLD: 15.6 % — HIGH (ref 11.5–14.5)
SODIUM SERPL-SCNC: 129 MMOL/L — LOW (ref 135–146)
SPECIMEN SOURCE: SIGNIFICANT CHANGE UP
WBC # BLD: 15.5 K/UL — HIGH (ref 4.8–10.8)
WBC # FLD AUTO: 15.5 K/UL — HIGH (ref 4.8–10.8)

## 2023-06-25 PROCEDURE — 99233 SBSQ HOSP IP/OBS HIGH 50: CPT

## 2023-06-25 RX ORDER — CEFEPIME 1 G/1
INJECTION, POWDER, FOR SOLUTION INTRAMUSCULAR; INTRAVENOUS
Refills: 0 | Status: DISCONTINUED | OUTPATIENT
Start: 2023-06-25 | End: 2023-06-26

## 2023-06-25 RX ORDER — CEFEPIME 1 G/1
2000 INJECTION, POWDER, FOR SOLUTION INTRAMUSCULAR; INTRAVENOUS EVERY 8 HOURS
Refills: 0 | Status: DISCONTINUED | OUTPATIENT
Start: 2023-06-25 | End: 2023-06-26

## 2023-06-25 RX ORDER — CEFEPIME 1 G/1
2000 INJECTION, POWDER, FOR SOLUTION INTRAMUSCULAR; INTRAVENOUS ONCE
Refills: 0 | Status: COMPLETED | OUTPATIENT
Start: 2023-06-25 | End: 2023-06-25

## 2023-06-25 RX ADMIN — HEPARIN SODIUM 5000 UNIT(S): 5000 INJECTION INTRAVENOUS; SUBCUTANEOUS at 21:38

## 2023-06-25 RX ADMIN — FINASTERIDE 5 MILLIGRAM(S): 5 TABLET, FILM COATED ORAL at 11:45

## 2023-06-25 RX ADMIN — CEFTRIAXONE 100 MILLIGRAM(S): 500 INJECTION, POWDER, FOR SOLUTION INTRAMUSCULAR; INTRAVENOUS at 03:29

## 2023-06-25 RX ADMIN — TAMSULOSIN HYDROCHLORIDE 0.4 MILLIGRAM(S): 0.4 CAPSULE ORAL at 21:40

## 2023-06-25 RX ADMIN — DULOXETINE HYDROCHLORIDE 60 MILLIGRAM(S): 30 CAPSULE, DELAYED RELEASE ORAL at 11:45

## 2023-06-25 RX ADMIN — Medication 3: at 16:36

## 2023-06-25 RX ADMIN — SIMVASTATIN 20 MILLIGRAM(S): 20 TABLET, FILM COATED ORAL at 21:38

## 2023-06-25 RX ADMIN — Medication 25 MILLIGRAM(S): at 21:38

## 2023-06-25 RX ADMIN — Medication 1 TABLET(S): at 08:04

## 2023-06-25 RX ADMIN — CLOPIDOGREL BISULFATE 75 MILLIGRAM(S): 75 TABLET, FILM COATED ORAL at 11:45

## 2023-06-25 RX ADMIN — Medication 50 MICROGRAM(S): at 05:54

## 2023-06-25 RX ADMIN — CEFEPIME 100 MILLIGRAM(S): 1 INJECTION, POWDER, FOR SOLUTION INTRAMUSCULAR; INTRAVENOUS at 21:37

## 2023-06-25 RX ADMIN — HEPARIN SODIUM 5000 UNIT(S): 5000 INJECTION INTRAVENOUS; SUBCUTANEOUS at 15:25

## 2023-06-25 RX ADMIN — HEPARIN SODIUM 5000 UNIT(S): 5000 INJECTION INTRAVENOUS; SUBCUTANEOUS at 05:53

## 2023-06-25 RX ADMIN — INSULIN GLARGINE 12 UNIT(S): 100 INJECTION, SOLUTION SUBCUTANEOUS at 11:45

## 2023-06-25 RX ADMIN — Medication 25 MILLIGRAM(S): at 11:45

## 2023-06-25 RX ADMIN — SODIUM ZIRCONIUM CYCLOSILICATE 10 GRAM(S): 10 POWDER, FOR SUSPENSION ORAL at 05:54

## 2023-06-25 RX ADMIN — PANTOPRAZOLE SODIUM 40 MILLIGRAM(S): 20 TABLET, DELAYED RELEASE ORAL at 06:12

## 2023-06-25 RX ADMIN — CEFEPIME 100 MILLIGRAM(S): 1 INJECTION, POWDER, FOR SOLUTION INTRAMUSCULAR; INTRAVENOUS at 11:46

## 2023-06-25 NOTE — PROGRESS NOTE ADULT - SUBJECTIVE AND OBJECTIVE BOX
INTERVAL HPI/OVERNIGHT EVENTS:    SUBJECTIVE: Patient seen and examined at bedside.     cc: hyperkalemia  no cp, sob, abd pain, fever  no cp, palpitations, tong, orthopnea    OBJECTIVE:    VITAL SIGNS:  Vital Signs Last 24 Hrs  T(C): 35.9 (25 Jun 2023 05:21), Max: 36.4 (24 Jun 2023 19:51)  T(F): 96.7 (25 Jun 2023 05:21), Max: 97.5 (24 Jun 2023 19:51)  HR: 73 (25 Jun 2023 05:21) (67 - 73)  BP: 96/52 (25 Jun 2023 05:21) (96/52 - 138/67)  BP(mean): --  RR: 18 (24 Jun 2023 19:51) (18 - 18)  SpO2: 98% (24 Jun 2023 19:51) (98% - 98%)    Parameters below as of 24 Jun 2023 19:51  Patient On (Oxygen Delivery Method): nasal cannula  O2 Flow (L/min): 2        PHYSICAL EXAM:    General: NAD  HEENT: NC/AT; PERRL, clear conjunctiva  Neck: supple  Respiratory: CTA b/l  Cardiovascular: +S1/S2; RRR  Abdomen: soft, NT/ND; +BS x4  Extremities: WWP, 2+ peripheral pulses b/l; no LE edema  Skin: normal color and turgor; no rash  Neurological:    MEDICATIONS:  MEDICATIONS  (STANDING):  cefepime   IVPB      clopidogrel Tablet 75 milliGRAM(s) Oral daily  dextrose 5%. 1000 milliLiter(s) (50 mL/Hr) IV Continuous <Continuous>  dextrose 5%. 1000 milliLiter(s) (100 mL/Hr) IV Continuous <Continuous>  dextrose 50% Injectable 25 Gram(s) IV Push once  dextrose 50% Injectable 25 Gram(s) IV Push once  dextrose 50% Injectable 12.5 Gram(s) IV Push once  DULoxetine 60 milliGRAM(s) Oral daily  finasteride 5 milliGRAM(s) Oral daily  glucagon  Injectable 1 milliGRAM(s) IntraMuscular once  heparin   Injectable 5000 Unit(s) SubCutaneous every 8 hours  insulin glargine Injectable (LANTUS) 12 Unit(s) SubCutaneous every morning  insulin lispro (ADMELOG) corrective regimen sliding scale   SubCutaneous at bedtime  insulin lispro (ADMELOG) corrective regimen sliding scale   SubCutaneous three times a day before meals  lactobacillus acidophilus 1 Tablet(s) Oral with breakfast  levothyroxine 50 MICROGram(s) Oral daily  metoprolol tartrate 25 milliGRAM(s) Oral two times a day  pantoprazole    Tablet 40 milliGRAM(s) Oral before breakfast  simvastatin 20 milliGRAM(s) Oral at bedtime  tamsulosin 0.4 milliGRAM(s) Oral at bedtime    MEDICATIONS  (PRN):  acetaminophen     Tablet .. 650 milliGRAM(s) Oral every 6 hours PRN Temp greater or equal to 38C (100.4F), Mild Pain (1 - 3)  aluminum hydroxide/magnesium hydroxide/simethicone Suspension 30 milliLiter(s) Oral every 4 hours PRN Dyspepsia  dextrose Oral Gel 15 Gram(s) Oral once PRN Blood Glucose LESS THAN 70 milliGRAM(s)/deciliter  melatonin 3 milliGRAM(s) Oral at bedtime PRN Insomnia  ondansetron Injectable 4 milliGRAM(s) IV Push every 8 hours PRN Nausea and/or Vomiting      ALLERGIES:  Allergies    No Known Allergies    Intolerances        LABS:                        8.6    15.50 )-----------( 349      ( 25 Jun 2023 06:29 )             27.2     Hemoglobin: 8.6 g/dL (06-25 @ 06:29)  Hemoglobin: 8.2 g/dL (06-24 @ 05:59)  Hemoglobin: 9.9 g/dL (06-23 @ 15:33)    CBC Full  -  ( 25 Jun 2023 06:29 )  WBC Count : 15.50 K/uL  RBC Count : 3.31 M/uL  Hemoglobin : 8.6 g/dL  Hematocrit : 27.2 %  Platelet Count - Automated : 349 K/uL  Mean Cell Volume : 82.2 fL  Mean Cell Hemoglobin : 26.0 pg  Mean Cell Hemoglobin Concentration : 31.6 g/dL  Auto Neutrophil # : 9.61 K/uL  Auto Lymphocyte # : 3.79 K/uL  Auto Monocyte # : 1.68 K/uL  Auto Eosinophil # : 0.17 K/uL  Auto Basophil # : 0.04 K/uL  Auto Neutrophil % : 61.9 %  Auto Lymphocyte % : 24.5 %  Auto Monocyte % : 10.8 %  Auto Eosinophil % : 1.1 %  Auto Basophil % : 0.3 %    06-25    129<L>  |  98  |  18  ----------------------------<  87  4.5   |  21  |  1.0    Ca    8.6      25 Jun 2023 06:29  Mg     2.1     06-25    TPro  4.9<L>  /  Alb  2.5<L>  /  TBili  0.2  /  DBili  x   /  AST  11  /  ALT  6   /  AlkPhos  101  06-25    Creatinine Trend: 1.0<--, 1.0<--, 1.0<--, 1.1<--, 1.0<--, 1.1<--  LIVER FUNCTIONS - ( 25 Jun 2023 06:29 )  Alb: 2.5 g/dL / Pro: 4.9 g/dL / ALK PHOS: 101 U/L / ALT: 6 U/L / AST: 11 U/L / GGT: x               hs Troponin:            Urinalysis Basic - ( 25 Jun 2023 06:29 )    Color: x / Appearance: x / SG: x / pH: x  Gluc: 87 mg/dL / Ketone: x  / Bili: x / Urobili: x   Blood: x / Protein: x / Nitrite: x   Leuk Esterase: x / RBC: x / WBC x   Sq Epi: x / Non Sq Epi: x / Bacteria: x      CSF:                      EKG:   MICROBIOLOGY:    Culture - Blood (collected 24 Jun 2023 00:28)  Source: .Blood None  Gram Stain (24 Jun 2023 21:43):    Growth in aerobic bottle: Gram Negative Rods  Preliminary Report (25 Jun 2023 11:16):    Growth in aerobic bottle: Pseudomonas aeruginosa    ***Blood Panel PCR results on this specimen are available    approximately 3 hours after the Gram stain result.***    Gram stain, PCR, and/or culture results may not always    correspond due to differencein methodologies.    ************************************************************    This PCR assay was performed by multiplex PCR. This    Assay tests for 66 bacterial and resistance gene targets.    Please refer to the [facility] test directory    at [email or web address] for details.  Organism: Blood Culture PCR (24 Jun 2023 23:53)  Organism: Blood Culture PCR (24 Jun 2023 23:53)      IMAGING:      Labs, imaging, EKG personally reviewed    RADIOLOGY & ADDITIONAL TESTS: Reviewed.

## 2023-06-25 NOTE — PROGRESS NOTE ADULT - ASSESSMENT
89M PMHx HTN, DM2, BPH, CAD s/p CABG, hypothyroidism here with hyperkalemia, hyponatremia noted as outpt. Found to have pseudomonas bacteremia.

## 2023-06-25 NOTE — PROGRESS NOTE ADULT - NSPROGADDITIONALINFOA_GEN_ALL_CORE
#Progress Note Handoff:  Pending (specify):  Consults_________, Tests________, Test Results_______, Other__tov, hyperkalemia, ucx_______  Family discussion: d/w pt at bedside  Disposition: Home___/SNF___/Other________/Unknown at this time____x____
#Progress Note Handoff:  Pending (specify):  Consults_________, Tests________, Test Results_______, Other__tov, hyperkalemia, ucx_______  Family discussion: d/w pt at bedside  Disposition: Home___/SNF___/Other________/Unknown at this time____x____

## 2023-06-25 NOTE — PROGRESS NOTE ADULT - PROBLEM SELECTOR PLAN 1
in setting of recent hidalgo in snf; now out; possible uti  ua positive; f/u ucx  bcx pseudomonas 6/24  change ceftriaxone to cefepime  repeat bcx

## 2023-06-26 LAB
-  AMIKACIN: SIGNIFICANT CHANGE UP
-  AZTREONAM: SIGNIFICANT CHANGE UP
-  CEFEPIME: SIGNIFICANT CHANGE UP
-  CEFTAZIDIME: SIGNIFICANT CHANGE UP
-  CIPROFLOXACIN: SIGNIFICANT CHANGE UP
-  GENTAMICIN: SIGNIFICANT CHANGE UP
-  IMIPENEM: SIGNIFICANT CHANGE UP
-  LEVOFLOXACIN: SIGNIFICANT CHANGE UP
-  MEROPENEM: SIGNIFICANT CHANGE UP
-  PIPERACILLIN/TAZOBACTAM: SIGNIFICANT CHANGE UP
-  TOBRAMYCIN: SIGNIFICANT CHANGE UP
ALBUMIN SERPL ELPH-MCNC: 2.6 G/DL — LOW (ref 3.5–5.2)
ALDOST SERPL-MCNC: 9.4 NG/DL — SIGNIFICANT CHANGE UP
ALP SERPL-CCNC: 96 U/L — SIGNIFICANT CHANGE UP (ref 30–115)
ALT FLD-CCNC: 6 U/L — SIGNIFICANT CHANGE UP (ref 0–41)
ANION GAP SERPL CALC-SCNC: 12 MMOL/L — SIGNIFICANT CHANGE UP (ref 7–14)
AST SERPL-CCNC: 13 U/L — SIGNIFICANT CHANGE UP (ref 0–41)
BASOPHILS # BLD AUTO: 0.04 K/UL — SIGNIFICANT CHANGE UP (ref 0–0.2)
BASOPHILS NFR BLD AUTO: 0.3 % — SIGNIFICANT CHANGE UP (ref 0–1)
BILIRUB SERPL-MCNC: 0.2 MG/DL — SIGNIFICANT CHANGE UP (ref 0.2–1.2)
BUN SERPL-MCNC: 18 MG/DL — SIGNIFICANT CHANGE UP (ref 10–20)
CALCIUM SERPL-MCNC: 8.5 MG/DL — SIGNIFICANT CHANGE UP (ref 8.4–10.4)
CHLORIDE SERPL-SCNC: 100 MMOL/L — SIGNIFICANT CHANGE UP (ref 98–110)
CO2 SERPL-SCNC: 18 MMOL/L — SIGNIFICANT CHANGE UP (ref 17–32)
CREAT SERPL-MCNC: 1.1 MG/DL — SIGNIFICANT CHANGE UP (ref 0.7–1.5)
CULTURE RESULTS: SIGNIFICANT CHANGE UP
EGFR: 60 ML/MIN/1.73M2 — SIGNIFICANT CHANGE UP
EOSINOPHIL # BLD AUTO: 0.19 K/UL — SIGNIFICANT CHANGE UP (ref 0–0.7)
EOSINOPHIL NFR BLD AUTO: 1.3 % — SIGNIFICANT CHANGE UP (ref 0–8)
GLUCOSE BLDC GLUCOMTR-MCNC: 139 MG/DL — HIGH (ref 70–99)
GLUCOSE BLDC GLUCOMTR-MCNC: 160 MG/DL — HIGH (ref 70–99)
GLUCOSE BLDC GLUCOMTR-MCNC: 176 MG/DL — HIGH (ref 70–99)
GLUCOSE BLDC GLUCOMTR-MCNC: 211 MG/DL — HIGH (ref 70–99)
GLUCOSE BLDC GLUCOMTR-MCNC: 252 MG/DL — HIGH (ref 70–99)
GLUCOSE SERPL-MCNC: 235 MG/DL — HIGH (ref 70–99)
HCT VFR BLD CALC: 27.1 % — LOW (ref 42–52)
HGB BLD-MCNC: 8.4 G/DL — LOW (ref 14–18)
IMM GRANULOCYTES NFR BLD AUTO: 1.5 % — HIGH (ref 0.1–0.3)
LYMPHOCYTES # BLD AUTO: 28.3 % — SIGNIFICANT CHANGE UP (ref 20.5–51.1)
LYMPHOCYTES # BLD AUTO: 4.25 K/UL — HIGH (ref 1.2–3.4)
MAGNESIUM SERPL-MCNC: 1.6 MG/DL — LOW (ref 1.8–2.4)
MCHC RBC-ENTMCNC: 26 PG — LOW (ref 27–31)
MCHC RBC-ENTMCNC: 31 G/DL — LOW (ref 32–37)
MCV RBC AUTO: 83.9 FL — SIGNIFICANT CHANGE UP (ref 80–94)
METHOD TYPE: SIGNIFICANT CHANGE UP
MONOCYTES # BLD AUTO: 1.55 K/UL — HIGH (ref 0.1–0.6)
MONOCYTES NFR BLD AUTO: 10.3 % — HIGH (ref 1.7–9.3)
NEUTROPHILS # BLD AUTO: 8.76 K/UL — HIGH (ref 1.4–6.5)
NEUTROPHILS NFR BLD AUTO: 58.3 % — SIGNIFICANT CHANGE UP (ref 42.2–75.2)
NRBC # BLD: 0 /100 WBCS — SIGNIFICANT CHANGE UP (ref 0–0)
ORGANISM # SPEC MICROSCOPIC CNT: SIGNIFICANT CHANGE UP
PLATELET # BLD AUTO: 330 K/UL — SIGNIFICANT CHANGE UP (ref 130–400)
PMV BLD: 9.3 FL — SIGNIFICANT CHANGE UP (ref 7.4–10.4)
POTASSIUM SERPL-MCNC: 4.6 MMOL/L — SIGNIFICANT CHANGE UP (ref 3.5–5)
POTASSIUM SERPL-SCNC: 4.6 MMOL/L — SIGNIFICANT CHANGE UP (ref 3.5–5)
PROT SERPL-MCNC: 4.9 G/DL — LOW (ref 6–8)
RBC # BLD: 3.23 M/UL — LOW (ref 4.7–6.1)
RBC # FLD: 15.3 % — HIGH (ref 11.5–14.5)
RENIN DIRECT, PLASMA: 14.6 PG/ML — SIGNIFICANT CHANGE UP
SODIUM SERPL-SCNC: 130 MMOL/L — LOW (ref 135–146)
SPECIMEN SOURCE: SIGNIFICANT CHANGE UP
WBC # BLD: 15.01 K/UL — HIGH (ref 4.8–10.8)
WBC # FLD AUTO: 15.01 K/UL — HIGH (ref 4.8–10.8)

## 2023-06-26 PROCEDURE — 99233 SBSQ HOSP IP/OBS HIGH 50: CPT

## 2023-06-26 RX ORDER — CEFEPIME 1 G/1
2000 INJECTION, POWDER, FOR SOLUTION INTRAMUSCULAR; INTRAVENOUS EVERY 12 HOURS
Refills: 0 | Status: DISCONTINUED | OUTPATIENT
Start: 2023-06-27 | End: 2023-07-02

## 2023-06-26 RX ADMIN — Medication 3: at 11:51

## 2023-06-26 RX ADMIN — SIMVASTATIN 20 MILLIGRAM(S): 20 TABLET, FILM COATED ORAL at 22:56

## 2023-06-26 RX ADMIN — Medication 1 TABLET(S): at 08:13

## 2023-06-26 RX ADMIN — CEFEPIME 100 MILLIGRAM(S): 1 INJECTION, POWDER, FOR SOLUTION INTRAMUSCULAR; INTRAVENOUS at 23:47

## 2023-06-26 RX ADMIN — Medication 1: at 08:14

## 2023-06-26 RX ADMIN — Medication 2: at 17:13

## 2023-06-26 RX ADMIN — HEPARIN SODIUM 5000 UNIT(S): 5000 INJECTION INTRAVENOUS; SUBCUTANEOUS at 05:23

## 2023-06-26 RX ADMIN — INSULIN GLARGINE 12 UNIT(S): 100 INJECTION, SOLUTION SUBCUTANEOUS at 08:14

## 2023-06-26 RX ADMIN — CEFEPIME 100 MILLIGRAM(S): 1 INJECTION, POWDER, FOR SOLUTION INTRAMUSCULAR; INTRAVENOUS at 13:07

## 2023-06-26 RX ADMIN — Medication 25 MILLIGRAM(S): at 11:50

## 2023-06-26 RX ADMIN — HEPARIN SODIUM 5000 UNIT(S): 5000 INJECTION INTRAVENOUS; SUBCUTANEOUS at 22:57

## 2023-06-26 RX ADMIN — HEPARIN SODIUM 5000 UNIT(S): 5000 INJECTION INTRAVENOUS; SUBCUTANEOUS at 13:07

## 2023-06-26 RX ADMIN — CEFEPIME 100 MILLIGRAM(S): 1 INJECTION, POWDER, FOR SOLUTION INTRAMUSCULAR; INTRAVENOUS at 05:26

## 2023-06-26 RX ADMIN — DULOXETINE HYDROCHLORIDE 60 MILLIGRAM(S): 30 CAPSULE, DELAYED RELEASE ORAL at 11:50

## 2023-06-26 RX ADMIN — Medication 25 MILLIGRAM(S): at 22:57

## 2023-06-26 RX ADMIN — PANTOPRAZOLE SODIUM 40 MILLIGRAM(S): 20 TABLET, DELAYED RELEASE ORAL at 06:34

## 2023-06-26 RX ADMIN — TAMSULOSIN HYDROCHLORIDE 0.4 MILLIGRAM(S): 0.4 CAPSULE ORAL at 22:57

## 2023-06-26 RX ADMIN — CLOPIDOGREL BISULFATE 75 MILLIGRAM(S): 75 TABLET, FILM COATED ORAL at 11:50

## 2023-06-26 RX ADMIN — FINASTERIDE 5 MILLIGRAM(S): 5 TABLET, FILM COATED ORAL at 11:50

## 2023-06-26 RX ADMIN — Medication 50 MICROGRAM(S): at 05:24

## 2023-06-26 NOTE — PHARMACOTHERAPY INTERVENTION NOTE - COMMENTS
Recommended dose adjusting cefepime from 2g IV q8h to cefepime 2g IV q12h in the setting of a current estimated creatinine clearance of ~28mL/min, in accordance with ID consult recommendations.    Kevin Valentin PharmD  Clinical Pharmacy Specialist, Infectious Diseases  Tele-Antimicrobial Stewardship Program (Tele-ASP)  Tele-ASP Phone: [phone]  Recommended dose adjusting cefepime from 2g IV q8h to cefepime 2g IV q12h in the setting of a current estimated creatinine clearance of ~32mL/min, in accordance with ID consult recommendations.    Kevin Valentin PharmD  Clinical Pharmacy Specialist, Infectious Diseases  Tele-Antimicrobial Stewardship Program (Tele-ASP)  Tele-ASP Phone: [phone]

## 2023-06-26 NOTE — PROGRESS NOTE ADULT - SUBJECTIVE AND OBJECTIVE BOX
CHEL CROFT 89y Male  MRN#: [id number]     Hospital Day: 3d    Pt is currently admitted with the primary diagnosis of  Hyperkalemia        SUBJECTIVE     Overnight events  None    Subjective complaints  Pt was evaluated this am. Patient denied any active complaints and per patient his symptoms are improving                                            ----------------------------------------------------------  OBJECTIVE  PAST MEDICAL & SURGICAL HISTORY  HTN (hypertension)    HLD (hyperlipidemia)    BPH (benign prostatic hyperplasia)    CAD (coronary artery disease)    Diabetes mellitus    Hypothyroidism    S/P CABG (coronary artery bypass graft)                                              -----------------------------------------------------------  ALLERGIES:  No Known Allergies                                            ------------------------------------------------------------    HOME MEDICATIONS  Home Medications:  DULoxetine 60 mg oral delayed release capsule: 1 cap(s) orally once a day (13 Dec 2022 22:03)  finasteride 5 mg oral tablet: 1 tab(s) orally once a day (13 Dec 2022 22:03)  lactobacillus acidophilus oral capsule: 1 tab(s) orally once a day (20 Dec 2022 10:18)  levothyroxine 50 mcg (0.05 mg) oral tablet: 1 tab(s) orally once a day (13 Dec 2022 22:03)  Metoprolol Tartrate 25 mg oral tablet: 1 tab(s) orally 2 times a day (13 Dec 2022 22:03)  Plavix 75 mg oral tablet: 1 orally once a day (23 Jun 2023 21:54)  simvastatin 20 mg oral tablet: 1 tab(s) orally once a day (at bedtime) (13 Dec 2022 22:03)  tamsulosin 0.4 mg oral capsule: 1 cap(s) orally once a day (13 Dec 2022 22:03)  Toujeo SoloStar 300 units/mL subcutaneous solution: 12 unit(s) subcutaneous once a day (in the morning) (13 Dec 2022 22:03)                           MEDICATIONS:  STANDING MEDICATIONS  cefepime   IVPB      cefepime   IVPB 2000 milliGRAM(s) IV Intermittent every 8 hours  clopidogrel Tablet 75 milliGRAM(s) Oral daily  dextrose 5%. 1000 milliLiter(s) IV Continuous <Continuous>  dextrose 5%. 1000 milliLiter(s) IV Continuous <Continuous>  dextrose 50% Injectable 25 Gram(s) IV Push once  dextrose 50% Injectable 12.5 Gram(s) IV Push once  dextrose 50% Injectable 25 Gram(s) IV Push once  DULoxetine 60 milliGRAM(s) Oral daily  finasteride 5 milliGRAM(s) Oral daily  glucagon  Injectable 1 milliGRAM(s) IntraMuscular once  heparin   Injectable 5000 Unit(s) SubCutaneous every 8 hours  insulin glargine Injectable (LANTUS) 12 Unit(s) SubCutaneous every morning  insulin lispro (ADMELOG) corrective regimen sliding scale   SubCutaneous at bedtime  insulin lispro (ADMELOG) corrective regimen sliding scale   SubCutaneous three times a day before meals  lactobacillus acidophilus 1 Tablet(s) Oral with breakfast  levothyroxine 50 MICROGram(s) Oral daily  metoprolol tartrate 25 milliGRAM(s) Oral two times a day  pantoprazole    Tablet 40 milliGRAM(s) Oral before breakfast  simvastatin 20 milliGRAM(s) Oral at bedtime  tamsulosin 0.4 milliGRAM(s) Oral at bedtime    PRN MEDICATIONS  acetaminophen     Tablet .. 650 milliGRAM(s) Oral every 6 hours PRN  aluminum hydroxide/magnesium hydroxide/simethicone Suspension 30 milliLiter(s) Oral every 4 hours PRN  dextrose Oral Gel 15 Gram(s) Oral once PRN  melatonin 3 milliGRAM(s) Oral at bedtime PRN  ondansetron Injectable 4 milliGRAM(s) IV Push every 8 hours PRN                                            ------------------------------------------------------------  VITAL SIGNS: Last 24 Hours  T(C): 36.5 (26 Jun 2023 13:41), Max: 36.5 (26 Jun 2023 05:09)  T(F): 97.7 (26 Jun 2023 13:41), Max: 97.7 (26 Jun 2023 05:09)  HR: 87 (26 Jun 2023 13:41) (72 - 87)  BP: 127/59 (26 Jun 2023 13:41) (111/66 - 127/59)  BP(mean): --  RR: 18 (26 Jun 2023 13:41) (18 - 18)  SpO2: 98% (25 Jun 2023 20:00) (98% - 98%)      06-25-23 @ 07:01  -  06-26-23 @ 07:00  --------------------------------------------------------  IN: 2690 mL / OUT: 0 mL / NET: 2690 mL    06-26-23 @ 07:01  -  06-26-23 @ 15:47  --------------------------------------------------------  IN: 480 mL / OUT: 0 mL / NET: 480 mL                                             --------------------------------------------------------------  LABS:                        8.6    15.50 )-----------( 349      ( 25 Jun 2023 06:29 )             27.2     06-25    129<L>  |  98  |  18  ----------------------------<  87  4.5   |  21  |  1.0    Ca    8.6      25 Jun 2023 06:29  Mg     2.1     06-25    TPro  4.9<L>  /  Alb  2.5<L>  /  TBili  0.2  /  DBili  x   /  AST  11  /  ALT  6   /  AlkPhos  101  06-25      Urinalysis Basic - ( 25 Jun 2023 06:29 )    Color: x / Appearance: x / SG: x / pH: x  Gluc: 87 mg/dL / Ketone: x  / Bili: x / Urobili: x   Blood: x / Protein: x / Nitrite: x   Leuk Esterase: x / RBC: x / WBC x   Sq Epi: x / Non Sq Epi: x / Bacteria: x              Culture - Blood (collected 24 Jun 2023 00:28)  Source: .Blood None  Gram Stain (25 Jun 2023 17:54):    Growth in aerobic bottle: Gram Negative Rods    Growth in anaerobic bottle: Gram Negative Rods  Final Report (26 Jun 2023 14:45):    Growth in aerobic and anaerobic bottles: Pseudomonas aeruginosa    ***Blood Panel PCR results on this specimen are available    approximately 3 hours after the Gram stain result.***    Gram stain, PCR, and/or culture results may not always    correspond due to difference in methodologies.    ************************************************************    This PCR assay was performed by multiplex PCR. This    Assay tests for 66 bacterial and resistance gene targets.    Please refer to the [facility] test directory    at [email or web address] for details.  Organism: Blood Culture PCR  Pseudomonas aeruginosa (26 Jun 2023 14:45)  Organism: Pseudomonas aeruginosa (26 Jun 2023 14:45)  Organism: Blood Culture PCR (26 Jun 2023 14:45)                                                    -------------------------------------------------------------  RADIOLOGY:  < from: US Kidney and Bladder (06.23.23 @ 21:08) >  IMPRESSION:    No sonographic evidence of hydronephrosis.    Post void residual of 69 mL (46%).      < from: Xray Chest 1 View-PORTABLE IMMEDIATE (Xray Chest 1 View-PORTABLE IMMEDIATE .) (06.23.23 @ 16:45) >  Impression:    Right basilar focal atelectasis      < from: Xray Chest 1 View-PORTABLE IMMEDIATE (Xray Chest 1 View-PORTABLE IMMEDIATE .) (04.02.23 @ 10:25) >  FINDINGS/  IMPRESSION:    Low lung volumes. Right basal bandlike opacity/atelectasis. No   pneumothorax or significant pleural effusion on frontal view. No focal   consolidation.    Stable cardiomediastinal silhouette.    Unchanged osseous structures.                                              --------------------------------------------------------------    PHYSICAL EXAM:  GENERAL: NAD, lying in bed comfortably  HEAD:  Atraumatic, Normocephalic  EYES: EOMI, conjunctiva and sclera clear  ENT: Moist mucous membranes  NECK: Supple, No JVD  CHEST/LUNG: Clear to auscultation bilaterally; No rales, rhonchi, wheezing, or rubs. Unlabored respirations  HEART: regular rate and rhythm; No murmurs, rubs, or gallops  ABDOMEN: Bowel sounds present; Soft, Nontender, Nondistended.    EXTREMITIES: Warm. No clubbing, cyanosis, or edema  NERVOUS SYSTEM:  Alert & Oriented X3. No focal deficits   SKIN: No rashes or lesions                                           --------------------------------------------------------------

## 2023-06-26 NOTE — ADVANCED PRACTICE NURSE CONSULT - RECOMMEDATIONS
Cleanse wound with soap and water.   Pat dry apply triad, gauze and Allevyn twice a day and prn for soiling.   Maintain pressure injury prevention.   Keep skin clean.   Maintain incontinence care.   Monitor wound for changes and notify provider   Case discussed with RN

## 2023-06-26 NOTE — CONSULT NOTE ADULT - ASSESSMENT
ASSESSMENT  89 yo M with hx of HTN, HLD, DM II BPH and CAD s/p CABG x 4, Hypothyroidism presents to ED for abnormal labs result (hyperkalemia and hyponatremia).    IMPRESSION  #Sepsis present on admission  #Pseudomonas bacteremia from suspected UTI   - BLood Cx 6/24 +   - Urine Cx 6/23 > 3 organisms   - US Kidney and Bladder (06.23.23 @ 21:08): IMPRESSION: No sonographic evidence of hydronephrosis. Post void residual of 69 mL (46%).      #Hyponatremia    #BPH  #DM II  #CAD s/p CABG    #Abx allergy: No Known Allergies    RECOMMENDATIONS  This is a preliminary incomplete pended note, all final recommendations to follow after interview and examination of the patient.    Please call or message on [employer] Teams if with any questions.  Spectra [phone]   ASSESSMENT  89 yo M with hx of HTN, HLD, DM II BPH and CAD s/p CABG x 4, Hypothyroidism presents to ED for abnormal labs result (hyperkalemia and hyponatremia).    IMPRESSION  #Sepsis present on admission  #Pseudomonas bacteremia from suspected UTI -- had recent hidalgo   - BLood Cx 6/24 +   - Urine Cx 6/23 > 3 organisms   - US Kidney and Bladder (06.23.23 @ 21:08): IMPRESSION: No sonographic evidence of hydronephrosis. Post void residual of 69 mL (46%).      #Hyponatremia    #BPH  #DM II  #CAD s/p CABG    #Abx allergy: No Known Allergies    CrCl 31    RECOMMENDATIONS  - continue cefepime -- decrease to 2g q 12 hours -- keep on IV while in house   - when discharged, switch to levofloxacin 750 mg q 48 hours (end date 7/4)    Please call or message on [employer] Teams if with any questions.  Spectra [phone]

## 2023-06-26 NOTE — ADVANCED PRACTICE NURSE CONSULT - ASSESSMENT
History of Present Illness:   89 yo M with hx of HTN, HLD, DM II BPH and CAD s/p CABG x 4, Hypothyroidism presents to ED for abnormal labs result (hyperkalemia and hyponatremia). Patient is a poor historian, however patient's daughter was at bedside for collateral. Per the daughter, patient was recently admitted for fall and discharged to [facility]. When patient was discharged from [facility] he did not have a hidalgo in. Per the daughter at the nursing home patient had a hidalgo catheter in despite family requesting removal of it multiple times. Patient was discharged from [facility] on May 26th. Per the daughter patient was on antibiotics at [facility] for UTI, but she is unsure which antibiotic it was.   Since discharge patient's daughter says the patient has been extremely lethargic and weak. His urine has been milky, dark since May 26th when he was discharged from [facility]. Over the past week patient has become incontinent, no able to make it to the restroom, and complaining that he has pain with urination.     Allergies and Intolerances:        Allergies:  	No Known Allergies:     Home Medications:   * Patient Currently Takes Medications as of 03-Apr-2023 14:47 documented in Structured Notes  · 	NIFEdipine 30 mg oral tablet, extended release: 1 tab(s) orally once a day  · 	lactobacillus acidophilus oral capsule: 1 tab(s) orally once a day  · 	guaiFENesin 600 mg oral tablet, extended release: 1 tab(s) orally every 12 hours, As needed, Cough  · 	Toujeo SoloStar 300 units/mL subcutaneous solution: 12 unit(s) subcutaneous once a day (in the morning)  · 	Metoprolol Tartrate 25 mg oral tablet: 1 tab(s) orally 2 times a day  · 	levothyroxine 50 mcg (0.05 mg) oral tablet: 1 tab(s) orally once a day  · 	finasteride 5 mg oral tablet: 1 tab(s) orally once a day  · 	simvastatin 20 mg oral tablet: 1 tab(s) orally once a day (at bedtime)  · 	traMADol 50 mg oral tablet: 1 tab(s) orally 2 times a day, As Needed  · 	tamsulosin 0.4 mg oral capsule: 1 cap(s) orally once a day  · 	DULoxetine 60 mg oral delayed release capsule: 1 cap(s) orally once a day    Patient received lying in bed. Alert and oriented. Limited mobility. Incontinent of urine and stool. High risk for pressure injury.    Type of Wound: Stage 2 Pressure Injury healing  Location: Right buttock  Measurements: ~0.5cmx0.5cm  Tunneling /Undermining: No  Wound bed: Dry pink, no necrotic tissue  Wound edges: Intact  Periwound: Intact  Wound exudate: None  Wound odor: No  Induration, erythema, warmth: No  Wound pain: No    Skin to bilateral heels intact at time of assessment.

## 2023-06-26 NOTE — PROGRESS NOTE ADULT - ASSESSMENT
89M PMHx HTN, DM2, BPH, CAD s/p CABG, hypothyroidism here with hyperkalemia, hyponatremia noted as outpt. Found to have pseudomonas bacteremia.    #Bacteremia due to Pseudomonas.   #in setting of recent hidalgo in snf; now out; possible uti  ua positive; f/u ucx  bcx pseudomonas 6/24  - Cont cefepime  - repeat bcx.  - f/u ID eval    #Hyperkalemia resolved  #hyponatremia  - check aldosterone, renin  ekg noted  trend.  - DC telemetry    #HTN (hypertension).   ·- lopressor 25 bid.    #DM2 (diabetes mellitus, type 2).   lantus 12  ssi.    #History of BPH.   finasteride 5  tamsulosin 0.4.    #CAD (coronary artery disease).   - plavix  zocor 20.    #Hypothyroidism.   synthroid 50.    DVT ppX, heparin    #Progress Note Handoff  Pending (specify): ID eval, cont IV cefepime, f/u UA, BCx  Family discussion: dw pt and daughter regarding tx for pseudomonas  Disposition: NH

## 2023-06-26 NOTE — CONSULT NOTE ADULT - SUBJECTIVE AND OBJECTIVE BOX
CHEL CROFT  89y, Male  Allergy: No Known Allergies      CHIEF COMPLAINT: Abnormal Lab results (26 Jun 2023 10:53)      LOS  3d    HPI:  89 yo M with hx of HTN, HLD, DM II BPH and CAD s/p CABG x 4, Hypothyroidism presents to ED for abnormal labs result (hyperkalemia and hyponatremia). Patient is a poor historian, however patient's daughter was at bedside for collateral. Per the daughter, patient was recently admitted for fall and discharged to [facility]. When patient was discharged from [facility] he did not have a hidalgo in. Per the daughter at the nursing home patient had a hidalgo catheter in despite family requesting removal of it multiple times. Patient was discharged from [facility] on May 26th. Per the daughter patient was on antibiotics at [facility] for UTI, but she is unsure which antibiotic it was.     Since discharge patient's daughter says the patient has been extremely lethargic and weak. His urine has been milky, dark since May 26th when he was discharged from [facility]. Over the past week patient has become incontinent, no able to make it to the restroom, and complaining that he has pain with urination. On Wednesday a doctor came to the house to examine the patient, at that time he ordered lab tests, which the patient had preformed today. Labs were significant for Na 126 and a K TNP. They were instructed to come straight to the ED.     In the ED labs were significant for a Na 131, K+ 5.1, Hgb 9.9 (up from previous admission), and a WBC count of 18K.     Vital Signs Last 24 Hrs  T(F): 98.7 (23 Jun 2023 17:00), Max: 98.7 (23 Jun 2023 17:00)  HR: 95 (23 Jun 2023 19:30) (80 - 95)  BP: 157/78 (23 Jun 2023 19:30) (121/60 - 157/78)  BP(mean): --  RR: 20 (23 Jun 2023 19:30) (16 - 20)  SpO2: 99% (23 Jun 2023 19:30) (96% - 99%)  Patient On (Oxygen Delivery Method): nasal cannula  O2 Flow (L/min): 2    UA is positive for many bacteria >720 WBC, moderate blood and large leukocyte esterase.  (23 Jun 2023 21:23)      INFECTIOUS DISEASE HISTORY:  History as above.  ID consulted for Pseudomonas bacteremia     PAST MEDICAL & SURGICAL HISTORY:  HTN (hypertension)      HLD (hyperlipidemia)      BPH (benign prostatic hyperplasia)      CAD (coronary artery disease)      Diabetes mellitus      Hypothyroidism      S/P CABG (coronary artery bypass graft)          FAMILY HISTORY  No pertinent family history in first degree relatives        SOCIAL HISTORY  Social History:  Currently retired. Lives w/ daughter (Shayna). Ambulates w/ rolling walker. Reportedly able to most if not all ADL's independently. However, pt does have HHA 8hrs x 7days per week. (13 Dec 2022 22:05)        ROS  General: Denies rigors, nightsweats  HEENT: Denies headache, rhinorrhea, sore throat, eye pain  CV: Denies CP, palpitations  PULM: Denies wheezing, hemoptysis  GI: Denies hematemesis, hematochezia, melena  : Denies discharge, hematuria  MSK: Denies arthralgias, myalgias  SKIN: Denies rash, lesions  NEURO: Denies paresthesias, weakness  PSYCH: Denies depression, anxiety    VITALS:  T(F): 97.7, Max: 97.7 (06-26-23 @ 05:09)  HR: 72  BP: 118/56  RR: 18Vital Signs Last 24 Hrs  T(C): 36.5 (26 Jun 2023 05:09), Max: 36.5 (26 Jun 2023 05:09)  T(F): 97.7 (26 Jun 2023 05:09), Max: 97.7 (26 Jun 2023 05:09)  HR: 72 (26 Jun 2023 05:09) (72 - 89)  BP: 118/56 (26 Jun 2023 05:09) (111/66 - 118/56)  BP(mean): --  RR: 18 (25 Jun 2023 19:55) (18 - 18)  SpO2: 98% (25 Jun 2023 20:00) (98% - 98%)    Parameters below as of 25 Jun 2023 20:00  Patient On (Oxygen Delivery Method): nasal cannula  O2 Flow (L/min): 2      PHYSICAL EXAM:  Gen: NAD, resting in bed  HEENT: Normocephalic, atraumatic  Neck: supple, no lymphadenopathy  CV: Regular rate & regular rhythm  Lungs: decreased BS at bases, no fremitus  Abdomen: Soft, BS present  Ext: Warm, well perfused  Neuro: non focal, awake  Skin: no rash, no erythema  Lines: no phlebitis    TESTS & MEASUREMENTS:                        8.6    15.50 )-----------( 349      ( 25 Jun 2023 06:29 )             27.2     06-25    129<L>  |  98  |  18  ----------------------------<  87  4.5   |  21  |  1.0    Ca    8.6      25 Jun 2023 06:29  Mg     2.1     06-25    TPro  4.9<L>  /  Alb  2.5<L>  /  TBili  0.2  /  DBili  x   /  AST  11  /  ALT  6   /  AlkPhos  101  06-25      LIVER FUNCTIONS - ( 25 Jun 2023 06:29 )  Alb: 2.5 g/dL / Pro: 4.9 g/dL / ALK PHOS: 101 U/L / ALT: 6 U/L / AST: 11 U/L / GGT: x           Urinalysis Basic - ( 25 Jun 2023 06:29 )    Color: x / Appearance: x / SG: x / pH: x  Gluc: 87 mg/dL / Ketone: x  / Bili: x / Urobili: x   Blood: x / Protein: x / Nitrite: x   Leuk Esterase: x / RBC: x / WBC x   Sq Epi: x / Non Sq Epi: x / Bacteria: x        Culture - Blood (collected 06-24-23 @ 00:28)  Source: .Blood None  Gram Stain (06-25-23 @ 17:54):    Growth in aerobic bottle: Gram Negative Rods    Growth in anaerobic bottle: Gram Negative Rods  Preliminary Report (06-26-23 @ 07:29):    Growth in aerobic and anaerobic bottles: Pseudomonas aeruginosa    ***Blood Panel PCR results on this specimen are available    approximately 3 hours after the Gram stain result.***    Gram stain, PCR, and/or culture results may not always    correspond due to difference in methodologies.    ************************************************************    This PCR assay was performed by multiplex PCR. This    Assay tests for 66 bacterial and resistance gene targets.    Please refer to the [facility] test directory    at [email or web address] for details.  Organism: Blood Culture PCR  Pseudomonas aeruginosa (06-26-23 @ 07:29)  Organism: Pseudomonas aeruginosa (06-26-23 @ 07:29)      Method Type: KEVIN      -  Amikacin: S -[id number]      -  Aztreonam: S <=4      -  Cefepime: S <=2      -  Ceftazidime: S <=1      -  Ciprofloxacin: S <=0.25      -  Gentamicin: S <=2      -  Imipenem: S <=1      -  Levofloxacin: S <=0.5      -  Meropenem: S <=1      -  Piperacillin/Tazobactam: S <=8      -  Tobramycin: S <=2  Organism: Blood Culture PCR (06-24-23 @ 23:53)      Method Type: PCR      -  Pseudomonas aeruginosa: Detec    Culture - Urine (collected 06-23-23 @ 15:45)  Source: Clean Catch Clean Catch (Midstream)  Final Report (06-25-23 @ 16:47):    >=3 organisms. Probable collection contamination.    Culture - Stool (collected 12-22-22 @ 00:33)  Source: .Stool Feces  Final Report (12-24-22 @ 15:20):    No enteric pathogens isolated.    (Stool culture examined for Salmonella,    Shigella, Campylobacter, Aeromonas, Plesiomonas,    Vibrio, E.coli O157 and Yersinia)    Moderate Yeast like cells    Culture - Sputum (collected 12-17-22 @ 13:49)  Source: .Sputum Sputum  Gram Stain (12-17-22 @ 22:32):    Few Squamous epithelial cells per low power field    Few polymorphonuclear leukocytes per low power field    Few Yeast per oil power field  Final Report (12-19-22 @ 16:19):    Normal Respiratory Nehemiah present    Culture - Urine (collected 12-13-22 @ 18:19)  Source: Clean Catch Clean Catch (Midstream)  Final Report (12-15-22 @ 12:58):    >=3 organisms. Probable collection contamination.    Culture - Blood (collected 12-13-22 @ 17:58)  Source: .Blood Blood  Final Report (12-19-22 @ 01:01):    No Growth Final    Culture - Blood (collected 12-13-22 @ 17:58)  Source: .Blood Blood  Final Report (12-19-22 @ 01:01):    No Growth Final    Culture - Urine (collected 12-06-22 @ 13:52)  Source: Clean Catch Clean Catch (Midstream)  Final Report (12-09-22 @ 18:34):    10,000 - 49,000 CFU/mL Escherichia coli    10,000 - 49,000 CFU/mL Enterococcus faecalis    <10,000 CFU/ml Normal Urogenital nehemiah present  Organism: Escherichia coli  Enterococcus faecalis (12-09-22 @ 18:34)  Organism: Enterococcus faecalis (12-09-22 @ 18:34)      Method Type: KEVIN      -  Ampicillin: S <=2 Predicts results to ampicillin/sulbactam, amoxacillin-clavulanate and  piperacillin-tazobactam.      -  Ciprofloxacin: S <=1      -  Levofloxacin: S <=1      -  Nitrofurantoin: S <=32 Should not be used to treat pyelonephritis.      -  Tetracycline: R >8      -  Vancomycin: S 2  Organism: Escherichia coli (12-09-22 @ 18:34)      Method Type: KEVIN      -  Amikacin: S <=16      -  Amoxicillin/Clavulanic Acid: S <=8/4      -  Ampicillin: S <=8 These ampicillin results predict results for amoxicillin      -  Ampicillin/Sulbactam: S <=4/2 Enterobacter, Klebsiella aerogenes, Citrobacter, and Serratia may develop resistance during prolonged therapy (3-4 days)      -  Aztreonam: S <=4      -  Cefazolin: S <=2 For uncomplicated UTI with K. pneumoniae, E. coli, or P. mirablis: KEVIN <=16 is sensitive and KEVIN >=32 is resistant. This also predicts results for oral agents cefaclor, cefdinir, cefpodoxime, cefprozil, cefuroxime axetil, cephalexin and locarbef for uncomplicated UTI. Note that some isolates may be susceptible to these agents while testing resistant to cefazolin.      -  Cefepime: S <=2      -  Cefoxitin: S <=8      -  Ceftriaxone: S <=1 Enterobacter, Klebsiella aerogenes, Citrobacter, and Serratia may develop resistance during prolonged therapy      -  Ciprofloxacin: S <=0.25      -  Ertapenem: S <=0.5      -  Gentamicin: S <=2      -  Imipenem: S <=1      -  Levofloxacin: S <=0.5      -  Meropenem: S <=1      -  Nitrofurantoin: S <=32 Should not be used to treat pyelonephritis      -  Piperacillin/Tazobactam: S <=8      -  Tobramycin: S <=2      -  Trimethoprim/Sulfamethoxazole: S <=0.5/9.5    Culture - Blood (collected 12-04-22 @ 16:24)  Source: .Blood Blood  Final Report (12-10-22 @ 02:00):    No Growth Final    Culture - Blood (collected 12-04-22 @ 16:24)  Source: .Blood Blood  Final Report (12-10-22 @ 02:00):    No Growth Final        Lactate, Blood: 1.1 mmol/L (06-25-23 @ 06:29)  Blood Gas Venous - Lactate: 2.30 mmol/L (06-23-23 @ 18:18)      INFECTIOUS DISEASES TESTING  COVID-19 PCR: NotDetec (04-03-23 @ 10:23)  COVID-19 PCR: NotDetec (04-01-23 @ 18:46)  COVID-19 PCR: NotDetec (03-28-23 @ 21:55)  COVID-19 PCR: NotDetec (12-23-22 @ 10:23)  COVID-19 PCR: NotDetec (12-19-22 @ 12:54)  Legionella Antigen, Urine: Negative (12-17-22 @ 14:02)  MRSA PCR Result.: Negative (12-14-22 @ 16:33)  Procalcitonin, Serum: 0.16 ng/mL (12-14-22 @ 05:42)  Procalcitonin, Serum: 0.16 ng/mL (12-05-22 @ 13:48)      RADIOLOGY & ADDITIONAL TESTS:  I have personally reviewed the last Chest xray  CXR      CT      CARDIOLOGY TESTING  12 Lead ECG:   Ventricular Rate 62 BPM    Atrial Rate 62 BPM    P-R Interval 172 ms    QRS Duration 94 ms    Q-T Interval 416 ms    QTC Calculation(Bazett) 422 ms    P Axis 51 degrees    R Axis -15 degrees    T Axis 16 degrees    Diagnosis Line Normal sinus rhythm  Left ventricular hypertrophy  Abnormal ECG    Confirmed by Mohan Lin ([id number]) on 6/24/2023 2:35:06 PM (06-24-23 @ 14:18)  12 Lead ECG:   Ventricular Rate 70 BPM    Atrial Rate 70 BPM    P-R Interval 162 ms    QRS Duration 92 ms    Q-T Interval 370 ms    QTC Calculation(Bazett) 399 ms    P Axis 41 degrees    R Axis -13 degrees    T Axis 19 degrees    Diagnosis Line Sinus rhythm with Premature atrial complexes  Left ventricular hypertrophy  Abnormal ECG    Confirmed by Mohan Lin ([id number]) on 6/25/2023 1:19:30 PM (06-23-23 @ 15:06)      MEDICATIONS  cefepime   IVPB     cefepime   IVPB 2000 IV Intermittent every 8 hours  clopidogrel Tablet 75 Oral daily  dextrose 5%. 1000 IV Continuous <Continuous>  dextrose 5%. 1000 IV Continuous <Continuous>  dextrose 50% Injectable 25 IV Push once  dextrose 50% Injectable 12.5 IV Push once  dextrose 50% Injectable 25 IV Push once  DULoxetine 60 Oral daily  finasteride 5 Oral daily  glucagon  Injectable 1 IntraMuscular once  heparin   Injectable 5000 SubCutaneous every 8 hours  insulin glargine Injectable (LANTUS) 12 SubCutaneous every morning  insulin lispro (ADMELOG) corrective regimen sliding scale  SubCutaneous at bedtime  insulin lispro (ADMELOG) corrective regimen sliding scale  SubCutaneous three times a day before meals  lactobacillus acidophilus 1 Oral with breakfast  levothyroxine 50 Oral daily  metoprolol tartrate 25 Oral two times a day  pantoprazole    Tablet 40 Oral before breakfast  simvastatin 20 Oral at bedtime  tamsulosin 0.4 Oral at bedtime      Weight  Weight (kg): 54.8 (06-24-23 @ 16:00)    ANTIBIOTICS:  cefepime   IVPB 2000 milliGRAM(s) IV Intermittent every 8 hours  cefepime   IVPB          ALLERGIES:  No Known Allergies

## 2023-06-26 NOTE — PROGRESS NOTE ADULT - SUBJECTIVE AND OBJECTIVE BOX
LANGCHEL  89y, Male  Allergy: No Known Allergies    Hospital Day: 3d    Patient seen and examined. No acute events overnight    PMH/PSH:  PAST MEDICAL & SURGICAL HISTORY:  HTN (hypertension)      HLD (hyperlipidemia)      BPH (benign prostatic hyperplasia)      CAD (coronary artery disease)      Diabetes mellitus      Hypothyroidism      S/P CABG (coronary artery bypass graft)          VITALS:  T(F): 97.7 (06-26-23 @ 05:09), Max: 97.7 (06-26-23 @ 05:09)  HR: 72 (06-26-23 @ 05:09)  BP: 118/56 (06-26-23 @ 05:09) (111/66 - 124/58)  RR: 18 (06-25-23 @ 19:55)  SpO2: 98% (06-25-23 @ 20:00)    TESTS & MEASUREMENTS:  Weight (Kg): 54.8 (06-24-23 @ 16:00)  BMI (kg/m2): 23.6 (06-24)    06-24-23 @ 07:01  -  06-25-23 @ 07:00  --------------------------------------------------------  IN: 687 mL / OUT: 0 mL / NET: 687 mL    06-25-23 @ 07:01  -  06-26-23 @ 07:00  --------------------------------------------------------  IN: 2690 mL / OUT: 0 mL / NET: 2690 mL    06-26-23 @ 07:01  -  06-26-23 @ 10:53  --------------------------------------------------------  IN: 240 mL / OUT: 0 mL / NET: 240 mL                            8.6    15.50 )-----------( 349      ( 25 Jun 2023 06:29 )             27.2       06-25    129<L>  |  98  |  18  ----------------------------<  87  4.5   |  21  |  1.0    Ca    8.6      25 Jun 2023 06:29  Mg     2.1     06-25    TPro  4.9<L>  /  Alb  2.5<L>  /  TBili  0.2  /  DBili  x   /  AST  11  /  ALT  6   /  AlkPhos  101  06-25    LIVER FUNCTIONS - ( 25 Jun 2023 06:29 )  Alb: 2.5 g/dL / Pro: 4.9 g/dL / ALK PHOS: 101 U/L / ALT: 6 U/L / AST: 11 U/L / GGT: x                 Culture - Blood (collected 06-24-23 @ 00:28)  Source: .Blood None  Gram Stain (06-25-23 @ 17:54):    Growth in aerobic bottle: Gram Negative Rods    Growth in anaerobic bottle: Gram Negative Rods  Preliminary Report (06-26-23 @ 07:29):    Growth in aerobic and anaerobic bottles: Pseudomonas aeruginosa    ***Blood Panel PCR results on this specimen are available    approximately 3 hours after the Gram stain result.***    Gram stain, PCR, and/or culture results may not always    correspond due to difference in methodologies.    ************************************************************    This PCR assay was performed by multiplex PCR. This    Assay tests for 66 bacterial and resistance gene targets.    Please refer to the [facility] test directory    at [email or web address] for details.  Organism: Blood Culture PCR  Pseudomonas aeruginosa (06-26-23 @ 07:29)  Organism: Pseudomonas aeruginosa (06-26-23 @ 07:29)      Method Type: KEVIN      -  Amikacin: S -[id number]      -  Aztreonam: S <=4      -  Cefepime: S <=2      -  Ceftazidime: S <=1      -  Ciprofloxacin: S <=0.25      -  Gentamicin: S <=2      -  Imipenem: S <=1      -  Levofloxacin: S <=0.5      -  Meropenem: S <=1      -  Piperacillin/Tazobactam: S <=8      -  Tobramycin: S <=2  Organism: Blood Culture PCR (06-24-23 @ 23:53)      Method Type: PCR      -  Pseudomonas aeruginosa: Detec    Culture - Urine (collected 06-23-23 @ 15:45)  Source: Clean Catch Clean Catch (Midstream)  Final Report (06-25-23 @ 16:47):    >=3 organisms. Probable collection contamination.      Urinalysis Basic - ( 25 Jun 2023 06:29 )    Color: x / Appearance: x / SG: x / pH: x  Gluc: 87 mg/dL / Ketone: x  / Bili: x / Urobili: x   Blood: x / Protein: x / Nitrite: x   Leuk Esterase: x / RBC: x / WBC x   Sq Epi: x / Non Sq Epi: x / Bacteria: x        RADIOLOGY & ADDITIONAL TESTS:    RECENT DIAGNOSTIC ORDERS:  Culture - Blood: AM Sched. Collection:26-Jun-2023 04:30  Specimen Source: Blood-Venous (06-25-23 @ 11:33)  Culture - Blood: AM Sched. Collection:26-Jun-2023 04:30  Specimen Source: Blood-Peripheral (06-25-23 @ 11:33)      MEDICATIONS:  MEDICATIONS  (STANDING):  cefepime   IVPB 2000 milliGRAM(s) IV Intermittent every 8 hours  cefepime   IVPB      clopidogrel Tablet 75 milliGRAM(s) Oral daily  dextrose 5%. 1000 milliLiter(s) (100 mL/Hr) IV Continuous <Continuous>  dextrose 5%. 1000 milliLiter(s) (50 mL/Hr) IV Continuous <Continuous>  dextrose 50% Injectable 25 Gram(s) IV Push once  dextrose 50% Injectable 25 Gram(s) IV Push once  dextrose 50% Injectable 12.5 Gram(s) IV Push once  DULoxetine 60 milliGRAM(s) Oral daily  finasteride 5 milliGRAM(s) Oral daily  glucagon  Injectable 1 milliGRAM(s) IntraMuscular once  heparin   Injectable 5000 Unit(s) SubCutaneous every 8 hours  insulin glargine Injectable (LANTUS) 12 Unit(s) SubCutaneous every morning  insulin lispro (ADMELOG) corrective regimen sliding scale   SubCutaneous at bedtime  insulin lispro (ADMELOG) corrective regimen sliding scale   SubCutaneous three times a day before meals  lactobacillus acidophilus 1 Tablet(s) Oral with breakfast  levothyroxine 50 MICROGram(s) Oral daily  metoprolol tartrate 25 milliGRAM(s) Oral two times a day  pantoprazole    Tablet 40 milliGRAM(s) Oral before breakfast  simvastatin 20 milliGRAM(s) Oral at bedtime  tamsulosin 0.4 milliGRAM(s) Oral at bedtime    MEDICATIONS  (PRN):  acetaminophen     Tablet .. 650 milliGRAM(s) Oral every 6 hours PRN Temp greater or equal to 38C (100.4F), Mild Pain (1 - 3)  aluminum hydroxide/magnesium hydroxide/simethicone Suspension 30 milliLiter(s) Oral every 4 hours PRN Dyspepsia  dextrose Oral Gel 15 Gram(s) Oral once PRN Blood Glucose LESS THAN 70 milliGRAM(s)/deciliter  melatonin 3 milliGRAM(s) Oral at bedtime PRN Insomnia  ondansetron Injectable 4 milliGRAM(s) IV Push every 8 hours PRN Nausea and/or Vomiting      HOME MEDICATIONS:  DULoxetine 60 mg oral delayed release capsule (12-13)  finasteride 5 mg oral tablet (12-13)  lactobacillus acidophilus oral capsule (12-20)  levothyroxine 50 mcg (0.05 mg) oral tablet (12-13)  Metoprolol Tartrate 25 mg oral tablet (12-13)  Plavix 75 mg oral tablet (06-23)  simvastatin 20 mg oral tablet (12-13)  tamsulosin 0.4 mg oral capsule (12-13)  Toujeo SoloStar 300 units/mL subcutaneous solution (12-13)      REVIEW OF SYSTEMS:  All other review of systems is negative unless indicated above.     PHYSICAL EXAM:  PHYSICAL EXAM:  GENERAL: NAD, well-developed  HEAD:  Atraumatic, Normocephalic  NECK: Supple, No JVD  CHEST/LUNG: Clear to auscultation bilaterally; No wheeze  HEART: Regular rate and rhythm; No murmurs, rubs, or gallops  ABDOMEN: Soft, Nontender, Nondistended; Bowel sounds present  EXTREMITIES:  2+ Peripheral Pulses, No clubbing, cyanosis, or edema  SKIN: No rashes or lesions

## 2023-06-26 NOTE — PROGRESS NOTE ADULT - ASSESSMENT
89M PMHx HTN, DM2, BPH, CAD s/p CABG, hypothyroidism here with hyperkalemia, hyponatremia noted as outpt. Found to have pseudomonas bacteremia.    #Pseudomonas bacteremia  -recent hidalgo in snf; now out  -bcx pseudomonas 6/24  - Cont cefepime  - repeat bcx, Follow up Ucx  - f/u ID    #Hyperkalemia(resolved)  #hyponatremia  -Na 129  - Follow up aldosterone, renin    #HTN  -cont lopressor 25 bid  -monitor BP    #DM2  -continue lantus 12 with sliding scale    #BPH.   -continue finasteride and  tamsulosin    #CAD  -cont plavix and zocor    #MISC  -GI ppx: ppi  -DVT ppx: heparin sq  -Diet: Soft and bite sized  -Activity: IAT  -Code status: Full code  -Dispo: Id Follow up, bcx, ucx

## 2023-06-27 LAB
ALBUMIN SERPL ELPH-MCNC: 2.4 G/DL — LOW (ref 3.5–5.2)
ALP SERPL-CCNC: 93 U/L — SIGNIFICANT CHANGE UP (ref 30–115)
ALT FLD-CCNC: 6 U/L — SIGNIFICANT CHANGE UP (ref 0–41)
ANION GAP SERPL CALC-SCNC: 10 MMOL/L — SIGNIFICANT CHANGE UP (ref 7–14)
ANISOCYTOSIS BLD QL: SLIGHT — SIGNIFICANT CHANGE UP
APPEARANCE UR: ABNORMAL
AST SERPL-CCNC: 11 U/L — SIGNIFICANT CHANGE UP (ref 0–41)
BACTERIA # UR AUTO: NEGATIVE — SIGNIFICANT CHANGE UP
BASOPHILS # BLD AUTO: 0 K/UL — SIGNIFICANT CHANGE UP (ref 0–0.2)
BASOPHILS NFR BLD AUTO: 0 % — SIGNIFICANT CHANGE UP (ref 0–1)
BILIRUB SERPL-MCNC: 0.3 MG/DL — SIGNIFICANT CHANGE UP (ref 0.2–1.2)
BILIRUB UR-MCNC: NEGATIVE — SIGNIFICANT CHANGE UP
BUN SERPL-MCNC: 18 MG/DL — SIGNIFICANT CHANGE UP (ref 10–20)
CALCIUM SERPL-MCNC: 8.4 MG/DL — SIGNIFICANT CHANGE UP (ref 8.4–10.5)
CHLORIDE SERPL-SCNC: 98 MMOL/L — SIGNIFICANT CHANGE UP (ref 98–110)
CO2 SERPL-SCNC: 22 MMOL/L — SIGNIFICANT CHANGE UP (ref 17–32)
COLOR SPEC: YELLOW — SIGNIFICANT CHANGE UP
CREAT ?TM UR-MCNC: <4 MG/DL — SIGNIFICANT CHANGE UP
CREAT SERPL-MCNC: 1 MG/DL — SIGNIFICANT CHANGE UP (ref 0.7–1.5)
DIFF PNL FLD: ABNORMAL
EGFR: 68 ML/MIN/1.73M2 — SIGNIFICANT CHANGE UP
EOSINOPHIL # BLD AUTO: 0 K/UL — SIGNIFICANT CHANGE UP (ref 0–0.7)
EOSINOPHIL NFR BLD AUTO: 0 % — SIGNIFICANT CHANGE UP (ref 0–8)
EPI CELLS # UR: 1 /HPF — SIGNIFICANT CHANGE UP (ref 0–5)
GIANT PLATELETS BLD QL SMEAR: PRESENT — SIGNIFICANT CHANGE UP
GLUCOSE BLDC GLUCOMTR-MCNC: 134 MG/DL — HIGH (ref 70–99)
GLUCOSE BLDC GLUCOMTR-MCNC: 142 MG/DL — HIGH (ref 70–99)
GLUCOSE BLDC GLUCOMTR-MCNC: 236 MG/DL — HIGH (ref 70–99)
GLUCOSE BLDC GLUCOMTR-MCNC: 240 MG/DL — HIGH (ref 70–99)
GLUCOSE SERPL-MCNC: 130 MG/DL — HIGH (ref 70–99)
GLUCOSE UR QL: NEGATIVE — SIGNIFICANT CHANGE UP
HCT VFR BLD CALC: 27.6 % — LOW (ref 42–52)
HGB BLD-MCNC: 8.7 G/DL — LOW (ref 14–18)
HYALINE CASTS # UR AUTO: 4 /LPF — SIGNIFICANT CHANGE UP (ref 0–7)
HYPOCHROMIA BLD QL: SLIGHT — SIGNIFICANT CHANGE UP
IRON SATN MFR SERPL: 16 % — SIGNIFICANT CHANGE UP (ref 15–50)
IRON SATN MFR SERPL: 19 UG/DL — LOW (ref 35–150)
KETONES UR-MCNC: SIGNIFICANT CHANGE UP
LEUKOCYTE ESTERASE UR-ACNC: ABNORMAL
LYMPHOCYTES # BLD AUTO: 16.5 % — LOW (ref 20.5–51.1)
LYMPHOCYTES # BLD AUTO: 3.44 K/UL — HIGH (ref 1.2–3.4)
MAGNESIUM SERPL-MCNC: 1.5 MG/DL — LOW (ref 1.8–2.4)
MANUAL SMEAR VERIFICATION: SIGNIFICANT CHANGE UP
MCHC RBC-ENTMCNC: 26.3 PG — LOW (ref 27–31)
MCHC RBC-ENTMCNC: 31.5 G/DL — LOW (ref 32–37)
MCV RBC AUTO: 83.4 FL — SIGNIFICANT CHANGE UP (ref 80–94)
METAMYELOCYTES # FLD: 0.9 % — HIGH (ref 0–0)
MICROCYTES BLD QL: SLIGHT — SIGNIFICANT CHANGE UP
MONOCYTES # BLD AUTO: 2.17 K/UL — HIGH (ref 0.1–0.6)
MONOCYTES NFR BLD AUTO: 10.4 % — HIGH (ref 1.7–9.3)
NEUTROPHILS # BLD AUTO: 15.05 K/UL — HIGH (ref 1.4–6.5)
NEUTROPHILS NFR BLD AUTO: 72.2 % — SIGNIFICANT CHANGE UP (ref 42.2–75.2)
NITRITE UR-MCNC: NEGATIVE — SIGNIFICANT CHANGE UP
OSMOLALITY SERPL: 284 MOS/KG — SIGNIFICANT CHANGE UP (ref 280–301)
OSMOLALITY UR: 349 MOS/KG — SIGNIFICANT CHANGE UP (ref 50–1200)
PH UR: 5.5 — SIGNIFICANT CHANGE UP (ref 5–8)
PLAT MORPH BLD: NORMAL — SIGNIFICANT CHANGE UP
PLATELET # BLD AUTO: 334 K/UL — SIGNIFICANT CHANGE UP (ref 130–400)
PMV BLD: 9.1 FL — SIGNIFICANT CHANGE UP (ref 7.4–10.4)
POLYCHROMASIA BLD QL SMEAR: SIGNIFICANT CHANGE UP
POTASSIUM SERPL-MCNC: 4.6 MMOL/L — SIGNIFICANT CHANGE UP (ref 3.5–5)
POTASSIUM SERPL-SCNC: 4.6 MMOL/L — SIGNIFICANT CHANGE UP (ref 3.5–5)
POTASSIUM UR-SCNC: 24 MMOL/L — SIGNIFICANT CHANGE UP
PROT ?TM UR-MCNC: 34 MG/DLG/24H — SIGNIFICANT CHANGE UP
PROT SERPL-MCNC: 5 G/DL — LOW (ref 6–8)
PROT UR-MCNC: ABNORMAL
PROT/CREAT UR-RTO: SIGNIFICANT CHANGE UP RATIO (ref 0–0.2)
RBC # BLD: 3.31 M/UL — LOW (ref 4.7–6.1)
RBC # FLD: 15.5 % — HIGH (ref 11.5–14.5)
RBC BLD AUTO: ABNORMAL
RBC CASTS # UR COMP ASSIST: 1 /HPF — SIGNIFICANT CHANGE UP (ref 0–4)
SMUDGE CELLS # BLD: PRESENT — SIGNIFICANT CHANGE UP
SODIUM SERPL-SCNC: 130 MMOL/L — LOW (ref 135–146)
SODIUM UR-SCNC: 39 MMOL/L — SIGNIFICANT CHANGE UP
SP GR SPEC: 1.01 — SIGNIFICANT CHANGE UP (ref 1.01–1.03)
TIBC SERPL-MCNC: 122 UG/DL — LOW (ref 220–430)
TSH SERPL-MCNC: 2.85 UIU/ML — SIGNIFICANT CHANGE UP (ref 0.27–4.2)
UIBC SERPL-MCNC: 103 UG/DL — LOW (ref 110–370)
UROBILINOGEN FLD QL: SIGNIFICANT CHANGE UP
UUN UR-MCNC: 405 MG/DL — SIGNIFICANT CHANGE UP
WBC # BLD: 20.84 K/UL — HIGH (ref 4.8–10.8)
WBC # FLD AUTO: 20.84 K/UL — HIGH (ref 4.8–10.8)
WBC UR QL: 390 /HPF — HIGH (ref 0–5)

## 2023-06-27 PROCEDURE — 99233 SBSQ HOSP IP/OBS HIGH 50: CPT

## 2023-06-27 RX ORDER — MAGNESIUM SULFATE 500 MG/ML
2 VIAL (ML) INJECTION ONCE
Refills: 0 | Status: COMPLETED | OUTPATIENT
Start: 2023-06-27 | End: 2023-06-27

## 2023-06-27 RX ORDER — METOPROLOL TARTRATE 50 MG
25 TABLET ORAL
Refills: 0 | Status: DISCONTINUED | OUTPATIENT
Start: 2023-06-27 | End: 2023-07-18

## 2023-06-27 RX ORDER — FERROUS SULFATE 325(65) MG
325 TABLET ORAL
Refills: 0 | Status: DISCONTINUED | OUTPATIENT
Start: 2023-06-27 | End: 2023-07-18

## 2023-06-27 RX ADMIN — SIMVASTATIN 20 MILLIGRAM(S): 20 TABLET, FILM COATED ORAL at 21:36

## 2023-06-27 RX ADMIN — Medication 25 GRAM(S): at 08:59

## 2023-06-27 RX ADMIN — CLOPIDOGREL BISULFATE 75 MILLIGRAM(S): 75 TABLET, FILM COATED ORAL at 12:04

## 2023-06-27 RX ADMIN — PANTOPRAZOLE SODIUM 40 MILLIGRAM(S): 20 TABLET, DELAYED RELEASE ORAL at 07:44

## 2023-06-27 RX ADMIN — DULOXETINE HYDROCHLORIDE 60 MILLIGRAM(S): 30 CAPSULE, DELAYED RELEASE ORAL at 12:04

## 2023-06-27 RX ADMIN — Medication 1 TABLET(S): at 08:59

## 2023-06-27 RX ADMIN — TAMSULOSIN HYDROCHLORIDE 0.4 MILLIGRAM(S): 0.4 CAPSULE ORAL at 21:36

## 2023-06-27 RX ADMIN — CEFEPIME 100 MILLIGRAM(S): 1 INJECTION, POWDER, FOR SOLUTION INTRAMUSCULAR; INTRAVENOUS at 23:14

## 2023-06-27 RX ADMIN — Medication 2: at 12:04

## 2023-06-27 RX ADMIN — FINASTERIDE 5 MILLIGRAM(S): 5 TABLET, FILM COATED ORAL at 12:04

## 2023-06-27 RX ADMIN — Medication 25 MILLIGRAM(S): at 17:48

## 2023-06-27 RX ADMIN — Medication 325 MILLIGRAM(S): at 17:48

## 2023-06-27 RX ADMIN — HEPARIN SODIUM 5000 UNIT(S): 5000 INJECTION INTRAVENOUS; SUBCUTANEOUS at 05:01

## 2023-06-27 RX ADMIN — HEPARIN SODIUM 5000 UNIT(S): 5000 INJECTION INTRAVENOUS; SUBCUTANEOUS at 21:34

## 2023-06-27 RX ADMIN — INSULIN GLARGINE 12 UNIT(S): 100 INJECTION, SOLUTION SUBCUTANEOUS at 10:01

## 2023-06-27 RX ADMIN — HEPARIN SODIUM 5000 UNIT(S): 5000 INJECTION INTRAVENOUS; SUBCUTANEOUS at 13:16

## 2023-06-27 RX ADMIN — CEFEPIME 100 MILLIGRAM(S): 1 INJECTION, POWDER, FOR SOLUTION INTRAMUSCULAR; INTRAVENOUS at 12:03

## 2023-06-27 RX ADMIN — Medication 50 MICROGRAM(S): at 05:01

## 2023-06-27 NOTE — PROGRESS NOTE ADULT - SUBJECTIVE AND OBJECTIVE BOX
CHEL CROFT  [facility] ([facility]) 022 A ([facility] ([facility]))      Patient was evaluated and examined  by bedside, remains on 2 L via NC, sat 96%, no fever , tolerating diet well      REVIEW OF SYSTEMS:  please see pertinent positives mentioned above, all other 12 ROS negative      T(C): , Max: 36.5 (06-26-23 @ 13:41)  HR: 86 (06-27-23 @ 10:12)  BP: 100/52 (06-27-23 @ 10:12)  RR: 18 (06-27-23 @ 05:00)  SpO2: 92% (06-27-23 @ 10:12)  CAPILLARY BLOOD GLUCOSE      POCT Blood Glucose.: 236 mg/dL (27 Jun 2023 11:44)  POCT Blood Glucose.: 134 mg/dL (27 Jun 2023 08:06)  POCT Blood Glucose.: 139 mg/dL (26 Jun 2023 22:53)  POCT Blood Glucose.: 160 mg/dL (26 Jun 2023 21:58)  POCT Blood Glucose.: 211 mg/dL (26 Jun 2023 16:40)      PHYSICAL EXAM:  General: NAD, AAOX3, patient is laying comfortably in bed  HEENT: AT, NC, Supple, NO JVD, NO CB  Lungs: good breath sounds B/L, no wheezing, no rhonchi  CVS: normal S1, S2, RRR, NO M/G/R  Abdomen: soft, bowel sounds present, non-tender, non-distended  Extremities: no edema, no clubbing, no cyanosis, positive peripheral pulses b/l  Neuro: no acute focal neurological deficits, diffuse generalized body weakness  Skin: no rash, no ecchymosis      LAB  CBC  Date: 06-27-23 @ 08:11  Mean cell Ciqbbdyzrz66.3  Mean cell Hemoglobin Conc31.5  Mean cell Volum 83.4  Platelet count-Automate 334  RBC Count 3.31  Red Cell Distrib Width15.5  WBC Count20.84  % Albumin, Urine--  Hematocrit 27.6  Hemoglobin 8.7  CBC  Date: 06-26-23 @ 18:13  Mean cell Wnxbrovrgl31.0  Mean cell Hemoglobin Conc31.0  Mean cell Volum 83.9  Platelet count-Automate 330  RBC Count 3.23  Red Cell Distrib Width15.3  WBC Count15.01  % Albumin, Urine--  Hematocrit 27.1  Hemoglobin 8.4  CBC  Date: 06-25-23 @ 06:29  Mean cell Xivitexsgf72.0  Mean cell Hemoglobin Conc31.6  Mean cell Volum 82.2  Platelet count-Automate 349  RBC Count 3.31  Red Cell Distrib Width15.6  WBC Count15.50  % Albumin, Urine--  Hematocrit 27.2  Hemoglobin 8.6  CBC  Date: 06-24-23 @ 05:59  Mean cell Gyhlmtssjh16.3  Mean cell Hemoglobin Conc32.0  Mean cell Volum 82.1  Platelet count-Automate 351  RBC Count 3.12  Red Cell Distrib Width15.6  WBC Count17.00  % Albumin, Urine--  Hematocrit 25.6  Hemoglobin 8.2  CBC  Date: 06-23-23 @ 15:33  Mean cell Aqntopmqkj63.6  Mean cell Hemoglobin Conc31.8  Mean cell Volum 83.6  Platelet count-Automate 363  RBC Count 3.72  Red Cell Distrib Width15.6  WBC Count18.75  % Albumin, Urine--  Hematocrit 31.1  Hemoglobin 9.9    [facility]  06-27-23 @ 08:11  Blood Gas Arterial-Calcium,Ionized--  Blood Urea Nitrogen, Serum 18 mg/dL [10 - 20]  Carbon Dioxide, Serum22 mmol/L [17 - 32]  Chloride, Serum98 mmol/L [98 - 110]  Creatinie, Serum1.0 mg/dL [0.7 - 1.5]  Glucose, Ujnuf352 mg/dL<H> [70 - 99]  Potassium, Serum4.6 mmol/L [3.5 - 5.0]  Sodium, Serum 130 mmol/L<L> [135 - 146]  [facility]  06-26-23 @ 18:13  Blood Gas Arterial-Calcium,Ionized--  Blood Urea Nitrogen, Serum 18 mg/dL [10 - 20]  Carbon Dioxide, Serum18 mmol/L [17 - 32]  Chloride, Eqots464 mmol/L [98 - 110]  Creatinie, Serum1.1 mg/dL [0.7 - 1.5]  Glucose, Uvzfh514 mg/dL<H> [70 - 99]  Potassium, Serum4.6 mmol/L [3.5 - 5.0]  Sodium, Serum 130 mmol/L<L> [135 - 146]  [facility]  06-25-23 @ 06:29  Blood Gas Arterial-Calcium,Ionized--  Blood Urea Nitrogen, Serum 18 mg/dL [10 - 20]  Carbon Dioxide, Serum21 mmol/L [17 - 32]  Chloride, Serum98 mmol/L [98 - 110]  Creatinie, Serum1.0 mg/dL [0.7 - 1.5]  Glucose, Serum87 mg/dL [70 - 99]  Potassium, Serum4.5 mmol/L [3.5 - 5.0]  Sodium, Serum 129 mmol/L<L> [135 - 146]  [facility]  06-24-23 @ 17:40  Blood Gas Arterial-Calcium,Ionized--  Blood Urea Nitrogen, Serum 21 mg/dL<H> [10 - 20]  Carbon Dioxide, Serum21 mmol/L [17 - 32]  Chloride, Serum98 mmol/L [98 - 110]  Creatinie, Serum1.0 mg/dL [0.7 - 1.5]  Glucose, Serum94 mg/dL [70 - 99]  Potassium, Serum5.6 mmol/L<H> [3.5 - 5.0]  Sodium, Serum 129 mmol/L<L> [135 - 146]  [facility]  06-24-23 @ 11:22  Blood Gas Arterial-Calcium,Ionized--  Blood Urea Nitrogen, Serum 22 mg/dL<H> [10 - 20]  Carbon Dioxide, Serum20 mmol/L [17 - 32]  Chloride, Serum98 mmol/L [98 - 110]  Creatinie, Serum1.0 mg/dL [0.7 - 1.5]  Glucose, Uzeia612 mg/dL<H> [70 - 99]  Potassium, Serum5.6 mmol/L<H> [3.5 - 5.0]  Sodium, Serum 130 mmol/L<L> [135 - 146]  [facility]  06-24-23 @ 05:59  Blood Gas Arterial-Calcium,Ionized--  Blood Urea Nitrogen, Serum 22 mg/dL<H> [10 - 20]  Carbon Dioxide, Serum20 mmol/L [17 - 32]  Chloride, Serum98 mmol/L [98 - 110]  Creatinie, Serum1.1 mg/dL [0.7 - 1.5]  Glucose, Tapqo630 mg/dL<H> [70 - 99]  Potassium, Serum5.5 mmol/L<H> [3.5 - 5.0] [Hemolyzed. Interpret with caution]  Sodium, Serum 131 mmol/L<L> [135 - 146]  [facility]  06-24-23 @ 00:28  Blood Gas Arterial-Calcium,Ionized--  Blood Urea Nitrogen, Serum 23 mg/dL<H> [10 - 20]  Carbon Dioxide, Serum20 mmol/L [17 - 32]  Chloride, Serum99 mmol/L [98 - 110]  Creatinie, Serum1.0 mg/dL [0.7 - 1.5]  Glucose, Vjvij485 mg/dL<H> [70 - 99]  Potassium, Serum5.1 mmol/L<H> [3.5 - 5.0]  Sodium, Serum 130 mmol/L<L> [135 - 146]        Microbiology:    Culture - Blood (collected 06-24-23 @ 00:28)  Source: .Blood None  Gram Stain (06-25-23 @ 17:54):    Growth in aerobic bottle: Gram Negative Rods    Growth in anaerobic bottle: Gram Negative Rods  Final Report (06-26-23 @ 14:45):    Growth in aerobic and anaerobic bottles: Pseudomonas aeruginosa    ***Blood Panel PCR results on this specimen are available    approximately 3 hours after the Gram stain result.***    Gram stain, PCR, and/or culture results may not always    correspond due to difference in methodologies.    ************************************************************    This PCR assay was performed by multiplex PCR. This    Assay tests for 66 bacterial and resistance gene targets.    Please refer to the [facility] test directory    at [email or web address] for details.  Organism: Blood Culture PCR  Pseudomonas aeruginosa (06-26-23 @ 14:45)  Organism: Pseudomonas aeruginosa (06-26-23 @ 14:45)      Method Type: KEVIN      -  Amikacin: S -[id number]      -  Aztreonam: S <=4      -  Cefepime: S <=2      -  Ceftazidime: S <=1      -  Ciprofloxacin: S <=0.25      -  Gentamicin: S <=2      -  Imipenem: S <=1      -  Levofloxacin: S <=0.5      -  Meropenem: S <=1      -  Piperacillin/Tazobactam: S <=8      -  Tobramycin: S <=2  Organism: Blood Culture PCR (06-26-23 @ 14:45)      Method Type: PCR      -  Pseudomonas aeruginosa: Detec    Culture - Urine (collected 06-23-23 @ 15:45)  Source: Clean Catch Clean Catch (Midstream)  Final Report (06-25-23 @ 16:47):    >=3 organisms. Probable collection contamination.      Medications:  acetaminophen     Tablet .. 650 milliGRAM(s) Oral every 6 hours PRN  aluminum hydroxide/magnesium hydroxide/simethicone Suspension 30 milliLiter(s) Oral every 4 hours PRN  cefepime   IVPB 2000 milliGRAM(s) IV Intermittent every 12 hours  clopidogrel Tablet 75 milliGRAM(s) Oral daily  dextrose 5%. 1000 milliLiter(s) IV Continuous <Continuous>  dextrose 5%. 1000 milliLiter(s) IV Continuous <Continuous>  dextrose 50% Injectable 25 Gram(s) IV Push once  dextrose 50% Injectable 25 Gram(s) IV Push once  dextrose 50% Injectable 12.5 Gram(s) IV Push once  dextrose Oral Gel 15 Gram(s) Oral once PRN  DULoxetine 60 milliGRAM(s) Oral daily  ferrous    sulfate 325 milliGRAM(s) Oral two times a day  finasteride 5 milliGRAM(s) Oral daily  glucagon  Injectable 1 milliGRAM(s) IntraMuscular once  heparin   Injectable 5000 Unit(s) SubCutaneous every 8 hours  insulin glargine Injectable (LANTUS) 12 Unit(s) SubCutaneous every morning  insulin lispro (ADMELOG) corrective regimen sliding scale   SubCutaneous at bedtime  insulin lispro (ADMELOG) corrective regimen sliding scale   SubCutaneous three times a day before meals  lactobacillus acidophilus 1 Tablet(s) Oral with breakfast  levothyroxine 50 MICROGram(s) Oral daily  melatonin 3 milliGRAM(s) Oral at bedtime PRN  metoprolol tartrate 25 milliGRAM(s) Oral two times a day  ondansetron Injectable 4 milliGRAM(s) IV Push every 8 hours PRN  pantoprazole    Tablet 40 milliGRAM(s) Oral before breakfast  simvastatin 20 milliGRAM(s) Oral at bedtime  tamsulosin 0.4 milliGRAM(s) Oral at bedtime        Assessment and Plan:  Patient is a 89 y/o Male with  PMHx HTN, DM2, BPH, CAD s/p CABG, hypothyroidism, recently dx. from [facility] , progressive ambulatory dysfunction, admitted   with hyperkalemia, hyponatremia noted as outpt. Found to have pseudomonas bacteremia.    #Bacteremia due to Pseudomonas.   # Leukocytosis   #in setting of recent hidalgo in snf; now out; possible uti  ua positive; f/u ucxs- more than 3 organisms, repeat urine cxs , repeat blood cxs   bcxs pseudomonas 6/24, sensitive to Cefepime , ID on board   - Cont  IV cefepime  - daily CBC monitoring       #Hyperkalemia resolved  #hyponatremia- serum osm- 284, obtain urine osm, urine Na levels, daily BMP monitoring   # Hypomagnesemia- supplemented       #HTN (hypertension). currently borderline Hypotensive   -continue  lopressor 25mg PO twice daily with holding parameters.     #DM2 (diabetes mellitus, type 2)- uncontrolled, Hga1C- 9  lantus 12  ssi.    #History of BPH. - continue finasteride 5 mg po once daily /tamsulosin 0.4 mg po once daily at bedtime     #h/o CAD (coronary artery disease). / h/o CABG  - plavix/zocor tx.  -patient is on supplemental oxygen via NC, 2 L , on RA sat 91- 92% at rest     #Hypothyroidism. continue synthroid 50mcg po once daily, obtain TSH level    # Ambulatory dysfunction, physical decline- once patient clinically improves will obtain PT evaluation.     DVT ppX, heparin    #Progress Note Handoff: Pending repeated blood cxs, repeat urine cxs, daily CBC, BMP , Mg levels, monitor b/p, GOC discussion  Family discussion: yes, medical team Disposition: Home___vs STR once medically stable    Total time spent to complete patient's bedside assessment, review medical chart, discuss medical plan of care with covering medical team was more than 50 minutes with >50% of time spent face to face with patient, discussion with patient/family and/or coordination of care           CHEL CROFT  [facility]N 3A (Back) 022 A ([facility] 3A (Back))      Patient was evaluated and examined  by bedside, remains on 2 L via NC, sat 96%, no fever , tolerating diet well      REVIEW OF SYSTEMS:  please see pertinent positives mentioned above, all other 12 ROS negative      T(C): , Max: 36.5 (06-26-23 @ 13:41)  HR: 86 (06-27-23 @ 10:12)  BP: 100/52 (06-27-23 @ 10:12)  RR: 18 (06-27-23 @ 05:00)  SpO2: 92% (06-27-23 @ 10:12)  CAPILLARY BLOOD GLUCOSE      POCT Blood Glucose.: 236 mg/dL (27 Jun 2023 11:44)  POCT Blood Glucose.: 134 mg/dL (27 Jun 2023 08:06)  POCT Blood Glucose.: 139 mg/dL (26 Jun 2023 22:53)  POCT Blood Glucose.: 160 mg/dL (26 Jun 2023 21:58)  POCT Blood Glucose.: 211 mg/dL (26 Jun 2023 16:40)      PHYSICAL EXAM:  General: NAD, AAOX3, patient is laying comfortably in bed  HEENT: AT, NC, Supple, NO JVD, NO CB  Lungs: good breath sounds B/L, no wheezing, no rhonchi  CVS: normal S1, S2, RRR, NO M/G/R  Abdomen: soft, bowel sounds present, non-tender, non-distended  Extremities: no edema, no clubbing, no cyanosis, positive peripheral pulses b/l  Neuro: no acute focal neurological deficits, diffuse generalized body weakness  Skin: no rash, no ecchymosis      LAB  CBC  Date: 06-27-23 @ 08:11  Mean cell Uqjqcgwmvn34.3  Mean cell Hemoglobin Conc31.5  Mean cell Volum 83.4  Platelet count-Automate 334  RBC Count 3.31  Red Cell Distrib Width15.5  WBC Count20.84  % Albumin, Urine--  Hematocrit 27.6  Hemoglobin 8.7  CBC  Date: 06-26-23 @ 18:13  Mean cell Gmvvmklizx65.0  Mean cell Hemoglobin Conc31.0  Mean cell Volum 83.9  Platelet count-Automate 330  RBC Count 3.23  Red Cell Distrib Width15.3  WBC Count15.01  % Albumin, Urine--  Hematocrit 27.1  Hemoglobin 8.4  CBC  Date: 06-25-23 @ 06:29  Mean cell Elfgovdlke90.0  Mean cell Hemoglobin Conc31.6  Mean cell Volum 82.2  Platelet count-Automate 349  RBC Count 3.31  Red Cell Distrib Width15.6  WBC Count15.50  % Albumin, Urine--  Hematocrit 27.2  Hemoglobin 8.6  CBC  Date: 06-24-23 @ 05:59  Mean cell Mjvrlntvyp34.3  Mean cell Hemoglobin Conc32.0  Mean cell Volum 82.1  Platelet count-Automate 351  RBC Count 3.12  Red Cell Distrib Width15.6  WBC Count17.00  % Albumin, Urine--  Hematocrit 25.6  Hemoglobin 8.2  CBC  Date: 06-23-23 @ 15:33  Mean cell Nnikmworlj50.6  Mean cell Hemoglobin Conc31.8  Mean cell Volum 83.6  Platelet count-Automate 363  RBC Count 3.72  Red Cell Distrib Width15.6  WBC Count18.75  % Albumin, Urine--  Hematocrit 31.1  Hemoglobin 9.9    [facility]  06-27-23 @ 08:11  Blood Gas Arterial-Calcium,Ionized--  Blood Urea Nitrogen, Serum 18 mg/dL [10 - 20]  Carbon Dioxide, Serum22 mmol/L [17 - 32]  Chloride, Serum98 mmol/L [98 - 110]  Creatinie, Serum1.0 mg/dL [0.7 - 1.5]  Glucose, Wzwjo115 mg/dL<H> [70 - 99]  Potassium, Serum4.6 mmol/L [3.5 - 5.0]  Sodium, Serum 130 mmol/L<L> [135 - 146]  [facility]  06-26-23 @ 18:13  Blood Gas Arterial-Calcium,Ionized--  Blood Urea Nitrogen, Serum 18 mg/dL [10 - 20]  Carbon Dioxide, Serum18 mmol/L [17 - 32]  Chloride, Voxve309 mmol/L [98 - 110]  Creatinie, Serum1.1 mg/dL [0.7 - 1.5]  Glucose, Ihpgi822 mg/dL<H> [70 - 99]  Potassium, Serum4.6 mmol/L [3.5 - 5.0]  Sodium, Serum 130 mmol/L<L> [135 - 146]  [facility]  06-25-23 @ 06:29  Blood Gas Arterial-Calcium,Ionized--  Blood Urea Nitrogen, Serum 18 mg/dL [10 - 20]  Carbon Dioxide, Serum21 mmol/L [17 - 32]  Chloride, Serum98 mmol/L [98 - 110]  Creatinie, Serum1.0 mg/dL [0.7 - 1.5]  Glucose, Serum87 mg/dL [70 - 99]  Potassium, Serum4.5 mmol/L [3.5 - 5.0]  Sodium, Serum 129 mmol/L<L> [135 - 146]  [facility]  06-24-23 @ 17:40  Blood Gas Arterial-Calcium,Ionized--  Blood Urea Nitrogen, Serum 21 mg/dL<H> [10 - 20]  Carbon Dioxide, Serum21 mmol/L [17 - 32]  Chloride, Serum98 mmol/L [98 - 110]  Creatinie, Serum1.0 mg/dL [0.7 - 1.5]  Glucose, Serum94 mg/dL [70 - 99]  Potassium, Serum5.6 mmol/L<H> [3.5 - 5.0]  Sodium, Serum 129 mmol/L<L> [135 - 146]  [facility]  06-24-23 @ 11:22  Blood Gas Arterial-Calcium,Ionized--  Blood Urea Nitrogen, Serum 22 mg/dL<H> [10 - 20]  Carbon Dioxide, Serum20 mmol/L [17 - 32]  Chloride, Serum98 mmol/L [98 - 110]  Creatinie, Serum1.0 mg/dL [0.7 - 1.5]  Glucose, Wbxxe009 mg/dL<H> [70 - 99]  Potassium, Serum5.6 mmol/L<H> [3.5 - 5.0]  Sodium, Serum 130 mmol/L<L> [135 - 146]  [facility]  06-24-23 @ 05:59  Blood Gas Arterial-Calcium,Ionized--  Blood Urea Nitrogen, Serum 22 mg/dL<H> [10 - 20]  Carbon Dioxide, Serum20 mmol/L [17 - 32]  Chloride, Serum98 mmol/L [98 - 110]  Creatinie, Serum1.1 mg/dL [0.7 - 1.5]  Glucose, Uupra580 mg/dL<H> [70 - 99]  Potassium, Serum5.5 mmol/L<H> [3.5 - 5.0] [Hemolyzed. Interpret with caution]  Sodium, Serum 131 mmol/L<L> [135 - 146]  [facility]  06-24-23 @ 00:28  Blood Gas Arterial-Calcium,Ionized--  Blood Urea Nitrogen, Serum 23 mg/dL<H> [10 - 20]  Carbon Dioxide, Serum20 mmol/L [17 - 32]  Chloride, Serum99 mmol/L [98 - 110]  Creatinie, Serum1.0 mg/dL [0.7 - 1.5]  Glucose, Xjwyz670 mg/dL<H> [70 - 99]  Potassium, Serum5.1 mmol/L<H> [3.5 - 5.0]  Sodium, Serum 130 mmol/L<L> [135 - 146]        Microbiology:    Culture - Blood (collected 06-24-23 @ 00:28)  Source: .Blood None  Gram Stain (06-25-23 @ 17:54):    Growth in aerobic bottle: Gram Negative Rods    Growth in anaerobic bottle: Gram Negative Rods  Final Report (06-26-23 @ 14:45):    Growth in aerobic and anaerobic bottles: Pseudomonas aeruginosa    ***Blood Panel PCR results on this specimen are available    approximately 3 hours after the Gram stain result.***    Gram stain, PCR, and/or culture results may not always    correspond due to difference in methodologies.    ************************************************************    This PCR assay was performed by multiplex PCR. This    Assay tests for 66 bacterial and resistance gene targets.    Please refer to the [facility] test directory    at [email or web address] for details.  Organism: Blood Culture PCR  Pseudomonas aeruginosa (06-26-23 @ 14:45)  Organism: Pseudomonas aeruginosa (06-26-23 @ 14:45)      Method Type: KEVIN      -  Amikacin: S -[id number]      -  Aztreonam: S <=4      -  Cefepime: S <=2      -  Ceftazidime: S <=1      -  Ciprofloxacin: S <=0.25      -  Gentamicin: S <=2      -  Imipenem: S <=1      -  Levofloxacin: S <=0.5      -  Meropenem: S <=1      -  Piperacillin/Tazobactam: S <=8      -  Tobramycin: S <=2  Organism: Blood Culture PCR (06-26-23 @ 14:45)      Method Type: PCR      -  Pseudomonas aeruginosa: Detec    Culture - Urine (collected 06-23-23 @ 15:45)  Source: Clean Catch Clean Catch (Midstream)  Final Report (06-25-23 @ 16:47):    >=3 organisms. Probable collection contamination.      Medications:  acetaminophen     Tablet .. 650 milliGRAM(s) Oral every 6 hours PRN  aluminum hydroxide/magnesium hydroxide/simethicone Suspension 30 milliLiter(s) Oral every 4 hours PRN  cefepime   IVPB 2000 milliGRAM(s) IV Intermittent every 12 hours  clopidogrel Tablet 75 milliGRAM(s) Oral daily  dextrose 5%. 1000 milliLiter(s) IV Continuous <Continuous>  dextrose 5%. 1000 milliLiter(s) IV Continuous <Continuous>  dextrose 50% Injectable 25 Gram(s) IV Push once  dextrose 50% Injectable 25 Gram(s) IV Push once  dextrose 50% Injectable 12.5 Gram(s) IV Push once  dextrose Oral Gel 15 Gram(s) Oral once PRN  DULoxetine 60 milliGRAM(s) Oral daily  ferrous    sulfate 325 milliGRAM(s) Oral two times a day  finasteride 5 milliGRAM(s) Oral daily  glucagon  Injectable 1 milliGRAM(s) IntraMuscular once  heparin   Injectable 5000 Unit(s) SubCutaneous every 8 hours  insulin glargine Injectable (LANTUS) 12 Unit(s) SubCutaneous every morning  insulin lispro (ADMELOG) corrective regimen sliding scale   SubCutaneous at bedtime  insulin lispro (ADMELOG) corrective regimen sliding scale   SubCutaneous three times a day before meals  lactobacillus acidophilus 1 Tablet(s) Oral with breakfast  levothyroxine 50 MICROGram(s) Oral daily  melatonin 3 milliGRAM(s) Oral at bedtime PRN  metoprolol tartrate 25 milliGRAM(s) Oral two times a day  ondansetron Injectable 4 milliGRAM(s) IV Push every 8 hours PRN  pantoprazole    Tablet 40 milliGRAM(s) Oral before breakfast  simvastatin 20 milliGRAM(s) Oral at bedtime  tamsulosin 0.4 milliGRAM(s) Oral at bedtime        Assessment and Plan:  Patient is a 89 y/o Male with  PMHx HTN, DM2, BPH, CAD s/p CABG, hypothyroidism, recently dx. from [facility] , progressive ambulatory dysfunction, admitted   with hyperkalemia, hyponatremia noted as outpt. Found to have pseudomonas bacteremia.    #Bacteremia due to Pseudomonas.   # Leukocytosis   #in setting of recent hidalgo in snf; now out; possible uti  ua positive; f/u ucxs- more than 3 organisms, repeat urine cxs , repeat blood cxs   bcxs pseudomonas 6/24, sensitive to Cefepime , ID on board   - Cont  IV cefepime  - daily CBC monitoring       #Hyperkalemia resolved  #hyponatremia- serum osm- 284, obtain urine osm, urine Na levels, daily BMP monitoring   # Hypomagnesemia- supplemented       #HTN (hypertension). currently borderline Hypotensive   -continue  lopressor 25mg PO twice daily with holding parameters.     #DM2 (diabetes mellitus, type 2)- uncontrolled, Hga1C- 9  lantus 12  ssi.    #History of BPH. - continue finasteride 5 mg po once daily /tamsulosin 0.4 mg po once daily at bedtime     #h/o CAD (coronary artery disease). / h/o CABG  - plavix/zocor tx.  -patient is on supplemental oxygen via NC, 2 L , on RA sat 91- 92% at rest     #Hypothyroidism. continue synthroid 50mcg po once daily, obtain TSH level    # Iron def. anemia- started on PO Iron tx.     # Ambulatory dysfunction, physical decline- once patient clinically improves will obtain PT evaluation.     DVT ppX, heparin    #Progress Note Handoff: Pending repeated blood cxs, repeat urine cxs, daily CBC, BMP , Mg levels, monitor b/p, GOC discussion  Family discussion: yes, medical team Disposition: Home___vs STR once medically stable    Total time spent to complete patient's bedside assessment, review medical chart, discuss medical plan of care with covering medical team was more than 50 minutes with >50% of time spent face to face with patient, discussion with patient/family and/or coordination of care

## 2023-06-27 NOTE — PROGRESS NOTE ADULT - ASSESSMENT
89M PMHx HTN, DM2, BPH, CAD s/p CABG, hypothyroidism here with hyperkalemia, hyponatremia noted as outpt. Found to have pseudomonas bacteremia.    #Pseudomonas bacteremia  Patient afebrile, WBC uptrending, continue monitoring on cefepime  - WBC 15 (6/26) -> 21 (6/27)  - F/u AM CBC  - recent hidalgo in snf; now out  - bcx pseudomonas 6/24  - UA showing no bacteria but LE positive and   - C/w cefepime 2 g q12h, d/c on levaquin 750 mg q48h as per   - F/u repeat Ucx (6/27)  - Pending AM Bcx (6/28)    #Hyperkalemia(resolved)  #hyponatremia  - Serum Na 130 (6/27)  - Aldosterone 9.4  - Renin 14.6  - Urine Na 39  - Urine Osm 349  - Serum Osm 284  - Pending Urine Cr (6/27)  - F/u AM Na    #HTN  #Moderate to severe AS found TTE on 12/14/22  - restarted metoprolol 25 mg bid, hold if SBP <100, HR <60  - monitor BP    #DM2  -continue lantus 12 with sliding scale    #BPH.   -continue finasteride and  tamsulosin    #CAD  -cont plavix and zocor    #MISC  -GI ppx: ppi  -DVT ppx: heparin sq  -Diet: Soft and bite sized  -Activity: IAT  -Code status: Full code  -Dispo: Had a GOC conversation with patient and daughter today, will f/u tomorrow when patient's daughter returns or consult palliative care AM

## 2023-06-27 NOTE — PROGRESS NOTE ADULT - SUBJECTIVE AND OBJECTIVE BOX
----------Daily Progress Note----------    HISTORY OF PRESENT ILLNESS:  89M PMHx HTN, DM2, BPH, CAD s/p CABG, hypothyroidism here with hyperkalemia, hyponatremia noted as outpt, who had pain with urination and urinary incontinence, with a home physician visit with labs showing hyperkalemia and hyponatremia, for which he was brought into the ED for. While in the ED, he was found to have pseudomonas bacteremia and was admitted for management of his UTI, pseudomonas bacteremia, hyperkalemia, and hyponatremia. Hyperkalemia has currently resolved.    Today is hospital day 4d.    ED course:  89 yo M with hx of HTN, HLD, DM II BPH and CAD s/p CABG x 4, Hypothyroidism presents to ED for abnormal labs result (hyperkalemia and hyponatremia). Patient is a poor historian, however patient's daughter was at bedside for collateral. Per the daughter, patient was recently admitted for fall and discharged to [facility]. When patient was discharged from [facility] he did not have a hidalgo in. Per the daughter at the nursing home patient had a hidalgo catheter in despite family requesting removal of it multiple times. Patient was discharged from [facility] on May 26th. Per the daughter patient was on antibiotics at [facility] for UTI, but she is unsure which antibiotic it was.     Since discharge patient's daughter says the patient has been extremely lethargic and weak. His urine has been milky, dark since May 26th when he was discharged from [facility]. Over the past week patient has become incontinent, no able to make it to the restroom, and complaining that he has pain with urination. On Wednesday a doctor came to the house to examine the patient, at that time he ordered lab tests, which the patient had preformed today. Labs were significant for Na 126 and a K TNP. They were instructed to come straight to the ED.     In the ED labs were significant for a Na 131, K+ 5.1, Hgb 9.9 (up from previous admission), and a WBC count of 18K.     Vital Signs Last 24 Hrs  T(F): 98.7 (2023 17:00), Max: 98.7 (2023 17:00)  HR: 95 (2023 19:30) (80 - 95)  BP: 157/78 (2023 19:30) (121/60 - 157/78)  BP(mean): --  RR: 20 (2023 19:30) (16 - 20)  SpO2: 99% (2023 19:30) (96% - 99%)  Patient On (Oxygen Delivery Method): nasal cannula  O2 Flow (L/min): 2    UA is positive for many bacteria >720 WBC, moderate blood and large leukocyte esterase.     INTERVAL HOSPITAL COURSE / OVERNIGHT EVENTS:  24 hr events:  None    O/N events:  BP dropped to 93/52, held morning dose of metoprolol    Patient was examined and seen at bedside. He was laying comfortably in bed, sleeping well, reported feeling well, and wishes to go home    Review of Systems: Otherwise unremarkable     <<<<<PAST MEDICAL & SURGICAL HISTORY>>>>>  HTN (hypertension)    HLD (hyperlipidemia)    BPH (benign prostatic hyperplasia)    CAD (coronary artery disease)    Diabetes mellitus    Hypothyroidism    S/P CABG (coronary artery bypass graft)      ALLERGIES  No Known Allergies      Home Medications:  DULoxetine 60 mg oral delayed release capsule: 1 cap(s) orally once a day (13 Dec 2022 22:03)  finasteride 5 mg oral tablet: 1 tab(s) orally once a day (13 Dec 2022 22:03)  lactobacillus acidophilus oral capsule: 1 tab(s) orally once a day (20 Dec 2022 10:18)  levothyroxine 50 mcg (0.05 mg) oral tablet: 1 tab(s) orally once a day (13 Dec 2022 22:03)  Metoprolol Tartrate 25 mg oral tablet: 1 tab(s) orally 2 times a day (13 Dec 2022 22:03)  Plavix 75 mg oral tablet: 1 orally once a day (2023 21:54)  simvastatin 20 mg oral tablet: 1 tab(s) orally once a day (at bedtime) (13 Dec 2022 22:03)  tamsulosin 0.4 mg oral capsule: 1 cap(s) orally once a day (13 Dec 2022 22:03)  Toujeo SoloStar 300 units/mL subcutaneous solution: 12 unit(s) subcutaneous once a day (in the morning) (13 Dec 2022 22:03)    MEDICATIONS  STANDING MEDICATIONS  cefepime   IVPB 2000 milliGRAM(s) IV Intermittent every 12 hours  clopidogrel Tablet 75 milliGRAM(s) Oral daily  dextrose 5%. 1000 milliLiter(s) IV Continuous <Continuous>  dextrose 5%. 1000 milliLiter(s) IV Continuous <Continuous>  dextrose 50% Injectable 25 Gram(s) IV Push once  dextrose 50% Injectable 12.5 Gram(s) IV Push once  dextrose 50% Injectable 25 Gram(s) IV Push once  DULoxetine 60 milliGRAM(s) Oral daily  ferrous    sulfate 325 milliGRAM(s) Oral two times a day  finasteride 5 milliGRAM(s) Oral daily  glucagon  Injectable 1 milliGRAM(s) IntraMuscular once  heparin   Injectable 5000 Unit(s) SubCutaneous every 8 hours  insulin glargine Injectable (LANTUS) 12 Unit(s) SubCutaneous every morning  insulin lispro (ADMELOG) corrective regimen sliding scale   SubCutaneous at bedtime  insulin lispro (ADMELOG) corrective regimen sliding scale   SubCutaneous three times a day before meals  lactobacillus acidophilus 1 Tablet(s) Oral with breakfast  levothyroxine 50 MICROGram(s) Oral daily  metoprolol tartrate 25 milliGRAM(s) Oral two times a day  pantoprazole    Tablet 40 milliGRAM(s) Oral before breakfast  simvastatin 20 milliGRAM(s) Oral at bedtime  tamsulosin 0.4 milliGRAM(s) Oral at bedtime    PRN MEDICATIONS  acetaminophen     Tablet .. 650 milliGRAM(s) Oral every 6 hours PRN  aluminum hydroxide/magnesium hydroxide/simethicone Suspension 30 milliLiter(s) Oral every 4 hours PRN  dextrose Oral Gel 15 Gram(s) Oral once PRN  melatonin 3 milliGRAM(s) Oral at bedtime PRN  ondansetron Injectable 4 milliGRAM(s) IV Push every 8 hours PRN    VITALS:  T(F): 96.9  HR: 80  BP: 108/55  RR: 18  SpO2: 99%    PHYSICAL EXAM:  General: Elderly man laying comfortably in bed, in no apparent distress  Cardio: RRR, No M/R/G, S1/S2+  Pulm: Normal breath sounds, No crackles, wheezing, or rhonchi  GI: Soft, NTND  : No CVA or suprapubic tenderness, +left testicular erythema and swelling  Skin: Intact, no rashes or bruising   Ext: No LE edema    <<<<<LABS>>>>>                        8.7    20.84 )-----------( 334      ( 2023 08:11 )             27.6         130<L>  |  98  |  18  ----------------------------<  130<H>  4.6   |  22  |  1.0    Ca    8.4      2023 08:11  Mg     1.5         TPro  5.0<L>  /  Alb  2.4<L>  /  TBili  0.3  /  DBili  x   /  AST  11  /  ALT  6   /  AlkPhos  93        Urinalysis Basic - ( 2023 12:50 )    Color: Yellow / Appearance: Slightly Turbid / S.014 / pH: x  Gluc: x / Ketone: Trace  / Bili: Negative / Urobili: <2 mg/dL   Blood: x / Protein: 30 mg/dL / Nitrite: Negative   Leuk Esterase: Large / RBC: 1 /HPF /  /HPF   Sq Epi: x / Non Sq Epi: x / Bacteria: Negative      <<<<<RADIOLOGY>>>>>        < from: US Kidney and Bladder (23 @ 21:08) >  IMPRESSION:    No sonographic evidence of hydronephrosis.    Post void residual of 69 mL (46%).      < from: Xray Chest 1 View-PORTABLE IMMEDIATE (Xray Chest 1 View-PORTABLE IMMEDIATE .) (23 @ 16:45) >  Impression:    Right basilar focal atelectasis      < from: Xray Chest 1 View-PORTABLE IMMEDIATE (Xray Chest 1 View-PORTABLE IMMEDIATE .) (23 @ 10:25) >  FINDINGS/  IMPRESSION:    Low lung volumes. Right basal bandlike opacity/atelectasis. No   pneumothorax or significant pleural effusion on frontal view. No focal   consolidation.    Stable cardiomediastinal silhouette.    Unchanged osseous structures.        -----------------------------------------------------------------------------------------------------------------------------------------------------------------------------------------------

## 2023-06-28 ENCOUNTER — TRANSCRIPTION ENCOUNTER (OUTPATIENT)
Age: 88
End: 2023-06-28

## 2023-06-28 LAB
ALBUMIN SERPL ELPH-MCNC: 2.4 G/DL — LOW (ref 3.5–5.2)
ALP SERPL-CCNC: 88 U/L — SIGNIFICANT CHANGE UP (ref 30–115)
ALT FLD-CCNC: 6 U/L — SIGNIFICANT CHANGE UP (ref 0–41)
ANION GAP SERPL CALC-SCNC: 11 MMOL/L — SIGNIFICANT CHANGE UP (ref 7–14)
AST SERPL-CCNC: 14 U/L — SIGNIFICANT CHANGE UP (ref 0–41)
BASOPHILS # BLD AUTO: 0.07 K/UL — SIGNIFICANT CHANGE UP (ref 0–0.2)
BASOPHILS NFR BLD AUTO: 0.5 % — SIGNIFICANT CHANGE UP (ref 0–1)
BILIRUB SERPL-MCNC: 0.2 MG/DL — SIGNIFICANT CHANGE UP (ref 0.2–1.2)
BUN SERPL-MCNC: 17 MG/DL — SIGNIFICANT CHANGE UP (ref 10–20)
CALCIUM SERPL-MCNC: 8.5 MG/DL — SIGNIFICANT CHANGE UP (ref 8.4–10.5)
CHLORIDE SERPL-SCNC: 98 MMOL/L — SIGNIFICANT CHANGE UP (ref 98–110)
CO2 SERPL-SCNC: 21 MMOL/L — SIGNIFICANT CHANGE UP (ref 17–32)
CREAT SERPL-MCNC: 0.9 MG/DL — SIGNIFICANT CHANGE UP (ref 0.7–1.5)
CULTURE RESULTS: SIGNIFICANT CHANGE UP
EGFR: 77 ML/MIN/1.73M2 — SIGNIFICANT CHANGE UP
EOSINOPHIL # BLD AUTO: 0.41 K/UL — SIGNIFICANT CHANGE UP (ref 0–0.7)
EOSINOPHIL NFR BLD AUTO: 2.9 % — SIGNIFICANT CHANGE UP (ref 0–8)
FERRITIN SERPL-MCNC: 704 NG/ML — HIGH (ref 30–400)
GLUCOSE BLDC GLUCOMTR-MCNC: 110 MG/DL — HIGH (ref 70–99)
GLUCOSE BLDC GLUCOMTR-MCNC: 115 MG/DL — HIGH (ref 70–99)
GLUCOSE BLDC GLUCOMTR-MCNC: 188 MG/DL — HIGH (ref 70–99)
GLUCOSE BLDC GLUCOMTR-MCNC: 209 MG/DL — HIGH (ref 70–99)
GLUCOSE BLDC GLUCOMTR-MCNC: 221 MG/DL — HIGH (ref 70–99)
GLUCOSE SERPL-MCNC: 93 MG/DL — SIGNIFICANT CHANGE UP (ref 70–99)
HCT VFR BLD CALC: 26.8 % — LOW (ref 42–52)
HGB BLD-MCNC: 8.3 G/DL — LOW (ref 14–18)
IMM GRANULOCYTES NFR BLD AUTO: 1.5 % — HIGH (ref 0.1–0.3)
LYMPHOCYTES # BLD AUTO: 27.5 % — SIGNIFICANT CHANGE UP (ref 20.5–51.1)
LYMPHOCYTES # BLD AUTO: 3.91 K/UL — HIGH (ref 1.2–3.4)
MAGNESIUM SERPL-MCNC: 1.8 MG/DL — SIGNIFICANT CHANGE UP (ref 1.8–2.4)
MCHC RBC-ENTMCNC: 25.5 PG — LOW (ref 27–31)
MCHC RBC-ENTMCNC: 31 G/DL — LOW (ref 32–37)
MCV RBC AUTO: 82.2 FL — SIGNIFICANT CHANGE UP (ref 80–94)
MONOCYTES # BLD AUTO: 1.36 K/UL — HIGH (ref 0.1–0.6)
MONOCYTES NFR BLD AUTO: 9.6 % — HIGH (ref 1.7–9.3)
NEUTROPHILS # BLD AUTO: 8.27 K/UL — HIGH (ref 1.4–6.5)
NEUTROPHILS NFR BLD AUTO: 58 % — SIGNIFICANT CHANGE UP (ref 42.2–75.2)
NRBC # BLD: 0 /100 WBCS — SIGNIFICANT CHANGE UP (ref 0–0)
PLATELET # BLD AUTO: 335 K/UL — SIGNIFICANT CHANGE UP (ref 130–400)
PMV BLD: 9.1 FL — SIGNIFICANT CHANGE UP (ref 7.4–10.4)
POTASSIUM SERPL-MCNC: 4.5 MMOL/L — SIGNIFICANT CHANGE UP (ref 3.5–5)
POTASSIUM SERPL-SCNC: 4.5 MMOL/L — SIGNIFICANT CHANGE UP (ref 3.5–5)
PROT SERPL-MCNC: 4.8 G/DL — LOW (ref 6–8)
RBC # BLD: 3.26 M/UL — LOW (ref 4.7–6.1)
RBC # FLD: 15.3 % — HIGH (ref 11.5–14.5)
SODIUM SERPL-SCNC: 130 MMOL/L — LOW (ref 135–146)
SPECIMEN SOURCE: SIGNIFICANT CHANGE UP
WBC # BLD: 14.24 K/UL — HIGH (ref 4.8–10.8)
WBC # FLD AUTO: 14.24 K/UL — HIGH (ref 4.8–10.8)

## 2023-06-28 PROCEDURE — 99233 SBSQ HOSP IP/OBS HIGH 50: CPT

## 2023-06-28 RX ADMIN — Medication 1 TABLET(S): at 09:27

## 2023-06-28 RX ADMIN — TAMSULOSIN HYDROCHLORIDE 0.4 MILLIGRAM(S): 0.4 CAPSULE ORAL at 21:28

## 2023-06-28 RX ADMIN — CEFEPIME 100 MILLIGRAM(S): 1 INJECTION, POWDER, FOR SOLUTION INTRAMUSCULAR; INTRAVENOUS at 23:07

## 2023-06-28 RX ADMIN — Medication 2: at 17:34

## 2023-06-28 RX ADMIN — Medication 325 MILLIGRAM(S): at 17:34

## 2023-06-28 RX ADMIN — PANTOPRAZOLE SODIUM 40 MILLIGRAM(S): 20 TABLET, DELAYED RELEASE ORAL at 05:40

## 2023-06-28 RX ADMIN — Medication 50 MICROGRAM(S): at 05:40

## 2023-06-28 RX ADMIN — Medication 325 MILLIGRAM(S): at 05:40

## 2023-06-28 RX ADMIN — DULOXETINE HYDROCHLORIDE 60 MILLIGRAM(S): 30 CAPSULE, DELAYED RELEASE ORAL at 12:55

## 2023-06-28 RX ADMIN — SIMVASTATIN 20 MILLIGRAM(S): 20 TABLET, FILM COATED ORAL at 21:28

## 2023-06-28 RX ADMIN — HEPARIN SODIUM 5000 UNIT(S): 5000 INJECTION INTRAVENOUS; SUBCUTANEOUS at 05:39

## 2023-06-28 RX ADMIN — HEPARIN SODIUM 5000 UNIT(S): 5000 INJECTION INTRAVENOUS; SUBCUTANEOUS at 15:09

## 2023-06-28 RX ADMIN — INSULIN GLARGINE 12 UNIT(S): 100 INJECTION, SOLUTION SUBCUTANEOUS at 09:32

## 2023-06-28 RX ADMIN — CEFEPIME 100 MILLIGRAM(S): 1 INJECTION, POWDER, FOR SOLUTION INTRAMUSCULAR; INTRAVENOUS at 12:54

## 2023-06-28 RX ADMIN — HEPARIN SODIUM 5000 UNIT(S): 5000 INJECTION INTRAVENOUS; SUBCUTANEOUS at 21:28

## 2023-06-28 RX ADMIN — Medication 2: at 15:10

## 2023-06-28 RX ADMIN — FINASTERIDE 5 MILLIGRAM(S): 5 TABLET, FILM COATED ORAL at 15:09

## 2023-06-28 RX ADMIN — CLOPIDOGREL BISULFATE 75 MILLIGRAM(S): 75 TABLET, FILM COATED ORAL at 12:55

## 2023-06-28 RX ADMIN — Medication 25 MILLIGRAM(S): at 17:34

## 2023-06-28 RX ADMIN — Medication 25 MILLIGRAM(S): at 05:40

## 2023-06-28 NOTE — PROGRESS NOTE ADULT - SUBJECTIVE AND OBJECTIVE BOX
----------Daily Progress Note----------    HISTORY OF PRESENT ILLNESS:  HISTORY OF PRESENT ILLNESS:  89M PMHx HTN, DM2, BPH, CAD s/p CABG, hypothyroidism here with hyperkalemia, hyponatremia noted as outpt, who had pain with urination and urinary incontinence, with a home physician visit with labs showing hyperkalemia and hyponatremia, for which he was brought into the ED for. While in the ED, he was found to have pseudomonas bacteremia and was admitted for management of his UTI, pseudomonas bacteremia, hyperkalemia, and hyponatremia. Hyperkalemia has currently resolved.    Today is hospital day 5d.    ED course:  89 yo M with hx of HTN, HLD, DM II BPH and CAD s/p CABG x 4, Hypothyroidism presents to ED for abnormal labs result (hyperkalemia and hyponatremia). Patient is a poor historian, however patient's daughter was at bedside for collateral. Per the daughter, patient was recently admitted for fall and discharged to [facility]. When patient was discharged from [facility] he did not have a hidalgo in. Per the daughter at the nursing home patient had a hidalgo catheter in despite family requesting removal of it multiple times. Patient was discharged from [facility] on May 26th. Per the daughter patient was on antibiotics at [facility] for UTI, but she is unsure which antibiotic it was.     Since discharge patient's daughter says the patient has been extremely lethargic and weak. His urine has been milky, dark since May 26th when he was discharged from [facility]. Over the past week patient has become incontinent, no able to make it to the restroom, and complaining that he has pain with urination. On Wednesday a doctor came to the house to examine the patient, at that time he ordered lab tests, which the patient had preformed today. Labs were significant for Na 126 and a K TNP. They were instructed to come straight to the ED.     In the ED labs were significant for a Na 131, K+ 5.1, Hgb 9.9 (up from previous admission), and a WBC count of 18K.     Vital Signs Last 24 Hrs  T(F): 98.7 (2023 17:00), Max: 98.7 (2023 17:00)  HR: 95 (2023 19:30) (80 - 95)  BP: 157/78 (2023 19:30) (121/60 - 157/78)  BP(mean): --  RR: 20 (2023 19:30) (16 - 20)  SpO2: 99% (2023 19:30) (96% - 99%)  Patient On (Oxygen Delivery Method): nasal cannula  O2 Flow (L/min): 2    UA is positive for many bacteria >720 WBC, moderate blood and large leukocyte esterase.     INTERVAL HOSPITAL COURSE / OVERNIGHT EVENTS:  24 hr events:  None    O/N events:  None    Patient was examined and seen at bedside. He was laying comfortably in bed, sleeping well, reported feeling well, and urinating well, has not had any BM yet.    Review of Systems: Otherwise unremarkable     <<<<<PAST MEDICAL & SURGICAL HISTORY>>>>>  HTN (hypertension)    HLD (hyperlipidemia)    BPH (benign prostatic hyperplasia)    CAD (coronary artery disease)    Diabetes mellitus    Hypothyroidism    S/P CABG (coronary artery bypass graft)      ALLERGIES  No Known Allergies      Home Medications:  DULoxetine 60 mg oral delayed release capsule: 1 cap(s) orally once a day (13 Dec 2022 22:03)  finasteride 5 mg oral tablet: 1 tab(s) orally once a day (13 Dec 2022 22:03)  lactobacillus acidophilus oral capsule: 1 tab(s) orally once a day (20 Dec 2022 10:18)  levothyroxine 50 mcg (0.05 mg) oral tablet: 1 tab(s) orally once a day (13 Dec 2022 22:03)  Metoprolol Tartrate 25 mg oral tablet: 1 tab(s) orally 2 times a day (13 Dec 2022 22:03)  Plavix 75 mg oral tablet: 1 orally once a day (2023 21:54)  simvastatin 20 mg oral tablet: 1 tab(s) orally once a day (at bedtime) (13 Dec 2022 22:03)  tamsulosin 0.4 mg oral capsule: 1 cap(s) orally once a day (13 Dec 2022 22:03)  Toujeo SoloStar 300 units/mL subcutaneous solution: 12 unit(s) subcutaneous once a day (in the morning) (13 Dec 2022 22:03)        MEDICATIONS  STANDING MEDICATIONS  cefepime   IVPB 2000 milliGRAM(s) IV Intermittent every 12 hours  clopidogrel Tablet 75 milliGRAM(s) Oral daily  dextrose 5%. 1000 milliLiter(s) IV Continuous <Continuous>  dextrose 5%. 1000 milliLiter(s) IV Continuous <Continuous>  dextrose 50% Injectable 25 Gram(s) IV Push once  dextrose 50% Injectable 12.5 Gram(s) IV Push once  dextrose 50% Injectable 25 Gram(s) IV Push once  DULoxetine 60 milliGRAM(s) Oral daily  ferrous    sulfate 325 milliGRAM(s) Oral two times a day  finasteride 5 milliGRAM(s) Oral daily  glucagon  Injectable 1 milliGRAM(s) IntraMuscular once  heparin   Injectable 5000 Unit(s) SubCutaneous every 8 hours  insulin glargine Injectable (LANTUS) 12 Unit(s) SubCutaneous every morning  insulin lispro (ADMELOG) corrective regimen sliding scale   SubCutaneous three times a day before meals  insulin lispro (ADMELOG) corrective regimen sliding scale   SubCutaneous at bedtime  lactobacillus acidophilus 1 Tablet(s) Oral with breakfast  levothyroxine 50 MICROGram(s) Oral daily  metoprolol tartrate 25 milliGRAM(s) Oral two times a day  pantoprazole    Tablet 40 milliGRAM(s) Oral before breakfast  simvastatin 20 milliGRAM(s) Oral at bedtime  tamsulosin 0.4 milliGRAM(s) Oral at bedtime    PRN MEDICATIONS  acetaminophen     Tablet .. 650 milliGRAM(s) Oral every 6 hours PRN  aluminum hydroxide/magnesium hydroxide/simethicone Suspension 30 milliLiter(s) Oral every 4 hours PRN  dextrose Oral Gel 15 Gram(s) Oral once PRN  melatonin 3 milliGRAM(s) Oral at bedtime PRN  ondansetron Injectable 4 milliGRAM(s) IV Push every 8 hours PRN    VITALS:  T(F): 97.1  HR: 84  BP: 133/58  RR: 18  SpO2: 98%    PHYSICAL EXAM:  General: Elderly man laying comfortably in bed, in no apparent distress  Cardio: RRR, No M/R/G, S1/S2+  Pulm: Normal breath sounds, No crackles, wheezing, or rhonchi  GI: Soft, NTND  : No CVA or suprapubic tenderness, +left testicular erythema and swelling  Skin: Intact, no rashes or bruising   Ext: No LE edema    <<<<<LABS>>>>>                        8.7    20.84 )-----------( 334      ( 2023 08:11 )             27.6         130<L>  |  98  |  18  ----------------------------<  130<H>  4.6   |  22  |  1.0    Ca    8.4      2023 08:11  Mg     1.5         TPro  5.0<L>  /  Alb  2.4<L>  /  TBili  0.3  /  DBili  x   /  AST  11  /  ALT  6   /  AlkPhos  93        Urinalysis Basic - ( 2023 12:50 )    Color: Yellow / Appearance: Slightly Turbid / S.014 / pH: x  Gluc: x / Ketone: Trace  / Bili: Negative / Urobili: <2 mg/dL   Blood: x / Protein: 30 mg/dL / Nitrite: Negative   Leuk Esterase: Large / RBC: 1 /HPF /  /HPF   Sq Epi: x / Non Sq Epi: x / Bacteria: Negative            Culture - Blood (collected 2023 06:02)  Source: .Blood None  Preliminary Report (2023 18:02):    No growth at 24 hours    [id number]        <<<<<RADIOLOGY>>>>>  < from: US Kidney and Bladder (23 @ 21:08) >  IMPRESSION:    No sonographic evidence of hydronephrosis.    Post void residual of 69 mL (46%).      < from: Xray Chest 1 View-PORTABLE IMMEDIATE (Xray Chest 1 View-PORTABLE IMMEDIATE .) (23 @ 16:45) >  Impression:    Right basilar focal atelectasis      < from: Xray Chest 1 View-PORTABLE IMMEDIATE (Xray Chest 1 View-PORTABLE IMMEDIATE .) (23 @ 10:25) >  FINDINGS/  IMPRESSION:    Low lung volumes. Right basal bandlike opacity/atelectasis. No   pneumothorax or significant pleural effusion on frontal view. No focal   consolidation.    Stable cardiomediastinal silhouette.    Unchanged osseous structures.        ----------------------------------------------------------------------------------------------------------------------------------------------------------------------------------------------- ----------Daily Progress Note----------  HISTORY OF PRESENT ILLNESS:  89M PMHx HTN, DM2, BPH, CAD s/p CABG, hypothyroidism here with hyperkalemia, hyponatremia noted as outpt, who had pain with urination and urinary incontinence, with a home physician visit with labs showing hyperkalemia and hyponatremia, for which he was brought into the ED for. While in the ED, he was found to have pseudomonas bacteremia and was admitted for management of his UTI, pseudomonas bacteremia, hyperkalemia, and hyponatremia. Hyperkalemia has currently resolved.    Today is hospital day 5d.    ED course:  89 yo M with hx of HTN, HLD, DM II BPH and CAD s/p CABG x 4, Hypothyroidism presents to ED for abnormal labs result (hyperkalemia and hyponatremia). Patient is a poor historian, however patient's daughter was at bedside for collateral. Per the daughter, patient was recently admitted for fall and discharged to [facility]. When patient was discharged from [facility] he did not have a hidalgo in. Per the daughter at the nursing home patient had a hidalgo catheter in despite family requesting removal of it multiple times. Patient was discharged from [facility] on May 26th. Per the daughter patient was on antibiotics at [facility] for UTI, but she is unsure which antibiotic it was.     Since discharge patient's daughter says the patient has been extremely lethargic and weak. His urine has been milky, dark since May 26th when he was discharged from [facility]. Over the past week patient has become incontinent, no able to make it to the restroom, and complaining that he has pain with urination. On Wednesday a doctor came to the house to examine the patient, at that time he ordered lab tests, which the patient had preformed today. Labs were significant for Na 126 and a K TNP. They were instructed to come straight to the ED.     In the ED labs were significant for a Na 131, K+ 5.1, Hgb 9.9 (up from previous admission), and a WBC count of 18K.     Vital Signs Last 24 Hrs  T(F): 98.7 (2023 17:00), Max: 98.7 (2023 17:00)  HR: 95 (2023 19:30) (80 - 95)  BP: 157/78 (2023 19:30) (121/60 - 157/78)  BP(mean): --  RR: 20 (2023 19:30) (16 - 20)  SpO2: 99% (2023 19:30) (96% - 99%)  Patient On (Oxygen Delivery Method): nasal cannula  O2 Flow (L/min): 2    UA is positive for many bacteria >720 WBC, moderate blood and large leukocyte esterase.     INTERVAL HOSPITAL COURSE / OVERNIGHT EVENTS:  24 hr events:  None    O/N events:  None    Patient was examined and seen at bedside. He was laying comfortably in bed, sleeping well, reported feeling well, and urinating well, has not had any BM yet.    Review of Systems: Otherwise unremarkable     <<<<<PAST MEDICAL & SURGICAL HISTORY>>>>>  HTN (hypertension)    HLD (hyperlipidemia)    BPH (benign prostatic hyperplasia)    CAD (coronary artery disease)    Diabetes mellitus    Hypothyroidism    S/P CABG (coronary artery bypass graft)      ALLERGIES  No Known Allergies      Home Medications:  DULoxetine 60 mg oral delayed release capsule: 1 cap(s) orally once a day (13 Dec 2022 22:03)  finasteride 5 mg oral tablet: 1 tab(s) orally once a day (13 Dec 2022 22:03)  lactobacillus acidophilus oral capsule: 1 tab(s) orally once a day (20 Dec 2022 10:18)  levothyroxine 50 mcg (0.05 mg) oral tablet: 1 tab(s) orally once a day (13 Dec 2022 22:03)  Metoprolol Tartrate 25 mg oral tablet: 1 tab(s) orally 2 times a day (13 Dec 2022 22:03)  Plavix 75 mg oral tablet: 1 orally once a day (2023 21:54)  simvastatin 20 mg oral tablet: 1 tab(s) orally once a day (at bedtime) (13 Dec 2022 22:03)  tamsulosin 0.4 mg oral capsule: 1 cap(s) orally once a day (13 Dec 2022 22:03)  Toujeo SoloStar 300 units/mL subcutaneous solution: 12 unit(s) subcutaneous once a day (in the morning) (13 Dec 2022 22:03)        MEDICATIONS  STANDING MEDICATIONS  cefepime   IVPB 2000 milliGRAM(s) IV Intermittent every 12 hours  clopidogrel Tablet 75 milliGRAM(s) Oral daily  dextrose 5%. 1000 milliLiter(s) IV Continuous <Continuous>  dextrose 5%. 1000 milliLiter(s) IV Continuous <Continuous>  dextrose 50% Injectable 25 Gram(s) IV Push once  dextrose 50% Injectable 12.5 Gram(s) IV Push once  dextrose 50% Injectable 25 Gram(s) IV Push once  DULoxetine 60 milliGRAM(s) Oral daily  ferrous    sulfate 325 milliGRAM(s) Oral two times a day  finasteride 5 milliGRAM(s) Oral daily  glucagon  Injectable 1 milliGRAM(s) IntraMuscular once  heparin   Injectable 5000 Unit(s) SubCutaneous every 8 hours  insulin glargine Injectable (LANTUS) 12 Unit(s) SubCutaneous every morning  insulin lispro (ADMELOG) corrective regimen sliding scale   SubCutaneous three times a day before meals  insulin lispro (ADMELOG) corrective regimen sliding scale   SubCutaneous at bedtime  lactobacillus acidophilus 1 Tablet(s) Oral with breakfast  levothyroxine 50 MICROGram(s) Oral daily  metoprolol tartrate 25 milliGRAM(s) Oral two times a day  pantoprazole    Tablet 40 milliGRAM(s) Oral before breakfast  simvastatin 20 milliGRAM(s) Oral at bedtime  tamsulosin 0.4 milliGRAM(s) Oral at bedtime    PRN MEDICATIONS  acetaminophen     Tablet .. 650 milliGRAM(s) Oral every 6 hours PRN  aluminum hydroxide/magnesium hydroxide/simethicone Suspension 30 milliLiter(s) Oral every 4 hours PRN  dextrose Oral Gel 15 Gram(s) Oral once PRN  melatonin 3 milliGRAM(s) Oral at bedtime PRN  ondansetron Injectable 4 milliGRAM(s) IV Push every 8 hours PRN    VITALS:  T(F): 97.1  HR: 84  BP: 133/58  RR: 18  SpO2: 98%    PHYSICAL EXAM:  General: Elderly man laying comfortably in bed, in no apparent distress  Cardio: RRR, No M/R/G, S1/S2+  Pulm: Normal breath sounds, No crackles, wheezing, or rhonchi  GI: Soft, NTND  : No CVA or suprapubic tenderness, +left testicular erythema and swelling  Skin: Intact, no rashes or bruising   Ext: No LE edema    <<<<<LABS>>>>>                        8.7    20.84 )-----------( 334      ( 2023 08:11 )             27.6     06    130<L>  |  98  |  18  ----------------------------<  130<H>  4.6   |  22  |  1.0    Ca    8.4      2023 08:11  Mg     1.5         TPro  5.0<L>  /  Alb  2.4<L>  /  TBili  0.3  /  DBili  x   /  AST  11  /  ALT  6   /  AlkPhos  93        Urinalysis Basic - ( 2023 12:50 )    Color: Yellow / Appearance: Slightly Turbid / S.014 / pH: x  Gluc: x / Ketone: Trace  / Bili: Negative / Urobili: <2 mg/dL   Blood: x / Protein: 30 mg/dL / Nitrite: Negative   Leuk Esterase: Large / RBC: 1 /HPF /  /HPF   Sq Epi: x / Non Sq Epi: x / Bacteria: Negative            Culture - Blood (collected 2023 06:02)  Source: .Blood None  Preliminary Report (2023 18:02):    No growth at 24 hours    [id number]        <<<<<RADIOLOGY>>>>>  < from: US Kidney and Bladder (23 @ 21:08) >  IMPRESSION:    No sonographic evidence of hydronephrosis.    Post void residual of 69 mL (46%).      < from: Xray Chest 1 View-PORTABLE IMMEDIATE (Xray Chest 1 View-PORTABLE IMMEDIATE .) (23 @ 16:45) >  Impression:    Right basilar focal atelectasis      < from: Xray Chest 1 View-PORTABLE IMMEDIATE (Xray Chest 1 View-PORTABLE IMMEDIATE .) (23 @ 10:25) >  FINDINGS/  IMPRESSION:    Low lung volumes. Right basal bandlike opacity/atelectasis. No   pneumothorax or significant pleural effusion on frontal view. No focal   consolidation.    Stable cardiomediastinal silhouette.    Unchanged osseous structures.        -----------------------------------------------------------------------------------------------------------------------------------------------------------------------------------------------

## 2023-06-28 NOTE — SWALLOW BEDSIDE ASSESSMENT ADULT - SWALLOW EVAL: RECOMMENDED FEEDING/EATING TECHNIQUES
position upright (90 degrees)/small sips/bites crush medication (when feasible)/position upright (90 degrees)/small sips/bites

## 2023-06-28 NOTE — MEDICAL STUDENT PROGRESS NOTE(EDUCATION) - PLAN 1
Pt afebrile with uptrending WBC   - continue monitoring with cefepime  - WBC 15 (6/26) -> 21 (6/27)  - F/U repeat UCx, b/c, CBC Pt afebrile with uptrending WBC   - continue monitoring with cefepime  - WBC downtrending 14.2 (21 - 6/27)  - pending repeat UCx, b/c  - Pt afebrile with uptrending WBC   - c/w cefepime   continue monitoring with cefepime  - WBC downtrending 14.2 (21 - 6/27)  - pending repeat UCx, b/c  - Pt afebrile with downtrending WBC   - WBC 21 (6/27) -> 14 (6/28)  - F/u AM CBC  - recent hidalgo in snf; now out  - bcx pseudomonas 6/24  - UA showing no bacteria but LE positive and   - C/w cefepime 2 g q12h, d/c on levaquin 750 mg q48h as per nephrology recs   - F/u Ucx (6/27)  - F/u Bcx (6/28)  - [facility] nephrology recs

## 2023-06-28 NOTE — DISCHARGE NOTE NURSING/CASE MANAGEMENT/SOCIAL WORK - PATIENT PORTAL LINK FT
You can access the FollowMyHealth Patient Portal offered by [facility] by registering at the following website: [email or web address]. By joining [facility]’s FollowMyHealth portal, you will also be able to view your health information using other applications (apps) compatible with our system.

## 2023-06-28 NOTE — SWALLOW BEDSIDE ASSESSMENT ADULT - SLP GENERAL OBSERVATIONS
Pt received in bed awake alert w/ no c/o pain +2L NC Pt received in bed awake alert w/o c/o pain +2L NC +confused

## 2023-06-28 NOTE — MEDICAL STUDENT PROGRESS NOTE(EDUCATION) - ASSESSMENT
My assessment is that this patient is a 89-year old  male with PMHx of HTN, DMII, BPH, CAD s/p CABG, and hypothyroidism who presented to ED due to acute encephalopathy 2/2 to Pseudomonas bacteremia, UTI, hyperkalemia, and hyponatremia. Despite resolution of his hyperkalemia, he has persistent hyponatremia, new hypomagnesia, and uptrending WBC. Continuing course of IV cefepime, administered 2g IV MgSO4, and awaiting repeat CBC, b/c, u/c.  My assessment is that this patient is a 89-year old  male with PMHx of HTN, DMII, BPH, CAD s/p CABG, and hypothyroidism who presented to ED due to acute encephalopathy 2/2 to Pseudomonas bacteremia, UTI, hyperkalemia, and hyponatremia. Despite resolution of his hyperkalemia, he has persistent hyponatremia and leukocytosis Pending repeat b/c and u/c and continuing course of IV cefepime with monitoring.

## 2023-06-28 NOTE — MEDICAL STUDENT PROGRESS NOTE(EDUCATION) - PLAN 3
- Serum Na 130  - Aldosterone 9.4  - Renin 14.6  - Chayo 39  - UOsm 349  - Serum Osm 284  - UCr 4.0    FeNa - 7.5% suggestive post-renal RBINA     - continue monitoring of patient with treatment of bacteremia.   - Continue BPH medications finasteride / tamulosin - Serum Na 130  - Aldosterone 9.4  - Renin 14.6  - Chayo 39  - UOsm 349  - Serum Osm 284  - UCr 4.0    FeNa - 7.5% possibIe ATN 2/2 BPH+pyelonephritis    - continue monitoring of patient with treatment of bacteremia.   - Continue BPH medications finasteride / tamulosin - Serum Na 130  - Aldosterone 9.4  - Renin 14.6  - Chayo 39  - UOsm 349  - Serum Osm 284  - UCr 4.0    FeNa - 7.5% possibIe intrinsic2/2 BPH+pyelonephritis    - continue monitoring of patient with treatment of bacteremia.   - Continue BPH medications finasteride / tamulosin - s/p 2 g IV MgSO4.  - Mg up 1.8 from 1.5 (6/27)

## 2023-06-28 NOTE — SWALLOW BEDSIDE ASSESSMENT ADULT - SWALLOW EVAL: DIAGNOSIS
+toleration for soft and bite sized, puree, and mildly thick liquids; +overt s/s aspiration/penetration for thin liquids +toleration for soft and bite sized, puree, and mildly thick liquids w/o overt s/s aspiration/penetration; +suspected pharyngeal dysphagia for thin liquids

## 2023-06-28 NOTE — PROGRESS NOTE ADULT - SUBJECTIVE AND OBJECTIVE BOX
CHEL CROFT  89y, Male  Allergy: No Known Allergies      LOS  5d    CHIEF COMPLAINT: Abnormal Lab results (2023 08:43)      INTERVAL EVENTS/HPI  - No acute events overnight  - T(F): , Max: 97.1 (23 @ 04:00)  - Denies any worsening symptoms  - Tolerating medication  - WBC better today -- denies nausea, vomiting, diarrhea  - WBC Count: 14.24 (23 @ 04:30)  WBC Count: 20.84 (23 @ 08:11)     - Creatinine: 1.0 (23 @ 08:11)  Creatinine: 1.1 (23 @ 18:13)       ROS  General: Denies rigors, nightsweats  HEENT: Denies headache, rhinorrhea, sore throat, eye pain  CV: Denies CP, palpitations  PULM: Denies wheezing, hemoptysis  GI: Denies hematemesis, hematochezia, melena  : Denies discharge, hematuria  MSK: Denies arthralgias, myalgias  SKIN: Denies rash, lesions  NEURO: Denies paresthesias, weakness  PSYCH: Denies depression, anxiety    VITALS:  T(F): 97.1, Max: 97.1 (23 @ 04:00)  HR: 84  BP: 133/58  RR: 18Vital Signs Last 24 Hrs  T(C): 36.2 (2023 04:00), Max: 36.2 (2023 04:00)  T(F): 97.1 (2023 04:00), Max: 97.1 (2023 04:00)  HR: 84 (2023 04:00) (70 - 94)  BP: 133/58 (2023 04:00) (100/52 - 165/72)  BP(mean): 72 (2023 10:12) (72 - 72)  RR: 18 (2023 08:15) (18 - 19)  SpO2: 98% (2023 08:15) (92% - 99%)    Parameters below as of 2023 08:15  Patient On (Oxygen Delivery Method): nasal cannula  O2 Flow (L/min): 2      PHYSICAL EXAM:  Gen: NAD, resting in bed  HEENT: Normocephalic, atraumatic  Neck: supple, no lymphadenopathy  CV: Regular rate & regular rhythm  Lungs: decreased BS at bases, no fremitus  Abdomen: Soft, BS present  Ext: Warm, well perfused  Neuro: non focal, awake  Skin: left scrotum enlarged, firm but softer compared to previous   Lines: no phlebitis    FH: Non-contributory  Social Hx: Non-contributory    TESTS & MEASUREMENTS:                        8.3    14.24 )-----------( 335      ( 2023 04:30 )             26.8         130<L>  |  98  |  18  ----------------------------<  130<H>  4.6   |  22  |  1.0    Ca    8.4      2023 08:11  Mg     1.5         TPro  5.0<L>  /  Alb  2.4<L>  /  TBili  0.3  /  DBili  x   /  AST  11  /  ALT  6   /  AlkPhos  93        LIVER FUNCTIONS - ( 2023 08:11 )  Alb: 2.4 g/dL / Pro: 5.0 g/dL / ALK PHOS: 93 U/L / ALT: 6 U/L / AST: 11 U/L / GGT: x           Urinalysis Basic - ( 2023 12:50 )    Color: Yellow / Appearance: Slightly Turbid / S.014 / pH: x  Gluc: x / Ketone: Trace  / Bili: Negative / Urobili: <2 mg/dL   Blood: x / Protein: 30 mg/dL / Nitrite: Negative   Leuk Esterase: Large / RBC: 1 /HPF /  /HPF   Sq Epi: x / Non Sq Epi: x / Bacteria: Negative        Culture - Blood (collected 23 @ 06:02)  Source: .Blood None  Preliminary Report (23 @ 18:02):    No growth at 24 hours    Culture - Blood (collected 23 @ 00:28)  Source: .Blood None  Gram Stain (23 @ 17:54):    Growth in aerobic bottle: Gram Negative Rods    Growth in anaerobic bottle: Gram Negative Rods  Final Report (23 @ 14:45):    Growth in aerobic and anaerobic bottles: Pseudomonas aeruginosa    ***Blood Panel PCR results on this specimen are available    approximately 3 hours after the Gram stain result.***    Gram stain, PCR, and/or culture results may not always    correspond due to difference in methodologies.    ************************************************************    This PCR assay was performed by multiplex PCR. This    Assay tests for 66 bacterial and resistance gene targets.    Please refer to the [facility] test directory    at [email or web address] for details.  Organism: Blood Culture PCR  Pseudomonas aeruginosa (23 @ 14:45)  Organism: Pseudomonas aeruginosa (23 @ 14:45)      Method Type: KEVIN      -  Amikacin: S -[id number]      -  Aztreonam: S <=4      -  Cefepime: S <=2      -  Ceftazidime: S <=1      -  Ciprofloxacin: S <=0.25      -  Gentamicin: S <=2      -  Imipenem: S <=1      -  Levofloxacin: S <=0.5      -  Meropenem: S <=1      -  Piperacillin/Tazobactam: S <=8      -  Tobramycin: S <=2  Organism: Blood Culture PCR (23 @ 14:45)      Method Type: PCR      -  Pseudomonas aeruginosa: Detec    Culture - Urine (collected 23 @ 15:45)  Source: Clean Catch Clean Catch (Midstream)  Final Report (23 @ 16:47):    >=3 organisms. Probable collection contamination.        Lactate, Blood: 1.1 mmol/L (23 @ 06:29)  Blood Gas Venous - Lactate: 2.30 mmol/L (23 @ 18:18)      INFECTIOUS DISEASES TESTING  COVID-19 PCR: NotDetec (23 @ 10:23)  COVID-19 PCR: NotDetec (23 @ 18:46)  COVID-19 PCR: NotDetec (23 @ 21:55)  COVID-19 PCR: NotDetec (22 @ 10:23)  COVID-19 PCR: NotDetec (22 @ 12:54)  Legionella Antigen, Urine: Negative (22 @ 14:02)  MRSA PCR Result.: Negative (22 @ 16:33)  Procalcitonin, Serum: 0.16 (22 @ 05:42)  Procalcitonin, Serum: 0.16 (22 @ 13:48)      INFLAMMATORY MARKERS      RADIOLOGY & ADDITIONAL TESTS:  I have personally reviewed the last available Chest xray  CXR      CT      CARDIOLOGY TESTING  12 Lead ECG:   Ventricular Rate 62 BPM    Atrial Rate 62 BPM    P-R Interval 172 ms    QRS Duration 94 ms    Q-T Interval 416 ms    QTC Calculation(Bazett) 422 ms    P Axis 51 degrees    R Axis -15 degrees    T Axis 16 degrees    Diagnosis Line Normal sinus rhythm  Left ventricular hypertrophy  Abnormal ECG    Confirmed by Mohan Lin ([id number]) on 2023 2:35:06 PM (23 @ 14:18)  12 Lead ECG:   Ventricular Rate 70 BPM    Atrial Rate 70 BPM    P-R Interval 162 ms    QRS Duration 92 ms    Q-T Interval 370 ms    QTC Calculation(Bazett) 399 ms    P Axis 41 degrees    R Axis -13 degrees    T Axis 19 degrees    Diagnosis Line Sinus rhythm with Premature atrial complexes  Left ventricular hypertrophy  Abnormal ECG    Confirmed by Mohan Lin ([id number]) on 2023 1:19:30 PM (23 @ 15:06)      MEDICATIONS  cefepime   IVPB 2000 IV Intermittent every 12 hours  clopidogrel Tablet 75 Oral daily  dextrose 5%. 1000 IV Continuous <Continuous>  dextrose 5%. 1000 IV Continuous <Continuous>  dextrose 50% Injectable 25 IV Push once  dextrose 50% Injectable 12.5 IV Push once  dextrose 50% Injectable 25 IV Push once  DULoxetine 60 Oral daily  ferrous    sulfate 325 Oral two times a day  finasteride 5 Oral daily  glucagon  Injectable 1 IntraMuscular once  heparin   Injectable 5000 SubCutaneous every 8 hours  insulin glargine Injectable (LANTUS) 12 SubCutaneous every morning  insulin lispro (ADMELOG) corrective regimen sliding scale  SubCutaneous three times a day before meals  insulin lispro (ADMELOG) corrective regimen sliding scale  SubCutaneous at bedtime  lactobacillus acidophilus 1 Oral with breakfast  levothyroxine 50 Oral daily  metoprolol tartrate 25 Oral two times a day  simvastatin 20 Oral at bedtime  tamsulosin 0.4 Oral at bedtime      WEIGHT  Weight (kg): 54.8 (23 @ 16:00)      ANTIBIOTICS:  cefepime   IVPB 2000 milliGRAM(s) IV Intermittent every 12 hours      All available historical records have been reviewed

## 2023-06-28 NOTE — MEDICAL STUDENT PROGRESS NOTE(EDUCATION) - PLAN 2
- Resolved - Resolved  - s/p 2 g IV MgSO4.  - Mg up 1.8 from 1.5 (6/27)  - K down 4.6 from 5.1 admission - Resolved  - s/p 2 g IV MgSO4.  - Mg up 1.8 from 1.5 (6/27)  - K down 4.6 from 5.1 admission  - Serum Na 130  - Aldosterone 9.4  - Renin 14.6  - Chayo 39  - UOsm 349  - Serum Osm 284  - UCr 4.0    FeNa - 7.5% likely intrinsic renal 2/2 BPH+pyelonephritis

## 2023-06-28 NOTE — MEDICAL STUDENT PROGRESS NOTE(EDUCATION) - TRANSITIONS OF CARE/DISPOSITION: (SDOH, ANTICIPATED DC NEEDS)
Patient is awaiting repeat b/c, u/c and CBC to assess clinical course. He is Ax3 and is requesting to go home. Pending lab results, patient is to be d/c with po levaquin 750 mg q48h as per ID.    Patient said that he ambulated at home with a zack. We are awaiting PT evaluation.    Had a GoC discussion with pt and his daughter (HCP) yesterday. Will F/U today or tomorrow once she is available to obtain DNR/DNI +/- consult palliative care.     Patient is awaiting repeat b/c, u/c assess clinical course; Will reassess CBC tomorrow. He is Ax3 and is requesting to go home. Pending lab results, patient is to be d/c with po levaquin 750 mg q48h as per ID.    Patient said that he ambulated at home with a cane. We are awaiting PT evaluation.    Had a GoC discussion with pt and his daughter. Pending complete DNR/DNI.     Patient is awaiting repeat b/c, u/c assess clinical course; Will reassess CBC tomorrow. Pending nephro consult. He is Ax3 and is requesting to go home. Pending lab results, patient is to be d/c with po levaquin 750 mg q48h as per ID.    Patient said that he ambulated at home with a cane. We are awaiting PT evaluation.    Had a GoC discussion with pt and his daughter. Pending complete DNR/DNI tomorrow as daugther said she needs to review with her sister / co-proxy.

## 2023-06-28 NOTE — PROGRESS NOTE ADULT - ASSESSMENT
ASSESSMENT  89 yo M with hx of HTN, HLD, DM II BPH and CAD s/p CABG x 4, Hypothyroidism presents to ED for abnormal labs result (hyperkalemia and hyponatremia).    IMPRESSION  #Sepsis present on admission  #Pseudomonas bacteremia from suspected UTI with Left Epididymitis  -- had recent hidalgo   - BLood Cx 6/24 +   - Urine Cx 6/23 > 3 organisms   - US Kidney and Bladder (06.23.23 @ 21:08): IMPRESSION: No sonographic evidence of hydronephrosis. Post void residual of 69 mL (46%).      #Hyponatremia    #BPH  #DM II  #CAD s/p CABG    #Abx allergy: No Known Allergies    CrCl 31    RECOMMENDATIONS  - check scrotal US  - continue cefepime -- decrease to 2g q 12 hours -- keep on IV while in house   - trend WBC -- improving so far  - if WBC continues to improve  switch to levofloxacin 750 mg q 48 hours (end date 7/4)    Please call or message on [employer] Teams if with any questions.  Spectra [phone]

## 2023-06-28 NOTE — PROGRESS NOTE ADULT - ASSESSMENT
89M PMHx HTN, DM2, BPH, CAD s/p CABG, hypothyroidism here with hyperkalemia, hyponatremia noted as outpt. Found to have pseudomonas bacteremia.    #Pseudomonas bacteremia  Patient afebrile, WBC uptrending, continue monitoring on cefepime  - WBC 15 (6/26) -> 21 (6/27)  - F/u AM CBC  - recent hidalgo in snf; now out  - bcx pseudomonas 6/24  - UA showing no bacteria but LE positive and   - C/w cefepime 2 g q12h, d/c on levaquin 750 mg q48h as per   - F/u repeat Ucx (6/27)  - Pending AM Bcx (6/28)    #Hyperkalemia(resolved)  #hyponatremia  - Serum Na 130 (6/27)  - Aldosterone 9.4  - Renin 14.6  - Urine Na 39  - Urine Osm 349  - Serum Osm 284  - Pending Urine Cr (6/27)  - F/u AM Na    #HTN  #Moderate to severe AS found TTE on 12/14/22  - restarted metoprolol 25 mg bid, hold if SBP <100, HR <60  - monitor BP    #DM2  -continue lantus 12 with sliding scale    #BPH.   -continue finasteride and  tamsulosin    #CAD  -cont plavix and zocor    #MISC  -GI ppx: ppi  -DVT ppx: heparin sq  -Diet: Soft and bite sized  -Activity: IAT  -Code status: Full code  -Dispo: Had a GOC conversation with patient and daughter today, will f/u tomorrow when patient's daughter returns or consult palliative care AM   89M PMHx HTN, DM2, BPH, CAD s/p CABG, hypothyroidism here with hyperkalemia, hyponatremia noted as outpt. Found to have pseudomonas bacteremia.    #Pseudomonas bacteremia  Patient afebrile, WBC downtrending, patient improving continue monitoring on cefepime  - WBC 15 (6/26) -> 21 (6/27)  - F/u AM CBC  - recent hidalgo in snf; now out  - bcx pseudomonas 6/24  - UA showing no bacteria but LE positive and   - C/w cefepime 2 g q12h, d/c on levaquin 750 mg q48h as per nephrology recs   - F/u Ucx (6/27)  - F/u Bcx (6/28)  - Appreciate nephrology recs    #Hyperkalemia(resolved)  #Hyponatremia  - Serum Na 130 (6/27)  - Aldosterone normal, renin normal  - Urine Na 39  - Urine Osm 349  - Serum Osm 284  - Urine Cr <4  - FeNa 7.5% (6/28), consider intrinsic renal cause for hyponatremia   - Pending nephrology consult    #HTN  #Moderate to severe AS found TTE on 12/14/22  - restarted metoprolol 25 mg bid, hold if SBP <100, HR <60  - monitor BP    #DM2  -continue lantus 12 with sliding scale    #BPH.   -continue finasteride and tamsulosin    #CAD  -cont plavix and zocor    #MISC  -GI ppx: ppi  -DVT ppx: heparin sq  -Diet: Soft and bite sized  -Activity: IAT  -Code status: Full code, patient's daughter will bring back MOLST form tomorrow  -Dispo: pending PT [place]

## 2023-06-28 NOTE — DISCHARGE NOTE NURSING/CASE MANAGEMENT/SOCIAL WORK - NSDCVIVACCINE_GEN_ALL_CORE_FT
Tdap; 28-Mar-2023 22:40; Jackie Cyr (RN); [employer]; [id number] (Exp. Date: 15-Feb-2025); IntraMuscular; Deltoid Right.; 0.5 milliLiter(s); VIS (VIS Published: 09-May-2013, VIS Presented: 28-Mar-2023);

## 2023-06-28 NOTE — MEDICAL STUDENT PROGRESS NOTE(EDUCATION) - PLAN 5
- TTE moderate-severe AS 12/14/22  - continue lopressor but hold if SBP <100 to ensure adequate preload - TTE from 12/2022 showed moderate-severe AS  - continue lopressor but hold if SBP <100 to ensure adequate preload

## 2023-06-28 NOTE — PROGRESS NOTE ADULT - ATTENDING COMMENTS
89 y/o Male with  PMHx HTN, DM2, BPH, CAD s/p CABG, hypothyroidism, recently dx. from [facility] , progressive ambulatory dysfunction, admitted   with hyperkalemia, hyponatremia noted as outpt. Found to have pseudomonas bacteremia.    #Bacteremia due to Pseudomonas.   # Leukocytosis   #in setting of recent hidalgo in snf; now out; possible uti  ua positive; f/u ucxs- more than 3 organisms, repeat urine cxs , repeat blood cxs   bcxs pseudomonas 6/24, sensitive to Cefepime , ID on board   - Cont  IV cefepime (eventual LVQ; end date 7/4 per ID)  - daily CBC monitoring     #Hyperkalemia resolved  # Hypomagnesemia- supplemented     #hyponatremia- Na 126 on admission > lately 130ish.   FENa around 7.5%? INtrinsic renal/ATN?? Cr normal.  Nephro eval. may have to restrict po free water and observe.          #HTN (hypertension). currently borderline Hypotensive   -continue  lopressor 25mg PO twice daily with holding parameters.     #DM2 (diabetes mellitus, type 2)- uncontrolled, Hga1C- 9  lantus 12  ssi.    #History of BPH. - continue finasteride 5 mg po once daily /tamsulosin 0.4 mg po once daily at bedtime     #h/o CAD (coronary artery disease). / h/o CABG  - plavix/zocor tx.  -patient is on supplemental oxygen via NC, 2 L , on RA sat 91- 92% at rest     #Hypothyroidism. continue synthroid 50mcg po once daily, obtain TSH level    # Iron def. anemia- started on PO Iron tx.     # Ambulatory dysfunction, physical decline- once patient clinically improves will obtain PT evaluation.     DVT ppX, heparin    #Progress Note Handoff: Pending repeated blood cxs, repeat urine cxs, daily CBC, BMP , Mg levels, monitor b/p, GOC discussion (family took MOLST form home to discuss among them. they will then talk to patient and probably sign it tomorrow)  Family discussion: yes, medical team Disposition: Home___vs STR once medically stable

## 2023-06-28 NOTE — MEDICAL STUDENT PROGRESS NOTE(EDUCATION) - SUBJECTIVE AND OBJECTIVE BOX
HPI  Mr. Chris José, is a friendly 89-year old  male presenting with a PMHx of HTN, DMII, BPH, CAD s/p CABG x 4, hypothyroidism who presented to ED with dysuria and incontinence after  HPI  Mr. Chris José, is a friendly 89-year old  male presenting with a PMHx of HTN, DMII, BPH, CAD s/p CABG x 4, hypothyroidism who presented to ED with AMS, dysuria and incontinence after home physician noted hyponatremia/hypokalemia on labwork. In the ED, he was afebrile but found to have pseudomonas bacteremia, Na 131, K 5.1, WBC 18k. U/A was positive for bacteria, moderate blood, and positive leukocyte esterase. He was admitted for management of acute encephalopathy 2/2 pseudomonas bacteremia and electrolyte imbalance and started on IV cefepime 2g q12    There were no major 24 hour or O/N events. Today is hospital day 5. Patient reports eating, drinking, and sleeping well. He has not ambulated or passed bowel to his knowledge,    Repeat vitals were significant for /58. Patient htn was previously trending low (6/27/23 100/52). TTE from 12/2022 showed moderate-severe AS; aiming to keep SBP >100 mmHg to ensure adequate preload.    Repeat labs were significant for Na 130, K 4.6, 20k (uptrending) WBC and low-trending Mg 1.5 down from 2.1 on admission. TSH was normal but patient renin was noted to be elevated at 14.6 with a Chayo of 39, UCr of 4, serum osm 284, urine osm 349. FeNa was calculated to be 7.5% post-renal likely 2/2 to BPH with UTI.     PE was notable for negative CVA tenderness, resolution of testicular erythema/swelling from yesterday and no lower-extremity edema.    Patient is currently pending repeat CBC, bacterial culture, and U/A.  HPI  Mr. Chris José, is a friendly 89-year old  male presenting with a PMHx of HTN, DMII, BPH, CAD s/p CABG x 4, hypothyroidism who presented to ED with AMS, dysuria and incontinence after home physician noted hyponatremia/hypokalemia on labwork. In the ED, he was afebrile but found to have pseudomonas bacteremia, Na 131, K 5.1, WBC 18k. U/A was positive for bacteria, moderate blood, and positive leukocyte esterase. He was admitted for management of acute encephalopathy 2/2 pseudomonas bacteremia and electrolyte imbalance and started on IV cefepime 2g q12    There were no major 24 hour or O/N events. Today is hospital day 5. Patient reports eating, drinking, and sleeping well. He has not ambulated or passed bowel to his knowledge,    Repeat vitals were significant for /58. Patient htn was previously trending low (6/27/23 100/52). TTE from 12/2022 showed moderate-severe AS; aiming to keep SBP >100 mmHg to ensure adequate preload.    Repeat labs were significant for Na 130, K 4.6,  WBC is downtrending (20 6/27 -> 14.2 6/28). Mg is now 1.8 up from 1.5 yesterday. TSH was normal but patient renin was noted to be elevated at 14.6 with a Chayo of 39, UCr of 4, serum osm 284, urine osm 349. FeNa was calculated to be 7.5% post-renal likely 2/2 to BPH with UTI.     PE was notable for negative CVA tenderness, resolution of testicular erythema/swelling from yesterday     General: friendly-appearing male, AAOx3  Cardio: RRR, No MRoG, Regular S1/S2 appreciated  Pulm: No wheezing, rales, crackles,  GI: No CVA tenderness, soft quadrants NTND  Skin: intact, no ecchymoses, no purpura  Extremities: No ZACK    Patient is currently pending repeat bacterial culture, and U/A HPI  Mr. Chris José, is a friendly 89-year old  male presenting with a PMHx of HTN, DMII, BPH, CAD s/p CABG x 4, hypothyroidism who presented to ED with AMS, dysuria and incontinence after home physician noted hyponatremia/hypokalemia on labwork. In the ED, he was afebrile but found to have pseudomonas bacteremia, Na 131, K 5.1, WBC 18k. U/A was positive for bacteria, moderate blood, and positive leukocyte esterase. He was admitted for management of acute encephalopathy 2/2 pseudomonas bacteremia and electrolyte imbalance and started on IV cefepime 2g q12    There were no major 24 hour or O/N events. Today is hospital day 5. Patient reports eating, drinking, and sleeping well. He has not ambulated or passed bowel to his knowledge,    Repeat vitals were significant for /58. Patient htn was previously trending low (6/27/23 100/52). TTE from 12/2022 showed moderate-severe AS; aiming to keep SBP >100 mmHg to ensure adequate preload.    Repeat labs were significant for Na 130, K 4.6,  WBC is downtrending (20 6/27 -> 14.2 6/28). Mg is now 1.8 up from 1.5 yesterday. TSH was normal but patient renin was noted to be elevated at 14.6 with a Chayo of 39, UCr of 4, serum osm 284, urine osm 349. FeNa was calculated to be 7.5% possibly  2/2 to UTI.     PE was notable for negative CVA tenderness, resolution of testicular erythema/swelling from yesterday     General: friendly-appearing male, AAOx3  Cardio: RRR, No MRoG, Regular S1/S2 appreciated  Pulm: No wheezing, rales, crackles,  GI: No CVA tenderness, soft quadrants NTND  Skin: intact, no ecchymoses, no purpura  Extremities: No ZACK    Patient is currently pending repeat bacterial culture, and U/A HPI  Mr. Chris José, is a friendly 89-year old  male presenting with a PMHx of HTN, DMII, BPH, CAD s/p CABG x 4, hypothyroidism who presented to ED with AMS, dysuria and incontinence after home physician noted hyponatremia/hypokalemia on labwork. In the ED, he was afebrile but found to have pseudomonas bacteremia, Na 131, K 5.1, WBC 18k. U/A was positive for bacteria, moderate blood, and positive leukocyte esterase. He was admitted for management of acute encephalopathy 2/2 pseudomonas bacteremia and electrolyte imbalance and started on IV cefepime 2g q12    There were no major 24 hour or O/N events. Today is hospital day 5. Patient reports eating, drinking, and sleeping well. He has not ambulated or passed bowel to his knowledge,    Repeat vitals were significant for /58. Patient htn was previously trending low (6/27/23 100/52). TTE from 12/2022 showed moderate-severe AS; aiming to keep SBP >100 mmHg to ensure adequate preload.    Repeat labs were significant for Na 130, K 4.6,  WBC is downtrending (20 6/27 -> 14.2 6/28). Mg is now 1.8 up from 1.5 yesterday. TSH was normal but patient renin was noted to be elevated at 14.6 with a Chayo of 39, UCr of 4, serum osm 284, urine osm 349. FeNa was calculated to be 7.5% possibly intrinsic cause  2/2 to UTI.     PE was notable for negative CVA tenderness, resolution of testicular erythema/swelling from yesterday     General: friendly-appearing male, AAOx3  Cardio: RRR, No MRoG, Regular S1/S2 appreciated  Pulm: No wheezing, rales, crackles,  GI: No CVA tenderness, soft quadrants NTND  Skin: intact, no ecchymoses, no purpura  Extremities: No ZACK    Patient is currently pending repeat bacterial culture, and U/A HPI  Mr. Chris José, is a friendly 89-year old male presenting with a PMHx of HTN, DMII, BPH, CAD s/p CABG x 4, hypothyroidism who presented to ED with AMS, dysuria and incontinence after home physician noted hyponatremia/hypokalemia on labwork. In the ED, he was afebrile but found to have pseudomonas bacteremia, Na 131, K 5.1, WBC 18k. U/A was positive for bacteria, moderate blood, and positive leukocyte esterase. He was admitted for management of acute encephalopathy 2/2 pseudomonas bacteremia and electrolyte imbalance and started on IV cefepime 2g q12    Today is hospital day 5    There were no major 24 hour or O/N events.     Patient reports eating, drinking, and sleeping well. He has not ambulated or had BM since admission.        VITALS:  T(F): 97.1  HR: 84  BP: 133/58  RR: 18  SpO2: 98%    PHYSICAL EXAM  General: friendly-appearing male, AAOx3  Cardio: RRR, No MRoG, Regular S1/S2 appreciated  Pulm: No wheezing, rales, crackles,  GI:  soft quadrants NTND  : No CVA tenderness, no suprapubic tenderness, continues to have testicular erythema  Skin: intact, no ecchymoses, no purpura  Extremities: No ZACK    LABS  Repeat labs were significant for Na 130, K 4.6,  WBC is downtrending (20 6/27 -> 14.2 6/28). Mg is now 1.8 up from 1.5 yesterday. TSH was normal but patient renin was noted to be elevated at 14.6 with a Chayo of 39, UCr of 4, serum osm 284, urine osm 349. FeNa was calculated to be 7.5% possibly intrinsic cause  2/2 to UTI.     MICROBIOLOGY  Patient is currently pending repeat bacterial culture, and U/A    RADIOLOGY  < from: US Kidney and Bladder (06.23.23 @ 21:08) >  IMPRESSION:    No sonographic evidence of hydronephrosis.    Post void residual of 69 mL (46%).      < from: Xray Chest 1 View-PORTABLE IMMEDIATE (Xray Chest 1 View-PORTABLE IMMEDIATE .) (06.23.23 @ 16:45) >  Impression:    Right basilar focal atelectasis      < from: Xray Chest 1 View-PORTABLE IMMEDIATE (Xray Chest 1 View-PORTABLE IMMEDIATE .) (04.02.23 @ 10:25) >  FINDINGS/  IMPRESSION:    Low lung volumes. Right basal bandlike opacity/atelectasis. No   pneumothorax or significant pleural effusion on frontal view. No focal   consolidation.    Stable cardiomediastinal silhouette.    Unchanged osseous structures.

## 2023-06-28 NOTE — MEDICAL STUDENT PROGRESS NOTE(EDUCATION) - PLAN 7
- GI ppx: ppi  -DVT ppx: heparin sq  -Diet: Soft and bite sized  -Activity: IAT  -Code status: Full code, patient's daughter will bring back MOLST form tomorrow  -Dispo: pending PT eval

## 2023-06-28 NOTE — SWALLOW BEDSIDE ASSESSMENT ADULT - SLP PERTINENT HISTORY OF CURRENT PROBLEM
Pt is a 89 y/o M w/ PMHx: HTN, HLD, DM II, BPH, CAD s/p CABG x4, hypothyroidism. Pt presents to ED for abnormal labs (hyperkalemia and hyponatremia). Per the daughter, pt was recently admitted for fall and d/c to [facility]. Pt has been extremely lethargic and his urine has been milky, dark since 5/26/23 when he was d/c from [facility]. Pt p/w incontinent and c/o pain w/ urination. Pt is being treated for pseudomonas bacteremia from suspected UTI w/ L epididymitis Pt is a 89 y/o M w/ PMHx: HTN, HLD, DM II, BPH, CAD s/p CABG x4, hypothyroidism. Pt presented to the ED w/ AMS, dysuria, and incontinence after home physician noted hyponatremia/hypokalemia on labwork. In the ED, he was afebrile, but found to have pseudomonas bacteremia. Per the daughter, pt was recently admitted for fall and d/c to [facility]. Pt has been extremely lethargic and his urine has been milky, dark since 5/26/23 when he was d/c from [facility]. Pt was admitted for management of acute encephalopathy 2' pseudomonas bacteremia and electrolyte imbalance. Pt is being treated for hyponatremia and pseudomonas bacteremia from suspected UTI w/ L epididymitis. Pt is a 89 y/o M w/ PMHx: HTN, HLD, DM II, BPH, CAD s/p CABG x4, hypothyroidism. Pt presented to the ED w/ AMS, dysuria, and incontinence after home physician noted hyponatremia/hypokalemia on labwork. Pt is being treated for acute encephalopathy 2' pseudomonas bacteremia and electrolyte imbalance, leukocytosis , UA (+).

## 2023-06-29 LAB
ALBUMIN SERPL ELPH-MCNC: 2.7 G/DL — LOW (ref 3.5–5.2)
ALP SERPL-CCNC: 86 U/L — SIGNIFICANT CHANGE UP (ref 30–115)
ALT FLD-CCNC: 6 U/L — SIGNIFICANT CHANGE UP (ref 0–41)
ANION GAP SERPL CALC-SCNC: 12 MMOL/L — SIGNIFICANT CHANGE UP (ref 7–14)
AST SERPL-CCNC: 13 U/L — SIGNIFICANT CHANGE UP (ref 0–41)
BASOPHILS # BLD AUTO: 0.06 K/UL — SIGNIFICANT CHANGE UP (ref 0–0.2)
BASOPHILS NFR BLD AUTO: 0.4 % — SIGNIFICANT CHANGE UP (ref 0–1)
BILIRUB SERPL-MCNC: 0.2 MG/DL — SIGNIFICANT CHANGE UP (ref 0.2–1.2)
BUN SERPL-MCNC: 16 MG/DL — SIGNIFICANT CHANGE UP (ref 10–20)
CALCIUM SERPL-MCNC: 8.7 MG/DL — SIGNIFICANT CHANGE UP (ref 8.4–10.5)
CHLORIDE SERPL-SCNC: 98 MMOL/L — SIGNIFICANT CHANGE UP (ref 98–110)
CO2 SERPL-SCNC: 21 MMOL/L — SIGNIFICANT CHANGE UP (ref 17–32)
CREAT SERPL-MCNC: 0.9 MG/DL — SIGNIFICANT CHANGE UP (ref 0.7–1.5)
EGFR: 77 ML/MIN/1.73M2 — SIGNIFICANT CHANGE UP
EOSINOPHIL # BLD AUTO: 0.35 K/UL — SIGNIFICANT CHANGE UP (ref 0–0.7)
EOSINOPHIL NFR BLD AUTO: 2.5 % — SIGNIFICANT CHANGE UP (ref 0–8)
GLUCOSE BLDC GLUCOMTR-MCNC: 195 MG/DL — HIGH (ref 70–99)
GLUCOSE BLDC GLUCOMTR-MCNC: 247 MG/DL — HIGH (ref 70–99)
GLUCOSE BLDC GLUCOMTR-MCNC: 262 MG/DL — HIGH (ref 70–99)
GLUCOSE BLDC GLUCOMTR-MCNC: 93 MG/DL — SIGNIFICANT CHANGE UP (ref 70–99)
GLUCOSE SERPL-MCNC: 90 MG/DL — SIGNIFICANT CHANGE UP (ref 70–99)
HCT VFR BLD CALC: 26.5 % — LOW (ref 42–52)
HGB BLD-MCNC: 8.5 G/DL — LOW (ref 14–18)
IMM GRANULOCYTES NFR BLD AUTO: 2.4 % — HIGH (ref 0.1–0.3)
LYMPHOCYTES # BLD AUTO: 31 % — SIGNIFICANT CHANGE UP (ref 20.5–51.1)
LYMPHOCYTES # BLD AUTO: 4.4 K/UL — HIGH (ref 1.2–3.4)
MAGNESIUM SERPL-MCNC: 1.6 MG/DL — LOW (ref 1.8–2.4)
MCHC RBC-ENTMCNC: 26.5 PG — LOW (ref 27–31)
MCHC RBC-ENTMCNC: 32.1 G/DL — SIGNIFICANT CHANGE UP (ref 32–37)
MCV RBC AUTO: 82.6 FL — SIGNIFICANT CHANGE UP (ref 80–94)
MONOCYTES # BLD AUTO: 1.31 K/UL — HIGH (ref 0.1–0.6)
MONOCYTES NFR BLD AUTO: 9.2 % — SIGNIFICANT CHANGE UP (ref 1.7–9.3)
NEUTROPHILS # BLD AUTO: 7.72 K/UL — HIGH (ref 1.4–6.5)
NEUTROPHILS NFR BLD AUTO: 54.5 % — SIGNIFICANT CHANGE UP (ref 42.2–75.2)
NRBC # BLD: 0 /100 WBCS — SIGNIFICANT CHANGE UP (ref 0–0)
PLATELET # BLD AUTO: 328 K/UL — SIGNIFICANT CHANGE UP (ref 130–400)
PMV BLD: 8.8 FL — SIGNIFICANT CHANGE UP (ref 7.4–10.4)
POTASSIUM SERPL-MCNC: 4.4 MMOL/L — SIGNIFICANT CHANGE UP (ref 3.5–5)
POTASSIUM SERPL-SCNC: 4.4 MMOL/L — SIGNIFICANT CHANGE UP (ref 3.5–5)
PROT SERPL-MCNC: 5.1 G/DL — LOW (ref 6–8)
RBC # BLD: 3.21 M/UL — LOW (ref 4.7–6.1)
RBC # FLD: 15.2 % — HIGH (ref 11.5–14.5)
SODIUM SERPL-SCNC: 131 MMOL/L — LOW (ref 135–146)
WBC # BLD: 14.18 K/UL — HIGH (ref 4.8–10.8)
WBC # FLD AUTO: 14.18 K/UL — HIGH (ref 4.8–10.8)

## 2023-06-29 PROCEDURE — 99497 ADVNCD CARE PLAN 30 MIN: CPT

## 2023-06-29 PROCEDURE — 99233 SBSQ HOSP IP/OBS HIGH 50: CPT

## 2023-06-29 RX ORDER — SENNA PLUS 8.6 MG/1
2 TABLET ORAL AT BEDTIME
Refills: 0 | Status: DISCONTINUED | OUTPATIENT
Start: 2023-06-29 | End: 2023-07-18

## 2023-06-29 RX ORDER — MAGNESIUM SULFATE 500 MG/ML
2 VIAL (ML) INJECTION ONCE
Refills: 0 | Status: COMPLETED | OUTPATIENT
Start: 2023-06-29 | End: 2023-06-29

## 2023-06-29 RX ORDER — UREA 15 G
15 POWDER IN PACKET (EA) ORAL DAILY
Refills: 0 | Status: DISCONTINUED | OUTPATIENT
Start: 2023-06-29 | End: 2023-07-18

## 2023-06-29 RX ORDER — POLYETHYLENE GLYCOL 3350 17 G/17G
17 POWDER, FOR SOLUTION ORAL
Refills: 0 | Status: DISCONTINUED | OUTPATIENT
Start: 2023-06-29 | End: 2023-07-18

## 2023-06-29 RX ADMIN — SIMVASTATIN 20 MILLIGRAM(S): 20 TABLET, FILM COATED ORAL at 22:28

## 2023-06-29 RX ADMIN — CEFEPIME 100 MILLIGRAM(S): 1 INJECTION, POWDER, FOR SOLUTION INTRAMUSCULAR; INTRAVENOUS at 23:15

## 2023-06-29 RX ADMIN — Medication 3: at 17:57

## 2023-06-29 RX ADMIN — Medication 25 MILLIGRAM(S): at 17:03

## 2023-06-29 RX ADMIN — SENNA PLUS 2 TABLET(S): 8.6 TABLET ORAL at 22:26

## 2023-06-29 RX ADMIN — DULOXETINE HYDROCHLORIDE 60 MILLIGRAM(S): 30 CAPSULE, DELAYED RELEASE ORAL at 11:12

## 2023-06-29 RX ADMIN — Medication 1 TABLET(S): at 08:30

## 2023-06-29 RX ADMIN — Medication 325 MILLIGRAM(S): at 17:04

## 2023-06-29 RX ADMIN — CEFEPIME 100 MILLIGRAM(S): 1 INJECTION, POWDER, FOR SOLUTION INTRAMUSCULAR; INTRAVENOUS at 11:11

## 2023-06-29 RX ADMIN — Medication 25 MILLIGRAM(S): at 05:32

## 2023-06-29 RX ADMIN — Medication 5 MILLIGRAM(S): at 11:12

## 2023-06-29 RX ADMIN — HEPARIN SODIUM 5000 UNIT(S): 5000 INJECTION INTRAVENOUS; SUBCUTANEOUS at 22:27

## 2023-06-29 RX ADMIN — FINASTERIDE 5 MILLIGRAM(S): 5 TABLET, FILM COATED ORAL at 11:12

## 2023-06-29 RX ADMIN — CLOPIDOGREL BISULFATE 75 MILLIGRAM(S): 75 TABLET, FILM COATED ORAL at 11:12

## 2023-06-29 RX ADMIN — HEPARIN SODIUM 5000 UNIT(S): 5000 INJECTION INTRAVENOUS; SUBCUTANEOUS at 05:31

## 2023-06-29 RX ADMIN — Medication 325 MILLIGRAM(S): at 05:32

## 2023-06-29 RX ADMIN — TAMSULOSIN HYDROCHLORIDE 0.4 MILLIGRAM(S): 0.4 CAPSULE ORAL at 22:27

## 2023-06-29 RX ADMIN — Medication 1: at 12:01

## 2023-06-29 RX ADMIN — Medication 25 GRAM(S): at 08:31

## 2023-06-29 RX ADMIN — POLYETHYLENE GLYCOL 3350 17 GRAM(S): 17 POWDER, FOR SOLUTION ORAL at 17:04

## 2023-06-29 RX ADMIN — Medication 50 MICROGRAM(S): at 05:32

## 2023-06-29 RX ADMIN — POLYETHYLENE GLYCOL 3350 17 GRAM(S): 17 POWDER, FOR SOLUTION ORAL at 09:40

## 2023-06-29 RX ADMIN — HEPARIN SODIUM 5000 UNIT(S): 5000 INJECTION INTRAVENOUS; SUBCUTANEOUS at 14:17

## 2023-06-29 RX ADMIN — INSULIN GLARGINE 12 UNIT(S): 100 INJECTION, SOLUTION SUBCUTANEOUS at 08:30

## 2023-06-29 NOTE — MEDICAL STUDENT PROGRESS NOTE(EDUCATION) - PLAN 1
Pt afebrile with leukocytosis  - continue monitoring with cefepime  - WBC 14.1 (14.2- 6/28)  - pending repeat UCx, b/c Pt afebrile with leukocytosis  - continue monitoring with cefepime  - WBC 14.1 (14.2- 6/28)  - pending repeat UCx, b/c  - F/u AM CBC  - recent hidalgo in snf; now out  - bcx pseudomonas 6/24  - UA showing no bacteria but LE positive and   - C/w cefepime 2 g q12h, d/c on levaquin 750 mg q48h as per nephrology recs   - Appreciate nephrology recs

## 2023-06-29 NOTE — MEDICAL STUDENT PROGRESS NOTE(EDUCATION) - ASSESSMENT
My assessment is that this patient is a 89-year old  male with PMHx of HTN, DMII, BPH, CAD s/p CABG, and hypothyroidism who presented to ED due to acute encephalopathy 2/2 to Pseudomonas bacteremia, UTI, hyperkalemia, and hyponatremia. Despite resolution of his hyperkalemia, he has persistent hyponatremia and leukocytosis Pending repeat b/c and u/c and continuing course of IV cefepime with monitoring. My assessment is that this patient is a 89-year old male with PMHx of HTN, DMII, BPH, CAD s/p CABG, hypothyroidism who presented to ED due to acute encephalopathy 2/2 to Pseudomonas bacteremia, UTI, hyperkalemia, and hyponatremia on course of IV cefepime 2 g q12 since 6/27/12. Currently on hospital day 6d with leukocytosis (WBC 14.18), hyponatremia (Na 131), hypomagnesia (Mg 1.6). Pending repeat b/c, u/c, and nephro consult; continue course of IV cefepime with CBC monitoring.      My assessment is that this patient is a 89-year old male with PMHx of HTN, DMII, BPH, CAD s/p CABG, hypothyroidism who presented to ED due to acute encephalopathy 2/2 to Pseudomonas bacteremia, UTI, hyperkalemia, and hyponatremia on course of IV cefepime 2 g q12 since 6/27/12. Currently on hospital day 6d with leukocytosis (WBC 14.18), hyponatremia (Na 131), hypomagnesia (Mg 1.6). Continue course of IV cefepime with CBC monitoring.

## 2023-06-29 NOTE — PHYSICAL THERAPY INITIAL EVALUATION ADULT - GENERAL OBSERVATIONS, REHAB EVAL
10:40-11:15 Pt. encountered in semifowler in bed in NAD. +2L O2 NC. Agreeable to PT. SPO2 99% on 2L NC at rest, 99% on RA following activity. Pilo SIMEON aware.

## 2023-06-29 NOTE — MEDICAL STUDENT PROGRESS NOTE(EDUCATION) - TRANSITIONS OF CARE/DISPOSITION: (SDOH, ANTICIPATED DC NEEDS)
- Awaiting PT evaluation for pt ambulation  - Completed GoC dicussion with pt daughter/co-proxy who said she will take pink form home to discuss/sign               -

## 2023-06-29 NOTE — MEDICAL STUDENT PROGRESS NOTE(EDUCATION) - PLAN 9
continue heparin 5000 U sq q8 -GI ppx: ppi  -DVT ppx: heparin sq  -Diet: Soft and bite sized  -Activity: IAT  -Code status: Full code, patient's daughter will bring back MOLST form tomorrow  -Dispo: pending PT eval

## 2023-06-29 NOTE — PHYSICAL THERAPY INITIAL EVALUATION ADULT - PERTINENT HX OF CURRENT PROBLEM, REHAB EVAL
89 yo M with hx of HTN, HLD, DM II BPH and CAD s/p CABG x 4, Hypothyroidism presents to ED for abnormal labs result (hyperkalemia and hyponatremia). Patient is a poor historian, however patient's daughter was at bedside for collateral. Per the daughter, patient was recently admitted for fall and discharged to [facility] nursing [place]. When patient was discharged from [facility] he did not have a hidalgo in. Per the daughter at the nursing home patient had a hidalgo catheter in despite family requesting removal of it multiple times. Patient was discharged from [facility] on May 26th. Per the daughter patient was on antibiotics at [facility] for UTI, but she is unsure which antibiotic it was.    Since discharge patient's daughter says the patient has been extremely lethargic and weak. His urine has been milky, dark since May 26th when he was discharged from [facility]. Over the past week patient has become incontinent, no able to make it to the restroom, and complaining that he has pain with urination. On Wednesday a doctor came to the house to examine the patient, at that time he ordered lab tests, which the patient had performed today. Labs were significant for Na 126 and a K TNP. They were instructed to come straight to the ED.

## 2023-06-29 NOTE — MEDICAL STUDENT PROGRESS NOTE(EDUCATION) - PLAN 5
- No bowel movements since admission  - Start on senna / Miralax qd  - pending PT eval for ambulation

## 2023-06-29 NOTE — PROGRESS NOTE ADULT - ASSESSMENT
89M PMHx HTN, DM2, BPH, CAD s/p CABG, hypothyroidism here with hyperkalemia, hyponatremia noted as outpt. Found to have pseudomonas bacteremia.    #Pseudomonas bacteremia  Patient afebrile, WBC downtrending, patient improving continue monitoring on cefepime  - WBC 14 (6/28) -> 14 (6/29)  - F/u AM CBC  - recent hidalgo in snf; now out  - bcx pseudomonas 6/24  - UA showing no bacteria but LE positive and   - C/w cefepime 2 g q12h, d/c on levaquin 750 mg q48h as per infectious disease recs  - Ucx, no growth (6/27)  - Bcx NGTD (6/28)  - Pending scrotal US to assess possible left epididimitis from UTI  - Appreciate ID recs    #Hyperkalemia(resolved)  #Hyponatremia  - Serum Na 130 (6/27)  - Aldosterone normal, renin normal  - Urine Na 39  - Urine Osm 349  - Serum Osm 284  - Urine Cr <4  - FeNa 7.5% (6/28), consider intrinsic renal cause for hyponatremia  - Nephrology recommended uric acid level, start urea 15 g daily  - Start urea 15 g daily  - Appreciate nephrology recs    #HTN  #Moderate to severe AS found TTE on 12/14/22  - c/w metoprolol 25 mg bid, hold if SBP <100, HR <60  - monitor BP    #DM2  -continue lantus 12 with sliding scale    #BPH.   -continue finasteride and tamsulosin    #CAD  -cont plavix and zocor    #MISC  -GI ppx: ppi  -DVT ppx: heparin sq  -Diet: Soft and bite sized  -Activity: IAT  -Code status: DNR/DNI  -Dispo: PT recommend home PT vs rehab

## 2023-06-29 NOTE — PHYSICAL THERAPY INITIAL EVALUATION ADULT - IMPAIRMENTS FOUND, PT EVAL
Patient calling in regards to his antibody levels for COVID. He thought they were low. He read an article about a new drug and also has a sister who is taking the medication due to being immunosuppressed. The medication is called Vance. Patient would like to know if he should be taking this as well since he is taking remicade.     In addition he also would like further explanation of his results regarding the stress test and the CXR.     Please advise.  Angelica Pulido LPN     [employer]/ergonomics and body mechanics/gait, locomotion, and balance/gross motor/muscle strength

## 2023-06-29 NOTE — MEDICAL STUDENT PROGRESS NOTE(EDUCATION) - PLAN 2
Persistent hyponatremia since admission  - Na 131 (130 6/28)  - pending nephro consult  - may consider switching D5W to 0.9 saline    - Chayo 39 6/27/23  - UOsm 349 6/27/23  - Serum Osm 284 6/27/23  - UCr 4.0 6/27/23      - FeNa - 7.5% possibIe intrinsic 2/2 BPH +pyelonephritis Persistent hyponatremia since admission  - Na 131 (130 6/28)  - pending nephro consult  - may consider switching D5W to 0.9 saline  - Chayo 39 6/27/23  - UOsm 349 6/27/23  - Serum Osm 284 6/27/23  - UCr 4.0 6/27/23      - FeNa - 7.5% possibIe intrinsic 2/2 BPH +pyelonephritis

## 2023-06-29 NOTE — PHYSICAL THERAPY INITIAL EVALUATION ADULT - ASSISTIVE DEVICE FOR TRANSFER: SIT/STAND, REHAB EVAL
$ Cardioversion External  Date/Time: 3/28/2019 5:40 PM  Performed by: Abdulaziz Branch MD  Authorized by: Abdulaziz Branch MD   Consent: Verbal consent obtained. Written consent obtained.  Consent given by: patient  Patient understanding: patient states understanding of the procedure being performed  Patient consent: the patient's understanding of the procedure matches consent given  Procedure consent: procedure consent matches procedure scheduled  Relevant documents: relevant documents present and verified  Patient identity confirmed: verbally with patient, hospital-assigned identification number and provided demographic data    Sedation:  Patient sedated: yes  Analgesia: see MAR for details    Cardioversion basis: elective  Pre-procedure rhythm: atrial fibrillation  Chest area: chest area exposed  Electrodes: pads  Electrodes placed: anterior-posterior  Number of attempts: 1  Attempt 1 mode: synchronous  Attempt 1 waveform: biphasic  Attempt 1 shock (in Joules): 200  Attempt 1 outcome: conversion to normal sinus rhythm  Post-procedure rhythm: normal sinus rhythm  Complications: no complications  Patient tolerance: Patient tolerated the procedure well with no immediate complications              
rolling walker

## 2023-06-29 NOTE — MEDICAL STUDENT PROGRESS NOTE(EDUCATION) - PLAN 4
- Currently 1.6 down form 1.8 6/28/23  - start on MgSO4 1 g IV - Currently 1.6 down form 1.8 6/28/23  - Given MgSO4 2 g IV

## 2023-06-29 NOTE — MEDICAL STUDENT PROGRESS NOTE(EDUCATION) - SUBJECTIVE AND OBJECTIVE BOX
HPI  Mr. Chris José, is a friendly 89-year old  male presenting with a PMHx of HTN, DMII, BPH, CAD s/p CABG x 4, hypothyroidism who presented to ED on 6/23/23 with AMS, dysuria and incontinence after home physician noted hyponatremia/hypokalemia on labwork. In the ED, he was afebrile but found to have pseudomonas bacteremia, Na 131, K 5.1, WBC 18k. U/A was positive for bacteria, moderate blood, and positive leukocyte esterase. Admitted for management of acute encephalopathy 2/2 pseudomonas bacteremia and electrolyte imbalance and started on IV cefepime 2g q12.     Currently hospital day 6d. No remarkable 24 hour or O/N events.  Patient is eating, drinking, and sleeping well. He has not passed bowl for 5 days; will need to start him on stool softeners. Additionally, he keeps asking when he is going home because he "feels out of it." He has not ambulated since admission, will f/u on PE consult; hopefully pt mood improves with movement.     Repeat vitals were remarkable for /58. Please note patient has moderate-severe AS per TTE 12/2033; keep SBP >100 for preload. Monitor BP closely and hold lopressor if necessary.    Repeat labs were significant for Na 131, WBC is still elevated at 14.18 (14.24 6/28). Mg is down again to 1.6 from yesterday. Pending repeat urinalysis, b/c, and nephro consult for persistent hyponatremia with possible intrinsic cause (FeNa 7.5% 6/28/23)     PE was notable for continued resolution of testicular erythema/swelling from yesterday     General: friendly-appearing male,  AAOx3  Cardio: RRR, No MRoG  Pulm: No wheezing, rales, crackles,  GI: soft, NTND  Skin: intact, no ecchymoses, no purpura  Extremities: No ZACK, palpable pulses, warm feet    Patient is currently pending repeat urinalysis, b/c, nephro consult, PT eval             HPI  Mr. hCris José, is a friendly 89-year old male presenting with a PMHx of HTN, DMII, BPH, CAD s/p CABG x 4, hypothyroidism who presented to ED with AMS, dysuria and incontinence after home physician noted hyponatremia/hypokalemia on labwork. In the ED, he was afebrile but found to have pseudomonas bacteremia, Na 131, K 5.1, WBC 18k. U/A was positive for bacteria, moderate blood, and positive leukocyte esterase. He was admitted for management of acute encephalopathy 2/2 pseudomonas bacteremia and electrolyte imbalance and started on IV cefepime 2g q12    Currently hospital day 6d.     No remarkable 24 hour or O/N events.    Patient is eating, drinking, and sleeping well. He has not passed bowl for 5 days; will need to start him on stool softeners. Additionally, he keeps asking when he is going home because he "feels out of it." He has not ambulated since admission, will f/u on PE consult; hopefully pt mood improves with movement.     Repeat vitals were remarkable for /58.    PHYSICAL EXAM  General: friendly-appearing male, AAOx3  Cardio: RRR, No MRoG, Regular S1/S2 appreciated  Pulm: No wheezing, rales, crackles,  GI:  soft quadrants NTND  : No CVA tenderness, no suprapubic tenderness, improved testicular erythema/swelling  Skin: intact, no ecchymoses, no purpura  Extremities: No ZACK    Repeat labs were significant for Na 131, WBC is still elevated at 14.18 (14.24 6/28). Mg is down again to 1.6 from yesterday.    Patient is currently pending repeat urinalysis, b/c, nephro consult, PT eval

## 2023-06-29 NOTE — PHYSICAL THERAPY INITIAL EVALUATION ADULT - ADDITIONAL COMMENTS
Pt. is poor historian, but reports that he lives with his family in a private home. Per chart, pt. uses RW at baseline and has 2 steps to enter.

## 2023-06-29 NOTE — CONSULT NOTE ADULT - ASSESSMENT
89M PMHx HTN, DM2, BPH, CAD s/p CABG, hypothyroidism here with hyperkalemia, hyponatremia noted as outpt. Found to have pseudomonas bacteremia that was treated. patient with persistent mild hyponatremia.    Assessment and plan  Mild hyponatremia/ Pseudomonas bacteremia/ hypothyroidism  Urine studies noted, high ADH state  good po intake  normal kidney function  check uric acid  start urea 15g daily  no need for fluid restriction on urea  can liberalize salt intake in diet if BP remains controlled  will sign off/ recall prn

## 2023-06-29 NOTE — CONSULT NOTE ADULT - SUBJECTIVE AND OBJECTIVE BOX
NEPHROLOGY CONSULTATION NOTE    89M PMHx HTN, DM2, BPH, CAD s/p CABG, hypothyroidism here with hyperkalemia, hyponatremia noted as outpt. Found to have pseudomonas bacteremia that was treated. patient with persistent mild hyponatremia.  patient seen at bedside, no major complaints  VS within acceptable range.    PAST MEDICAL & SURGICAL HISTORY:  HTN (hypertension)      HLD (hyperlipidemia)      BPH (benign prostatic hyperplasia)      CAD (coronary artery disease)      Diabetes mellitus      Hypothyroidism      S/P CABG (coronary artery bypass graft)        Allergies:  No Known Allergies    Home Medications Reviewed  Hospital Medications:   MEDICATIONS  (STANDING):  bisacodyl 5 milliGRAM(s) Oral once  cefepime   IVPB 2000 milliGRAM(s) IV Intermittent every 12 hours  clopidogrel Tablet 75 milliGRAM(s) Oral daily  dextrose 5%. 1000 milliLiter(s) (100 mL/Hr) IV Continuous <Continuous>  dextrose 5%. 1000 milliLiter(s) (50 mL/Hr) IV Continuous <Continuous>  dextrose 50% Injectable 25 Gram(s) IV Push once  dextrose 50% Injectable 12.5 Gram(s) IV Push once  dextrose 50% Injectable 25 Gram(s) IV Push once  DULoxetine 60 milliGRAM(s) Oral daily  ferrous    sulfate 325 milliGRAM(s) Oral two times a day  finasteride 5 milliGRAM(s) Oral daily  glucagon  Injectable 1 milliGRAM(s) IntraMuscular once  heparin   Injectable 5000 Unit(s) SubCutaneous every 8 hours  insulin glargine Injectable (LANTUS) 12 Unit(s) SubCutaneous every morning  insulin lispro (ADMELOG) corrective regimen sliding scale   SubCutaneous three times a day before meals  insulin lispro (ADMELOG) corrective regimen sliding scale   SubCutaneous at bedtime  lactobacillus acidophilus 1 Tablet(s) Oral with breakfast  levothyroxine 50 MICROGram(s) Oral daily  metoprolol tartrate 25 milliGRAM(s) Oral two times a day  polyethylene glycol 3350 17 Gram(s) Oral two times a day  senna 2 Tablet(s) Oral at bedtime  simvastatin 20 milliGRAM(s) Oral at bedtime  tamsulosin 0.4 milliGRAM(s) Oral at bedtime    SOCIAL HISTORY:  Denies ETOH,Smoking,   FAMILY HISTORY:      REVIEW OF SYSTEMS:  CONSTITUTIONAL: No weakness, fevers or chills  EYES/ENT: No visual changes;  No vertigo or throat pain   NECK: No pain or stiffness  RESPIRATORY: No cough, wheezing, hemoptysis; No shortness of breath  CARDIOVASCULAR: No chest pain or palpitations.  GASTROINTESTINAL: No abdominal or epigastric pain. No nausea, vomiting, or hematemesis; No diarrhea or constipation. No melena or hematochezia.  GENITOURINARY: No dysuria, frequency, foamy urine, urinary urgency, incontinence or hematuria  NEUROLOGICAL: No numbness or weakness  SKIN: No itching, burning, rashes, or lesions   VASCULAR: No bilateral lower extremity edema.   All other review of systems is negative unless indicated above.    VITALS:  Vital Signs Last 24 Hrs  T(C): 37 (29 Jun 2023 04:39), Max: 37 (29 Jun 2023 04:39)  T(F): 98.6 (29 Jun 2023 04:39), Max: 98.6 (29 Jun 2023 04:39)  HR: 81 (29 Jun 2023 04:39) (67 - 86)  BP: 118/58 (29 Jun 2023 04:39) (118/58 - 145/67)  BP(mean): --  RR: 19 (29 Jun 2023 04:39) (18 - 19)  SpO2: 96% (29 Jun 2023 04:39) (96% - 100%)    Parameters below as of 29 Jun 2023 04:39  Patient On (Oxygen Delivery Method): nasal cannula          PHYSICAL EXAM:  Constitutional: NAD  HEENT: anicteric sclera, oropharynx clear, MMM  Neck: No JVD  Respiratory: CTAB, no wheezes, rales or rhonchi  Cardiovascular: S1, S2, RRR  Gastrointestinal: BS+, soft, NT/ND  Extremities: No cyanosis or clubbing. No peripheral edema  Neurological: A/O x 3, no focal deficits  Psychiatric: Normal mood, normal affect  : No CVA tenderness.  Skin: No rashes    LABS:  06-29    131<L>  |  98  |  16  ----------------------------<  90  4.4   |  21  |  0.9    Ca    8.7      29 Jun 2023 05:53  Mg     1.6     06-29    TPro  5.1<L>  /  Alb  2.7<L>  /  TBili  0.2  /  DBili      /  AST  13  /  ALT  6   /  AlkPhos  86  06-29    Creatinine Trend: 0.9 <--, 0.9 <--, 1.0 <--, 1.1 <--, 1.0 <--, 1.0 <--, 1.0 <--, 1.1 <--, 1.0 <--, 1.1 <--, 1.1 <--, 1.3 <--                        8.5    14.18 )-----------( 328      ( 29 Jun 2023 05:53 )             26.5     Urine Studies:  Urinalysis Basic - ( 29 Jun 2023 05:53 )    Color:  / Appearance:  / SG:  / pH:   Gluc: 90 mg/dL / Ketone:   / Bili:  / Urobili:    Blood:  / Protein:  / Nitrite:    Leuk Esterase:  / RBC:  / WBC    Sq Epi:  / Non Sq Epi:  / Bacteria:       Sodium, Random Urine: 39.0 mmoL/L (06-27 @ 12:50)  Creatinine, Random Urine: <4 mg/dL (06-27 @ 12:50)  Protein/Creatinine Ratio Calculation: NP Ratio (06-27 @ 12:50)  Osmolality, Random Urine: 349 mos/kg (06-27 @ 12:50)  Potassium, Random Urine: 24 mmol/L (06-27 @ 12:50)

## 2023-06-29 NOTE — PROGRESS NOTE ADULT - ATTENDING COMMENTS
89 y/o Male with  PMHx HTN, DM2, BPH, CAD s/p CABG, hypothyroidism, recently dx. from [facility] , progressive ambulatory dysfunction, admitted   with hyperkalemia, hyponatremia noted as outpt. Found to have pseudomonas bacteremia.    #Bacteremia due to Pseudomonas.   # Leukocytosis   #in setting of recent hidalgo in snf; now out; possible uti  ua positive; f/u ucxs- more than 3 organisms, repeat urine cxs , repeat blood cxs   bcxs pseudomonas 6/24, sensitive to Cefepime , ID on board   - Cont  IV cefepime (eventual LVQ; end date 7/4 per ID)  US Scrotum (for epididymitis)    #Hyperkalemia resolved  # Hypomagnesemia- supplemented     #hyponatremia- Na 126 on admission > lately 130ish.    Cr normal.  Per Nephro eval, likely [facility]? Urea 15gm QD, Check Uric Acid. No need to restrict po free water.     #constipated: Senna, Miralax.     #HTN (hypertension). currently borderline Hypotensive   -continue  lopressor 25mg PO twice daily with holding parameters.     #DM2 (diabetes mellitus, type 2)- uncontrolled, Hga1C- 9  lantus 12  ssi.    #History of BPH. - continue finasteride 5 mg po once daily /tamsulosin 0.4 mg po once daily at bedtime     #h/o CAD (coronary artery disease). / h/o CABG  - plavix/zocor tx.  -patient is on supplemental oxygen via NC, 2 L , on RA sat 91- 92% at rest     #Hypothyroidism. continue synthroid 50mcg po once daily, obtain TSH level    # Iron def. anemia- started on PO Iron tx.     # Ambulatory dysfunction, physical decline- once patient clinically improves will obtain PT evaluation.     DVT ppX, heparin  DNR/DNI (MOLST signed 6/29)    #Progress Note Handoff:  PT eval noted. Home w/ HCS by maybe 7/1 (Pending Na improvement)

## 2023-06-29 NOTE — MEDICAL STUDENT PROGRESS NOTE(EDUCATION) - PLAN 8
- BP currently 118/58   - Continue finasteride / tamulosin  - TTE moderate-severe AS 12/14/22  - continue lopressor but hold if SBP <100 to ensure adequate preload - BP currently 118/58   - TTE moderate-severe AS 12/14/22  - continue lopressor but hold if SBP <100 to ensure adequate preload

## 2023-06-29 NOTE — CONSULT NOTE ADULT - ATTENDING COMMENTS
89M PMHx HTN, DM2, BPH, CAD s/p CABG, hypothyroidism here with hyperkalemia, hyponatremia noted as outpt. Found to have pseudomonas bacteremia that was treated. patient with persistent mild hyponatremia.    Mild hyponatremia/ Pseudomonas bacteremia/ hypothyroidism  Urine studies noted, high ADH state  good po intake  normal kidney function  check uric acid  start urea 15g daily, increase solute intake  no need for fluid restriction on urea  can liberalize salt intake in diet if BP remains controlled  will sign off/ recall prn

## 2023-06-29 NOTE — PROGRESS NOTE ADULT - SUBJECTIVE AND OBJECTIVE BOX
----------Daily Progress Note----------    HISTORY OF PRESENT ILLNESS:  Patient is a 89y old Male who presents with a chief complaint of Abnormal Lab results (29 Jun 2023 09:59)    Currently admitted to medicine with the primary diagnosis of Hyperkalemia       Today is hospital day 6d.     INTERVAL HOSPITAL COURSE / OVERNIGHT EVENTS:    Patient was examined and seen at bedside. This morning he is resting comfortably in bed and reports no new issues or overnight events.     Review of Systems: Otherwise unremarkable     <<<<<PAST MEDICAL & SURGICAL HISTORY>>>>>  HTN (hypertension)    HLD (hyperlipidemia)    BPH (benign prostatic hyperplasia)    CAD (coronary artery disease)    Diabetes mellitus    Hypothyroidism    S/P CABG (coronary artery bypass graft)      ALLERGIES  No Known Allergies      Home Medications:  DULoxetine 60 mg oral delayed release capsule: 1 cap(s) orally once a day (13 Dec 2022 22:03)  finasteride 5 mg oral tablet: 1 tab(s) orally once a day (13 Dec 2022 22:03)  lactobacillus acidophilus oral capsule: 1 tab(s) orally once a day (20 Dec 2022 10:18)  levothyroxine 50 mcg (0.05 mg) oral tablet: 1 tab(s) orally once a day (13 Dec 2022 22:03)  Metoprolol Tartrate 25 mg oral tablet: 1 tab(s) orally 2 times a day (13 Dec 2022 22:03)  Plavix 75 mg oral tablet: 1 orally once a day (23 Jun 2023 21:54)  simvastatin 20 mg oral tablet: 1 tab(s) orally once a day (at bedtime) (13 Dec 2022 22:03)  tamsulosin 0.4 mg oral capsule: 1 cap(s) orally once a day (13 Dec 2022 22:03)  Toujeo SoloStar 300 units/mL subcutaneous solution: 12 unit(s) subcutaneous once a day (in the morning) (13 Dec 2022 22:03)        MEDICATIONS  STANDING MEDICATIONS  cefepime   IVPB 2000 milliGRAM(s) IV Intermittent every 12 hours  clopidogrel Tablet 75 milliGRAM(s) Oral daily  dextrose 5%. 1000 milliLiter(s) IV Continuous <Continuous>  dextrose 5%. 1000 milliLiter(s) IV Continuous <Continuous>  dextrose 50% Injectable 25 Gram(s) IV Push once  dextrose 50% Injectable 12.5 Gram(s) IV Push once  dextrose 50% Injectable 25 Gram(s) IV Push once  DULoxetine 60 milliGRAM(s) Oral daily  ferrous    sulfate 325 milliGRAM(s) Oral two times a day  finasteride 5 milliGRAM(s) Oral daily  glucagon  Injectable 1 milliGRAM(s) IntraMuscular once  heparin   Injectable 5000 Unit(s) SubCutaneous every 8 hours  insulin glargine Injectable (LANTUS) 12 Unit(s) SubCutaneous every morning  insulin lispro (ADMELOG) corrective regimen sliding scale   SubCutaneous three times a day before meals  insulin lispro (ADMELOG) corrective regimen sliding scale   SubCutaneous at bedtime  lactobacillus acidophilus 1 Tablet(s) Oral with breakfast  levothyroxine 50 MICROGram(s) Oral daily  metoprolol tartrate 25 milliGRAM(s) Oral two times a day  polyethylene glycol 3350 17 Gram(s) Oral two times a day  senna 2 Tablet(s) Oral at bedtime  simvastatin 20 milliGRAM(s) Oral at bedtime  tamsulosin 0.4 milliGRAM(s) Oral at bedtime    PRN MEDICATIONS  acetaminophen     Tablet .. 650 milliGRAM(s) Oral every 6 hours PRN  aluminum hydroxide/magnesium hydroxide/simethicone Suspension 30 milliLiter(s) Oral every 4 hours PRN  dextrose Oral Gel 15 Gram(s) Oral once PRN  melatonin 3 milliGRAM(s) Oral at bedtime PRN  ondansetron Injectable 4 milliGRAM(s) IV Push every 8 hours PRN    VITALS:  T(F): 98.6  HR: 81  BP: 118/58  RR: 19  SpO2: 96%    <<<<<LABS>>>>>                        8.5    14.18 )-----------( 328      ( 29 Jun 2023 05:53 )             26.5     06-29    131<L>  |  98  |  16  ----------------------------<  90  4.4   |  21  |  0.9    Ca    8.7      29 Jun 2023 05:53  Mg     1.6     06-29    TPro  5.1<L>  /  Alb  2.7<L>  /  TBili  0.2  /  DBili  x   /  AST  13  /  ALT  6   /  AlkPhos  86  06-29      Urinalysis Basic - ( 29 Jun 2023 05:53 )    Color: x / Appearance: x / SG: x / pH: x  Gluc: 90 mg/dL / Ketone: x  / Bili: x / Urobili: x   Blood: x / Protein: x / Nitrite: x   Leuk Esterase: x / RBC: x / WBC x   Sq Epi: x / Non Sq Epi: x / Bacteria: x            Culture - Urine (collected 27 Jun 2023 12:50)  Source: Catheterized Catheterized  Final Report (28 Jun 2023 23:40):    <10,000 CFU/mL Normal Urogenital Roxana    [id number]        <<<<<RADIOLOGY>>>>>    <<<<<PHYSICAL EXAM>>>>>  GENERAL: Well developed, well nourished and in no acute distress. Resting comfortably in bed.  HEENT: Normocephalic, atraumatic, mucous membranes moist, EOMI, PERRLA, bilateral sclera anicteric, no conjunctival injection  Neck: Supple, non-tender, no lymphadenopathy.  PULMONARY: Clear to auscultation bilaterally. No rales, rhonchi, or wheezing.  CARDIOVASCULAR: Regular rate and rhythm, S1-S2, no murmurs  GASTROINTESTINAL: Soft, non-tender, non-distended, no guarding.  RENAL: No CVA tenderness.  SKIN/EXTREMITIES: No clubbing or edema  NEUROLOGIC/MUSCULOSKELETAL: AOx4, grossly moving all extremities, no focal deficits.      ----------------------------------------------------------------------------------------------------------------------------------------------------------------------------------------------- ----------Daily Progress Note----------    89M PMHx HTN, DM2, BPH, CAD s/p CABG, hypothyroidism here with hyperkalemia, hyponatremia noted as outpt, who had pain with urination and urinary incontinence, with a home physician visit with labs showing hyperkalemia and hyponatremia, for which he was brought into the ED for. While in the ED, he was found to have pseudomonas bacteremia and was admitted for management of his UTI, pseudomonas bacteremia, hyperkalemia, and hyponatremia. Hyperkalemia has currently resolved.    Today is hospital day 6d.    ED course:  89 yo M with hx of HTN, HLD, DM II BPH and CAD s/p CABG x 4, Hypothyroidism presents to ED for abnormal labs result (hyperkalemia and hyponatremia). Patient is a poor historian, however patient's daughter was at bedside for collateral. Per the daughter, patient was recently admitted for fall and discharged to [facility]. When patient was discharged from [facility] he did not have a hidalgo in. Per the daughter at the nursing home patient had a hidalgo catheter in despite family requesting removal of it multiple times. Patient was discharged from [facility] on May 26th. Per the daughter patient was on antibiotics at [facility] for UTI, but she is unsure which antibiotic it was.     Since discharge patient's daughter says the patient has been extremely lethargic and weak. His urine has been milky, dark since May 26th when he was discharged from [facility]. Over the past week patient has become incontinent, no able to make it to the restroom, and complaining that he has pain with urination. On Wednesday a doctor came to the house to examine the patient, at that time he ordered lab tests, which the patient had preformed today. Labs were significant for Na 126 and a K TNP. They were instructed to come straight to the ED.     In the ED labs were significant for a Na 131, K+ 5.1, Hgb 9.9 (up from previous admission), and a WBC count of 18K.     Vital Signs Last 24 Hrs  T(F): 98.7 (23 Jun 2023 17:00), Max: 98.7 (23 Jun 2023 17:00)  HR: 95 (23 Jun 2023 19:30) (80 - 95)  BP: 157/78 (23 Jun 2023 19:30) (121/60 - 157/78)  BP(mean): --  RR: 20 (23 Jun 2023 19:30) (16 - 20)  SpO2: 99% (23 Jun 2023 19:30) (96% - 99%)  Patient On (Oxygen Delivery Method): nasal cannula  O2 Flow (L/min): 2    UA is positive for many bacteria >720 WBC, moderate blood and large leukocyte esterase.     INTERVAL HOSPITAL COURSE / OVERNIGHT EVENTS:  24 hr events:  None    O/N events:  None    Patient was examined and seen at bedside. He was laying comfortably in bed, sleeping well, reported feeling well, and urinating well, has not had any BM yet.    Review of Systems: Otherwise unremarkable     <<<<<PAST MEDICAL & SURGICAL HISTORY>>>>>  HTN (hypertension)    HLD (hyperlipidemia)    BPH (benign prostatic hyperplasia)    CAD (coronary artery disease)    Diabetes mellitus    Hypothyroidism    S/P CABG (coronary artery bypass graft)      ALLERGIES  No Known Allergies      Home Medications:  DULoxetine 60 mg oral delayed release capsule: 1 cap(s) orally once a day (13 Dec 2022 22:03)  finasteride 5 mg oral tablet: 1 tab(s) orally once a day (13 Dec 2022 22:03)  lactobacillus acidophilus oral capsule: 1 tab(s) orally once a day (20 Dec 2022 10:18)  levothyroxine 50 mcg (0.05 mg) oral tablet: 1 tab(s) orally once a day (13 Dec 2022 22:03)  Metoprolol Tartrate 25 mg oral tablet: 1 tab(s) orally 2 times a day (13 Dec 2022 22:03)  Plavix 75 mg oral tablet: 1 orally once a day (23 Jun 2023 21:54)  simvastatin 20 mg oral tablet: 1 tab(s) orally once a day (at bedtime) (13 Dec 2022 22:03)  tamsulosin 0.4 mg oral capsule: 1 cap(s) orally once a day (13 Dec 2022 22:03)  Toujeo SoloStar 300 units/mL subcutaneous solution: 12 unit(s) subcutaneous once a day (in the morning) (13 Dec 2022 22:03)        MEDICATIONS  STANDING MEDICATIONS  cefepime   IVPB 2000 milliGRAM(s) IV Intermittent every 12 hours  clopidogrel Tablet 75 milliGRAM(s) Oral daily  dextrose 5%. 1000 milliLiter(s) IV Continuous <Continuous>  dextrose 5%. 1000 milliLiter(s) IV Continuous <Continuous>  dextrose 50% Injectable 25 Gram(s) IV Push once  dextrose 50% Injectable 12.5 Gram(s) IV Push once  dextrose 50% Injectable 25 Gram(s) IV Push once  DULoxetine 60 milliGRAM(s) Oral daily  ferrous    sulfate 325 milliGRAM(s) Oral two times a day  finasteride 5 milliGRAM(s) Oral daily  glucagon  Injectable 1 milliGRAM(s) IntraMuscular once  heparin   Injectable 5000 Unit(s) SubCutaneous every 8 hours  insulin glargine Injectable (LANTUS) 12 Unit(s) SubCutaneous every morning  insulin lispro (ADMELOG) corrective regimen sliding scale   SubCutaneous three times a day before meals  insulin lispro (ADMELOG) corrective regimen sliding scale   SubCutaneous at bedtime  lactobacillus acidophilus 1 Tablet(s) Oral with breakfast  levothyroxine 50 MICROGram(s) Oral daily  metoprolol tartrate 25 milliGRAM(s) Oral two times a day  polyethylene glycol 3350 17 Gram(s) Oral two times a day  senna 2 Tablet(s) Oral at bedtime  simvastatin 20 milliGRAM(s) Oral at bedtime  tamsulosin 0.4 milliGRAM(s) Oral at bedtime    PRN MEDICATIONS  acetaminophen     Tablet .. 650 milliGRAM(s) Oral every 6 hours PRN  aluminum hydroxide/magnesium hydroxide/simethicone Suspension 30 milliLiter(s) Oral every 4 hours PRN  dextrose Oral Gel 15 Gram(s) Oral once PRN  melatonin 3 milliGRAM(s) Oral at bedtime PRN  ondansetron Injectable 4 milliGRAM(s) IV Push every 8 hours PRN    VITALS:  T(F): 98.6  HR: 81  BP: 118/58  RR: 19  SpO2: 96%    PHYSICAL EXAM:  General: Elderly man laying comfortably in bed, in no apparent distress  Cardio: RRR, No M/R/G, S1/S2+  Pulm: Normal breath sounds, No crackles, wheezing, or rhonchi  GI: Soft, NTND  : No CVA or suprapubic tenderness, +left testicular erythema and swelling  Skin: Intact, no rashes or bruising   Ext: No LE edema    <<<<<LABS>>>>>                        8.5    14.18 )-----------( 328      ( 29 Jun 2023 05:53 )             26.5     06-29    131<L>  |  98  |  16  ----------------------------<  90  4.4   |  21  |  0.9    Ca    8.7      29 Jun 2023 05:53  Mg     1.6     06-29    TPro  5.1<L>  /  Alb  2.7<L>  /  TBili  0.2  /  DBili  x   /  AST  13  /  ALT  6   /  AlkPhos  86  06-29      Urinalysis Basic - ( 29 Jun 2023 05:53 )    Color: x / Appearance: x / SG: x / pH: x  Gluc: 90 mg/dL / Ketone: x  / Bili: x / Urobili: x   Blood: x / Protein: x / Nitrite: x   Leuk Esterase: x / RBC: x / WBC x   Sq Epi: x / Non Sq Epi: x / Bacteria: x            Culture - Urine (collected 27 Jun 2023 12:50)  Source: Catheterized Catheterized  Final Report (28 Jun 2023 23:40):    <10,000 CFU/mL Normal Urogenital Roxana    [id number]        <<<<<RADIOLOGY>>>>>  < from: US Kidney and Bladder (06.23.23 @ 21:08) >  IMPRESSION:    No sonographic evidence of hydronephrosis.    Post void residual of 69 mL (46%).      < from: Xray Chest 1 View-PORTABLE IMMEDIATE (Xray Chest 1 View-PORTABLE IMMEDIATE .) (06.23.23 @ 16:45) >  Impression:    Right basilar focal atelectasis      < from: Xray Chest 1 View-PORTABLE IMMEDIATE (Xray Chest 1 View-PORTABLE IMMEDIATE .) (04.02.23 @ 10:25) >  FINDINGS/  IMPRESSION:    Low lung volumes. Right basal bandlike opacity/atelectasis. No   pneumothorax or significant pleural effusion on frontal view. No focal   consolidation.    Stable cardiomediastinal silhouette.    Unchanged osseous structures.    -----------------------------------------------------------------------------------------------------------------------------------------------------------------------------------------------

## 2023-06-30 LAB
ALBUMIN SERPL ELPH-MCNC: 2.4 G/DL — LOW (ref 3.5–5.2)
ALP SERPL-CCNC: 83 U/L — SIGNIFICANT CHANGE UP (ref 30–115)
ALT FLD-CCNC: 7 U/L — SIGNIFICANT CHANGE UP (ref 0–41)
ANION GAP SERPL CALC-SCNC: 11 MMOL/L — SIGNIFICANT CHANGE UP (ref 7–14)
ANION GAP SERPL CALC-SCNC: 8 MMOL/L — SIGNIFICANT CHANGE UP (ref 7–14)
ANION GAP SERPL CALC-SCNC: 9 MMOL/L — SIGNIFICANT CHANGE UP (ref 7–14)
AST SERPL-CCNC: 15 U/L — SIGNIFICANT CHANGE UP (ref 0–41)
BASOPHILS # BLD AUTO: 0.1 K/UL — SIGNIFICANT CHANGE UP (ref 0–0.2)
BASOPHILS NFR BLD AUTO: 0.7 % — SIGNIFICANT CHANGE UP (ref 0–1)
BILIRUB SERPL-MCNC: 0.2 MG/DL — SIGNIFICANT CHANGE UP (ref 0.2–1.2)
BUN SERPL-MCNC: 15 MG/DL — SIGNIFICANT CHANGE UP (ref 10–20)
BUN SERPL-MCNC: 16 MG/DL — SIGNIFICANT CHANGE UP (ref 10–20)
BUN SERPL-MCNC: 31 MG/DL — HIGH (ref 10–20)
CALCIUM SERPL-MCNC: 8.3 MG/DL — LOW (ref 8.4–10.5)
CALCIUM SERPL-MCNC: 8.6 MG/DL — SIGNIFICANT CHANGE UP (ref 8.4–10.4)
CALCIUM SERPL-MCNC: 8.7 MG/DL — SIGNIFICANT CHANGE UP (ref 8.4–10.4)
CHLORIDE SERPL-SCNC: 100 MMOL/L — SIGNIFICANT CHANGE UP (ref 98–110)
CHLORIDE SERPL-SCNC: 97 MMOL/L — LOW (ref 98–110)
CHLORIDE SERPL-SCNC: 97 MMOL/L — LOW (ref 98–110)
CO2 SERPL-SCNC: 22 MMOL/L — SIGNIFICANT CHANGE UP (ref 17–32)
CREAT SERPL-MCNC: 0.9 MG/DL — SIGNIFICANT CHANGE UP (ref 0.7–1.5)
EGFR: 77 ML/MIN/1.73M2 — SIGNIFICANT CHANGE UP
EOSINOPHIL # BLD AUTO: 0.42 K/UL — SIGNIFICANT CHANGE UP (ref 0–0.7)
EOSINOPHIL NFR BLD AUTO: 3 % — SIGNIFICANT CHANGE UP (ref 0–8)
GLUCOSE BLDC GLUCOMTR-MCNC: 120 MG/DL — HIGH (ref 70–99)
GLUCOSE BLDC GLUCOMTR-MCNC: 126 MG/DL — HIGH (ref 70–99)
GLUCOSE BLDC GLUCOMTR-MCNC: 144 MG/DL — HIGH (ref 70–99)
GLUCOSE BLDC GLUCOMTR-MCNC: 292 MG/DL — HIGH (ref 70–99)
GLUCOSE SERPL-MCNC: 110 MG/DL — HIGH (ref 70–99)
GLUCOSE SERPL-MCNC: 117 MG/DL — HIGH (ref 70–99)
GLUCOSE SERPL-MCNC: 132 MG/DL — HIGH (ref 70–99)
HCT VFR BLD CALC: 26.7 % — LOW (ref 42–52)
HGB BLD-MCNC: 8.7 G/DL — LOW (ref 14–18)
IMM GRANULOCYTES NFR BLD AUTO: 2.4 % — HIGH (ref 0.1–0.3)
LYMPHOCYTES # BLD AUTO: 31 % — SIGNIFICANT CHANGE UP (ref 20.5–51.1)
LYMPHOCYTES # BLD AUTO: 4.37 K/UL — HIGH (ref 1.2–3.4)
MAGNESIUM SERPL-MCNC: 1.8 MG/DL — SIGNIFICANT CHANGE UP (ref 1.8–2.4)
MCHC RBC-ENTMCNC: 26.9 PG — LOW (ref 27–31)
MCHC RBC-ENTMCNC: 32.6 G/DL — SIGNIFICANT CHANGE UP (ref 32–37)
MCV RBC AUTO: 82.4 FL — SIGNIFICANT CHANGE UP (ref 80–94)
MONOCYTES # BLD AUTO: 1.36 K/UL — HIGH (ref 0.1–0.6)
MONOCYTES NFR BLD AUTO: 9.6 % — HIGH (ref 1.7–9.3)
NEUTROPHILS # BLD AUTO: 7.51 K/UL — HIGH (ref 1.4–6.5)
NEUTROPHILS NFR BLD AUTO: 53.3 % — SIGNIFICANT CHANGE UP (ref 42.2–75.2)
NRBC # BLD: 0 /100 WBCS — SIGNIFICANT CHANGE UP (ref 0–0)
PLATELET # BLD AUTO: 332 K/UL — SIGNIFICANT CHANGE UP (ref 130–400)
PMV BLD: 8.9 FL — SIGNIFICANT CHANGE UP (ref 7.4–10.4)
POTASSIUM SERPL-MCNC: 4.3 MMOL/L — SIGNIFICANT CHANGE UP (ref 3.5–5)
POTASSIUM SERPL-MCNC: 4.4 MMOL/L — SIGNIFICANT CHANGE UP (ref 3.5–5)
POTASSIUM SERPL-MCNC: 4.9 MMOL/L — SIGNIFICANT CHANGE UP (ref 3.5–5)
POTASSIUM SERPL-SCNC: 4.3 MMOL/L — SIGNIFICANT CHANGE UP (ref 3.5–5)
POTASSIUM SERPL-SCNC: 4.4 MMOL/L — SIGNIFICANT CHANGE UP (ref 3.5–5)
POTASSIUM SERPL-SCNC: 4.9 MMOL/L — SIGNIFICANT CHANGE UP (ref 3.5–5)
PROT SERPL-MCNC: 5.1 G/DL — LOW (ref 6–8)
RBC # BLD: 3.24 M/UL — LOW (ref 4.7–6.1)
RBC # FLD: 15.5 % — HIGH (ref 11.5–14.5)
SODIUM SERPL-SCNC: 128 MMOL/L — LOW (ref 135–146)
SODIUM SERPL-SCNC: 130 MMOL/L — LOW (ref 135–146)
SODIUM SERPL-SCNC: 130 MMOL/L — LOW (ref 135–146)
URATE SERPL-MCNC: 4.5 MG/DL — SIGNIFICANT CHANGE UP (ref 3.4–8.8)
WBC # BLD: 14.1 K/UL — HIGH (ref 4.8–10.8)
WBC # FLD AUTO: 14.1 K/UL — HIGH (ref 4.8–10.8)

## 2023-06-30 PROCEDURE — 99232 SBSQ HOSP IP/OBS MODERATE 35: CPT

## 2023-06-30 RX ORDER — SODIUM CHLORIDE 9 MG/ML
1 INJECTION INTRAMUSCULAR; INTRAVENOUS; SUBCUTANEOUS
Refills: 0 | Status: DISCONTINUED | OUTPATIENT
Start: 2023-06-30 | End: 2023-07-18

## 2023-06-30 RX ADMIN — HEPARIN SODIUM 5000 UNIT(S): 5000 INJECTION INTRAVENOUS; SUBCUTANEOUS at 05:36

## 2023-06-30 RX ADMIN — Medication 25 MILLIGRAM(S): at 17:55

## 2023-06-30 RX ADMIN — HEPARIN SODIUM 5000 UNIT(S): 5000 INJECTION INTRAVENOUS; SUBCUTANEOUS at 21:37

## 2023-06-30 RX ADMIN — Medication 1 TABLET(S): at 09:32

## 2023-06-30 RX ADMIN — SODIUM CHLORIDE 1 GRAM(S): 9 INJECTION INTRAMUSCULAR; INTRAVENOUS; SUBCUTANEOUS at 17:55

## 2023-06-30 RX ADMIN — Medication 325 MILLIGRAM(S): at 05:36

## 2023-06-30 RX ADMIN — TAMSULOSIN HYDROCHLORIDE 0.4 MILLIGRAM(S): 0.4 CAPSULE ORAL at 21:37

## 2023-06-30 RX ADMIN — DULOXETINE HYDROCHLORIDE 60 MILLIGRAM(S): 30 CAPSULE, DELAYED RELEASE ORAL at 11:05

## 2023-06-30 RX ADMIN — CLOPIDOGREL BISULFATE 75 MILLIGRAM(S): 75 TABLET, FILM COATED ORAL at 11:05

## 2023-06-30 RX ADMIN — Medication 50 MICROGRAM(S): at 05:36

## 2023-06-30 RX ADMIN — Medication 325 MILLIGRAM(S): at 17:55

## 2023-06-30 RX ADMIN — HEPARIN SODIUM 5000 UNIT(S): 5000 INJECTION INTRAVENOUS; SUBCUTANEOUS at 13:15

## 2023-06-30 RX ADMIN — INSULIN GLARGINE 12 UNIT(S): 100 INJECTION, SOLUTION SUBCUTANEOUS at 10:07

## 2023-06-30 RX ADMIN — Medication 15 GRAM(S): at 13:14

## 2023-06-30 RX ADMIN — Medication 3: at 12:31

## 2023-06-30 RX ADMIN — FINASTERIDE 5 MILLIGRAM(S): 5 TABLET, FILM COATED ORAL at 11:05

## 2023-06-30 RX ADMIN — CEFEPIME 100 MILLIGRAM(S): 1 INJECTION, POWDER, FOR SOLUTION INTRAMUSCULAR; INTRAVENOUS at 11:05

## 2023-06-30 RX ADMIN — Medication 650 MILLIGRAM(S): at 08:20

## 2023-06-30 RX ADMIN — POLYETHYLENE GLYCOL 3350 17 GRAM(S): 17 POWDER, FOR SOLUTION ORAL at 05:36

## 2023-06-30 RX ADMIN — Medication 25 MILLIGRAM(S): at 05:35

## 2023-06-30 RX ADMIN — SIMVASTATIN 20 MILLIGRAM(S): 20 TABLET, FILM COATED ORAL at 21:37

## 2023-06-30 NOTE — DIETITIAN INITIAL EVALUATION ADULT - NSFNSPHYEXAMSKINFT_GEN_A_CORE
Stage II to L-buttocks Stage II to L-buttocks    Per wound care RN note on 6/26:  Type of Wound: Stage 2 Pressure Injury healing  Location: Right buttock

## 2023-06-30 NOTE — MEDICAL STUDENT PROGRESS NOTE(EDUCATION) - PLAN 4
- trending hypotensive, currently 107/62  - continue lopressor   - closely monitor BP as we liberalize salt intake  - TTE 12/22 moderate-severe AS (be mindful of preload) - trending hypotensive, currently 107/62  - continue lopressor, hold if SBP <100 to ensure adequate preload  - closely monitor BP as we liberalize salt intake  - TTE 12/22 moderate-severe AS (be mindful of preload)

## 2023-06-30 NOTE — DIETITIAN INITIAL EVALUATION ADULT - OTHER INFO
Pertinent Medical Information: Per H&P, pt is a 89 y/o male with PMHx of HTN, HLD, DM II BPH and CAD s/p CABG x 4, Hypothyroidism presents to ED for abnormal labs result (hyperkalemia and hyponatremia).     Pertinent Subjective Information: Observed pt consuming breakfast at time of visit; tolerating diet texture, however heard pt asking the RN for his dentures. Per RN, pt consumes regular texture food at home per daughter. Per RN, pt consumed % of meals on 6/29. No nausea or vomiting reported.    Weight hx: UBW unknown. Current dosing weight is 54.8 KG. Previous admission weight was  Pertinent Medical Information: Per H&P, pt is a 89 y/o male with PMHx of HTN, HLD, DM II BPH and CAD s/p CABG x 4, Hypothyroidism presents to ED for abnormal labs result (hyperkalemia and hyponatremia).     Pertinent Subjective Information: Observed pt consuming breakfast at time of visit; tolerating diet texture, however heard pt asking the RN for his dentures. Per RN, pt consumes regular texture food at home per daughter. Per RN, pt consumed % of meals on 6/29. No nausea or vomiting reported.    Weight hx: UBW unknown. Current dosing weight is 54.8 KG. Previous admission weight was 79 KG on 3/31/23;  Pertinent Medical Information: Per H&P, pt is a 89 y/o male with PMHx of HTN, HLD, DM II BPH and CAD s/p CABG x 4, Hypothyroidism presents to ED for abnormal labs result (hyperkalemia and hyponatremia).     Pertinent Subjective Information: Observed pt consuming breakfast at time of visit; tolerating diet texture, however heard pt asking the RN for his dentures. Per RN, pt consumes regular texture food at home per daughter. Per RN, pt consumed % of meals on 6/29. No nausea or vomiting reported.    Weight hx: UBW unknown. Current dosing weight is 54.8 KG. Previous admission weight was 79 KG on 3/31/23; 68 KG on 12/20/22. 30% unintentional weight loss in 3 months compared to current dosing weight (comparing 79 KG). Pt meets weight criteria for malnutrition.  Pertinent Medical Information: Per H&P, pt is a 89 y/o male with PMHx of HTN, HLD, DM II BPH and CAD s/p CABG x 4, Hypothyroidism presents to ED for abnormal labs result (hyperkalemia and hyponatremia). Patient was discharged from [facility] on May 26th. Per the daughter patient was on antibiotics at [facility] for UTI, but she is unsure which antibiotic it was. Since discharge patient's daughter says the patient has been extremely lethargic and weak.    Pertinent Subjective Information: Observed pt consuming breakfast at time of visit; tolerating diet texture, however heard pt asking the RN for his dentures. Per RN, pt consumes regular texture food at home per daughter. Per RN, pt consumed % of meals on 6/29. No nausea or vomiting reported.    Weight hx: UBW unknown. Current dosing weight is 54.8 KG. Previous admission weight was 79 KG on 3/31/23; 68 KG on 12/20/22. 30% unintentional weight loss in 3 months compared to current dosing weight (comparing 79 KG). Pt meets weight criteria for malnutrition.

## 2023-06-30 NOTE — MEDICAL STUDENT PROGRESS NOTE(EDUCATION) - TRANSITIONS OF CARE/DISPOSITION: (SDOH, ANTICIPATED DC NEEDS)
Pt HCP returned signed Molst form pt DNR/DNI  PT evaluation complete 6/29/23; pt gait distance of 3 ft   D/C location pending further functional improvement as per PE  D/C pending results of testicular u/s, WBC stabilization and correction of hyponatremia

## 2023-06-30 NOTE — PROGRESS NOTE ADULT - SUBJECTIVE AND OBJECTIVE BOX
----------Daily Progress Note----------    HISTORY OF PRESENT ILLNESS:  89M PMHx HTN, DM2, BPH, CAD s/p CABG, hypothyroidism here with hyperkalemia, hyponatremia noted as outpt, who had pain with urination and urinary incontinence, with a home physician visit with labs showing hyperkalemia and hyponatremia, for which he was brought into the ED for. While in the ED, he was found to have pseudomonas bacteremia and was admitted for management of his UTI, pseudomonas bacteremia, hyperkalemia, and hyponatremia. Hyperkalemia has currently resolved.    Today is hospital day 6d.    ED course:  89 yo M with hx of HTN, HLD, DM II BPH and CAD s/p CABG x 4, Hypothyroidism presents to ED for abnormal labs result (hyperkalemia and hyponatremia). Patient is a poor historian, however patient's daughter was at bedside for collateral. Per the daughter, patient was recently admitted for fall and discharged to [facility]. When patient was discharged from [facility] he did not have a hidalgo in. Per the daughter at the nursing home patient had a hidalgo catheter in despite family requesting removal of it multiple times. Patient was discharged from [facility] on May 26th. Per the daughter patient was on antibiotics at [facility] for UTI, but she is unsure which antibiotic it was.     Since discharge patient's daughter says the patient has been extremely lethargic and weak. His urine has been milky, dark since May 26th when he was discharged from [facility]. Over the past week patient has become incontinent, no able to make it to the restroom, and complaining that he has pain with urination. On Wednesday a doctor came to the house to examine the patient, at that time he ordered lab tests, which the patient had preformed today. Labs were significant for Na 126 and a K TNP. They were instructed to come straight to the ED.     In the ED labs were significant for a Na 131, K+ 5.1, Hgb 9.9 (up from previous admission), and a WBC count of 18K.     Vital Signs Last 24 Hrs  T(F): 98.7 (23 Jun 2023 17:00), Max: 98.7 (23 Jun 2023 17:00)  HR: 95 (23 Jun 2023 19:30) (80 - 95)  BP: 157/78 (23 Jun 2023 19:30) (121/60 - 157/78)  BP(mean): --  RR: 20 (23 Jun 2023 19:30) (16 - 20)  SpO2: 99% (23 Jun 2023 19:30) (96% - 99%)  Patient On (Oxygen Delivery Method): nasal cannula  O2 Flow (L/min): 2    UA is positive for many bacteria >720 WBC, moderate blood and large leukocyte esterase.     INTERVAL HOSPITAL COURSE / OVERNIGHT EVENTS:  24 hr events:  None    O/N events:  None    Patient was examined and seen at bedside. He was laying comfortably in bed, sleeping well, reported feeling well, and urinating well, has not had any BM yet.    Review of Systems: Otherwise unremarkable     <<<<<PAST MEDICAL & SURGICAL HISTORY>>>>>  HTN (hypertension)    HLD (hyperlipidemia)    BPH (benign prostatic hyperplasia)    CAD (coronary artery disease)    Diabetes mellitus    Hypothyroidism    S/P CABG (coronary artery bypass graft)      ALLERGIES  No Known Allergies      Home Medications:  DULoxetine 60 mg oral delayed release capsule: 1 cap(s) orally once a day (13 Dec 2022 22:03)  finasteride 5 mg oral tablet: 1 tab(s) orally once a day (13 Dec 2022 22:03)  lactobacillus acidophilus oral capsule: 1 tab(s) orally once a day (20 Dec 2022 10:18)  levothyroxine 50 mcg (0.05 mg) oral tablet: 1 tab(s) orally once a day (13 Dec 2022 22:03)  Metoprolol Tartrate 25 mg oral tablet: 1 tab(s) orally 2 times a day (13 Dec 2022 22:03)  Plavix 75 mg oral tablet: 1 orally once a day (23 Jun 2023 21:54)  simvastatin 20 mg oral tablet: 1 tab(s) orally once a day (at bedtime) (13 Dec 2022 22:03)  tamsulosin 0.4 mg oral capsule: 1 cap(s) orally once a day (13 Dec 2022 22:03)  Toujeo SoloStar 300 units/mL subcutaneous solution: 12 unit(s) subcutaneous once a day (in the morning) (13 Dec 2022 22:03)        MEDICATIONS  STANDING MEDICATIONS  cefepime   IVPB 2000 milliGRAM(s) IV Intermittent every 12 hours  clopidogrel Tablet 75 milliGRAM(s) Oral daily  dextrose 5%. 1000 milliLiter(s) IV Continuous <Continuous>  dextrose 5%. 1000 milliLiter(s) IV Continuous <Continuous>  dextrose 50% Injectable 25 Gram(s) IV Push once  dextrose 50% Injectable 12.5 Gram(s) IV Push once  dextrose 50% Injectable 25 Gram(s) IV Push once  DULoxetine 60 milliGRAM(s) Oral daily  ferrous    sulfate 325 milliGRAM(s) Oral two times a day  finasteride 5 milliGRAM(s) Oral daily  glucagon  Injectable 1 milliGRAM(s) IntraMuscular once  heparin   Injectable 5000 Unit(s) SubCutaneous every 8 hours  insulin glargine Injectable (LANTUS) 12 Unit(s) SubCutaneous every morning  insulin lispro (ADMELOG) corrective regimen sliding scale   SubCutaneous three times a day before meals  insulin lispro (ADMELOG) corrective regimen sliding scale   SubCutaneous at bedtime  lactobacillus acidophilus 1 Tablet(s) Oral with breakfast  levothyroxine 50 MICROGram(s) Oral daily  metoprolol tartrate 25 milliGRAM(s) Oral two times a day  polyethylene glycol 3350 17 Gram(s) Oral two times a day  senna 2 Tablet(s) Oral at bedtime  simvastatin 20 milliGRAM(s) Oral at bedtime  tamsulosin 0.4 milliGRAM(s) Oral at bedtime  urea Oral Powder 15 Gram(s) Oral daily    PRN MEDICATIONS  acetaminophen     Tablet .. 650 milliGRAM(s) Oral every 6 hours PRN  aluminum hydroxide/magnesium hydroxide/simethicone Suspension 30 milliLiter(s) Oral every 4 hours PRN  dextrose Oral Gel 15 Gram(s) Oral once PRN  melatonin 3 milliGRAM(s) Oral at bedtime PRN  ondansetron Injectable 4 milliGRAM(s) IV Push every 8 hours PRN    VITALS:  T(F): 97.1  HR: 80  BP: 115/58  RR: 18  SpO2: 100%    PHYSICAL EXAM:  General: Elderly man laying comfortably in bed, in no apparent distress  Cardio: RRR, No M/R/G, S1/S2+  Pulm: Normal breath sounds, No crackles, wheezing, or rhonchi  GI: Soft, NTND  : No CVA or suprapubic tenderness, +left testicular erythema and swelling  Skin: Intact, no rashes or bruising   Ext: No LE edema    <<<<<LABS>>>>>                        8.5    14.18 )-----------( 328      ( 29 Jun 2023 05:53 )             26.5     06-30    130<L>  |  97<L>  |  16  ----------------------------<  117<H>  4.4   |  22  |  0.9    Ca    8.3<L>      30 Jun 2023 01:42  Mg     1.6     06-29    TPro  5.1<L>  /  Alb  2.7<L>  /  TBili  0.2  /  DBili  x   /  AST  13  /  ALT  6   /  AlkPhos  86  06-29      Urinalysis Basic - ( 30 Jun 2023 01:42 )    Color: x / Appearance: x / SG: x / pH: x  Gluc: 117 mg/dL / Ketone: x  / Bili: x / Urobili: x   Blood: x / Protein: x / Nitrite: x   Leuk Esterase: x / RBC: x / WBC x   Sq Epi: x / Non Sq Epi: x / Bacteria: x          Microbiology  Culture - Blood (collected 28 Jun 2023 04:30)  Source: .Blood Blood-Peripheral  Preliminary Report (29 Jun 2023 18:01):    No growth at 24 hours    Culture - Urine (collected 27 Jun 2023 12:50)  Source: Catheterized Catheterized  Final Report (28 Jun 2023 23:40):    <10,000 CFU/mL Normal Urogenital Roxana    [id number]        <<<<<RADIOLOGY>>>>>  < from: US Kidney and Bladder (06.23.23 @ 21:08) >  IMPRESSION:    No sonographic evidence of hydronephrosis.    Post void residual of 69 mL (46%).      < from: Xray Chest 1 View-PORTABLE IMMEDIATE (Xray Chest 1 View-PORTABLE IMMEDIATE .) (06.23.23 @ 16:45) >  Impression:    Right basilar focal atelectasis      < from: Xray Chest 1 View-PORTABLE IMMEDIATE (Xray Chest 1 View-PORTABLE IMMEDIATE .) (04.02.23 @ 10:25) >  FINDINGS/  IMPRESSION:    Low lung volumes. Right basal bandlike opacity/atelectasis. No   pneumothorax or significant pleural effusion on frontal view. No focal   consolidation.    Stable cardiomediastinal silhouette.    Unchanged osseous structures.      ----------------------------------------------------------------------------------------------------------------------------------------------------------------------------------------------- ----------Daily Progress Note----------    HISTORY OF PRESENT ILLNESS:  89M PMHx HTN, DM2, BPH, CAD s/p CABG, hypothyroidism here with hyperkalemia, hyponatremia noted as outpt, who had pain with urination and urinary incontinence, with a home physician visit with labs showing hyperkalemia and hyponatremia, for which he was brought into the ED for. While in the ED, he was found to have pseudomonas bacteremia and was admitted for management of his UTI, pseudomonas bacteremia, hyperkalemia, and hyponatremia. Hyperkalemia has currently resolved.    Today is hospital day 6d.    ED course:  89 yo M with hx of HTN, HLD, DM II BPH and CAD s/p CABG x 4, Hypothyroidism presents to ED for abnormal labs result (hyperkalemia and hyponatremia). Patient is a poor historian, however patient's daughter was at bedside for collateral. Per the daughter, patient was recently admitted for fall and discharged to [facility]. When patient was discharged from [facility] he did not have a hidalgo in. Per the daughter at the nursing home patient had a hidalgo catheter in despite family requesting removal of it multiple times. Patient was discharged from [facility] on May 26th. Per the daughter patient was on antibiotics at [facility] for UTI, but she is unsure which antibiotic it was.     Since discharge patient's daughter says the patient has been extremely lethargic and weak. His urine has been milky, dark since May 26th when he was discharged from [facility]. Over the past week patient has become incontinent, no able to make it to the restroom, and complaining that he has pain with urination. On Wednesday a doctor came to the house to examine the patient, at that time he ordered lab tests, which the patient had preformed today. Labs were significant for Na 126 and a K TNP. They were instructed to come straight to the ED.     In the ED labs were significant for a Na 131, K+ 5.1, Hgb 9.9 (up from previous admission), and a WBC count of 18K.     Vital Signs Last 24 Hrs  T(F): 98.7 (23 Jun 2023 17:00), Max: 98.7 (23 Jun 2023 17:00)  HR: 95 (23 Jun 2023 19:30) (80 - 95)  BP: 157/78 (23 Jun 2023 19:30) (121/60 - 157/78)  BP(mean): --  RR: 20 (23 Jun 2023 19:30) (16 - 20)  SpO2: 99% (23 Jun 2023 19:30) (96% - 99%)  Patient On (Oxygen Delivery Method): nasal cannula  O2 Flow (L/min): 2    UA is positive for many bacteria >720 WBC, moderate blood and large leukocyte esterase.     INTERVAL HOSPITAL COURSE / OVERNIGHT EVENTS:  24 hr events:  None    O/N events:  None    Patient was examined and seen at bedside. He was laying in bed reporting pain in his scrotum, continues to have burning pain with urination. Was given tylenol for his pain in his scrotum. Otherwise, sleeping well and eating well. Had a BM today.    Review of Systems: Otherwise unremarkable     <<<<<PAST MEDICAL & SURGICAL HISTORY>>>>>  HTN (hypertension)    HLD (hyperlipidemia)    BPH (benign prostatic hyperplasia)    CAD (coronary artery disease)    Diabetes mellitus    Hypothyroidism    S/P CABG (coronary artery bypass graft)      ALLERGIES  No Known Allergies      Home Medications:  DULoxetine 60 mg oral delayed release capsule: 1 cap(s) orally once a day (13 Dec 2022 22:03)  finasteride 5 mg oral tablet: 1 tab(s) orally once a day (13 Dec 2022 22:03)  lactobacillus acidophilus oral capsule: 1 tab(s) orally once a day (20 Dec 2022 10:18)  levothyroxine 50 mcg (0.05 mg) oral tablet: 1 tab(s) orally once a day (13 Dec 2022 22:03)  Metoprolol Tartrate 25 mg oral tablet: 1 tab(s) orally 2 times a day (13 Dec 2022 22:03)  Plavix 75 mg oral tablet: 1 orally once a day (23 Jun 2023 21:54)  simvastatin 20 mg oral tablet: 1 tab(s) orally once a day (at bedtime) (13 Dec 2022 22:03)  tamsulosin 0.4 mg oral capsule: 1 cap(s) orally once a day (13 Dec 2022 22:03)  Toujeo SoloStar 300 units/mL subcutaneous solution: 12 unit(s) subcutaneous once a day (in the morning) (13 Dec 2022 22:03)        MEDICATIONS  STANDING MEDICATIONS  cefepime   IVPB 2000 milliGRAM(s) IV Intermittent every 12 hours  clopidogrel Tablet 75 milliGRAM(s) Oral daily  dextrose 5%. 1000 milliLiter(s) IV Continuous <Continuous>  dextrose 5%. 1000 milliLiter(s) IV Continuous <Continuous>  dextrose 50% Injectable 25 Gram(s) IV Push once  dextrose 50% Injectable 12.5 Gram(s) IV Push once  dextrose 50% Injectable 25 Gram(s) IV Push once  DULoxetine 60 milliGRAM(s) Oral daily  ferrous    sulfate 325 milliGRAM(s) Oral two times a day  finasteride 5 milliGRAM(s) Oral daily  glucagon  Injectable 1 milliGRAM(s) IntraMuscular once  heparin   Injectable 5000 Unit(s) SubCutaneous every 8 hours  insulin glargine Injectable (LANTUS) 12 Unit(s) SubCutaneous every morning  insulin lispro (ADMELOG) corrective regimen sliding scale   SubCutaneous three times a day before meals  insulin lispro (ADMELOG) corrective regimen sliding scale   SubCutaneous at bedtime  lactobacillus acidophilus 1 Tablet(s) Oral with breakfast  levothyroxine 50 MICROGram(s) Oral daily  metoprolol tartrate 25 milliGRAM(s) Oral two times a day  polyethylene glycol 3350 17 Gram(s) Oral two times a day  senna 2 Tablet(s) Oral at bedtime  simvastatin 20 milliGRAM(s) Oral at bedtime  tamsulosin 0.4 milliGRAM(s) Oral at bedtime  urea Oral Powder 15 Gram(s) Oral daily    PRN MEDICATIONS  acetaminophen     Tablet .. 650 milliGRAM(s) Oral every 6 hours PRN  aluminum hydroxide/magnesium hydroxide/simethicone Suspension 30 milliLiter(s) Oral every 4 hours PRN  dextrose Oral Gel 15 Gram(s) Oral once PRN  melatonin 3 milliGRAM(s) Oral at bedtime PRN  ondansetron Injectable 4 milliGRAM(s) IV Push every 8 hours PRN    VITALS:  T(F): 97.1  HR: 80  BP: 115/58  RR: 18  SpO2: 100%    PHYSICAL EXAM:  General: Elderly man laying comfortably in bed, in no apparent distress  Cardio: RRR, No M/R/G, S1/S2+  Pulm: Normal breath sounds, No crackles, wheezing, or rhonchi  GI: Soft, NTND  : No CVA or suprapubic tenderness, +left testicular erythema and swelling  Skin: Intact, no rashes or bruising   Ext: No LE edema    <<<<<LABS>>>>>                        8.5    14.18 )-----------( 328      ( 29 Jun 2023 05:53 )             26.5     06-30    130<L>  |  97<L>  |  16  ----------------------------<  117<H>  4.4   |  22  |  0.9    Ca    8.3<L>      30 Jun 2023 01:42  Mg     1.6     06-29    TPro  5.1<L>  /  Alb  2.7<L>  /  TBili  0.2  /  DBili  x   /  AST  13  /  ALT  6   /  AlkPhos  86  06-29      Urinalysis Basic - ( 30 Jun 2023 01:42 )    Color: x / Appearance: x / SG: x / pH: x  Gluc: 117 mg/dL / Ketone: x  / Bili: x / Urobili: x   Blood: x / Protein: x / Nitrite: x   Leuk Esterase: x / RBC: x / WBC x   Sq Epi: x / Non Sq Epi: x / Bacteria: x          Microbiology  Culture - Blood (collected 28 Jun 2023 04:30)  Source: .Blood Blood-Peripheral  Preliminary Report (29 Jun 2023 18:01):    No growth at 24 hours    Culture - Urine (collected 27 Jun 2023 12:50)  Source: Catheterized Catheterized  Final Report (28 Jun 2023 23:40):    <10,000 CFU/mL Normal Urogenital Roxana    [id number]        <<<<<RADIOLOGY>>>>>  < from: US Kidney and Bladder (06.23.23 @ 21:08) >  IMPRESSION:    No sonographic evidence of hydronephrosis.    Post void residual of 69 mL (46%).      < from: Xray Chest 1 View-PORTABLE IMMEDIATE (Xray Chest 1 View-PORTABLE IMMEDIATE .) (06.23.23 @ 16:45) >  Impression:    Right basilar focal atelectasis      < from: Xray Chest 1 View-PORTABLE IMMEDIATE (Xray Chest 1 View-PORTABLE IMMEDIATE .) (04.02.23 @ 10:25) >  FINDINGS/  IMPRESSION:    Low lung volumes. Right basal bandlike opacity/atelectasis. No   pneumothorax or significant pleural effusion on frontal view. No focal   consolidation.    Stable cardiomediastinal silhouette.    Unchanged osseous structures.      -----------------------------------------------------------------------------------------------------------------------------------------------------------------------------------------------

## 2023-06-30 NOTE — DIETITIAN INITIAL EVALUATION ADULT - ORAL INTAKE PTA/DIET HISTORY
Pt unable to participate in nutrition assessment interview at this time; disoriented per flow sheet. No family at bedside at time of visit. Unable to reach emergency contact to obtain nutrition hx. Will attempt to again at follow up.

## 2023-06-30 NOTE — MEDICAL STUDENT PROGRESS NOTE(EDUCATION) - SUBJECTIVE AND OBJECTIVE BOX
HPI  Mr. Chris José, is a 89-year old  male with a PMHx of HTN, DMII, BPH, CAD s/p CABG x 4, hypothyroidism who was admitted on 6/23/23 with AMS, dysuria and urinary incontinence after home physician noted hyponatremia/hypokalemia on labwork. In the ED, he was afebrile with pseudomonas bacteremia, Na 131, K 5.1, WBC 18k. Admitted for management of acute encephalopathy 2/2 pseudomonas bacteremia and electrolyte imbalance. On IV cefepime 2g q12 since 6/27/23.    Currently hospital day 7d.     No remarkable 24 hour or O/N events.    Patient is eating, drinking, and sleeping well. Passed stool this morning  s/p senna + Miralax. Pt is in mild distress due to left testicular pain. The swelling has not changed since yesterday but erythema has increased. Pt said he has not been able to sleep due to the pain. Pending u/s to rule out epididymitis.    Pt completed PT evaluation yesterday with gait distance of 3 feet. Will continue with 3-5x PT/week; d/c location pending further functional improvement.     Repeat vitals were remarkable for /62; given his persistently low Na may really want to liberalize salt intake per nephro consult 6/29/23.   Repeat labs significant for Na 129 s/p 15g urea qd per nephro, WBC is elevated 14.10 (14.18 6/29/20). Mg is up to 1.8 s/p 1g MgSO4 yesterday from yesterday. Pending repeat urinalysis, b/c, and nephro consult for persistent hyponatremia with possible intrinsic cause (FeNa 7.5% 6/28/23)     PE was notable for worsening  testicular erythema/ pain.     General: mild distress,  AAOx3  Cardio: RRR, No MRoG  Pulm: Negative wheezing, rales, crackles,  GI: soft, NTND, negative CVA, negative SPT   Skin: intact, no ecchymoses, no purpura  Extremities: No ZACK, palpable pulses, warm feet     HPI  Mr. Chris José, is a 89-year old  male with a PMHx of HTN, DMII, BPH, CAD s/p CABG x 4, hypothyroidism who was admitted on 6/23/23 with AMS, dysuria and urinary incontinence after home physician noted hyponatremia/hypokalemia on labwork. In the ED, he was afebrile with pseudomonas bacteremia, Na 131, K 5.1, WBC 18k. Admitted for management of acute encephalopathy 2/2 pseudomonas bacteremia and electrolyte imbalance. On IV cefepime 2g q12 since 6/27/23.    Currently hospital day 7d.     No remarkable 24 hour or O/N events.      Patient is eating, drinking, and sleeping well. Passed stool this morning  s/p senna + Miralax. Pt is in mild distress due to left testicular pain. The swelling has not changed since yesterday but erythema has increased. Pt said he has not been able to sleep due to the pain. Pending u/s to rule out epididymitis.    VITALS  Repeat vitals were remarkable for /62    PHYSICAL EXAM  General: mild distress,  AAOx3  Cardio: RRR, No MRoG  Pulm: Negative wheezing, rales, crackles,  GI: soft, NTND, negative CVA, negative SPT  : Worsening testicular erythema  Skin: intact, no ecchymoses, no purpura  Extremities: No ZACK, palpable pulses, warm feet    LABS  Repeat labs significant for Na 129 s/p 15g urea qd per nephro, WBC is elevated 14.10 (14.18 6/29/20). Mg is up to 1.8 s/p 2g MgSO4 yesterday from yesterday.

## 2023-06-30 NOTE — MEDICAL STUDENT PROGRESS NOTE(EDUCATION) - PLAN 8
- continue heparin 5000 U q8 -GI ppx: ppi  -DVT ppx: heparin sq  -Diet: Soft and bite sized  -Activity: IAT  -Code status: Full code, patient's daughter will bring back MOLST form tomorrow  -Dispo: pending PT eval

## 2023-06-30 NOTE — DIETITIAN INITIAL EVALUATION ADULT - NS FNS DIET ORDER
Diet, Minced and Moist:   Mildly Thick Liquids (MILDTHICKLIQS)  No Concentrated Potassium (06-27-23 @ 16:55) [Active]

## 2023-06-30 NOTE — MEDICAL STUDENT PROGRESS NOTE(EDUCATION) - PLAN 1
#Left testicular erythema / pain  - possibly 2/2 epididymitis  - continue IV cefepime course w/ CBC monitoring (currently 14.1 down from 14.18 yesterday)  - acetaminophen for pain prn  - pending testicular u/s #Left testicular erythema / pain  - possibly 2/2 epididymitis  - continue IV cefepime course w/ CBC monitoring (currently WBC 14.1 down from 14.18 yesterday)  - acetaminophen for pain prn  - pending testicular u/s  - F/u AM CBC  - recent hidalgo in snf; now out  - bcx pseudomonas 6/24  - UA showing no bacteria but LE positive and   - C/w cefepime 2 g q12h, d/c on levaquin 750 mg q48h as per nephrology recs   - Appreciate nephrology recs

## 2023-06-30 NOTE — DIETITIAN NUTRITION RISK NOTIFICATION - ADDITIONAL COMMENTS/DIETITIAN RECOMMENDATIONS
1. Ensure Max 3X/DAILY to optimize kcal/pro intake -- provides 450 kcal/day total, 90g pro/day total  2. K+ labs WNL, recommend to D/C no concentrated K+ modifier; continue with texture/consistency per SLP recs  3. Encourage PO intake and assist during meals prn  4. Supplement with MVI daily; 500 mg VIT C daily; 220 mg ZINC SULFATE (10-14 days only) to aid in wound healing if medically feasible

## 2023-06-30 NOTE — PROGRESS NOTE ADULT - ASSESSMENT
89M PMHx HTN, DM2, BPH, CAD s/p CABG, hypothyroidism here with hyperkalemia, hyponatremia noted as outpt. Found to have pseudomonas bacteremia.    #Pseudomonas bacteremia  Patient afebrile, WBC downtrending, patient improving continue monitoring on cefepime  - WBC 14 (6/28) -> 14 (6/29)  - F/u AM CBC  - recent hidalgo in snf; now out  - bcx pseudomonas 6/24  - UA showing no bacteria but LE positive and   - C/w cefepime 2 g q12h, d/c on levaquin 750 mg q48h as per infectious disease recs  - Ucx, no growth (6/27)  - Bcx NGTD (6/28)  - Pending scrotal US to assess possible left epididimitis from UTI  - Appreciate ID recs    #Hyperkalemia(resolved)  #Hyponatremia  - Serum Na 130 (6/27)  - Aldosterone normal, renin normal  - Urine Na 39  - Urine Osm 349  - Serum Osm 284  - Urine Cr <4  - FeNa 7.5% (6/28), consider intrinsic renal cause for hyponatremia  - Nephrology recommended uric acid level, start urea 15 g daily  - Start urea 15 g daily  - Appreciate nephrology recs    #HTN  #Moderate to severe AS found TTE on 12/14/22  - c/w metoprolol 25 mg bid, hold if SBP <100, HR <60  - monitor BP    #DM2  -continue lantus 12 with sliding scale    #BPH.   -continue finasteride and tamsulosin    #CAD  -cont plavix and zocor    #MISC  -GI ppx: ppi  -DVT ppx: heparin sq  -Diet: Soft and bite sized  -Activity: IAT  -Code status: DNR/DNI  -Dispo: PT recommend home PT vs rehab   89M PMHx HTN, DM2, BPH, CAD s/p CABG, hypothyroidism here with hyperkalemia, hyponatremia noted as outpt. Found to have pseudomonas bacteremia.    #Pseudomonas bacteremia  Patient afebrile, WBC downtrending, patient improving continue monitoring on cefepime  - WBC 14 (6/28) -> 14 (6/29)  - F/u AM CBC  - recent hidalgo in snf; now out  - bcx pseudomonas 6/24  - UA showing no bacteria but LE positive and   - C/w cefepime 2 g q12h, d/c on levaquin 750 mg q48h as per infectious disease recs  - Ucx, no growth (6/27)  - Bcx NGTD (6/28)  - Pending scrotal US to assess possible left epididimitis from UTI  - Appreciate ID recs    #Hyperkalemia(resolved)  #Hyponatremia  - Serum Na 128 (6/30)  - Aldosterone normal, renin normal  - Urine Na 39  - Urine Osm 349  - Serum Osm 284  - Urine Cr <4  - FeNa 7.5% (6/28), consider intrinsic renal cause for hyponatremia  - Nephrology recommended uric acid level, start urea 15 g daily  - c/w urea 15 g daily1  - Start salt tabs 1 g bid, if PM Na is less than 132, start salt tabs 2 g bid  - Appreciate nephrology recs    #HTN  #Moderate to severe AS found TTE on 12/14/22  - c/w metoprolol 25 mg bid, hold if SBP <100, HR <60  - monitor BP    #DM2  -continue lantus 12 with sliding scale    #BPH.   -continue finasteride and tamsulosin    #CAD  -cont plavix and zocor    #MISC  -GI ppx: ppi  -DVT ppx: heparin sq  -Diet: Soft and bite sized  -Activity: IAT  -Code status: DNR/DNI  -Dispo: PT recommend home PT vs rehab   89M PMHx HTN, DM2, BPH, CAD s/p CABG, hypothyroidism here with hyperkalemia, hyponatremia noted as outpt. Found to have pseudomonas bacteremia.    #Pseudomonas bacteremia  Patient afebrile, WBC downtrending, patient improving continue monitoring on cefepime  - WBC 14 (6/28) -> 14 (6/29)  - F/u AM CBC  - recent hidalgo in snf; now out  - bcx pseudomonas 6/24  - UA showing no bacteria but LE positive and   - C/w cefepime 2 g q12h, d/c on levaquin 750 mg q48h as per infectious disease recs  - Ucx, no growth (6/27)  - Bcx NGTD (6/28)  - Pending scrotal US to assess possible left epididimitis from UTI  - Appreciate ID recs    #Hyperkalemia(resolved)  #Hyponatremia  - Serum Na 130 (6/29) -> 128 (6/30)  - Aldosterone normal, renin normal  - Urine Na 39  - Urine Osm 349  - Serum Osm 284  - Urine Cr <4  - FeNa 7.5% (6/28), consider intrinsic renal cause for hyponatremia  - Nephrology recommended uric acid level, start urea 15 g daily  - c/w urea 15 g daily1  - Start salt tabs 1 g bid, if PM Na is less than 132, start salt tabs 2 g bid  - Pending PM BMP (6/30) to monitor Na  - F/u AM BMP  - Appreciate nephrology recs    #HTN  #Moderate to severe AS found TTE on 12/14/22  - c/w metoprolol 25 mg bid, hold if SBP <100, HR <60  - monitor BP    #DM2  -continue lantus 12 with sliding scale    #BPH.   -continue finasteride and tamsulosin    #CAD  -cont plavix and zocor    #MISC  -GI ppx: ppi  -DVT ppx: heparin sq  -Diet: Soft and bite sized  -Activity: IAT  -Code status: DNR/DNI  -Dispo: PT recommend home PT vs rehab

## 2023-06-30 NOTE — DIETITIAN INITIAL EVALUATION ADULT - PERTINENT LABORATORY DATA
06-30    128<L>  |  97<L>  |  15  ----------------------------<  132<H>  4.3   |  22  |  0.9    Ca    8.6      30 Jun 2023 08:42  Mg     1.8     06-30    TPro  5.1<L>  /  Alb  2.4<L>  /  TBili  0.2  /  DBili  x   /  AST  15  /  ALT  7   /  AlkPhos  83  06-30  POCT Blood Glucose.: 120 mg/dL (06-30-23 @ 08:10)  A1C with Estimated Average Glucose Result: 9.0 % (06-24-23 @ 05:59)  A1C with Estimated Average Glucose Result: 7.9 % (03-30-23 @ 05:35)  A1C with Estimated Average Glucose Result: 7.6 % (03-29-23 @ 04:20)

## 2023-06-30 NOTE — DIETITIAN INITIAL EVALUATION ADULT - OTHER CALCULATIONS
Using ABW: ENERGY: (35-40 kcal/kg) Using ABW: ENERGY: 6723-5068 kcal/day (35-40 kcal/kg); PROTEIN: 71-88 g/day (1.3-1.6 g/kg); FLUID: 1mL/kcal -- with consideration for age, BMI, weight loss pta, malnutrition

## 2023-06-30 NOTE — DIETITIAN INITIAL EVALUATION ADULT - NAME AND PHONE
Slivia [phone] or TEAMS    Nutrition Interventions: meals, snacks, medical nutrition supplements; Nutrition Monitoring: Diet order, PO intake, weights, labs, NFPF, body composition, BM and tolerance to medical food supplements

## 2023-06-30 NOTE — MEDICAL STUDENT PROGRESS NOTE(EDUCATION) - ASSESSMENT
This is 89-year old male with PMHx of HTN, DMII, BPH, CAD s/p CABG, hypothyroidism currently on hospital 6d with hyponatremia (Na 128) s/p 15 g urea with no fluid restriction and  left testicular pain possibly 2/2 to epididymitis 2/2 to Pseudomonas bacteremia (WBC 14.1) on IV cefepime 2 g q12 day 3. Pending testicular u/s to rule out epididymitis; continue course of IV cefepime with CBC monitoring. Start 1g NaCl tablets bid to treat persistent hyponatremia.   This is 89-year old male with PMHx of HTN, DMII, BPH, CAD s/p CABG, hypothyroidism currently on hospital 6d with hyponatremia (Na 128) s/p 15 g urea with no fluid restriction and  left testicular pain possibly 2/2 to epididymitis 2/2 to Pseudomonas bacteremia (WBC 14.1) on IV cefepime 2 g q12 day 3. Continue course of IV cefepime with CBC monitoring.

## 2023-06-30 NOTE — DIETITIAN NUTRITION RISK NOTIFICATION - TREATMENT: THE FOLLOWING DIET HAS BEEN RECOMMENDED
Diet, Soft and Bite Sized:   Mildly Thick Liquids (MILDTHICKLIQS)  Free Water Flush Instructions:  please add 2 packets of thickener per ensure max  Supplement Feeding Modality:  Oral  Ensure Max Cans or Servings Per Day:  3       Frequency:  Daily (06-30-23 @ 11:17) [Pending Verification By Attending]  Diet, Minced and Moist:   Mildly Thick Liquids (MILDTHICKLIQS)  No Concentrated Potassium (06-27-23 @ 16:55) [Active]

## 2023-06-30 NOTE — DIETITIAN INITIAL EVALUATION ADULT - PERTINENT MEDS FT
MEDICATIONS  (STANDING):  cefepime   IVPB 2000 milliGRAM(s) IV Intermittent every 12 hours  clopidogrel Tablet 75 milliGRAM(s) Oral daily  dextrose 5%. 1000 milliLiter(s) (100 mL/Hr) IV Continuous <Continuous>  dextrose 5%. 1000 milliLiter(s) (50 mL/Hr) IV Continuous <Continuous>  dextrose 50% Injectable 25 Gram(s) IV Push once  dextrose 50% Injectable 12.5 Gram(s) IV Push once  dextrose 50% Injectable 25 Gram(s) IV Push once  DULoxetine 60 milliGRAM(s) Oral daily  ferrous    sulfate 325 milliGRAM(s) Oral two times a day  finasteride 5 milliGRAM(s) Oral daily  glucagon  Injectable 1 milliGRAM(s) IntraMuscular once  heparin   Injectable 5000 Unit(s) SubCutaneous every 8 hours  insulin glargine Injectable (LANTUS) 12 Unit(s) SubCutaneous every morning  insulin lispro (ADMELOG) corrective regimen sliding scale   SubCutaneous three times a day before meals  insulin lispro (ADMELOG) corrective regimen sliding scale   SubCutaneous at bedtime  lactobacillus acidophilus 1 Tablet(s) Oral with breakfast  levothyroxine 50 MICROGram(s) Oral daily  metoprolol tartrate 25 milliGRAM(s) Oral two times a day  polyethylene glycol 3350 17 Gram(s) Oral two times a day  senna 2 Tablet(s) Oral at bedtime  simvastatin 20 milliGRAM(s) Oral at bedtime  tamsulosin 0.4 milliGRAM(s) Oral at bedtime  urea Oral Powder 15 Gram(s) Oral daily    MEDICATIONS  (PRN):  acetaminophen     Tablet .. 650 milliGRAM(s) Oral every 6 hours PRN Temp greater or equal to 38C (100.4F), Mild Pain (1 - 3)  aluminum hydroxide/magnesium hydroxide/simethicone Suspension 30 milliLiter(s) Oral every 4 hours PRN Dyspepsia  dextrose Oral Gel 15 Gram(s) Oral once PRN Blood Glucose LESS THAN 70 milliGRAM(s)/deciliter  melatonin 3 milliGRAM(s) Oral at bedtime PRN Insomnia  ondansetron Injectable 4 milliGRAM(s) IV Push every 8 hours PRN Nausea and/or Vomiting

## 2023-06-30 NOTE — DIETITIAN INITIAL EVALUATION ADULT - ADD RECOMMEND
82
1. Ensure Max 3X/DAILY to optimize kcal/pro intake -- provides 450 kcal/day total, 90g pro/day total  2. K+ labs WNL, recommend to D/C no concentrated K+ modifier; continue with texture/consistency per SLP recs  3. Encourage PO intake and assist during meals prn  4. Supplement with MVI daily; 500 mg VIT C daily; 220 mg ZINC SULFATE (10-14 days only) to aid in wound healing if medically feasible    Pt is at high nutrition risk; will f/u in 3 days or prn.

## 2023-06-30 NOTE — MEDICAL STUDENT PROGRESS NOTE(EDUCATION) - PLAN 2
- Pt sodium trending low since admission  - 130 6/28/23 -> 131 6/29/23 -> 128 today  - continue 15 g urea qd with no fluid restriction per nephro  - start pt on 1g NaCl tablet qd  - monitor with CMP

## 2023-06-30 NOTE — PROGRESS NOTE ADULT - ATTENDING COMMENTS
89 y/o Male with  PMHx HTN, DM2, BPH, CAD s/p CABG, hypothyroidism, recently dx. from [facility] , progressive ambulatory dysfunction, admitted   with hyperkalemia, hyponatremia noted as outpt. Found to have pseudomonas bacteremia.    #Bacteremia due to Pseudomonas.   # Leukocytosis   #in setting of recent hidalgo in snf; now out; possible uti  ua positive; f/u ucxs- more than 3 organisms, repeat urine cxs , repeat blood cxs   bcxs pseudomonas 6/24, sensitive to Cefepime , ID on board   - Cont  IV cefepime (eventual LVQ; end date 7/4 per ID)  US Scrotum (for epididymitis) PENDING    #Hyperkalemia resolved  # Hypomagnesemia- supplemented     #hyponatremia- Na 126 on admission > lately 130ish.    Cr normal.  Per Nephro eval, likely [facility]? Urea 15gm QD, Check Uric Acid. No need to restrict po free water.     Na 130 > 128. start 1gm NaCal BID. monitor.   If  remains stable,asymptomatic - consider d/c over weekend.     #constipated: Senna, Miralax.     #HTN (hypertension). currently borderline Hypotensive   -continue  lopressor 25mg PO twice daily with holding parameters.     #DM2 (diabetes mellitus, type 2)- uncontrolled, Hga1C- 9  lantus 12  ssi.    #History of BPH. - continue finasteride 5 mg po once daily /tamsulosin 0.4 mg po once daily at bedtime     #h/o CAD (coronary artery disease). / h/o CABG  - plavix/zocor tx.  -patient is on supplemental oxygen via [place], 2 L , on RA sat 91- 92% at rest     #Hypothyroidism. continue synthroid 50mcg po once daily, obtain TSH level    # Iron def. anemia- started on PO Iron tx.     # Ambulatory dysfunction, physical decline- once patient clinically improves will obtain PT evaluation.     DVT ppX, heparin  DNR/DNI ([facility]OLST signed 6/29)    #Progress Note Handoff:  PT eval noted. Home w/ HCS by maybe 7/2 (Pending Na improvement).  If  remains stable,asymptomatic - consider d/c over weekend.

## 2023-07-01 LAB
ALBUMIN SERPL ELPH-MCNC: 2.5 G/DL — LOW (ref 3.5–5.2)
ALBUMIN SERPL ELPH-MCNC: 2.7 G/DL — LOW (ref 3.5–5.2)
ALP SERPL-CCNC: 88 U/L — SIGNIFICANT CHANGE UP (ref 30–115)
ALP SERPL-CCNC: 92 U/L — SIGNIFICANT CHANGE UP (ref 30–115)
ALT FLD-CCNC: 10 U/L — SIGNIFICANT CHANGE UP (ref 0–41)
ALT FLD-CCNC: 9 U/L — SIGNIFICANT CHANGE UP (ref 0–41)
ANION GAP SERPL CALC-SCNC: 10 MMOL/L — SIGNIFICANT CHANGE UP (ref 7–14)
ANION GAP SERPL CALC-SCNC: 11 MMOL/L — SIGNIFICANT CHANGE UP (ref 7–14)
AST SERPL-CCNC: 18 U/L — SIGNIFICANT CHANGE UP (ref 0–41)
AST SERPL-CCNC: 18 U/L — SIGNIFICANT CHANGE UP (ref 0–41)
BASOPHILS # BLD AUTO: 0.08 K/UL — SIGNIFICANT CHANGE UP (ref 0–0.2)
BASOPHILS # BLD AUTO: 0.11 K/UL — SIGNIFICANT CHANGE UP (ref 0–0.2)
BASOPHILS NFR BLD AUTO: 0.6 % — SIGNIFICANT CHANGE UP (ref 0–1)
BASOPHILS NFR BLD AUTO: 0.8 % — SIGNIFICANT CHANGE UP (ref 0–1)
BILIRUB SERPL-MCNC: 0.2 MG/DL — SIGNIFICANT CHANGE UP (ref 0.2–1.2)
BILIRUB SERPL-MCNC: <0.2 MG/DL — SIGNIFICANT CHANGE UP (ref 0.2–1.2)
BUN SERPL-MCNC: 24 MG/DL — HIGH (ref 10–20)
BUN SERPL-MCNC: 26 MG/DL — HIGH (ref 10–20)
CALCIUM SERPL-MCNC: 8.6 MG/DL — SIGNIFICANT CHANGE UP (ref 8.4–10.5)
CALCIUM SERPL-MCNC: 8.8 MG/DL — SIGNIFICANT CHANGE UP (ref 8.4–10.5)
CHLORIDE SERPL-SCNC: 98 MMOL/L — SIGNIFICANT CHANGE UP (ref 98–110)
CHLORIDE SERPL-SCNC: 99 MMOL/L — SIGNIFICANT CHANGE UP (ref 98–110)
CO2 SERPL-SCNC: 21 MMOL/L — SIGNIFICANT CHANGE UP (ref 17–32)
CO2 SERPL-SCNC: 23 MMOL/L — SIGNIFICANT CHANGE UP (ref 17–32)
CREAT SERPL-MCNC: 0.9 MG/DL — SIGNIFICANT CHANGE UP (ref 0.7–1.5)
CREAT SERPL-MCNC: 0.9 MG/DL — SIGNIFICANT CHANGE UP (ref 0.7–1.5)
CULTURE RESULTS: SIGNIFICANT CHANGE UP
EGFR: 77 ML/MIN/1.73M2 — SIGNIFICANT CHANGE UP
EGFR: 77 ML/MIN/1.73M2 — SIGNIFICANT CHANGE UP
EOSINOPHIL # BLD AUTO: 0.18 K/UL — SIGNIFICANT CHANGE UP (ref 0–0.7)
EOSINOPHIL # BLD AUTO: 0.28 K/UL — SIGNIFICANT CHANGE UP (ref 0–0.7)
EOSINOPHIL NFR BLD AUTO: 1.5 % — SIGNIFICANT CHANGE UP (ref 0–8)
EOSINOPHIL NFR BLD AUTO: 2 % — SIGNIFICANT CHANGE UP (ref 0–8)
GLUCOSE BLDC GLUCOMTR-MCNC: 116 MG/DL — HIGH (ref 70–99)
GLUCOSE BLDC GLUCOMTR-MCNC: 118 MG/DL — HIGH (ref 70–99)
GLUCOSE BLDC GLUCOMTR-MCNC: 122 MG/DL — HIGH (ref 70–99)
GLUCOSE BLDC GLUCOMTR-MCNC: 149 MG/DL — HIGH (ref 70–99)
GLUCOSE BLDC GLUCOMTR-MCNC: 149 MG/DL — HIGH (ref 70–99)
GLUCOSE BLDC GLUCOMTR-MCNC: 187 MG/DL — HIGH (ref 70–99)
GLUCOSE SERPL-MCNC: 110 MG/DL — HIGH (ref 70–99)
GLUCOSE SERPL-MCNC: 162 MG/DL — HIGH (ref 70–99)
HCT VFR BLD CALC: 27.7 % — LOW (ref 42–52)
HCT VFR BLD CALC: 28.9 % — LOW (ref 42–52)
HGB BLD-MCNC: 8.6 G/DL — LOW (ref 14–18)
HGB BLD-MCNC: 9.2 G/DL — LOW (ref 14–18)
IMM GRANULOCYTES NFR BLD AUTO: 1.8 % — HIGH (ref 0.1–0.3)
IMM GRANULOCYTES NFR BLD AUTO: 2.3 % — HIGH (ref 0.1–0.3)
LYMPHOCYTES # BLD AUTO: 28.9 % — SIGNIFICANT CHANGE UP (ref 20.5–51.1)
LYMPHOCYTES # BLD AUTO: 29.4 % — SIGNIFICANT CHANGE UP (ref 20.5–51.1)
LYMPHOCYTES # BLD AUTO: 3.62 K/UL — HIGH (ref 1.2–3.4)
LYMPHOCYTES # BLD AUTO: 4.11 K/UL — HIGH (ref 1.2–3.4)
MAGNESIUM SERPL-MCNC: 1.7 MG/DL — LOW (ref 1.8–2.4)
MAGNESIUM SERPL-MCNC: 2.2 MG/DL — SIGNIFICANT CHANGE UP (ref 1.8–2.4)
MCHC RBC-ENTMCNC: 25.9 PG — LOW (ref 27–31)
MCHC RBC-ENTMCNC: 26.4 PG — LOW (ref 27–31)
MCHC RBC-ENTMCNC: 31 G/DL — LOW (ref 32–37)
MCHC RBC-ENTMCNC: 31.8 G/DL — LOW (ref 32–37)
MCV RBC AUTO: 82.8 FL — SIGNIFICANT CHANGE UP (ref 80–94)
MCV RBC AUTO: 83.4 FL — SIGNIFICANT CHANGE UP (ref 80–94)
MONOCYTES # BLD AUTO: 1.25 K/UL — HIGH (ref 0.1–0.6)
MONOCYTES # BLD AUTO: 1.32 K/UL — HIGH (ref 0.1–0.6)
MONOCYTES NFR BLD AUTO: 10.1 % — HIGH (ref 1.7–9.3)
MONOCYTES NFR BLD AUTO: 9.3 % — SIGNIFICANT CHANGE UP (ref 1.7–9.3)
NEUTROPHILS # BLD AUTO: 6.97 K/UL — HIGH (ref 1.4–6.5)
NEUTROPHILS # BLD AUTO: 8.07 K/UL — HIGH (ref 1.4–6.5)
NEUTROPHILS NFR BLD AUTO: 56.6 % — SIGNIFICANT CHANGE UP (ref 42.2–75.2)
NEUTROPHILS NFR BLD AUTO: 56.7 % — SIGNIFICANT CHANGE UP (ref 42.2–75.2)
NRBC # BLD: 0 /100 WBCS — SIGNIFICANT CHANGE UP (ref 0–0)
NRBC # BLD: 0 /100 WBCS — SIGNIFICANT CHANGE UP (ref 0–0)
PLATELET # BLD AUTO: 373 K/UL — SIGNIFICANT CHANGE UP (ref 130–400)
PLATELET # BLD AUTO: 396 K/UL — SIGNIFICANT CHANGE UP (ref 130–400)
PMV BLD: 8.7 FL — SIGNIFICANT CHANGE UP (ref 7.4–10.4)
PMV BLD: 9.2 FL — SIGNIFICANT CHANGE UP (ref 7.4–10.4)
POTASSIUM SERPL-MCNC: 4.3 MMOL/L — SIGNIFICANT CHANGE UP (ref 3.5–5)
POTASSIUM SERPL-MCNC: 5.2 MMOL/L — HIGH (ref 3.5–5)
POTASSIUM SERPL-SCNC: 4.3 MMOL/L — SIGNIFICANT CHANGE UP (ref 3.5–5)
POTASSIUM SERPL-SCNC: 5.2 MMOL/L — HIGH (ref 3.5–5)
PROT SERPL-MCNC: 5.1 G/DL — LOW (ref 6–8)
PROT SERPL-MCNC: 5.3 G/DL — LOW (ref 6–8)
RBC # BLD: 3.32 M/UL — LOW (ref 4.7–6.1)
RBC # BLD: 3.49 M/UL — LOW (ref 4.7–6.1)
RBC # FLD: 15.5 % — HIGH (ref 11.5–14.5)
RBC # FLD: 15.8 % — HIGH (ref 11.5–14.5)
SODIUM SERPL-SCNC: 130 MMOL/L — LOW (ref 135–146)
SODIUM SERPL-SCNC: 132 MMOL/L — LOW (ref 135–146)
SPECIMEN SOURCE: SIGNIFICANT CHANGE UP
WBC # BLD: 12.32 K/UL — HIGH (ref 4.8–10.8)
WBC # BLD: 14.21 K/UL — HIGH (ref 4.8–10.8)
WBC # FLD AUTO: 12.32 K/UL — HIGH (ref 4.8–10.8)
WBC # FLD AUTO: 14.21 K/UL — HIGH (ref 4.8–10.8)

## 2023-07-01 PROCEDURE — 93010 ELECTROCARDIOGRAM REPORT: CPT

## 2023-07-01 PROCEDURE — 70496 CT ANGIOGRAPHY HEAD: CPT | Mod: 26

## 2023-07-01 PROCEDURE — 99233 SBSQ HOSP IP/OBS HIGH 50: CPT

## 2023-07-01 PROCEDURE — 70450 CT HEAD/BRAIN W/O DYE: CPT | Mod: 26,59

## 2023-07-01 PROCEDURE — 71045 X-RAY EXAM CHEST 1 VIEW: CPT | Mod: 26

## 2023-07-01 PROCEDURE — 70498 CT ANGIOGRAPHY NECK: CPT | Mod: 26

## 2023-07-01 PROCEDURE — 76870 US EXAM SCROTUM: CPT | Mod: 26

## 2023-07-01 PROCEDURE — 0042T: CPT

## 2023-07-01 PROCEDURE — 70553 MRI BRAIN STEM W/O & W/DYE: CPT | Mod: 26

## 2023-07-01 RX ORDER — MAGNESIUM SULFATE 500 MG/ML
2 VIAL (ML) INJECTION ONCE
Refills: 0 | Status: COMPLETED | OUTPATIENT
Start: 2023-07-01 | End: 2023-07-01

## 2023-07-01 RX ADMIN — Medication 1 TABLET(S): at 09:03

## 2023-07-01 RX ADMIN — INSULIN GLARGINE 12 UNIT(S): 100 INJECTION, SOLUTION SUBCUTANEOUS at 09:02

## 2023-07-01 RX ADMIN — HEPARIN SODIUM 5000 UNIT(S): 5000 INJECTION INTRAVENOUS; SUBCUTANEOUS at 13:32

## 2023-07-01 RX ADMIN — CLOPIDOGREL BISULFATE 75 MILLIGRAM(S): 75 TABLET, FILM COATED ORAL at 12:46

## 2023-07-01 RX ADMIN — Medication 0: at 18:53

## 2023-07-01 RX ADMIN — SODIUM CHLORIDE 1 GRAM(S): 9 INJECTION INTRAMUSCULAR; INTRAVENOUS; SUBCUTANEOUS at 05:16

## 2023-07-01 RX ADMIN — Medication 25 MILLIGRAM(S): at 18:53

## 2023-07-01 RX ADMIN — SENNA PLUS 2 TABLET(S): 8.6 TABLET ORAL at 22:23

## 2023-07-01 RX ADMIN — Medication 15 GRAM(S): at 14:39

## 2023-07-01 RX ADMIN — Medication 325 MILLIGRAM(S): at 18:54

## 2023-07-01 RX ADMIN — HEPARIN SODIUM 5000 UNIT(S): 5000 INJECTION INTRAVENOUS; SUBCUTANEOUS at 05:15

## 2023-07-01 RX ADMIN — SODIUM CHLORIDE 1 GRAM(S): 9 INJECTION INTRAMUSCULAR; INTRAVENOUS; SUBCUTANEOUS at 18:54

## 2023-07-01 RX ADMIN — SIMVASTATIN 20 MILLIGRAM(S): 20 TABLET, FILM COATED ORAL at 22:22

## 2023-07-01 RX ADMIN — Medication 0: at 08:18

## 2023-07-01 RX ADMIN — FINASTERIDE 5 MILLIGRAM(S): 5 TABLET, FILM COATED ORAL at 12:47

## 2023-07-01 RX ADMIN — Medication 0: at 12:46

## 2023-07-01 RX ADMIN — Medication 50 MICROGRAM(S): at 05:15

## 2023-07-01 RX ADMIN — DULOXETINE HYDROCHLORIDE 60 MILLIGRAM(S): 30 CAPSULE, DELAYED RELEASE ORAL at 12:47

## 2023-07-01 RX ADMIN — CEFEPIME 100 MILLIGRAM(S): 1 INJECTION, POWDER, FOR SOLUTION INTRAMUSCULAR; INTRAVENOUS at 12:46

## 2023-07-01 RX ADMIN — Medication 325 MILLIGRAM(S): at 05:16

## 2023-07-01 RX ADMIN — Medication 25 GRAM(S): at 10:57

## 2023-07-01 RX ADMIN — TAMSULOSIN HYDROCHLORIDE 0.4 MILLIGRAM(S): 0.4 CAPSULE ORAL at 22:22

## 2023-07-01 RX ADMIN — Medication 25 MILLIGRAM(S): at 05:16

## 2023-07-01 RX ADMIN — POLYETHYLENE GLYCOL 3350 17 GRAM(S): 17 POWDER, FOR SOLUTION ORAL at 18:54

## 2023-07-01 RX ADMIN — HEPARIN SODIUM 5000 UNIT(S): 5000 INJECTION INTRAVENOUS; SUBCUTANEOUS at 22:22

## 2023-07-01 RX ADMIN — CEFEPIME 100 MILLIGRAM(S): 1 INJECTION, POWDER, FOR SOLUTION INTRAMUSCULAR; INTRAVENOUS at 00:54

## 2023-07-01 NOTE — STROKE CODE NOTE - NSMDCONSULT [id number] FT
NEUROLOGY CONSULT    HPI:  89 yo M with hx of HTN, HLD, DM II BPH and CAD s/p CABG x 4, Hypothyroidism presents to ED for abnormal labs result (hyperkalemia and hyponatremia). Patient is a poor historian, however patient's daughter was at bedside for collateral. Per the daughter, patient was recently admitted for fall and discharged to [facility]. When patient was discharged from [facility] he did not have a hidalgo in. Per the daughter at the nursing home patient had a hidalgo catheter in despite family requesting removal of it multiple times. Patient was discharged from [facility] on May 26th. Per the daughter patient was on antibiotics at [facility] for UTI, but she is unsure which antibiotic it was.     Since discharge patient's daughter says the patient has been extremely lethargic and weak. His urine has been milky, dark since May 26th when he was discharged from [facility]. Over the past week patient has become incontinent, no able to make it to the restroom, and complaining that he has pain with urination. On Wednesday a doctor came to the house to examine the patient, at that time he ordered lab tests, which the patient had preformed today. Labs were significant for Na 126 and a K TNP. They were instructed to come straight to the ED.     In the ED labs were significant for a Na 131, K+ 5.1, Hgb 9.9 (up from previous admission), and a WBC count of 18K.     Neurology was consulted for Stroke code.   Today at he was noticed by RN at 4.10 pm he stopped talking but was able to follow commands only with eyes for mimicking. Stroke code was called and during the neuroimaging he started to talk and moves his arms. Due to rapidly improved NIHSS from 10 to his baseline TNK not administered.      NEUROLOGICAL EXAMINATION:  GENERAL:  Appearance is consistent with chronologic age.   COGNITION/LANGUAGE:  Awake, alert, and oriented to person, place, not on time and date.  Fluent, intact comprehension, repetition, naming with slow.  Nondysarthric.    CRANIAL NERVES:   - Eyes:  Visual acuity intact. Pupils equal round and reactive, no RAPD. EOMI w/o nystagmus, skew or reported double vision. Normal visual field on confrontation. No ptosis/weakness of eyelid closure.   - Face:  Facial sensation normal V1 - 3, no facial asymmetry.    - Ears/Nose/Throat:  Hearing grossly intact b/l to finger rub.  Palate elevates midline.  Tongue and uvula midline.   MOTOR EXAM:  (R/L) 5/5 UE; 5/5 LE.  No observable drift. Normal tone and bulk. No tenderness, twitching, tremors or involuntary movements.  SENSORY EXAM:  Intact to light touch and pinprick, pain, temperature and proprioception and vibration in all extremities.  REFLEXES:   2+ b/l biceps, triceps, patella and achilles.  Plantar response downgoing b/l.  Jaw jerk, Theron, clonus absent.  CEREBELLUM:  Finger to nose/Heel to knee and shin intact.  No dysmetria.    GAIT: narrow based and normal.  Romberg: deferred.      Recommendations:     - please obtain rEEG  - MRI brain w/o contrast  - obtain series of routine labs  - f/u w CTA studies      Vital Signs Last 24 Hrs  T(F): 98.7 (23 Jun 2023 17:00), Max: 98.7 (23 Jun 2023 17:00)  HR: 95 (23 Jun 2023 19:30) (80 - 95)  BP: 157/78 (23 Jun 2023 19:30) (121/60 - 157/78)  BP(mean): --  RR: 20 (23 Jun 2023 19:30) (16 - 20)  SpO2: 99% (23 Jun 2023 19:30) (96% - 99%)  Patient On (Oxygen Delivery Method): nasal cannula  O2 Flow (L/min): 2    UA is positive for many bacteria >720 WBC, moderate blood and large leukocyte esterase.  (23 Jun 2023 21:23)         MEDICATIONS  (PRN):  acetaminophen     Tablet .. 650 milliGRAM(s) Oral every 6 hours PRN Temp greater or equal to 38C (100.4F), Mild Pain (1 - 3)  aluminum hydroxide/magnesium hydroxide/simethicone Suspension 30 milliLiter(s) Oral every 4 hours PRN Dyspepsia  dextrose Oral Gel 15 Gram(s) Oral once PRN Blood Glucose LESS THAN 70 milliGRAM(s)/deciliter  melatonin 3 milliGRAM(s) Oral at bedtime PRN Insomnia  ondansetron Injectable 4 milliGRAM(s) IV Push every 8 hours PRN Nausea and/or Vomiting      FAMILY HISTORY:    SOCIAL HISTORY: negative for tobacco, alcohol, or ilicit drug use.    Allergies    No Known Allergies    Intolerances        GEN: NAD, pleasant, cooperative  NEUROLOGICAL EXAMINATION:  GENERAL:  Appearance is consistent with chronologic age.   COGNITION/LANGUAGE:  Awake, alert, and oriented to person, place, not on time and date.  Fluent, intact comprehension, repetition, naming with slow.  Some signs of dysarthria   CRANIAL NERVES:   - Eyes:  Visual acuity intact. Pupils equal round and reactive, no RAPD. EOMI w/o nystagmus, skew or reported double vision. Normal visual field on confrontation. No ptosis/weakness of eyelid closure.   - Face:  Facial sensation normal V1 - 3, no facial asymmetry.    - Ears/Nose/Throat:  Hearing grossly intact b/l to finger rub.  Palate elevates midline.  Tongue and uvula midline.   MOTOR EXAM:  (R/L) 5/5 UE; 4/4 LE.  No observable drift. Normal tone and bulk. No tenderness, twitching, tremors or involuntary movements.  SENSORY EXAM:  Intact to light touch and pinprick, pain, temperature and proprioception and vibration in all extremities.  REFLEXES:   2+ b/l biceps, triceps, patella and achilles.  Plantar response downgoing b/l.  Jaw jerk, Theron, clonus absent.  CEREBELLUM:  Finger to nose/Heel to knee and shin intact.  No dysmetria.    GAIT: narrow based and normal.  Romberg: deferred.     LABS:                        9.2    14.21 )-----------( 373      ( 01 Jul 2023 08:06 )             28.9     07-01    130<L>  |  99  |  26<H>  ----------------------------<  110<H>  4.3   |  21  |  0.9    Ca    8.8      01 Jul 2023 08:06  Mg     1.7     07-01    TPro  5.3<L>  /  Alb  2.7<L>  /  TBili  0.2  /  DBili  x   /  AST  18  /  ALT  9   /  AlkPhos  92  07-01    Hemoglobin A1C:   Vitamin B12     CAPILLARY BLOOD GLUCOSE      POCT Blood Glucose.: 118 mg/dL (01 Jul 2023 18:11)      Urinalysis Basic - ( 01 Jul 2023 08:06 )    Color: x / Appearance: x / SG: x / pH: x  Gluc: 110 mg/dL / Ketone: x  / Bili: x / Urobili: x   Blood: x / Protein: x / Nitrite: x   Leuk Esterase: x / RBC: x / WBC x   Sq Epi: x / Non Sq Epi: x / Bacteria: x            Microbiology:      RADIOLOGY, EKG AND ADDITIONAL TESTS: Reviewed.      A/P:   89 yo M w pmh of CABG, HTN, DM2, hypothyroidism was admitted for electrolyte derangement 8 days ago, and after appropriate correction he was ready to be d/c. Today at 4.10 pm he stopped talking but was able to follow commands only with eyes. Stroke code was called and during the neuroimaging he started to talk and moves his arms. Due to rapidly improved NIHSS from 10 to his baseline 4 -  TNK not administered. His condition was w/o localizing symptoms and likely due to encephalopathic condition secondary to metabolic-toxic, electrolyte derangement, seizures or TIA.          Recommendations:     - please obtain rEEG  - MRI brain w/o contrast  - obtain series of routine labs  - f/u w CTA studies      the case was discussed w Dr. Reeder NEUROLOGY CONSULT    HPI:  89 yo M with hx of HTN, HLD, DM II BPH and CAD s/p CABG x 4, Hypothyroidism presents to ED for abnormal labs result (hyperkalemia and hyponatremia). Patient is a poor historian, however patient's daughter was at bedside for collateral. Per the daughter, patient was recently admitted for fall and discharged to [facility]. When patient was discharged from [facility] he did not have a hidalgo in. Per the daughter at the nursing home patient had a hidalgo catheter in despite family requesting removal of it multiple times. Patient was discharged from [facility] on May 26th. Per the daughter patient was on antibiotics at [facility] for UTI, but she is unsure which antibiotic it was.     Since discharge patient's daughter says the patient has been extremely lethargic and weak. His urine has been milky, dark since May 26th when he was discharged from [facility]. Over the past week patient has become incontinent, no able to make it to the restroom, and complaining that he has pain with urination. On Wednesday a doctor came to the house to examine the patient, at that time he ordered lab tests, which the patient had preformed today. Labs were significant for Na 126 and a K TNP. They were instructed to come straight to the ED.   In the ED labs were significant for a Na 131, K+ 5.1, Hgb 9.9 (up from previous admission), and a WBC count of 18K.     Neurology was consulted for Stroke code.   Today at 4.10 pm he was noticed by RN that he stopped talking but was able to follow commands only with his eyes mimicking others. Stroke code was called and during the neuroimaging he started to talk and move his arms. Due to rapidly improved NIHSS from 10 to his baseline TNK not administered.      Vital Signs Last 24 Hrs  T(F): 98.7 (23 Jun 2023 17:00), Max: 98.7 (23 Jun 2023 17:00)  HR: 95 (23 Jun 2023 19:30) (80 - 95)  BP: 157/78 (23 Jun 2023 19:30) (121/60 - 157/78)  BP(mean): --  RR: 20 (23 Jun 2023 19:30) (16 - 20)  SpO2: 99% (23 Jun 2023 19:30) (96% - 99%)  Patient On (Oxygen Delivery Method): nasal cannula  O2 Flow (L/min): 2    UA is positive for many bacteria >720 WBC, moderate blood and large leukocyte esterase.  (23 Jun 2023 21:23)      MEDICATIONS  (PRN):  acetaminophen     Tablet .. 650 milliGRAM(s) Oral every 6 hours PRN Temp greater or equal to 38C (100.4F), Mild Pain (1 - 3)  aluminum hydroxide/magnesium hydroxide/simethicone Suspension 30 milliLiter(s) Oral every 4 hours PRN Dyspepsia  dextrose Oral Gel 15 Gram(s) Oral once PRN Blood Glucose LESS THAN 70 milliGRAM(s)/deciliter  melatonin 3 milliGRAM(s) Oral at bedtime PRN Insomnia  ondansetron Injectable 4 milliGRAM(s) IV Push every 8 hours PRN Nausea and/or Vomiting      FAMILY HISTORY:  SOCIAL HISTORY: negative for tobacco, alcohol, or ilicit drug use.  Allergies  No Known Allergies  Intolerances        NEUROLOGICAL EXAMINATION:  GENERAL:  Appearance is consistent with chronologic age.   COGNITION/LANGUAGE:  Awake, alert, and oriented to person, place, not on time and date.  Fluent, intact comprehension, repetition, naming but slow respond.  Dysarthric.    CRANIAL NERVES:   - Eyes:  Visual acuity intact. Pupils equal round and reactive, no RAPD. EOMI w/o nystagmus, skew or reported double vision. Normal visual field on confrontation. No ptosis/weakness of eyelid closure.   - Face:  Facial sensation normal V1 - 3, no facial asymmetry.    - Ears/Nose/Throat:  Hearing grossly intact b/l to finger rub.  Palate elevates midline.  Tongue and uvula midline.   MOTOR EXAM:  (R/L) 5/5 UE; 4/3 LE.  No observable drift. Normal tone and bulk. No tenderness, twitching, tremors or involuntary movements.  SENSORY EXAM:  Intact to light touch and pinprick, pain, temperature and proprioception and vibration in all extremities.  REFLEXES:   1+ b/l biceps, triceps, patella and achilles.     CEREBELLUM:  No dysmetria noted.    GAIT: deferred  Romberg: deferred.     LABS:                        9.2    14.21 )-----------( 373      ( 01 Jul 2023 08:06 )             28.9     07-01    130<L>  |  99  |  26<H>  ----------------------------<  110<H>  4.3   |  21  |  0.9    Ca    8.8      01 Jul 2023 08:06  Mg     1.7     07-01    TPro  5.3<L>  /  Alb  2.7<L>  /  TBili  0.2  /  DBili  x   /  AST  18  /  ALT  9   /  AlkPhos  92  07-01    Hemoglobin A1C:   Vitamin B12     CAPILLARY BLOOD GLUCOSE    POCT Blood Glucose.: 118 mg/dL (01 Jul 2023 18:11)    Urinalysis Basic - ( 01 Jul 2023 08:06 )    Color: x / Appearance: x / SG: x / pH: x  Gluc: 110 mg/dL / Ketone: x  / Bili: x / Urobili: x   Blood: x / Protein: x / Nitrite: x   Leuk Esterase: x / RBC: x / WBC x   Sq Epi: x / Non Sq Epi: x / Bacteria: x    Microbiology:    RADIOLOGY, EKG AND ADDITIONAL TESTS: Reviewed.      A/P:   89 yo M w pmh of CABG, HTN, DM2, hypothyroidism was admitted for electrolyte derangement 8 days ago, and after appropriate correction he was preparing for the d/c. Today at 4.10 pm he stopped talking but was able to follow commands only with eyes. Stroke code was called and during the neuroimaging he started to talk and moves his arms. Due to rapidly improved NIHSS from 10 to his baseline 4 -  TNK not administered. His condition was w/o localizing symptoms and likely due to encephalopathic condition secondary to metabolic-toxic encephalopathy, electrolyte derangement, seizures or TIA.         Recommendations:     - please obtain rEEG  - MRI brain w/o contrast  - obtain series of routine labs  - f/u w CTA studies      the case was discussed w Dr. Reeder

## 2023-07-01 NOTE — PROGRESS NOTE ADULT - SUBJECTIVE AND OBJECTIVE BOX
S: Code stroke around 4:50PM      All other pertinent ROS negative.      Vital Signs Last 24 Hrs  T(C): 36.4 (01 Jul 2023 13:10), Max: 36.4 (01 Jul 2023 13:10)  T(F): 97.6 (01 Jul 2023 13:10), Max: 97.6 (01 Jul 2023 13:10)  HR: 69 (01 Jul 2023 13:10) (68 - 89)  BP: 116/59 (01 Jul 2023 13:10) (107/56 - 197/80)  BP(mean): --  RR: 17 (01 Jul 2023 13:10) (15 - 18)  SpO2: 95% (01 Jul 2023 13:10) (95% - 97%)    Parameters below as of 01 Jul 2023 13:10  Patient On (Oxygen Delivery Method): room air      PHYSICAL EXAM:    Constitutional: NAD, awake and alert  HEENT: PERR, EOMI, Normal Hearing, MMM  Neck: Soft and supple, No LAD, No JVD  Respiratory: Breath sounds are clear bilaterally, No wheezing, rales or rhonchi  Cardiovascular: S1 and S2, regular rate and rhythm, no Murmurs, gallops or rubs  Gastrointestinal: Bowel Sounds present, soft, nontender, nondistended, no guarding, no rebound  Extremities: No peripheral edema      MEDICATIONS:  MEDICATIONS  (STANDING):  cefepime   IVPB 2000 milliGRAM(s) IV Intermittent every 12 hours  clopidogrel Tablet 75 milliGRAM(s) Oral daily  dextrose 5%. 1000 milliLiter(s) (50 mL/Hr) IV Continuous <Continuous>  dextrose 5%. 1000 milliLiter(s) (100 mL/Hr) IV Continuous <Continuous>  dextrose 50% Injectable 25 Gram(s) IV Push once  dextrose 50% Injectable 12.5 Gram(s) IV Push once  dextrose 50% Injectable 25 Gram(s) IV Push once  DULoxetine 60 milliGRAM(s) Oral daily  ferrous    sulfate 325 milliGRAM(s) Oral two times a day  finasteride 5 milliGRAM(s) Oral daily  glucagon  Injectable 1 milliGRAM(s) IntraMuscular once  heparin   Injectable 5000 Unit(s) SubCutaneous every 8 hours  insulin glargine Injectable (LANTUS) 12 Unit(s) SubCutaneous every morning  insulin lispro (ADMELOG) corrective regimen sliding scale   SubCutaneous three times a day before meals  insulin lispro (ADMELOG) corrective regimen sliding scale   SubCutaneous at bedtime  lactobacillus acidophilus 1 Tablet(s) Oral with breakfast  levothyroxine 50 MICROGram(s) Oral daily  metoprolol tartrate 25 milliGRAM(s) Oral two times a day  polyethylene glycol 3350 17 Gram(s) Oral two times a day  senna 2 Tablet(s) Oral at bedtime  simvastatin 20 milliGRAM(s) Oral at bedtime  sodium chloride 1 Gram(s) Oral two times a day  tamsulosin 0.4 milliGRAM(s) Oral at bedtime  urea Oral Powder 15 Gram(s) Oral daily      LABS: All Labs Reviewed:                        9.2    14.21 )-----------( 373      ( 01 Jul 2023 08:06 )             28.9     07-01    130<L>  |  99  |  26<H>  ----------------------------<  110<H>  4.3   |  21  |  0.9    Ca    8.8      01 Jul 2023 08:06  Mg     1.7     07-01    TPro  5.3<L>  /  Alb  2.7<L>  /  TBili  0.2  /  DBili  x   /  AST  18  /  ALT  9   /  AlkPhos  92  07-01          Blood Culture: 06-28 @ 04:30  Organism --  Gram Stain Blood -- Gram Stain --  Specimen Source .Blood Blood-Peripheral  Culture-Blood --    06-27 @ 12:50  Organism --  Gram Stain Blood -- Gram Stain --  Specimen Source Catheterized Catheterized  Culture-Blood --        Radiology: reviewed

## 2023-07-01 NOTE — PROGRESS NOTE ADULT - ASSESSMENT
89 y/o Male with  PMHx HTN, DM2, BPH, CAD s/p CABG, hypothyroidism, recently dx. from [facility] , progressive ambulatory dysfunction, admitted   with hyperkalemia, hyponatremia noted as outpt. Found to have pseudomonas bacteremia.    #Code stroke (7/1):   Around 4:45 pm family and nurse noted that patient was aphasic.  Not following commands. Able to mimic actions but apparently not comprehending spoken words.   Neuro evaluated patient at bedside.   Stat CT Head, CTA H&N, CT perfusion.   Will f/u Neuro for further recs.   May have to be transferred to Stroke unit?     #Bacteremia due to Pseudomonas.   # Leukocytosis   #in setting of recent hidalgo in snf; now out; possible uti  bcxs pseudomonas 6/24, sensitive to Cefepime , ID on board   ua positive; f/u ucxs- more than 3 organisms, repeat urine cxs neg , repeat blood cxs 6/26, 28 Neg.   - Cont  IV cefepime (eventual LVQ; end date 7/4 per ID)  US Scrotum (for epididymitis) PENDING    #Hyperkalemia resolved  # Hypomagnesemia- supplemented     #hyponatremia- Na 126 on admission > lately 130ish.    Cr normal.  Per Nephro eval, likely [facility]? Urea 15gm QD, Check Uric Acid. No need to restrict po free water.     Na 130 > 128. started 1gm NaCal BID > 130 > 130.      #constipated: Senna, Miralax.     #HTN (hypertension). currently borderline Hypotensive   -continue  lopressor 25mg PO twice daily with holding parameters.     #DM2 (diabetes mellitus, type 2)- uncontrolled, Hga1C- 9  lantus 12  ssi.    #History of BPH. - continue finasteride 5 mg po once daily /tamsulosin 0.4 mg po once daily at bedtime     #h/o CAD (coronary artery disease). / h/o CABG  - plavix/zocor tx.  -patient is on supplemental oxygen via NC, 2 L , on RA sat 91- 92% at rest     #Hypothyroidism. continue synthroid 50mcg po once daily, obtain TSH level    # Iron def. anemia- started on PO Iron tx.     # Ambulatory dysfunction, physical decline- eventual Home PT    DVT ppX, heparin  DNR/DNI (MOLST signed 6/29)    #Progress Note Handoff:  PT eval noted. Home w/ HCS (Pending Na improvement)   89 y/o Male with  PMHx HTN, DM2, BPH, CAD s/p CABG, hypothyroidism, recently dx. from [facility] , progressive ambulatory dysfunction, admitted   with hyperkalemia, hyponatremia noted as outpt. Found to have pseudomonas bacteremia.    #Code stroke (7/1):   Around 4:45 pm family and nurse noted that patient was aphasic.  Not following commands. Able to mimic actions but apparently not comprehending spoken words.   Neuro evaluated patient at bedside. Bedside EKG, Vitals WNL.   Stat CT Head, CTA H&N, CT perfusion.   Will f/u Neuro for further recs.   May have to be transferred to Stroke unit?     #Bacteremia due to Pseudomonas.   # Leukocytosis   #in setting of recent hidalgo in snf; now out; possible uti  bcxs pseudomonas 6/24, sensitive to Cefepime , ID on board   ua positive; f/u ucxs- more than 3 organisms, repeat urine cxs neg , repeat blood cxs 6/26, 28 Neg.   - Cont  IV cefepime (eventual LVQ; end date 7/4 per ID)  US Scrotum (for epididymitis) PENDING    #Hyperkalemia resolved  # Hypomagnesemia- supplemented     #hyponatremia- Na 126 on admission > lately 130ish.    Cr normal.  Per Nephro eval, likely [facility]? Urea 15gm QD, Check Uric Acid. No need to restrict po free water.     Na 130 > 128. started 1gm NaCal BID > 130 > 130.      #constipated: Senna, Miralax.     #HTN (hypertension). currently borderline Hypotensive   -continue  lopressor 25mg PO twice daily with holding parameters.     #DM2 (diabetes mellitus, type 2)- uncontrolled, Hga1C- 9  lantus 12  ssi.    #History of BPH. - continue finasteride 5 mg po once daily /tamsulosin 0.4 mg po once daily at bedtime     #h/o CAD (coronary artery disease). / h/o CABG  - plavix/zocor tx.  -patient is on supplemental oxygen via NC, 2 L , on RA sat 91- 92% at rest     #Hypothyroidism. continue synthroid 50mcg po once daily, obtain TSH level    # Iron def. anemia- started on PO Iron tx.     # Ambulatory dysfunction, physical decline- eventual Home PT    DVT ppX, heparin  DNR/DNI (MOLST signed 6/29)    #Progress Note Handoff:  PT eval noted. Home w/ HCS (Pending Na improvement)

## 2023-07-01 NOTE — CHART NOTE - NSCHARTNOTEFT_GEN_A_CORE
Patient was unable to speak, and unable to move b/l arms and legs, unable to stick out tongue, unable to raise eyebrows, unable to move his eyes. Pt could blink and move his head, and was able to confirm through blinking that he could hear, and his sensation was intact bilaterally. During neuro exam patient was in and out of consciousness. Nurse endorsed that 40 minutes before the patient was communicating, and able to move his arms.     Rapid and code stroke were called and patient is now being worked up by neurology. F/u stat labs and ABG.

## 2023-07-02 LAB
ALBUMIN SERPL ELPH-MCNC: 2.9 G/DL — LOW (ref 3.5–5.2)
ALP SERPL-CCNC: 86 U/L — SIGNIFICANT CHANGE UP (ref 30–115)
ALT FLD-CCNC: 10 U/L — SIGNIFICANT CHANGE UP (ref 0–41)
ANION GAP SERPL CALC-SCNC: 12 MMOL/L — SIGNIFICANT CHANGE UP (ref 7–14)
AST SERPL-CCNC: 17 U/L — SIGNIFICANT CHANGE UP (ref 0–41)
BASOPHILS # BLD AUTO: 0.09 K/UL — SIGNIFICANT CHANGE UP (ref 0–0.2)
BASOPHILS NFR BLD AUTO: 0.7 % — SIGNIFICANT CHANGE UP (ref 0–1)
BILIRUB SERPL-MCNC: 0.2 MG/DL — SIGNIFICANT CHANGE UP (ref 0.2–1.2)
BUN SERPL-MCNC: 21 MG/DL — HIGH (ref 10–20)
CALCIUM SERPL-MCNC: 9 MG/DL — SIGNIFICANT CHANGE UP (ref 8.4–10.4)
CHLORIDE SERPL-SCNC: 100 MMOL/L — SIGNIFICANT CHANGE UP (ref 98–110)
CO2 SERPL-SCNC: 21 MMOL/L — SIGNIFICANT CHANGE UP (ref 17–32)
CREAT SERPL-MCNC: 0.8 MG/DL — SIGNIFICANT CHANGE UP (ref 0.7–1.5)
EGFR: 79 ML/MIN/1.73M2 — SIGNIFICANT CHANGE UP
EOSINOPHIL # BLD AUTO: 0.26 K/UL — SIGNIFICANT CHANGE UP (ref 0–0.7)
EOSINOPHIL NFR BLD AUTO: 2.1 % — SIGNIFICANT CHANGE UP (ref 0–8)
GLUCOSE BLDC GLUCOMTR-MCNC: 146 MG/DL — HIGH (ref 70–99)
GLUCOSE BLDC GLUCOMTR-MCNC: 147 MG/DL — HIGH (ref 70–99)
GLUCOSE BLDC GLUCOMTR-MCNC: 178 MG/DL — HIGH (ref 70–99)
GLUCOSE BLDC GLUCOMTR-MCNC: 236 MG/DL — HIGH (ref 70–99)
GLUCOSE SERPL-MCNC: 135 MG/DL — HIGH (ref 70–99)
HCT VFR BLD CALC: 29.1 % — LOW (ref 42–52)
HGB BLD-MCNC: 9 G/DL — LOW (ref 14–18)
IMM GRANULOCYTES NFR BLD AUTO: 1.9 % — HIGH (ref 0.1–0.3)
LYMPHOCYTES # BLD AUTO: 31.6 % — SIGNIFICANT CHANGE UP (ref 20.5–51.1)
LYMPHOCYTES # BLD AUTO: 4 K/UL — HIGH (ref 1.2–3.4)
MAGNESIUM SERPL-MCNC: 2.1 MG/DL — SIGNIFICANT CHANGE UP (ref 1.8–2.4)
MCHC RBC-ENTMCNC: 25.6 PG — LOW (ref 27–31)
MCHC RBC-ENTMCNC: 30.9 G/DL — LOW (ref 32–37)
MCV RBC AUTO: 82.9 FL — SIGNIFICANT CHANGE UP (ref 80–94)
MONOCYTES # BLD AUTO: 1.33 K/UL — HIGH (ref 0.1–0.6)
MONOCYTES NFR BLD AUTO: 10.5 % — HIGH (ref 1.7–9.3)
NEUTROPHILS # BLD AUTO: 6.73 K/UL — HIGH (ref 1.4–6.5)
NEUTROPHILS NFR BLD AUTO: 53.2 % — SIGNIFICANT CHANGE UP (ref 42.2–75.2)
NRBC # BLD: 0 /100 WBCS — SIGNIFICANT CHANGE UP (ref 0–0)
PLATELET # BLD AUTO: 360 K/UL — SIGNIFICANT CHANGE UP (ref 130–400)
PMV BLD: 9.1 FL — SIGNIFICANT CHANGE UP (ref 7.4–10.4)
POTASSIUM SERPL-MCNC: 4.5 MMOL/L — SIGNIFICANT CHANGE UP (ref 3.5–5)
POTASSIUM SERPL-SCNC: 4.5 MMOL/L — SIGNIFICANT CHANGE UP (ref 3.5–5)
PROT SERPL-MCNC: 5.5 G/DL — LOW (ref 6–8)
RBC # BLD: 3.51 M/UL — LOW (ref 4.7–6.1)
RBC # FLD: 16 % — HIGH (ref 11.5–14.5)
SODIUM SERPL-SCNC: 133 MMOL/L — LOW (ref 135–146)
WBC # BLD: 12.65 K/UL — HIGH (ref 4.8–10.8)
WBC # FLD AUTO: 12.65 K/UL — HIGH (ref 4.8–10.8)

## 2023-07-02 PROCEDURE — 99233 SBSQ HOSP IP/OBS HIGH 50: CPT

## 2023-07-02 PROCEDURE — 99222 1ST HOSP IP/OBS MODERATE 55: CPT

## 2023-07-02 RX ADMIN — SIMVASTATIN 20 MILLIGRAM(S): 20 TABLET, FILM COATED ORAL at 21:14

## 2023-07-02 RX ADMIN — HEPARIN SODIUM 5000 UNIT(S): 5000 INJECTION INTRAVENOUS; SUBCUTANEOUS at 05:19

## 2023-07-02 RX ADMIN — SENNA PLUS 2 TABLET(S): 8.6 TABLET ORAL at 21:14

## 2023-07-02 RX ADMIN — Medication 1 TABLET(S): at 08:43

## 2023-07-02 RX ADMIN — Medication 15 GRAM(S): at 12:13

## 2023-07-02 RX ADMIN — CLOPIDOGREL BISULFATE 75 MILLIGRAM(S): 75 TABLET, FILM COATED ORAL at 12:13

## 2023-07-02 RX ADMIN — Medication 25 MILLIGRAM(S): at 05:19

## 2023-07-02 RX ADMIN — CEFEPIME 100 MILLIGRAM(S): 1 INJECTION, POWDER, FOR SOLUTION INTRAMUSCULAR; INTRAVENOUS at 00:06

## 2023-07-02 RX ADMIN — DULOXETINE HYDROCHLORIDE 60 MILLIGRAM(S): 30 CAPSULE, DELAYED RELEASE ORAL at 12:13

## 2023-07-02 RX ADMIN — TAMSULOSIN HYDROCHLORIDE 0.4 MILLIGRAM(S): 0.4 CAPSULE ORAL at 21:13

## 2023-07-02 RX ADMIN — Medication 325 MILLIGRAM(S): at 17:40

## 2023-07-02 RX ADMIN — SODIUM CHLORIDE 1 GRAM(S): 9 INJECTION INTRAMUSCULAR; INTRAVENOUS; SUBCUTANEOUS at 05:19

## 2023-07-02 RX ADMIN — HEPARIN SODIUM 5000 UNIT(S): 5000 INJECTION INTRAVENOUS; SUBCUTANEOUS at 21:14

## 2023-07-02 RX ADMIN — Medication 1: at 12:09

## 2023-07-02 RX ADMIN — CEFEPIME 100 MILLIGRAM(S): 1 INJECTION, POWDER, FOR SOLUTION INTRAMUSCULAR; INTRAVENOUS at 15:17

## 2023-07-02 RX ADMIN — INSULIN GLARGINE 12 UNIT(S): 100 INJECTION, SOLUTION SUBCUTANEOUS at 08:44

## 2023-07-02 RX ADMIN — Medication 50 MICROGRAM(S): at 05:19

## 2023-07-02 RX ADMIN — Medication 25 MILLIGRAM(S): at 17:39

## 2023-07-02 RX ADMIN — Medication 325 MILLIGRAM(S): at 05:19

## 2023-07-02 RX ADMIN — POLYETHYLENE GLYCOL 3350 17 GRAM(S): 17 POWDER, FOR SOLUTION ORAL at 17:39

## 2023-07-02 RX ADMIN — FINASTERIDE 5 MILLIGRAM(S): 5 TABLET, FILM COATED ORAL at 12:13

## 2023-07-02 RX ADMIN — SODIUM CHLORIDE 1 GRAM(S): 9 INJECTION INTRAMUSCULAR; INTRAVENOUS; SUBCUTANEOUS at 17:40

## 2023-07-02 RX ADMIN — HEPARIN SODIUM 5000 UNIT(S): 5000 INJECTION INTRAVENOUS; SUBCUTANEOUS at 15:21

## 2023-07-02 NOTE — PROGRESS NOTE ADULT - SUBJECTIVE AND OBJECTIVE BOX
----------Daily Progress Note----------    HISTORY OF PRESENT ILLNESS:  HISTORY OF PRESENT ILLNESS:  89M PMHx HTN, DM2, BPH, CAD s/p CABG, hypothyroidism here with hyperkalemia, hyponatremia noted as outpt, who had pain with urination and urinary incontinence, with a home physician visit with labs showing hyperkalemia and hyponatremia, for which he was brought into the ED for. While in the ED, he was found to have pseudomonas bacteremia and was admitted for management of his UTI, pseudomonas bacteremia, hyperkalemia, and hyponatremia. Hyperkalemia has currently resolved.    Today is hospital day 6d.    ED course:  89 yo M with hx of HTN, HLD, DM II BPH and CAD s/p CABG x 4, Hypothyroidism presents to ED for abnormal labs result (hyperkalemia and hyponatremia). Patient is a poor historian, however patient's daughter was at bedside for collateral. Per the daughter, patient was recently admitted for fall and discharged to [facility]. When patient was discharged from [facility] he did not have a hidalgo in. Per the daughter at the nursing home patient had a hidalgo catheter in despite family requesting removal of it multiple times. Patient was discharged from [facility] on May 26th. Per the daughter patient was on antibiotics at [facility] for UTI, but she is unsure which antibiotic it was.     Since discharge patient's daughter says the patient has been extremely lethargic and weak. His urine has been milky, dark since May 26th when he was discharged from [facility]. Over the past week patient has become incontinent, no able to make it to the restroom, and complaining that he has pain with urination. On Wednesday a doctor came to the house to examine the patient, at that time he ordered lab tests, which the patient had preformed today. Labs were significant for Na 126 and a K TNP. They were instructed to come straight to the ED.     In the ED labs were significant for a Na 131, K+ 5.1, Hgb 9.9 (up from previous admission), and a WBC count of 18K.     Vital Signs Last 24 Hrs  T(F): 98.7 (23 Jun 2023 17:00), Max: 98.7 (23 Jun 2023 17:00)  HR: 95 (23 Jun 2023 19:30) (80 - 95)  BP: 157/78 (23 Jun 2023 19:30) (121/60 - 157/78)  BP(mean): --  RR: 20 (23 Jun 2023 19:30) (16 - 20)  SpO2: 99% (23 Jun 2023 19:30) (96% - 99%)  Patient On (Oxygen Delivery Method): nasal cannula  O2 Flow (L/min): 2    UA is positive for many bacteria >720 WBC, moderate blood and large leukocyte esterase.     INTERVAL HOSPITAL COURSE / OVERNIGHT EVENTS:  24 hr events:  Had a possible TIA on night of 7/1     O/N events:  None    Patient was examined and seen at bedside. He appeared much more fatigued than when I last saw him and was disinterested in answering questions. He understood the questions and were able to answer them with repeated questioning. Was alert and oriented to person, place, and time    <<<<<PAST MEDICAL & SURGICAL HISTORY>>>>>  HTN (hypertension)    HLD (hyperlipidemia)    BPH (benign prostatic hyperplasia)    CAD (coronary artery disease)    Diabetes mellitus    Hypothyroidism    S/P CABG (coronary artery bypass graft)      ALLERGIES  No Known Allergies      Home Medications:  DULoxetine 60 mg oral delayed release capsule: 1 cap(s) orally once a day (13 Dec 2022 22:03)  finasteride 5 mg oral tablet: 1 tab(s) orally once a day (13 Dec 2022 22:03)  lactobacillus acidophilus oral capsule: 1 tab(s) orally once a day (20 Dec 2022 10:18)  levothyroxine 50 mcg (0.05 mg) oral tablet: 1 tab(s) orally once a day (13 Dec 2022 22:03)  Metoprolol Tartrate 25 mg oral tablet: 1 tab(s) orally 2 times a day (13 Dec 2022 22:03)  Plavix 75 mg oral tablet: 1 orally once a day (23 Jun 2023 21:54)  simvastatin 20 mg oral tablet: 1 tab(s) orally once a day (at bedtime) (13 Dec 2022 22:03)  tamsulosin 0.4 mg oral capsule: 1 cap(s) orally once a day (13 Dec 2022 22:03)  Toujeo SoloStar 300 units/mL subcutaneous solution: 12 unit(s) subcutaneous once a day (in the morning) (13 Dec 2022 22:03)        MEDICATIONS  STANDING MEDICATIONS  cefepime   IVPB 2000 milliGRAM(s) IV Intermittent every 12 hours  clopidogrel Tablet 75 milliGRAM(s) Oral daily  dextrose 5%. 1000 milliLiter(s) IV Continuous <Continuous>  dextrose 5%. 1000 milliLiter(s) IV Continuous <Continuous>  dextrose 50% Injectable 25 Gram(s) IV Push once  dextrose 50% Injectable 25 Gram(s) IV Push once  dextrose 50% Injectable 12.5 Gram(s) IV Push once  DULoxetine 60 milliGRAM(s) Oral daily  ferrous    sulfate 325 milliGRAM(s) Oral two times a day  finasteride 5 milliGRAM(s) Oral daily  glucagon  Injectable 1 milliGRAM(s) IntraMuscular once  heparin   Injectable 5000 Unit(s) SubCutaneous every 8 hours  insulin glargine Injectable (LANTUS) 12 Unit(s) SubCutaneous every morning  insulin lispro (ADMELOG) corrective regimen sliding scale   SubCutaneous at bedtime  insulin lispro (ADMELOG) corrective regimen sliding scale   SubCutaneous three times a day before meals  lactobacillus acidophilus 1 Tablet(s) Oral with breakfast  levothyroxine 50 MICROGram(s) Oral daily  metoprolol tartrate 25 milliGRAM(s) Oral two times a day  polyethylene glycol 3350 17 Gram(s) Oral two times a day  senna 2 Tablet(s) Oral at bedtime  simvastatin 20 milliGRAM(s) Oral at bedtime  sodium chloride 1 Gram(s) Oral two times a day  tamsulosin 0.4 milliGRAM(s) Oral at bedtime  urea Oral Powder 15 Gram(s) Oral daily    PRN MEDICATIONS  acetaminophen     Tablet .. 650 milliGRAM(s) Oral every 6 hours PRN  aluminum hydroxide/magnesium hydroxide/simethicone Suspension 30 milliLiter(s) Oral every 4 hours PRN  dextrose Oral Gel 15 Gram(s) Oral once PRN  melatonin 3 milliGRAM(s) Oral at bedtime PRN  ondansetron Injectable 4 milliGRAM(s) IV Push every 8 hours PRN    VITALS:  T(F): 97.6  HR: 80  BP: 119/58  RR: 19  SpO2: 96%    PHYSICAL EXAM:  General: Elderly man laying in bed, more fatigued than the last time I saw him  Cardio: Irregular rhythm, regular rate, Decrescendo murmur at apex, S1/S2+  Pulm: Normal breath sounds, No crackles, wheezing, or rhonchi  GI: Soft, NTND  : No CVA or suprapubic tenderness, +left testicular erythema and swelling  Neuro: AxO3, CNII-XII intact, BL UE and LE strength unchanged from before, no sensory level deficits, was slow to responding to questions, occasionally closes eyes as if dosing off  Skin: Intact, no rashes or bruising   Ext: No LE edema      <<<<<LABS>>>>>                        9.0    12.65 )-----------( 360      ( 02 Jul 2023 07:19 )             29.1     07-01    132<L>  |  98  |  24<H>  ----------------------------<  162<H>  5.2<H>   |  23  |  0.9    Ca    8.6      01 Jul 2023 22:33  Mg     2.2     07-01    TPro  5.1<L>  /  Alb  2.5<L>  /  TBili  <0.2  /  DBili  x   /  AST  18  /  ALT  10  /  AlkPhos  88  07-01      Urinalysis Basic - ( 01 Jul 2023 22:33 )    Color: x / Appearance: x / SG: x / pH: x  Gluc: 162 mg/dL / Ketone: x  / Bili: x / Urobili: x   Blood: x / Protein: x / Nitrite: x   Leuk Esterase: x / RBC: x / WBC x   Sq Epi: x / Non Sq Epi: x / Bacteria: x              <<<<<RADIOLOGY>>>>>  ******PRELIMINARY REPORT******      ******PRELIMINARY REPORT******         ACC: [id number] EXAM:  CT ANGIO NECK (W)AW IC   ORDERED BY: TIGIST ROBERSON     ACC: [id number] EXAM:  CT ANGIO BRAIN (W)AW IC   ORDERED BY: TIGIST ROBERSON     ACC: [id number] EXAM:  CT PERFUSION W MAPS IC   ORDERED BY: TIGIST ROBERSON     PROCEDURE DATE:  07/01/2023    ******PRELIMINARY REPORT******      ******PRELIMINARY REPORT******           INTERPRETATION:  CLINICAL INDICATION: Stroke code; aphasia, total   paralysis    TECHNIQUE: CT of the head was performed with multiplanar reformats,   without IV contrast. CTA of the head and neck was performed after the   intravenous administration of  120 mL Omnipaque 350 nonionic IV contrast.   3-D reconstructions were performed under physician supervision on a   separate workstation and reviewed.    COMPARISON: CTA head and neck 3/29/2023    FINDINGS:    PERFUSION:    CBF<30%: 0  Tmax>6sec: 0 mL  Mismatch volume: 0 mL  Mismatch ratio: None    Per RAPID assessment for acute infarct, threshold for ischemic tissue   volume is Tmax > 6 sec.  Threshold for infarct core volume estimate is CBF < 30percent.  Threshold for poor collateral flow or severe delayed perfusion is Tmax >   10 sec.    CTA HEAD/NECK:    AORTIC ARCH: Calcific plaque at the origin of the great vessels without   flow-limiting stenosis.    RIGHT ANTERIOR CIRCULATION:  The common carotid artery (CCA) is patent up to the bulb without   stenosis. There is calcific plaque at the carotid bulb extending into the   common carotid bifurcation, resulting in severe (70% to 80%) stenosis of   the ECA and moderate to severe (60 to 70%) stenosis of the proximal ICA.   The ECA and ICA are otherwise patent without stenosis. There is calcific   plaque throughout the cavernous, clinoid, and supraclinoid segments of   the intracranial ICA resulting in mild to moderate stenosis.    The anterior and middle cerebral arteries are patent without stenosis.      LEFT ANTERIOR CIRCULATION:  The CCA has a tortuous course at the origin and is patent up to the bulb   without stenosis. There is calcified plaque at the carotid bulb extending   into the proximal ICA, resulting in moderate (60%) stenosis of the   proximal ICA. The ECA and its proximal branches are patent without   stenosis. There is calcific plaque in the distal cervical ICA without   flow-limiting stenosis. There is calcific plaque throughout the   cavernous, clinoid, and supraclinoid segments of the intracranial ICA   resulting in mild stenosis.    The anterior and middle cerebral arteries are patent without stenosis.      POSTERIOR CIRCULATION:  There is a dominant right vertebral artery. The is calcific plaque at the   origin of the right vertebral artery resulting in moderate stenosis. The   right vertebral artery is otherwise patent without stenosis. The left   vertebral artery is small in caliber. There is focal punctate calcific   plaque in the distal intracranial segment of the left vertebral artery   without flow-limiting stenosis. The basilar artery ispatent without   stenosis. The proximal branch vasculature of the posterior circulation   are within normal limits.    The posterior cerebral arteries are patent without stenosis.    There is no evidence for saccular aneurysm, vascular malformation, or   large vessel occlusion.    Visualized dural venous sinuses are patent.    OTHERS:    Redemonstration of nondisplaced C1 posterior arch fracture and   hyperdensity surrounding the thecal sac in the upper cervical canal.   Unchanged severe degenerative changes of the cervical spine resulting in   severe canal and foraminal stenosis as previously described.    Linear, band-like opacity in the dependent portion of the visualized   right lower lobe, which may reflect subsegmental atelectasis.    IMPRESSION:    1.  No perfusion deficit to suggest ischemic penumbra.  2.  No large vessel occlusion, saccular aneurysm, or vascular   malformation.  3.  Stable atherosclerotic disease of the cervical and intracranial   arteries as described above.  _____________  NASCET criteria for internal carotid artery stenosis:  Mild: 0% to 49%  Moderate: 50% to 69%  Severe: 70% to 99%  Complete Occlusion            ******PRELIMINARY REPORT******      ******PRELIMINARY REPORT******       MADHURI ERVIN MD; Resident Radiologist  This document is a PRELIMINARY interpretation and is pending final   attending approval. Jul 1 2023  7:25PM              ACC: [id number] EXAM:  CT BRAIN STROKE PROTOCOL   ORDERED BY: TIGIST SAH     PROCEDURE DATE:  07/01/2023          INTERPRETATION:  CLINICAL INDICATION: Code stroke, aphasia, total   paralysis    TECHNIQUE: CT of the head was performed without the administration of   intravenous contrast.    COMPARISON: CT head 3/28/2023    FINDINGS:    No acute intracranial hemorrhage, mass effect, or midline shift.   Gray-white differentiation is well-maintained.    There is prominence of the sulci, sylvian fissures, and ventricles,   reflecting age-related diffuse parenchymal volume loss.    There are scattered patchy low attenuations in the bilateral   periventricular cerebral white matter consistent with chronic   microvascular ischemic changes.    There is no evidence of hydrocephalus. There are no extra-axial fluid   collections.    Post cataract surgeries. Retention cyst in maxillary sinus. The mastoid   air cells are aerated. The visualized soft tissues and osseous structures   appear normal.      IMPRESSION:    No acute intracranial pathology.    Chronic microvascular ischemic changes.    A neurology resident was contacted at 5:20 PM. Results could not be   relayed at this time. Callback was placed.    These findings were discussed with Dr. Vazquez at 7/1/2023 5:28 PM by Dr. Ervin with read back confirmation. Result additional discussed with Dr. Rivera at 7/1/2023 5:30 PM.    --- End of Report ---          MADHURI ERVIN MD; Resident Radiologist  This document has been electronically signed.  CARL CHILEL MD; Attending Radiologist  This document has been electronically signed. Jul 1 2023  5:51PM          < from: US Kidney and Bladder (06.23.23 @ 21:08) >  IMPRESSION:    No sonographic evidence of hydronephrosis.    Post void residual of 69 mL (46%).      < from: Xray Chest 1 View-PORTABLE IMMEDIATE (Xray Chest 1 View-PORTABLE IMMEDIATE .) (06.23.23 @ 16:45) >  Impression:    Right basilar focal atelectasis      < from: Xray Chest 1 View-PORTABLE IMMEDIATE (Xray Chest 1 View-PORTABLE IMMEDIATE .) (04.02.23 @ 10:25) >  FINDINGS/  IMPRESSION:    Low lung volumes. Right basal bandlike opacity/atelectasis. No   pneumothorax or significant pleural effusion on frontal view. No focal   consolidation.    Stable cardiomediastinal silhouette.    Unchanged osseous structures.        -----------------------------------------------------------------------------------------------------------------------------------------------------------------------------------------------

## 2023-07-02 NOTE — PROGRESS NOTE ADULT - ASSESSMENT
89M PMHx HTN, DM2, BPH, CAD s/p CABG, hypothyroidism here with hyperkalemia, hyponatremia noted as outpt. Found to have pseudomonas bacteremia.    #Possible TIA vs stroke vs seizure  This morning, patient is able to speak, move b/l arms and legs, stick out tongue, feel light touch BL in head, UE, and LE, move eyes, and raise eyebrows. He was also able to move upper and lower extremities. However, he is slow to answering questions and   *On CTA of head and neck and CT perfusion, no perfusion deficit to suggest ischemic penumbra, no large vessel occlusion, saccular aneurysm, or vascular   malformation, stable atherosclerotic disease of the cervical and intracranial arteries as described above.  *On CT brain stroke protocol there was no acute intracranial pathology and only chronic microvascular ischemic changes  - Pending MR brain  - Pending EEG  - F/u neuro for further recs    #Pseudomonas bacteremia  Patient afebrile, WBC downtrending, patient improving continue monitoring on cefepime  - WBC 14.1 (6/30) -> 12.32 (7/1)  - F/u AM CBC  - recent hidalgo in snf; now out  - bcx pseudomonas 6/24  - UA showing no bacteria but LE positive and   - C/w cefepime 2 g q12h, d/c on levaquin 750 mg q48h as per infectious disease recs  - Ucx, no growth (6/27)  - Bcx NGTD (6/28)  - Pending scrotal US to assess possible left epididimitis from UTI  - Appreciate ID recs    #Hyperkalemia(resolved)  #Hyponatremia  - Serum Na 128 (6/30) -> 132 (7/1)  - Aldosterone normal, renin normal  - Urine Na 39  - Urine Osm 349  - Serum Osm 284  - Urine Cr <4  - FeNa 7.5% (6/28), consider intrinsic renal cause for hyponatremia  - Nephrology recommended uric acid level, start urea 15 g daily  - c/w urea 15 g daily1  - Start salt tabs 1 g bid, if PM Na is less than 132, start salt tabs 2 g bid  - F/u AM BMP  - Appreciate nephrology recs    #HTN  #Moderate to severe AS found TTE on 12/14/22  - c/w metoprolol 25 mg bid, hold if SBP <100, HR <60  - monitor BP    #DM2  -continue lantus 12 with sliding scale    #BPH.   -continue finasteride and tamsulosin    #CAD  -cont plavix and zocor    #MISC  -GI ppx: ppi  -DVT ppx: heparin sq  -Diet: Soft and bite sized  -Activity: IAT  -Code status: DNR/DNI  -Dispo: PT recommend home PT vs rehab

## 2023-07-02 NOTE — PROGRESS NOTE ADULT - ATTENDING COMMENTS
89 y/o Male with  PMHx HTN, DM2, BPH, CAD s/p CABG, hypothyroidism, recently dx. from [facility] , progressive ambulatory dysfunction, admitted   with hyperkalemia, hyponatremia noted as outpt. Found to have pseudomonas bacteremia.    #Code stroke (7/1):   Around 4:45 pm family and nurse noted that patient was aphasic.  Not following commands. Able to mimic actions but apparently not comprehending spoken words.   Neuro evaluated patient at bedside. Bedside EKG, Vitals WNL.   Stat CT Head, CTA H&N, CT perfusion performed.   7/2:This morning, patient is able to speak, move b/l arms and legs, stick out tongue, feel light touch BL in head, UE, and LE, move eyes, and raise eyebrows. He was also able to move upper and lower extremities. However, he is slow to answering questions and   *On CTA of head and neck and CT perfusion, no perfusion deficit to suggest ischemic penumbra, no large vessel occlusion, saccular aneurysm, or vascular   malformation, stable atherosclerotic disease of the cervical and intracranial arteries as described above.  *On CT brain stroke protocol there was no acute intracranial pathology and only chronic microvascular ischemic changes  - Pending MR brain  - Pending EEG  - F/u neuro for further recs  Neurochecks Q6H while on this floor.      #Bacteremia due to Pseudomonas.   # Leukocytosis   #in setting of recent hidalgo in snf; now out; possible uti  bcxs pseudomonas 6/24, sensitive to Cefepime , ID on board   ua positive; f/u ucxs- more than 3 organisms, repeat urine cxs neg , repeat blood cxs 6/26, 28 Neg.   - Cont  IV cefepime (eventual LVQ; end date 7/4 per ID)  US Scrotum (for epididymitis) : Complex scrotal mixed echogenicity collection anterior to left testicle measuring approximately 7.7 x 3 x 6.9 cm. 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    #Hyperkalemia resolved  # Hypomagnesemia- supplemented     #hyponatremia- Na 126 on admission > lately 130ish.    Cr normal.  Per Nephro eval, likely S[facility]? Urea 15gm QD,  Uric Acid = 4.5. No need to restrict po free water.     Na 130 > 128. started 1gm NaCal BID > 130 > 130 > 133.   Stable. Improving.    #constipated: Senna, Miralax.     #HTN (hypertension). currently borderline Hypotensive   -continue  lopressor 25mg PO twice daily with holding parameters.     #DM2 (diabetes mellitus, type 2)- uncontrolled, Hga1C- 9  lantus 12  ssi.    #History of BPH. - continue finasteride 5 mg po once daily /tamsulosin 0.4 mg po once daily at bedtime     #h/o CAD (coronary artery disease). / h/o CABG  - plavix/zocor tx.  -patient is on supplemental oxygen via [place], 2 L , on RA sat 91- 92% at rest     #Hypothyroidism. continue synthroid 50mcg po once daily, obtain TSH level    # Iron def. anemia- started on PO Iron tx.     # Ambulatory dysfunction, physical decline- eventual Home PT    DVT ppX, heparin  DNR/DNI (MOLST signed 6/29)    #Progress Note Handoff:  PT eval noted. Home w/ HCS (Pending Na improvement, Neuro w/u. 89 y/o Male with  PMHx HTN, DM2, BPH, CAD s/p CABG, hypothyroidism, recently dx. from [facility] , progressive ambulatory dysfunction, admitted   with hyperkalemia, hyponatremia noted as outpt. Found to have pseudomonas bacteremia.    #Code stroke (7/1):   Around 4:45 pm family and nurse noted that patient was aphasic.  Not following commands. Able to mimic actions but apparently not comprehending spoken words.   Neuro evaluated patient at bedside. Bedside EKG, Vitals WNL.   Stat CT Head, CTA H&N, CT perfusion performed.   7/2:This morning, patient is able to speak, move b/l arms and legs, stick out tongue, feel light touch BL in head, UE, and LE, move eyes, and raise eyebrows. He was also able to move upper and lower extremities. However, he is slow to answering questions and   *On CTA of head and neck and CT perfusion, no perfusion deficit to suggest ischemic penumbra, no large vessel occlusion, saccular aneurysm, or vascular   malformation, stable atherosclerotic disease of the cervical and intracranial arteries as described above.  *On CT brain stroke protocol there was no acute intracranial pathology and only chronic microvascular ischemic changes  Was probably a TIA.   - Pending MR brain  - Pending EEG  - F/u neuro for further recs  Neurochecks Q6H while on this floor.      #Bacteremia due to Pseudomonas.   # Leukocytosis   #in setting of recent hidalgo in snf; now out; possible uti  bcxs pseudomonas 6/24, sensitive to Cefepime , ID on board   ua positive; f/u ucxs- more than 3 organisms, repeat urine cxs neg , repeat blood cxs 6/26, 28 Neg.   - Cont  IV cefepime (eventual LVQ; end date 7/4 per ID)  US Scrotum (for epididymitis) : Complex scrotal mixed echogenicity collection anterior to left testicle measuring approximately 7.7 x 3 x 6.9 cm. 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    #Hyperkalemia resolved  # Hypomagnesemia- supplemented     #hyponatremia- Na 126 on admission > lately 130ish.    Cr normal.  Per Nephro eval, likely [facility]? Urea 15gm QD,  Uric Acid = 4.5. No need to restrict po free water.     Na 130 > 128. started 1gm NaCal BID > 130 > 130 > 133.   Stable. Improving.    #constipated: Senna, Miralax.     #HTN (hypertension). currently borderline Hypotensive   -continue  lopressor 25mg PO twice daily with holding parameters.     #DM2 (diabetes mellitus, type 2)- uncontrolled, Hga1C- 9  lantus 12  ssi.    #History of BPH. - continue finasteride 5 mg po once daily /tamsulosin 0.4 mg po once daily at bedtime     #h/o CAD (coronary artery disease). / h/o CABG  - plavix/zocor tx.  -patient is on supplemental oxygen via [place], 2 L , on RA sat 91- 92% at rest     #Hypothyroidism. continue synthroid 50mcg po once daily, obtain TSH level    # Iron def. anemia- started on PO Iron tx.     # Ambulatory dysfunction, physical decline- eventual Home PT    DVT ppX, heparin  DNR/DNI (MOLST signed 6/29)    #Progress Note Handoff:  PT eval noted. Home w/ HCS (Pending Na improvement, Neuro w/u. 89 y/o Male with  PMHx HTN, DM2, BPH, CAD s/p CABG, hypothyroidism, recently dx. from [facility] , progressive ambulatory dysfunction, admitted   with hyperkalemia, hyponatremia noted as outpt. Found to have pseudomonas bacteremia.    #Code stroke (7/1):   Around 4:45 pm family and nurse noted that patient was aphasic.  Not following commands. Able to mimic actions but apparently not comprehending spoken words.   Neuro evaluated patient at bedside. Bedside EKG, Vitals WNL.   Stat CT Head, CTA H&N, CT perfusion performed.   7/2:This morning, patient is able to speak, move b/l arms and legs, stick out tongue, feel light touch BL in head, UE, and LE, move eyes, and raise eyebrows. He was also able to move upper and lower extremities. However, he is slow to answering questions. which is new c/f baseline per family.   *On CTA of head and neck and CT perfusion, no perfusion deficit to suggest ischemic penumbra, no large vessel occlusion, saccular aneurysm, or vascular   malformation, stable atherosclerotic disease of the cervical and intracranial arteries as described above.  *On CT brain stroke protocol there was no acute intracranial pathology and only chronic microvascular ischemic changes  Was probably a TIA.   - Pending MR brain  - Pending EEG  - F/u neuro for further recs  Neurochecks Q6H while on this floor.      #Bacteremia due to Pseudomonas.   # Leukocytosis   #in setting of recent hidalgo in snf; now out; possible uti  bcxs pseudomonas 6/24, sensitive to Cefepime , ID on board   ua positive; f/u ucxs- more than 3 organisms, repeat urine cxs neg , repeat blood cxs 6/26, 28 Neg.   - Cont  IV cefepime (eventual LVQ; end date 7/4 per ID) > From 7/3: change to PO  Q48. In case AMS could be 2/2 Cefepime.  US Scrotum (for epididymitis) : Complex scrotal mixed echogenicity collection anterior to left testicle measuring approximately 7.7 x 3 x 6.9 cm. 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    #Hyperkalemia resolved  # Hypomagnesemia- supplemented     #hyponatremia- Na 126 on admission > lately 130ish.    Cr normal.  Per Nephro eval, likely [facility]? Urea 15gm QD,  Uric Acid = 4.5. No need to restrict po free water.     Na 130 > 128. started 1gm NaCal BID > 130 > 130 > 133.   Stable. Improving.    #constipated: Senna, Miralax.     #HTN (hypertension). currently borderline Hypotensive   -continue  lopressor 25mg PO twice daily with holding parameters.     #DM2 (diabetes mellitus, type 2)- uncontrolled, Hga1C- 9  lantus 12  ssi.    #History of BPH. - continue finasteride 5 mg po once daily /tamsulosin 0.4 mg po once daily at bedtime     #h/o CAD (coronary artery disease). / h/o CABG  - plavix/zocor tx.  -patient is on supplemental oxygen via [place], 2 L , on RA sat 91- 92% at rest     #Hypothyroidism. continue synthroid 50mcg po once daily, obtain TSH level    # Iron def. anemia- started on PO Iron tx.     # Ambulatory dysfunction, physical decline- eventual Home PT    DVT ppX, heparin  DNR/DNI (MOLST signed 6/29)    #Progress Note Handoff:  PT eval noted. Home w/ HCS (Pending Na improvement, Neuro w/u.

## 2023-07-03 LAB
ALBUMIN SERPL ELPH-MCNC: 2.7 G/DL — LOW (ref 3.5–5.2)
ALP SERPL-CCNC: 85 U/L — SIGNIFICANT CHANGE UP (ref 30–115)
ALT FLD-CCNC: 10 U/L — SIGNIFICANT CHANGE UP (ref 0–41)
ANION GAP SERPL CALC-SCNC: 10 MMOL/L — SIGNIFICANT CHANGE UP (ref 7–14)
AST SERPL-CCNC: 17 U/L — SIGNIFICANT CHANGE UP (ref 0–41)
BASOPHILS # BLD AUTO: 0.13 K/UL — SIGNIFICANT CHANGE UP (ref 0–0.2)
BASOPHILS NFR BLD AUTO: 0.9 % — SIGNIFICANT CHANGE UP (ref 0–1)
BILIRUB SERPL-MCNC: <0.2 MG/DL — SIGNIFICANT CHANGE UP (ref 0.2–1.2)
BUN SERPL-MCNC: 35 MG/DL — HIGH (ref 10–20)
CALCIUM SERPL-MCNC: 9.3 MG/DL — SIGNIFICANT CHANGE UP (ref 8.4–10.5)
CHLORIDE SERPL-SCNC: 103 MMOL/L — SIGNIFICANT CHANGE UP (ref 98–110)
CO2 SERPL-SCNC: 21 MMOL/L — SIGNIFICANT CHANGE UP (ref 17–32)
CREAT SERPL-MCNC: 1 MG/DL — SIGNIFICANT CHANGE UP (ref 0.7–1.5)
CULTURE RESULTS: SIGNIFICANT CHANGE UP
EGFR: 68 ML/MIN/1.73M2 — SIGNIFICANT CHANGE UP
EOSINOPHIL # BLD AUTO: 0.35 K/UL — SIGNIFICANT CHANGE UP (ref 0–0.7)
EOSINOPHIL NFR BLD AUTO: 2.3 % — SIGNIFICANT CHANGE UP (ref 0–8)
GLUCOSE BLDC GLUCOMTR-MCNC: 190 MG/DL — HIGH (ref 70–99)
GLUCOSE BLDC GLUCOMTR-MCNC: 210 MG/DL — HIGH (ref 70–99)
GLUCOSE BLDC GLUCOMTR-MCNC: 219 MG/DL — HIGH (ref 70–99)
GLUCOSE BLDC GLUCOMTR-MCNC: 224 MG/DL — HIGH (ref 70–99)
GLUCOSE BLDC GLUCOMTR-MCNC: 238 MG/DL — HIGH (ref 70–99)
GLUCOSE BLDC GLUCOMTR-MCNC: 239 MG/DL — HIGH (ref 70–99)
GLUCOSE SERPL-MCNC: 206 MG/DL — HIGH (ref 70–99)
HCT VFR BLD CALC: 29.3 % — LOW (ref 42–52)
HGB BLD-MCNC: 9 G/DL — LOW (ref 14–18)
IMM GRANULOCYTES NFR BLD AUTO: 1.7 % — HIGH (ref 0.1–0.3)
LYMPHOCYTES # BLD AUTO: 33.7 % — SIGNIFICANT CHANGE UP (ref 20.5–51.1)
LYMPHOCYTES # BLD AUTO: 5.07 K/UL — HIGH (ref 1.2–3.4)
MAGNESIUM SERPL-MCNC: 1.8 MG/DL — SIGNIFICANT CHANGE UP (ref 1.8–2.4)
MCHC RBC-ENTMCNC: 25.7 PG — LOW (ref 27–31)
MCHC RBC-ENTMCNC: 30.7 G/DL — LOW (ref 32–37)
MCV RBC AUTO: 83.7 FL — SIGNIFICANT CHANGE UP (ref 80–94)
MONOCYTES # BLD AUTO: 1.58 K/UL — HIGH (ref 0.1–0.6)
MONOCYTES NFR BLD AUTO: 10.5 % — HIGH (ref 1.7–9.3)
NEUTROPHILS # BLD AUTO: 7.66 K/UL — HIGH (ref 1.4–6.5)
NEUTROPHILS NFR BLD AUTO: 50.9 % — SIGNIFICANT CHANGE UP (ref 42.2–75.2)
NRBC # BLD: 0 /100 WBCS — SIGNIFICANT CHANGE UP (ref 0–0)
PLATELET # BLD AUTO: 398 K/UL — SIGNIFICANT CHANGE UP (ref 130–400)
PMV BLD: 9.1 FL — SIGNIFICANT CHANGE UP (ref 7.4–10.4)
POTASSIUM SERPL-MCNC: 4.5 MMOL/L — SIGNIFICANT CHANGE UP (ref 3.5–5)
POTASSIUM SERPL-SCNC: 4.5 MMOL/L — SIGNIFICANT CHANGE UP (ref 3.5–5)
PROT SERPL-MCNC: 5.3 G/DL — LOW (ref 6–8)
RBC # BLD: 3.5 M/UL — LOW (ref 4.7–6.1)
RBC # FLD: 16.5 % — HIGH (ref 11.5–14.5)
SODIUM SERPL-SCNC: 134 MMOL/L — LOW (ref 135–146)
SPECIMEN SOURCE: SIGNIFICANT CHANGE UP
WBC # BLD: 15.04 K/UL — HIGH (ref 4.8–10.8)
WBC # FLD AUTO: 15.04 K/UL — HIGH (ref 4.8–10.8)

## 2023-07-03 PROCEDURE — 99233 SBSQ HOSP IP/OBS HIGH 50: CPT

## 2023-07-03 RX ADMIN — SODIUM CHLORIDE 1 GRAM(S): 9 INJECTION INTRAMUSCULAR; INTRAVENOUS; SUBCUTANEOUS at 05:18

## 2023-07-03 RX ADMIN — Medication 50 MICROGRAM(S): at 05:19

## 2023-07-03 RX ADMIN — Medication 325 MILLIGRAM(S): at 05:18

## 2023-07-03 RX ADMIN — HEPARIN SODIUM 5000 UNIT(S): 5000 INJECTION INTRAVENOUS; SUBCUTANEOUS at 21:42

## 2023-07-03 RX ADMIN — HEPARIN SODIUM 5000 UNIT(S): 5000 INJECTION INTRAVENOUS; SUBCUTANEOUS at 15:34

## 2023-07-03 RX ADMIN — POLYETHYLENE GLYCOL 3350 17 GRAM(S): 17 POWDER, FOR SOLUTION ORAL at 17:37

## 2023-07-03 RX ADMIN — SODIUM CHLORIDE 1 GRAM(S): 9 INJECTION INTRAMUSCULAR; INTRAVENOUS; SUBCUTANEOUS at 21:43

## 2023-07-03 RX ADMIN — SIMVASTATIN 20 MILLIGRAM(S): 20 TABLET, FILM COATED ORAL at 21:43

## 2023-07-03 RX ADMIN — Medication 325 MILLIGRAM(S): at 17:37

## 2023-07-03 RX ADMIN — Medication 15 GRAM(S): at 12:19

## 2023-07-03 RX ADMIN — INSULIN GLARGINE 12 UNIT(S): 100 INJECTION, SOLUTION SUBCUTANEOUS at 08:02

## 2023-07-03 RX ADMIN — TAMSULOSIN HYDROCHLORIDE 0.4 MILLIGRAM(S): 0.4 CAPSULE ORAL at 21:43

## 2023-07-03 RX ADMIN — DULOXETINE HYDROCHLORIDE 60 MILLIGRAM(S): 30 CAPSULE, DELAYED RELEASE ORAL at 12:19

## 2023-07-03 RX ADMIN — Medication 1: at 12:16

## 2023-07-03 RX ADMIN — HEPARIN SODIUM 5000 UNIT(S): 5000 INJECTION INTRAVENOUS; SUBCUTANEOUS at 05:14

## 2023-07-03 RX ADMIN — FINASTERIDE 5 MILLIGRAM(S): 5 TABLET, FILM COATED ORAL at 12:19

## 2023-07-03 RX ADMIN — Medication 25 MILLIGRAM(S): at 05:19

## 2023-07-03 RX ADMIN — CLOPIDOGREL BISULFATE 75 MILLIGRAM(S): 75 TABLET, FILM COATED ORAL at 12:18

## 2023-07-03 RX ADMIN — Medication 2: at 17:33

## 2023-07-03 NOTE — PROGRESS NOTE ADULT - ATTENDING COMMENTS
89 y/o Male with  PMHx HTN, DM2, BPH, CAD s/p CABG, hypothyroidism, recently dx. from [facility] , progressive ambulatory dysfunction, admitted   with hyperkalemia, hyponatremia noted as outpt. Found to have pseudomonas bacteremia.    #Code stroke (7/1):   Around 4:45 pm family and nurse noted that patient was aphasic.  Not following commands. Able to mimic actions but apparently not comprehending spoken words.   Neuro evaluated patient at bedside. Bedside EKG, Vitals WNL.   Stat CT Head, CTA H&N, CT perfusion performed.   7/2:This morning, patient is able to speak, move b/l arms and legs, stick out tongue, feel light touch BL in head, UE, and LE, move eyes, and raise eyebrows. He was also able to move upper and lower extremities. However, he is slow to answering questions. which is new c/f baseline per family.   *On CTA of head and neck and CT perfusion, no perfusion deficit to suggest ischemic penumbra, no large vessel occlusion, saccular aneurysm, or vascular   malformation, stable atherosclerotic disease of the cervical and intracranial arteries as described above.  *On CT brain stroke protocol there was no acute intracranial pathology and only chronic microvascular ischemic changes  Was probably a TIA.   - MR brain WNL  - EEG Done- awaiting read.  - F/u neuro for further recs  Neurochecks Q6H while on this floor.    On 7/3 morning - was very hard to arouse even with painful stimuli. But in afternoon, with family, patient is spontaneously awake and saying "I want to go home". Explained to family that apparently his mental status is waxing & Waning. Will f/u EEG. Keep off cefepime. changedto Zosyn per ID.    #Bacteremia due to Pseudomonas.   # Leukocytosis   #in setting of recent hidalgo in snf; now out; possible uti  bcxs pseudomonas 6/24, sensitive to Cefepime , ID on board. changed as above.  ua positive; f/u ucxs- more than 3 organisms, repeat urine cxs neg , repeat blood cxs 6/26, 28 Neg.   - Cont  IV cefepime (eventual LVQ; end date 7/4 per ID) > From 7/3: change to PO  Q48. In case AMS could be 2/2 Cefepime.  US Scrotum (for epididymitis) : Complex scrotal mixed echogenicity collection anterior to left testicle measuring approximately 7.7 x 3 x 6.9 cm. 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    #Hyperkalemia resolved  # Hypomagnesemia- supplemented     #hyponatremia- Na 126 on admission > lately 130ish.    Cr normal.  Per Nephro eval, likely [facility]? Urea 15gm QD,  Uric Acid = 4.5. No need to restrict po free water.     Na 130 > 128. started 1gm NaCal BID > 130 > 130 > 133> 134..   Stable. Improving.    #constipated: Senna, Miralax.     #HTN (hypertension). currently borderline Hypotensive   -continue  lopressor 25mg PO twice daily with holding parameters.     #DM2 (diabetes mellitus, type 2)- uncontrolled, Hga1C- 9  lantus 12  ssi.    #History of BPH. - continue finasteride 5 mg po once daily /tamsulosin 0.4 mg po once daily at bedtime     #h/o CAD (coronary artery disease). / h/o CABG  - plavix/zocor tx.  -patient is on supplemental oxygen via [place], 2 L , on RA sat 91- 92% at rest     #Hypothyroidism. continue synthroid 50mcg po once daily, obtain TSH level    # Iron def. anemia- started on PO Iron tx.     # Ambulatory dysfunction, physical decline- eventual Home PT    DVT ppX, heparin  DNR/DNI (MOLST signed 6/29)    #Progress Note Handoff:  PT eval noted. Home w/ HCS (Pending Na improvement, Neuro w/u.

## 2023-07-03 NOTE — PROGRESS NOTE ADULT - SUBJECTIVE AND OBJECTIVE BOX
CHEL CROFT 89y Male  MRN#: [id number]     Hospital Day: 10d    Pt is currently admitted with the primary diagnosis of  Hyperkalemia        SUBJECTIVE     Overnight events  None    Subjective complaints  Pt was evaluated this am. Patient denied any active complaints and per patient his symptoms are improving                                            ----------------------------------------------------------  OBJECTIVE  PAST MEDICAL & SURGICAL HISTORY  HTN (hypertension)    HLD (hyperlipidemia)    BPH (benign prostatic hyperplasia)    CAD (coronary artery disease)    Diabetes mellitus    Hypothyroidism    S/P CABG (coronary artery bypass graft)                                              -----------------------------------------------------------  ALLERGIES:  No Known Allergies                                            ------------------------------------------------------------    HOME MEDICATIONS  Home Medications:  DULoxetine 60 mg oral delayed release capsule: 1 cap(s) orally once a day (13 Dec 2022 22:03)  finasteride 5 mg oral tablet: 1 tab(s) orally once a day (13 Dec 2022 22:03)  lactobacillus acidophilus oral capsule: 1 tab(s) orally once a day (20 Dec 2022 10:18)  levothyroxine 50 mcg (0.05 mg) oral tablet: 1 tab(s) orally once a day (13 Dec 2022 22:03)  Metoprolol Tartrate 25 mg oral tablet: 1 tab(s) orally 2 times a day (13 Dec 2022 22:03)  Plavix 75 mg oral tablet: 1 orally once a day (23 Jun 2023 21:54)  simvastatin 20 mg oral tablet: 1 tab(s) orally once a day (at bedtime) (13 Dec 2022 22:03)  tamsulosin 0.4 mg oral capsule: 1 cap(s) orally once a day (13 Dec 2022 22:03)  Trent Davidson 300 units/mL subcutaneous solution: 12 unit(s) subcutaneous once a day (in the morning) (13 Dec 2022 22:03)                           MEDICATIONS:  STANDING MEDICATIONS  clopidogrel Tablet 75 milliGRAM(s) Oral daily  dextrose 5%. 1000 milliLiter(s) IV Continuous <Continuous>  dextrose 5%. 1000 milliLiter(s) IV Continuous <Continuous>  dextrose 50% Injectable 25 Gram(s) IV Push once  dextrose 50% Injectable 25 Gram(s) IV Push once  dextrose 50% Injectable 12.5 Gram(s) IV Push once  DULoxetine 60 milliGRAM(s) Oral daily  ferrous    sulfate 325 milliGRAM(s) Oral two times a day  finasteride 5 milliGRAM(s) Oral daily  glucagon  Injectable 1 milliGRAM(s) IntraMuscular once  heparin   Injectable 5000 Unit(s) SubCutaneous every 8 hours  insulin glargine Injectable (LANTUS) 12 Unit(s) SubCutaneous every morning  insulin lispro (ADMELOG) corrective regimen sliding scale   SubCutaneous at bedtime  insulin lispro (ADMELOG) corrective regimen sliding scale   SubCutaneous three times a day before meals  lactobacillus acidophilus 1 Tablet(s) Oral with breakfast  levoFLOXacin  Tablet 750 milliGRAM(s) Oral every 48 hours  levothyroxine 50 MICROGram(s) Oral daily  metoprolol tartrate 25 milliGRAM(s) Oral two times a day  polyethylene glycol 3350 17 Gram(s) Oral two times a day  senna 2 Tablet(s) Oral at bedtime  simvastatin 20 milliGRAM(s) Oral at bedtime  sodium chloride 1 Gram(s) Oral two times a day  tamsulosin 0.4 milliGRAM(s) Oral at bedtime  urea Oral Powder 15 Gram(s) Oral daily    PRN MEDICATIONS  acetaminophen     Tablet .. 650 milliGRAM(s) Oral every 6 hours PRN  aluminum hydroxide/magnesium hydroxide/simethicone Suspension 30 milliLiter(s) Oral every 4 hours PRN  dextrose Oral Gel 15 Gram(s) Oral once PRN  melatonin 3 milliGRAM(s) Oral at bedtime PRN  ondansetron Injectable 4 milliGRAM(s) IV Push every 8 hours PRN                                            ------------------------------------------------------------  VITAL SIGNS: Last 24 Hours  T(C): 36.7 (03 Jul 2023 04:13), Max: 36.7 (03 Jul 2023 04:13)  T(F): 98.1 (03 Jul 2023 04:13), Max: 98.1 (03 Jul 2023 04:13)  HR: 85 (03 Jul 2023 04:13) (70 - 91)  BP: 138/63 (03 Jul 2023 04:13) (113/63 - 139/75)  BP(mean): --  RR: 19 (03 Jul 2023 04:13) (16 - 19)  SpO2: 95% (03 Jul 2023 04:13) (95% - 98%)                                             --------------------------------------------------------------  LABS:                        9.0    15.04 )-----------( 398      ( 03 Jul 2023 07:56 )             29.3     07-02    133<L>  |  100  |  21<H>  ----------------------------<  135<H>  4.5   |  21  |  0.8    Ca    9.0      02 Jul 2023 07:19  Mg     2.1     07-02    TPro  5.5<L>  /  Alb  2.9<L>  /  TBili  0.2  /  DBili  x   /  AST  17  /  ALT  10  /  AlkPhos  86  07-02      Urinalysis Basic - ( 02 Jul 2023 07:19 )    Color: x / Appearance: x / SG: x / pH: x  Gluc: 135 mg/dL / Ketone: x  / Bili: x / Urobili: x   Blood: x / Protein: x / Nitrite: x   Leuk Esterase: x / RBC: x / WBC x   Sq Epi: x / Non Sq Epi: x / Bacteria: x                                                            -------------------------------------------------------------  RADIOLOGY:                                            --------------------------------------------------------------    PHYSICAL EXAM:  GENERAL: NAD, lying in bed comfortably  HEAD:  Atraumatic, Normocephalic  EYES: EOMI, conjunctiva and sclera clear  ENT: Moist mucous membranes  NECK: Supple, No JVD  CHEST/LUNG: Clear to auscultation bilaterally; No rales, rhonchi, wheezing, or rubs. Unlabored respirations  HEART: regular rate and rhythm; No murmurs, rubs, or gallops  ABDOMEN: Bowel sounds present; Soft, Nontender, Nondistended.    EXTREMITIES: Warm. No clubbing, cyanosis, or edema  NERVOUS SYSTEM:  Alert & Oriented X3. No focal deficits   SKIN: No rashes or lesions                                           --------------------------------------------------------------                 CHEL CROFT 89y Male  MRN#: [id number]     Hospital Day: 10d    Pt is currently admitted with the primary diagnosis of  Hyperkalemia        SUBJECTIVE     Overnight events  None    Subjective complaints  Pt was evaluated this am. Patient denied any active complaints and per patient his symptoms are improving                                            ----------------------------------------------------------  OBJECTIVE  PAST MEDICAL & SURGICAL HISTORY  HTN (hypertension)    HLD (hyperlipidemia)    BPH (benign prostatic hyperplasia)    CAD (coronary artery disease)    Diabetes mellitus    Hypothyroidism    S/P CABG (coronary artery bypass graft)                                              -----------------------------------------------------------  ALLERGIES:  No Known Allergies                                            ------------------------------------------------------------    HOME MEDICATIONS  Home Medications:  DULoxetine 60 mg oral delayed release capsule: 1 cap(s) orally once a day (13 Dec 2022 22:03)  finasteride 5 mg oral tablet: 1 tab(s) orally once a day (13 Dec 2022 22:03)  lactobacillus acidophilus oral capsule: 1 tab(s) orally once a day (20 Dec 2022 10:18)  levothyroxine 50 mcg (0.05 mg) oral tablet: 1 tab(s) orally once a day (13 Dec 2022 22:03)  Metoprolol Tartrate 25 mg oral tablet: 1 tab(s) orally 2 times a day (13 Dec 2022 22:03)  Plavix 75 mg oral tablet: 1 orally once a day (23 Jun 2023 21:54)  simvastatin 20 mg oral tablet: 1 tab(s) orally once a day (at bedtime) (13 Dec 2022 22:03)  tamsulosin 0.4 mg oral capsule: 1 cap(s) orally once a day (13 Dec 2022 22:03)  Trent Davidson 300 units/mL subcutaneous solution: 12 unit(s) subcutaneous once a day (in the morning) (13 Dec 2022 22:03)                           MEDICATIONS:  STANDING MEDICATIONS  clopidogrel Tablet 75 milliGRAM(s) Oral daily  dextrose 5%. 1000 milliLiter(s) IV Continuous <Continuous>  dextrose 5%. 1000 milliLiter(s) IV Continuous <Continuous>  dextrose 50% Injectable 25 Gram(s) IV Push once  dextrose 50% Injectable 25 Gram(s) IV Push once  dextrose 50% Injectable 12.5 Gram(s) IV Push once  DULoxetine 60 milliGRAM(s) Oral daily  ferrous    sulfate 325 milliGRAM(s) Oral two times a day  finasteride 5 milliGRAM(s) Oral daily  glucagon  Injectable 1 milliGRAM(s) IntraMuscular once  heparin   Injectable 5000 Unit(s) SubCutaneous every 8 hours  insulin glargine Injectable (LANTUS) 12 Unit(s) SubCutaneous every morning  insulin lispro (ADMELOG) corrective regimen sliding scale   SubCutaneous at bedtime  insulin lispro (ADMELOG) corrective regimen sliding scale   SubCutaneous three times a day before meals  lactobacillus acidophilus 1 Tablet(s) Oral with breakfast  levoFLOXacin  Tablet 750 milliGRAM(s) Oral every 48 hours  levothyroxine 50 MICROGram(s) Oral daily  metoprolol tartrate 25 milliGRAM(s) Oral two times a day  polyethylene glycol 3350 17 Gram(s) Oral two times a day  senna 2 Tablet(s) Oral at bedtime  simvastatin 20 milliGRAM(s) Oral at bedtime  sodium chloride 1 Gram(s) Oral two times a day  tamsulosin 0.4 milliGRAM(s) Oral at bedtime  urea Oral Powder 15 Gram(s) Oral daily    PRN MEDICATIONS  acetaminophen     Tablet .. 650 milliGRAM(s) Oral every 6 hours PRN  aluminum hydroxide/magnesium hydroxide/simethicone Suspension 30 milliLiter(s) Oral every 4 hours PRN  dextrose Oral Gel 15 Gram(s) Oral once PRN  melatonin 3 milliGRAM(s) Oral at bedtime PRN  ondansetron Injectable 4 milliGRAM(s) IV Push every 8 hours PRN                                            ------------------------------------------------------------  VITAL SIGNS: Last 24 Hours  T(C): 36.7 (03 Jul 2023 04:13), Max: 36.7 (03 Jul 2023 04:13)  T(F): 98.1 (03 Jul 2023 04:13), Max: 98.1 (03 Jul 2023 04:13)  HR: 85 (03 Jul 2023 04:13) (70 - 91)  BP: 138/63 (03 Jul 2023 04:13) (113/63 - 139/75)  BP(mean): --  RR: 19 (03 Jul 2023 04:13) (16 - 19)  SpO2: 95% (03 Jul 2023 04:13) (95% - 98%)                                             --------------------------------------------------------------  LABS:                        9.0    15.04 )-----------( 398      ( 03 Jul 2023 07:56 )             29.3     07-02    133<L>  |  100  |  21<H>  ----------------------------<  135<H>  4.5   |  21  |  0.8    Ca    9.0      02 Jul 2023 07:19  Mg     2.1     07-02    TPro  5.5<L>  /  Alb  2.9<L>  /  TBili  0.2  /  DBili  x   /  AST  17  /  ALT  10  /  AlkPhos  86  07-02      Urinalysis Basic - ( 02 Jul 2023 07:19 )    Color: x / Appearance: x / SG: x / pH: x  Gluc: 135 mg/dL / Ketone: x  / Bili: x / Urobili: x   Blood: x / Protein: x / Nitrite: x   Leuk Esterase: x / RBC: x / WBC x   Sq Epi: x / Non Sq Epi: x / Bacteria: x                                                            -------------------------------------------------------------  RADIOLOGY:                                            --------------------------------------------------------------    PHYSICAL EXAM:  PHYSICAL EXAM:  General: Elderly man laying in bed, more fatigued than the last time I saw him, lethargic today  Cardio: Irregular rhythm, regular rate, Decrescendo murmur at apex, S1/S2+  Pulm: Normal breath sounds, No crackles, wheezing, or rhonchi  GI: Soft, NTND  : No CVA or suprapubic tenderness, +left testicular erythema and swelling  Neuro: AxO3, CNII-XII intact, BL UE and LE strength unchanged from before, no sensory level deficits, was slow to responding to questions, occasionally closes eyes as if dosing off  Skin: Intact, no rashes or bruising   Ext: No LE edema                                           --------------------------------------------------------------

## 2023-07-03 NOTE — PROGRESS NOTE ADULT - ASSESSMENT
89M PMHx HTN, DM2, BPH, CAD s/p CABG, hypothyroidism here with hyperkalemia, hyponatremia noted as outpt. Found to have pseudomonas bacteremia.    #Possible TIA vs stroke vs seizure  This morning, patient is able to speak, move b/l arms and legs, stick out tongue, feel light touch BL in head, UE, and LE, move eyes, and raise eyebrows. He was also able to move upper and lower extremities. However, he is slow to answering questions and   *On CTA of head and neck and CT perfusion, no perfusion deficit to suggest ischemic penumbra, no large vessel occlusion, saccular aneurysm, or vascular   malformation, stable atherosclerotic disease of the cervical and intracranial arteries as described above.  *On CT brain stroke protocol there was no acute intracranial pathology and only chronic microvascular ischemic changes  - Pending MR brain  - Pending EEG  - F/u neuro for further recs    #Pseudomonas bacteremia  Patient afebrile, WBC downtrending, patient improving continue monitoring on cefepime  - WBC 14.1 (6/30) -> 12.32 (7/1)  - F/u AM CBC  - recent hidalgo in snf; now out  - bcx pseudomonas 6/24  - UA showing no bacteria but LE positive and   - C/w cefepime 2 g q12h, d/c on levaquin 750 mg q48h as per infectious disease recs  - Ucx, no growth (6/27)  - Bcx NGTD (6/28)  - Pending scrotal US to assess possible left epididimitis from UTI  - Appreciate ID recs    #Hyperkalemia(resolved)  #Hyponatremia  - Serum Na 128 (6/30) -> 132 (7/1)  - Aldosterone normal, renin normal  - Urine Na 39  - Urine Osm 349  - Serum Osm 284  - Urine Cr <4  - FeNa 7.5% (6/28), consider intrinsic renal cause for hyponatremia  - Nephrology recommended uric acid level, start urea 15 g daily  - c/w urea 15 g daily1  - Start salt tabs 1 g bid, if PM Na is less than 132, start salt tabs 2 g bid  - F/u AM BMP  - Appreciate nephrology recs    #HTN  #Moderate to severe AS found TTE on 12/14/22  - c/w metoprolol 25 mg bid, hold if SBP <100, HR <60  - monitor BP    #DM2  -continue lantus 12 with sliding scale    #BPH.   -continue finasteride and tamsulosin    #CAD  -cont plavix and zocor    #MISC  -GI ppx: ppi  -DVT ppx: heparin sq  -Diet: Soft and bite sized  -Activity: IAT  -Code status: DNR/DNI  -Dispo: PT recommend home PT vs rehab 89M PMHx HTN, DM2, BPH, CAD s/p CABG, hypothyroidism here with hyperkalemia, hyponatremia noted as outpt. Found to have pseudomonas bacteremia.    #Possible TIA vs stroke vs seizure  This morning, patient is able to speak, move b/l arms and legs, stick out tongue, feel light touch BL in head, UE, and LE, move eyes, and raise eyebrows. He was also able to move upper and lower extremities. However, he is slow to answering questions and   *On CTA of head and neck and CT perfusion, no perfusion deficit to suggest ischemic penumbra, no large vessel occlusion, saccular aneurysm, or vascular   malformation, stable atherosclerotic disease of the cervical and intracranial arteries as described above.  *On CT brain stroke protocol there was no acute intracranial pathology and only chronic microvascular ischemic changes  - Pending MR brain  - Pending EEG  - F/u neuro for further recs    #Pseudomonas bacteremia  Patient afebrile, WBC downtrending, patient improving continue monitoring on cefepime  - WBC 14.1 (6/30) -> 12.32 (7/1)  - F/u AM CBC  - recent hidalgo in snf; now out  - bcx pseudomonas 6/24  - UA showing no bacteria but LE positive and   - C/w cefepime 2 g q12h, d/c on levaquin 750 mg q48h as per infectious disease recs  - Ucx, no growth (6/27)  - Bcx NGTD (6/28)  - Pending scrotal US to assess possible left epididimitis from UTI  - Appreciate ID recs    #Hyperkalemia(resolved)  #Hyponatremia  - Serum Na 128 (6/30) -> 132 (7/1)  - Aldosterone normal, renin normal  - Urine Na 39  - Urine Osm 349  - Serum Osm 284  - Urine Cr <4  - FeNa 7.5% (6/28), consider intrinsic renal cause for hyponatremia  - Nephrology recommended uric acid level, start urea 15 g daily  - c/w urea 15 g daily1  - Start salt tabs 1 g bid, if PM Na is less than 132, start salt tabs 2 g bid  - F/u AM BMP  - Appreciate nephrology recs    #HTN  #Moderate to severe AS found TTE on 12/14/22  - c/w metoprolol 25 mg bid, hold if SBP <100, HR <60  - monitor BP    #DM2  -continue lantus 12 with sliding scale    #BPH.   -continue finasteride and tamsulosin    #CAD  -cont plavix and zocor    #MISC  -GI ppx: ppi  -DVT ppx: heparin sq  -Diet: Soft and bite sized  -Activity: IAT  -Code status: DNR/DNI  -Dispo: PT recommend home PT vs rehab    Handoff: f/u EEG, ID recommends Zosyn 3.375 q8? 89M PMHx HTN, DM2, BPH, CAD s/p CABG, hypothyroidism here with hyperkalemia, hyponatremia noted as outpt. Found to have pseudomonas bacteremia.    #Possible TIA vs stroke vs seizure  - pt more lethargic 7/3 not responding as much to sternal rubs VS stable however  - stroke code 7/1  *On CTA of head and neck and CT perfusion, no perfusion deficit to suggest ischemic penumbra, no large vessel occlusion, saccular aneurysm, or vascular   malformation, stable atherosclerotic disease of the cervical and intracranial arteries as described above.  *On CT brain stroke protocol there was no acute intracranial pathology and only chronic microvascular ischemic changes  - MR brain normal  - Pending EEG  - F/u neuro for further recs    #Pseudomonas bacteremia  Patient afebrile, WBC downtrending, patient improving continue monitoring on cefepime  - WBC 14.1 (6/30) -> 12.32 (7/1)-->15.04 (7/3)  - F/u AM CBC  - recent hidalgo in snf; now out  - bcx pseudomonas 6/24, bcx 6/28 NGTD  - UA showing no bacteria but LE positive and   - C/w cefepime 2 g q12h, d/c on levaquin 750 mg q48h as per infectious disease recs - now changed to zosyn 3.375 q8 per ID recs  - Ucx, no growth (6/27)  - Pending scrotal US to assess possible left epididimitis from UTI - Complex scrotal mixed echogenicity collection anterior to left testicle measuring approximately 7.7 x 3 x 6.9 cm.  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  - Appreciate ID recs    #Hyperkalemia(resolved)  #Hyponatremia - resolving   - Serum Na 128 (6/30) -> 132 (7/1)--> 134 (7/3)  - Aldosterone normal, renin normal  - Urine Na 39  - Urine Osm 349  - Serum Osm 284  - Urine Cr <4  - FeNa 7.5% (6/28), consider intrinsic renal cause for hyponatremia  - Nephrology recommended uric acid level, start urea 15 g daily  - c/w urea 15 g daily1  - Start salt tabs 1 g bid, if PM Na is less than 132, start salt tabs 2 g bid  - F/u AM BMP  - Appreciate nephrology recs    #HTN  #Moderate to severe AS found TTE on 12/14/22  - c/w metoprolol 25 mg bid, hold if SBP <100, HR <60  - monitor BP    #DM2  -continue lantus 12 with sliding scale    #BPH.   -continue finasteride and tamsulosin    #CAD  -cont plavix and zocor    #MISC  -GI ppx: ppi  -DVT ppx: heparin sq  -Diet: Soft and bite sized  -Activity: IAT  -Code status: DNR/DNI  -Dispo: PT recommend home PT vs rehab    attempted to call family today - no reply will check bedside for family.  Handoff: f/u EEG

## 2023-07-03 NOTE — EEG REPORT - NS EEG TEXT BOX
[facility] Department of Neurology  Inpatient Routine-Electroencephalography Report    Patient Name:	CHEL CROFT    :	3/12/1924  MRN:	-    Study Date/Time:  2023, 9:20:20 PM  Referred by:  select    Brief Clinical History:  CHEL CROFT is a 89 year old Male; study performed to investigate for seizures or markers of epilepsy.    Diagnosis Code: R56.9 convulsions/seizure  CPT:  14713 (awake/sleep)    Pertinent Medications    Acquisition Details:  Electroencephalography was acquired using a minimum of 21 channels on an [employer] system v 9.3.1 with electrode placement according to the standard International 10-20 system following ACNS (American Clinical Neurophysiology Society) guidelines.  Anterior temporal T1 and T2 electrodes were utilized whenever possible.   The XLTEK automated spike & seizure detections were all reviewed in detail, in addition to the entire raw EEG.    Findings:  Background:  continuous, with predominantly alpha and beta frequencies.  Voltage:  Normal (20+ uV)  Organization: Appropriate anterior-posterior gradient.  Posterior Dominant Rhythm:  select symmetric, well-organized, and well-modulated.  Variability: Yes. 		Reactivity: Yes.  N2 sleep: Absent.  Focal abnormalities:    1)	No persistent asymmetries of voltage or frequency.  Spontaneous Activity:    No epileptiform discharges.  Periodic/rhythmic activity:  None  Events:  No electrographic seizures or significant clinical events.  Provocations:  1)	Hyperventilation:  did not evoke EEG abnormalities.  2)	Photic stimulation: did not evoke EEG abnormalities.    Summary:  Normal  inpatient routine EEG study for age, awake and drowsy.  1)	The EEG remained normal throughout the study      Clinical Correlation:  There were no findings of active epilepsy, however this alone does not rule out the diagnosis.           Oneil Styles MD  Attending Neurologist, Division of Epilepsy

## 2023-07-04 LAB
ALBUMIN SERPL ELPH-MCNC: 3 G/DL — LOW (ref 3.5–5.2)
ALP SERPL-CCNC: 80 U/L — SIGNIFICANT CHANGE UP (ref 30–115)
ALT FLD-CCNC: 10 U/L — SIGNIFICANT CHANGE UP (ref 0–41)
ANION GAP SERPL CALC-SCNC: 10 MMOL/L — SIGNIFICANT CHANGE UP (ref 7–14)
AST SERPL-CCNC: 15 U/L — SIGNIFICANT CHANGE UP (ref 0–41)
BILIRUB SERPL-MCNC: <0.2 MG/DL — SIGNIFICANT CHANGE UP (ref 0.2–1.2)
BUN SERPL-MCNC: 34 MG/DL — HIGH (ref 10–20)
CALCIUM SERPL-MCNC: 9.3 MG/DL — SIGNIFICANT CHANGE UP (ref 8.4–10.5)
CHLORIDE SERPL-SCNC: 100 MMOL/L — SIGNIFICANT CHANGE UP (ref 98–110)
CO2 SERPL-SCNC: 23 MMOL/L — SIGNIFICANT CHANGE UP (ref 17–32)
CREAT SERPL-MCNC: 1 MG/DL — SIGNIFICANT CHANGE UP (ref 0.7–1.5)
EGFR: 68 ML/MIN/1.73M2 — SIGNIFICANT CHANGE UP
GLUCOSE BLDC GLUCOMTR-MCNC: 103 MG/DL — HIGH (ref 70–99)
GLUCOSE BLDC GLUCOMTR-MCNC: 230 MG/DL — HIGH (ref 70–99)
GLUCOSE BLDC GLUCOMTR-MCNC: 231 MG/DL — HIGH (ref 70–99)
GLUCOSE BLDC GLUCOMTR-MCNC: 253 MG/DL — HIGH (ref 70–99)
GLUCOSE SERPL-MCNC: 217 MG/DL — HIGH (ref 70–99)
HCT VFR BLD CALC: 27 % — LOW (ref 42–52)
HGB BLD-MCNC: 8.4 G/DL — LOW (ref 14–18)
MAGNESIUM SERPL-MCNC: 1.8 MG/DL — SIGNIFICANT CHANGE UP (ref 1.8–2.4)
MCHC RBC-ENTMCNC: 26.3 PG — LOW (ref 27–31)
MCHC RBC-ENTMCNC: 31.1 G/DL — LOW (ref 32–37)
MCV RBC AUTO: 84.6 FL — SIGNIFICANT CHANGE UP (ref 80–94)
NRBC # BLD: 0 /100 WBCS — SIGNIFICANT CHANGE UP (ref 0–0)
PLATELET # BLD AUTO: 366 K/UL — SIGNIFICANT CHANGE UP (ref 130–400)
PMV BLD: 9.2 FL — SIGNIFICANT CHANGE UP (ref 7.4–10.4)
POTASSIUM SERPL-MCNC: 4.4 MMOL/L — SIGNIFICANT CHANGE UP (ref 3.5–5)
POTASSIUM SERPL-SCNC: 4.4 MMOL/L — SIGNIFICANT CHANGE UP (ref 3.5–5)
PROT SERPL-MCNC: 5.3 G/DL — LOW (ref 6–8)
RBC # BLD: 3.19 M/UL — LOW (ref 4.7–6.1)
RBC # FLD: 16.7 % — HIGH (ref 11.5–14.5)
SODIUM SERPL-SCNC: 133 MMOL/L — LOW (ref 135–146)
WBC # BLD: 13.5 K/UL — HIGH (ref 4.8–10.8)
WBC # FLD AUTO: 13.5 K/UL — HIGH (ref 4.8–10.8)

## 2023-07-04 PROCEDURE — 99232 SBSQ HOSP IP/OBS MODERATE 35: CPT

## 2023-07-04 PROCEDURE — 99233 SBSQ HOSP IP/OBS HIGH 50: CPT

## 2023-07-04 RX ORDER — PIPERACILLIN AND TAZOBACTAM 4; .5 G/20ML; G/20ML
3.38 INJECTION, POWDER, LYOPHILIZED, FOR SOLUTION INTRAVENOUS EVERY 8 HOURS
Refills: 0 | Status: DISCONTINUED | OUTPATIENT
Start: 2023-07-04 | End: 2023-07-05

## 2023-07-04 RX ORDER — PIPERACILLIN AND TAZOBACTAM 4; .5 G/20ML; G/20ML
3.38 INJECTION, POWDER, LYOPHILIZED, FOR SOLUTION INTRAVENOUS ONCE
Refills: 0 | Status: DISCONTINUED | OUTPATIENT
Start: 2023-07-04 | End: 2023-07-04

## 2023-07-04 RX ORDER — LEVETIRACETAM 250 MG/1
500 TABLET, FILM COATED ORAL
Refills: 0 | Status: DISCONTINUED | OUTPATIENT
Start: 2023-07-04 | End: 2023-07-06

## 2023-07-04 RX ORDER — LEVETIRACETAM 250 MG/1
1000 TABLET, FILM COATED ORAL ONCE
Refills: 0 | Status: COMPLETED | OUTPATIENT
Start: 2023-07-04 | End: 2023-07-04

## 2023-07-04 RX ORDER — LEVETIRACETAM 250 MG/1
1000 TABLET, FILM COATED ORAL
Refills: 0 | Status: DISCONTINUED | OUTPATIENT
Start: 2023-07-04 | End: 2023-07-04

## 2023-07-04 RX ADMIN — Medication 25 MILLIGRAM(S): at 18:33

## 2023-07-04 RX ADMIN — Medication 325 MILLIGRAM(S): at 05:43

## 2023-07-04 RX ADMIN — LEVETIRACETAM 1000 MILLIGRAM(S): 250 TABLET, FILM COATED ORAL at 05:42

## 2023-07-04 RX ADMIN — HEPARIN SODIUM 5000 UNIT(S): 5000 INJECTION INTRAVENOUS; SUBCUTANEOUS at 22:29

## 2023-07-04 RX ADMIN — PIPERACILLIN AND TAZOBACTAM 25 GRAM(S): 4; .5 INJECTION, POWDER, LYOPHILIZED, FOR SOLUTION INTRAVENOUS at 22:30

## 2023-07-04 RX ADMIN — SODIUM CHLORIDE 1 GRAM(S): 9 INJECTION INTRAMUSCULAR; INTRAVENOUS; SUBCUTANEOUS at 05:43

## 2023-07-04 RX ADMIN — SIMVASTATIN 20 MILLIGRAM(S): 20 TABLET, FILM COATED ORAL at 23:23

## 2023-07-04 RX ADMIN — SENNA PLUS 2 TABLET(S): 8.6 TABLET ORAL at 22:29

## 2023-07-04 RX ADMIN — LEVETIRACETAM 400 MILLIGRAM(S): 250 TABLET, FILM COATED ORAL at 01:12

## 2023-07-04 RX ADMIN — Medication 50 MICROGRAM(S): at 05:43

## 2023-07-04 RX ADMIN — POLYETHYLENE GLYCOL 3350 17 GRAM(S): 17 POWDER, FOR SOLUTION ORAL at 18:32

## 2023-07-04 RX ADMIN — Medication 2: at 08:06

## 2023-07-04 RX ADMIN — Medication 1 TABLET(S): at 08:07

## 2023-07-04 RX ADMIN — CLOPIDOGREL BISULFATE 75 MILLIGRAM(S): 75 TABLET, FILM COATED ORAL at 11:40

## 2023-07-04 RX ADMIN — HEPARIN SODIUM 5000 UNIT(S): 5000 INJECTION INTRAVENOUS; SUBCUTANEOUS at 05:43

## 2023-07-04 RX ADMIN — SODIUM CHLORIDE 1 GRAM(S): 9 INJECTION INTRAMUSCULAR; INTRAVENOUS; SUBCUTANEOUS at 18:33

## 2023-07-04 RX ADMIN — FINASTERIDE 5 MILLIGRAM(S): 5 TABLET, FILM COATED ORAL at 11:41

## 2023-07-04 RX ADMIN — Medication 25 MILLIGRAM(S): at 05:42

## 2023-07-04 RX ADMIN — Medication 325 MILLIGRAM(S): at 18:33

## 2023-07-04 RX ADMIN — INSULIN GLARGINE 12 UNIT(S): 100 INJECTION, SOLUTION SUBCUTANEOUS at 08:05

## 2023-07-04 RX ADMIN — LEVETIRACETAM 500 MILLIGRAM(S): 250 TABLET, FILM COATED ORAL at 18:33

## 2023-07-04 RX ADMIN — DULOXETINE HYDROCHLORIDE 60 MILLIGRAM(S): 30 CAPSULE, DELAYED RELEASE ORAL at 11:40

## 2023-07-04 RX ADMIN — HEPARIN SODIUM 5000 UNIT(S): 5000 INJECTION INTRAVENOUS; SUBCUTANEOUS at 13:06

## 2023-07-04 RX ADMIN — Medication 15 GRAM(S): at 11:41

## 2023-07-04 RX ADMIN — Medication 3: at 12:21

## 2023-07-04 RX ADMIN — TAMSULOSIN HYDROCHLORIDE 0.4 MILLIGRAM(S): 0.4 CAPSULE ORAL at 22:29

## 2023-07-04 NOTE — PROGRESS NOTE ADULT - ASSESSMENT
Impression:  89 yo M w pmh of CABG, HTN, DM2, hypothyroidism was admitted for electrolyte derangement 8 days ago, and after appropriate correction he was preparing for the d/c. 7/2 he stopped talking but was able to follow commands only with eyes. Stroke code was called and during the neuroimaging he started to talk and moves his arms. Due to rapidly improved NIHSS from 10 to his baseline 4 -  TNK not administered. EEG revealed multifocal cortical hyper excitability with epileptogenic potential diffusely.  Started on Keppra 1000 mg q12 hours. Treated for pseudomonas bacteremia and hyponatremia.        Recommendations:   - Follow up VEEG  - MRI brain w/o contrast  - Keep magnesium >2  - Seizure precautions  - Medical management of infectious process and electrolyte derangement.   - avoid neurotoxic antibiotics   Impression:  89 yo M w pmh of CABG, HTN, DM2, hypothyroidism was admitted for electrolyte derangement 8 days ago, and after appropriate correction he was preparing for the d/c. 7/2 he stopped talking but was able to follow commands only with eyes. Stroke code was called and during the neuroimaging he started to talk and moves his arms. Due to rapidly improved NIHSS from 10 to his baseline 4 -  TNK not administered. EEG revealed multifocal cortical hyper excitability with epileptogenic potential diffusely.  Started on Keppra 1000 mg q12 hours. Treated for pseudomonas bacteremia and hyponatremia.        Recommendations:   - VEEG  - MRI brain w/o contrast  - Keep magnesium >2  - Seizure precautions  - Medical management of infectious process and electrolyte derangement.   - avoid neurotoxic antibiotics   Impression:  89 yo M w pmh of CABG, HTN, DM2, hypothyroidism was admitted for electrolyte derangement 8 days ago, and after appropriate correction he was preparing for the d/c. 7/2 he stopped talking but was able to follow commands only with eyes. Stroke code was called and during the neuroimaging he started to talk and moves his arms. Due to rapidly improved NIHSS from 10 to his baseline 4 -  TNK not administered. MRI brain negative for acute stroke. EEG revealed multifocal cortical hyper excitability with epileptogenic potential diffusely. Started on Keppra 1000 mg q12 hours. Treated for pseudomonas bacteremia and hyponatremia.        Recommendations:   - VEEG  - Keep magnesium >2  - Seizure precautions  - Medical management of infectious process and electrolyte derangement.   - avoid neurotoxic antibiotics   Impression:  89 yo M w pmh of CABG, HTN, DM2, hypothyroidism was admitted for electrolyte derangement 8 days ago, and after appropriate correction he was preparing for the d/c. 7/2 he stopped talking but was able to follow commands only with eyes. Stroke code was called and during the neuroimaging he started to talk and moves his arms. Due to rapidly improved NIHSS from 10 to his baseline 4 -  TNK not administered. MRI brain negative for acute stroke. EEG revealed multifocal cortical hyper excitability with epileptogenic potential diffusely. Started on Keppra 1000 mg q12 hours. Treated for pseudomonas bacteremia and hyponatremia.        Recommendations:   - VEEG  - Keep magnesium >2  - Seizure precautions  - Medical management of infectious process and electrolyte derangement.   - avoid neurotoxic antibiotics  - decrease keppra to 500 mg BID

## 2023-07-04 NOTE — PROGRESS NOTE ADULT - ATTENDING COMMENTS
89 y/o Male with  PMHx HTN, DM2, BPH, CAD s/p CABG, hypothyroidism, recently dx. from [facility] , progressive ambulatory dysfunction, admitted   with hyperkalemia, hyponatremia noted as outpt. Found to have pseudomonas bacteremia.    #Code stroke (7/1):   Around 4:45 pm family and nurse noted that patient was aphasic.  Not following commands. Able to mimic actions but apparently not comprehending spoken words.   Neuro evaluated patient at bedside. Bedside EKG, Vitals WNL.   Stat CT Head, CTA H&N, CT perfusion performed.   7/2:This morning, patient is able to speak, move b/l arms and legs, stick out tongue, feel light touch BL in head, UE, and LE, move eyes, and raise eyebrows. He was also able to move upper and lower extremities. However, he is slow to answering questions. which is new c/f baseline per family.   *On CTA of head and neck and CT perfusion, no perfusion deficit to suggest ischemic penumbra, no large vessel occlusion, saccular aneurysm, or vascular   malformation, stable atherosclerotic disease of the cervical and intracranial arteries as described above.  *On CT brain stroke protocol there was no acute intracranial pathology and only chronic microvascular ischemic changes  Was probably a TIA.   - MR brain WNL  - EEG Done- 2nd EEG shows generalized slowing + Multifocal epileptic potentials. Started Keppra 1000 Q12. Neuro further wants VEEG now.   - F/u neuro for further recs  Neurochecks Q6H while on this floor.    On 7/3 morning - was very hard to arouse even with painful stimuli. But in afternoon, with family, patient is spontaneously awake and saying "I want to go home". Explained to family that apparently his mental status is waxing & Waning. Will f/u EEG. Keep off cefepime. changed to Zosyn per ID. May d/c abx by around 7/5 if ID agrees.     7/4: Doing VEEG as above.       #Bacteremia due to Pseudomonas.   # Leukocytosis   #in setting of recent hidalgo in snf; now out; possible uti  bcxs pseudomonas 6/24, sensitive to Cefepime , ID on board. changed as above.  ua positive; f/u ucxs- more than 3 organisms, repeat urine cxs neg , repeat blood cxs 6/26, 28 Neg.   - Cont  IV cefepime (eventual LVQ; end date 7/4 per ID) > From 7/3: change to PO  Q48. In case AMS could be 2/2 Cefepime.  US Scrotum (for epididymitis) : Complex scrotal mixed echogenicity collection anterior to left testicle measuring approximately 7.7 x 3 x 6.9 cm. 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    #Hyperkalemia resolved  # Hypomagnesemia- supplemented     #hyponatremia- Na 126 on admission > lately 130ish.    Cr normal.  Per Nephro eval, likely [facility]? Urea 15gm QD,  Uric Acid = 4.5. No need to restrict po free water.     Na 130 > 128. started 1gm NaCal BID > 130 > 130 > 133> 134..   Stable. Improving.    #constipated: Senna, Miralax.     #HTN (hypertension). currently borderline Hypotensive   -continue  lopressor 25mg PO twice daily with holding parameters.     #DM2 (diabetes mellitus, type 2)- uncontrolled, Hga1C- 9  lantus 12  ssi.    #History of BPH. - continue finasteride 5 mg po once daily /tamsulosin 0.4 mg po once daily at bedtime     #h/o CAD (coronary artery disease). / h/o CABG  - plavix/zocor tx.  -patient is on supplemental oxygen via [place], 2 L , on RA sat 91- 92% at rest     #Hypothyroidism. continue synthroid 50mcg po once daily, obtain TSH level    # Iron def. anemia- started on PO Iron tx.     # Ambulatory dysfunction, physical decline- eventual Home PT    DVT ppX, heparin  DNR/DNI (MOLST signed 6/29)    #Progress Note Handoff:  PT eval noted. Home w/ HCS (Pending Na improvement, Neuro w/u.

## 2023-07-04 NOTE — EEG REPORT - NS EEG TEXT BOX
[facility] Department of Neurology  Inpatient Routine-Electroencephalography Report    Patient Name:	CHEL CROFT    :	3/12/1924  MRN:	-    Study Date/Time:  7/3/2023, 3:33:36 PM  Referred by:  select    Brief Clinical History:  CHEL CROFT is a 89 year old Male; study performed to investigate for seizures or markers of epilepsy.    Diagnosis Code: R56.9 convulsions/seizure  CPT:  56231 (awake/sleep)    Pertinent Medications    Acquisition Details:  Electroencephalography was acquired using a minimum of 21 channels on an [employer] Neurology system v 9.3.1 with electrode placement according to the standard International 10-20 system following ACNS (American Clinical Neurophysiology Society) guidelines.  Anterior temporal T1 and T2 electrodes were utilized whenever possible.   The XLTEK automated spike & seizure detections were all reviewed in detail, in addition to the entire raw EEG.    Findings:  Background:  continuous, with predominantly diffuse sharp theta, with varying amplitudes, mixed with rhythmic delta  , and superimposed beta  frequencies with occasional intermittent diffuse background attenuation.   Voltage: Varying 20-50uV,   Organization: Less organized,   Posterior Dominant Rhythm:  Mostly drowsy. Brief periods of arousal with alpha mixed with delta and theta.   Variability: Yes. 		Reactivity: Yes.  N2 sleep: Absent.  Focal abnormalities:    1)	Frequent (10-49%) bilateral independent and bisynchronous, epileptiform sharp waves, sometimes in runs, diffusely, occasionally with tri-phasic morphology.   Spontaneous Activity:    Frequent multifocal bilateral independent and bisynchronous epileptiform sharp waves, sometimes in runs, with shifting asymmetries.   Periodic/rhythmic activity:  Frequent, diffusely. Mixed theta, delta, with superimposed beta.     Events:  Frequent intermittent diffuse abrupt muscle artifact, on occasions associated with rhythmic high amplitude sharp theta.     Provocations:  1)	Hyperventilation:  did not evoke EEG abnormalities.  2)	Photic stimulation: did not evoke EEG abnormalities.      Summary:  Abnormal inpatient routine EEG study, awake and drowsy.    1)	The EEG demonstrated generalized background slowing     2)	The EEG demonstrated multifocal cortical hyper excitability with epileptogenic potential diffusely.    3)	The presence of diffuse sharp theta activity      Clinical Correlation:  Generalized background slowing is consistent with diffuse cortical dysfunction but is nonspecific as to etiology.  Multifocal cortical hyperecitability with epileptic potential.  The presence of diffuse slowing mixed with tri-phasic sharp activity suggests also possible metabolic encephalopathy.     Clinical correlation recommended.     Discussed with neurology resident on call.       Oneil Styles MD  Attending Neurologist, Division of Epilepsy

## 2023-07-04 NOTE — CHART NOTE - NSCHARTNOTEFT_GEN_A_CORE
Registered Dietitian Follow-Up  Patient Profile Reviewed                           Yes [x]   No []  Nutrition History Previously Obtained        Yes []  No [x]      Pertinent Medical Interventions:  89M PMHx HTN, DM2, BPH, CAD s/p CABG, hypothyroidism here with hyperkalemia, hyponatremia noted as outpt. Found to have pseudomonas bacteremia.  #Possible TIA vs stroke vs seizure  - pt more lethargic 7/3 not responding as much to sternal rubs VS stable however  - stroke code 7/1  Start salt tabs 1 g bid, if PM Na is less than 132, start salt tabs 2 g bid    Nutrition Interval History:   28-Jun-2023 SLP evaluation soft and bite sized, mildly thick liquids  crush medication (when feasible); position upright (90 degrees); small sips/bites  frequent cues/help required  1:1 feed    Subjective Information:  patient with poor PO intake has been refusing meals, <25% intake   was initially having adequate intake though decreased since admission   Nutrient Intake: Patient meeting <50% of estimated energy needs in-house     Diet order:   Diet, Soft and Bite Sized:   Consistent Carbohydrate {No Snacks}  Mildly Thick Liquids (MILDTHICKLIQS)  Free Water Flush Instructions:  please add 2 packets of thickener per ensure max  Supplement Feeding Modality:  Oral  Ensure Max Cans or Servings Per Day:  3       Frequency:  Daily (07-04-23 @ 09:27) [Active]    Anthropometrics:  152.4 Centimeter(s)  120.8 lb/54.8 kg 6/24/23  23.5kg/m2  IBW: 48.2kg  UBW unknown    Previous admission weights  79kg on 3/31/23 suspect scale errors  68kg on 12/20/22 -- 19.4% x7mo, clinically significant   73.5kg 2/11/22    MEDICATIONS  (STANDING):  clopidogrel Tablet 75 milliGRAM(s) Oral daily  dextrose 5%. 1000 milliLiter(s) (50 mL/Hr) IV Continuous <Continuous>  dextrose 5%. 1000 milliLiter(s) (100 mL/Hr) IV Continuous <Continuous>  dextrose 50% Injectable 25 Gram(s) IV Push once  dextrose 50% Injectable 25 Gram(s) IV Push once  dextrose 50% Injectable 12.5 Gram(s) IV Push once  DULoxetine 60 milliGRAM(s) Oral daily  ferrous    sulfate 325 milliGRAM(s) Oral two times a day  finasteride 5 milliGRAM(s) Oral daily  glucagon  Injectable 1 milliGRAM(s) IntraMuscular once  heparin   Injectable 5000 Unit(s) SubCutaneous every 8 hours  insulin glargine Injectable (LANTUS) 12 Unit(s) SubCutaneous every morning  insulin lispro (ADMELOG) corrective regimen sliding scale   SubCutaneous at bedtime  insulin lispro (ADMELOG) corrective regimen sliding scale   SubCutaneous three times a day before meals  lactobacillus acidophilus 1 Tablet(s) Oral with breakfast  levETIRAcetam 1000 milliGRAM(s) Oral two times a day  levothyroxine 50 MICROGram(s) Oral daily  metoprolol tartrate 25 milliGRAM(s) Oral two times a day  piperacillin/tazobactam IVPB.. 3.375 Gram(s) IV Intermittent every 8 hours  polyethylene glycol 3350 17 Gram(s) Oral two times a day  senna 2 Tablet(s) Oral at bedtime  simvastatin 20 milliGRAM(s) Oral at bedtime  sodium chloride 1 Gram(s) Oral two times a day  tamsulosin 0.4 milliGRAM(s) Oral at bedtime  urea Oral Powder 15 Gram(s) Oral daily    MEDICATIONS  (PRN):  acetaminophen     Tablet .. 650 milliGRAM(s) Oral every 6 hours PRN Temp greater or equal to 38C (100.4F), Mild Pain (1 - 3)  aluminum hydroxide/magnesium hydroxide/simethicone Suspension 30 milliLiter(s) Oral every 4 hours PRN Dyspepsia  dextrose Oral Gel 15 Gram(s) Oral once PRN Blood Glucose LESS THAN 70 milliGRAM(s)/deciliter  melatonin 3 milliGRAM(s) Oral at bedtime PRN Insomnia  ondansetron Injectable 4 milliGRAM(s) IV Push every 8 hours PRN Nausea and/or Vomiting    Pertinent Labs: 07-04 @ 08:35: Na 133<L>, BUN 34<H>, Cr 1.0, <H>, K+ 4.4, Phos --, Mg 1.8, Alk Phos 80, ALT/SGPT 10, AST/SGOT 15, HbA1c --    Finger Sticks:  POCT Blood Glucose.: 253 mg/dL (07-04 @ 12:07)  POCT Blood Glucose.: 231 mg/dL (07-04 @ 07:59)  POCT Blood Glucose.: 239 mg/dL (07-03 @ 21:40)  POCT Blood Glucose.: 219 mg/dL (07-03 @ 21:15)  POCT Blood Glucose.: 238 mg/dL (07-03 @ 17:10)    Physical Findings:  - Cognition: moderate muscle clavicle & temple depletion  - GI function: last bowel movement 6/30 x6  - Tubes: n/a  - Oral/Mouth cavity: soft and bite size mild thick liquids  - Skin: documented stage 2 to L buttocks per flow sheets vs. WOCN assessment 6/26 patient with stage 2 healing pressure injury R buttock  - Edema: no edema noted in flow sheets      Nutrition Requirements:   Weight Used: 54.8kg with consideration for age, BMI, weight loss pta, malnutrition, wound healing  ENERGY: 5540-3562 kcal/day (MSJ x 1.2-1.5)  PROTEIN: 71-88 g/day (1.3-1.6 g/kg)  FLUID: 1mL/kcal     [x] Previous Nutrition Diagnosis:            [x] Ongoing          [] Resolved  #1 malnutrition   Goal/Expected Outcome: meet >/=75% est energy needs within 3-5 days  Nutrition Intervention: Meals and Snacks, Medical Food Supplement, Vitamin Supplement, Nutrition Related Medication, Coordination of Care  Indicator/Monitoring:  Monitor diet order, energy intake, food and nutrient intake, body composition, weight    Recommendations:  -continue soft and bite size and mild thick liquids per SLP evaluation  -would suggest to remove diet restrictions ( consistent carbohydrate diet and dash) to optimize PO intake by not restricting tray options  -switch oral supplements to ensure high protein 3x/day with 2 thickener packets, 350kcal, 20g protein per supplement-- higher calorie/ protein supplement considering poor PO intake requiring supplement that will better help meet patients needs. will adjust to glucerna as able when PO intake returns to baseline due to glucerna will not provide enough kcal/pro to meet patients needs.  -bowel regimen to promote regular bowel movements  -ferous sulfate  -adjust insulin regimen to promote euglycemia   -provide 1:1 feeding assistance, maintain aspiration precautions  -rec zinc 220mg/daily x10-14d, ascorbic acid 500mg/daily, MV w/ minerals daily for wound healing    Amargjosé Miranda, #[id number] or via TEAMS  Patient is at HIGH Risk, follow up x 3-5days Registered Dietitian Follow-Up  Patient Profile Reviewed                           Yes [x]   No []  Nutrition History Previously Obtained        Yes []  No [x]      Pertinent Medical Interventions:  89M PMHx HTN, DM2, BPH, CAD s/p CABG, hypothyroidism here with hyperkalemia, hyponatremia noted as outpt. Found to have pseudomonas bacteremia.  #Possible TIA vs stroke vs seizure  - pt more lethargic 7/3 not responding as much to sternal rubs VS stable however  - stroke code 7/1  Start salt tabs 1 g bid, if PM Na is less than 132, start salt tabs 2 g bid    History prior to admission: obtained by daughter who lives with patient  - baseline fair PO intake though ~2 weeks prior to admission decline in PO due to change in mental status  - no known food allergies, no food restrictions at baseline eats regular diet/thin liquids with dentures, takes vitamins D, B12, C, MV  - supplements with ensure 1x/day at home    Nutrition Interval History:   28-Jun-2023 SLP evaluation soft and bite sized, mildly thick liquids  crush medication (when feasible); position upright (90 degrees); small sips/bites  frequent cues/help required  1:1 feed    Subjective Information:  patient with poor PO intake has been refusing meals, <25% intake recently. patient endorses various food preferences/ dislike for foods. ate a few bites at lunch @ time of visit and 25% of ensure. reports that he's been having 1 ensure/daily on average. daughter endorses slightly improving mental status and is optimistic about intake improving as well. we discussed soft and bite size textured foods and mild thick liquids.  Nutrient Intake: Patient meeting <50% of estimated energy needs in-house     Diet order:   Diet, Soft and Bite Sized:   Consistent Carbohydrate {No Snacks}  Mildly Thick Liquids (MILDTHICKLIQS)  Free Water Flush Instructions:  please add 2 packets of thickener per ensure max  Supplement Feeding Modality:  Oral  Ensure Max Cans or Servings Per Day:  3       Frequency:  Daily (07-04-23 @ 09:27) [Active]    Anthropometrics:  152.4 Centimeter(s)  120.8 lb/54.8 kg 6/24/23  23.5kg/m2  IBW: 48.2kg  UBW 68.2kg per daughters report     Previous admission weights  79kg on 3/31/23 suspect scale error  68kg on 12/20/22 -- 19.4% x7mo to current weight of 54.8kg, clinically significant   73.5kg 2/11/22    MEDICATIONS  (STANDING):  clopidogrel Tablet 75 milliGRAM(s) Oral daily  dextrose 5%. 1000 milliLiter(s) (50 mL/Hr) IV Continuous <Continuous>  dextrose 5%. 1000 milliLiter(s) (100 mL/Hr) IV Continuous <Continuous>  dextrose 50% Injectable 25 Gram(s) IV Push once  dextrose 50% Injectable 25 Gram(s) IV Push once  dextrose 50% Injectable 12.5 Gram(s) IV Push once  DULoxetine 60 milliGRAM(s) Oral daily  ferrous    sulfate 325 milliGRAM(s) Oral two times a day  finasteride 5 milliGRAM(s) Oral daily  glucagon  Injectable 1 milliGRAM(s) IntraMuscular once  heparin   Injectable 5000 Unit(s) SubCutaneous every 8 hours  insulin glargine Injectable (LANTUS) 12 Unit(s) SubCutaneous every morning  insulin lispro (ADMELOG) corrective regimen sliding scale   SubCutaneous at bedtime  insulin lispro (ADMELOG) corrective regimen sliding scale   SubCutaneous three times a day before meals  lactobacillus acidophilus 1 Tablet(s) Oral with breakfast  levETIRAcetam 1000 milliGRAM(s) Oral two times a day  levothyroxine 50 MICROGram(s) Oral daily  metoprolol tartrate 25 milliGRAM(s) Oral two times a day  piperacillin/tazobactam IVPB.. 3.375 Gram(s) IV Intermittent every 8 hours  polyethylene glycol 3350 17 Gram(s) Oral two times a day  senna 2 Tablet(s) Oral at bedtime  simvastatin 20 milliGRAM(s) Oral at bedtime  sodium chloride 1 Gram(s) Oral two times a day  tamsulosin 0.4 milliGRAM(s) Oral at bedtime  urea Oral Powder 15 Gram(s) Oral daily    MEDICATIONS  (PRN):  acetaminophen     Tablet .. 650 milliGRAM(s) Oral every 6 hours PRN Temp greater or equal to 38C (100.4F), Mild Pain (1 - 3)  aluminum hydroxide/magnesium hydroxide/simethicone Suspension 30 milliLiter(s) Oral every 4 hours PRN Dyspepsia  dextrose Oral Gel 15 Gram(s) Oral once PRN Blood Glucose LESS THAN 70 milliGRAM(s)/deciliter  melatonin 3 milliGRAM(s) Oral at bedtime PRN Insomnia  ondansetron Injectable 4 milliGRAM(s) IV Push every 8 hours PRN Nausea and/or Vomiting    Pertinent Labs: 07-04 @ 08:35: Na 133<L>, BUN 34<H>, Cr 1.0, <H>, K+ 4.4, Phos --, Mg 1.8, Alk Phos 80, ALT/SGPT 10, AST/SGOT 15, HbA1c --    Finger Sticks:  POCT Blood Glucose.: 253 mg/dL (07-04 @ 12:07)  POCT Blood Glucose.: 231 mg/dL (07-04 @ 07:59)  POCT Blood Glucose.: 239 mg/dL (07-03 @ 21:40)  POCT Blood Glucose.: 219 mg/dL (07-03 @ 21:15)  POCT Blood Glucose.: 238 mg/dL (07-03 @ 17:10)    Physical Findings:  - Cognition: moderate muscle clavicle & temple depletion  - GI function: WNL   - Tubes: n/a  - Oral/Mouth cavity: soft and bite size mild thick liquids  - Skin: documented stage 2 to L buttocks per flow sheets vs. WOCN assessment 6/26 patient with stage 2 healing pressure injury R buttock  - Edema: no edema noted in flow sheets      Nutrition Requirements:   Weight Used: 54.8kg with consideration for age, BMI, weight loss pta, malnutrition, wound healing  ENERGY: 1496-1959 kcal/day (MSJ x 1.2-1.5)  PROTEIN: 71-88 g/day (1.3-1.6 g/kg)  FLUID: 1mL/kcal     [x] Previous Nutrition Diagnosis:            [x] Ongoing          [] Resolved  #1 malnutrition   Goal/Expected Outcome: meet >/=75% est energy needs within 3-5 days  Nutrition Intervention: Meals and Snacks, Medical Food Supplement, Vitamin Supplement, Nutrition Related Medication, Coordination of Care  Indicator/Monitoring:  Monitor diet order, energy intake, food and nutrient intake, body composition, weight    Recommendations:  -continue soft and bite size and mild thick liquids per SLP evaluation  -would suggest to remove diet restrictions ( consistent carbohydrate diet and dash) to optimize PO intake by not restricting additional tray options considering his food preferences  -switch oral supplements to ensure high protein 2x/day with 2 thickener packets, 350kcal, 20g protein per supplement-- higher calorie/ protein supplement considering poor PO intake requiring supplement that will better help meet patients needs. will adjust to glucerna as able when PO intake returns to baseline due to glucerna will not provide enough kcal/pro to meet patients needs.  -also add Magic Cup 1x/day with dinner meal 290kcal, 9g protein   -bowel regimen to promote regular bowel movements  -ferous sulfate  -adjust insulin regimen to promote euglycemia   -provide 1:1 feeding assistance, maintain aspiration precautions  -rec zinc 220mg/daily x10-14d, ascorbic acid 500mg/daily, MV w/ minerals daily for wound healing    Delonte Miranda, #[id number] or via TEAMS  Patient is at HIGH Risk, follow up x 3-5days

## 2023-07-04 NOTE — PROGRESS NOTE ADULT - ASSESSMENT
89M PMHx HTN, DM2, BPH, CAD s/p CABG, hypothyroidism here with hyperkalemia, hyponatremia noted as outpt. Found to have pseudomonas bacteremia.    #Possible TIA vs stroke vs seizure  - pt more lethargic 7/3 not responding as much to sternal rubs VS stable however  - stroke code 7/1  *On CTA of head and neck and CT perfusion, no perfusion deficit to suggest ischemic penumbra, no large vessel occlusion, saccular aneurysm, or vascular   malformation, stable atherosclerotic disease of the cervical and intracranial arteries as described above.  *On CT brain stroke protocol there was no acute intracranial pathology and only chronic microvascular ischemic changes  - MR brain normal  - Pending EEG  - F/u neuro for further recs    #Pseudomonas bacteremia  Patient afebrile, WBC downtrending, patient improving continue monitoring on cefepime  - WBC 14.1 (6/30) -> 12.32 (7/1)-->15.04 (7/3)  - F/u AM CBC  - recent hidalgo in snf; now out  - bcx pseudomonas 6/24, bcx 6/28 NGTD  - UA showing no bacteria but LE positive and   - C/w cefepime 2 g q12h, d/c on levaquin 750 mg q48h as per infectious disease recs - now changed to zosyn 3.375 q8 per ID recs  - Ucx, no growth (6/27)  - Pending scrotal US to assess possible left epididimitis from UTI - Complex scrotal mixed echogenicity collection anterior to left testicle measuring approximately 7.7 x 3 x 6.9 cm.  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  - Appreciate ID recs    #Hyperkalemia(resolved)  #Hyponatremia - resolving   - Serum Na 128 (6/30) -> 132 (7/1)--> 134 (7/3)  - Aldosterone normal, renin normal  - Urine Na 39  - Urine Osm 349  - Serum Osm 284  - Urine Cr <4  - FeNa 7.5% (6/28), consider intrinsic renal cause for hyponatremia  - Nephrology recommended uric acid level, start urea 15 g daily  - c/w urea 15 g daily1  - Start salt tabs 1 g bid, if PM Na is less than 132, start salt tabs 2 g bid  - F/u AM BMP  - Appreciate nephrology recs    #HTN  #Moderate to severe AS found TTE on 12/14/22  - c/w metoprolol 25 mg bid, hold if SBP <100, HR <60  - monitor BP    #DM2  -continue lantus 12 with sliding scale    #BPH.   -continue finasteride and tamsulosin    #CAD  -cont plavix and zocor    #MISC  -GI ppx: ppi  -DVT ppx: heparin sq  -Diet: Soft and bite sized  -Activity: IAT  -Code status: DNR/DNI  -Dispo: PT recommend home PT vs rehab    attempted to call family today - no reply will check bedside for family.  Handoff: f/u EEG      89M PMHx HTN, DM2, BPH, CAD s/p CABG, hypothyroidism here with hyperkalemia, hyponatremia noted as outpt. Found to have pseudomonas bacteremia.    #Possible TIA vs stroke vs seizure  - pt more lethargic 7/3 not responding as much to sternal rubs VS stable however  - stroke code 7/1  *On CTA of head and neck and CT perfusion, no perfusion deficit to suggest ischemic penumbra, no large vessel occlusion, saccular aneurysm, or vascular   malformation, stable atherosclerotic disease of the cervical and intracranial arteries as described above.  *On CT brain stroke protocol there was no acute intracranial pathology and only chronic microvascular ischemic changes  - MR brain normal  - EEG + epileptiform activity, neuro recs appreciated  - f/u VEEG today  - Keep magnesium >2    #Pseudomonas bacteremia  Patient afebrile, WBC downtrending, patient improving continue monitoring on cefepime  - WBC 14.1 (6/30) -> 12.32 (7/1)-->15.04 (7/3)  - F/u AM CBC  - recent hidalgo in snf; now out  - bcx pseudomonas 6/24, bcx 6/28 NGTD  - UA showing no bacteria but LE positive and   - C/w cefepime 2 g q12h, d/c on levaquin 750 mg q48h as per infectious disease recs - now changed to zosyn 3.375 q8 per ID recs  - Ucx, no growth (6/27)  - Pending scrotal US to assess possible left epididimitis from UTI - Complex scrotal mixed echogenicity collection anterior to left testicle measuring approximately 7.7 x 3 x 6.9 cm.  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  - Appreciate ID recs    #Hyperkalemia(resolved)  #Hyponatremia - resolving   - Serum Na 128 (6/30) -> 132 (7/1)--> 134 (7/3)  - Aldosterone normal, renin normal  - Urine Na 39  - Urine Osm 349  - Serum Osm 284  - Urine Cr <4  - FeNa 7.5% (6/28), consider intrinsic renal cause for hyponatremia  - Nephrology recommended uric acid level, start urea 15 g daily  - c/w urea 15 g daily1  - Start salt tabs 1 g bid, if PM Na is less than 132, start salt tabs 2 g bid  - F/u AM BMP  - Appreciate nephrology recs    #HTN  #Moderate to severe AS found TTE on 12/14/22  - c/w metoprolol 25 mg bid, hold if SBP <100, HR <60  - monitor BP    #DM2  -continue lantus 12 with sliding scale    #BPH.   -continue finasteride and tamsulosin    #CAD  -cont plavix and zocor    #MISC  -GI ppx: ppi  -DVT ppx: heparin sq  -Diet: Soft and bite sized  -Activity: IAT  -Code status: DNR/DNI  -Dispo: PT recommend home PT vs rehab         89M PMHx HTN, DM2, BPH, CAD s/p CABG, hypothyroidism here with hyperkalemia, hyponatremia noted as outpt. Found to have pseudomonas bacteremia.    #Possible TIA vs stroke vs seizure  - pt more lethargic 7/3 not responding as much to sternal rubs VS stable however  - stroke code 7/1  *On CTA of head and neck and CT perfusion, no perfusion deficit to suggest ischemic penumbra, no large vessel occlusion, saccular aneurysm, or vascular   malformation, stable atherosclerotic disease of the cervical and intracranial arteries as described above.  *On CT brain stroke protocol there was no acute intracranial pathology and only chronic microvascular ischemic changes  - MR brain normal  - EEG + epileptiform activity, neuro recs appreciated  - f/u VEEG today  - Keep magnesium >2  - Keppra 500mg 2x day    #Pseudomonas bacteremia  Patient afebrile, WBC downtrending, patient improving continue monitoring on cefepime  - WBC 14.1 (6/30) -> 12.32 (7/1)-->15.04 (7/3)  - F/u AM CBC  - recent hidalgo in snf; now out  - bcx pseudomonas 6/24, bcx 6/28 NGTD  - UA showing no bacteria but LE positive and   - C/w cefepime 2 g q12h, d/c on levaquin 750 mg q48h as per infectious disease recs - now changed to zosyn 3.375 q8 per ID recs  - Ucx, no growth (6/27)  - Pending scrotal US to assess possible left epididimitis from UTI - Complex scrotal mixed echogenicity collection anterior to left testicle measuring approximately 7.7 x 3 x 6.9 cm.  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  - Appreciate ID recs    #Hyperkalemia(resolved)  #Hyponatremia - resolving   - Serum Na 128 (6/30) -> 132 (7/1)--> 134 (7/3)  - Aldosterone normal, renin normal  - Urine Na 39  - Urine Osm 349  - Serum Osm 284  - Urine Cr <4  - FeNa 7.5% (6/28), consider intrinsic renal cause for hyponatremia  - Nephrology recommended uric acid level, start urea 15 g daily  - c/w urea 15 g daily1  - Start salt tabs 1 g bid, if PM Na is less than 132, start salt tabs 2 g bid  - F/u AM BMP  - Appreciate nephrology recs    #HTN  #Moderate to severe AS found TTE on 12/14/22  - c/w metoprolol 25 mg bid, hold if SBP <100, HR <60  - monitor BP    #DM2  -continue lantus 12 with sliding scale    #BPH.   -continue finasteride and tamsulosin    #CAD  -cont plavix and zocor    #MISC  -GI ppx: ppi  -DVT ppx: heparin sq  -Diet: Soft and bite sized  -Activity: IAT  -Code status: DNR/DNI  -Dispo: PT recommend home PT vs rehab

## 2023-07-04 NOTE — CHART NOTE - NSCHARTNOTEFT_GEN_A_CORE
REEG results discussed with epilepsy attending Dr Jensen Jeter. Results reveal significant diffuse Epileptiform discharges. Examined patient at bedside. His neuro exam is unchanged , he is a/o x2 following commands at this time. Discussed with neurology attending on call Dr Lilli Thomas, recommends keppra 1 gram loading and  then keppra 1 gram bid and VEEG REEG results discussed with epilepsy attending Dr Jensen Jeter. Results reveal significant diffuse Epileptiform discharges. Examined patient at bedside. His neuro exam is unchanged , he is a/o x2 following commands at this time. Discussed with neurology attending on call Dr Lilli Thomas, recommends keppra 1 gram loading and  then keppra 1 gram bid and VEEG. Seizure/fall precautions.

## 2023-07-04 NOTE — PROGRESS NOTE ADULT - SUBJECTIVE AND OBJECTIVE BOX
Neurology Progress Note    Interval History:    89 yo M with hx of HTN, HLD, DM II BPH and CAD s/p CABG x 4, Hypothyroidism presents to ED for abnormal labs result (hyperkalemia and hyponatremia). Patient is a poor historian, however patient's daughter was at bedside for collateral. Per the daughter, patient was recently admitted for fall and discharged to [facility]. When patient was discharged from [facility] he did not have a hidalgo in. Per the daughter at the nursing home patient had a hidalgo catheter in despite family requesting removal of it multiple times. Patient was discharged from [facility] on May 26th. Per the daughter patient was on antibiotics at [facility] for UTI, but she is unsure which antibiotic it was.     Since discharge patient's daughter says the patient has been extremely lethargic and weak. His urine has been milky, dark since May 26th when he was discharged from [facility]. Over the past week patient has become incontinent, no able to make it to the restroom, and complaining that he has pain with urination. On Wednesday a doctor came to the house to examine the patient, at that time he ordered lab tests, which the patient had preformed today. Labs were significant for Na 126 and a K TNP. They were instructed to come straight to the ED.     In the ED labs were significant for a Na 131, K+ 5.1, Hgb 9.9 (up from previous admission), and a WBC count of 18K.     Vital Signs Last 24 Hrs  T(F): 98.7 (23 Jun 2023 17:00), Max: 98.7 (23 Jun 2023 17:00)  HR: 95 (23 Jun 2023 19:30) (80 - 95)  BP: 157/78 (23 Jun 2023 19:30) (121/60 - 157/78)  BP(mean): --  RR: 20 (23 Jun 2023 19:30) (16 - 20)  SpO2: 99% (23 Jun 2023 19:30) (96% - 99%)  Patient On (Oxygen Delivery Method): nasal cannula  O2 Flow (L/min): 2    UA is positive for many bacteria >720 WBC, moderate blood and large leukocyte esterase.  (23 Jun 2023 21:23)        Vital Signs Last 24 Hrs  T(C): 36.2 (04 Jul 2023 05:00), Max: 36.8 (03 Jul 2023 21:00)  T(F): 97.2 (04 Jul 2023 05:00), Max: 98.2 (03 Jul 2023 21:00)  HR: 91 (04 Jul 2023 07:48) (91 - 110)  BP: 110/57 (04 Jul 2023 05:00) (107/51 - 115/55)  BP(mean): --  RR: 17 (04 Jul 2023 07:48) (16 - 18)  SpO2: 97% (04 Jul 2023 07:48) (97% - 98%)    Parameters below as of 04 Jul 2023 07:48  Patient On (Oxygen Delivery Method): room air        Neurological Exam:   Patient is at hour of sleep  Patient lethargic following commands  Hypophonic, mouthing his name and location when asked  Holds bilateral UE up for 10 seconds without drift  Flexes bilateral LE symmetrically  Without obvious seizure activity    Medications:  MEDICATIONS  (STANDING):  clopidogrel Tablet 75 milliGRAM(s) Oral daily  dextrose 5%. 1000 milliLiter(s) (100 mL/Hr) IV Continuous <Continuous>  dextrose 5%. 1000 milliLiter(s) (50 mL/Hr) IV Continuous <Continuous>  dextrose 50% Injectable 25 Gram(s) IV Push once  dextrose 50% Injectable 25 Gram(s) IV Push once  dextrose 50% Injectable 12.5 Gram(s) IV Push once  DULoxetine 60 milliGRAM(s) Oral daily  ferrous    sulfate 325 milliGRAM(s) Oral two times a day  finasteride 5 milliGRAM(s) Oral daily  glucagon  Injectable 1 milliGRAM(s) IntraMuscular once  heparin   Injectable 5000 Unit(s) SubCutaneous every 8 hours  insulin glargine Injectable (LANTUS) 12 Unit(s) SubCutaneous every morning  insulin lispro (ADMELOG) corrective regimen sliding scale   SubCutaneous at bedtime  insulin lispro (ADMELOG) corrective regimen sliding scale   SubCutaneous three times a day before meals  lactobacillus acidophilus 1 Tablet(s) Oral with breakfast  levETIRAcetam 1000 milliGRAM(s) Oral two times a day  levoFLOXacin  Tablet 750 milliGRAM(s) Oral every 48 hours  levothyroxine 50 MICROGram(s) Oral daily  metoprolol tartrate 25 milliGRAM(s) Oral two times a day  polyethylene glycol 3350 17 Gram(s) Oral two times a day  senna 2 Tablet(s) Oral at bedtime  simvastatin 20 milliGRAM(s) Oral at bedtime  sodium chloride 1 Gram(s) Oral two times a day  tamsulosin 0.4 milliGRAM(s) Oral at bedtime  urea Oral Powder 15 Gram(s) Oral daily      Labs:  CBC Full  -  ( 04 Jul 2023 08:35 )  WBC Count : 13.50 K/uL  RBC Count : 3.19 M/uL  Hemoglobin : 8.4 g/dL  Hematocrit : 27.0 %  Platelet Count - Automated : 366 K/uL  Mean Cell Volume : 84.6 fL  Mean Cell Hemoglobin : 26.3 pg  Mean Cell Hemoglobin Concentration : 31.1 g/dL  Auto Neutrophil # : x  Auto Lymphocyte # : x  Auto Monocyte # : x  Auto Eosinophil # : x  Auto Basophil # : x  Auto Neutrophil % : x  Auto Lymphocyte % : x  Auto Monocyte % : x  Auto Eosinophil % : x  Auto Basophil % : x    07-03    134<L>  |  103  |  35<H>  ----------------------------<  206<H>  4.5   |  21  |  1.0    Ca    9.3      03 Jul 2023 07:56  Mg     1.8     07-03    TPro  5.3<L>  /  Alb  2.7<L>  /  TBili  <0.2  /  DBili  x   /  AST  17  /  ALT  10  /  AlkPhos  85  07-03    LIVER FUNCTIONS - ( 03 Jul 2023 07:56 )  Alb: 2.7 g/dL / Pro: 5.3 g/dL / ALK PHOS: 85 U/L / ALT: 10 U/L / AST: 17 U/L / GGT: x             Urinalysis Basic - ( 03 Jul 2023 07:56 )    Color: x / Appearance: x / SG: x / pH: x  Gluc: 206 mg/dL / Ketone: x  / Bili: x / Urobili: x   Blood: x / Protein: x / Nitrite: x   Leuk Esterase: x / RBC: x / WBC x   Sq Epi: x / Non Sq Epi: x / Bacteria: x      Focal abnormalities:    1)	Frequent (10-49%) bilateral independent and bisynchronous, epileptiform sharp waves, sometimes in runs, diffusely, occasionally with tri-phasic morphology.   Spontaneous Activity:    Frequent multifocal bilateral independent and bisynchronous epileptiform sharp waves, sometimes in runs, with shifting asymmetries.   Periodic/rhythmic activity:  Frequent, diffusely. Mixed theta, delta, with superimposed beta.     Events:  Frequent intermittent diffuse abrupt muscle artifact, on occasions associated with rhythmic high amplitude sharp theta.     Provocations:  1)	Hyperventilation:  did not evoke EEG abnormalities.  2)	Photic stimulation: did not evoke EEG abnormalities.      Summary:  Abnormal inpatient routine EEG study, awake and drowsy.    1)	The EEG demonstrated generalized background slowing     2)	The EEG demonstrated multifocal cortical hyper excitability with epileptogenic potential diffusely. Neurology Progress Note    Interval History:  Pt awake and alert.  Conversant but hypophonic.  Follows commands.  Appears lethargic.     89 yo M with hx of HTN, HLD, DM II BPH and CAD s/p CABG x 4, Hypothyroidism presents to ED for abnormal labs result (hyperkalemia and hyponatremia). Patient is a poor historian, however patient's daughter was at bedside for collateral. Per the daughter, patient was recently admitted for fall and discharged to [facility]. When patient was discharged from [facility] he did not have a hidalgo in. Per the daughter at the nursing home patient had a hidalgo catheter in despite family requesting removal of it multiple times. Patient was discharged from [facility] on May 26th. Per the daughter patient was on antibiotics at [facility] for UTI, but she is unsure which antibiotic it was.     Since discharge patient's daughter says the patient has been extremely lethargic and weak. His urine has been milky, dark since May 26th when he was discharged from [facility]. Over the past week patient has become incontinent, no able to make it to the restroom, and complaining that he has pain with urination. On Wednesday a doctor came to the house to examine the patient, at that time he ordered lab tests, which the patient had preformed today. Labs were significant for Na 126 and a K TNP. They were instructed to come straight to the ED.     In the ED labs were significant for a Na 131, K+ 5.1, Hgb 9.9 (up from previous admission), and a WBC count of 18K.     Vital Signs Last 24 Hrs  T(F): 98.7 (23 Jun 2023 17:00), Max: 98.7 (23 Jun 2023 17:00)  HR: 95 (23 Jun 2023 19:30) (80 - 95)  BP: 157/78 (23 Jun 2023 19:30) (121/60 - 157/78)  BP(mean): --  RR: 20 (23 Jun 2023 19:30) (16 - 20)  SpO2: 99% (23 Jun 2023 19:30) (96% - 99%)  Patient On (Oxygen Delivery Method): nasal cannula  O2 Flow (L/min): 2    UA is positive for many bacteria >720 WBC, moderate blood and large leukocyte esterase.  (23 Jun 2023 21:23)    Vital Signs Last 24 Hrs  T(C): 36.2 (04 Jul 2023 05:00), Max: 36.8 (03 Jul 2023 21:00)  T(F): 97.2 (04 Jul 2023 05:00), Max: 98.2 (03 Jul 2023 21:00)  HR: 91 (04 Jul 2023 07:48) (91 - 110)  BP: 110/57 (04 Jul 2023 05:00) (107/51 - 115/55)  BP(mean): --  RR: 17 (04 Jul 2023 07:48) (16 - 18)  SpO2: 97% (04 Jul 2023 07:48) (97% - 98%)    Parameters below as of 04 Jul 2023 07:48  Patient On (Oxygen Delivery Method): room air    Neurological Exam:   NAD, AOX2 (person, place, age).    Patient lethargic following commands  Hypophonic, mouthing his name and location when asked  Holds bilateral UE up for 10 seconds without drift  Flexes bilateral LE symmetrically  Without obvious seizure activity    Medications:  MEDICATIONS  (STANDING):  clopidogrel Tablet 75 milliGRAM(s) Oral daily  dextrose 5%. 1000 milliLiter(s) (100 mL/Hr) IV Continuous <Continuous>  dextrose 5%. 1000 milliLiter(s) (50 mL/Hr) IV Continuous <Continuous>  dextrose 50% Injectable 25 Gram(s) IV Push once  dextrose 50% Injectable 25 Gram(s) IV Push once  dextrose 50% Injectable 12.5 Gram(s) IV Push once  DULoxetine 60 milliGRAM(s) Oral daily  ferrous    sulfate 325 milliGRAM(s) Oral two times a day  finasteride 5 milliGRAM(s) Oral daily  glucagon  Injectable 1 milliGRAM(s) IntraMuscular once  heparin   Injectable 5000 Unit(s) SubCutaneous every 8 hours  insulin glargine Injectable (LANTUS) 12 Unit(s) SubCutaneous every morning  insulin lispro (ADMELOG) corrective regimen sliding scale   SubCutaneous at bedtime  insulin lispro (ADMELOG) corrective regimen sliding scale   SubCutaneous three times a day before meals  lactobacillus acidophilus 1 Tablet(s) Oral with breakfast  levETIRAcetam 1000 milliGRAM(s) Oral two times a day  levoFLOXacin  Tablet 750 milliGRAM(s) Oral every 48 hours  levothyroxine 50 MICROGram(s) Oral daily  metoprolol tartrate 25 milliGRAM(s) Oral two times a day  polyethylene glycol 3350 17 Gram(s) Oral two times a day  senna 2 Tablet(s) Oral at bedtime  simvastatin 20 milliGRAM(s) Oral at bedtime  sodium chloride 1 Gram(s) Oral two times a day  tamsulosin 0.4 milliGRAM(s) Oral at bedtime  urea Oral Powder 15 Gram(s) Oral daily      Labs:  CBC Full  -  ( 04 Jul 2023 08:35 )  WBC Count : 13.50 K/uL  RBC Count : 3.19 M/uL  Hemoglobin : 8.4 g/dL  Hematocrit : 27.0 %  Platelet Count - Automated : 366 K/uL  Mean Cell Volume : 84.6 fL  Mean Cell Hemoglobin : 26.3 pg  Mean Cell Hemoglobin Concentration : 31.1 g/dL    07-03    134<L>  |  103  |  35<H>  ----------------------------<  206<H>  4.5   |  21  |  1.0    Ca    9.3      03 Jul 2023 07:56  Mg     1.8     07-03    TPro  5.3<L>  /  Alb  2.7<L>  /  TBili  <0.2  /  DBili  x   /  AST  17  /  ALT  10  /  AlkPhos  85  07-03    LIVER FUNCTIONS - ( 03 Jul 2023 07:56 )  Alb: 2.7 g/dL / Pro: 5.3 g/dL / ALK PHOS: 85 U/L / ALT: 10 U/L / AST: 17 U/L / GGT: x             Urinalysis Basic - ( 03 Jul 2023 07:56 )    Color: x / Appearance: x / SG: x / pH: x  Gluc: 206 mg/dL / Ketone: x  / Bili: x / Urobili: x   Blood: x / Protein: x / Nitrite: x   Leuk Esterase: x / RBC: x / WBC x   Sq Epi: x / Non Sq Epi: x / Bacteria: x      Focal abnormalities:    1)	Frequent (10-49%) bilateral independent and bisynchronous, epileptiform sharp waves, sometimes in runs, diffusely, occasionally with tri-phasic morphology.   Spontaneous Activity:    Frequent multifocal bilateral independent and bisynchronous epileptiform sharp waves, sometimes in runs, with shifting asymmetries.   Periodic/rhythmic activity:  Frequent, diffusely. Mixed theta, delta, with superimposed beta.     Events:  Frequent intermittent diffuse abrupt muscle artifact, on occasions associated with rhythmic high amplitude sharp theta.     Provocations:  1)	Hyperventilation:  did not evoke EEG abnormalities.  2)	Photic stimulation: did not evoke EEG abnormalities.    Summary:  Abnormal inpatient routine EEG study, awake and drowsy.    1)	The EEG demonstrated generalized background slowing     2)	The EEG demonstrated multifocal cortical hyper excitability with epileptogenic potential diffusely.

## 2023-07-04 NOTE — PROGRESS NOTE ADULT - SUBJECTIVE AND OBJECTIVE BOX
CHEL RCOFT 89y Male  MRN#: [id number]     Hospital Day: 11d    Pt is currently admitted with the primary diagnosis of  Hyperkalemia        SUBJECTIVE     Overnight events  None    Subjective complaints  Pt was evaluated this am. Patient denied any active complaints and per patient his symptoms are improving                                            ----------------------------------------------------------  OBJECTIVE  PAST MEDICAL & SURGICAL HISTORY  HTN (hypertension)    HLD (hyperlipidemia)    BPH (benign prostatic hyperplasia)    CAD (coronary artery disease)    Diabetes mellitus    Hypothyroidism    S/P CABG (coronary artery bypass graft)                                              -----------------------------------------------------------  ALLERGIES:  No Known Allergies                                            ------------------------------------------------------------    HOME MEDICATIONS  Home Medications:  DULoxetine 60 mg oral delayed release capsule: 1 cap(s) orally once a day (13 Dec 2022 22:03)  finasteride 5 mg oral tablet: 1 tab(s) orally once a day (13 Dec 2022 22:03)  lactobacillus acidophilus oral capsule: 1 tab(s) orally once a day (20 Dec 2022 10:18)  levothyroxine 50 mcg (0.05 mg) oral tablet: 1 tab(s) orally once a day (13 Dec 2022 22:03)  Metoprolol Tartrate 25 mg oral tablet: 1 tab(s) orally 2 times a day (13 Dec 2022 22:03)  Plavix 75 mg oral tablet: 1 orally once a day (23 Jun 2023 21:54)  simvastatin 20 mg oral tablet: 1 tab(s) orally once a day (at bedtime) (13 Dec 2022 22:03)  tamsulosin 0.4 mg oral capsule: 1 cap(s) orally once a day (13 Dec 2022 22:03)  Trent Davidson 300 units/mL subcutaneous solution: 12 unit(s) subcutaneous once a day (in the morning) (13 Dec 2022 22:03)                           MEDICATIONS:  STANDING MEDICATIONS  clopidogrel Tablet 75 milliGRAM(s) Oral daily  dextrose 5%. 1000 milliLiter(s) IV Continuous <Continuous>  dextrose 5%. 1000 milliLiter(s) IV Continuous <Continuous>  dextrose 50% Injectable 25 Gram(s) IV Push once  dextrose 50% Injectable 25 Gram(s) IV Push once  dextrose 50% Injectable 12.5 Gram(s) IV Push once  DULoxetine 60 milliGRAM(s) Oral daily  ferrous    sulfate 325 milliGRAM(s) Oral two times a day  finasteride 5 milliGRAM(s) Oral daily  glucagon  Injectable 1 milliGRAM(s) IntraMuscular once  heparin   Injectable 5000 Unit(s) SubCutaneous every 8 hours  insulin glargine Injectable (LANTUS) 12 Unit(s) SubCutaneous every morning  insulin lispro (ADMELOG) corrective regimen sliding scale   SubCutaneous at bedtime  insulin lispro (ADMELOG) corrective regimen sliding scale   SubCutaneous three times a day before meals  lactobacillus acidophilus 1 Tablet(s) Oral with breakfast  levETIRAcetam 1000 milliGRAM(s) Oral two times a day  levoFLOXacin  Tablet 750 milliGRAM(s) Oral every 48 hours  levothyroxine 50 MICROGram(s) Oral daily  metoprolol tartrate 25 milliGRAM(s) Oral two times a day  polyethylene glycol 3350 17 Gram(s) Oral two times a day  senna 2 Tablet(s) Oral at bedtime  simvastatin 20 milliGRAM(s) Oral at bedtime  sodium chloride 1 Gram(s) Oral two times a day  tamsulosin 0.4 milliGRAM(s) Oral at bedtime  urea Oral Powder 15 Gram(s) Oral daily    PRN MEDICATIONS  acetaminophen     Tablet .. 650 milliGRAM(s) Oral every 6 hours PRN  aluminum hydroxide/magnesium hydroxide/simethicone Suspension 30 milliLiter(s) Oral every 4 hours PRN  dextrose Oral Gel 15 Gram(s) Oral once PRN  melatonin 3 milliGRAM(s) Oral at bedtime PRN  ondansetron Injectable 4 milliGRAM(s) IV Push every 8 hours PRN                                            ------------------------------------------------------------  VITAL SIGNS: Last 24 Hours  T(C): 36.2 (04 Jul 2023 05:00), Max: 36.8 (03 Jul 2023 21:00)  T(F): 97.2 (04 Jul 2023 05:00), Max: 98.2 (03 Jul 2023 21:00)  HR: 94 (04 Jul 2023 05:00) (76 - 110)  BP: 110/57 (04 Jul 2023 05:00) (107/51 - 155/70)  BP(mean): --  RR: 18 (04 Jul 2023 05:00) (16 - 19)  SpO2: 98% (03 Jul 2023 21:00) (98% - 98%)      07-03-23 @ 07:01  -  07-04-23 @ 07:00  --------------------------------------------------------  IN: 0 mL / OUT: 225 mL / NET: -225 mL                                             --------------------------------------------------------------  LABS:                        9.0    15.04 )-----------( 398      ( 03 Jul 2023 07:56 )             29.3     07-03    134<L>  |  103  |  35<H>  ----------------------------<  206<H>  4.5   |  21  |  1.0    Ca    9.3      03 Jul 2023 07:56  Mg     1.8     07-03    TPro  5.3<L>  /  Alb  2.7<L>  /  TBili  <0.2  /  DBili  x   /  AST  17  /  ALT  10  /  AlkPhos  85  07-03      Urinalysis Basic - ( 03 Jul 2023 07:56 )    Color: x / Appearance: x / SG: x / pH: x  Gluc: 206 mg/dL / Ketone: x  / Bili: x / Urobili: x   Blood: x / Protein: x / Nitrite: x   Leuk Esterase: x / RBC: x / WBC x   Sq Epi: x / Non Sq Epi: x / Bacteria: x                                                            -------------------------------------------------------------  RADIOLOGY:                                            --------------------------------------------------------------    PHYSICAL EXAM:  GENERAL: NAD, lying in bed comfortably  HEAD:  Atraumatic, Normocephalic  EYES: EOMI, conjunctiva and sclera clear  ENT: Moist mucous membranes  NECK: Supple, No JVD  CHEST/LUNG: Clear to auscultation bilaterally; No rales, rhonchi, wheezing, or rubs. Unlabored respirations  HEART: regular rate and rhythm; No murmurs, rubs, or gallops  ABDOMEN: Bowel sounds present; Soft, Nontender, Nondistended.    EXTREMITIES: Warm. No clubbing, cyanosis, or edema  NERVOUS SYSTEM:  Alert & Oriented X3. No focal deficits   SKIN: No rashes or lesions                                           --------------------------------------------------------------                 CHEL CROFT 89y Male  MRN#: [id number]     Hospital Day: 11d    Pt is currently admitted with the primary diagnosis of  Hyperkalemia        SUBJECTIVE     Overnight events  None    Subjective complaints  Pt was evaluated this am. Patient denied any active complaints and per patient his symptoms are improving                                            ----------------------------------------------------------  OBJECTIVE  PAST MEDICAL & SURGICAL HISTORY  HTN (hypertension)    HLD (hyperlipidemia)    BPH (benign prostatic hyperplasia)    CAD (coronary artery disease)    Diabetes mellitus    Hypothyroidism    S/P CABG (coronary artery bypass graft)                                              -----------------------------------------------------------  ALLERGIES:  No Known Allergies                                            ------------------------------------------------------------    HOME MEDICATIONS  Home Medications:  DULoxetine 60 mg oral delayed release capsule: 1 cap(s) orally once a day (13 Dec 2022 22:03)  finasteride 5 mg oral tablet: 1 tab(s) orally once a day (13 Dec 2022 22:03)  lactobacillus acidophilus oral capsule: 1 tab(s) orally once a day (20 Dec 2022 10:18)  levothyroxine 50 mcg (0.05 mg) oral tablet: 1 tab(s) orally once a day (13 Dec 2022 22:03)  Metoprolol Tartrate 25 mg oral tablet: 1 tab(s) orally 2 times a day (13 Dec 2022 22:03)  Plavix 75 mg oral tablet: 1 orally once a day (23 Jun 2023 21:54)  simvastatin 20 mg oral tablet: 1 tab(s) orally once a day (at bedtime) (13 Dec 2022 22:03)  tamsulosin 0.4 mg oral capsule: 1 cap(s) orally once a day (13 Dec 2022 22:03)  Trent Davidson 300 units/mL subcutaneous solution: 12 unit(s) subcutaneous once a day (in the morning) (13 Dec 2022 22:03)                           MEDICATIONS:  STANDING MEDICATIONS  clopidogrel Tablet 75 milliGRAM(s) Oral daily  dextrose 5%. 1000 milliLiter(s) IV Continuous <Continuous>  dextrose 5%. 1000 milliLiter(s) IV Continuous <Continuous>  dextrose 50% Injectable 25 Gram(s) IV Push once  dextrose 50% Injectable 25 Gram(s) IV Push once  dextrose 50% Injectable 12.5 Gram(s) IV Push once  DULoxetine 60 milliGRAM(s) Oral daily  ferrous    sulfate 325 milliGRAM(s) Oral two times a day  finasteride 5 milliGRAM(s) Oral daily  glucagon  Injectable 1 milliGRAM(s) IntraMuscular once  heparin   Injectable 5000 Unit(s) SubCutaneous every 8 hours  insulin glargine Injectable (LANTUS) 12 Unit(s) SubCutaneous every morning  insulin lispro (ADMELOG) corrective regimen sliding scale   SubCutaneous at bedtime  insulin lispro (ADMELOG) corrective regimen sliding scale   SubCutaneous three times a day before meals  lactobacillus acidophilus 1 Tablet(s) Oral with breakfast  levETIRAcetam 1000 milliGRAM(s) Oral two times a day  levoFLOXacin  Tablet 750 milliGRAM(s) Oral every 48 hours  levothyroxine 50 MICROGram(s) Oral daily  metoprolol tartrate 25 milliGRAM(s) Oral two times a day  polyethylene glycol 3350 17 Gram(s) Oral two times a day  senna 2 Tablet(s) Oral at bedtime  simvastatin 20 milliGRAM(s) Oral at bedtime  sodium chloride 1 Gram(s) Oral two times a day  tamsulosin 0.4 milliGRAM(s) Oral at bedtime  urea Oral Powder 15 Gram(s) Oral daily    PRN MEDICATIONS  acetaminophen     Tablet .. 650 milliGRAM(s) Oral every 6 hours PRN  aluminum hydroxide/magnesium hydroxide/simethicone Suspension 30 milliLiter(s) Oral every 4 hours PRN  dextrose Oral Gel 15 Gram(s) Oral once PRN  melatonin 3 milliGRAM(s) Oral at bedtime PRN  ondansetron Injectable 4 milliGRAM(s) IV Push every 8 hours PRN                                            ------------------------------------------------------------  VITAL SIGNS: Last 24 Hours  T(C): 36.2 (04 Jul 2023 05:00), Max: 36.8 (03 Jul 2023 21:00)  T(F): 97.2 (04 Jul 2023 05:00), Max: 98.2 (03 Jul 2023 21:00)  HR: 94 (04 Jul 2023 05:00) (76 - 110)  BP: 110/57 (04 Jul 2023 05:00) (107/51 - 155/70)  BP(mean): --  RR: 18 (04 Jul 2023 05:00) (16 - 19)  SpO2: 98% (03 Jul 2023 21:00) (98% - 98%)      07-03-23 @ 07:01  -  07-04-23 @ 07:00  --------------------------------------------------------  IN: 0 mL / OUT: 225 mL / NET: -225 mL                                             --------------------------------------------------------------  LABS:                        9.0    15.04 )-----------( 398      ( 03 Jul 2023 07:56 )             29.3     07-03    134<L>  |  103  |  35<H>  ----------------------------<  206<H>  4.5   |  21  |  1.0    Ca    9.3      03 Jul 2023 07:56  Mg     1.8     07-03    TPro  5.3<L>  /  Alb  2.7<L>  /  TBili  <0.2  /  DBili  x   /  AST  17  /  ALT  10  /  AlkPhos  85  07-03      Urinalysis Basic - ( 03 Jul 2023 07:56 )    Color: x / Appearance: x / SG: x / pH: x  Gluc: 206 mg/dL / Ketone: x  / Bili: x / Urobili: x   Blood: x / Protein: x / Nitrite: x   Leuk Esterase: x / RBC: x / WBC x   Sq Epi: x / Non Sq Epi: x / Bacteria: x                                                            -------------------------------------------------------------  RADIOLOGY:                                            --------------------------------------------------------------    PHYSICAL EXAM:  General: Elderly man laying in bed, more fatigued than the last time I saw him, lethargic today  Cardio: Irregular rhythm, regular rate, Decrescendo murmur at apex, S1/S2+  Pulm: Normal breath sounds, No crackles, wheezing, or rhonchi  GI: Soft, NTND  : No CVA or suprapubic tenderness, +left testicular erythema and swelling  Neuro: AxO3, CNII-XII intact, BL UE and LE strength unchanged from before, no sensory level deficits, was slow to responding to questions, occasionally closes eyes as if dosing off  Skin: Intact, no rashes or bruising   Ext: No LE edema                                           --------------------------------------------------------------                 CHEL CROFT 89y Male  MRN#: [id number]     Hospital Day: 11d    Pt is currently admitted with the primary diagnosis of  Hyperkalemia        SUBJECTIVE     Overnight events  None                                              ----------------------------------------------------------  OBJECTIVE  PAST MEDICAL & SURGICAL HISTORY  HTN (hypertension)    HLD (hyperlipidemia)    BPH (benign prostatic hyperplasia)    CAD (coronary artery disease)    Diabetes mellitus    Hypothyroidism    S/P CABG (coronary artery bypass graft)                                              -----------------------------------------------------------  ALLERGIES:  No Known Allergies                                            ------------------------------------------------------------    HOME MEDICATIONS  Home Medications:  DULoxetine 60 mg oral delayed release capsule: 1 cap(s) orally once a day (13 Dec 2022 22:03)  finasteride 5 mg oral tablet: 1 tab(s) orally once a day (13 Dec 2022 22:03)  lactobacillus acidophilus oral capsule: 1 tab(s) orally once a day (20 Dec 2022 10:18)  levothyroxine 50 mcg (0.05 mg) oral tablet: 1 tab(s) orally once a day (13 Dec 2022 22:03)  Metoprolol Tartrate 25 mg oral tablet: 1 tab(s) orally 2 times a day (13 Dec 2022 22:03)  Plavix 75 mg oral tablet: 1 orally once a day (23 Jun 2023 21:54)  simvastatin 20 mg oral tablet: 1 tab(s) orally once a day (at bedtime) (13 Dec 2022 22:03)  tamsulosin 0.4 mg oral capsule: 1 cap(s) orally once a day (13 Dec 2022 22:03)  Toujeo SoloStar 300 units/mL subcutaneous solution: 12 unit(s) subcutaneous once a day (in the morning) (13 Dec 2022 22:03)                           MEDICATIONS:  STANDING MEDICATIONS  clopidogrel Tablet 75 milliGRAM(s) Oral daily  dextrose 5%. 1000 milliLiter(s) IV Continuous <Continuous>  dextrose 5%. 1000 milliLiter(s) IV Continuous <Continuous>  dextrose 50% Injectable 25 Gram(s) IV Push once  dextrose 50% Injectable 25 Gram(s) IV Push once  dextrose 50% Injectable 12.5 Gram(s) IV Push once  DULoxetine 60 milliGRAM(s) Oral daily  ferrous    sulfate 325 milliGRAM(s) Oral two times a day  finasteride 5 milliGRAM(s) Oral daily  glucagon  Injectable 1 milliGRAM(s) IntraMuscular once  heparin   Injectable 5000 Unit(s) SubCutaneous every 8 hours  insulin glargine Injectable (LANTUS) 12 Unit(s) SubCutaneous every morning  insulin lispro (ADMELOG) corrective regimen sliding scale   SubCutaneous at bedtime  insulin lispro (ADMELOG) corrective regimen sliding scale   SubCutaneous three times a day before meals  lactobacillus acidophilus 1 Tablet(s) Oral with breakfast  levETIRAcetam 1000 milliGRAM(s) Oral two times a day  levoFLOXacin  Tablet 750 milliGRAM(s) Oral every 48 hours  levothyroxine 50 MICROGram(s) Oral daily  metoprolol tartrate 25 milliGRAM(s) Oral two times a day  polyethylene glycol 3350 17 Gram(s) Oral two times a day  senna 2 Tablet(s) Oral at bedtime  simvastatin 20 milliGRAM(s) Oral at bedtime  sodium chloride 1 Gram(s) Oral two times a day  tamsulosin 0.4 milliGRAM(s) Oral at bedtime  urea Oral Powder 15 Gram(s) Oral daily    PRN MEDICATIONS  acetaminophen     Tablet .. 650 milliGRAM(s) Oral every 6 hours PRN  aluminum hydroxide/magnesium hydroxide/simethicone Suspension 30 milliLiter(s) Oral every 4 hours PRN  dextrose Oral Gel 15 Gram(s) Oral once PRN  melatonin 3 milliGRAM(s) Oral at bedtime PRN  ondansetron Injectable 4 milliGRAM(s) IV Push every 8 hours PRN                                            ------------------------------------------------------------  VITAL SIGNS: Last 24 Hours  T(C): 36.2 (04 Jul 2023 05:00), Max: 36.8 (03 Jul 2023 21:00)  T(F): 97.2 (04 Jul 2023 05:00), Max: 98.2 (03 Jul 2023 21:00)  HR: 94 (04 Jul 2023 05:00) (76 - 110)  BP: 110/57 (04 Jul 2023 05:00) (107/51 - 155/70)  BP(mean): --  RR: 18 (04 Jul 2023 05:00) (16 - 19)  SpO2: 98% (03 Jul 2023 21:00) (98% - 98%)      07-03-23 @ 07:01  -  07-04-23 @ 07:00  --------------------------------------------------------  IN: 0 mL / OUT: 225 mL / NET: -225 mL                                             --------------------------------------------------------------  LABS:                        9.0    15.04 )-----------( 398      ( 03 Jul 2023 07:56 )             29.3     07-03    134<L>  |  103  |  35<H>  ----------------------------<  206<H>  4.5   |  21  |  1.0    Ca    9.3      03 Jul 2023 07:56  Mg     1.8     07-03    TPro  5.3<L>  /  Alb  2.7<L>  /  TBili  <0.2  /  DBili  x   /  AST  17  /  ALT  10  /  AlkPhos  85  07-03      Urinalysis Basic - ( 03 Jul 2023 07:56 )    Color: x / Appearance: x / SG: x / pH: x  Gluc: 206 mg/dL / Ketone: x  / Bili: x / Urobili: x   Blood: x / Protein: x / Nitrite: x   Leuk Esterase: x / RBC: x / WBC x   Sq Epi: x / Non Sq Epi: x / Bacteria: x                                                            -------------------------------------------------------------  RADIOLOGY:                                            --------------------------------------------------------------    PHYSICAL EXAM:  General: Elderly man laying in bed, more fatigued than the last time I saw him, lethargic today  Cardio: Irregular rhythm, regular rate, Decrescendo murmur at apex, S1/S2+  Pulm: Normal breath sounds, No crackles, wheezing, or rhonchi  GI: Soft, NTND  : No CVA or suprapubic tenderness, +left testicular erythema and swelling  Neuro: AxO3, CNII-XII intact, BL UE and LE strength unchanged from before, no sensory level deficits, was slow to responding to questions, occasionally closes eyes as if dosing off  Skin: Intact, no rashes or bruising   Ext: No LE edema                                           --------------------------------------------------------------

## 2023-07-05 LAB
ALBUMIN SERPL ELPH-MCNC: 2.8 G/DL — LOW (ref 3.5–5.2)
ALP SERPL-CCNC: 81 U/L — SIGNIFICANT CHANGE UP (ref 30–115)
ALT FLD-CCNC: 12 U/L — SIGNIFICANT CHANGE UP (ref 0–41)
ANION GAP SERPL CALC-SCNC: 12 MMOL/L — SIGNIFICANT CHANGE UP (ref 7–14)
AST SERPL-CCNC: 19 U/L — SIGNIFICANT CHANGE UP (ref 0–41)
BILIRUB SERPL-MCNC: <0.2 MG/DL — SIGNIFICANT CHANGE UP (ref 0.2–1.2)
BUN SERPL-MCNC: 37 MG/DL — HIGH (ref 10–20)
CALCIUM SERPL-MCNC: 9.4 MG/DL — SIGNIFICANT CHANGE UP (ref 8.4–10.5)
CHLORIDE SERPL-SCNC: 103 MMOL/L — SIGNIFICANT CHANGE UP (ref 98–110)
CO2 SERPL-SCNC: 22 MMOL/L — SIGNIFICANT CHANGE UP (ref 17–32)
CREAT SERPL-MCNC: 1.1 MG/DL — SIGNIFICANT CHANGE UP (ref 0.7–1.5)
EGFR: 60 ML/MIN/1.73M2 — SIGNIFICANT CHANGE UP
GLUCOSE BLDC GLUCOMTR-MCNC: 149 MG/DL — HIGH (ref 70–99)
GLUCOSE BLDC GLUCOMTR-MCNC: 188 MG/DL — HIGH (ref 70–99)
GLUCOSE BLDC GLUCOMTR-MCNC: 241 MG/DL — HIGH (ref 70–99)
GLUCOSE BLDC GLUCOMTR-MCNC: 257 MG/DL — HIGH (ref 70–99)
GLUCOSE SERPL-MCNC: 241 MG/DL — HIGH (ref 70–99)
HCT VFR BLD CALC: 28.9 % — LOW (ref 42–52)
HGB BLD-MCNC: 8.8 G/DL — LOW (ref 14–18)
MAGNESIUM SERPL-MCNC: 1.7 MG/DL — LOW (ref 1.8–2.4)
MCHC RBC-ENTMCNC: 26.1 PG — LOW (ref 27–31)
MCHC RBC-ENTMCNC: 30.4 G/DL — LOW (ref 32–37)
MCV RBC AUTO: 85.8 FL — SIGNIFICANT CHANGE UP (ref 80–94)
NRBC # BLD: 0 /100 WBCS — SIGNIFICANT CHANGE UP (ref 0–0)
PLATELET # BLD AUTO: 364 K/UL — SIGNIFICANT CHANGE UP (ref 130–400)
PMV BLD: 9.2 FL — SIGNIFICANT CHANGE UP (ref 7.4–10.4)
POTASSIUM SERPL-MCNC: 4.8 MMOL/L — SIGNIFICANT CHANGE UP (ref 3.5–5)
POTASSIUM SERPL-SCNC: 4.8 MMOL/L — SIGNIFICANT CHANGE UP (ref 3.5–5)
PROT SERPL-MCNC: 5.3 G/DL — LOW (ref 6–8)
RBC # BLD: 3.37 M/UL — LOW (ref 4.7–6.1)
RBC # FLD: 17.3 % — HIGH (ref 11.5–14.5)
SODIUM SERPL-SCNC: 137 MMOL/L — SIGNIFICANT CHANGE UP (ref 135–146)
WBC # BLD: 13.38 K/UL — HIGH (ref 4.8–10.8)
WBC # FLD AUTO: 13.38 K/UL — HIGH (ref 4.8–10.8)

## 2023-07-05 PROCEDURE — 99222 1ST HOSP IP/OBS MODERATE 55: CPT | Mod: 25,57

## 2023-07-05 PROCEDURE — 54700 I&D EPDIDYMS TSTIS&/SCROT SP: CPT | Mod: LT

## 2023-07-05 PROCEDURE — 99232 SBSQ HOSP IP/OBS MODERATE 35: CPT

## 2023-07-05 PROCEDURE — 99233 SBSQ HOSP IP/OBS HIGH 50: CPT

## 2023-07-05 PROCEDURE — 95720 EEG PHY/QHP EA INCR W/VEEG: CPT

## 2023-07-05 RX ORDER — PIPERACILLIN AND TAZOBACTAM 4; .5 G/20ML; G/20ML
3.38 INJECTION, POWDER, LYOPHILIZED, FOR SOLUTION INTRAVENOUS ONCE
Refills: 0 | Status: COMPLETED | OUTPATIENT
Start: 2023-07-05 | End: 2023-07-05

## 2023-07-05 RX ORDER — INSULIN LISPRO 100/ML
4 VIAL (ML) SUBCUTANEOUS
Refills: 0 | Status: DISCONTINUED | OUTPATIENT
Start: 2023-07-05 | End: 2023-07-08

## 2023-07-05 RX ORDER — PIPERACILLIN AND TAZOBACTAM 4; .5 G/20ML; G/20ML
3.38 INJECTION, POWDER, LYOPHILIZED, FOR SOLUTION INTRAVENOUS ONCE
Refills: 0 | Status: COMPLETED | OUTPATIENT
Start: 2023-07-06 | End: 2023-07-06

## 2023-07-05 RX ORDER — MAGNESIUM SULFATE 500 MG/ML
2 VIAL (ML) INJECTION ONCE
Refills: 0 | Status: COMPLETED | OUTPATIENT
Start: 2023-07-05 | End: 2023-07-05

## 2023-07-05 RX ORDER — PIPERACILLIN AND TAZOBACTAM 4; .5 G/20ML; G/20ML
3.38 INJECTION, POWDER, LYOPHILIZED, FOR SOLUTION INTRAVENOUS EVERY 8 HOURS
Refills: 0 | Status: DISCONTINUED | OUTPATIENT
Start: 2023-07-06 | End: 2023-07-08

## 2023-07-05 RX ADMIN — INSULIN GLARGINE 12 UNIT(S): 100 INJECTION, SOLUTION SUBCUTANEOUS at 08:19

## 2023-07-05 RX ADMIN — Medication 50 MICROGRAM(S): at 07:02

## 2023-07-05 RX ADMIN — Medication 3: at 12:56

## 2023-07-05 RX ADMIN — Medication 25 MILLIGRAM(S): at 07:06

## 2023-07-05 RX ADMIN — Medication 325 MILLIGRAM(S): at 17:21

## 2023-07-05 RX ADMIN — Medication 4 UNIT(S): at 17:15

## 2023-07-05 RX ADMIN — Medication 15 GRAM(S): at 12:58

## 2023-07-05 RX ADMIN — Medication 1 TABLET(S): at 08:21

## 2023-07-05 RX ADMIN — Medication 25 GRAM(S): at 16:25

## 2023-07-05 RX ADMIN — HEPARIN SODIUM 5000 UNIT(S): 5000 INJECTION INTRAVENOUS; SUBCUTANEOUS at 21:45

## 2023-07-05 RX ADMIN — LEVETIRACETAM 500 MILLIGRAM(S): 250 TABLET, FILM COATED ORAL at 17:21

## 2023-07-05 RX ADMIN — Medication 325 MILLIGRAM(S): at 07:02

## 2023-07-05 RX ADMIN — Medication 2: at 08:20

## 2023-07-05 RX ADMIN — POLYETHYLENE GLYCOL 3350 17 GRAM(S): 17 POWDER, FOR SOLUTION ORAL at 07:05

## 2023-07-05 RX ADMIN — DULOXETINE HYDROCHLORIDE 60 MILLIGRAM(S): 30 CAPSULE, DELAYED RELEASE ORAL at 12:58

## 2023-07-05 RX ADMIN — PIPERACILLIN AND TAZOBACTAM 25 GRAM(S): 4; .5 INJECTION, POWDER, LYOPHILIZED, FOR SOLUTION INTRAVENOUS at 20:05

## 2023-07-05 RX ADMIN — LEVETIRACETAM 500 MILLIGRAM(S): 250 TABLET, FILM COATED ORAL at 07:02

## 2023-07-05 RX ADMIN — PIPERACILLIN AND TAZOBACTAM 200 GRAM(S): 4; .5 INJECTION, POWDER, LYOPHILIZED, FOR SOLUTION INTRAVENOUS at 16:25

## 2023-07-05 RX ADMIN — FINASTERIDE 5 MILLIGRAM(S): 5 TABLET, FILM COATED ORAL at 12:58

## 2023-07-05 RX ADMIN — HEPARIN SODIUM 5000 UNIT(S): 5000 INJECTION INTRAVENOUS; SUBCUTANEOUS at 07:01

## 2023-07-05 RX ADMIN — Medication 1: at 17:14

## 2023-07-05 RX ADMIN — SODIUM CHLORIDE 1 GRAM(S): 9 INJECTION INTRAMUSCULAR; INTRAVENOUS; SUBCUTANEOUS at 07:03

## 2023-07-05 RX ADMIN — SODIUM CHLORIDE 1 GRAM(S): 9 INJECTION INTRAMUSCULAR; INTRAVENOUS; SUBCUTANEOUS at 17:21

## 2023-07-05 RX ADMIN — CLOPIDOGREL BISULFATE 75 MILLIGRAM(S): 75 TABLET, FILM COATED ORAL at 12:57

## 2023-07-05 RX ADMIN — Medication 1 TABLET(S): at 13:12

## 2023-07-05 RX ADMIN — HEPARIN SODIUM 5000 UNIT(S): 5000 INJECTION INTRAVENOUS; SUBCUTANEOUS at 13:12

## 2023-07-05 RX ADMIN — PIPERACILLIN AND TAZOBACTAM 25 GRAM(S): 4; .5 INJECTION, POWDER, LYOPHILIZED, FOR SOLUTION INTRAVENOUS at 07:03

## 2023-07-05 NOTE — PROGRESS NOTE ADULT - ASSESSMENT
89 yo M w pmh of CABG, HTN, DM2, hypothyroidism was admitted for electrolyte derangement of hyperkalemia and hyponatremia s/p appropriate correction, and Pseudomonas infection. 7/2stroke code called for episode of aphasia? (stopped talking but was able to follow commands only with eyes) with rapid improvement of symptoms with NIHSS 10 to his baseline 4 -  TNK not administered. MRI brain negative for acute stroke. EEG revealed multifocal cortical hyper excitability with epileptogenic potential diffusely. Started on Keppra 1000 mg q12 hours that was decreased to 500 mg BID given noted lethargy.        Recommendations:   - D/c VEEG  - Keep magnesium >2  - Seizure precautions  - Medical management of infectious process and electrolyte derangement.   - avoid neurotoxic antibiotics  - C/w keppra to 500 mg BID 89 yo M w pmh of CABG, HTN, DM2, hypothyroidism was admitted for electrolyte derangement of hyperkalemia and hyponatremia s/p appropriate correction, and Pseudomonas infection. 7/2stroke code called for episode of aphasia? (stopped talking but was able to follow commands only with eyes) with rapid improvement of symptoms with NIHSS 10 to his baseline 4 -  TNK not administered. MRI brain negative for acute stroke. EEG revealed multifocal cortical hyper excitability with epileptogenic potential diffusely. Started on Keppra 1000 mg q12 hours that was decreased to 500 mg BID given noted lethargy. Currently more awake.       Recommendations:   - c/w VEEG  - C/w keppra to 500 mg BID  - Keep magnesium >2  - Seizure precautions  - Medical management of infectious process and electrolyte derangement.   - Avoid neurotoxic antibiotics    Discussed with neurology attending

## 2023-07-05 NOTE — PROGRESS NOTE ADULT - ASSESSMENT
ASSESSMENT  89 yo M with hx of HTN, HLD, DM II BPH and CAD s/p CABG x 4, Hypothyroidism presents to ED for abnormal labs result (hyperkalemia and hyponatremia).    IMPRESSION  #Sepsis present on admission  #Pseudomonas bacteremia from suspected UTI with Left Epididymitis  -- had recent h[place]go   - BLood Cx 6/24 +   - Urine Cx 6/23 > 3 organisms   - US Kidney and Bladder (06.23.23 @ 21:08): IMPRESSION: No sonographic evidence of hydronephrosis. Post void residual of 69 mL (46%).  - US Testicles (07.01.23 @ 08:52): Complex scrotal mixed echogenicity collection anterior to left testicle  measuring approximately 7.7 x 3 x 6.9 cm. 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    #Hyponatremia    #BPH  #DM II  #CAD s/p CABG    #Abx allergy: No Known Allergies    CrCl 31    RECOMMENDATIONS  - continue zosyn 3.375 mg q 12 hours   - appreciate urology evaluation for possible I and D     Please call or message on [employer] Teams if with any questions.  Spectra [phone]

## 2023-07-05 NOTE — PROGRESS NOTE ADULT - SUBJECTIVE AND OBJECTIVE BOX
CHEL CROFT  [facility] 022 A ([facility]      Patient was evaluated and examined  by bedside, denies any testicular pain, remains afebrile, on EEG        REVIEW OF SYSTEMS: unable to obtain, patient is a poor historian         T(C): , Max: 36.2 (07-05-23 @ 14:53)  HR: 87 (07-05-23 @ 14:53)  BP: 110/60 (07-05-23 @ 14:53)  RR: 16 (07-05-23 @ 14:53)  SpO2: 96% (07-05-23 @ 14:53)  CAPILLARY BLOOD GLUCOSE      POCT Blood Glucose.: 257 mg/dL (05 Jul 2023 12:31)  POCT Blood Glucose.: 241 mg/dL (05 Jul 2023 08:00)  POCT Blood Glucose.: 230 mg/dL (04 Jul 2023 21:42)  POCT Blood Glucose.: 103 mg/dL (04 Jul 2023 17:05)      PHYSICAL EXAM:  General: NAD, Awake, patient is laying comfortably in bed  HEENT: AT, NC, Supple, NO JVD, NO CB  Lungs: CTA B/L, no wheezing, no rhonchi  CVS: normal S1, S2, RRR, NO M/G/R  Abdomen: soft, bowel sounds present, non-tender, non-distended  Extremities: no edema, no clubbing, no cyanosis, positive peripheral pulses b/l  Neuro: generalized body weakness, gait not tested  Skin: no rash, no ecchymosis      LAB  CBC  Date: 07-05-23 @ 08:00  Mean cell Mpmtgdokmi92.1  Mean cell Hemoglobin Conc30.4  Mean cell Volum 85.8  Platelet count-Automate 364  RBC Count 3.37  Red Cell Distrib Width17.3  WBC Count13.38  % Albumin, Urine--  Hematocrit 28.9  Hemoglobin 8.8  CBC  Date: 07-04-23 @ 08:35  Mean cell Fvrnhuvapd96.3  Mean cell Hemoglobin Conc31.1  Mean cell Volum 84.6  Platelet count-Automate 366  RBC Count 3.19  Red Cell Distrib Width16.7  WBC Count13.50  % Albumin, Urine--  Hematocrit 27.0  Hemoglobin 8.4        BMP  07-05-23 @ 08:00  Blood Gas Arterial-Calcium,Ionized--  Blood Urea Nitrogen, Serum 37 mg/dL<H> [10 - 20]  Carbon Dioxide, Serum22 mmol/L [17 - 32]  Chloride, Wyrja027 mmol/L [98 - 110]  Creatinie, Serum1.1 mg/dL [0.7 - 1.5]  Glucose, Wccpj662 mg/dL<H> [70 - 99]  Potassium, Serum4.8 mmol/L [3.5 - 5.0]  Sodium, Serum 137 mmol/L [135 - 146]  [facility]  07-04-23 @ 08:35  Blood Gas Arterial-Calcium,Ionized--  Blood Urea Nitrogen, Serum 34 mg/dL<H> [10 - 20]  Carbon Dioxide, Serum23 mmol/L [17 - 32]  Chloride, Moclj659 mmol/L [98 - 110]  Creatinie, Serum1.0 mg/dL [0.7 - 1.5]  Glucose, Frfal665 mg/dL<H> [70 - 99]  Potassium, Serum4.4 mmol/L [3.5 - 5.0]  Sodium, Serum 133 mmol/L<L> [135 - 146]  [facility]  07-03-23 @ 07:56  Blood Gas Arterial-Calcium,Ionized--  Blood Urea Nitrogen, Serum 35 mg/dL<H> [10 - 20]  Carbon Dioxide, Serum21 mmol/L [17 - 32]  Chloride, Gccde913 mmol/L [98 - 110]  Creatinie, Serum1.0 mg/dL [0.7 - 1.5]  Glucose, Pvvnb204 mg/dL<H> [70 - 99]  Potassium, Serum4.5 mmol/L [3.5 - 5.0]  Sodium, Serum 134 mmol/L<L> [135 - 146]        Microbiology:    Culture - Blood (collected 06-28-23 @ 04:30)  Source: .Blood Blood-Peripheral  Final Report (07-03-23 @ 18:01):    No growth at 5 days    Culture - Urine (collected 06-27-23 @ 12:50)  Source: Catheterized Catheterized  Final Report (06-28-23 @ 23:40):    <10,000 CFU/mL Normal Urogenital Roxana    Culture - Blood (collected 06-26-23 @ 06:02)  Source: .Blood None  Final Report (07-01-23 @ 18:00):    No growth at 5 days    Culture - Blood (collected 06-24-23 @ 00:28)  Source: .Blood None  Gram Stain (06-25-23 @ 17:54):    Growth in aerobic bottle: Gram Negative Rods    Growth in anaerobic bottle: Gram Negative Rods  Final Report (06-26-23 @ 14:45):    Growth in aerobic and anaerobic bottles: Pseudomonas aeruginosa    ***Blood Panel PCR results on this specimen are available    approximately 3 hours after the Gram stain result.***    Gram stain, PCR, and/or culture results may not always    correspond due to difference in methodologies.    ************************************************************    This PCR assay was performed by multiplex PCR. This    Assay tests for 66 bacterial and resistance gene targets.    Please refer to the [facility] test directory    at [email or web address] for details.  Organism: Blood Culture PCR  Pseudomonas aeruginosa (06-26-23 @ 14:45)  Organism: Pseudomonas aeruginosa (06-26-23 @ 14:45)      Method Type: KEVIN      -  Amikacin: S -[id number]      -  Aztreonam: S <=4      -  Cefepime: S <=2      -  Ceftazidime: S <=1      -  Ciprofloxacin: S <=0.25      -  Gentamicin: S <=2      -  Imipenem: S <=1      -  Levofloxacin: S <=0.5      -  Meropenem: S <=1      -  Piperacillin/Tazobactam: S <=8      -  Tobramycin: S <=2  Organism: Blood Culture PCR (06-26-23 @ 14:45)      Method Type: PCR      -  Pseudomonas aeruginosa: Detec    Culture - Urine (collected 06-23-23 @ 15:45)  Source: Clean Catch Clean Catch (Midstream)  Final Report (06-25-23 @ 16:47):    >=3 organisms. Probable collection contamination.      Medications:  acetaminophen     Tablet .. 650 milliGRAM(s) Oral every 6 hours PRN  aluminum hydroxide/magnesium hydroxide/simethicone Suspension 30 milliLiter(s) Oral every 4 hours PRN  clopidogrel Tablet 75 milliGRAM(s) Oral daily  dextrose 5%. 1000 milliLiter(s) IV Continuous <Continuous>  dextrose 5%. 1000 milliLiter(s) IV Continuous <Continuous>  dextrose 50% Injectable 12.5 Gram(s) IV Push once  dextrose 50% Injectable 25 Gram(s) IV Push once  dextrose 50% Injectable 25 Gram(s) IV Push once  dextrose Oral Gel 15 Gram(s) Oral once PRN  DULoxetine 60 milliGRAM(s) Oral daily  ferrous    sulfate 325 milliGRAM(s) Oral two times a day  finasteride 5 milliGRAM(s) Oral daily  glucagon  Injectable 1 milliGRAM(s) IntraMuscular once  heparin   Injectable 5000 Unit(s) SubCutaneous every 8 hours  insulin glargine Injectable (LANTUS) 12 Unit(s) SubCutaneous every morning  insulin lispro (ADMELOG) corrective regimen sliding scale   SubCutaneous at bedtime  insulin lispro (ADMELOG) corrective regimen sliding scale   SubCutaneous three times a day before meals  insulin lispro Injectable (ADMELOG) 4 Unit(s) SubCutaneous before breakfast  insulin lispro Injectable (ADMELOG) 4 Unit(s) SubCutaneous before lunch  insulin lispro Injectable (ADMELOG) 4 Unit(s) SubCutaneous before dinner  lactobacillus acidophilus 1 Tablet(s) Oral with breakfast  levETIRAcetam 500 milliGRAM(s) Oral two times a day  levothyroxine 50 MICROGram(s) Oral daily  LORazepam   Injectable 2 milliGRAM(s) IV Push every 6 hours PRN  magnesium sulfate  IVPB 2 Gram(s) IV Intermittent once  melatonin 3 milliGRAM(s) Oral at bedtime PRN  metoprolol tartrate 25 milliGRAM(s) Oral two times a day  multivitamin 1 Tablet(s) Oral every 24 hours  ondansetron Injectable 4 milliGRAM(s) IV Push every 8 hours PRN  piperacillin/tazobactam IVPB. 3.375 Gram(s) IV Intermittent once  piperacillin/tazobactam IVPB.- 3.375 Gram(s) IV Intermittent once  polyethylene glycol 3350 17 Gram(s) Oral two times a day  senna 2 Tablet(s) Oral at bedtime  simvastatin 20 milliGRAM(s) Oral at bedtime  sodium chloride 1 Gram(s) Oral two times a day  tamsulosin 0.4 milliGRAM(s) Oral at bedtime  urea Oral Powder 15 Gram(s) Oral daily        Assessment and Plan:  Patient is a 89 y/o Male with  PMHx HTN, DM2, BPH, CAD s/p CABG, hypothyroidism, recently dx. from [facility] , progressive ambulatory dysfunction, admitted   with hyperkalemia, hyponatremia noted as outpt. Found to have pseudomonas bacteremia.    # Acute mental status change:   - MR brain WNL  - EEG Done- 2nd EEG shows generalized slowing + Multifocal epileptic potentials. Started Keppra 500mg every 12 hours . Neuro f/up 7/4- placed on VEEG now.   - F/u neuro for further recs, continue Neurochecks      #Bacteremia due to Pseudomonas.   # Leukocytosis   #in setting of recent hidalgo in snf; now out; possible uti, suspected testicular infection  US Scrotum (for epididymitis) : Complex scrotal mixed echogenicity collection anterior to left testicle measuring approximately 7.7 x 3 x 6.9 cm. 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  ua positive; f/u ucxs- more than 3 organisms, repeat urine cxs neg , repeat blood cxs 6/26, 28 Neg.  blood cxs pseudomonas 6/24, as per  ID - continue on IV Zosyn tx.   - consulted  - f/up rec. , daily CBC monitoring     # Hypomagnesemia- supplemented     #hyponatremia- stable levels   Per Nephro eval, likely [facility]? Urea 15gm QD,  Uric Acid = 4.5. No need to restrict po free water. continue on salt tabs  -daily BMP monitoring      #constipated: Senna, Miralax.     #HTN (hypertension). currently borderline Hypotensive   -continue  lopressor 25mg PO twice daily with holding parameters.     #DM2 (diabetes mellitus, type 2)- uncontrolled, Hga1C- 9  lantus 12 units subc. once daily , add on lispro 4 units subc. before each meal three times daily   ssi.    #History of BPH. - continue finasteride 5 mg po once daily /tamsulosin 0.4 mg po once daily at bedtime     #h/o CAD (coronary artery disease). / h/o CABG  - plavix/zocor tx.  -patient is on supplemental oxygen via NC, 2 L , on RA sat 91- 92% at rest     #Hypothyroidism. continue synthroid 50mcg po once daily    # Iron def. anemia- started on PO Iron tx.     # Ambulatory dysfunction, physical decline- PT eval.     DVT ppX, heparin  DNR/DNI (MOLST signed 6/29)    #Progress Note Handoff: f/up EEG results, f/up  for further rec. on abn. Scrotal US, continue IV Zosyn, supplement Mg.   Family discussion: all results and medical plan of tx. d/w patient's daughter Shayna, who is in agreement with treatment plan. Disposition:  STR vs. Home with home care and home PT.     Total time spent to complete patient's bedside assessment, review medical chart, discuss medical plan of care with covering medical team was more than 50  minutes with >50% of time spent face to face with patient, discussion with patient/family and/or coordination of care

## 2023-07-05 NOTE — CONSULT NOTE ADULT - ASSESSMENT
89 y/o m poor historian, with pseudomonas bacteremia, found to have a scrotal abscess, pt currently on SQ heparin and Plavix.    A) left Scrotal abscess superior to the left testicle  LUTS  Incomplete bladder emptying    P) Case d/w attending.  To asses pt a decide for bedside I&D  consent obtained from daughter.  will see with attending

## 2023-07-05 NOTE — EEG REPORT - NS EEG TEXT BOX
Epilepsy Attending Note:     CHEL CROFT    89y Male  MRN [id number]    Vital Signs Last 24 Hrs  T(C): 35.8 (05 Jul 2023 05:00), Max: 35.8 (04 Jul 2023 21:04)  T(F): 96.5 (05 Jul 2023 05:00), Max: 96.5 (05 Jul 2023 05:00)  HR: 87 (05 Jul 2023 07:06) (73 - 93)  BP: 106/53 (05 Jul 2023 07:06) (94/52 - 120/56)  BP(mean): --  RR: 16 (05 Jul 2023 08:07) (16 - 18)  SpO2: 96% (05 Jul 2023 08:07) (96% - 99%)    Parameters below as of 05 Jul 2023 08:07  Patient On (Oxygen Delivery Method): room air                              8.8    13.38 )-----------( 364      ( 05 Jul 2023 08:00 )             28.9       07-05    137  |  103  |  37<H>  ----------------------------<  241<H>  4.8   |  22  |  1.1    Ca    9.4      05 Jul 2023 08:00  Mg     1.7     07-05    TPro  5.3<L>  /  Alb  2.8<L>  /  TBili  <0.2  /  DBili  x   /  AST  19  /  ALT  12  /  AlkPhos  81  07-05      MEDICATIONS  (STANDING):  clopidogrel Tablet 75 milliGRAM(s) Oral daily  dextrose 5%. 1000 milliLiter(s) (100 mL/Hr) IV Continuous <Continuous>  dextrose 5%. 1000 milliLiter(s) (50 mL/Hr) IV Continuous <Continuous>  dextrose 50% Injectable 25 Gram(s) IV Push once  dextrose 50% Injectable 25 Gram(s) IV Push once  dextrose 50% Injectable 12.5 Gram(s) IV Push once  DULoxetine 60 milliGRAM(s) Oral daily  ferrous    sulfate 325 milliGRAM(s) Oral two times a day  finasteride 5 milliGRAM(s) Oral daily  glucagon  Injectable 1 milliGRAM(s) IntraMuscular once  heparin   Injectable 5000 Unit(s) SubCutaneous every 8 hours  insulin glargine Injectable (LANTUS) 12 Unit(s) SubCutaneous every morning  insulin lispro (ADMELOG) corrective regimen sliding scale   SubCutaneous at bedtime  insulin lispro (ADMELOG) corrective regimen sliding scale   SubCutaneous three times a day before meals  lactobacillus acidophilus 1 Tablet(s) Oral with breakfast  levETIRAcetam 500 milliGRAM(s) Oral two times a day  levothyroxine 50 MICROGram(s) Oral daily  metoprolol tartrate 25 milliGRAM(s) Oral two times a day  multivitamin 1 Tablet(s) Oral every 24 hours  polyethylene glycol 3350 17 Gram(s) Oral two times a day  senna 2 Tablet(s) Oral at bedtime  simvastatin 20 milliGRAM(s) Oral at bedtime  sodium chloride 1 Gram(s) Oral two times a day  tamsulosin 0.4 milliGRAM(s) Oral at bedtime  urea Oral Powder 15 Gram(s) Oral daily    MEDICATIONS  (PRN):  acetaminophen     Tablet .. 650 milliGRAM(s) Oral every 6 hours PRN Temp greater or equal to 38C (100.4F), Mild Pain (1 - 3)  aluminum hydroxide/magnesium hydroxide/simethicone Suspension 30 milliLiter(s) Oral every 4 hours PRN Dyspepsia  dextrose Oral Gel 15 Gram(s) Oral once PRN Blood Glucose LESS THAN 70 milliGRAM(s)/deciliter  LORazepam   Injectable 2 milliGRAM(s) IV Push every 6 hours PRN Acute Seizure Event ONLY  melatonin 3 milliGRAM(s) Oral at bedtime PRN Insomnia  ondansetron Injectable 4 milliGRAM(s) IV Push every 8 hours PRN Nausea and/or Vomiting            VEEG in the last 24 hours:    Background--------------continues, symmetrical less than optimally organized showing improvement towards the latter part of the recording and reaching frequencies in the range of 6-7     Focal and generalized slowing------------ 1- mild to moderate  generalized slowing . 2- borderline to mild right  mid to posterior temporal  and independent left anterior quadrants focal slowing    Interictal activity----------rare right posterior sharp transients    Events------------- none    Seizures------none    Impression: abnormal as above    Plan - as/neurology

## 2023-07-05 NOTE — PROGRESS NOTE ADULT - SUBJECTIVE AND OBJECTIVE BOX
Neurology Progress Note    INTERVAL HPI/OVERNIGHT EVENTS:  Patient seen and examined today. No events reported by the staff.  Remains on VEEG. Awake and oriented.  Noted VEEG running with generalized slowing    MEDICATIONS  (STANDING):  clopidogrel Tablet 75 milliGRAM(s) Oral daily  dextrose 5%. 1000 milliLiter(s) (50 mL/Hr) IV Continuous <Continuous>  dextrose 5%. 1000 milliLiter(s) (100 mL/Hr) IV Continuous <Continuous>  dextrose 50% Injectable 25 Gram(s) IV Push once  dextrose 50% Injectable 25 Gram(s) IV Push once  dextrose 50% Injectable 12.5 Gram(s) IV Push once  DULoxetine 60 milliGRAM(s) Oral daily  ferrous    sulfate 325 milliGRAM(s) Oral two times a day  finasteride 5 milliGRAM(s) Oral daily  glucagon  Injectable 1 milliGRAM(s) IntraMuscular once  heparin   Injectable 5000 Unit(s) SubCutaneous every 8 hours  insulin glargine Injectable (LANTUS) 12 Unit(s) SubCutaneous every morning  insulin lispro (ADMELOG) corrective regimen sliding scale   SubCutaneous at bedtime  insulin lispro (ADMELOG) corrective regimen sliding scale   SubCutaneous three times a day before meals  lactobacillus acidophilus 1 Tablet(s) Oral with breakfast  levETIRAcetam 500 milliGRAM(s) Oral two times a day  levothyroxine 50 MICROGram(s) Oral daily  metoprolol tartrate 25 milliGRAM(s) Oral two times a day  multivitamin 1 Tablet(s) Oral every 24 hours  polyethylene glycol 3350 17 Gram(s) Oral two times a day  senna 2 Tablet(s) Oral at bedtime  simvastatin 20 milliGRAM(s) Oral at bedtime  sodium chloride 1 Gram(s) Oral two times a day  tamsulosin 0.4 milliGRAM(s) Oral at bedtime  urea Oral Powder 15 Gram(s) Oral daily    MEDICATIONS  (PRN):  acetaminophen     Tablet .. 650 milliGRAM(s) Oral every 6 hours PRN Temp greater or equal to 38C (100.4F), Mild Pain (1 - 3)  aluminum hydroxide/magnesium hydroxide/simethicone Suspension 30 milliLiter(s) Oral every 4 hours PRN Dyspepsia  dextrose Oral Gel 15 Gram(s) Oral once PRN Blood Glucose LESS THAN 70 milliGRAM(s)/deciliter  LORazepam   Injectable 2 milliGRAM(s) IV Push every 6 hours PRN Acute Seizure Event ONLY  melatonin 3 milliGRAM(s) Oral at bedtime PRN Insomnia  ondansetron Injectable 4 milliGRAM(s) IV Push every 8 hours PRN Nausea and/or Vomiting      Allergies    No Known Allergies    Intolerances        Vital Signs Last 24 Hrs  T(C): 35.8 (05 Jul 2023 05:00), Max: 35.8 (04 Jul 2023 21:04)  T(F): 96.5 (05 Jul 2023 05:00), Max: 96.5 (05 Jul 2023 05:00)  HR: 87 (05 Jul 2023 07:06) (73 - 93)  BP: 106/53 (05 Jul 2023 07:06) (94/52 - 120/56)  RR: 16 (05 Jul 2023 08:07) (16 - 18)  SpO2: 96% (05 Jul 2023 08:07) (96% - 99%)    Parameters below as of 05 Jul 2023 08:07  Patient On (Oxygen Delivery Method): room air        Physical exam:  General: No acute distress, awake and alert  NAD, AOX2 (person, place, age).    Patient lethargic following commands  Hypophonic, mouthing his name and location when asked, +dysarthria?  Holds bilateral UE up for 10 seconds without drift  Flexes bilateral LE symmetrically  Without obvious seizure activity    LABS:                        8.8    13.38 )-----------( 364      ( 05 Jul 2023 08:00 )             28.9     07-05    137  |  103  |  37<H>  ----------------------------<  241<H>  4.8   |  22  |  1.1    Ca    9.4      05 Jul 2023 08:00  Mg     1.7     07-05    TPro  5.3<L>  /  Alb  2.8<L>  /  TBili  <0.2  /  DBili  x   /  AST  19  /  ALT  12  /  AlkPhos  81  07-05      Urinalysis Basic - ( 05 Jul 2023 08:00 )    Color: x / Appearance: x / SG: x / pH: x  Gluc: 241 mg/dL / Ketone: x  / Bili: x / Urobili: x   Blood: x / Protein: x / Nitrite: x   Leuk Esterase: x / RBC: x / WBC x   Sq Epi: x / Non Sq Epi: x / Bacteria: x        RADIOLOGY & ADDITIONAL TESTS:  Summary: 7/4/2023  Abnormal inpatient routine EEG study, awake and drowsy.    1)	The EEG demonstrated generalized background slowing     2)	The EEG demonstrated multifocal cortical hyper excitability with epileptogenic potential diffusely.    3)	The presence of diffuse sharp theta activity

## 2023-07-05 NOTE — CONSULT NOTE ADULT - NS ATTEND AMEND GEN_ALL_CORE FT
PT seen and examined  7/5/23. we discussed the findings w pts daughter and that he has a scrotal abscess in setting of bacteremia. though he is on plavix we discussed there there is risk of waiting 5-7 days for this medication to be out of his system. therefore we decided to perform a bedside I and D w local anesthesia , negating general anesthesia risk for this 89 year old. also reassured that we would make I and d incision as small as possible given higher bleeding risk w plavix on board.  cont abx as per ID  fu cultures from I and d

## 2023-07-05 NOTE — PROCEDURE NOTE - ADDITIONAL PROCEDURE DETAILS
Plan:   - Left Beside I&D s/p 1/2 " packing placement with 100cc of purulent drainage, irrigated 200cc of Sterile water   - Continue 1/2' packing for 2 days   - Analgesics for pain control prn   - f/u Wound Cx   - 89 yo M with Left scrotal abscess.     Plan:   - Left Beside I&D s/p 1/2 " packing placement with 100cc of purulent drainage, irrigated 200cc of Sterile water   - Continue 1/2' packing for 2 days   - Analgesics for pain control prn   - f/u Wound Cx   - Spoke with Medical resident   - Patient seen and examined at bedside with Dr. Wen

## 2023-07-05 NOTE — CONSULT NOTE ADULT - SUBJECTIVE AND OBJECTIVE BOX
Urology Consult    Pt is a 89 y/o Male admitted for hyperkalemia and hyponatremia. Pt with c/o of left testicular pain for the past 8 days.  Pt with scrotal tenderness. Pt unable to give history, spoke with pt's daughter who is health care proxy. Pt was in [facility].  And had a Hoover. The pt was voiding well but no clear indication as to why the Hoover was placed. Pt now with condom catheter.  Sheets soaked as condom does not fit well. Pt was found to have pseudomonas bacteremia.       PAST MEDICAL & SURGICAL HISTORY:  HTN (hypertension)    HLD (hyperlipidemia)    BPH (benign prostatic hyperplasia)    CAD (coronary artery disease)    Diabetes mellitus    Hypothyroidism      S/P CABG (coronary artery bypass graft)      MEDICATIONS  (STANDING):  clopidogrel Tablet 75 milliGRAM(s) Oral daily  dextrose 5%. 1000 milliLiter(s) (50 mL/Hr) IV Continuous <Continuous>  dextrose 5%. 1000 milliLiter(s) (100 mL/Hr) IV Continuous <Continuous>  dextrose 50% Injectable 25 Gram(s) IV Push once  dextrose 50% Injectable 25 Gram(s) IV Push once  dextrose 50% Injectable 12.5 Gram(s) IV Push once  DULoxetine 60 milliGRAM(s) Oral daily  ferrous    sulfate 325 milliGRAM(s) Oral two times a day  finasteride 5 milliGRAM(s) Oral daily  glucagon  Injectable 1 milliGRAM(s) IntraMuscular once  heparin   Injectable 5000 Unit(s) SubCutaneous every 8 hours  insulin glargine Injectable (LANTUS) 12 Unit(s) SubCutaneous every morning  insulin lispro (ADMELOG) corrective regimen sliding scale   SubCutaneous at bedtime  insulin lispro (ADMELOG) corrective regimen sliding scale   SubCutaneous three times a day before meals  lactobacillus acidophilus 1 Tablet(s) Oral with breakfast  levETIRAcetam 500 milliGRAM(s) Oral two times a day  levothyroxine 50 MICROGram(s) Oral daily  magnesium sulfate  IVPB 2 Gram(s) IV Intermittent once  metoprolol tartrate 25 milliGRAM(s) Oral two times a day  multivitamin 1 Tablet(s) Oral every 24 hours  polyethylene glycol 3350 17 Gram(s) Oral two times a day  senna 2 Tablet(s) Oral at bedtime  simvastatin 20 milliGRAM(s) Oral at bedtime  sodium chloride 1 Gram(s) Oral two times a day  tamsulosin 0.4 milliGRAM(s) Oral at bedtime  urea Oral Powder 15 Gram(s) Oral daily    MEDICATIONS  (PRN):  acetaminophen     Tablet .. 650 milliGRAM(s) Oral every 6 hours PRN Temp greater or equal to 38C (100.4F), Mild Pain (1 - 3)  aluminum hydroxide/magnesium hydroxide/simethicone Suspension 30 milliLiter(s) Oral every 4 hours PRN Dyspepsia  dextrose Oral Gel 15 Gram(s) Oral once PRN Blood Glucose LESS THAN 70 milliGRAM(s)/deciliter  LORazepam   Injectable 2 milliGRAM(s) IV Push every 6 hours PRN Acute Seizure Event ONLY  melatonin 3 milliGRAM(s) Oral at bedtime PRN Insomnia  ondansetron Injectable 4 milliGRAM(s) IV Push every 8 hours PRN Nausea and/or Vomiting      Allergies    No Known Allergies    SOCIAL HISTORY: No illicit drug use    FAMILY HISTORY:  non contributory to current issue    REVIEW OF SYSTEMS   [x] Due to altered mental status/intubation, subjective information were not able to be obtained from patient. History was obtained, to the extent possible, from review of the chart and collateral sources of information.    Vital Signs Last 24 Hrs  T(C): 35.8 (05 Jul 2023 05:00), Max: 35.8 (04 Jul 2023 21:04)  T(F): 96.5 (05 Jul 2023 05:00), Max: 96.5 (05 Jul 2023 05:00)  HR: 87 (05 Jul 2023 07:06) (73 - 87)  BP: 106/53 (05 Jul 2023 07:06) (94/52 - 110/55)  RR: 16 (05 Jul 2023 08:07) (16 - 18)  SpO2: 96% (05 Jul 2023 08:07) (96% - 99%)    Parameters below as of 05 Jul 2023 08:07  Patient On (Oxygen Delivery Method): room air      PHYSICAL EXAM:    GEN: NAD, awake and alert. but slightly confused, currently under going VEEG   SKIN: Good color, non diaphoretic.  RESP: Non-labored breathing. No use of accessory muscles.  ABDO: Soft, NT/ND, no palpable bladder, no suprapubic tenderness.  BACK: No CVAT B/L  : + Non circumcised male. B/L descended testicles x 2. No palpable testicular masses, large fluctuant area superior to the left testicle,   mildly tender no indurations or crepitus.  No meatal discharge. + condom catheter in place, draining clear yellow urine. leaking from condom causing scrotal irritation and erythema.  EXT: JIMENES x 4      I&O's Summary      LABS:                        8.8    13.38 )-----------( 364      ( 05 Jul 2023 08:00 )             28.9     07-05    137  |  103  |  37<H>  ----------------------------<  241<H>  4.8   |  22  |  1.1    Ca    9.4      05 Jul 2023 08:00  Mg     1.7     07-05    TPro  5.3<L>  /  Alb  2.8<L>  /  TBili  <0.2  /  DBili  x   /  AST  19  /  ALT  12  /  AlkPhos  81  07-05      Urinalysis Basic - ( 05 Jul 2023 08:00 )    Color: x / Appearance: x / SG: x / pH: x  Gluc: 241 mg/dL / Ketone: x  / Bili: x / Urobili: x   Blood: x / Protein: x / Nitrite: x   Leuk Esterase: x / RBC: x / WBC x   Sq Epi: x / Non Sq Epi: x / Bacteria: x      Culture - Urine (06.27.23 @ 12:50)   Specimen Source: Catheterized Catheterized  Culture Results:   <10,000 CFU/mL Normal Urogenital Roxana      RADIOLOGY & ADDITIONAL STUDIES:      < from: US Testicles (07.01.23 @ 08:52) >    ACC: [id number] EXAM:  US SCROTUM AND CONTENTS   ORDERED BY: KAYDEN RODAS     PROCEDURE DATE:  07/01/2023          INTERPRETATION:  CLINICAL INFORMATION: Epididymitis.    COMPARISON: None available.    TECHNIQUE: Testicular ultrasound utilizing color and spectral Doppler.    FINDINGS:    Complex scrotal mixed echogenicity collection anterior to left testicle   measuring approximately 7.7 x 3 x 6.9 cm.    Bilateral symmetric testicular Doppler flow demonstrated.    RIGHT:  Right testis: 4.1 cm x 1.8 cm x 2.6 cm. right testicular heterogeneity  Right epididymis: Within normal limits.  Trace right hydrocele.    LEFT:  Left testis: 3.0 cm x 1.5 cm x 2.2 cm. left testicular heterogeneity  Left epididymis: Within normal limits.  Left hydrocele: None.    IMPRESSION:    Complex scrotal mixed echogenicity collection anterior to left testicle   measuring approximately 7.7 x 3 x 6.9 cm.    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    --- End of Report ---            ALEX WOODS MD; Attending Radiologist  This document has been electronically signed. Jul 2 2023 11:12AM    < end of copied text >    < from: US Kidney and Bladder (06.23.23 @ 21:08) >    ACC: [id number] EXAM:  US KIDNEYS AND BLADDER   ORDERED BY: CAROLE TURPIN     PROCEDURE DATE:  06/23/2023          INTERPRETATION:  CLINICAL HISTORY / REASON FOR EXAM: Pain    CORRELATION: CT chest, abdomen and pelvis from March 28, 2023    PROCEDURE: Retroperitoneal ultrasound was performed.    FINDINGS:    RIGHT KIDNEY: 9.1 cm. No renal mass, hydronephrosis or calculi.    LEFT KIDNEY: 9.9 cm. No renal mass, hydronephrosis or calculi.    URINARY BLADDER: No debris or calculus. Bilateral ureteral jets are   visualized. Prevoid volume of approximately 150 mL. Postvoid volume of   approximately 69 mL.      IMPRESSION:    No sonographic evidence of hydronephrosis.    Post void residual of 69 mL (46%).    --- End of Report ---        CB YEE MD; Attending Radiologist  This document has been electronically signed. Jun 23 2023  9:12PM    < end of copied text >       Urology Consult    Pt is a 89 y/o Male admitted for hyperkalemia and hyponatremia. Pt with c/o of left testicular pain for the past 8 days.  Pt with scrotal tenderness. Pt unable to give history, spoke with pt's daughter who is health care proxy. Pt was in [facility].  And had a Hoover. The pt was voiding well but no clear indication as to why the Hoover was placed. Pt now with condom catheter.  Sheets soaked as condom does not fit well. Pt was found to have pseudomonas bacteremia.       PAST MEDICAL & SURGICAL HISTORY:  HTN (hypertension)    HLD (hyperlipidemia)    BPH (benign prostatic hyperplasia)    CAD (coronary artery disease)    Diabetes mellitus    Hypothyroidism      S/P CABG (coronary artery bypass graft)      MEDICATIONS  (STANDING):  clopidogrel Tablet 75 milliGRAM(s) Oral daily  dextrose 5%. 1000 milliLiter(s) (50 mL/Hr) IV Continuous <Continuous>  dextrose 5%. 1000 milliLiter(s) (100 mL/Hr) IV Continuous <Continuous>  dextrose 50% Injectable 25 Gram(s) IV Push once  dextrose 50% Injectable 25 Gram(s) IV Push once  dextrose 50% Injectable 12.5 Gram(s) IV Push once  DULoxetine 60 milliGRAM(s) Oral daily  ferrous    sulfate 325 milliGRAM(s) Oral two times a day  finasteride 5 milliGRAM(s) Oral daily  glucagon  Injectable 1 milliGRAM(s) IntraMuscular once  heparin   Injectable 5000 Unit(s) SubCutaneous every 8 hours  insulin glargine Injectable (LANTUS) 12 Unit(s) SubCutaneous every morning  insulin lispro (ADMELOG) corrective regimen sliding scale   SubCutaneous at bedtime  insulin lispro (ADMELOG) corrective regimen sliding scale   SubCutaneous three times a day before meals  lactobacillus acidophilus 1 Tablet(s) Oral with breakfast  levETIRAcetam 500 milliGRAM(s) Oral two times a day  levothyroxine 50 MICROGram(s) Oral daily  magnesium sulfate  IVPB 2 Gram(s) IV Intermittent once  metoprolol tartrate 25 milliGRAM(s) Oral two times a day  multivitamin 1 Tablet(s) Oral every 24 hours  polyethylene glycol 3350 17 Gram(s) Oral two times a day  senna 2 Tablet(s) Oral at bedtime  simvastatin 20 milliGRAM(s) Oral at bedtime  sodium chloride 1 Gram(s) Oral two times a day  tamsulosin 0.4 milliGRAM(s) Oral at bedtime  urea Oral Powder 15 Gram(s) Oral daily    MEDICATIONS  (PRN):  acetaminophen     Tablet .. 650 milliGRAM(s) Oral every 6 hours PRN Temp greater or equal to 38C (100.4F), Mild Pain (1 - 3)  aluminum hydroxide/magnesium hydroxide/simethicone Suspension 30 milliLiter(s) Oral every 4 hours PRN Dyspepsia  dextrose Oral Gel 15 Gram(s) Oral once PRN Blood Glucose LESS THAN 70 milliGRAM(s)/deciliter  LORazepam   Injectable 2 milliGRAM(s) IV Push every 6 hours PRN Acute Seizure Event ONLY  melatonin 3 milliGRAM(s) Oral at bedtime PRN Insomnia  ondansetron Injectable 4 milliGRAM(s) IV Push every 8 hours PRN Nausea and/or Vomiting      Allergies    No Known Allergies    SOCIAL HISTORY: No illicit drug use    FAMILY HISTORY:  non contributory to current issue    REVIEW OF SYSTEMS   [x] Due to altered mental status/intubation, subjective information were not able to be obtained from patient. History was obtained, to the extent possible, from review of the chart and collateral sources of information.    Vital Signs Last 24 Hrs  T(C): 35.8 (05 Jul 2023 05:00), Max: 35.8 (04 Jul 2023 21:04)  T(F): 96.5 (05 Jul 2023 05:00), Max: 96.5 (05 Jul 2023 05:00)  HR: 87 (05 Jul 2023 07:06) (73 - 87)  BP: 106/53 (05 Jul 2023 07:06) (94/52 - 110/55)  RR: 16 (05 Jul 2023 08:07) (16 - 18)  SpO2: 96% (05 Jul 2023 08:07) (96% - 99%)    Parameters below as of 05 Jul 2023 08:07  Patient On (Oxygen Delivery Method): room air      PHYSICAL EXAM:    GEN: NAD, awake and alert. but slightly confused, currently under going VEEG   SKIN: Good color, non diaphoretic.  RESP: Non-labored breathing. No use of accessory muscles.  ABDO: Soft, NT/ND, no palpable bladder, no suprapubic tenderness.  BACK: No CVAT B/L  : + Non circumcised male. B/L descended testicles x 2. No palpable testicular masses, large fluctuant area inferior to the left testicle,   mildly tender no indurations or crepitus.  No meatal discharge. + condom catheter in place, draining clear yellow urine. EXT: JIMENES x 4      I&O's Summary      LABS:                        8.8    13.38 )-----------( 364      ( 05 Jul 2023 08:00 )             28.9     07-05    137  |  103  |  37<H>  ----------------------------<  241<H>  4.8   |  22  |  1.1    Ca    9.4      05 Jul 2023 08:00  Mg     1.7     07-05    TPro  5.3<L>  /  Alb  2.8<L>  /  TBili  <0.2  /  DBili  x   /  AST  19  /  ALT  12  /  AlkPhos  81  07-05      Urinalysis Basic - ( 05 Jul 2023 08:00 )    Color: x / Appearance: x / SG: x / pH: x  Gluc: 241 mg/dL / Ketone: x  / Bili: x / Urobili: x   Blood: x / Protein: x / Nitrite: x   Leuk Esterase: x / RBC: x / WBC x   Sq Epi: x / Non Sq Epi: x / Bacteria: x      Culture - Urine (06.27.23 @ 12:50)   Specimen Source: Catheterized Catheterized  Culture Results:   <10,000 CFU/mL Normal Urogenital Roxana      RADIOLOGY & ADDITIONAL STUDIES:      < from: US Testicles (07.01.23 @ 08:52) >    ACC: [id number] EXAM:  US SCROTUM AND CONTENTS   ORDERED BY: KAYDEN RODAS     PROCEDURE DATE:  07/01/2023          INTERPRETATION:  CLINICAL INFORMATION: Epididymitis.    COMPARISON: None available.    TECHNIQUE: Testicular ultrasound utilizing color and spectral Doppler.    FINDINGS:    Complex scrotal mixed echogenicity collection anterior to left testicle   measuring approximately 7.7 x 3 x 6.9 cm.    Bilateral symmetric testicular Doppler flow demonstrated.    RIGHT:  Right testis: 4.1 cm x 1.8 cm x 2.6 cm. right testicular heterogeneity  Right epididymis: Within normal limits.  Trace right hydrocele.    LEFT:  Left testis: 3.0 cm x 1.5 cm x 2.2 cm. left testicular heterogeneity  Left epididymis: Within normal limits.  Left hydrocele: None.    IMPRESSION:    Complex scrotal mixed echogenicity collection anterior to left testicle   measuring approximately 7.7 x 3 x 6.9 cm.    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    --- End of Report ---            ALEX WOODS MD; Attending Radiologist  This document has been electronically signed. Jul 2 2023 11:12AM    < end of copied text >    < from: US Kidney and Bladder (06.23.23 @ 21:08) >    ACC: [id number] EXAM:  US KIDNEYS AND BLADDER   ORDERED BY: CAROLE TURPIN     PROCEDURE DATE:  06/23/2023          INTERPRETATION:  CLINICAL HISTORY / REASON FOR EXAM: Pain    CORRELATION: CT chest, abdomen and pelvis from March 28, 2023    PROCEDURE: Retroperitoneal ultrasound was performed.    FINDINGS:    RIGHT KIDNEY: 9.1 cm. No renal mass, hydronephrosis or calculi.    LEFT KIDNEY: 9.9 cm. No renal mass, hydronephrosis or calculi.    URINARY BLADDER: No debris or calculus. Bilateral ureteral jets are   visualized. Prevoid volume of approximately 150 mL. Postvoid volume of   approximately 69 mL.      IMPRESSION:    No sonographic evidence of hydronephrosis.    Post void residual of 69 mL (46%).    --- End of Report ---        CB YEE MD; Attending Radiologist  This document has been electronically signed. Jun 23 2023  9:12PM    < end of copied text >

## 2023-07-05 NOTE — PROGRESS NOTE ADULT - SUBJECTIVE AND OBJECTIVE BOX
CHEL CROFT 89y Male  MRN#: [id number]     Hospital Day: 12d    Pt is currently admitted with the primary diagnosis of  Hyperkalemia        SUBJECTIVE     Overnight events  None    Subjective complaints  Pt was evaluated this am. Patient more awake and oriented than previously.                                             ----------------------------------------------------------  OBJECTIVE  PAST MEDICAL & SURGICAL HISTORY  HTN (hypertension)    HLD (hyperlipidemia)    BPH (benign prostatic hyperplasia)    CAD (coronary artery disease)    Diabetes mellitus    Hypothyroidism    S/P CABG (coronary artery bypass graft)                                              -----------------------------------------------------------  ALLERGIES:  No Known Allergies                                            ------------------------------------------------------------    HOME MEDICATIONS  Home Medications:  DULoxetine 60 mg oral delayed release capsule: 1 cap(s) orally once a day (13 Dec 2022 22:03)  finasteride 5 mg oral tablet: 1 tab(s) orally once a day (13 Dec 2022 22:03)  lactobacillus acidophilus oral capsule: 1 tab(s) orally once a day (20 Dec 2022 10:18)  levothyroxine 50 mcg (0.05 mg) oral tablet: 1 tab(s) orally once a day (13 Dec 2022 22:03)  Metoprolol Tartrate 25 mg oral tablet: 1 tab(s) orally 2 times a day (13 Dec 2022 22:03)  Plavix 75 mg oral tablet: 1 orally once a day (23 Jun 2023 21:54)  simvastatin 20 mg oral tablet: 1 tab(s) orally once a day (at bedtime) (13 Dec 2022 22:03)  tamsulosin 0.4 mg oral capsule: 1 cap(s) orally once a day (13 Dec 2022 22:03)  Toujeo SoloStar 300 units/mL subcutaneous solution: 12 unit(s) subcutaneous once a day (in the morning) (13 Dec 2022 22:03)                           MEDICATIONS:  STANDING MEDICATIONS  clopidogrel Tablet 75 milliGRAM(s) Oral daily  dextrose 5%. 1000 milliLiter(s) IV Continuous <Continuous>  dextrose 5%. 1000 milliLiter(s) IV Continuous <Continuous>  dextrose 50% Injectable 25 Gram(s) IV Push once  dextrose 50% Injectable 25 Gram(s) IV Push once  dextrose 50% Injectable 12.5 Gram(s) IV Push once  DULoxetine 60 milliGRAM(s) Oral daily  ferrous    sulfate 325 milliGRAM(s) Oral two times a day  finasteride 5 milliGRAM(s) Oral daily  glucagon  Injectable 1 milliGRAM(s) IntraMuscular once  heparin   Injectable 5000 Unit(s) SubCutaneous every 8 hours  insulin glargine Injectable (LANTUS) 12 Unit(s) SubCutaneous every morning  insulin lispro (ADMELOG) corrective regimen sliding scale   SubCutaneous at bedtime  insulin lispro (ADMELOG) corrective regimen sliding scale   SubCutaneous three times a day before meals  insulin lispro Injectable (ADMELOG) 4 Unit(s) SubCutaneous before lunch  insulin lispro Injectable (ADMELOG) 4 Unit(s) SubCutaneous before breakfast  insulin lispro Injectable (ADMELOG) 4 Unit(s) SubCutaneous before dinner  lactobacillus acidophilus 1 Tablet(s) Oral with breakfast  levETIRAcetam 500 milliGRAM(s) Oral two times a day  levothyroxine 50 MICROGram(s) Oral daily  metoprolol tartrate 25 milliGRAM(s) Oral two times a day  multivitamin 1 Tablet(s) Oral every 24 hours  piperacillin/tazobactam IVPB.- 3.375 Gram(s) IV Intermittent once  polyethylene glycol 3350 17 Gram(s) Oral two times a day  senna 2 Tablet(s) Oral at bedtime  simvastatin 20 milliGRAM(s) Oral at bedtime  sodium chloride 1 Gram(s) Oral two times a day  tamsulosin 0.4 milliGRAM(s) Oral at bedtime  urea Oral Powder 15 Gram(s) Oral daily    PRN MEDICATIONS  acetaminophen     Tablet .. 650 milliGRAM(s) Oral every 6 hours PRN  aluminum hydroxide/magnesium hydroxide/simethicone Suspension 30 milliLiter(s) Oral every 4 hours PRN  dextrose Oral Gel 15 Gram(s) Oral once PRN  LORazepam   Injectable 2 milliGRAM(s) IV Push every 6 hours PRN  melatonin 3 milliGRAM(s) Oral at bedtime PRN  ondansetron Injectable 4 milliGRAM(s) IV Push every 8 hours PRN                                            ------------------------------------------------------------  VITAL SIGNS: Last 24 Hours  T(C): 36.2 (05 Jul 2023 14:53), Max: 36.2 (05 Jul 2023 14:53)  T(F): 97.1 (05 Jul 2023 14:53), Max: 97.1 (05 Jul 2023 14:53)  HR: 87 (05 Jul 2023 14:53) (73 - 87)  BP: 110/60 (05 Jul 2023 14:53) (94/52 - 110/60)  BP(mean): --  RR: 16 (05 Jul 2023 14:53) (16 - 18)  SpO2: 96% (05 Jul 2023 14:53) (96% - 99%)                                             --------------------------------------------------------------  LABS:                        8.8    13.38 )-----------( 364      ( 05 Jul 2023 08:00 )             28.9     07-05    137  |  103  |  37<H>  ----------------------------<  241<H>  4.8   |  22  |  1.1    Ca    9.4      05 Jul 2023 08:00  Mg     1.7     07-05    TPro  5.3<L>  /  Alb  2.8<L>  /  TBili  <0.2  /  DBili  x   /  AST  19  /  ALT  12  /  AlkPhos  81  07-05      Urinalysis Basic - ( 05 Jul 2023 08:00 )    Color: x / Appearance: x / SG: x / pH: x  Gluc: 241 mg/dL / Ketone: x  / Bili: x / Urobili: x   Blood: x / Protein: x / Nitrite: x   Leuk Esterase: x / RBC: x / WBC x   Sq Epi: x / Non Sq Epi: x / Bacteria: x                                                            -------------------------------------------------------------  RADIOLOGY:                                            --------------------------------------------------------------    PHYSICAL EXAM:  GENERAL: NAD, lying in bed comfortably  HEAD:  Atraumatic, Normocephalic  EYES: EOMI, conjunctiva and sclera clear  ENT: Moist mucous membranes  CHEST/LUNG: Clear to auscultation bilaterally;  HEART: regular rate and rhythm; No murmurs, rubs, or gallops  ABDOMEN: Bowel sounds present; Soft, Nontender, Nondistended.    EXTREMITIES: Warm. No clubbing, cyanosis, or edema  NERVOUS SYSTEM:  Alert & Oriented.                                             --------------------------------------------------------------

## 2023-07-05 NOTE — PROGRESS NOTE ADULT - ASSESSMENT
Patient is a 89 y/o Male with  PMHx HTN, DM2, BPH, CAD s/p CABG, hypothyroidism, recently dx. from [facility] , progressive ambulatory dysfunction, admitted   with hyperkalemia, hyponatremia noted as outpt. Found to have pseudomonas bacteremia.    # Acute mental status change:   - MR brain WNL  - EEG Done- 2nd EEG shows generalized slowing + Multifocal epileptic potentials. Started Keppra 500mg every 12 hours . Neuro f/up 7/4- placed on VEEG now.   - F/u neuro for further recs, continue Neurochecks      #Bacteremia due to Pseudomonas.   # Leukocytosis   #in setting of recent hidalgo in snf; now out; possible uti, suspected testicular infection  US Scrotum (for epididymitis) : Complex scrotal mixed echogenicity collection anterior to left testicle measuring approximately 7.7 x 3 x 6.9 cm. 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  ua positive; f/u ucxs- more than 3 organisms, repeat urine cxs neg , repeat blood cxs 6/26, 28 Neg.  blood cxs pseudomonas 6/24, as per  ID - continue on IV Zosyn tx.   - consulted  - possible bedside I&D for abscess    # Hypomagnesemia- supplemented  - f/u mag level tomorrow    #hyponatremia- stable levels   Per Nephro eval, likely [facility]? Urea 15gm QD,  Uric Acid = 4.5. No need to restrict po free water.   - continue on salt tabs  -daily BMP monitoring      #constipated: Senna, Miralax.     #HTN (hypertension)  - currently borderline Hypotensive   - continue  lopressor 25mg PO twice daily with holding parameters.     #DM2 (diabetes mellitus, type 2)- uncontrolled, Hga1C- 9  - lantus 12 units subc. once daily ,   -  lispro 4 units subc. before each meal three times daily   ssi.    #History of BPH.   - continue finasteride 5 mg po once daily /tamsulosin 0.4 mg po once daily at bedtime     #h/o CAD (coronary artery disease). / h/o CABG  - plavix/zocor tx.  -patient is on supplemental oxygen via NC, 2 L , on RA sat 91- 92% at rest     #Hypothyroidism. continue synthroid 50mcg po once daily    # Iron def. anemia- started on PO Iron tx.     # Ambulatory dysfunction, physical decline- PT eval.     DVT ppX, heparin  DNR/DNI ([facility] signed 6/29)    #Progress Note Handoff: f/up EEG results, f/up  for further rec. on abn. Scrotal US, continue IV Zosyn, supplement Mg . f/u mag helena  Family discussion: all results and medical plan of tx. d/w patient's daughter Shayna, who is in agreement with treatment plan. Disposition:  STR vs. Home with home care and home PT.

## 2023-07-06 LAB
ALBUMIN SERPL ELPH-MCNC: 3.1 G/DL — LOW (ref 3.5–5.2)
ALP SERPL-CCNC: 74 U/L — SIGNIFICANT CHANGE UP (ref 30–115)
ALT FLD-CCNC: 13 U/L — SIGNIFICANT CHANGE UP (ref 0–41)
ANION GAP SERPL CALC-SCNC: 9 MMOL/L — SIGNIFICANT CHANGE UP (ref 7–14)
AST SERPL-CCNC: 19 U/L — SIGNIFICANT CHANGE UP (ref 0–41)
BILIRUB SERPL-MCNC: 0.2 MG/DL — SIGNIFICANT CHANGE UP (ref 0.2–1.2)
BUN SERPL-MCNC: 39 MG/DL — HIGH (ref 10–20)
CALCIUM SERPL-MCNC: 9.3 MG/DL — SIGNIFICANT CHANGE UP (ref 8.4–10.5)
CHLORIDE SERPL-SCNC: 99 MMOL/L — SIGNIFICANT CHANGE UP (ref 98–110)
CO2 SERPL-SCNC: 25 MMOL/L — SIGNIFICANT CHANGE UP (ref 17–32)
CREAT SERPL-MCNC: 1.1 MG/DL — SIGNIFICANT CHANGE UP (ref 0.7–1.5)
EGFR: 60 ML/MIN/1.73M2 — SIGNIFICANT CHANGE UP
GLUCOSE BLDC GLUCOMTR-MCNC: 103 MG/DL — HIGH (ref 70–99)
GLUCOSE BLDC GLUCOMTR-MCNC: 131 MG/DL — HIGH (ref 70–99)
GLUCOSE BLDC GLUCOMTR-MCNC: 131 MG/DL — HIGH (ref 70–99)
GLUCOSE BLDC GLUCOMTR-MCNC: 242 MG/DL — HIGH (ref 70–99)
GLUCOSE SERPL-MCNC: 181 MG/DL — HIGH (ref 70–99)
GRAM STN FLD: SIGNIFICANT CHANGE UP
HCT VFR BLD CALC: 29.3 % — LOW (ref 42–52)
HGB BLD-MCNC: 9.2 G/DL — LOW (ref 14–18)
MAGNESIUM SERPL-MCNC: 2.1 MG/DL — SIGNIFICANT CHANGE UP (ref 1.8–2.4)
MCHC RBC-ENTMCNC: 26.8 PG — LOW (ref 27–31)
MCHC RBC-ENTMCNC: 31.4 G/DL — LOW (ref 32–37)
MCV RBC AUTO: 85.4 FL — SIGNIFICANT CHANGE UP (ref 80–94)
NRBC # BLD: 0 /100 WBCS — SIGNIFICANT CHANGE UP (ref 0–0)
PLATELET # BLD AUTO: 358 K/UL — SIGNIFICANT CHANGE UP (ref 130–400)
PMV BLD: 9.2 FL — SIGNIFICANT CHANGE UP (ref 7.4–10.4)
POTASSIUM SERPL-MCNC: 4.7 MMOL/L — SIGNIFICANT CHANGE UP (ref 3.5–5)
POTASSIUM SERPL-SCNC: 4.7 MMOL/L — SIGNIFICANT CHANGE UP (ref 3.5–5)
PROT SERPL-MCNC: 5.3 G/DL — LOW (ref 6–8)
RBC # BLD: 3.43 M/UL — LOW (ref 4.7–6.1)
RBC # FLD: 17.8 % — HIGH (ref 11.5–14.5)
SODIUM SERPL-SCNC: 133 MMOL/L — LOW (ref 135–146)
SPECIMEN SOURCE: SIGNIFICANT CHANGE UP
WBC # BLD: 12.04 K/UL — HIGH (ref 4.8–10.8)
WBC # FLD AUTO: 12.04 K/UL — HIGH (ref 4.8–10.8)

## 2023-07-06 PROCEDURE — 95720 EEG PHY/QHP EA INCR W/VEEG: CPT

## 2023-07-06 PROCEDURE — 99232 SBSQ HOSP IP/OBS MODERATE 35: CPT

## 2023-07-06 PROCEDURE — 99233 SBSQ HOSP IP/OBS HIGH 50: CPT

## 2023-07-06 RX ORDER — TRAMADOL HYDROCHLORIDE 50 MG/1
25 TABLET ORAL EVERY 8 HOURS
Refills: 0 | Status: DISCONTINUED | OUTPATIENT
Start: 2023-07-06 | End: 2023-07-08

## 2023-07-06 RX ORDER — LEVETIRACETAM 250 MG/1
500 TABLET, FILM COATED ORAL EVERY 12 HOURS
Refills: 0 | Status: DISCONTINUED | OUTPATIENT
Start: 2023-07-06 | End: 2023-07-09

## 2023-07-06 RX ADMIN — Medication 325 MILLIGRAM(S): at 17:10

## 2023-07-06 RX ADMIN — Medication 2: at 08:25

## 2023-07-06 RX ADMIN — Medication 4 UNIT(S): at 12:11

## 2023-07-06 RX ADMIN — PIPERACILLIN AND TAZOBACTAM 25 GRAM(S): 4; .5 INJECTION, POWDER, LYOPHILIZED, FOR SOLUTION INTRAVENOUS at 02:47

## 2023-07-06 RX ADMIN — FINASTERIDE 5 MILLIGRAM(S): 5 TABLET, FILM COATED ORAL at 11:57

## 2023-07-06 RX ADMIN — LEVETIRACETAM 400 MILLIGRAM(S): 250 TABLET, FILM COATED ORAL at 05:19

## 2023-07-06 RX ADMIN — HEPARIN SODIUM 5000 UNIT(S): 5000 INJECTION INTRAVENOUS; SUBCUTANEOUS at 13:06

## 2023-07-06 RX ADMIN — PIPERACILLIN AND TAZOBACTAM 25 GRAM(S): 4; .5 INJECTION, POWDER, LYOPHILIZED, FOR SOLUTION INTRAVENOUS at 13:06

## 2023-07-06 RX ADMIN — Medication 15 GRAM(S): at 17:09

## 2023-07-06 RX ADMIN — CLOPIDOGREL BISULFATE 75 MILLIGRAM(S): 75 TABLET, FILM COATED ORAL at 11:57

## 2023-07-06 RX ADMIN — Medication 25 MILLIGRAM(S): at 17:09

## 2023-07-06 RX ADMIN — Medication 1 TABLET(S): at 08:24

## 2023-07-06 RX ADMIN — INSULIN GLARGINE 12 UNIT(S): 100 INJECTION, SOLUTION SUBCUTANEOUS at 08:23

## 2023-07-06 RX ADMIN — Medication 1 TABLET(S): at 11:57

## 2023-07-06 RX ADMIN — PIPERACILLIN AND TAZOBACTAM 25 GRAM(S): 4; .5 INJECTION, POWDER, LYOPHILIZED, FOR SOLUTION INTRAVENOUS at 21:35

## 2023-07-06 RX ADMIN — LEVETIRACETAM 400 MILLIGRAM(S): 250 TABLET, FILM COATED ORAL at 17:10

## 2023-07-06 RX ADMIN — SODIUM CHLORIDE 1 GRAM(S): 9 INJECTION INTRAMUSCULAR; INTRAVENOUS; SUBCUTANEOUS at 17:10

## 2023-07-06 RX ADMIN — PIPERACILLIN AND TAZOBACTAM 25 GRAM(S): 4; .5 INJECTION, POWDER, LYOPHILIZED, FOR SOLUTION INTRAVENOUS at 08:23

## 2023-07-06 RX ADMIN — DULOXETINE HYDROCHLORIDE 60 MILLIGRAM(S): 30 CAPSULE, DELAYED RELEASE ORAL at 11:57

## 2023-07-06 RX ADMIN — HEPARIN SODIUM 5000 UNIT(S): 5000 INJECTION INTRAVENOUS; SUBCUTANEOUS at 05:20

## 2023-07-06 RX ADMIN — Medication 4 UNIT(S): at 08:27

## 2023-07-06 RX ADMIN — HEPARIN SODIUM 5000 UNIT(S): 5000 INJECTION INTRAVENOUS; SUBCUTANEOUS at 21:36

## 2023-07-06 NOTE — PROGRESS NOTE ADULT - ATTENDING COMMENTS
Patient is a 89 y/o Male with  PMHx HTN, DM2, BPH, CAD s/p CABG, hypothyroidism, recently dx. from [facility] , progressive ambulatory dysfunction, admitted   with hyperkalemia, hyponatremia noted as outpt. Found to have pseudomonas bacteremia.    # Acute mental status change:   - MR brain WNL  - EEG Done- 2nd EEG shows generalized slowing + Multifocal epileptic potentials. Started Keppra 500mg every 12 hours .   Neuro f/up 7/4- s/p VEEG - no epilepti-form activity   - F/u neuro for further recs, continue Neurochecks      #Bacteremia due to Pseudomonas.   # Leukocytosis   #in setting of acute Testicular abscess  US Scrotum (for epididymitis) : Complex scrotal mixed echogenicity collection anterior to left testicle measuring approximately 7.7 x 3 x 6.9 cm. 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  ua positive; s/p urine cxs- more than 3 organisms, repeat urine cxs neg , repeat blood cxs 6/26, 28 Neg.  blood cxs pseudomonas 6/24, as per  ID - continue on IV Zosyn tx.   - consulted  - s/p Incision and drainage on 7/6/23- daily wound care, f/up abscess cxs, pain management ,  f/up     # Hypomagnesemia- supplemented     #hyponatremia- stable levels   Per Nephro eval, likely [facility]? Urea 15gm QD,  Uric Acid = 4.5. No need to restrict po free water. continue on salt tabs  -daily BMP monitoring      #constipated: Senna, Miralax.     #HTN (hypertension). currently borderline Hypotensive   -continue  lopressor 25mg PO twice daily with holding parameters.     #DM2 (diabetes mellitus, type 2)- uncontrolled, Hga1C- 9  lantus 12 units subc. once daily , add on lispro 4 units subc. before each meal three times daily   ssi.    #History of BPH. - continue finasteride 5 mg po once daily /tamsulosin 0.4 mg po once daily at bedtime     #h/o CAD (coronary artery disease). / h/o CABG  - plavix/zocor tx.  -patient is on supplemental oxygen via [place], 2 L , on RA sat 91- 92% at rest     #Hypothyroidism. continue synthroid 50mcg po once daily    # Iron def. anemia- started on PO Iron tx.     # Ambulatory dysfunction, physical decline- PT eval.     DVT ppX, heparin  DNR/DNI (MOLST signed 6/29)    #Progress Note Handoff:  continue IV Zosyn, f/up abscess cxs, daily CBC, BMP monitoring, neuro assessment, PT eval  Family discussion: all results and medical plan of tx. d/w patient's daughter Shayna, who is in agreement with treatment plan. Disposition:  STR vs. Home with home care and home PT.     Total time spent to complete patient's bedside assessment, review medical chart, discuss medical plan of care with covering medical team was more than 50  minutes with >50% of time spent face to face with patient, discussion with patient/family and/or coordination of care

## 2023-07-06 NOTE — PROGRESS NOTE ADULT - ATTENDING COMMENTS
89 yo M w pmh of CABG, HTN, DM2, hypothyroidism admitted for electrolyte abnormalities with acute decreased responsiveness found to have abnormal EEG w/ epileptiform activities possible LRE currently doing well with keppra 500 mg BID.  Recommendations as above.

## 2023-07-06 NOTE — PROGRESS NOTE ADULT - ASSESSMENT
Patient is a 89 y/o Male with  PMHx HTN, DM2, BPH, CAD s/p CABG, hypothyroidism, recently dx. from [facility] , progressive ambulatory dysfunction, admitted   with hyperkalemia, hyponatremia noted as outpt. Found to have pseudomonas bacteremia.    # Acute mental status change:   - MR brain WNL  - EEG Done- 2nd EEG shows generalized slowing + Multifocal epileptic potentials. Started Keppra 500mg every 12 hours .   - Neuro f/up 7/4- placed on VEEG now.   - f/u VEEG results  - F/u neuro for further recs, continue Neurochecks      #Bacteremia due to Pseudomonas.   # Leukocytosis   #in setting of recent hidalgo in snf; now out; possible uti, suspected testicular infection  US Scrotum (for epididymitis) : Complex scrotal mixed echogenicity collection anterior to left testicle measuring approximately 7.7 x 3 x 6.9 cm. 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  ua positive; f/u ucxs- more than 3 organisms, repeat urine cxs neg , repeat blood cxs 6/26, 28 Neg.  blood cxs pseudomonas 6/24, as per  ID - continue on IV Zosyn tx.   -  did I&D bedside 7/5   - f/u wound cx - no organisms seen so far    # Hypomagnesemia- supplemented  - f/u mag level  - mag 2.1 stable now    #hyponatremia- stable levels   Per Nephro eval, likely [facility]? Urea 15gm QD,  Uric Acid = 4.5. No need to restrict po free water.   - Na 133 7/6  - continue on salt tabs  - daily BMP monitoring      #constipated:   - Senna, Miralax.     #HTN (hypertension)  - currently borderline Hypotensive   - continue lopressor 25mg PO twice daily with holding parameters.     #DM2 (diabetes mellitus, type 2)  - uncontrolled, Hga1C- 9  - lantus 12 units subc. once daily ,   - lispro 4 units subc. before each meal three times daily   ssi.    #History of BPH.   - continue finasteride 5 mg po once daily /tamsulosin 0.4 mg po once daily at bedtime     #h/o CAD (coronary artery disease). / h/o CABG  - plavix/zocor tx.  -patient is on supplemental oxygen via [place], 2 L , on RA sat 91- 92% at rest     #Hypothyroidism.   - continue synthroid 50mcg po once daily    # Iron def. anemia  - started on PO Iron tx.     # Ambulatory dysfunction   - physical decline- PT eval.     DVT ppX, heparin  DNR/DNI (MOLST signed 6/29)    #Progress Note Handoff: f/up VEEG results, on abx. continue IV Zosyn, supplement Mg if needed   Patient is a 89 y/o Male with  PMHx HTN, DM2, BPH, CAD s/p CABG, hypothyroidism, recently dx. from [facility] , progressive ambulatory dysfunction, admitted   with hyperkalemia, hyponatremia noted as outpt. Found to have pseudomonas bacteremia.    # Acute mental status change:   - MR brain WNL  - EEG Done- 2nd EEG shows generalized slowing + Multifocal epileptic potentials. Started Keppra 500mg every 12 hours .   - Neuro f/up 7/4- placed on VEEG now.   - f/u VEEG results  - F/u neuro for further recs, continue Neurochecks      #Bacteremia due to Pseudomonas.   # Leukocytosis   #in setting of recent hidalgo in snf; now out; possible uti, suspected testicular infection  US Scrotum (for epididymitis) : Complex scrotal mixed echogenicity collection anterior to left testicle measuring approximately 7.7 x 3 x 6.9 cm. 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  ua positive; f/u ucxs- more than 3 organisms, repeat urine cxs neg , repeat blood cxs 6/26, 28 Neg.  blood cxs pseudomonas 6/24, as per  ID - continue on IV Zosyn tx.   -  did I&D bedside 7/5   - f/u wound cx - no organisms seen so far    # Hypomagnesemia- supplemented  - f/u mag level  - mag 2.1 stable now    #hyponatremia- stable levels   Per Nephro eval, likely [facility]? Urea 15gm QD,  Uric Acid = 4.5. No need to restrict po free water.   - Na 133 7/6  - continue on salt tabs  - daily BMP monitoring      #constipated:   - Senna, Miralax.     #HTN (hypertension)  - currently borderline Hypotensive   - continue lopressor 25mg PO twice daily with holding parameters.     #DM2 (diabetes mellitus, type 2)  - uncontrolled, Hga1C- 9  - lantus 12 units subc. once daily ,   - lispro 4 units subc. before each meal three times daily   ssi.    #History of BPH.   - continue finasteride 5 mg po once daily /tamsulosin 0.4 mg po once daily at bedtime     #h/o CAD (coronary artery disease). / h/o CABG  - plavix/zocor tx.  -patient is on supplemental oxygen via [place], 2 L , on RA sat 91- 92% at rest     #Hypothyroidism.   - continue synthroid 50mcg po once daily    # Iron def. anemia  - started on PO Iron tx.     # Ambulatory dysfunction   - physical decline- PT - will f/u today    DVT ppX, heparin  DNR/DNI (MOLST signed 6/29)    #Progress Note Handoff: f/up VEEG results, on abx. continue IV Zosyn, supplement Mg if needed

## 2023-07-06 NOTE — PROGRESS NOTE ADULT - ASSESSMENT
89 yo M w pmh of CABG, HTN, DM2, hypothyroidism was admitted for electrolyte derangement of hyperkalemia and hyponatremia s/p appropriate correction, and Pseudomonas infection. 7/2stroke code called for episode of aphasia? (stopped talking but was able to follow commands only with eyes) with rapid improvement of symptoms with NIHSS 10 to his baseline 4 -  TNK not administered. MRI brain negative for acute stroke. EEG revealed multifocal cortical hyper excitability with epileptogenic potential diffusely. Started on Keppra 1000 mg q12 hours that was decreased to 500 mg BID given noted lethargy. Currently more awake. EEG with only rare posterior transient sharps.       Recommendations:   - DC VEEG  - C/w keppra to 500 mg BID  - Keep magnesium >2  - Seizure precautions  - Medical management of infectious process and electrolyte derangement.   - Avoid neurotoxic antibiotics  - Outpatient neurology followup in 2-4 weeks    Discussed with neurology attending

## 2023-07-06 NOTE — EEG REPORT - NS EEG TEXT BOX
Epilepsy Attending Note:     CHEL CROFT    89y Male  MRN [id number]    Vital Signs Last 24 Hrs  T(C): 36.6 (2023 05:00), Max: 36.6 (2023 21:00)  T(F): 97.9 (2023 05:00), Max: 97.9 (2023 21:00)  HR: 97 (2023 05:00) (62 - 97)  BP: 109/57 (2023 05:00) (100/58 - 110/60)  BP(mean): --  RR: 18 (2023 05:00) (16 - 18)  SpO2: 97% (2023 05:00) (96% - 97%)    Parameters below as of 2023 16:50  Patient On (Oxygen Delivery Method): room air                              9.2    12.04 )-----------( 358      ( 2023 08:44 )             29.3       07-06    133<L>  |  99  |  39<H>  ----------------------------<  181<H>  4.7   |  25  |  1.1    Ca    9.3      2023 08:44  Mg     2.1     07-06    TPro  5.3<L>  /  Alb  3.1<L>  /  TBili  0.2  /  DBili  x   /  AST  19  /  ALT  13  /  AlkPhos  74  07-06      MEDICATIONS  (STANDING):  clopidogrel Tablet 75 milliGRAM(s) Oral daily  dextrose 5%. 1000 milliLiter(s) (100 mL/Hr) IV Continuous <Continuous>  dextrose 5%. 1000 milliLiter(s) (50 mL/Hr) IV Continuous <Continuous>  dextrose 50% Injectable 12.5 Gram(s) IV Push once  dextrose 50% Injectable 25 Gram(s) IV Push once  dextrose 50% Injectable 25 Gram(s) IV Push once  DULoxetine 60 milliGRAM(s) Oral daily  ferrous    sulfate 325 milliGRAM(s) Oral two times a day  finasteride 5 milliGRAM(s) Oral daily  glucagon  Injectable 1 milliGRAM(s) IntraMuscular once  heparin   Injectable 5000 Unit(s) SubCutaneous every 8 hours  insulin glargine Injectable (LANTUS) 12 Unit(s) SubCutaneous every morning  insulin lispro (ADMELOG) corrective regimen sliding scale   SubCutaneous at bedtime  insulin lispro (ADMELOG) corrective regimen sliding scale   SubCutaneous three times a day before meals  insulin lispro Injectable (ADMELOG) 4 Unit(s) SubCutaneous before lunch  insulin lispro Injectable (ADMELOG) 4 Unit(s) SubCutaneous before breakfast  insulin lispro Injectable (ADMELOG) 4 Unit(s) SubCutaneous before dinner  lactobacillus acidophilus 1 Tablet(s) Oral with breakfast  levETIRAcetam  IVPB 500 milliGRAM(s) IV Intermittent every 12 hours  levothyroxine 50 MICROGram(s) Oral daily  metoprolol tartrate 25 milliGRAM(s) Oral two times a day  multivitamin 1 Tablet(s) Oral every 24 hours  piperacillin/tazobactam IVPB.. 3.375 Gram(s) IV Intermittent every 8 hours  polyethylene glycol 3350 17 Gram(s) Oral two times a day  senna 2 Tablet(s) Oral at bedtime  simvastatin 20 milliGRAM(s) Oral at bedtime  sodium chloride 1 Gram(s) Oral two times a day  tamsulosin 0.4 milliGRAM(s) Oral at bedtime  urea Oral Powder 15 Gram(s) Oral daily    MEDICATIONS  (PRN):  acetaminophen     Tablet .. 650 milliGRAM(s) Oral every 6 hours PRN Temp greater or equal to 38C (100.4F), Mild Pain (1 - 3)  aluminum hydroxide/magnesium hydroxide/simethicone Suspension 30 milliLiter(s) Oral every 4 hours PRN Dyspepsia  dextrose Oral Gel 15 Gram(s) Oral once PRN Blood Glucose LESS THAN 70 milliGRAM(s)/deciliter  LORazepam   Injectable 2 milliGRAM(s) IV Push every 6 hours PRN Acute Seizure Event ONLY  melatonin 3 milliGRAM(s) Oral at bedtime PRN Insomnia  ondansetron Injectable 4 milliGRAM(s) IV Push every 8 hours PRN Nausea and/or Vomiting  traMADol 25 milliGRAM(s) Oral every 8 hours PRN Severe Pain (7 - 10)            VEEG in the last 24 hours:    Background - continuous, reaching frequencies in the range of     Focal and generalized slowin. generalized slowing  2.     Interictal activity -     Events - none    Seizures - none    Impression: Abnormal VEEG as above    Plan - per neurology team     Epilepsy Attending Note:     CHEL CROFT    89y Male  MRN [id number]    Vital Signs Last 24 Hrs  T(C): 36.6 (2023 05:00), Max: 36.6 (2023 21:00)  T(F): 97.9 (2023 05:00), Max: 97.9 (2023 21:00)  HR: 97 (2023 05:00) (62 - 97)  BP: 109/57 (2023 05:00) (100/58 - 110/60)  BP(mean): --  RR: 18 (2023 05:00) (16 - 18)  SpO2: 97% (2023 05:00) (96% - 97%)    Parameters below as of 2023 16:50  Patient On (Oxygen Delivery Method): room air                              9.2    12.04 )-----------( 358      ( 2023 08:44 )             29.3       07-06    133<L>  |  99  |  39<H>  ----------------------------<  181<H>  4.7   |  25  |  1.1    Ca    9.3      2023 08:44  Mg     2.1     07-06    TPro  5.3<L>  /  Alb  3.1<L>  /  TBili  0.2  /  DBili  x   /  AST  19  /  ALT  13  /  AlkPhos  74  07-06      MEDICATIONS  (STANDING):  clopidogrel Tablet 75 milliGRAM(s) Oral daily  dextrose 5%. 1000 milliLiter(s) (100 mL/Hr) IV Continuous <Continuous>  dextrose 5%. 1000 milliLiter(s) (50 mL/Hr) IV Continuous <Continuous>  dextrose 50% Injectable 12.5 Gram(s) IV Push once  dextrose 50% Injectable 25 Gram(s) IV Push once  dextrose 50% Injectable 25 Gram(s) IV Push once  DULoxetine 60 milliGRAM(s) Oral daily  ferrous    sulfate 325 milliGRAM(s) Oral two times a day  finasteride 5 milliGRAM(s) Oral daily  glucagon  Injectable 1 milliGRAM(s) IntraMuscular once  heparin   Injectable 5000 Unit(s) SubCutaneous every 8 hours  insulin glargine Injectable (LANTUS) 12 Unit(s) SubCutaneous every morning  insulin lispro (ADMELOG) corrective regimen sliding scale   SubCutaneous at bedtime  insulin lispro (ADMELOG) corrective regimen sliding scale   SubCutaneous three times a day before meals  insulin lispro Injectable (ADMELOG) 4 Unit(s) SubCutaneous before lunch  insulin lispro Injectable (ADMELOG) 4 Unit(s) SubCutaneous before breakfast  insulin lispro Injectable (ADMELOG) 4 Unit(s) SubCutaneous before dinner  lactobacillus acidophilus 1 Tablet(s) Oral with breakfast  levETIRAcetam  IVPB 500 milliGRAM(s) IV Intermittent every 12 hours  levothyroxine 50 MICROGram(s) Oral daily  metoprolol tartrate 25 milliGRAM(s) Oral two times a day  multivitamin 1 Tablet(s) Oral every 24 hours  piperacillin/tazobactam IVPB.. 3.375 Gram(s) IV Intermittent every 8 hours  polyethylene glycol 3350 17 Gram(s) Oral two times a day  senna 2 Tablet(s) Oral at bedtime  simvastatin 20 milliGRAM(s) Oral at bedtime  sodium chloride 1 Gram(s) Oral two times a day  tamsulosin 0.4 milliGRAM(s) Oral at bedtime  urea Oral Powder 15 Gram(s) Oral daily    MEDICATIONS  (PRN):  acetaminophen     Tablet .. 650 milliGRAM(s) Oral every 6 hours PRN Temp greater or equal to 38C (100.4F), Mild Pain (1 - 3)  aluminum hydroxide/magnesium hydroxide/simethicone Suspension 30 milliLiter(s) Oral every 4 hours PRN Dyspepsia  dextrose Oral Gel 15 Gram(s) Oral once PRN Blood Glucose LESS THAN 70 milliGRAM(s)/deciliter  LORazepam   Injectable 2 milliGRAM(s) IV Push every 6 hours PRN Acute Seizure Event ONLY  melatonin 3 milliGRAM(s) Oral at bedtime PRN Insomnia  ondansetron Injectable 4 milliGRAM(s) IV Push every 8 hours PRN Nausea and/or Vomiting  traMADol 25 milliGRAM(s) Oral every 8 hours PRN Severe Pain (7 - 10)            VEEG in the last 24 hours:    Background - continuous, symmetrical, less than optimally organized, reaching frequencies in the range of 6-7 Hz    Focal and generalized slowin. mild to moderate  generalized slowing .   2. borderline to mild right  mid to posterior temporal  and independent left anterior quadrants focal slowing, R>L    Interictal activity - rare to small number of right posterior sharp transients    Events - none    Seizures --none    Impression: abnormal VEEG as above    Plan - per neurology

## 2023-07-06 NOTE — PROGRESS NOTE ADULT - SUBJECTIVE AND OBJECTIVE BOX
Neurology Progress Note    INTERVAL HPI/OVERNIGHT EVENTS:  Patient seen and examined.  No events reported by the staff.  Remains on VEEG. Awake and oriented. Daughter at the bedside reports he looks the same.        MEDICATIONS  (STANDING):  clopidogrel Tablet 75 milliGRAM(s) Oral daily  dextrose 5%. 1000 milliLiter(s) (50 mL/Hr) IV Continuous <Continuous>  dextrose 5%. 1000 milliLiter(s) (100 mL/Hr) IV Continuous <Continuous>  dextrose 50% Injectable 25 Gram(s) IV Push once  dextrose 50% Injectable 25 Gram(s) IV Push once  dextrose 50% Injectable 12.5 Gram(s) IV Push once  DULoxetine 60 milliGRAM(s) Oral daily  ferrous    sulfate 325 milliGRAM(s) Oral two times a day  finasteride 5 milliGRAM(s) Oral daily  glucagon  Injectable 1 milliGRAM(s) IntraMuscular once  heparin   Injectable 5000 Unit(s) SubCutaneous every 8 hours  insulin glargine Injectable (LANTUS) 12 Unit(s) SubCutaneous every morning  insulin lispro (ADMELOG) corrective regimen sliding scale   SubCutaneous at bedtime  insulin lispro (ADMELOG) corrective regimen sliding scale   SubCutaneous three times a day before meals  insulin lispro Injectable (ADMELOG) 4 Unit(s) SubCutaneous before dinner  insulin lispro Injectable (ADMELOG) 4 Unit(s) SubCutaneous before lunch  insulin lispro Injectable (ADMELOG) 4 Unit(s) SubCutaneous before breakfast  lactobacillus acidophilus 1 Tablet(s) Oral with breakfast  levETIRAcetam  IVPB 500 milliGRAM(s) IV Intermittent every 12 hours  levothyroxine 50 MICROGram(s) Oral daily  metoprolol tartrate 25 milliGRAM(s) Oral two times a day  multivitamin 1 Tablet(s) Oral every 24 hours  piperacillin/tazobactam IVPB.. 3.375 Gram(s) IV Intermittent every 8 hours  polyethylene glycol 3350 17 Gram(s) Oral two times a day  senna 2 Tablet(s) Oral at bedtime  simvastatin 20 milliGRAM(s) Oral at bedtime  sodium chloride 1 Gram(s) Oral two times a day  tamsulosin 0.4 milliGRAM(s) Oral at bedtime  urea Oral Powder 15 Gram(s) Oral daily    MEDICATIONS  (PRN):  acetaminophen     Tablet .. 650 milliGRAM(s) Oral every 6 hours PRN Temp greater or equal to 38C (100.4F), Mild Pain (1 - 3)  aluminum hydroxide/magnesium hydroxide/simethicone Suspension 30 milliLiter(s) Oral every 4 hours PRN Dyspepsia  dextrose Oral Gel 15 Gram(s) Oral once PRN Blood Glucose LESS THAN 70 milliGRAM(s)/deciliter  LORazepam   Injectable 2 milliGRAM(s) IV Push every 6 hours PRN Acute Seizure Event ONLY  melatonin 3 milliGRAM(s) Oral at bedtime PRN Insomnia  ondansetron Injectable 4 milliGRAM(s) IV Push every 8 hours PRN Nausea and/or Vomiting  traMADol 25 milliGRAM(s) Oral every 8 hours PRN Severe Pain (7 - 10)      Allergies    No Known Allergies    Intolerances        Vital Signs Last 24 Hrs  T(C): 36.4 (06 Jul 2023 13:00), Max: 36.6 (05 Jul 2023 21:00)  T(F): 97.5 (06 Jul 2023 13:00), Max: 97.9 (05 Jul 2023 21:00)  HR: 88 (06 Jul 2023 13:00) (62 - 97)  BP: 110/59 (06 Jul 2023 13:00) (100/58 - 110/59)  BP(mean): --  RR: 16 (06 Jul 2023 13:00) (16 - 18)  SpO2: 98% (06 Jul 2023 13:00) (96% - 98%)    Parameters below as of 05 Jul 2023 16:50  Patient On (Oxygen Delivery Method): room air        Physical exam:      Neurologic:  General: No acute distress, awake and alert  NAD, AOX2 (person, place, age).    Patient lethargic following commands  Hypophonic, mouthing his name and location when asked, +dysarthria?  Holds bilateral UE up for 10 seconds without drift  Flexes bilateral LE symmetrically, hold against gravity      LABS:                        9.2    12.04 )-----------( 358      ( 06 Jul 2023 08:44 )             29.3     07-06    133<L>  |  99  |  39<H>  ----------------------------<  181<H>  4.7   |  25  |  1.1    Ca    9.3      06 Jul 2023 08:44  Mg     2.1     07-06    TPro  5.3<L>  /  Alb  3.1<L>  /  TBili  0.2  /  DBili  x   /  AST  19  /  ALT  13  /  AlkPhos  74  07-06      Urinalysis Basic - ( 06 Jul 2023 08:44 )    Color: x / Appearance: x / SG: x / pH: x  Gluc: 181 mg/dL / Ketone: x  / Bili: x / Urobili: x   Blood: x / Protein: x / Nitrite: x   Leuk Esterase: x / RBC: x / WBC x   Sq Epi: x / Non Sq Epi: x / Bacteria: x        RADIOLOGY & ADDITIONAL TESTS:

## 2023-07-06 NOTE — PROGRESS NOTE ADULT - SUBJECTIVE AND OBJECTIVE BOX
CHEL CROFT 89y Male  MRN#: [id number]     Hospital Day: 13d    Pt is currently admitted with the primary diagnosis of  Hyperkalemia        SUBJECTIVE     Overnight events  None    Subjective complaints  Pt was evaluated this am. Patient denied any active complaints and per patient his symptoms are improving                                            ----------------------------------------------------------  OBJECTIVE  PAST MEDICAL & SURGICAL HISTORY  HTN (hypertension)    HLD (hyperlipidemia)    BPH (benign prostatic hyperplasia)    CAD (coronary artery disease)    Diabetes mellitus    Hypothyroidism    S/P CABG (coronary artery bypass graft)                                              -----------------------------------------------------------  ALLERGIES:  No Known Allergies                                            ------------------------------------------------------------    HOME MEDICATIONS  Home Medications:  DULoxetine 60 mg oral delayed release capsule: 1 cap(s) orally once a day (13 Dec 2022 22:03)  finasteride 5 mg oral tablet: 1 tab(s) orally once a day (13 Dec 2022 22:03)  lactobacillus acidophilus oral capsule: 1 tab(s) orally once a day (20 Dec 2022 10:18)  levothyroxine 50 mcg (0.05 mg) oral tablet: 1 tab(s) orally once a day (13 Dec 2022 22:03)  Metoprolol Tartrate 25 mg oral tablet: 1 tab(s) orally 2 times a day (13 Dec 2022 22:03)  Plavix 75 mg oral tablet: 1 orally once a day (23 Jun 2023 21:54)  simvastatin 20 mg oral tablet: 1 tab(s) orally once a day (at bedtime) (13 Dec 2022 22:03)  tamsulosin 0.4 mg oral capsule: 1 cap(s) orally once a day (13 Dec 2022 22:03)  Trent Davidson 300 units/mL subcutaneous solution: 12 unit(s) subcutaneous once a day (in the morning) (13 Dec 2022 22:03)                           MEDICATIONS:  STANDING MEDICATIONS  clopidogrel Tablet 75 milliGRAM(s) Oral daily  dextrose 5%. 1000 milliLiter(s) IV Continuous <Continuous>  dextrose 5%. 1000 milliLiter(s) IV Continuous <Continuous>  dextrose 50% Injectable 25 Gram(s) IV Push once  dextrose 50% Injectable 25 Gram(s) IV Push once  dextrose 50% Injectable 12.5 Gram(s) IV Push once  DULoxetine 60 milliGRAM(s) Oral daily  ferrous    sulfate 325 milliGRAM(s) Oral two times a day  finasteride 5 milliGRAM(s) Oral daily  glucagon  Injectable 1 milliGRAM(s) IntraMuscular once  heparin   Injectable 5000 Unit(s) SubCutaneous every 8 hours  insulin glargine Injectable (LANTUS) 12 Unit(s) SubCutaneous every morning  insulin lispro (ADMELOG) corrective regimen sliding scale   SubCutaneous at bedtime  insulin lispro (ADMELOG) corrective regimen sliding scale   SubCutaneous three times a day before meals  insulin lispro Injectable (ADMELOG) 4 Unit(s) SubCutaneous before breakfast  insulin lispro Injectable (ADMELOG) 4 Unit(s) SubCutaneous before lunch  insulin lispro Injectable (ADMELOG) 4 Unit(s) SubCutaneous before dinner  lactobacillus acidophilus 1 Tablet(s) Oral with breakfast  levETIRAcetam  IVPB 500 milliGRAM(s) IV Intermittent every 12 hours  levothyroxine 50 MICROGram(s) Oral daily  metoprolol tartrate 25 milliGRAM(s) Oral two times a day  multivitamin 1 Tablet(s) Oral every 24 hours  piperacillin/tazobactam IVPB.. 3.375 Gram(s) IV Intermittent every 8 hours  polyethylene glycol 3350 17 Gram(s) Oral two times a day  senna 2 Tablet(s) Oral at bedtime  simvastatin 20 milliGRAM(s) Oral at bedtime  sodium chloride 1 Gram(s) Oral two times a day  tamsulosin 0.4 milliGRAM(s) Oral at bedtime  urea Oral Powder 15 Gram(s) Oral daily    PRN MEDICATIONS  acetaminophen     Tablet .. 650 milliGRAM(s) Oral every 6 hours PRN  aluminum hydroxide/magnesium hydroxide/simethicone Suspension 30 milliLiter(s) Oral every 4 hours PRN  dextrose Oral Gel 15 Gram(s) Oral once PRN  LORazepam   Injectable 2 milliGRAM(s) IV Push every 6 hours PRN  melatonin 3 milliGRAM(s) Oral at bedtime PRN  ondansetron Injectable 4 milliGRAM(s) IV Push every 8 hours PRN  traMADol 25 milliGRAM(s) Oral every 8 hours PRN                                            ------------------------------------------------------------  VITAL SIGNS: Last 24 Hours  T(C): 36.6 (06 Jul 2023 05:00), Max: 36.6 (05 Jul 2023 21:00)  T(F): 97.9 (06 Jul 2023 05:00), Max: 97.9 (05 Jul 2023 21:00)  HR: 97 (06 Jul 2023 05:00) (62 - 97)  BP: 109/57 (06 Jul 2023 05:00) (100/58 - 110/60)  BP(mean): --  RR: 18 (06 Jul 2023 05:00) (16 - 18)  SpO2: 97% (06 Jul 2023 05:00) (96% - 97%)                                             --------------------------------------------------------------  LABS:                        9.2    12.04 )-----------( 358      ( 06 Jul 2023 08:44 )             29.3     07-06    133<L>  |  99  |  39<H>  ----------------------------<  181<H>  4.7   |  25  |  1.1    Ca    9.3      06 Jul 2023 08:44  Mg     2.1     07-06    TPro  5.3<L>  /  Alb  3.1<L>  /  TBili  0.2  /  DBili  x   /  AST  19  /  ALT  13  /  AlkPhos  74  07-06      Urinalysis Basic - ( 06 Jul 2023 08:44 )    Color: x / Appearance: x / SG: x / pH: x  Gluc: 181 mg/dL / Ketone: x  / Bili: x / Urobili: x   Blood: x / Protein: x / Nitrite: x   Leuk Esterase: x / RBC: x / WBC x   Sq Epi: x / Non Sq Epi: x / Bacteria: x              Culture - Abscess with Gram Stain (collected 05 Jul 2023 18:48)  Source: .Abscess LEFT scrotal abscess  Gram Stain (06 Jul 2023 05:04):    Rare polymorphonuclear leukocytes seen per low power field    No organisms seen per oil power field                                                    -------------------------------------------------------------  RADIOLOGY:                                            --------------------------------------------------------------    PHYSICAL EXAM:  GENERAL: NAD, lying in bed comfortably  HEAD:  Atraumatic, Normocephalic  CHEST/LUNG: Clear to auscultation bilaterally; No rales, rhonchi, wheezing, or rubs. Unlabored respirations  HEART: regular rate and rhythm; No murmurs, rubs, or gallops  ABDOMEN: Bowel sounds present; Soft, Nontender, Nondistended.    EXTREMITIES: No clubbing, cyanosis, or edema  NERVOUS SYSTEM:  Alert & Oriented                                           --------------------------------------------------------------

## 2023-07-07 LAB
-  AMPICILLIN: SIGNIFICANT CHANGE UP
-  TETRACYCLINE: SIGNIFICANT CHANGE UP
-  VANCOMYCIN: SIGNIFICANT CHANGE UP
ALBUMIN SERPL ELPH-MCNC: 3 G/DL — LOW (ref 3.5–5.2)
ALP SERPL-CCNC: 75 U/L — SIGNIFICANT CHANGE UP (ref 30–115)
ALT FLD-CCNC: 14 U/L — SIGNIFICANT CHANGE UP (ref 0–41)
ANION GAP SERPL CALC-SCNC: 11 MMOL/L — SIGNIFICANT CHANGE UP (ref 7–14)
AST SERPL-CCNC: 24 U/L — SIGNIFICANT CHANGE UP (ref 0–41)
BILIRUB SERPL-MCNC: 0.3 MG/DL — SIGNIFICANT CHANGE UP (ref 0.2–1.2)
BUN SERPL-MCNC: 34 MG/DL — HIGH (ref 10–20)
CALCIUM SERPL-MCNC: 9.2 MG/DL — SIGNIFICANT CHANGE UP (ref 8.4–10.5)
CHLORIDE SERPL-SCNC: 101 MMOL/L — SIGNIFICANT CHANGE UP (ref 98–110)
CO2 SERPL-SCNC: 25 MMOL/L — SIGNIFICANT CHANGE UP (ref 17–32)
CREAT SERPL-MCNC: 1.1 MG/DL — SIGNIFICANT CHANGE UP (ref 0.7–1.5)
EGFR: 60 ML/MIN/1.73M2 — SIGNIFICANT CHANGE UP
GLUCOSE BLDC GLUCOMTR-MCNC: 109 MG/DL — HIGH (ref 70–99)
GLUCOSE BLDC GLUCOMTR-MCNC: 161 MG/DL — HIGH (ref 70–99)
GLUCOSE BLDC GLUCOMTR-MCNC: 217 MG/DL — HIGH (ref 70–99)
GLUCOSE BLDC GLUCOMTR-MCNC: 241 MG/DL — HIGH (ref 70–99)
GLUCOSE BLDC GLUCOMTR-MCNC: 73 MG/DL — SIGNIFICANT CHANGE UP (ref 70–99)
GLUCOSE SERPL-MCNC: 100 MG/DL — HIGH (ref 70–99)
HCT VFR BLD CALC: 30.7 % — LOW (ref 42–52)
HGB BLD-MCNC: 9.4 G/DL — LOW (ref 14–18)
MAGNESIUM SERPL-MCNC: 2 MG/DL — SIGNIFICANT CHANGE UP (ref 1.8–2.4)
MCHC RBC-ENTMCNC: 26.6 PG — LOW (ref 27–31)
MCHC RBC-ENTMCNC: 30.6 G/DL — LOW (ref 32–37)
MCV RBC AUTO: 87 FL — SIGNIFICANT CHANGE UP (ref 80–94)
METHOD TYPE: SIGNIFICANT CHANGE UP
NRBC # BLD: 0 /100 WBCS — SIGNIFICANT CHANGE UP (ref 0–0)
PLATELET # BLD AUTO: 358 K/UL — SIGNIFICANT CHANGE UP (ref 130–400)
PMV BLD: 9.3 FL — SIGNIFICANT CHANGE UP (ref 7.4–10.4)
POTASSIUM SERPL-MCNC: 4.1 MMOL/L — SIGNIFICANT CHANGE UP (ref 3.5–5)
POTASSIUM SERPL-SCNC: 4.1 MMOL/L — SIGNIFICANT CHANGE UP (ref 3.5–5)
PROT SERPL-MCNC: 5.5 G/DL — LOW (ref 6–8)
RBC # BLD: 3.53 M/UL — LOW (ref 4.7–6.1)
RBC # FLD: 18.5 % — HIGH (ref 11.5–14.5)
SODIUM SERPL-SCNC: 137 MMOL/L — SIGNIFICANT CHANGE UP (ref 135–146)
WBC # BLD: 11.86 K/UL — HIGH (ref 4.8–10.8)
WBC # FLD AUTO: 11.86 K/UL — HIGH (ref 4.8–10.8)

## 2023-07-07 PROCEDURE — 99233 SBSQ HOSP IP/OBS HIGH 50: CPT

## 2023-07-07 RX ORDER — DEXTROSE 50 % IN WATER 50 %
25 SYRINGE (ML) INTRAVENOUS ONCE
Refills: 0 | Status: COMPLETED | OUTPATIENT
Start: 2023-07-07 | End: 2023-07-07

## 2023-07-07 RX ADMIN — Medication 1: at 11:41

## 2023-07-07 RX ADMIN — HEPARIN SODIUM 5000 UNIT(S): 5000 INJECTION INTRAVENOUS; SUBCUTANEOUS at 13:02

## 2023-07-07 RX ADMIN — Medication 15 GRAM(S): at 13:02

## 2023-07-07 RX ADMIN — Medication 4 UNIT(S): at 17:01

## 2023-07-07 RX ADMIN — Medication 1 TABLET(S): at 08:53

## 2023-07-07 RX ADMIN — Medication 325 MILLIGRAM(S): at 17:03

## 2023-07-07 RX ADMIN — Medication 2: at 17:01

## 2023-07-07 RX ADMIN — LEVETIRACETAM 400 MILLIGRAM(S): 250 TABLET, FILM COATED ORAL at 17:02

## 2023-07-07 RX ADMIN — INSULIN GLARGINE 12 UNIT(S): 100 INJECTION, SOLUTION SUBCUTANEOUS at 08:52

## 2023-07-07 RX ADMIN — HEPARIN SODIUM 5000 UNIT(S): 5000 INJECTION INTRAVENOUS; SUBCUTANEOUS at 05:37

## 2023-07-07 RX ADMIN — Medication 4 UNIT(S): at 11:41

## 2023-07-07 RX ADMIN — PIPERACILLIN AND TAZOBACTAM 25 GRAM(S): 4; .5 INJECTION, POWDER, LYOPHILIZED, FOR SOLUTION INTRAVENOUS at 13:02

## 2023-07-07 RX ADMIN — PIPERACILLIN AND TAZOBACTAM 25 GRAM(S): 4; .5 INJECTION, POWDER, LYOPHILIZED, FOR SOLUTION INTRAVENOUS at 05:37

## 2023-07-07 RX ADMIN — SODIUM CHLORIDE 1 GRAM(S): 9 INJECTION INTRAMUSCULAR; INTRAVENOUS; SUBCUTANEOUS at 17:02

## 2023-07-07 RX ADMIN — DULOXETINE HYDROCHLORIDE 60 MILLIGRAM(S): 30 CAPSULE, DELAYED RELEASE ORAL at 13:01

## 2023-07-07 RX ADMIN — Medication 4 UNIT(S): at 08:51

## 2023-07-07 RX ADMIN — SIMVASTATIN 20 MILLIGRAM(S): 20 TABLET, FILM COATED ORAL at 21:48

## 2023-07-07 RX ADMIN — HEPARIN SODIUM 5000 UNIT(S): 5000 INJECTION INTRAVENOUS; SUBCUTANEOUS at 21:48

## 2023-07-07 RX ADMIN — CLOPIDOGREL BISULFATE 75 MILLIGRAM(S): 75 TABLET, FILM COATED ORAL at 13:01

## 2023-07-07 RX ADMIN — Medication 25 GRAM(S): at 21:45

## 2023-07-07 RX ADMIN — TAMSULOSIN HYDROCHLORIDE 0.4 MILLIGRAM(S): 0.4 CAPSULE ORAL at 21:48

## 2023-07-07 RX ADMIN — Medication 1 TABLET(S): at 13:01

## 2023-07-07 RX ADMIN — FINASTERIDE 5 MILLIGRAM(S): 5 TABLET, FILM COATED ORAL at 13:01

## 2023-07-07 RX ADMIN — LEVETIRACETAM 400 MILLIGRAM(S): 250 TABLET, FILM COATED ORAL at 05:37

## 2023-07-07 RX ADMIN — Medication 25 MILLIGRAM(S): at 17:03

## 2023-07-07 RX ADMIN — SENNA PLUS 2 TABLET(S): 8.6 TABLET ORAL at 21:48

## 2023-07-07 RX ADMIN — PIPERACILLIN AND TAZOBACTAM 25 GRAM(S): 4; .5 INJECTION, POWDER, LYOPHILIZED, FOR SOLUTION INTRAVENOUS at 21:51

## 2023-07-07 NOTE — PROGRESS NOTE ADULT - SUBJECTIVE AND OBJECTIVE BOX
CHEL CROFT 89y Male  MRN#: [id number]     Hospital Day: 14d    Pt is currently admitted with the primary diagnosis of  Hyperkalemia        SUBJECTIVE     Overnight events  None    Subjective complaints  Pt was evaluated this am. Patient denied any active complaints.                                            ----------------------------------------------------------  OBJECTIVE  PAST MEDICAL & SURGICAL HISTORY  HTN (hypertension)    HLD (hyperlipidemia)    BPH (benign prostatic hyperplasia)    CAD (coronary artery disease)    Diabetes mellitus    Hypothyroidism    S/P CABG (coronary artery bypass graft)                                              -----------------------------------------------------------  ALLERGIES:  No Known Allergies                                            ------------------------------------------------------------    HOME MEDICATIONS  Home Medications:  DULoxetine 60 mg oral delayed release capsule: 1 cap(s) orally once a day (13 Dec 2022 22:03)  finasteride 5 mg oral tablet: 1 tab(s) orally once a day (13 Dec 2022 22:03)  lactobacillus acidophilus oral capsule: 1 tab(s) orally once a day (20 Dec 2022 10:18)  levothyroxine 50 mcg (0.05 mg) oral tablet: 1 tab(s) orally once a day (13 Dec 2022 22:03)  Metoprolol Tartrate 25 mg oral tablet: 1 tab(s) orally 2 times a day (13 Dec 2022 22:03)  Plavix 75 mg oral tablet: 1 orally once a day (23 Jun 2023 21:54)  simvastatin 20 mg oral tablet: 1 tab(s) orally once a day (at bedtime) (13 Dec 2022 22:03)  tamsulosin 0.4 mg oral capsule: 1 cap(s) orally once a day (13 Dec 2022 22:03)  Toujeo SoloStar 300 units/mL subcutaneous solution: 12 unit(s) subcutaneous once a day (in the morning) (13 Dec 2022 22:03)                           MEDICATIONS:  STANDING MEDICATIONS  clopidogrel Tablet 75 milliGRAM(s) Oral daily  dextrose 5%. 1000 milliLiter(s) IV Continuous <Continuous>  dextrose 5%. 1000 milliLiter(s) IV Continuous <Continuous>  dextrose 50% Injectable 25 Gram(s) IV Push once  dextrose 50% Injectable 25 Gram(s) IV Push once  dextrose 50% Injectable 12.5 Gram(s) IV Push once  DULoxetine 60 milliGRAM(s) Oral daily  ferrous    sulfate 325 milliGRAM(s) Oral two times a day  finasteride 5 milliGRAM(s) Oral daily  glucagon  Injectable 1 milliGRAM(s) IntraMuscular once  heparin   Injectable 5000 Unit(s) SubCutaneous every 8 hours  insulin glargine Injectable (LANTUS) 12 Unit(s) SubCutaneous every morning  insulin lispro (ADMELOG) corrective regimen sliding scale   SubCutaneous at bedtime  insulin lispro (ADMELOG) corrective regimen sliding scale   SubCutaneous three times a day before meals  insulin lispro Injectable (ADMELOG) 4 Unit(s) SubCutaneous before lunch  insulin lispro Injectable (ADMELOG) 4 Unit(s) SubCutaneous before dinner  insulin lispro Injectable (ADMELOG) 4 Unit(s) SubCutaneous before breakfast  lactobacillus acidophilus 1 Tablet(s) Oral with breakfast  levETIRAcetam  IVPB 500 milliGRAM(s) IV Intermittent every 12 hours  levothyroxine 50 MICROGram(s) Oral daily  metoprolol tartrate 25 milliGRAM(s) Oral two times a day  multivitamin 1 Tablet(s) Oral every 24 hours  piperacillin/tazobactam IVPB.. 3.375 Gram(s) IV Intermittent every 8 hours  polyethylene glycol 3350 17 Gram(s) Oral two times a day  senna 2 Tablet(s) Oral at bedtime  simvastatin 20 milliGRAM(s) Oral at bedtime  sodium chloride 1 Gram(s) Oral two times a day  tamsulosin 0.4 milliGRAM(s) Oral at bedtime  urea Oral Powder 15 Gram(s) Oral daily    PRN MEDICATIONS  acetaminophen     Tablet .. 650 milliGRAM(s) Oral every 6 hours PRN  aluminum hydroxide/magnesium hydroxide/simethicone Suspension 30 milliLiter(s) Oral every 4 hours PRN  dextrose Oral Gel 15 Gram(s) Oral once PRN  LORazepam   Injectable 2 milliGRAM(s) IV Push every 6 hours PRN  melatonin 3 milliGRAM(s) Oral at bedtime PRN  ondansetron Injectable 4 milliGRAM(s) IV Push every 8 hours PRN  traMADol 25 milliGRAM(s) Oral every 8 hours PRN                                            ------------------------------------------------------------  VITAL SIGNS: Last 24 Hours  T(C): 36.6 (07 Jul 2023 04:56), Max: 36.6 (07 Jul 2023 04:56)  T(F): 97.8 (07 Jul 2023 04:56), Max: 97.8 (07 Jul 2023 04:56)  HR: 80 (07 Jul 2023 05:38) (71 - 97)  BP: 125/58 (07 Jul 2023 05:38) (87/45 - 125/58)  BP(mean): --  RR: 18 (07 Jul 2023 04:56) (16 - 18)  SpO2: 98% (07 Jul 2023 04:56) (98% - 98%)                                             --------------------------------------------------------------  LABS:                        9.4    11.86 )-----------( 358      ( 07 Jul 2023 08:44 )             30.7     07-07    137  |  101  |  34<H>  ----------------------------<  100<H>  4.1   |  25  |  1.1    Ca    9.2      07 Jul 2023 08:44  Mg     2.0     07-07    TPro  5.5<L>  /  Alb  3.0<L>  /  TBili  0.3  /  DBili  x   /  AST  24  /  ALT  14  /  AlkPhos  75  07-07      Urinalysis Basic - ( 07 Jul 2023 08:44 )    Color: x / Appearance: x / SG: x / pH: x  Gluc: 100 mg/dL / Ketone: x  / Bili: x / Urobili: x   Blood: x / Protein: x / Nitrite: x   Leuk Esterase: x / RBC: x / WBC x   Sq Epi: x / Non Sq Epi: x / Bacteria: x              Culture - Abscess with Gram Stain (collected 05 Jul 2023 18:48)  Source: .Abscess LEFT scrotal abscess  Gram Stain (06 Jul 2023 05:04):    Rare polymorphonuclear leukocytes seen per low power field    No organisms seen per oil power field  Preliminary Report (06 Jul 2023 23:05):    Moderate Enterococcus faecalis                                                    -------------------------------------------------------------  RADIOLOGY:                                            --------------------------------------------------------------    PHYSICAL EXAM:  GENERAL: NAD, lying in bed comfortably  HEAD:  Atraumatic, Normocephalic  CHEST/LUNG: Clear to auscultation bilaterally; No rales, rhonchi, wheezing, or rubs. Unlabored respirations  HEART: regular rate and rhythm; No murmurs, rubs, or gallops  ABDOMEN: Bowel sounds present; Soft, Nontender, Nondistended.    : Pt with bandage from bedside I&D procedure  EXTREMITIES: No clubbing, cyanosis, or edema  NERVOUS SYSTEM:  Alert & Oriented                                           --------------------------------------------------------------

## 2023-07-07 NOTE — PROGRESS NOTE ADULT - SUBJECTIVE AND OBJECTIVE BOX
CHEL CROFT  89y, Male  Allergy: No Known Allergies      LOS  14d    CHIEF COMPLAINT: Abnormal Lab results (06 Jul 2023 16:35)      INTERVAL EVENTS/HPI  - No acute events overnight  - T(F): , Max: 97.8 (07-07-23 @ 04:56)  - s/p I and D 7/5 with purulent drainage  - WBC Count: 11.86 (07-07-23 @ 08:44)  WBC Count: 12.04 (07-06-23 @ 08:44)     - Creatinine: 1.1 (07-07-23 @ 08:44)  Creatinine: 1.1 (07-06-23 @ 08:44)       ROS  General: Denies rigors, nightsweats  HEENT: Denies headache, rhinorrhea, sore throat, eye pain  CV: Denies CP, palpitations  PULM: Denies wheezing, hemoptysis  GI: Denies hematemesis, hematochezia, melena  : Denies discharge, hematuria  MSK: Denies arthralgias, myalgias  SKIN: Denies rash, lesions  NEURO: Denies paresthesias, weakness  PSYCH: Denies depression, anxiety    VITALS:  T(F): 97.8, Max: 97.8 (07-07-23 @ 04:56)  HR: 80  BP: 125/58  RR: 18Vital Signs Last 24 Hrs  T(C): 36.6 (07 Jul 2023 04:56), Max: 36.6 (07 Jul 2023 04:56)  T(F): 97.8 (07 Jul 2023 04:56), Max: 97.8 (07 Jul 2023 04:56)  HR: 80 (07 Jul 2023 05:38) (71 - 97)  BP: 125/58 (07 Jul 2023 05:38) (87/45 - 125/58)  BP(mean): --  RR: 18 (07 Jul 2023 04:56) (16 - 18)  SpO2: 98% (07 Jul 2023 04:56) (98% - 98%)        PHYSICAL EXAM:  Gen: NAD, resting in bed  HEENT: Normocephalic, atraumatic  Neck: supple, no lymphadenopathy  CV: Regular rate & regular rhythm  Lungs: decreased BS at bases, no fremitus  Abdomen: Soft, BS present  Ext: Warm, well perfused  Neuro: non focal, awake  Skin left scrotum packed   Lines: no phlebitis    FH: Non-contributory  Social Hx: Non-contributory    TESTS & MEASUREMENTS:                        9.4    11.86 )-----------( 358      ( 07 Jul 2023 08:44 )             30.7     07-07    137  |  101  |  34<H>  ----------------------------<  100<H>  4.1   |  25  |  1.1    Ca    9.2      07 Jul 2023 08:44  Mg     2.0     07-07    TPro  5.5<L>  /  Alb  3.0<L>  /  TBili  0.3  /  DBili  x   /  AST  24  /  ALT  14  /  AlkPhos  75  07-07      LIVER FUNCTIONS - ( 07 Jul 2023 08:44 )  Alb: 3.0 g/dL / Pro: 5.5 g/dL / ALK PHOS: 75 U/L / ALT: 14 U/L / AST: 24 U/L / GGT: x           Urinalysis Basic - ( 07 Jul 2023 08:44 )    Color: x / Appearance: x / SG: x / pH: x  Gluc: 100 mg/dL / Ketone: x  / Bili: x / Urobili: x   Blood: x / Protein: x / Nitrite: x   Leuk Esterase: x / RBC: x / WBC x   Sq Epi: x / Non Sq Epi: x / Bacteria: x        Culture - Abscess with Gram Stain (collected 07-05-23 @ 18:48)  Source: .Abscess LEFT scrotal abscess  Gram Stain (07-06-23 @ 05:04):    Rare polymorphonuclear leukocytes seen per low power field    No organisms seen per oil power field  Preliminary Report (07-06-23 @ 23:05):    Moderate Enterococcus faecalis    Culture - Blood (collected 06-28-23 @ 04:30)  Source: .Blood Blood-Peripheral  Final Report (07-03-23 @ 18:01):    No growth at 5 days    Culture - Urine (collected 06-27-23 @ 12:50)  Source: Catheterized Catheterized  Final Report (06-28-23 @ 23:40):    <10,000 CFU/mL Normal Urogenital Roxana    Culture - Blood (collected 06-26-23 @ 06:02)  Source: .Blood None  Final Report (07-01-23 @ 18:00):    No growth at 5 days    Culture - Blood (collected 06-24-23 @ 00:28)  Source: .Blood None  Gram Stain (06-25-23 @ 17:54):    Growth in aerobic bottle: Gram Negative Rods    Growth in anaerobic bottle: Gram Negative Rods  Final Report (06-26-23 @ 14:45):    Growth in aerobic and anaerobic bottles: Pseudomonas aeruginosa    ***Blood Panel PCR results on this specimen are available    approximately 3 hours after the Gram stain result.***    Gram stain, PCR, and/or culture results may not always    correspond due to difference in methodologies.    ************************************************************    This PCR assay was performed by multiplex PCR. This    Assay tests for 66 bacterial and resistance gene targets.    Please refer to the [facility] test directory    at [email or web address] for details.  Organism: Blood Culture PCR  Pseudomonas aeruginosa (06-26-23 @ 14:45)  Organism: Pseudomonas aeruginosa (06-26-23 @ 14:45)      -  Levofloxacin: S <=0.5      -  Tobramycin: S <=2      -  Aztreonam: S <=4      -  Gentamicin: S <=2      -  Cefepime: S <=2      -  Piperacillin/Tazobactam: S <=8      -  Ciprofloxacin: S <=0.25      -  Imipenem: S <=1      Method Type: KEVIN      -  Meropenem: S <=1      -  Ceftazidime: S <=1      -  Amikacin: S -[id number]  Organism: Blood Culture PCR (06-26-23 @ 14:45)      Method Type: PCR      -  Pseudomonas aeruginosa: Detec    Culture - Urine (collected 06-23-23 @ 15:45)  Source: Clean Catch Clean Catch (Midstream)  Final Report (06-25-23 @ 16:47):    >=3 organisms. Probable collection contamination.            INFECTIOUS DISEASES TESTING  COVID-19 PCR: NotDetec (04-03-23 @ 10:23)  COVID-19 PCR: NotDetec (04-01-23 @ 18:46)  COVID-19 PCR: NotDetec (03-28-23 @ 21:55)  COVID-19 PCR: NotDetec (12-23-22 @ 10:23)  COVID-19 PCR: NotDetec (12-19-22 @ 12:54)  Legionella Antigen, Urine: Negative (12-17-22 @ 14:02)  MRSA PCR Result.: Negative (12-14-22 @ 16:33)  Procalcitonin, Serum: 0.16 (12-14-22 @ 05:42)  Procalcitonin, Serum: 0.16 (12-05-22 @ 13:48)      INFLAMMATORY MARKERS      RADIOLOGY & ADDITIONAL TESTS:  I have personally reviewed the last available Chest xray  CXR      CT      CARDIOLOGY TESTING  12 Lead ECG:   Ventricular Rate 88 BPM    Atrial Rate 88 BPM    P-R Interval 170 ms    QRS Duration 92 ms    Q-T Interval 370 ms    QTC Calculation(Bazett) 447 ms    P Axis 43 degrees    R Axis -7 degrees    T Axis 62 degrees    Diagnosis Line Normal sinus rhythm  Possible Anterior infarct , age undetermined  Abnormal ECG    Confirmed by Steve Chavez ([id number]) on 7/3/2023 8:05:18 AM (07-01-23 @ 16:51)  12 Lead ECG:   Ventricular Rate 62 BPM    Atrial Rate 62 BPM    P-R Interval 172 ms    QRS Duration 94 ms    Q-T Interval 416 ms    QTC Calculation(Bazett) 422 ms    P Axis 51 degrees    R Axis -15 degrees    T Axis 16 degrees    Diagnosis Line Normal sinus rhythm  Left ventricular hypertrophy  Abnormal ECG    Confirmed by Mohan Lin ([id number]) on 6/24/2023 2:35:06 PM (06-24-23 @ 14:18)      MEDICATIONS  clopidogrel Tablet 75 Oral daily  dextrose 5%. 1000 IV Continuous <Continuous>  dextrose 5%. 1000 IV Continuous <Continuous>  dextrose 50% Injectable 25 IV Push once  dextrose 50% Injectable 12.5 IV Push once  dextrose 50% Injectable 25 IV Push once  DULoxetine 60 Oral daily  ferrous    sulfate 325 Oral two times a day  finasteride 5 Oral daily  glucagon  Injectable 1 IntraMuscular once  heparin   Injectable 5000 SubCutaneous every 8 hours  insulin glargine Injectable (LANTUS) 12 SubCutaneous every morning  insulin lispro (ADMELOG) corrective regimen sliding scale  SubCutaneous at bedtime  insulin lispro (ADMELOG) corrective regimen sliding scale  SubCutaneous three times a day before meals  insulin lispro Injectable (ADMELOG) 4 SubCutaneous before lunch  insulin lispro Injectable (ADMELOG) 4 SubCutaneous before breakfast  insulin lispro Injectable (ADMELOG) 4 SubCutaneous before dinner  lactobacillus acidophilus 1 Oral with breakfast  levETIRAcetam  IVPB 500 IV Intermittent every 12 hours  levothyroxine 50 Oral daily  metoprolol tartrate 25 Oral two times a day  multivitamin 1 Oral every 24 hours  piperacillin/tazobactam IVPB.. 3.375 IV Intermittent every 8 hours  polyethylene glycol 3350 17 Oral two times a day  senna 2 Oral at bedtime  simvastatin 20 Oral at bedtime  sodium chloride 1 Oral two times a day  tamsulosin 0.4 Oral at bedtime  urea Oral Powder 15 Oral daily      WEIGHT  Weight (kg): 54.8 (06-24-23 @ 16:00)  Creatinine: 1.1 mg/dL (07-07-23 @ 08:44)      ANTIBIOTICS:  piperacillin/tazobactam IVPB.. 3.375 Gram(s) IV Intermittent every 8 hours      All available historical records have been reviewed

## 2023-07-07 NOTE — PROGRESS NOTE ADULT - ASSESSMENT
Pt is a 89y M with L scrotal abscess s/p bedside drainage and packing by Dr. Wen on 7/5/23.    ·	Packing from L scrotum removed--packing saturated with purulent/bloody discharge, minimal bloody discharge expressed from scrotum; replaced new packing to L scrotum.   ·	Will continue with packing changes   ·	Abscess cx prelim with enterococcus faecalis  ·	Cont abx as per ID recs   ·	Will d/w attng  Pt is a 89y M with L scrotal abscess s/p bedside drainage and packing by Dr. Wen on 7/5/23.    ·	Packing from L scrotum removed--packing saturated with purulent/bloody discharge, minimal bloody discharge expressed from scrotum; replaced new packing to L scrotum.   ·	Will continue with packing changes   ·	Abscess cx prelim with enterococcus faecalis  ·	Cont abx as per ID recs-- currently on Zosyn   ·	Will d/w attng

## 2023-07-07 NOTE — PROGRESS NOTE ADULT - ASSESSMENT
Patient is a 89 y/o Male with  PMHx HTN, DM2, BPH, CAD s/p CABG, hypothyroidism, recently dx. from [facility] , progressive ambulatory dysfunction, admitted with hyperkalemia, hyponatremia noted as outpt. Found to have pseudomonas bacteremia.    # Acute mental status change:   - MR brain WNL  - EEG Done- 2nd EEG shows generalized slowing + Multifocal epileptic potentials. Started Keppra 500mg every 12 hours .   - Neuro f/up 7/4- placed on VEEG now.   - f/u VEEG results  - F/u neuro for further recs, continue Neurochecks      #Bacteremia due to Pseudomonas.   # Leukocytosis   #in setting of recent hidalgo in snf; now out; possible uti, suspected testicular infection  US Scrotum (for epididymitis) : Complex scrotal mixed echogenicity collection anterior to left testicle measuring approximately 7.7 x 3 x 6.9 cm. 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  ua positive; f/u ucxs- more than 3 organisms, repeat urine cxs neg , repeat blood cxs 6/26, 28 Neg.  blood cxs pseudomonas 6/24, as per  ID - continue on IV Zosyn tx.   -  did I&D bedside 7/5   - wound cx - e.faecalis  - f/u ID recs    # Hypomagnesemia - resolved  - f/u mag level  - mag 2.1 stable now    #hyponatremia- stable levels   Per Nephro eval, likely [facility]? Urea 15gm QD,  Uric Acid = 4.5. No need to restrict po free water.   - Na 133 7/6  - continue on salt tabs  - daily BMP monitoring      #constipated:   - Senna, Miralax.     #HTN (hypertension)  - currently borderline Hypotensive   - continue lopressor 25mg PO twice daily with holding parameters.     #DM2 (diabetes mellitus, type 2)  - uncontrolled, Hga1C- 9  - lantus 12 units subc. once daily ,   - lispro 4 units subc. before each meal three times daily   ssi.    #History of BPH.   - continue finasteride 5 mg po once daily /tamsulosin 0.4 mg po once daily at bedtime     #h/o CAD (coronary artery disease). / h/o CABG  - plavix/zocor tx.  -patient is on supplemental oxygen via NC, 2 L , on RA sat 91- 92% at rest     #Hypothyroidism.   - continue synthroid 50mcg po once daily    # Iron def. anemia  - started on PO Iron tx.     # Ambulatory dysfunction   - physical decline- PT - will f/u today    DVT ppX, heparin  DNR/DNI (MOLST signed 6/29)    #Progress Note Handoff: f/up ID rec abx.  supplement Mg if needed

## 2023-07-07 NOTE — PROGRESS NOTE ADULT - ASSESSMENT
ASSESSMENT  89 yo M with hx of HTN, HLD, DM II BPH and CAD s/p CABG x 4, Hypothyroidism presents to ED for abnormal labs result (hyperkalemia and hyponatremia).    IMPRESSION  #Sepsis present on admission  #Pseudomonas bacteremia from suspected UTI with Left Epididymitis/Scrotal abscess -- had recent hidalgo   - BLood Cx 6/24 +   - Urine Cx 6/23 > 3 organisms   - US Kidney and Bladder (06.23.23 @ 21:08): IMPRESSION: No sonographic evidence of hydronephrosis. Post void residual of 69 mL (46%).  - US Testicles (07.01.23 @ 08:52): Complex scrotal mixed echogenicity collection anterior to left testicle  measuring approximately 7.7 x 3 x 6.9 cm. 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  - s/p I and D 7/5 - Wound CX E faecalis     #Hyponatremia    #BPH  #DM II  #CAD s/p CABG    #Abx allergy: No Known Allergies    CrCl 31    RECOMMENDATIONS  - continue zosyn 3.375 mg q 12 hours   - follow-up E faecalis susceptibilities   - if E faecalis susceptible to levofloxacin -- will eventually finish course with PO levofloxacin 750 mg q 48 hours for 10 days post I and D (end date 7/14)    Please call or message on Microsoft Teams if with any questions.  Spectra [phone]

## 2023-07-07 NOTE — PROGRESS NOTE ADULT - SUBJECTIVE AND OBJECTIVE BOX
UROLOGY PROGRESS NOTE    Pt is a 89y M with L scrotal abscess s/p bedside drainage and packing by Dr. Wen on 7/5/23. Pt seen and examined at bedside today. Packing from L scrotum removed--packing saturated with purulent/bloody discharge, minimal bloody discharge expressed from scrotum; replaced new packing to L scrotum.     MEDICATIONS  (STANDING):  clopidogrel Tablet 75 milliGRAM(s) Oral daily  dextrose 5%. 1000 milliLiter(s) (50 mL/Hr) IV Continuous <Continuous>  dextrose 5%. 1000 milliLiter(s) (100 mL/Hr) IV Continuous <Continuous>  dextrose 50% Injectable 25 Gram(s) IV Push once  dextrose 50% Injectable 25 Gram(s) IV Push once  dextrose 50% Injectable 12.5 Gram(s) IV Push once  DULoxetine 60 milliGRAM(s) Oral daily  ferrous    sulfate 325 milliGRAM(s) Oral two times a day  finasteride 5 milliGRAM(s) Oral daily  glucagon  Injectable 1 milliGRAM(s) IntraMuscular once  heparin   Injectable 5000 Unit(s) SubCutaneous every 8 hours  insulin glargine Injectable (LANTUS) 12 Unit(s) SubCutaneous every morning  insulin lispro (ADMELOG) corrective regimen sliding scale   SubCutaneous at bedtime  insulin lispro (ADMELOG) corrective regimen sliding scale   SubCutaneous three times a day before meals  insulin lispro Injectable (ADMELOG) 4 Unit(s) SubCutaneous before dinner  insulin lispro Injectable (ADMELOG) 4 Unit(s) SubCutaneous before lunch  insulin lispro Injectable (ADMELOG) 4 Unit(s) SubCutaneous before breakfast  lactobacillus acidophilus 1 Tablet(s) Oral with breakfast  levETIRAcetam  IVPB 500 milliGRAM(s) IV Intermittent every 12 hours  levothyroxine 50 MICROGram(s) Oral daily  metoprolol tartrate 25 milliGRAM(s) Oral two times a day  multivitamin 1 Tablet(s) Oral every 24 hours  piperacillin/tazobactam IVPB.. 3.375 Gram(s) IV Intermittent every 8 hours  polyethylene glycol 3350 17 Gram(s) Oral two times a day  senna 2 Tablet(s) Oral at bedtime  simvastatin 20 milliGRAM(s) Oral at bedtime  sodium chloride 1 Gram(s) Oral two times a day  tamsulosin 0.4 milliGRAM(s) Oral at bedtime  urea Oral Powder 15 Gram(s) Oral daily    MEDICATIONS  (PRN):  acetaminophen     Tablet .. 650 milliGRAM(s) Oral every 6 hours PRN Temp greater or equal to 38C (100.4F), Mild Pain (1 - 3)  aluminum hydroxide/magnesium hydroxide/simethicone Suspension 30 milliLiter(s) Oral every 4 hours PRN Dyspepsia  dextrose Oral Gel 15 Gram(s) Oral once PRN Blood Glucose LESS THAN 70 milliGRAM(s)/deciliter  LORazepam   Injectable 2 milliGRAM(s) IV Push every 6 hours PRN Acute Seizure Event ONLY  melatonin 3 milliGRAM(s) Oral at bedtime PRN Insomnia  ondansetron Injectable 4 milliGRAM(s) IV Push every 8 hours PRN Nausea and/or Vomiting  traMADol 25 milliGRAM(s) Oral every 8 hours PRN Severe Pain (7 - 10)      REVIEW OF SYSTEMS   [x] A ten-point review of systems was otherwise negative except as noted.    Vital Signs Last 24 Hrs  T(C): 35.8 (07 Jul 2023 13:02), Max: 36.6 (07 Jul 2023 04:56)  T(F): 96.5 (07 Jul 2023 13:02), Max: 97.8 (07 Jul 2023 04:56)  HR: 77 (07 Jul 2023 13:02) (71 - 97)  BP: 165/72 (07 Jul 2023 13:02) (87/45 - 165/72)  RR: 18 (07 Jul 2023 13:02) (18 - 18)  SpO2: 98% (07 Jul 2023 13:02) (98% - 98%)      PHYSICAL EXAM:  GEN: NAD  SKIN: Good color, non diaphoretic  RESP: Non-labored breathing  CARDIO: +S1/S2  ABDO: Soft, NT  : Packing from L scrotum removed--packing saturated with purulent/bloody discharge, minimal bloody discharge expressed from scrotum; replaced new packing to L scrotum   EXT: YUDY x 4        LABS:                        9.4    11.86 )-----------( 358      ( 07 Jul 2023 08:44 )             30.7     07-07    137  |  101  |  34<H>  ----------------------------<  100<H>  4.1   |  25  |  1.1    Ca    9.2      07 Jul 2023 08:44  Mg     2.0     07-07    TPro  5.5<L>  /  Alb  3.0<L>  /  TBili  0.3  /  DBili  x   /  AST  24  /  ALT  14  /  AlkPhos  75  07-07      Urinalysis Basic - ( 07 Jul 2023 08:44 )    Color: x / Appearance: x / SG: x / pH: x  Gluc: 100 mg/dL / Ketone: x  / Bili: x / Urobili: x   Blood: x / Protein: x / Nitrite: x   Leuk Esterase: x / RBC: x / WBC x   Sq Epi: x / Non Sq Epi: x / Bacteria: x            RADIOLOGY & ADDITIONAL STUDIES: UROLOGY PROGRESS NOTE    Pt is a 89y M with L scrotal abscess s/p bedside drainage and packing by Dr. Wen on 7/5/23. Pt seen and examined at bedside today. Packing from L scrotum removed--packing saturated with purulent/bloody discharge, minimal bloody discharge expressed from scrotum; replaced new packing to L scrotum.     MEDICATIONS  (STANDING):  clopidogrel Tablet 75 milliGRAM(s) Oral daily  dextrose 5%. 1000 milliLiter(s) (50 mL/Hr) IV Continuous <Continuous>  dextrose 5%. 1000 milliLiter(s) (100 mL/Hr) IV Continuous <Continuous>  dextrose 50% Injectable 25 Gram(s) IV Push once  dextrose 50% Injectable 25 Gram(s) IV Push once  dextrose 50% Injectable 12.5 Gram(s) IV Push once  DULoxetine 60 milliGRAM(s) Oral daily  ferrous    sulfate 325 milliGRAM(s) Oral two times a day  finasteride 5 milliGRAM(s) Oral daily  glucagon  Injectable 1 milliGRAM(s) IntraMuscular once  heparin   Injectable 5000 Unit(s) SubCutaneous every 8 hours  insulin glargine Injectable (LANTUS) 12 Unit(s) SubCutaneous every morning  insulin lispro (ADMELOG) corrective regimen sliding scale   SubCutaneous at bedtime  insulin lispro (ADMELOG) corrective regimen sliding scale   SubCutaneous three times a day before meals  insulin lispro Injectable (ADMELOG) 4 Unit(s) SubCutaneous before dinner  insulin lispro Injectable (ADMELOG) 4 Unit(s) SubCutaneous before lunch  insulin lispro Injectable (ADMELOG) 4 Unit(s) SubCutaneous before breakfast  lactobacillus acidophilus 1 Tablet(s) Oral with breakfast  levETIRAcetam  IVPB 500 milliGRAM(s) IV Intermittent every 12 hours  levothyroxine 50 MICROGram(s) Oral daily  metoprolol tartrate 25 milliGRAM(s) Oral two times a day  multivitamin 1 Tablet(s) Oral every 24 hours  piperacillin/tazobactam IVPB.. 3.375 Gram(s) IV Intermittent every 8 hours  polyethylene glycol 3350 17 Gram(s) Oral two times a day  senna 2 Tablet(s) Oral at bedtime  simvastatin 20 milliGRAM(s) Oral at bedtime  sodium chloride 1 Gram(s) Oral two times a day  tamsulosin 0.4 milliGRAM(s) Oral at bedtime  urea Oral Powder 15 Gram(s) Oral daily    MEDICATIONS  (PRN):  acetaminophen     Tablet .. 650 milliGRAM(s) Oral every 6 hours PRN Temp greater or equal to 38C (100.4F), Mild Pain (1 - 3)  aluminum hydroxide/magnesium hydroxide/simethicone Suspension 30 milliLiter(s) Oral every 4 hours PRN Dyspepsia  dextrose Oral Gel 15 Gram(s) Oral once PRN Blood Glucose LESS THAN 70 milliGRAM(s)/deciliter  LORazepam   Injectable 2 milliGRAM(s) IV Push every 6 hours PRN Acute Seizure Event ONLY  melatonin 3 milliGRAM(s) Oral at bedtime PRN Insomnia  ondansetron Injectable 4 milliGRAM(s) IV Push every 8 hours PRN Nausea and/or Vomiting  traMADol 25 milliGRAM(s) Oral every 8 hours PRN Severe Pain (7 - 10)      REVIEW OF SYSTEMS   [x] A ten-point review of systems was otherwise negative except as noted.    Vital Signs Last 24 Hrs  T(C): 35.8 (07 Jul 2023 13:02), Max: 36.6 (07 Jul 2023 04:56)  T(F): 96.5 (07 Jul 2023 13:02), Max: 97.8 (07 Jul 2023 04:56)  HR: 77 (07 Jul 2023 13:02) (71 - 97)  BP: 165/72 (07 Jul 2023 13:02) (87/45 - 165/72)  RR: 18 (07 Jul 2023 13:02) (18 - 18)  SpO2: 98% (07 Jul 2023 13:02) (98% - 98%)      PHYSICAL EXAM:  GEN: NAD  SKIN: Good color, non diaphoretic  RESP: Non-labored breathing  CARDIO: +S1/S2  ABDO: Soft, NT  : Packing from L scrotum removed--packing saturated with purulent/bloody discharge, minimal bloody discharge expressed from scrotum; replaced new packing to L scrotum   EXT: YUDY x 4        LABS:                        9.4    11.86 )-----------( 358      ( 07 Jul 2023 08:44 )             30.7     07-07    137  |  101  |  34<H>  ----------------------------<  100<H>  4.1   |  25  |  1.1    Ca    9.2      07 Jul 2023 08:44  Mg     2.0     07-07    TPro  5.5<L>  /  Alb  3.0<L>  /  TBili  0.3  /  DBili  x   /  AST  24  /  ALT  14  /  AlkPhos  75  07-07      Urinalysis Basic - ( 07 Jul 2023 08:44 )  Color: x / Appearance: x / SG: x / pH: x  Gluc: 100 mg/dL / Ketone: x  / Bili: x / Urobili: x   Blood: x / Protein: x / Nitrite: x   Leuk Esterase: x / RBC: x / WBC x   Sq Epi: x / Non Sq Epi: x / Bacteria: x

## 2023-07-07 NOTE — PROGRESS NOTE ADULT - ATTENDING COMMENTS
Patient is a 89 y/o Male with  PMHx HTN, DM2, BPH, CAD s/p CABG, hypothyroidism, recently dx. from [facility] , progressive ambulatory dysfunction, admitted   with hyperkalemia, hyponatremia noted as outpt. Found to have pseudomonas bacteremia.    # Acute mental status change:   - MR brain WNL  - EEG Done- 2nd EEG shows generalized slowing + Multifocal epileptic potentials. Started Keppra 500mg every 12 hours .   Neuro f/up 7/4- s/p VEEG - no epilepti-form activity   - F/u neuro for further recs, continue Neurochecks      #Bacteremia due to Pseudomonas.   # Leukocytosis   #in setting of acute Testicular abscess- due to Enterococcus infection  US Scrotum (for epididymitis) : Complex scrotal mixed echogenicity collection anterior to left testicle measuring approximately 7.7 x 3 x 6.9 cm. 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  ua positive; s/p urine cxs- more than 3 organisms, repeat urine cxs neg , repeat blood cxs 6/26, 28 Neg.  blood cxs pseudomonas 6/24, as per  ID - continue on IV Zosyn tx.   - consulted  - s/p Incision and drainage on 7/6/23- daily wound care, testicular  abscess cxs-grew Enterococcus- f/up sensitivity , pain management ,  f/up     # Hypomagnesemia- supplemented     #hyponatremia- stable levels   Per Nephro eval, likely [facility]? Urea 15gm QD,  Uric Acid = 4.5. No need to restrict po free water. continue on salt tabs  -daily BMP monitoring      #constipated: Senna, Miralax.     #HTN (hypertension). currently borderline Hypotensive   -continue  lopressor 25mg PO twice daily with holding parameters.     #DM2 (diabetes mellitus, type 2)- uncontrolled, Hga1C- 9, improved bsfs  lantus 12 units subc. once daily ,  lispro 4 units subc. before each meal three times daily   ssi.    #History of BPH. - continue finasteride 5 mg po once daily /tamsulosin 0.4 mg po once daily at bedtime     #h/o CAD (coronary artery disease). / h/o CABG  - plavix/zocor tx.  -patient is on supplemental oxygen via [place], 2 L , on RA sat 91- 92% at rest     #Hypothyroidism. continue synthroid 50mcg po once daily    # Iron def. anemia- started on PO Iron tx.     # Ambulatory dysfunction, physical decline- PT eval.     DVT ppX, heparin  DNR/DNI (MOLST signed 6/29)    #Progress Note Handoff:  continue IV Zosyn, f/up abscess cxs- sensitivity , daily CBC, BMP monitoring, neuro assessment, PT eval  Family discussion: all results and medical plan of tx. d/w patient's daughter Shayna, who is in agreement with treatment plan. Disposition:  STR vs. Home with home care and home PT.     Total time spent to complete patient's bedside assessment, review medical chart, discuss medical plan of care with covering medical team was more than 50  minutes with >50% of time spent face to face with patient, discussion with patient/family and/or coordination of care .

## 2023-07-08 LAB
ALBUMIN SERPL ELPH-MCNC: 3.2 G/DL — LOW (ref 3.5–5.2)
ALP SERPL-CCNC: 78 U/L — SIGNIFICANT CHANGE UP (ref 30–115)
ALT FLD-CCNC: 16 U/L — SIGNIFICANT CHANGE UP (ref 0–41)
ANION GAP SERPL CALC-SCNC: 12 MMOL/L — SIGNIFICANT CHANGE UP (ref 7–14)
AST SERPL-CCNC: 23 U/L — SIGNIFICANT CHANGE UP (ref 0–41)
BILIRUB SERPL-MCNC: 0.3 MG/DL — SIGNIFICANT CHANGE UP (ref 0.2–1.2)
BUN SERPL-MCNC: 27 MG/DL — HIGH (ref 10–20)
CALCIUM SERPL-MCNC: 9.1 MG/DL — SIGNIFICANT CHANGE UP (ref 8.4–10.5)
CHLORIDE SERPL-SCNC: 102 MMOL/L — SIGNIFICANT CHANGE UP (ref 98–110)
CO2 SERPL-SCNC: 22 MMOL/L — SIGNIFICANT CHANGE UP (ref 17–32)
CREAT SERPL-MCNC: 1.1 MG/DL — SIGNIFICANT CHANGE UP (ref 0.7–1.5)
EGFR: 60 ML/MIN/1.73M2 — SIGNIFICANT CHANGE UP
GLUCOSE BLDC GLUCOMTR-MCNC: 102 MG/DL — HIGH (ref 70–99)
GLUCOSE BLDC GLUCOMTR-MCNC: 136 MG/DL — HIGH (ref 70–99)
GLUCOSE BLDC GLUCOMTR-MCNC: 139 MG/DL — HIGH (ref 70–99)
GLUCOSE BLDC GLUCOMTR-MCNC: 219 MG/DL — HIGH (ref 70–99)
GLUCOSE SERPL-MCNC: 107 MG/DL — HIGH (ref 70–99)
HCT VFR BLD CALC: 32.6 % — LOW (ref 42–52)
HGB BLD-MCNC: 9.8 G/DL — LOW (ref 14–18)
MAGNESIUM SERPL-MCNC: 2 MG/DL — SIGNIFICANT CHANGE UP (ref 1.8–2.4)
MCHC RBC-ENTMCNC: 26.4 PG — LOW (ref 27–31)
MCHC RBC-ENTMCNC: 30.1 G/DL — LOW (ref 32–37)
MCV RBC AUTO: 87.9 FL — SIGNIFICANT CHANGE UP (ref 80–94)
NRBC # BLD: 0 /100 WBCS — SIGNIFICANT CHANGE UP (ref 0–0)
PLATELET # BLD AUTO: 338 K/UL — SIGNIFICANT CHANGE UP (ref 130–400)
PMV BLD: 9.2 FL — SIGNIFICANT CHANGE UP (ref 7.4–10.4)
POTASSIUM SERPL-MCNC: 4.3 MMOL/L — SIGNIFICANT CHANGE UP (ref 3.5–5)
POTASSIUM SERPL-SCNC: 4.3 MMOL/L — SIGNIFICANT CHANGE UP (ref 3.5–5)
PROT SERPL-MCNC: 5.5 G/DL — LOW (ref 6–8)
RBC # BLD: 3.71 M/UL — LOW (ref 4.7–6.1)
RBC # FLD: 18.7 % — HIGH (ref 11.5–14.5)
SODIUM SERPL-SCNC: 136 MMOL/L — SIGNIFICANT CHANGE UP (ref 135–146)
WBC # BLD: 10.47 K/UL — SIGNIFICANT CHANGE UP (ref 4.8–10.8)
WBC # FLD AUTO: 10.47 K/UL — SIGNIFICANT CHANGE UP (ref 4.8–10.8)

## 2023-07-08 PROCEDURE — 99232 SBSQ HOSP IP/OBS MODERATE 35: CPT

## 2023-07-08 RX ORDER — CIPROFLOXACIN LACTATE 400MG/40ML
750 VIAL (ML) INTRAVENOUS DAILY
Refills: 0 | Status: DISCONTINUED | OUTPATIENT
Start: 2023-07-08 | End: 2023-07-10

## 2023-07-08 RX ORDER — INSULIN LISPRO 100/ML
3 VIAL (ML) SUBCUTANEOUS
Refills: 0 | Status: DISCONTINUED | OUTPATIENT
Start: 2023-07-08 | End: 2023-07-08

## 2023-07-08 RX ADMIN — HEPARIN SODIUM 5000 UNIT(S): 5000 INJECTION INTRAVENOUS; SUBCUTANEOUS at 21:43

## 2023-07-08 RX ADMIN — SODIUM CHLORIDE 1 GRAM(S): 9 INJECTION INTRAMUSCULAR; INTRAVENOUS; SUBCUTANEOUS at 05:43

## 2023-07-08 RX ADMIN — FINASTERIDE 5 MILLIGRAM(S): 5 TABLET, FILM COATED ORAL at 11:28

## 2023-07-08 RX ADMIN — HEPARIN SODIUM 5000 UNIT(S): 5000 INJECTION INTRAVENOUS; SUBCUTANEOUS at 05:42

## 2023-07-08 RX ADMIN — LEVETIRACETAM 400 MILLIGRAM(S): 250 TABLET, FILM COATED ORAL at 05:44

## 2023-07-08 RX ADMIN — Medication 15 GRAM(S): at 11:28

## 2023-07-08 RX ADMIN — Medication 25 MILLIGRAM(S): at 05:43

## 2023-07-08 RX ADMIN — INSULIN GLARGINE 12 UNIT(S): 100 INJECTION, SOLUTION SUBCUTANEOUS at 09:08

## 2023-07-08 RX ADMIN — Medication 1 TABLET(S): at 09:08

## 2023-07-08 RX ADMIN — Medication 50 MICROGRAM(S): at 05:43

## 2023-07-08 RX ADMIN — DULOXETINE HYDROCHLORIDE 60 MILLIGRAM(S): 30 CAPSULE, DELAYED RELEASE ORAL at 11:28

## 2023-07-08 RX ADMIN — Medication 325 MILLIGRAM(S): at 05:43

## 2023-07-08 RX ADMIN — PIPERACILLIN AND TAZOBACTAM 25 GRAM(S): 4; .5 INJECTION, POWDER, LYOPHILIZED, FOR SOLUTION INTRAVENOUS at 05:44

## 2023-07-08 RX ADMIN — HEPARIN SODIUM 5000 UNIT(S): 5000 INJECTION INTRAVENOUS; SUBCUTANEOUS at 13:34

## 2023-07-08 RX ADMIN — CLOPIDOGREL BISULFATE 75 MILLIGRAM(S): 75 TABLET, FILM COATED ORAL at 11:28

## 2023-07-08 NOTE — PROGRESS NOTE ADULT - SUBJECTIVE AND OBJECTIVE BOX
UROLOGY PROGRESS NOTE    Pt is a 89y M with L scrotal abscess s/p bedside drainage and packing by Dr. Wen on 7/5/23. Pt seen and examined at bedside today. On exam-- packing that was placed yesterday noted not to be in place; expressed minimal purulent discharge expressed from scrotum. New packing placed.     MEDICATIONS  (STANDING):  amoxicillin  500 milliGRAM(s)/clavulanate 1 Tablet(s) Oral two times a day  ciprofloxacin     Tablet 750 milliGRAM(s) Oral daily  clopidogrel Tablet 75 milliGRAM(s) Oral daily  dextrose 5%. 1000 milliLiter(s) (50 mL/Hr) IV Continuous <Continuous>  dextrose 5%. 1000 milliLiter(s) (100 mL/Hr) IV Continuous <Continuous>  dextrose 50% Injectable 25 Gram(s) IV Push once  dextrose 50% Injectable 25 Gram(s) IV Push once  dextrose 50% Injectable 12.5 Gram(s) IV Push once  DULoxetine 60 milliGRAM(s) Oral daily  ferrous    sulfate 325 milliGRAM(s) Oral two times a day  finasteride 5 milliGRAM(s) Oral daily  glucagon  Injectable 1 milliGRAM(s) IntraMuscular once  heparin   Injectable 5000 Unit(s) SubCutaneous every 8 hours  insulin glargine Injectable (LANTUS) 12 Unit(s) SubCutaneous every morning  insulin lispro (ADMELOG) corrective regimen sliding scale   SubCutaneous at bedtime  lactobacillus acidophilus 1 Tablet(s) Oral with breakfast  levETIRAcetam  IVPB 500 milliGRAM(s) IV Intermittent every 12 hours  levothyroxine 50 MICROGram(s) Oral daily  metoprolol tartrate 25 milliGRAM(s) Oral two times a day  multivitamin 1 Tablet(s) Oral every 24 hours  polyethylene glycol 3350 17 Gram(s) Oral two times a day  senna 2 Tablet(s) Oral at bedtime  simvastatin 20 milliGRAM(s) Oral at bedtime  sodium chloride 1 Gram(s) Oral two times a day  tamsulosin 0.4 milliGRAM(s) Oral at bedtime  urea Oral Powder 15 Gram(s) Oral daily    MEDICATIONS  (PRN):  acetaminophen     Tablet .. 650 milliGRAM(s) Oral every 6 hours PRN Temp greater or equal to 38C (100.4F), Mild Pain (1 - 3)  aluminum hydroxide/magnesium hydroxide/simethicone Suspension 30 milliLiter(s) Oral every 4 hours PRN Dyspepsia  dextrose Oral Gel 15 Gram(s) Oral once PRN Blood Glucose LESS THAN 70 milliGRAM(s)/deciliter  LORazepam   Injectable 2 milliGRAM(s) IV Push every 6 hours PRN Acute Seizure Event ONLY  melatonin 3 milliGRAM(s) Oral at bedtime PRN Insomnia  ondansetron Injectable 4 milliGRAM(s) IV Push every 8 hours PRN Nausea and/or Vomiting    REVIEW OF SYSTEMS   [x] A ten-point review of systems was otherwise negative except as noted.    Vital Signs Last 24 Hrs  T(C): 37.1 (08 Jul 2023 13:26), Max: 37.1 (08 Jul 2023 13:26)  T(F): 98.8 (08 Jul 2023 13:26), Max: 98.8 (08 Jul 2023 13:26)  HR: 70 (08 Jul 2023 13:26) (70 - 92)  BP: 115/58 (08 Jul 2023 13:26) (115/58 - 151/67)  RR: 19 (08 Jul 2023 13:26) (18 - 19)  SpO2: 100% (08 Jul 2023 13:26) (100% - 100%)    Parameters below as of 08 Jul 2023 13:26  Patient On (Oxygen Delivery Method): room air    PHYSICAL EXAM:  GEN: NAD   NEURO: Awake and alert  SKIN: Good color, non diaphoretic   RESP: Non-labored breathing   : On exam-- packing that was placed yesterday noted not to be in place; expressed minimal purulent discharge expressed from scrotum. New packing placed.       LABS:                        9.8    10.47 )-----------( 338      ( 08 Jul 2023 09:37 )             32.6     07-08    136  |  102  |  27<H>  ----------------------------<  107<H>  4.3   |  22  |  1.1    Ca    9.1      08 Jul 2023 09:37  Mg     2.0     07-08

## 2023-07-08 NOTE — PROGRESS NOTE ADULT - ASSESSMENT
Patient is a 89 y/o Male with  PMHx HTN, DM2, BPH, CAD s/p CABG, hypothyroidism, recently dx. from [facility] , progressive ambulatory dysfunction, admitted with hyperkalemia, hyponatremia noted as outpt. Found to have pseudomonas bacteremia.    # Acute mental status change:   - MR brain WNL  - EEG Done- 2nd EEG shows generalized slowing + Multifocal epileptic potentials. Started Keppra 500mg every 12 hours .   - Neuro f/up 7/4- placed on VEEG now.   - f/u VEEG results  - F/u neuro for further recs, continue Neurochecks      #Bacteremia due to Pseudomonas.   # Leukocytosis   #in setting of recent hidalgo in snf; now out; possible uti, suspected testicular infection  US Scrotum (for epididymitis) : Complex scrotal mixed echogenicity collection anterior to left testicle measuring approximately 7.7 x 3 x 6.9 cm. 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  ua positive; f/u ucxs- more than 3 organisms, repeat urine cxs neg , repeat blood cxs 6/26, 28 Neg.  blood cxs pseudomonas 6/24, as per  ID - continue on IV Zosyn tx.   -  did I&D bedside 7/5   - wound cx - e.faecalis  - f/u ID recs    # Hypomagnesemia - resolved  - f/u mag level  - mag 2.1 stable now    #hyponatremia- stable levels   Per Nephro eval, likely [facility]? Urea 15gm QD,  Uric Acid = 4.5. No need to restrict po free water.   - Na 133 7/6  - continue on salt tabs  - daily BMP monitoring      #constipated:   - Senna, Miralax.     #HTN (hypertension)  - currently borderline Hypotensive   - continue lopressor 25mg PO twice daily with holding parameters.     #DM2 (diabetes mellitus, type 2)  - uncontrolled, Hga1C- 9  - lantus 12 units subc. once daily ,   - lispro 4 units subc. before each meal three times daily   ssi.    #History of BPH.   - continue finasteride 5 mg po once daily /tamsulosin 0.4 mg po once daily at bedtime     #h/o CAD (coronary artery disease). / h/o CABG  - plavix/zocor tx.  -patient is on supplemental oxygen via [place], 2 L , on RA sat 91- 92% at rest     #Hypothyroidism.   - continue synthroid 50mcg po once daily    # Iron def. anemia  - started on PO Iron tx.     # Ambulatory dysfunction   - physical decline- PT - will f/u today    DVT ppX, heparin  DNR/DNI (MOLST signed 6/29)    #Progress Note Handoff: f/up neuro and ID recs

## 2023-07-08 NOTE — PROGRESS NOTE ADULT - SUBJECTIVE AND OBJECTIVE BOX
CHEL CROFT  [facility] 3A (Back) 022 A ([facility] 3A (Back))        Patient was evaluated and examined  by bedside, sleepy today, refusing to eat breakfast, no fever        REVIEW OF SYSTEMS: unable to obtain, patient is forgetful         T(C): , Max: 36.8 (07-07-23 @ 21:16)  HR: 81 (07-08-23 @ 05:00)  BP: 122/56 (07-08-23 @ 05:00)  RR: 18 (07-08-23 @ 05:00)  SpO2: 98% (07-07-23 @ 17:09)  CAPILLARY BLOOD GLUCOSE      POCT Blood Glucose.: 102 mg/dL (08 Jul 2023 08:10)  POCT Blood Glucose.: 217 mg/dL (07 Jul 2023 21:58)  POCT Blood Glucose.: 73 mg/dL (07 Jul 2023 21:05)  POCT Blood Glucose.: 241 mg/dL (07 Jul 2023 16:58)  POCT Blood Glucose.: 161 mg/dL (07 Jul 2023 11:29)      PHYSICAL EXAM:  General: NAD, sleepy, patient is laying comfortably in bed  HEENT: AT, NC, Supple  Lungs: CTA B/L, no wheezing, no rhonchi  CVS: normal S1, S2, RRR, NO M/G/R  Abdomen: soft, bowel sounds present, non-tender, non-distended  : left testicle covered with dressing  Extremities: no edema, no clubbing, no cyanosis, positive peripheral pulses b/l  Neuro: mild lethargic state, generalized body weakness  Skin: no rash, no ecchymosis      LAB  CBC  Date: 07-08-23 @ 09:37  Mean cell Duiezmiost45.4  Mean cell Hemoglobin Conc30.1  Mean cell Volum 87.9  Platelet count-Automate 338  RBC Count 3.71  Red Cell Distrib Width18.7  WBC Count10.47  % Albumin, Urine--  Hematocrit 32.6  Hemoglobin 9.8  CBC  Date: 07-07-23 @ 08:44  Mean cell Qjqafogsph78.6  Mean cell Hemoglobin Conc30.6  Mean cell Volum 87.0  Platelet count-Automate 358  RBC Count 3.53  Red Cell Distrib Width18.5  WBC Count11.86  % Albumin, Urine--  Hematocrit 30.7  Hemoglobin 9.4  CBC  Date: 07-06-23 @ 08:44  Mean cell Ggbqqlyspx62.8  Mean cell Hemoglobin Conc31.4  Mean cell Volum 85.4  Platelet count-Automate 358  RBC Count 3.43  Red Cell Distrib Width17.8  WBC Count12.04  % Albumin, Urine--  Hematocrit 29.3  Hemoglobin 9.2  CBC  Date: 07-05-23 @ 08:00  Mean cell Wjmcwdpwdb24.1  Mean cell Hemoglobin Conc30.4  Mean cell Volum 85.8  Platelet count-Automate 364  RBC Count 3.37  Red Cell Distrib Width17.3  WBC Count13.38  % Albumin, Urine--  Hematocrit 28.9  Hemoglobin 8.8  CBC  Date: 07-04-23 @ 08:35  Mean cell Pixbpukmfb25.3  Mean cell Hemoglobin Conc31.1  Mean cell Volum 84.6  Platelet count-Automate 366  RBC Count 3.19  Red Cell Distrib Width16.7  WBC Count13.50  % Albumin, Urine--  Hematocrit 27.0  Hemoglobin 8.4  CBC  Date: 07-03-23 @ 07:56  Mean cell Ylmmjqgnrj40.7  Mean cell Hemoglobin Conc30.7  Mean cell Volum 83.7  Platelet count-Automate 398  RBC Count 3.50  Red Cell Distrib Width16.5  WBC Count15.04  % Albumin, Urine--  Hematocrit 29.3  Hemoglobin 9.0  CBC  Date: 07-02-23 @ 07:19  Mean cell Heyyeibsgw84.6  Mean cell Hemoglobin Conc30.9  Mean cell Volum 82.9  Platelet count-Automate 360  RBC Count 3.51  Red Cell Distrib Width16.0  WBC Count12.65  % Albumin, Urine--  Hematocrit 29.1  Hemoglobin 9.0  CBC  Date: 07-01-23 @ 22:33  Mean cell Fitefdksnm33.9  Mean cell Hemoglobin Conc31.0  Mean cell Volum 83.4  Platelet count-Automate 396  RBC Count 3.32  Red Cell Distrib Width15.8  WBC Count12.32  % Albumin, Urine--  Hematocrit 27.7  Hemoglobin 8.6    BMP  07-07-23 @ 08:44  Blood Gas Arterial-Calcium,Ionized--  Blood Urea Nitrogen, Serum 34 mg/dL<H> [10 - 20]  Carbon Dioxide, Serum25 mmol/L [17 - 32]  Chloride, Ajebt127 mmol/L [98 - 110]  Creatinie, Serum1.1 mg/dL [0.7 - 1.5]  Glucose, Uxlmm174 mg/dL<H> [70 - 99]  Potassium, Serum4.1 mmol/L [3.5 - 5.0]  Sodium, Serum 137 mmol/L [135 - 146]  [facility]  07-06-23 @ 08:44  Blood Gas Arterial-Calcium,Ionized--  Blood Urea Nitrogen, Serum 39 mg/dL<H> [10 - 20]  Carbon Dioxide, Serum25 mmol/L [17 - 32]  Chloride, Serum99 mmol/L [98 - 110]  Creatinie, Serum1.1 mg/dL [0.7 - 1.5]  Glucose, Lxfcg484 mg/dL<H> [70 - 99]  Potassium, Serum4.7 mmol/L [3.5 - 5.0]  Sodium, Serum 133 mmol/L<L> [135 - 146]  [facility]  07-05-23 @ 08:00  Blood Gas Arterial-Calcium,Ionized--  Blood Urea Nitrogen, Serum 37 mg/dL<H> [10 - 20]  Carbon Dioxide, Serum22 mmol/L [17 - 32]  Chloride, Hyblu181 mmol/L [98 - 110]  Creatinie, Serum1.1 mg/dL [0.7 - 1.5]  Glucose, Thzpc683 mg/dL<H> [70 - 99]  Potassium, Serum4.8 mmol/L [3.5 - 5.0]  Sodium, Serum 137 mmol/L [135 - 146]  [facility]  07-04-23 @ 08:35  Blood Gas Arterial-Calcium,Ionized--  Blood Urea Nitrogen, Serum 34 mg/dL<H> [10 - 20]  Carbon Dioxide, Serum23 mmol/L [17 - 32]  Chloride, Ebnml077 mmol/L [98 - 110]  Creatinie, Serum1.0 mg/dL [0.7 - 1.5]  Glucose, Atxrh329 mg/dL<H> [70 - 99]  Potassium, Serum4.4 mmol/L [3.5 - 5.0]  Sodium, Serum 133 mmol/L<L> [135 - 146]              Microbiology:    Culture - Abscess with Gram Stain (collected 07-05-23 @ 18:48)  Source: .Abscess LEFT scrotal abscess  Gram Stain (07-06-23 @ 05:04):    Rare polymorphonuclear leukocytes seen per low power field    No organisms seen per oil power field  Preliminary Report (07-06-23 @ 23:05):    Moderate Enterococcus faecalis  Organism: Enterococcus faecalis (07-07-23 @ 21:11)  Organism: Enterococcus faecalis (07-07-23 @ 21:11)      Method Type: KEVIN      -  Ampicillin: S <=2 Predicts results to ampicillin/sulbactam, amoxacillin-clavulanate and  piperacillin-tazobactam.      -  Tetracycline: R >8      -  Vancomycin: S 2    Culture - Blood (collected 06-28-23 @ 04:30)  Source: .Blood Blood-Peripheral  Final Report (07-03-23 @ 18:01):    No growth at 5 days    Culture - Urine (collected 06-27-23 @ 12:50)  Source: Catheterized Catheterized  Final Report (06-28-23 @ 23:40):    <10,000 CFU/mL Normal Urogenital Roxana    Culture - Blood (collected 06-26-23 @ 06:02)  Source: .Blood None  Final Report (07-01-23 @ 18:00):    No growth at 5 days    Culture - Blood (collected 06-24-23 @ 00:28)  Source: .Blood None  Gram Stain (06-25-23 @ 17:54):    Growth in aerobic bottle: Gram Negative Rods    Growth in anaerobic bottle: Gram Negative Rods  Final Report (06-26-23 @ 14:45):    Growth in aerobic and anaerobic bottles: Pseudomonas aeruginosa    ***Blood Panel PCR results on this specimen are available    approximately 3 hours after the Gram stain result.***    Gram stain, PCR, and/or culture results may not always    correspond due to difference in methodologies.    ************************************************************    This PCR assay was performed by multiplex PCR. This    Assay tests for 66 bacterial and resistance gene targets.    Please refer to the [facility] test directory    at [email or web address] for details.  Organism: Blood Culture PCR  Pseudomonas aeruginosa (06-26-23 @ 14:45)  Organism: Pseudomonas aeruginosa (06-26-23 @ 14:45)      Method Type: KEVIN      -  Amikacin: S -[id number]      -  Aztreonam: S <=4      -  Cefepime: S <=2      -  Ceftazidime: S <=1      -  Ciprofloxacin: S <=0.25      -  Gentamicin: S <=2      -  Imipenem: S <=1      -  Levofloxacin: S <=0.5      -  Meropenem: S <=1      -  Piperacillin/Tazobactam: S <=8      -  Tobramycin: S <=2  Organism: Blood Culture PCR (06-26-23 @ 14:45)      Method Type: PCR      -  Pseudomonas aeruginosa: Detec    Culture - Urine (collected 06-23-23 @ 15:45)  Source: Clean Catch Clean Catch (Midstream)  Final Report (06-25-23 @ 16:47):    >=3 organisms. Probable collection contamination.        Medications:  acetaminophen     Tablet .. 650 milliGRAM(s) Oral every 6 hours PRN  aluminum hydroxide/magnesium hydroxide/simethicone Suspension 30 milliLiter(s) Oral every 4 hours PRN  clopidogrel Tablet 75 milliGRAM(s) Oral daily  dextrose 5%. 1000 milliLiter(s) IV Continuous <Continuous>  dextrose 5%. 1000 milliLiter(s) IV Continuous <Continuous>  dextrose 50% Injectable 25 Gram(s) IV Push once  dextrose 50% Injectable 25 Gram(s) IV Push once  dextrose 50% Injectable 12.5 Gram(s) IV Push once  dextrose Oral Gel 15 Gram(s) Oral once PRN  DULoxetine 60 milliGRAM(s) Oral daily  ferrous    sulfate 325 milliGRAM(s) Oral two times a day  finasteride 5 milliGRAM(s) Oral daily  glucagon  Injectable 1 milliGRAM(s) IntraMuscular once  heparin   Injectable 5000 Unit(s) SubCutaneous every 8 hours  insulin glargine Injectable (LANTUS) 12 Unit(s) SubCutaneous every morning  insulin lispro (ADMELOG) corrective regimen sliding scale   SubCutaneous at bedtime  insulin lispro Injectable (ADMELOG) 3 Unit(s) SubCutaneous three times a day with meals  lactobacillus acidophilus 1 Tablet(s) Oral with breakfast  levETIRAcetam  IVPB 500 milliGRAM(s) IV Intermittent every 12 hours  levothyroxine 50 MICROGram(s) Oral daily  LORazepam   Injectable 2 milliGRAM(s) IV Push every 6 hours PRN  melatonin 3 milliGRAM(s) Oral at bedtime PRN  metoprolol tartrate 25 milliGRAM(s) Oral two times a day  multivitamin 1 Tablet(s) Oral every 24 hours  ondansetron Injectable 4 milliGRAM(s) IV Push every 8 hours PRN  piperacillin/tazobactam IVPB.. 3.375 Gram(s) IV Intermittent every 8 hours  polyethylene glycol 3350 17 Gram(s) Oral two times a day  senna 2 Tablet(s) Oral at bedtime  simvastatin 20 milliGRAM(s) Oral at bedtime  sodium chloride 1 Gram(s) Oral two times a day  tamsulosin 0.4 milliGRAM(s) Oral at bedtime  urea Oral Powder 15 Gram(s) Oral daily        Assessment and Plan:  Patient is a 89 y/o Male with  PMHx HTN, DM2, BPH, CAD s/p CABG, hypothyroidism, recently dx. from [facility] , progressive ambulatory dysfunction, admitted   with hyperkalemia, hyponatremia noted as outpt. Found to have pseudomonas bacteremia.    # Acute mental status change:   - MR brain WNL  - EEG Done- 2nd EEG shows generalized slowing + Multifocal epileptic potentials. Started Keppra 500mg every 12 hours .   Neuro f/up 7/4- s/p VEEG - no epilepti-form activity   - F/u neuro if dose of Keppra needs to be decreased due to patient's lethargic state      #Bacteremia due to Pseudomonas.   # Leukocytosis   #in setting of acute Testicular abscess- due to Enterococcus infection  US Scrotum (for epididymitis) : Complex scrotal mixed echogenicity collection anterior to left testicle measuring approximately 7.7 x 3 x 6.9 cm. 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  ua positive; s/p urine cxs- more than 3 organisms, repeat urine cxs neg , repeat blood cxs 6/26, 28 Neg.  blood cxs pseudomonas 6/24, as per  ID - continue on IV Zosyn tx. , f/up ID for deescalation of abx. tx.   - consulted  - s/p Incision and drainage on 7/6/23- daily wound care, testicular  abscess cxs-grew Enterococcus- ensitive to ampicillin  , pain management ,  f/up     # Hypomagnesemia- supplemented     #hyponatremia- stable levels   Per Nephro eval, likely [facility]? Urea 15gm QD,  Uric Acid = 4.5. No need to restrict po free water. continue on salt tabs  -daily BMP monitoring      #constipated: Senna, Miralax.     #HTN (hypertension). currently borderline Hypotensive   -continue  lopressor 25mg PO twice daily with holding parameters.     #DM2 (diabetes mellitus, type 2)- uncontrolled, Hga1C- 9, improved bsfs  lantus 12 units subc. once daily ,  hold lispro - patient has poor appetite today    ssi.    #History of BPH. - continue finasteride 5 mg po once daily /tamsulosin 0.4 mg po once daily at bedtime     #h/o CAD (coronary artery disease). / h/o CABG  - plavix/zocor tx.  -patient is on supplemental oxygen via NC, 2 L , on RA sat 91- 92% at rest     #Hypothyroidism. continue synthroid 50mcg po once daily    # Iron def. anemia- started on PO Iron tx.     # Ambulatory dysfunction, physical decline- PT eval.     DVT ppX, heparin  DNR/DNI (MOLST signed 6/29)    #Progress Note Handoff:  f/up ID for abx. choice, f/up Neuro if we can decrease Keppra dose since pt. is very lethargic  , daily CBC, BMP monitoring  Family discussion: all results and medical plan of tx. d/w patient's daughter Shayna, who is in agreement with treatment plan. Disposition:  STR vs. Home with home care and home PT.     Total time spent to complete patient's bedside assessment, review medical chart, discuss medical plan of care with covering medical team was more than 50 minutes with >50% of time spent face to face with patient, discussion with patient/family and/or coordination of care

## 2023-07-08 NOTE — PROGRESS NOTE ADULT - SUBJECTIVE AND OBJECTIVE BOX
CHEL CROFT 89y Male  MRN#: [id number]     Hospital Day: 15d    Pt is currently admitted with the primary diagnosis of  Hyperkalemia        SUBJECTIVE     Overnight events  None - pt more sleepy                                              ----------------------------------------------------------  OBJECTIVE  PAST MEDICAL & SURGICAL HISTORY  HTN (hypertension)    HLD (hyperlipidemia)    BPH (benign prostatic hyperplasia)    CAD (coronary artery disease)    Diabetes mellitus    Hypothyroidism    S/P CABG (coronary artery bypass graft)                                              -----------------------------------------------------------  ALLERGIES:  No Known Allergies                                            ------------------------------------------------------------    HOME MEDICATIONS  Home Medications:  DULoxetine 60 mg oral delayed release capsule: 1 cap(s) orally once a day (13 Dec 2022 22:03)  finasteride 5 mg oral tablet: 1 tab(s) orally once a day (13 Dec 2022 22:03)  lactobacillus acidophilus oral capsule: 1 tab(s) orally once a day (20 Dec 2022 10:18)  levothyroxine 50 mcg (0.05 mg) oral tablet: 1 tab(s) orally once a day (13 Dec 2022 22:03)  Metoprolol Tartrate 25 mg oral tablet: 1 tab(s) orally 2 times a day (13 Dec 2022 22:03)  Plavix 75 mg oral tablet: 1 orally once a day (23 Jun 2023 21:54)  simvastatin 20 mg oral tablet: 1 tab(s) orally once a day (at bedtime) (13 Dec 2022 22:03)  tamsulosin 0.4 mg oral capsule: 1 cap(s) orally once a day (13 Dec 2022 22:03)  Toujeo SoloStar 300 units/mL subcutaneous solution: 12 unit(s) subcutaneous once a day (in the morning) (13 Dec 2022 22:03)                           MEDICATIONS:  STANDING MEDICATIONS  clopidogrel Tablet 75 milliGRAM(s) Oral daily  dextrose 5%. 1000 milliLiter(s) IV Continuous <Continuous>  dextrose 5%. 1000 milliLiter(s) IV Continuous <Continuous>  dextrose 50% Injectable 25 Gram(s) IV Push once  dextrose 50% Injectable 25 Gram(s) IV Push once  dextrose 50% Injectable 12.5 Gram(s) IV Push once  DULoxetine 60 milliGRAM(s) Oral daily  ferrous    sulfate 325 milliGRAM(s) Oral two times a day  finasteride 5 milliGRAM(s) Oral daily  glucagon  Injectable 1 milliGRAM(s) IntraMuscular once  heparin   Injectable 5000 Unit(s) SubCutaneous every 8 hours  insulin glargine Injectable (LANTUS) 12 Unit(s) SubCutaneous every morning  insulin lispro (ADMELOG) corrective regimen sliding scale   SubCutaneous at bedtime  lactobacillus acidophilus 1 Tablet(s) Oral with breakfast  levETIRAcetam  IVPB 500 milliGRAM(s) IV Intermittent every 12 hours  levothyroxine 50 MICROGram(s) Oral daily  metoprolol tartrate 25 milliGRAM(s) Oral two times a day  multivitamin 1 Tablet(s) Oral every 24 hours  piperacillin/tazobactam IVPB.. 3.375 Gram(s) IV Intermittent every 8 hours  polyethylene glycol 3350 17 Gram(s) Oral two times a day  senna 2 Tablet(s) Oral at bedtime  simvastatin 20 milliGRAM(s) Oral at bedtime  sodium chloride 1 Gram(s) Oral two times a day  tamsulosin 0.4 milliGRAM(s) Oral at bedtime  urea Oral Powder 15 Gram(s) Oral daily    PRN MEDICATIONS  acetaminophen     Tablet .. 650 milliGRAM(s) Oral every 6 hours PRN  aluminum hydroxide/magnesium hydroxide/simethicone Suspension 30 milliLiter(s) Oral every 4 hours PRN  dextrose Oral Gel 15 Gram(s) Oral once PRN  LORazepam   Injectable 2 milliGRAM(s) IV Push every 6 hours PRN  melatonin 3 milliGRAM(s) Oral at bedtime PRN  ondansetron Injectable 4 milliGRAM(s) IV Push every 8 hours PRN                                            ------------------------------------------------------------  VITAL SIGNS: Last 24 Hours  T(C): 36.7 (08 Jul 2023 05:00), Max: 36.8 (07 Jul 2023 21:16)  T(F): 98 (08 Jul 2023 05:00), Max: 98.2 (07 Jul 2023 21:16)  HR: 81 (08 Jul 2023 05:00) (77 - 92)  BP: 122/56 (08 Jul 2023 05:00) (121/56 - 165/72)  BP(mean): --  RR: 18 (08 Jul 2023 05:00) (18 - 19)  SpO2: 98% (07 Jul 2023 17:09) (98% - 98%)                                             --------------------------------------------------------------  LABS:                        9.8    10.47 )-----------( 338      ( 08 Jul 2023 09:37 )             32.6     07-07    137  |  101  |  34<H>  ----------------------------<  100<H>  4.1   |  25  |  1.1    Ca    9.2      07 Jul 2023 08:44  Mg     2.0     07-07    TPro  5.5<L>  /  Alb  3.0<L>  /  TBili  0.3  /  DBili  x   /  AST  24  /  ALT  14  /  AlkPhos  75  07-07      Urinalysis Basic - ( 07 Jul 2023 08:44 )    Color: x / Appearance: x / SG: x / pH: x  Gluc: 100 mg/dL / Ketone: x  / Bili: x / Urobili: x   Blood: x / Protein: x / Nitrite: x   Leuk Esterase: x / RBC: x / WBC x   Sq Epi: x / Non Sq Epi: x / Bacteria: x              Culture - Abscess with Gram Stain (collected 05 Jul 2023 18:48)  Source: .Abscess LEFT scrotal abscess  Gram Stain (06 Jul 2023 05:04):    Rare polymorphonuclear leukocytes seen per low power field    No organisms seen per oil power field  Preliminary Report (06 Jul 2023 23:05):    Moderate Enterococcus faecalis  Organism: Enterococcus faecalis (07 Jul 2023 21:11)  Organism: Enterococcus faecalis (07 Jul 2023 21:11)                                                    -------------------------------------------------------------  RADIOLOGY:                                            --------------------------------------------------------------    PHYSICAL EXAM:  GENERAL: NAD, lying in bed comfortably  HEAD:  Atraumatic, Normocephalic  CHEST/LUNG: Clear to auscultation bilaterally; No rales, rhonchi, wheezing, or rubs. Unlabored respirations  HEART: regular rate and rhythm; No murmurs, rubs, or gallops  ABDOMEN: Bowel sounds present; Soft, Nontender, Nondistended.    : Pt with bandage from bedside I&D procedure  EXTREMITIES: No clubbing, cyanosis, or edema  NERVOUS SYSTEM:  Alert & Oriented                                           --------------------------------------------------------------

## 2023-07-08 NOTE — CHART NOTE - NSCHARTNOTEFT_GEN_A_CORE
Neurology addendum:   Called for drowsiness. Discussed with Dr. Wheeler.   Can lower Keppra to 250 mg in am and 500 mg at night for one week and then make Keppra 250 mg q12.

## 2023-07-08 NOTE — PROGRESS NOTE ADULT - ASSESSMENT
Pt is a 89y M with L scrotal abscess s/p bedside drainage and packing by Dr. Wen on 7/5/23.     ·	On exam today-- packing that was placed yesterday noted not to be in place; expressed minimal purulent discharge expressed from scrotum. New packing placed.   ·	Will continue with packing changes   ·	Abscess cx prelim with enterococcus faecalis  ·	Cont abx as per ID recs-- currently on Zosyn   ·	Will d/w attng

## 2023-07-09 LAB
ALBUMIN SERPL ELPH-MCNC: 3 G/DL — LOW (ref 3.5–5.2)
ALP SERPL-CCNC: 82 U/L — SIGNIFICANT CHANGE UP (ref 30–115)
ALT FLD-CCNC: 15 U/L — SIGNIFICANT CHANGE UP (ref 0–41)
ANION GAP SERPL CALC-SCNC: 12 MMOL/L — SIGNIFICANT CHANGE UP (ref 7–14)
AST SERPL-CCNC: 20 U/L — SIGNIFICANT CHANGE UP (ref 0–41)
BILIRUB SERPL-MCNC: 0.2 MG/DL — SIGNIFICANT CHANGE UP (ref 0.2–1.2)
BUN SERPL-MCNC: 26 MG/DL — HIGH (ref 10–20)
CALCIUM SERPL-MCNC: 9.2 MG/DL — SIGNIFICANT CHANGE UP (ref 8.4–10.5)
CHLORIDE SERPL-SCNC: 105 MMOL/L — SIGNIFICANT CHANGE UP (ref 98–110)
CO2 SERPL-SCNC: 21 MMOL/L — SIGNIFICANT CHANGE UP (ref 17–32)
CREAT SERPL-MCNC: 1.1 MG/DL — SIGNIFICANT CHANGE UP (ref 0.7–1.5)
EGFR: 60 ML/MIN/1.73M2 — SIGNIFICANT CHANGE UP
GLUCOSE BLDC GLUCOMTR-MCNC: 166 MG/DL — HIGH (ref 70–99)
GLUCOSE BLDC GLUCOMTR-MCNC: 169 MG/DL — HIGH (ref 70–99)
GLUCOSE BLDC GLUCOMTR-MCNC: 176 MG/DL — HIGH (ref 70–99)
GLUCOSE BLDC GLUCOMTR-MCNC: 282 MG/DL — HIGH (ref 70–99)
GLUCOSE SERPL-MCNC: 170 MG/DL — HIGH (ref 70–99)
HCT VFR BLD CALC: 32.8 % — LOW (ref 42–52)
HGB BLD-MCNC: 9.9 G/DL — LOW (ref 14–18)
MAGNESIUM SERPL-MCNC: 2 MG/DL — SIGNIFICANT CHANGE UP (ref 1.8–2.4)
MCHC RBC-ENTMCNC: 26.5 PG — LOW (ref 27–31)
MCHC RBC-ENTMCNC: 30.2 G/DL — LOW (ref 32–37)
MCV RBC AUTO: 87.9 FL — SIGNIFICANT CHANGE UP (ref 80–94)
NRBC # BLD: 0 /100 WBCS — SIGNIFICANT CHANGE UP (ref 0–0)
PLATELET # BLD AUTO: 348 K/UL — SIGNIFICANT CHANGE UP (ref 130–400)
PMV BLD: 9.3 FL — SIGNIFICANT CHANGE UP (ref 7.4–10.4)
POTASSIUM SERPL-MCNC: 4.7 MMOL/L — SIGNIFICANT CHANGE UP (ref 3.5–5)
POTASSIUM SERPL-SCNC: 4.7 MMOL/L — SIGNIFICANT CHANGE UP (ref 3.5–5)
PROT SERPL-MCNC: 5.5 G/DL — LOW (ref 6–8)
RBC # BLD: 3.73 M/UL — LOW (ref 4.7–6.1)
RBC # FLD: 19.3 % — HIGH (ref 11.5–14.5)
SODIUM SERPL-SCNC: 138 MMOL/L — SIGNIFICANT CHANGE UP (ref 135–146)
WBC # BLD: 11.84 K/UL — HIGH (ref 4.8–10.8)
WBC # FLD AUTO: 11.84 K/UL — HIGH (ref 4.8–10.8)

## 2023-07-09 PROCEDURE — 99232 SBSQ HOSP IP/OBS MODERATE 35: CPT

## 2023-07-09 RX ORDER — LEVETIRACETAM 250 MG/1
500 TABLET, FILM COATED ORAL
Refills: 0 | Status: DISCONTINUED | OUTPATIENT
Start: 2023-07-10 | End: 2023-07-10

## 2023-07-09 RX ADMIN — INSULIN GLARGINE 12 UNIT(S): 100 INJECTION, SOLUTION SUBCUTANEOUS at 09:08

## 2023-07-09 RX ADMIN — HEPARIN SODIUM 5000 UNIT(S): 5000 INJECTION INTRAVENOUS; SUBCUTANEOUS at 21:58

## 2023-07-09 RX ADMIN — SENNA PLUS 2 TABLET(S): 8.6 TABLET ORAL at 21:58

## 2023-07-09 RX ADMIN — HEPARIN SODIUM 5000 UNIT(S): 5000 INJECTION INTRAVENOUS; SUBCUTANEOUS at 05:58

## 2023-07-09 RX ADMIN — SIMVASTATIN 20 MILLIGRAM(S): 20 TABLET, FILM COATED ORAL at 21:58

## 2023-07-09 RX ADMIN — LEVETIRACETAM 400 MILLIGRAM(S): 250 TABLET, FILM COATED ORAL at 05:58

## 2023-07-09 RX ADMIN — HEPARIN SODIUM 5000 UNIT(S): 5000 INJECTION INTRAVENOUS; SUBCUTANEOUS at 14:15

## 2023-07-09 NOTE — PROGRESS NOTE ADULT - ASSESSMENT
Pt is a 89y M with L scrotal abscess s/p bedside drainage and packing by Dr. Wen on 7/5/23. Patient refusing packing change today and asking for packing change in AM.     Plan:   - Burn c/s for further wound management   - Abscess Cx- E. faecalis   - Continue packing changes, will attempted in AM.   - Continue IV abx per ID - on Zosyn   - D/w attending

## 2023-07-09 NOTE — PROGRESS NOTE ADULT - SUBJECTIVE AND OBJECTIVE BOX
UROLOGY DAILY PROGRESS NOTE    Pt is a 89y M with L scrotal abscess s/p bedside drainage and packing by Dr. Wen on 7/5/23. Patient refusing packing change today and asking for packing change in AM.     MEDICATIONS  (STANDING):  amoxicillin  500 milliGRAM(s)/clavulanate 1 Tablet(s) Oral two times a day  ciprofloxacin     Tablet 750 milliGRAM(s) Oral daily  clopidogrel Tablet 75 milliGRAM(s) Oral daily  dextrose 5%. 1000 milliLiter(s) (50 mL/Hr) IV Continuous <Continuous>  dextrose 5%. 1000 milliLiter(s) (100 mL/Hr) IV Continuous <Continuous>  dextrose 50% Injectable 25 Gram(s) IV Push once  dextrose 50% Injectable 25 Gram(s) IV Push once  dextrose 50% Injectable 12.5 Gram(s) IV Push once  DULoxetine 60 milliGRAM(s) Oral daily  ferrous    sulfate 325 milliGRAM(s) Oral two times a day  finasteride 5 milliGRAM(s) Oral daily  glucagon  Injectable 1 milliGRAM(s) IntraMuscular once  heparin   Injectable 5000 Unit(s) SubCutaneous every 8 hours  insulin glargine Injectable (LANTUS) 12 Unit(s) SubCutaneous every morning  insulin lispro (ADMELOG) corrective regimen sliding scale   SubCutaneous at bedtime  lactobacillus acidophilus 1 Tablet(s) Oral with breakfast  levothyroxine 50 MICROGram(s) Oral daily  metoprolol tartrate 25 milliGRAM(s) Oral two times a day  multivitamin 1 Tablet(s) Oral every 24 hours  polyethylene glycol 3350 17 Gram(s) Oral two times a day  senna 2 Tablet(s) Oral at bedtime  simvastatin 20 milliGRAM(s) Oral at bedtime  sodium chloride 1 Gram(s) Oral two times a day  tamsulosin 0.4 milliGRAM(s) Oral at bedtime  urea Oral Powder 15 Gram(s) Oral daily    MEDICATIONS  (PRN):  acetaminophen     Tablet .. 650 milliGRAM(s) Oral every 6 hours PRN Temp greater or equal to 38C (100.4F), Mild Pain (1 - 3)  aluminum hydroxide/magnesium hydroxide/simethicone Suspension 30 milliLiter(s) Oral every 4 hours PRN Dyspepsia  dextrose Oral Gel 15 Gram(s) Oral once PRN Blood Glucose LESS THAN 70 milliGRAM(s)/deciliter  melatonin 3 milliGRAM(s) Oral at bedtime PRN Insomnia  ondansetron Injectable 4 milliGRAM(s) IV Push every 8 hours PRN Nausea and/or Vomiting      REVIEW OF SYSTEMS   [x] A ten-point review of systems was otherwise negative except as noted.    Vital Signs Last 24 Hrs  T(C): 36.4 (09 Jul 2023 04:45), Max: 36.4 (08 Jul 2023 21:00)  T(F): 97.5 (09 Jul 2023 04:45), Max: 97.5 (08 Jul 2023 21:00)  HR: 110 (09 Jul 2023 04:45) (102 - 110)  BP: 114/59 (09 Jul 2023 04:45) (114/59 - 122/66)  BP(mean): --  RR: 18 (09 Jul 2023 04:45) (18 - 18)  SpO2: 100% (08 Jul 2023 21:00) (100% - 100%)        PHYSICAL EXAM:  GEN: NAD, awake and alert  Patient refused packing change at this time asking for packing change in the morning       I&O's Summary      LABS:                        9.9    11.84 )-----------( 348      ( 09 Jul 2023 06:24 )             32.8     07-09    138  |  105  |  26<H>  ----------------------------<  170<H>  4.7   |  21  |  1.1    Ca    9.2      09 Jul 2023 06:24  Mg     2.0     07-09    TPro  5.5<L>  /  Alb  3.0<L>  /  TBili  0.2  /  DBili  x   /  AST  20  /  ALT  15  /  AlkPhos  82  07-09      Urinalysis Basic - ( 09 Jul 2023 06:24 )    Color: x / Appearance: x / SG: x / pH: x  Gluc: 170 mg/dL / Ketone: x  / Bili: x / Urobili: x   Blood: x / Protein: x / Nitrite: x   Leuk Esterase: x / RBC: x / WBC x   Sq Epi: x / Non Sq Epi: x / Bacteria: x    Culture - Abscess with Gram Stain (07.05.23 @ 18:48)    Gram Stain:   Rare polymorphonuclear leukocytes seen per low power field  No organisms seen per oil power field   -  Ampicillin: S <=2 Predicts results to ampicillin/sulbactam, amoxacillin-clavulanate and  piperacillin-tazobactam.   -  Tetracycline: R >8   -  Vancomycin: S 2   Specimen Source: .Abscess LEFT scrotal abscess   Culture Results:   Moderate Enterococcus faecalis   Organism Identification: Enterococcus faecalis   Organism: Enterococcus faecalis   Method Type: KEVIN            RADIOLOGY & ADDITIONAL STUDIES:

## 2023-07-09 NOTE — CHART NOTE - NSCHARTNOTEFT_GEN_A_CORE
Registered Dietitian Follow-Up     Patient Profile Reviewed                           Yes [x]   No []     Nutrition History Previously Obtained        Yes [x]  No []       Pertinent Subjective Information: Per flow sheet, pt has poor PO intake; assistance during meals required. No nausea or vomiting reported.      Pertinent Medical Information: 89 y/o male with PMHx of HTN, DM2, BPH, CAD s/p CABG, hypothyroidism, recently dx. from [facility] , progressive ambulatory dysfunction, admitted   with hyperkalemia, hyponatremia noted as outpt. Found to have pseudomonas bacteremia.  # Hypomagnesemia- supplemented   # hyponatremia- stable levels   # Constipated - Senna, Miralax    Per SLP note on 2023, recommend soft and bite sized, mildly thick liquids.    Diet order: Diet, Soft and Bite Sized:   Mildly Thick Liquids (MILDTHICKLIQS)  Free Water Flush Instructions:  please add 2 packets of thickener per ensure + MAGIC CUP 1x AT DINNER  Supplement Feeding Modality:  Oral  Ensure Plus High Protein Cans or Servings Per Day:  2       Frequency:  Daily (23 @ 15:55) [Active]    Anthropometrics:  Weight hx: 54.8 KG  Height: 152.4 cm  IBW: 48.2 KG    Dailt weights in K - no other weights taken     MEDICATIONS  (STANDING):  amoxicillin  500 milliGRAM(s)/clavulanate 1 Tablet(s) Oral two times a day  ciprofloxacin     Tablet 750 milliGRAM(s) Oral daily  clopidogrel Tablet 75 milliGRAM(s) Oral daily  dextrose 5%. 1000 milliLiter(s) (50 mL/Hr) IV Continuous <Continuous>  dextrose 5%. 1000 milliLiter(s) (100 mL/Hr) IV Continuous <Continuous>  dextrose 50% Injectable 25 Gram(s) IV Push once  dextrose 50% Injectable 25 Gram(s) IV Push once  dextrose 50% Injectable 12.5 Gram(s) IV Push once  DULoxetine 60 milliGRAM(s) Oral daily  ferrous    sulfate 325 milliGRAM(s) Oral two times a day  finasteride 5 milliGRAM(s) Oral daily  glucagon  Injectable 1 milliGRAM(s) IntraMuscular once  heparin   Injectable 5000 Unit(s) SubCutaneous every 8 hours  insulin glargine Injectable (LANTUS) 12 Unit(s) SubCutaneous every morning  insulin lispro (ADMELOG) corrective regimen sliding scale   SubCutaneous at bedtime  lactobacillus acidophilus 1 Tablet(s) Oral with breakfast  levothyroxine 50 MICROGram(s) Oral daily  metoprolol tartrate 25 milliGRAM(s) Oral two times a day  multivitamin 1 Tablet(s) Oral every 24 hours  polyethylene glycol 3350 17 Gram(s) Oral two times a day  senna 2 Tablet(s) Oral at bedtime  simvastatin 20 milliGRAM(s) Oral at bedtime  sodium chloride 1 Gram(s) Oral two times a day  tamsulosin 0.4 milliGRAM(s) Oral at bedtime  urea Oral Powder 15 Gram(s) Oral daily    MEDICATIONS  (PRN):  acetaminophen     Tablet .. 650 milliGRAM(s) Oral every 6 hours PRN Temp greater or equal to 38C (100.4F), Mild Pain (1 - 3)  aluminum hydroxide/magnesium hydroxide/simethicone Suspension 30 milliLiter(s) Oral every 4 hours PRN Dyspepsia  dextrose Oral Gel 15 Gram(s) Oral once PRN Blood Glucose LESS THAN 70 milliGRAM(s)/deciliter  melatonin 3 milliGRAM(s) Oral at bedtime PRN Insomnia  ondansetron Injectable 4 milliGRAM(s) IV Push every 8 hours PRN Nausea and/or Vomiting    Pertinent Labs:  @ 06:24: Na 138, BUN 26<H>, Cr 1.1, <H>, K+ 4.7, Phos --, Mg 2.0, Alk Phos 82, ALT/SGPT 15, AST/SGOT 20, HbA1c --    Finger Sticks:  POCT Blood Glucose.: 282 mg/dL ( @ 12:14)  POCT Blood Glucose.: 176 mg/dL ( @ 08:35)  POCT Blood Glucose.: 219 mg/dL ( @ 21:50)  POCT Blood Glucose.: 139 mg/dL ( @ 16:55)    Physical Findings:  - Appearance: lethargic/disoriented  - GI function: last BM on  - multiple BMs, fecal incontinence noted per flow sheet   - Tubes: no feeding tube  - Oral/Mouth cavity: tolerating diet texture/consistency  - Skin: stage II to left buttocks  - Edema: no edema noted     Nutrition Requirements:  Weight Used: Weight Used: 54.8 kg with consideration for age, BMI, weight loss pta, malnutrition, wound healing    Estimated Energy Needs    Continue [x]  Adjust []  ENERGY: 4724-9668 kcal/day (MSJ x 1.2-1.5)     Estimated Protein Needs    Continue [x]  Adjust []  PROTEIN: 71-88 g/day (1.3-1.6 g/kg)     Estimated Fluid Needs        Continue [x]  Adjust []  FLUID: 1mL/kcal      Nutrient Intake: Meeting <50% of estimated needs    [x] Previous Nutrition Diagnosis: Malnutrition            [x] Ongoing          [] Resolved    [] No active nutrition diagnosis identified at this time     Nutrition Education: Education deferred     Goal/Expected Outcome: Pt to meet at least 50% of estimated needs within 3-5 days.     Indicator/Monitoring: Diet order, PO intake, weights, labs, NFPF, body composition, BM and tolerance to medical food supplements    Recommendation:  1. Consider appetite stimulant if medically feasible  2. Continue with current diet order + supplements  3. Encourage PO intake and assist during meals  4. Supplement with MVI daily; 500 mg VIT C daily; 220 mg ZINC SULFATE (10-14 days only) to aid in wound healing if medically feasible      Pt is at high nutrition risk; will f/u in 3-5 days or prn.    RD to remain available: Silvia Rodriguez [phone] or TEAMS

## 2023-07-09 NOTE — PROGRESS NOTE ADULT - SUBJECTIVE AND OBJECTIVE BOX
CHEL CROFT  [facility] 3[facility] (Back) 022 A ([facility] (Back))        Patient was evaluated and examined  by bedside, more awake today, no fever       REVIEW OF SYSTEMS: unable to obtain, patient is confused         T(C): , Max: 37.1 (07-08-23 @ 13:26)  HR: 110 (07-09-23 @ 04:45)  BP: 114/59 (07-09-23 @ 04:45)  RR: 18 (07-09-23 @ 04:45)  SpO2: 100% (07-08-23 @ 21:00)  CAPILLARY BLOOD GLUCOSE      POCT Blood Glucose.: 176 mg/dL (09 Jul 2023 08:35)  POCT Blood Glucose.: 219 mg/dL (08 Jul 2023 21:50)  POCT Blood Glucose.: 139 mg/dL (08 Jul 2023 16:55)  POCT Blood Glucose.: 136 mg/dL (08 Jul 2023 11:47)      PHYSICAL EXAM:  General: NAD, awake, confused, patient is laying comfortably in bed  HEENT: AT, NC, Supple  Lungs: CTA B/L, no wheezing, no rhonchi  CVS: normal S1, S2, RRR, NO M/G/R  Abdomen: soft, bowel sounds present, non-tender, non-distended  : left testicle covered with dressing  Extremities: no edema, no clubbing, no cyanosis, positive peripheral pulses b/l  Neuro: mild confused state, generalized body weakness  Skin: no rash, no ecchymosis          LAB  CBC  Date: 07-09-23 @ 06:24  Mean cell Htoypqjjcg31.5  Mean cell Hemoglobin Conc30.2  Mean cell Volum 87.9  Platelet count-Automate 348  RBC Count 3.73  Red Cell Distrib Width19.3  WBC Count11.84  % Albumin, Urine--  Hematocrit 32.8  Hemoglobin 9.9  CBC  Date: 07-08-23 @ 09:37  Mean cell Riybrbcdml43.4  Mean cell Hemoglobin Conc30.1  Mean cell Volum 87.9  Platelet count-Automate 338  RBC Count 3.71  Red Cell Distrib Width18.7  WBC Count10.47  % Albumin, Urine--  Hematocrit 32.6  Hemoglobin 9.8          BMP  07-09-23 @ 06:24  Blood Gas Arterial-Calcium,Ionized--  Blood Urea Nitrogen, Serum 26 mg/dL<H> [10 - 20]  Carbon Dioxide, Serum21 mmol/L [17 - 32]  Chloride, Txkly232 mmol/L [98 - 110]  Creatinie, Serum1.1 mg/dL [0.7 - 1.5]  Glucose, Fhaxs568 mg/dL<H> [70 - 99]  Potassium, Serum4.7 mmol/L [3.5 - 5.0]  Sodium, Serum 138 mmol/L [135 - 146]  BMP  07-08-23 @ 09:37  Blood Gas Arterial-Calcium,Ionized--  Blood Urea Nitrogen, Serum 27 mg/dL<H> [10 - 20]  Carbon Dioxide, Serum22 mmol/L [17 - 32]  Chloride, Wyiry857 mmol/L [98 - 110]  Creatinie, Serum1.1 mg/dL [0.7 - 1.5]  Glucose, Fimmk211 mg/dL<H> [70 - 99]  Potassium, Serum4.3 mmol/L [3.5 - 5.0]  Sodium, Serum 136 mmol/L [135 - 146]        Microbiology:    Culture - Abscess with Gram Stain (collected 07-05-23 @ 18:48)  Source: .Abscess LEFT scrotal abscess  Gram Stain (07-06-23 @ 05:04):    Rare polymorphonuclear leukocytes seen per low power field    No organisms seen per oil power field  Preliminary Report (07-06-23 @ 23:05):    Moderate Enterococcus faecalis  Organism: Enterococcus faecalis (07-07-23 @ 21:11)  Organism: Enterococcus faecalis (07-07-23 @ 21:11)      Method Type: KEVIN      -  Ampicillin: S <=2 Predicts results to ampicillin/sulbactam, amoxacillin-clavulanate and  piperacillin-tazobactam.      -  Tetracycline: R >8      -  Vancomycin: S 2    Culture - Blood (collected 06-28-23 @ 04:30)  Source: .Blood Blood-Peripheral  Final Report (07-03-23 @ 18:01):    No growth at 5 days    Culture - Urine (collected 06-27-23 @ 12:50)  Source: Catheterized Catheterized  Final Report (06-28-23 @ 23:40):    <10,000 CFU/mL Normal Urogenital Roxana    Culture - Blood (collected 06-26-23 @ 06:02)  Source: .Blood None  Final Report (07-01-23 @ 18:00):    No growth at 5 days    Culture - Blood (collected 06-24-23 @ 00:28)  Source: .Blood None  Gram Stain (06-25-23 @ 17:54):    Growth in aerobic bottle: Gram Negative Rods    Growth in anaerobic bottle: Gram Negative Rods  Final Report (06-26-23 @ 14:45):    Growth in aerobic and anaerobic bottles: Pseudomonas aeruginosa    ***Blood Panel PCR results on this specimen are available    approximately 3 hours after the Gram stain result.***    Gram stain, PCR, and/or culture results may not always    correspond due to difference in methodologies.    ************************************************************    This PCR assay was performed by multiplex PCR. This    Assay tests for 66 bacterial and resistance gene targets.    Please refer to the [facility] test directory    at [email or web address] for details.  Organism: Blood Culture PCR  Pseudomonas aeruginosa (06-26-23 @ 14:45)  Organism: Pseudomonas aeruginosa (06-26-23 @ 14:45)      Method Type: KEVIN      -  Amikacin: S -[id number]      -  Aztreonam: S <=4      -  Cefepime: S <=2      -  Ceftazidime: S <=1      -  Ciprofloxacin: S <=0.25      -  Gentamicin: S <=2      -  Imipenem: S <=1      -  Levofloxacin: S <=0.5      -  Meropenem: S <=1      -  Piperacillin/Tazobactam: S <=8      -  Tobramycin: S <=2  Organism: Blood Culture PCR (06-26-23 @ 14:45)      Method Type: PCR      -  Pseudomonas aeruginosa: Detec    Culture - Urine (collected 06-23-23 @ 15:45)  Source: Clean Catch Clean Catch (Midstream)  Final Report (06-25-23 @ 16:47):    >=3 organisms. Probable collection contamination.        Medications:  acetaminophen     Tablet .. 650 milliGRAM(s) Oral every 6 hours PRN  aluminum hydroxide/magnesium hydroxide/simethicone Suspension 30 milliLiter(s) Oral every 4 hours PRN  amoxicillin  500 milliGRAM(s)/clavulanate 1 Tablet(s) Oral two times a day  ciprofloxacin     Tablet 750 milliGRAM(s) Oral daily  clopidogrel Tablet 75 milliGRAM(s) Oral daily  dextrose 5%. 1000 milliLiter(s) IV Continuous <Continuous>  dextrose 5%. 1000 milliLiter(s) IV Continuous <Continuous>  dextrose 50% Injectable 25 Gram(s) IV Push once  dextrose 50% Injectable 25 Gram(s) IV Push once  dextrose 50% Injectable 12.5 Gram(s) IV Push once  dextrose Oral Gel 15 Gram(s) Oral once PRN  DULoxetine 60 milliGRAM(s) Oral daily  ferrous    sulfate 325 milliGRAM(s) Oral two times a day  finasteride 5 milliGRAM(s) Oral daily  glucagon  Injectable 1 milliGRAM(s) IntraMuscular once  heparin   Injectable 5000 Unit(s) SubCutaneous every 8 hours  insulin glargine Injectable (LANTUS) 12 Unit(s) SubCutaneous every morning  insulin lispro (ADMELOG) corrective regimen sliding scale   SubCutaneous at bedtime  lactobacillus acidophilus 1 Tablet(s) Oral with breakfast  levothyroxine 50 MICROGram(s) Oral daily  melatonin 3 milliGRAM(s) Oral at bedtime PRN  metoprolol tartrate 25 milliGRAM(s) Oral two times a day  multivitamin 1 Tablet(s) Oral every 24 hours  ondansetron Injectable 4 milliGRAM(s) IV Push every 8 hours PRN  polyethylene glycol 3350 17 Gram(s) Oral two times a day  senna 2 Tablet(s) Oral at bedtime  simvastatin 20 milliGRAM(s) Oral at bedtime  sodium chloride 1 Gram(s) Oral two times a day  tamsulosin 0.4 milliGRAM(s) Oral at bedtime  urea Oral Powder 15 Gram(s) Oral daily        Assessment and Plan:  Patient is a 89 y/o Male with  PMHx HTN, DM2, BPH, CAD s/p CABG, hypothyroidism, recently dx. from [facility] , progressive ambulatory dysfunction, admitted   with hyperkalemia, hyponatremia noted as outpt. Found to have pseudomonas bacteremia.    # Acute mental status change:   - MR brain WNL  - EEG Done- 2nd EEG shows generalized slowing + Multifocal epileptic potentials. Started Keppra 500mg every 12 hours .   Neuro f/up 7/4- s/p VEEG - no epilepti-form activity   - 7/9/23- patient is more awake today     #Bacteremia due to Pseudomonas.   # Leukocytosis   #in setting of acute Testicular abscess- due to Enterococcus infection  US Scrotum (for epididymitis) : Complex scrotal mixed echogenicity collection anterior to left testicle measuring approximately 7.7 x 3 x 6.9 cm. 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  ua positive; s/p urine cxs- more than 3 organisms, repeat urine cxs neg , repeat blood cxs 6/26, 28 Neg.  blood cxs pseudomonas 6/24, as per  ID - continue on IV Zosyn tx. , post ID f/up on 7/8/23- transitioned to PO Augmentin/Cipro tx.   - consulted  - s/p Incision and drainage on 7/6/23- daily wound care, testicular  abscess cxs-grew Enterococcus- ensitive to ampicillin  , pain management ,  f/up     # Hypomagnesemia- supplemented     #hyponatremia- stable levels   Per Nephro eval, likely [facility]? Urea 15gm QD,  Uric Acid = 4.5. No need to restrict po free water. continue on salt tabs  -daily BMP monitoring      #constipated: Senna, Miralax.     #HTN (hypertension) BP: 114/59 (09 Jul 2023 04:45) (114/59 - 122/66), HR: 110 (09 Jul 2023 04:45) (70 - 110)  -continue  lopressor 25mg PO twice daily with holding parameters.     #DM2 (diabetes mellitus, type 2)- uncontrolled, Hga1C- 9, improved bsfs  lantus 12 units subc. once daily ,  hold lispro - , continue sliding scale co. since patient's appetite fluctuates     #History of BPH. - continue finasteride 5 mg po once daily /tamsulosin 0.4 mg po once daily at bedtime     #h/o CAD (coronary artery disease). / h/o CABG  - plavix/zocor tx.  -patient is on supplemental oxygen via NC, 2 L , on RA sat 91- 92% at rest     #Hypothyroidism. continue synthroid 50mcg po once daily    # Iron def. anemia- started on PO Iron tx.     # Ambulatory dysfunction, physical decline- PT eval.     DVT ppX, heparin  DNR/DNI (MOLST signed 6/29)    #Progress Note Handoff:  transitioned to PO abx, tx. obtain PT f/up, start d/c planning   Family discussion: yes, medical team  Disposition:  STR vs. Home with home care and home PT in 24 hours.     Total time spent to complete patient's bedside assessment, review medical chart, discuss medical plan of care with covering medical team was more than 35  minutes with >50% of time spent face to face with patient, discussion with patient/family and/or coordination of care

## 2023-07-10 LAB
ALBUMIN SERPL ELPH-MCNC: 3.5 G/DL — SIGNIFICANT CHANGE UP (ref 3.5–5.2)
ALP SERPL-CCNC: 81 U/L — SIGNIFICANT CHANGE UP (ref 30–115)
ALT FLD-CCNC: 14 U/L — SIGNIFICANT CHANGE UP (ref 0–41)
ANION GAP SERPL CALC-SCNC: 11 MMOL/L — SIGNIFICANT CHANGE UP (ref 7–14)
AST SERPL-CCNC: 17 U/L — SIGNIFICANT CHANGE UP (ref 0–41)
BILIRUB SERPL-MCNC: 0.3 MG/DL — SIGNIFICANT CHANGE UP (ref 0.2–1.2)
BUN SERPL-MCNC: 24 MG/DL — HIGH (ref 10–20)
CALCIUM SERPL-MCNC: 10.1 MG/DL — SIGNIFICANT CHANGE UP (ref 8.4–10.5)
CHLORIDE SERPL-SCNC: 103 MMOL/L — SIGNIFICANT CHANGE UP (ref 98–110)
CO2 SERPL-SCNC: 26 MMOL/L — SIGNIFICANT CHANGE UP (ref 17–32)
CREAT SERPL-MCNC: 0.9 MG/DL — SIGNIFICANT CHANGE UP (ref 0.7–1.5)
CULTURE RESULTS: SIGNIFICANT CHANGE UP
EGFR: 77 ML/MIN/1.73M2 — SIGNIFICANT CHANGE UP
GLUCOSE BLDC GLUCOMTR-MCNC: 130 MG/DL — HIGH (ref 70–99)
GLUCOSE BLDC GLUCOMTR-MCNC: 156 MG/DL — HIGH (ref 70–99)
GLUCOSE BLDC GLUCOMTR-MCNC: 228 MG/DL — HIGH (ref 70–99)
GLUCOSE BLDC GLUCOMTR-MCNC: 268 MG/DL — HIGH (ref 70–99)
GLUCOSE SERPL-MCNC: 107 MG/DL — HIGH (ref 70–99)
HCT VFR BLD CALC: 34.4 % — LOW (ref 42–52)
HGB BLD-MCNC: 10.4 G/DL — LOW (ref 14–18)
MAGNESIUM SERPL-MCNC: 2 MG/DL — SIGNIFICANT CHANGE UP (ref 1.8–2.4)
MCHC RBC-ENTMCNC: 26.6 PG — LOW (ref 27–31)
MCHC RBC-ENTMCNC: 30.2 G/DL — LOW (ref 32–37)
MCV RBC AUTO: 88 FL — SIGNIFICANT CHANGE UP (ref 80–94)
NRBC # BLD: 0 /100 WBCS — SIGNIFICANT CHANGE UP (ref 0–0)
ORGANISM # SPEC MICROSCOPIC CNT: SIGNIFICANT CHANGE UP
ORGANISM # SPEC MICROSCOPIC CNT: SIGNIFICANT CHANGE UP
PLATELET # BLD AUTO: 411 K/UL — HIGH (ref 130–400)
PMV BLD: 9.4 FL — SIGNIFICANT CHANGE UP (ref 7.4–10.4)
POTASSIUM SERPL-MCNC: 4.6 MMOL/L — SIGNIFICANT CHANGE UP (ref 3.5–5)
POTASSIUM SERPL-SCNC: 4.6 MMOL/L — SIGNIFICANT CHANGE UP (ref 3.5–5)
PROT SERPL-MCNC: 6.1 G/DL — SIGNIFICANT CHANGE UP (ref 6–8)
RBC # BLD: 3.91 M/UL — LOW (ref 4.7–6.1)
RBC # FLD: 19.4 % — HIGH (ref 11.5–14.5)
SODIUM SERPL-SCNC: 140 MMOL/L — SIGNIFICANT CHANGE UP (ref 135–146)
SPECIMEN SOURCE: SIGNIFICANT CHANGE UP
WBC # BLD: 15.26 K/UL — HIGH (ref 4.8–10.8)
WBC # FLD AUTO: 15.26 K/UL — HIGH (ref 4.8–10.8)

## 2023-07-10 PROCEDURE — 99232 SBSQ HOSP IP/OBS MODERATE 35: CPT

## 2023-07-10 PROCEDURE — 99221 1ST HOSP IP/OBS SF/LOW 40: CPT | Mod: FS

## 2023-07-10 RX ORDER — AMPICILLIN SODIUM AND SULBACTAM SODIUM 250; 125 MG/ML; MG/ML
3 INJECTION, POWDER, FOR SUSPENSION INTRAMUSCULAR; INTRAVENOUS EVERY 6 HOURS
Refills: 0 | Status: DISCONTINUED | OUTPATIENT
Start: 2023-07-11 | End: 2023-07-12

## 2023-07-10 RX ORDER — LEVETIRACETAM 250 MG/1
500 TABLET, FILM COATED ORAL EVERY 12 HOURS
Refills: 0 | Status: DISCONTINUED | OUTPATIENT
Start: 2023-07-10 | End: 2023-07-18

## 2023-07-10 RX ORDER — CIPROFLOXACIN LACTATE 400MG/40ML
400 VIAL (ML) INTRAVENOUS EVERY 12 HOURS
Refills: 0 | Status: DISCONTINUED | OUTPATIENT
Start: 2023-07-10 | End: 2023-07-12

## 2023-07-10 RX ORDER — AMPICILLIN SODIUM AND SULBACTAM SODIUM 250; 125 MG/ML; MG/ML
3 INJECTION, POWDER, FOR SUSPENSION INTRAMUSCULAR; INTRAVENOUS ONCE
Refills: 0 | Status: COMPLETED | OUTPATIENT
Start: 2023-07-10 | End: 2023-07-10

## 2023-07-10 RX ORDER — AMPICILLIN SODIUM AND SULBACTAM SODIUM 250; 125 MG/ML; MG/ML
INJECTION, POWDER, FOR SUSPENSION INTRAMUSCULAR; INTRAVENOUS
Refills: 0 | Status: DISCONTINUED | OUTPATIENT
Start: 2023-07-10 | End: 2023-07-12

## 2023-07-10 RX ADMIN — Medication 1 TABLET(S): at 05:42

## 2023-07-10 RX ADMIN — Medication 50 MICROGRAM(S): at 05:42

## 2023-07-10 RX ADMIN — Medication 200 MILLIGRAM(S): at 19:50

## 2023-07-10 RX ADMIN — POLYETHYLENE GLYCOL 3350 17 GRAM(S): 17 POWDER, FOR SOLUTION ORAL at 05:43

## 2023-07-10 RX ADMIN — HEPARIN SODIUM 5000 UNIT(S): 5000 INJECTION INTRAVENOUS; SUBCUTANEOUS at 05:43

## 2023-07-10 RX ADMIN — HEPARIN SODIUM 5000 UNIT(S): 5000 INJECTION INTRAVENOUS; SUBCUTANEOUS at 14:51

## 2023-07-10 RX ADMIN — LEVETIRACETAM 400 MILLIGRAM(S): 250 TABLET, FILM COATED ORAL at 18:51

## 2023-07-10 RX ADMIN — AMPICILLIN SODIUM AND SULBACTAM SODIUM 200 GRAM(S): 250; 125 INJECTION, POWDER, FOR SUSPENSION INTRAMUSCULAR; INTRAVENOUS at 18:51

## 2023-07-10 RX ADMIN — INSULIN GLARGINE 12 UNIT(S): 100 INJECTION, SOLUTION SUBCUTANEOUS at 08:56

## 2023-07-10 RX ADMIN — Medication 1: at 21:51

## 2023-07-10 RX ADMIN — Medication 25 MILLIGRAM(S): at 05:42

## 2023-07-10 RX ADMIN — SODIUM CHLORIDE 1 GRAM(S): 9 INJECTION INTRAMUSCULAR; INTRAVENOUS; SUBCUTANEOUS at 05:42

## 2023-07-10 RX ADMIN — Medication 325 MILLIGRAM(S): at 05:42

## 2023-07-10 RX ADMIN — HEPARIN SODIUM 5000 UNIT(S): 5000 INJECTION INTRAVENOUS; SUBCUTANEOUS at 21:52

## 2023-07-10 RX ADMIN — AMPICILLIN SODIUM AND SULBACTAM SODIUM 200 GRAM(S): 250; 125 INJECTION, POWDER, FOR SUSPENSION INTRAMUSCULAR; INTRAVENOUS at 23:35

## 2023-07-10 NOTE — CONSULT NOTE ADULT - ASSESSMENT
Patient is a 89y old  Male who presents with a chief complaint of testicular infection (10 Jul 2023 10:26)    - recommend surgical debridement, will plan to schedule for OR wednesday  - please medically clear  - consent obtained from daughter  - f/u wound culture  - abx per ID Patient is a 89y old  Male who presents with a chief complaint of testicular infection (10 Jul 2023 10:26)    - recommend surgical debridement, will plan to schedule for OR wednesday  - please medically clear  - consent obtained from daughter  - f/u wound culture  - abx per ID  - LWC: wash with soap and water, xeroform packing, abd and tape

## 2023-07-10 NOTE — PROGRESS NOTE ADULT - ASSESSMENT
Patient is a 89 y/o Male with  PMHx HTN, DM2, BPH, CAD s/p CABG, hypothyroidism, recently dx. from [facility] , progressive ambulatory dysfunction, admitted with hyperkalemia, hyponatremia noted as outpt. Found to have pseudomonas bacteremia.    # Acute mental status change:   - MR brain WNL  - EEG Done- 2nd EEG shows generalized slowing + Multifocal epileptic potentials. Started Keppra 500mg every 12 hours .   - Neuro f/up 7/4- placed on VEEG now.   - f/u VEEG results  - F/u neuro for further recs, continue Neurochecks      #Bacteremia due to Pseudomonas.   # Leukocytosis   #in setting of recent hidalgo in snf; now out; possible uti, suspected testicular infection  US Scrotum (for epididymitis) : Complex scrotal mixed echogenicity collection anterior to left testicle measuring approximately 7.7 x 3 x 6.9 cm. 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  ua positive; f/u ucxs- more than 3 organisms, repeat urine cxs neg , repeat blood cxs 6/26, 28 Neg.  blood cxs pseudomonas 6/24, as per  ID - continue on IV Zosyn tx.   -  did I&D bedside 7/5   - wound cx - e.faecalis  - f/u burn consult  - f/u bcx    # Hypomagnesemia - resolved  - f/u mag level  - mag 2.1 stable now    #hyponatremia- stable levels   Per Nephro eval, likely [facility]? Urea 15gm QD,  Uric Acid = 4.5. No need to restrict po free water.   - Na 133 7/6  - continue on salt tabs  - daily BMP monitoring      #constipated:   - Senna, Miralax.     #HTN (hypertension)  - currently borderline Hypotensive   - continue lopressor 25mg PO twice daily with holding parameters.     #DM2 (diabetes mellitus, type 2)  - uncontrolled, Hga1C- 9  - lantus 12 units subc. once daily ,   - lispro 4 units subc. before each meal three times daily   ssi.    #History of BPH.   - continue finasteride 5 mg po once daily /tamsulosin 0.4 mg po once daily at bedtime     #h/o CAD (coronary artery disease). / h/o CABG  - plavix/zocor tx.  -patient is on supplemental oxygen via NC, 2 L , on RA sat 91- 92% at rest     #Hypothyroidism.   - continue synthroid 50mcg po once daily    # Iron def. anemia  - started on PO Iron tx.     # Ambulatory dysfunction   - physical decline- PT - will f/u today    DVT ppX, heparin  DNR/DNI (MOLST signed 6/29)    #Progress Note Handoff: f/u white count, f/u bcx, f/u burn consult Patient is a 89 y/o Male with  PMHx HTN, DM2, BPH, CAD s/p CABG, hypothyroidism, recently dx. from [facility] , progressive ambulatory dysfunction, admitted with hyperkalemia, hyponatremia noted as outpt. Found to have pseudomonas bacteremia.    # Acute mental status change:   - MR brain WNL  - EEG Done- 2nd EEG shows generalized slowing + Multifocal epileptic potentials. Started Keppra 500mg every 12 hours .   - Neuro f/up 7/4- placed on VEEG now.   - VEEG results nl  - F/u neuro for further recs, continue Neurochecks      #Bacteremia due to Pseudomonas.   # Leukocytosis   #in setting of recent hidalgo in snf; now out; possible uti, suspected testicular infection  US Scrotum (for epididymitis) : Complex scrotal mixed echogenicity collection anterior to left testicle measuring approximately 7.7 x 3 x 6.9 cm. 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  ua positive; f/u ucxs- more than 3 organisms, repeat urine cxs neg , repeat blood cxs 6/26, 28 Neg.  blood cxs pseudomonas 6/24, as per  ID - continue on IV Zosyn tx.   -  did I&D bedside 7/5   - wound cx - e.faecalis  - f/u burn consult  - f/u bcx    # Hypomagnesemia - resolved  - f/u mag level  - mag 2.1 stable now    #hyponatremia- stable levels   Per Nephro eval, likely [facility]? Urea 15gm QD,  Uric Acid = 4.5. No need to restrict po free water.   - Na 133 7/6  - continue on salt tabs  - daily BMP monitoring      #constipated:   - Senna, Miralax.     #HTN (hypertension)  - currently borderline Hypotensive   - continue lopressor 25mg PO twice daily with holding parameters.     #DM2 (diabetes mellitus, type 2)  - uncontrolled, Hga1C- 9  - lantus 12 units subc. once daily ,   - lispro 4 units subc. before each meal three times daily   ssi.    #History of BPH.   - continue finasteride 5 mg po once daily /tamsulosin 0.4 mg po once daily at bedtime     #h/o CAD (coronary artery disease). / h/o CABG  - plavix/zocor tx.  -patient is on supplemental oxygen via NC, 2 L , on RA sat 91- 92% at rest     #Hypothyroidism.   - continue synthroid 50mcg po once daily    # Iron def. anemia  - started on PO Iron tx.     # Ambulatory dysfunction   - physical decline- PT - will f/u today    DVT ppX, heparin  DNR/DNI (MOLST signed 6/29)    #Progress Note Handoff: f/u white count, f/u bcx, f/u burn consult

## 2023-07-10 NOTE — PROGRESS NOTE ADULT - ATTENDING COMMENTS
Patient is a 89 y/o Male with  PMHx HTN, DM2, BPH, CAD s/p CABG, hypothyroidism, recently dx. from [facility] , progressive ambulatory dysfunction, admitted   with hyperkalemia, hyponatremia noted as outpt. Found to have pseudomonas bacteremia.    # Acute mental status change:   - MR brain WNL  - EEG Done- 2nd EEG shows generalized slowing + Multifocal epileptic potentials. Started Keppra 500mg every 12 hours .   Neuro f/up 7/4- s/p VEEG - no epilepti-form activity   - 7/9/23- 7/10/23  patient is more awake , tolerating diet well     #Bacteremia due to Pseudomonas.   # Leukocytosis   #in setting of acute Testicular abscess- due to Enterococcus infection  US Scrotum (for epididymitis) : Complex scrotal mixed echogenicity collection anterior to left testicle measuring approximately 7.7 x 3 x 6.9 cm. 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  ua positive; s/p urine cxs- more than 3 organisms, repeat urine cxs neg , repeat blood cxs 6/26, 28 Neg.  blood cxs pseudomonas 6/24, as per  ID - continue on IV Zosyn tx. , post ID f/up on 7/8/23- transitioned to PO Augmentin/Cipro tx.   - consulted  - s/p Incision and drainage on 7/6/23- daily wound care, testicular  abscess cxs-grew Enterococcus- ensitive to ampicillin  , pain management ,  f/up     # Hypomagnesemia- supplemented     #hyponatremia- stable levels   Per Nephro eval, likely [facility]? Urea 15gm QD,  Uric Acid = 4.5. No need to restrict po free water. continue on salt tabs  -daily BMP monitoring      #constipated: Senna, Miralax.     #HTN (hypertension)  -continue  lopressor 25mg PO twice daily with holding parameters.     #DM2 (diabetes mellitus, type 2)- uncontrolled, Hga1C- 9, improved bsfs  lantus 12 units subc. once daily ,  hold lispro - , continue sliding scale co. since patient's appetite fluctuates     #History of BPH. - continue finasteride 5 mg po once daily /tamsulosin 0.4 mg po once daily at bedtime     #h/o CAD (coronary artery disease). / h/o CABG  - plavix/zocor tx.  -patient is on supplemental oxygen via NC, 2 L , on RA sat 91- 92% at rest     #Hypothyroidism. continue synthroid 50mcg po once daily    # Iron def. anemia- started on PO Iron tx.     # Ambulatory dysfunction, physical decline- PT eval.     DVT ppX, heparin  DNR/DNI (MOLST signed 6/29)    #Progress Note Handoff:  transitioned to PO abx, tx. , occasionally non-compliant, needs to be encouraged to take po meds, obtain PT f/up, start d/c planning   Family discussion: yes, medical team  Disposition:  STR vs. Home with home care and home PT in 24 hours.     Total time spent to complete patient's bedside assessment, review medical chart, discuss medical plan of care with covering medical team was more than 35  minutes with >50% of time spent face to face with patient, discussion with patient/family and/or coordination of care

## 2023-07-10 NOTE — PROGRESS NOTE ADULT - SUBJECTIVE AND OBJECTIVE BOX
CHEL CROFT 89y Male  MRN#: [id number]     Hospital Day: 17d    Pt is currently admitted with the primary diagnosis of  Hyperkalemia        SUBJECTIVE     Overnight events  None    Subjective complaints  Pt was evaluated this am. Patient denied any active complaints and per patient his symptoms are improving                                            ----------------------------------------------------------  OBJECTIVE  PAST MEDICAL & SURGICAL HISTORY  HTN (hypertension)    HLD (hyperlipidemia)    BPH (benign prostatic hyperplasia)    CAD (coronary artery disease)    Diabetes mellitus    Hypothyroidism    S/P CABG (coronary artery bypass graft)                                              -----------------------------------------------------------  ALLERGIES:  No Known Allergies                                            ------------------------------------------------------------    HOME MEDICATIONS  Home Medications:  DULoxetine 60 mg oral delayed release capsule: 1 cap(s) orally once a day (13 Dec 2022 22:03)  finasteride 5 mg oral tablet: 1 tab(s) orally once a day (13 Dec 2022 22:03)  lactobacillus acidophilus oral capsule: 1 tab(s) orally once a day (20 Dec 2022 10:18)  levothyroxine 50 mcg (0.05 mg) oral tablet: 1 tab(s) orally once a day (13 Dec 2022 22:03)  Metoprolol Tartrate 25 mg oral tablet: 1 tab(s) orally 2 times a day (13 Dec 2022 22:03)  Plavix 75 mg oral tablet: 1 orally once a day (23 Jun 2023 21:54)  simvastatin 20 mg oral tablet: 1 tab(s) orally once a day (at bedtime) (13 Dec 2022 22:03)  tamsulosin 0.4 mg oral capsule: 1 cap(s) orally once a day (13 Dec 2022 22:03)  Trent Davidson 300 units/mL subcutaneous solution: 12 unit(s) subcutaneous once a day (in the morning) (13 Dec 2022 22:03)                           MEDICATIONS:  STANDING MEDICATIONS  amoxicillin  500 milliGRAM(s)/clavulanate 1 Tablet(s) Oral two times a day  ciprofloxacin     Tablet 750 milliGRAM(s) Oral daily  clopidogrel Tablet 75 milliGRAM(s) Oral daily  dextrose 5%. 1000 milliLiter(s) IV Continuous <Continuous>  dextrose 5%. 1000 milliLiter(s) IV Continuous <Continuous>  dextrose 50% Injectable 25 Gram(s) IV Push once  dextrose 50% Injectable 25 Gram(s) IV Push once  dextrose 50% Injectable 12.5 Gram(s) IV Push once  DULoxetine 60 milliGRAM(s) Oral daily  ferrous    sulfate 325 milliGRAM(s) Oral two times a day  finasteride 5 milliGRAM(s) Oral daily  glucagon  Injectable 1 milliGRAM(s) IntraMuscular once  heparin   Injectable 5000 Unit(s) SubCutaneous every 8 hours  insulin glargine Injectable (LANTUS) 12 Unit(s) SubCutaneous every morning  insulin lispro (ADMELOG) corrective regimen sliding scale   SubCutaneous at bedtime  lactobacillus acidophilus 1 Tablet(s) Oral with breakfast  levETIRAcetam 500 milliGRAM(s) Oral two times a day  levothyroxine 50 MICROGram(s) Oral daily  metoprolol tartrate 25 milliGRAM(s) Oral two times a day  multivitamin 1 Tablet(s) Oral every 24 hours  polyethylene glycol 3350 17 Gram(s) Oral two times a day  senna 2 Tablet(s) Oral at bedtime  simvastatin 20 milliGRAM(s) Oral at bedtime  sodium chloride 1 Gram(s) Oral two times a day  tamsulosin 0.4 milliGRAM(s) Oral at bedtime  urea Oral Powder 15 Gram(s) Oral daily    PRN MEDICATIONS  acetaminophen     Tablet .. 650 milliGRAM(s) Oral every 6 hours PRN  aluminum hydroxide/magnesium hydroxide/simethicone Suspension 30 milliLiter(s) Oral every 4 hours PRN  dextrose Oral Gel 15 Gram(s) Oral once PRN  melatonin 3 milliGRAM(s) Oral at bedtime PRN  ondansetron Injectable 4 milliGRAM(s) IV Push every 8 hours PRN                                            ------------------------------------------------------------  VITAL SIGNS: Last 24 Hours  T(C): 36.7 (10 Jul 2023 05:00), Max: 36.7 (10 Jul 2023 05:00)  T(F): 98 (10 Jul 2023 05:00), Max: 98 (10 Jul 2023 05:00)  HR: 107 (10 Jul 2023 05:00) (107 - 107)  BP: 130/64 (10 Jul 2023 05:00) (130/64 - 130/64)  BP(mean): --  RR: 18 (10 Jul 2023 05:00) (18 - 18)  SpO2: 98% (10 Jul 2023 05:00) (98% - 98%)                                             --------------------------------------------------------------  LABS:                        10.4   15.26 )-----------( 411      ( 10 Jul 2023 06:06 )             34.4     07-10    140  |  103  |  24<H>  ----------------------------<  107<H>  4.6   |  26  |  0.9    Ca    10.1      10 Jul 2023 06:06  Mg     2.0     07-10    TPro  6.1  /  Alb  3.5  /  TBili  0.3  /  DBili  x   /  AST  17  /  ALT  14  /  AlkPhos  81  07-10      Urinalysis Basic - ( 10 Jul 2023 06:06 )    Color: x / Appearance: x / SG: x / pH: x  Gluc: 107 mg/dL / Ketone: x  / Bili: x / Urobili: x   Blood: x / Protein: x / Nitrite: x   Leuk Esterase: x / RBC: x / WBC x   Sq Epi: x / Non Sq Epi: x / Bacteria: x                                                            -------------------------------------------------------------  RADIOLOGY:                                            --------------------------------------------------------------    PHYSICAL EXAM:  GENERAL: NAD, lying in bed comfortably  HEAD:  Atraumatic, Normocephalic  CHEST/LUNG: Clear to auscultation bilaterally; No rales, rhonchi, wheezing, or rubs. Unlabored respirations  HEART: regular rate and rhythm; No murmurs, rubs, or gallops  ABDOMEN: Bowel sounds present; Soft, Nontender, Nondistended.    : Pt with bandage from bedside I&D procedure  EXTREMITIES: No clubbing, cyanosis, or edema  NERVOUS SYSTEM:  Alert & Oriented                                           --------------------------------------------------------------

## 2023-07-10 NOTE — CONSULT NOTE ADULT - SUBJECTIVE AND OBJECTIVE BOX
Patient is a 89y old  Male who presents with a chief complaint of testicular infection (10 Jul 2023 10:26)      HPI:  89 yo M with hx of HTN, HLD, DM II BPH and CAD s/p CABG x 4, Hypothyroidism presents to ED for abnormal labs result (hyperkalemia and hyponatremia). Patient is a poor historian, however patient's daughter was at bedside for collateral. Per the daughter, patient was recently admitted for fall and discharged to [facility]. When patient was discharged from [facility] he did not have a hidalgo in. Per the daughter at the nursing home patient had a hidalgo catheter in despite family requesting removal of it multiple times. Patient was discharged from [facility] on May 26th. Per the daughter patient was on antibiotics at [facility] for UTI, but she is unsure which antibiotic it was.     Since discharge patient's daughter says the patient has been extremely lethargic and weak. His urine has been milky, dark since May 26th when he was discharged from [facility]. Over the past week patient has become incontinent, no able to make it to the restroom, and complaining that he has pain with urination. On Wednesday a doctor came to the house to examine the patient, at that time he ordered lab tests, which the patient had preformed today. Labs were significant for Na 126 and a K TNP. They were instructed to come straight to the ED.     In the ED labs were significant for a Na 131, K+ 5.1, Hgb 9.9 (up from previous admission), and a WBC count of 18K.     Vital Signs Last 24 Hrs  T(F): 98.7 (23 Jun 2023 17:00), Max: 98.7 (23 Jun 2023 17:00)  HR: 95 (23 Jun 2023 19:30) (80 - 95)  BP: 157/78 (23 Jun 2023 19:30) (121/60 - 157/78)  BP(mean): --  RR: 20 (23 Jun 2023 19:30) (16 - 20)  SpO2: 99% (23 Jun 2023 19:30) (96% - 99%)  Patient On (Oxygen Delivery Method): nasal cannula  O2 Flow (L/min): 2    UA is positive for many bacteria >720 WBC, moderate blood and large leukocyte esterase.  (23 Jun 2023 21:23)    Pt seen today with attending. We were called for evaluation of scrotal abscess. daughter at bedside states this was i&d previously. pt denies any current pain.     PAST MEDICAL & SURGICAL HISTORY:  HTN (hypertension)  HLD (hyperlipidemia)  BPH (benign prostatic hyperplasia)  CAD (coronary artery disease)  Diabetes mellitus  Hypothyroidism  S/P CABG (coronary artery bypass graft)    Allergies  No Known Allergies  Intolerances    PHYSICAL EXAM    ICU Vital Signs Last 24 Hrs  T(C): 36.6 (10 Jul 2023 14:00), Max: 36.7 (10 Jul 2023 05:00)  T(F): 97.9 (10 Jul 2023 14:00), Max: 98 (10 Jul 2023 05:00)  HR: 101 (10 Jul 2023 14:00) (101 - 107)  BP: 128/61 (10 Jul 2023 14:00) (128/61 - 130/64)  RR: 18 (10 Jul 2023 14:00) (18 - 18)  SpO2: 98% (10 Jul 2023 14:00) (98% - 98%)    PHYSICAL EXAM:  GENERAL: NAD, well-developed  HEAD:  Atraumatic, Normocephalic  EYES: EOMI  CHEST/LUNG: No cardiopulmonary compromise  ABDOMEN: Soft, Nontender  Neuro: awake, alert, answers questions appropriately  SKIN: scrotal abscess approximately 1cm with active purulent drainage. no edema noted. nontender. no active bleeding  wound culture taken

## 2023-07-11 DIAGNOSIS — Z51.5 ENCOUNTER FOR PALLIATIVE CARE: ICD-10-CM

## 2023-07-11 LAB
ALBUMIN SERPL ELPH-MCNC: 3.2 G/DL — LOW (ref 3.5–5.2)
ALP SERPL-CCNC: 70 U/L — SIGNIFICANT CHANGE UP (ref 30–115)
ALT FLD-CCNC: 10 U/L — SIGNIFICANT CHANGE UP (ref 0–41)
ANION GAP SERPL CALC-SCNC: 10 MMOL/L — SIGNIFICANT CHANGE UP (ref 7–14)
APTT BLD: 41.8 SEC — HIGH (ref 27–39.2)
AST SERPL-CCNC: 15 U/L — SIGNIFICANT CHANGE UP (ref 0–41)
BILIRUB SERPL-MCNC: <0.2 MG/DL — SIGNIFICANT CHANGE UP (ref 0.2–1.2)
BUN SERPL-MCNC: 23 MG/DL — HIGH (ref 10–20)
CALCIUM SERPL-MCNC: 9.1 MG/DL — SIGNIFICANT CHANGE UP (ref 8.4–10.4)
CHLORIDE SERPL-SCNC: 102 MMOL/L — SIGNIFICANT CHANGE UP (ref 98–110)
CO2 SERPL-SCNC: 22 MMOL/L — SIGNIFICANT CHANGE UP (ref 17–32)
CREAT SERPL-MCNC: 0.9 MG/DL — SIGNIFICANT CHANGE UP (ref 0.7–1.5)
EGFR: 77 ML/MIN/1.73M2 — SIGNIFICANT CHANGE UP
GLUCOSE BLDC GLUCOMTR-MCNC: 177 MG/DL — HIGH (ref 70–99)
GLUCOSE BLDC GLUCOMTR-MCNC: 185 MG/DL — HIGH (ref 70–99)
GLUCOSE BLDC GLUCOMTR-MCNC: 188 MG/DL — HIGH (ref 70–99)
GLUCOSE BLDC GLUCOMTR-MCNC: 245 MG/DL — HIGH (ref 70–99)
GLUCOSE SERPL-MCNC: 177 MG/DL — HIGH (ref 70–99)
HCT VFR BLD CALC: 29.4 % — LOW (ref 42–52)
HGB BLD-MCNC: 9 G/DL — LOW (ref 14–18)
INR BLD: 1.16 RATIO — SIGNIFICANT CHANGE UP (ref 0.65–1.3)
MAGNESIUM SERPL-MCNC: 1.7 MG/DL — LOW (ref 1.8–2.4)
MCHC RBC-ENTMCNC: 27.2 PG — SIGNIFICANT CHANGE UP (ref 27–31)
MCHC RBC-ENTMCNC: 30.6 G/DL — LOW (ref 32–37)
MCV RBC AUTO: 88.8 FL — SIGNIFICANT CHANGE UP (ref 80–94)
NRBC # BLD: 0 /100 WBCS — SIGNIFICANT CHANGE UP (ref 0–0)
PLATELET # BLD AUTO: 326 K/UL — SIGNIFICANT CHANGE UP (ref 130–400)
PMV BLD: 9.5 FL — SIGNIFICANT CHANGE UP (ref 7.4–10.4)
POTASSIUM SERPL-MCNC: 4.8 MMOL/L — SIGNIFICANT CHANGE UP (ref 3.5–5)
POTASSIUM SERPL-SCNC: 4.8 MMOL/L — SIGNIFICANT CHANGE UP (ref 3.5–5)
PROT SERPL-MCNC: 5.4 G/DL — LOW (ref 6–8)
PROTHROM AB SERPL-ACNC: 13.3 SEC — HIGH (ref 9.95–12.87)
RBC # BLD: 3.31 M/UL — LOW (ref 4.7–6.1)
RBC # FLD: 19.3 % — HIGH (ref 11.5–14.5)
SODIUM SERPL-SCNC: 134 MMOL/L — LOW (ref 135–146)
WBC # BLD: 10.63 K/UL — SIGNIFICANT CHANGE UP (ref 4.8–10.8)
WBC # FLD AUTO: 10.63 K/UL — SIGNIFICANT CHANGE UP (ref 4.8–10.8)

## 2023-07-11 PROCEDURE — 71045 X-RAY EXAM CHEST 1 VIEW: CPT | Mod: 26

## 2023-07-11 PROCEDURE — 99223 1ST HOSP IP/OBS HIGH 75: CPT

## 2023-07-11 PROCEDURE — 99024 POSTOP FOLLOW-UP VISIT: CPT

## 2023-07-11 PROCEDURE — 99232 SBSQ HOSP IP/OBS MODERATE 35: CPT

## 2023-07-11 PROCEDURE — 93010 ELECTROCARDIOGRAM REPORT: CPT

## 2023-07-11 RX ORDER — LIDOCAINE HYDROCHLORIDE AND EPINEPHRINE 10; 10 MG/ML; UG/ML
1 INJECTION, SOLUTION INFILTRATION; PERINEURAL ONCE
Refills: 0 | Status: DISCONTINUED | OUTPATIENT
Start: 2023-07-11 | End: 2023-07-14

## 2023-07-11 RX ADMIN — HEPARIN SODIUM 5000 UNIT(S): 5000 INJECTION INTRAVENOUS; SUBCUTANEOUS at 05:43

## 2023-07-11 RX ADMIN — INSULIN GLARGINE 12 UNIT(S): 100 INJECTION, SOLUTION SUBCUTANEOUS at 08:59

## 2023-07-11 RX ADMIN — LEVETIRACETAM 400 MILLIGRAM(S): 250 TABLET, FILM COATED ORAL at 17:02

## 2023-07-11 RX ADMIN — AMPICILLIN SODIUM AND SULBACTAM SODIUM 200 GRAM(S): 250; 125 INJECTION, POWDER, FOR SUSPENSION INTRAMUSCULAR; INTRAVENOUS at 05:45

## 2023-07-11 RX ADMIN — Medication 200 MILLIGRAM(S): at 18:13

## 2023-07-11 RX ADMIN — HEPARIN SODIUM 5000 UNIT(S): 5000 INJECTION INTRAVENOUS; SUBCUTANEOUS at 13:04

## 2023-07-11 RX ADMIN — HEPARIN SODIUM 5000 UNIT(S): 5000 INJECTION INTRAVENOUS; SUBCUTANEOUS at 21:23

## 2023-07-11 RX ADMIN — AMPICILLIN SODIUM AND SULBACTAM SODIUM 200 GRAM(S): 250; 125 INJECTION, POWDER, FOR SUSPENSION INTRAMUSCULAR; INTRAVENOUS at 23:08

## 2023-07-11 RX ADMIN — AMPICILLIN SODIUM AND SULBACTAM SODIUM 200 GRAM(S): 250; 125 INJECTION, POWDER, FOR SUSPENSION INTRAMUSCULAR; INTRAVENOUS at 11:54

## 2023-07-11 RX ADMIN — LEVETIRACETAM 400 MILLIGRAM(S): 250 TABLET, FILM COATED ORAL at 05:45

## 2023-07-11 RX ADMIN — Medication 25 MILLIGRAM(S): at 18:14

## 2023-07-11 RX ADMIN — Medication 200 MILLIGRAM(S): at 05:46

## 2023-07-11 RX ADMIN — AMPICILLIN SODIUM AND SULBACTAM SODIUM 200 GRAM(S): 250; 125 INJECTION, POWDER, FOR SUSPENSION INTRAMUSCULAR; INTRAVENOUS at 17:23

## 2023-07-11 NOTE — PROGRESS NOTE ADULT - SUBJECTIVE AND OBJECTIVE BOX
CHEL CROFT 89y Male  MRN#: [id number]     Hospital Day: 18d    Pt is currently admitted with the primary diagnosis of  Hyperkalemia        SUBJECTIVE     Overnight events  None    Subjective complaints  Pt was evaluated this am. Patient denied any active complaints and per patient his symptoms are improving                                            ----------------------------------------------------------  OBJECTIVE  PAST MEDICAL & SURGICAL HISTORY  HTN (hypertension)    HLD (hyperlipidemia)    BPH (benign prostatic hyperplasia)    CAD (coronary artery disease)    Diabetes mellitus    Hypothyroidism    S/P CABG (coronary artery bypass graft)                                              -----------------------------------------------------------  ALLERGIES:  No Known Allergies                                            ------------------------------------------------------------    HOME MEDICATIONS  Home Medications:  DULoxetine 60 mg oral delayed release capsule: 1 cap(s) orally once a day (13 Dec 2022 22:03)  finasteride 5 mg oral tablet: 1 tab(s) orally once a day (13 Dec 2022 22:03)  lactobacillus acidophilus oral capsule: 1 tab(s) orally once a day (20 Dec 2022 10:18)  levothyroxine 50 mcg (0.05 mg) oral tablet: 1 tab(s) orally once a day (13 Dec 2022 22:03)  Metoprolol Tartrate 25 mg oral tablet: 1 tab(s) orally 2 times a day (13 Dec 2022 22:03)  Plavix 75 mg oral tablet: 1 orally once a day (23 Jun 2023 21:54)  simvastatin 20 mg oral tablet: 1 tab(s) orally once a day (at bedtime) (13 Dec 2022 22:03)  tamsulosin 0.4 mg oral capsule: 1 cap(s) orally once a day (13 Dec 2022 22:03)  Trent Davidson 300 units/mL subcutaneous solution: 12 unit(s) subcutaneous once a day (in the morning) (13 Dec 2022 22:03)                           MEDICATIONS:  STANDING MEDICATIONS  ampicillin/sulbactam  IVPB 3 Gram(s) IV Intermittent every 6 hours  ampicillin/sulbactam  IVPB      ciprofloxacin   IVPB 400 milliGRAM(s) IV Intermittent every 12 hours  clopidogrel Tablet 75 milliGRAM(s) Oral daily  dextrose 5%. 1000 milliLiter(s) IV Continuous <Continuous>  dextrose 5%. 1000 milliLiter(s) IV Continuous <Continuous>  dextrose 50% Injectable 25 Gram(s) IV Push once  dextrose 50% Injectable 25 Gram(s) IV Push once  dextrose 50% Injectable 12.5 Gram(s) IV Push once  DULoxetine 60 milliGRAM(s) Oral daily  ferrous    sulfate 325 milliGRAM(s) Oral two times a day  finasteride 5 milliGRAM(s) Oral daily  glucagon  Injectable 1 milliGRAM(s) IntraMuscular once  heparin   Injectable 5000 Unit(s) SubCutaneous every 8 hours  insulin glargine Injectable (LANTUS) 12 Unit(s) SubCutaneous every morning  insulin lispro (ADMELOG) corrective regimen sliding scale   SubCutaneous at bedtime  lactobacillus acidophilus 1 Tablet(s) Oral with breakfast  levETIRAcetam  IVPB 500 milliGRAM(s) IV Intermittent every 12 hours  levothyroxine 50 MICROGram(s) Oral daily  metoprolol tartrate 25 milliGRAM(s) Oral two times a day  multivitamin 1 Tablet(s) Oral every 24 hours  polyethylene glycol 3350 17 Gram(s) Oral two times a day  senna 2 Tablet(s) Oral at bedtime  simvastatin 20 milliGRAM(s) Oral at bedtime  sodium chloride 1 Gram(s) Oral two times a day  tamsulosin 0.4 milliGRAM(s) Oral at bedtime  urea Oral Powder 15 Gram(s) Oral daily    PRN MEDICATIONS  acetaminophen     Tablet .. 650 milliGRAM(s) Oral every 6 hours PRN  aluminum hydroxide/magnesium hydroxide/simethicone Suspension 30 milliLiter(s) Oral every 4 hours PRN  dextrose Oral Gel 15 Gram(s) Oral once PRN  melatonin 3 milliGRAM(s) Oral at bedtime PRN  ondansetron Injectable 4 milliGRAM(s) IV Push every 8 hours PRN                                            ------------------------------------------------------------  VITAL SIGNS: Last 24 Hours  T(C): 36 (11 Jul 2023 05:00), Max: 36.8 (10 Jul 2023 20:06)  T(F): 96.8 (11 Jul 2023 05:00), Max: 98.2 (10 Jul 2023 20:06)  HR: 105 (11 Jul 2023 05:00) (101 - 106)  BP: 142/67 (11 Jul 2023 05:00) (122/62 - 145/68)  BP(mean): --  RR: 18 (11 Jul 2023 05:00) (18 - 18)  SpO2: 98% (11 Jul 2023 05:00) (98% - 98%)      07-10-23 @ 07:01  -  07-11-23 @ 07:00  --------------------------------------------------------  IN: 0 mL / OUT: 2 mL / NET: -2 mL                                             --------------------------------------------------------------  LABS:                        10.4   15.26 )-----------( 411      ( 10 Jul 2023 06:06 )             34.4     07-10    140  |  103  |  24<H>  ----------------------------<  107<H>  4.6   |  26  |  0.9    Ca    10.1      10 Jul 2023 06:06  Mg     2.0     07-10    TPro  6.1  /  Alb  3.5  /  TBili  0.3  /  DBili  x   /  AST  17  /  ALT  14  /  AlkPhos  81  07-10      Urinalysis Basic - ( 10 Jul 2023 06:06 )    Color: x / Appearance: x / SG: x / pH: x  Gluc: 107 mg/dL / Ketone: x  / Bili: x / Urobili: x   Blood: x / Protein: x / Nitrite: x   Leuk Esterase: x / RBC: x / WBC x   Sq Epi: x / Non Sq Epi: x / Bacteria: x                                                            -------------------------------------------------------------  RADIOLOGY:                                            --------------------------------------------------------------    PHYSICAL EXAM:  GENERAL: NAD, lying in bed comfortably  HEAD:  Atraumatic, Normocephalic  CHEST/LUNG: Clear to auscultation bilaterally; No rales, rhonchi, wheezing, or rubs. Unlabored respirations  HEART: regular rate and rhythm; No murmurs, rubs, or gallops  ABDOMEN: Bowel sounds present; Soft, Nontender, Nondistended.    : Pt with bandage from bedside I&D procedure  EXTREMITIES: No clubbing, cyanosis, or edema  NERVOUS SYSTEM:  Alert & Oriented                                           --------------------------------------------------------------

## 2023-07-11 NOTE — CONSULT NOTE ADULT - PROBLEM SELECTOR RECOMMENDATION 3
-DNR/DNI  -ongoing medical management with surgical intervention  -family not interested in hospice at this time  -will sign off

## 2023-07-11 NOTE — PROGRESS NOTE ADULT - SUBJECTIVE AND OBJECTIVE BOX
CHEL CROFT  89y, Male  Allergy: No Known Allergies      LOS  18d    CHIEF COMPLAINT: Abnormal Lab results (11 Jul 2023 14:36)      INTERVAL EVENTS/HPI  - No acute events overnight  - T(F): , Max: 98.2 (07-10-23 @ 20:06)  - WBC Count: 15.26 (07-10-23 @ 06:06)  WBC Count: 11.84 (07-09-23 @ 06:24)     - Creatinine: 0.9 (07-10-23 @ 06:06)       ROS  General: Denies rigors, nightsweats  HEENT: Denies headache, rhinorrhea, sore throat, eye pain  CV: Denies CP, palpitations  PULM: Denies wheezing, hemoptysis  GI: Denies hematemesis, hematochezia, melena  : Denies discharge, hematuria  MSK: Denies arthralgias, myalgias  SKIN: Denies rash, lesions  NEURO: Denies paresthesias, weakness  PSYCH: Denies depression, anxiety    VITALS:  T(F): 97.4, Max: 98.2 (07-10-23 @ 20:06)  HR: 98  BP: 132/65  RR: 18Vital Signs Last 24 Hrs  T(C): 36.3 (11 Jul 2023 14:00), Max: 36.8 (10 Jul 2023 20:06)  T(F): 97.4 (11 Jul 2023 14:00), Max: 98.2 (10 Jul 2023 20:06)  HR: 98 (11 Jul 2023 14:00) (98 - 106)  BP: 132/65 (11 Jul 2023 14:00) (122/62 - 145/68)  BP(mean): --  RR: 18 (11 Jul 2023 14:00) (18 - 18)  SpO2: 98% (11 Jul 2023 14:00) (98% - 98%)    Parameters below as of 10 Jul 2023 20:06  Patient On (Oxygen Delivery Method): room air        PHYSICAL EXAM:  Gen: NAD, resting in bed  HEENT: Normocephalic, atraumatic  Neck: supple, no lymphadenopathy  CV: Regular rate & regular rhythm  Lungs: decreased BS at bases, no fremitus  Abdomen: Soft, BS present  Ext: Warm, well perfused  Neuro: non focal, awake  Skin: no rash, no erythema  Lines: no phlebitis    FH: Non-contributory  Social Hx: Non-contributory    TESTS & MEASUREMENTS:                        10.4   15.26 )-----------( 411      ( 10 Jul 2023 06:06 )             34.4     07-10    140  |  103  |  24<H>  ----------------------------<  107<H>  4.6   |  26  |  0.9    Ca    10.1      10 Jul 2023 06:06  Mg     2.0     07-10    TPro  6.1  /  Alb  3.5  /  TBili  0.3  /  DBili  x   /  AST  17  /  ALT  14  /  AlkPhos  81  07-10      LIVER FUNCTIONS - ( 10 Jul 2023 06:06 )  Alb: 3.5 g/dL / Pro: 6.1 g/dL / ALK PHOS: 81 U/L / ALT: 14 U/L / AST: 17 U/L / GGT: x           Urinalysis Basic - ( 10 Jul 2023 06:06 )    Color: x / Appearance: x / SG: x / pH: x  Gluc: 107 mg/dL / Ketone: x  / Bili: x / Urobili: x   Blood: x / Protein: x / Nitrite: x   Leuk Esterase: x / RBC: x / WBC x   Sq Epi: x / Non Sq Epi: x / Bacteria: x        Culture - Abscess with Gram Stain (collected 07-05-23 @ 18:48)  Source: .Abscess LEFT scrotal abscess  Gram Stain (07-06-23 @ 05:04):    Rare polymorphonuclear leukocytes seen per low power field    No organisms seen per oil power field  Final Report (07-10-23 @ 16:57):    Moderate Enterococcus faecalis  Organism: Enterococcus faecalis (07-10-23 @ 16:57)  Organism: Enterococcus faecalis (07-10-23 @ 16:57)      Method Type: KEVIN      -  Ampicillin: S <=2 Predicts results to ampicillin/sulbactam, amoxacillin-clavulanate and  piperacillin-tazobactam.      -  Tetracycline: R >8      -  Vancomycin: S 2    Culture - Blood (collected 06-28-23 @ 04:30)  Source: .Blood Blood-Peripheral  Final Report (07-03-23 @ 18:01):    No growth at 5 days    Culture - Urine (collected 06-27-23 @ 12:50)  Source: Catheterized Catheterized  Final Report (06-28-23 @ 23:40):    <10,000 CFU/mL Normal Urogenital Roxana    Culture - Blood (collected 06-26-23 @ 06:02)  Source: .Blood None  Final Report (07-01-23 @ 18:00):    No growth at 5 days    Culture - Blood (collected 06-24-23 @ 00:28)  Source: .Blood None  Gram Stain (06-25-23 @ 17:54):    Growth in aerobic bottle: Gram Negative Rods    Growth in anaerobic bottle: Gram Negative Rods  Final Report (06-26-23 @ 14:45):    Growth in aerobic and anaerobic bottles: Pseudomonas aeruginosa    ***Blood Panel PCR results on this specimen are available    approximately 3 hours after the Gram stain result.***    Gram stain, PCR, and/or culture results may not always    correspond due to difference in methodologies.    ************************************************************    This PCR assay was performed by multiplex PCR. This    Assay tests for 66 bacterial and resistance gene targets.    Please refer to the [facility] test directory    at [email or web address] for details.  Organism: Blood Culture PCR  Pseudomonas aeruginosa (06-26-23 @ 14:45)  Organism: Pseudomonas aeruginosa (06-26-23 @ 14:45)      Method Type: KEVIN      -  Amikacin: S -[id number]      -  Aztreonam: S <=4      -  Cefepime: S <=2      -  Ceftazidime: S <=1      -  Ciprofloxacin: S <=0.25      -  Gentamicin: S <=2      -  Imipenem: S <=1      -  Levofloxacin: S <=0.5      -  Meropenem: S <=1      -  Piperacillin/Tazobactam: S <=8      -  Tobramycin: S <=2  Organism: Blood Culture PCR (06-26-23 @ 14:45)      Method Type: PCR      -  Pseudomonas aeruginosa: Detec    Culture - Urine (collected 06-23-23 @ 15:45)  Source: Clean Catch Clean Catch (Midstream)  Final Report (06-25-23 @ 16:47):    >=3 organisms. Probable collection contamination.            INFECTIOUS DISEASES TESTING  COVID-19 PCR: NotDetec (04-03-23 @ 10:23)  COVID-19 PCR: NotDetec (04-01-23 @ 18:46)  COVID-19 PCR: NotDetec (03-28-23 @ 21:55)  COVID-19 PCR: NotDetec (12-23-22 @ 10:23)  COVID-19 PCR: NotDetec (12-19-22 @ 12:54)  Legionella Antigen, Urine: Negative (12-17-22 @ 14:02)  MRSA PCR Result.: Negative (12-14-22 @ 16:33)  Procalcitonin, Serum: 0.16 (12-14-22 @ 05:42)  Procalcitonin, Serum: 0.16 (12-05-22 @ 13:48)      INFLAMMATORY MARKERS      RADIOLOGY & ADDITIONAL TESTS:  I have personally reviewed the last available Chest xray  CXR      CT      CARDIOLOGY TESTING  12 Lead ECG:   Ventricular Rate 86 BPM    Atrial Rate 86 BPM    P-R Interval 148 ms    QRS Duration 92 ms    Q-T Interval 374 ms    QTC Calculation(Bazett) 447 ms    P Axis 42 degrees    R Axis -6 degrees    T Axis -27 degrees    Diagnosis Line Normal sinus rhythm with sinus arrhythmia  Left ventricular hypertrophy  Inferior infarct , age undetermined  T wave abnormality, consider lateral ischemia  Abnormal ECG    Confirmed by Mohan Lin ([id number]) on 7/11/2023 2:21:11 PM (07-11-23 @ 11:24)  12 Lead ECG:   Ventricular Rate 88 BPM    Atrial Rate 88 BPM    P-R Interval 170 ms    QRS Duration 92 ms    Q-T Interval 370 ms    QTC Calculation(Bazett) 447 ms    P Axis 43 degrees    R Axis -7 degrees    T Axis 62 degrees    Diagnosis Line Normal sinus rhythm  Possible Anterior infarct , age undetermined  Abnormal ECG    Confirmed by Steve Chavez ([id number]) on 7/3/2023 8:05:18 AM (07-01-23 @ 16:51)      MEDICATIONS  ampicillin/sulbactam  IVPB     ampicillin/sulbactam  IVPB 3 IV Intermittent every 6 hours  ciprofloxacin   IVPB 400 IV Intermittent every 12 hours  clopidogrel Tablet 75 Oral daily  dextrose 5%. 1000 IV Continuous <Continuous>  dextrose 5%. 1000 IV Continuous <Continuous>  dextrose 50% Injectable 25 IV Push once  dextrose 50% Injectable 25 IV Push once  dextrose 50% Injectable 12.5 IV Push once  DULoxetine 60 Oral daily  ferrous    sulfate 325 Oral two times a day  finasteride 5 Oral daily  glucagon  Injectable 1 IntraMuscular once  heparin   Injectable 5000 SubCutaneous every 8 hours  insulin glargine Injectable (LANTUS) 12 SubCutaneous every morning  insulin lispro (ADMELOG) corrective regimen sliding scale  SubCutaneous at bedtime  lactobacillus acidophilus 1 Oral with breakfast  levETIRAcetam  IVPB 500 IV Intermittent every 12 hours  levothyroxine 50 Oral daily  metoprolol tartrate 25 Oral two times a day  multivitamin 1 Oral every 24 hours  polyethylene glycol 3350 17 Oral two times a day  senna 2 Oral at bedtime  simvastatin 20 Oral at bedtime  sodium chloride 1 Oral two times a day  tamsulosin 0.4 Oral at bedtime  urea Oral Powder 15 Oral daily      WEIGHT  Weight (kg): 54.8 (06-24-23 @ 16:00)      ANTIBIOTICS:  ampicillin/sulbactam  IVPB 3 Gram(s) IV Intermittent every 6 hours  ampicillin/sulbactam  IVPB      ciprofloxacin   IVPB 400 milliGRAM(s) IV Intermittent every 12 hours      All available historical records have been reviewed

## 2023-07-11 NOTE — CONSULT NOTE ADULT - SUBJECTIVE AND OBJECTIVE BOX
CC:  abnormal lab results    HPI:  89 yo M with hx of HTN, HLD, DM II BPH and CAD s/p CABG x 4, Hypothyroidism presents to ED for abnormal labs result (hyperkalemia and hyponatremia). Patient is a poor historian, however patient's daughter was at bedside for collateral. Per the daughter, patient was recently admitted for fall and discharged to [facility]. When patient was discharged from [facility] he did not have a hidalgo in. Per the daughter at the nursing home patient had a hidalgo catheter in despite family requesting removal of it multiple times. Patient was discharged from [facility] on May 26th. Per the daughter patient was on antibiotics at [facility] for UTI, but she is unsure which antibiotic it was.     Since discharge patient's daughter says the patient has been extremely lethargic and weak. His urine has been milky, dark since May 26th when he was discharged from [facility]. Over the past week patient has become incontinent, no able to make it to the restroom, and complaining that he has pain with urination. On Wednesday a doctor came to the house to examine the patient, at that time he ordered lab tests, which the patient had preformed today. Labs were significant for Na 126 and a K TNP. They were instructed to come straight to the ED.     In the ED labs were significant for a Na 131, K+ 5.1, Hgb 9.9 (up from previous admission), and a WBC count of 18K.     Vital Signs Last 24 Hrs  T(F): 98.7 (23 Jun 2023 17:00), Max: 98.7 (23 Jun 2023 17:00)  HR: 95 (23 Jun 2023 19:30) (80 - 95)  BP: 157/78 (23 Jun 2023 19:30) (121/60 - 157/78)  BP(mean): --  RR: 20 (23 Jun 2023 19:30) (16 - 20)  SpO2: 99% (23 Jun 2023 19:30) (96% - 99%)  Patient On (Oxygen Delivery Method): nasal cannula  O2 Flow (L/min): 2    UA is positive for many bacteria >720 WBC, moderate blood and large leukocyte esterase.  (23 Jun 2023 21:23)    PERTINENT PM/SXH:   HTN (hypertension)    HLD (hyperlipidemia)    BPH (benign prostatic hyperplasia)    CAD (coronary artery disease)    Diabetes mellitus    Hypothyroidism      No significant past surgical history    S/P CABG (coronary artery bypass graft)      FAMILY HISTORY:  Unable to obtain from patient as confused    ITEMS NOT CHECKED ARE NOT PRESENT    SOCIAL HISTORY:   Significant other/partner[ ]  Children[ ]  Restorationism/Spirituality:  Substance hx:  [ ]   Tobacco hx:  [ ]   Alcohol hx: [ ]   Living Situation: [ ]Home  [ x]Long term care  [ ]Rehab [ ]Other  Home Services: [ ] HHA [ ] Visting RN [ ] Hospice  Occupation:  Home Opioid hx:  [ ] Y [ ] N [x ] I-Stop Reference No:    Reference #: [id number]    Practitioner Count: 3  Pharmacy Count: 2  Current Opioid Prescriptions: 1  Current Benzodiazepine Prescriptions: 0  Current Stimulant Prescriptions: 0      Patient Demographic Information (PDI)       PDI	First Name	Last Name	Birth Date	Gender	Street Address	[facility]  CHRISSY José	03/12/1924	Male	[address]	[facility]	[id number]    Prescription Information      PDI Filter:    PDI	Current Rx	Drug Type	Rx Written	Rx Dispensed	Drug	Quantity	Days Supply	Prescriber Name	Prescriber EMELINA #	Payment Method	Dispenser  A	Y	O	02/22/2023	06/22/2023	tramadol hcl 50 mg tablet	60	30	Delauro Javan G	[id number]	Insurance	[id number]  A	N	O	05/01/2023	05/05/2023	tramadol hcl 50 mg tablet	28	14	Camila Hylton	[id number]	Insurance	[facility]  A	N	O	05/01/2023	05/01/2023	tramadol hcl 50 mg tablet	21	7	Camila Hylton	[id number]	Insurance	[facility]  A	N	O	04/14/2023	04/14/2023	tramadol hcl 50 mg tablet	30	15	Lesli Daiz MD	[id number]	Insurance	[facility]  A	N	O	04/05/2023	04/05/2023	tramadol hcl 50 mg tablet	20	10	Javan Chamorro DO	[id number]	Insurance	[facility]  A	N	O	02/22/2023	02/23/2023	tramadol hcl 50 mg tablet	60	30	Delauro Javan G	[id number]	Insurance	[id number]  A	N	O	10/18/2022	10/20/2022	tramadol hcl 50 mg tablet	60	30	Delauro Javan G	[id number]	Insurance	[id number]  A	N	O	08/22/2022	08/26/2022	tramadol hcl 50 mg tablet	60	30	Javan Rios	[id number]	Insurance	[id number]     ADVANCE DIRECTIVES:     [ ] Full Code [x ] DNR  MOLST  [ ]  Living Will  [ ]   DECISION MAKER(s):  [ ] Health Care Proxy(s)  [x ] Surrogate(s)  [ ] Guardian           Name(s): Phone Number(s):  Daughter      BASELINE (I)ADL(s) (prior to admission):    [place]: [ ]Total  [ ] Moderate [ ]Dependent  Palliative Performance Status Version 2:         %    http://npcrc.org/files/news/palliative_performance_scale_ppsv2.pdf    Allergies    No Known Allergies    Intolerances    MEDICATIONS  (STANDING):  ampicillin/sulbactam  IVPB 3 Gram(s) IV Intermittent every 6 hours  ampicillin/sulbactam  IVPB      ciprofloxacin   IVPB 400 milliGRAM(s) IV Intermittent every 12 hours  clopidogrel Tablet 75 milliGRAM(s) Oral daily  dextrose 5%. 1000 milliLiter(s) (100 mL/Hr) IV Continuous <Continuous>  dextrose 5%. 1000 milliLiter(s) (50 mL/Hr) IV Continuous <Continuous>  dextrose 50% Injectable 25 Gram(s) IV Push once  dextrose 50% Injectable 25 Gram(s) IV Push once  dextrose 50% Injectable 12.5 Gram(s) IV Push once  DULoxetine 60 milliGRAM(s) Oral daily  ferrous    sulfate 325 milliGRAM(s) Oral two times a day  finasteride 5 milliGRAM(s) Oral daily  glucagon  Injectable 1 milliGRAM(s) IntraMuscular once  heparin   Injectable 5000 Unit(s) SubCutaneous every 8 hours  insulin glargine Injectable (LANTUS) 12 Unit(s) SubCutaneous every morning  insulin lispro (ADMELOG) corrective regimen sliding scale   SubCutaneous at bedtime  lactobacillus acidophilus 1 Tablet(s) Oral with breakfast  levETIRAcetam  IVPB 500 milliGRAM(s) IV Intermittent every 12 hours  levothyroxine 50 MICROGram(s) Oral daily  metoprolol tartrate 25 milliGRAM(s) Oral two times a day  multivitamin 1 Tablet(s) Oral every 24 hours  polyethylene glycol 3350 17 Gram(s) Oral two times a day  senna 2 Tablet(s) Oral at bedtime  simvastatin 20 milliGRAM(s) Oral at bedtime  sodium chloride 1 Gram(s) Oral two times a day  tamsulosin 0.4 milliGRAM(s) Oral at bedtime  urea Oral Powder 15 Gram(s) Oral daily    MEDICATIONS  (PRN):  acetaminophen     Tablet .. 650 milliGRAM(s) Oral every 6 hours PRN Temp greater or equal to 38C (100.4F), Mild Pain (1 - 3)  aluminum hydroxide/magnesium hydroxide/simethicone Suspension 30 milliLiter(s) Oral every 4 hours PRN Dyspepsia  dextrose Oral Gel 15 Gram(s) Oral once PRN Blood Glucose LESS THAN 70 milliGRAM(s)/deciliter  lidocaine 1%/epinephrine 1:100,000 Inj 1 Vial(s) Local Injection once PRN Bedside debridement  melatonin 3 milliGRAM(s) Oral at bedtime PRN Insomnia  ondansetron Injectable 4 milliGRAM(s) IV Push every 8 hours PRN Nausea and/or Vomiting    PRESENT SYMPTOMS: [ ]Unable to obtain due to poor mentation   Source if other than patient:  [ ]Family   [ ]Team     Pain: [ ]yes [x ]no  QOL impact -   Location -                    Aggravating factors -  Quality -  Radiation -  Timing-  Severity (0-10 scale):  Minimal acceptable level (0-10 scale):     CPOT:    https://www.sccm.org/getattachment/nwn53c01-6e9m-0l6x-5y4m-5838m9402b5g/Critical-Care-Pain-Observation-Tool-(CPOT)    PAIN AD Score:   http://geriatrictoolkit.[place].[facility]/cog/painad.pdf (press ctrl +  left click to view)    Dyspnea:                           [ ]None[x ]Mild [ ]Moderate [ ]Severe     Respiratory Distress Observation Scale (RDOS):   A score of 0 to 2 signifies little or no respiratory distress, 3 signifies mild distress, scores 4 to 6 indicate moderate distress, and scores greater than 7 signify severe distress  https://www.[facility].ca/sites/default/files/PDFS/248118-quownadbvvo-vjbdubnd-mqsdmbrjriy-adypn.pdf    Anxiety:                             [ x]None[ ]Mild [ ]Moderate [ ]Severe   Fatigue:                             [x ]None[ ]Mild [ ]Moderate [ ]Severe   Nausea:                             [x ]None[ ]Mild [ ]Moderate [ ]Severe   Loss of appetite:              [x ]None[ ]Mild [ ]Moderate [ ]Severe   Constipation:                    x ]None[ ]Mild [ ]Moderate [ ]Severe    Other Symptoms:  [ ]All other review of systems negative     Palliative Performance Status Version 2:        30 %    http://[facility].org/files/news/palliative_performance_scale_ppsv2.pdf    PHYSICAL EXAM:  Vital Signs Last 24 Hrs  T(C): 36.4 (11 Jul 2023 19:01), Max: 36.4 (11 Jul 2023 19:01)  T(F): 97.6 (11 Jul 2023 19:01), Max: 97.6 (11 Jul 2023 19:01)  HR: 102 (11 Jul 2023 19:01) (98 - 105)  BP: 128/68 (11 Jul 2023 19:01) (128/68 - 142/67)  BP(mean): --  RR: 18 (11 Jul 2023 19:01) (18 - 18)  SpO2: 98% (11 Jul 2023 19:01) (98% - 98%)    Parameters below as of 11 Jul 2023 19:01  Patient On (Oxygen Delivery Method): room air     I&O's Summary    10 Jul 2023 07:01  -  11 Jul 2023 07:00  --------------------------------------------------------  IN: 0 mL / OUT: 2 mL / NET: -2 mL        GENERAL:  [x ] No acute distress [ ]Lethargic  [ ]Unarousable  x[ ]Verbal  [ ]Non-Verbal [ ]Cachexia    BEHAVIORAL/PSYCH:  [x]Alert and Oriented x2  [ ] Anxiety [ ] Delirium [ ] Agitation [ ] Calm   EYES: [ x] No scleral icterus [ ] Scleral icterus [ ] Closed  ENMT:  [ ]Dry mouth  [ x]No external oral lesions [ ] No external ear or nose lesions  CARDIOVASCULAR:  [ ]Regular [ ]Irregular [x]Tachy [ ]Not Tachy  [ ]Giancarlo [ ] Edema [ ] No edema  PULMONARY:  [ ]Tachypnea  [ ]Audible excessive secretions [ x] No labored breathing [ ] labored breathing  GASTROINTESTINAL: [ ]Soft  [ ]Distended  [ x]Not distended [ ]Non tender [ ]Tender  MUSCULOSKELETAL: [ ]No clubbing [ ] clubbing  [x ] No cyanosis [ ] cyanosis  NEUROLOGIC: [ ]No focal deficits  [ ]Follows commands  [ ]Does not follow commands  [x ]Cognitive impairment  [ ]Dysphagia  [ ]Dysarthria  [ ]Paresis   SKIN: [ ] Jaundiced [x ] Non-jaundiced [ ]Rash [ ]No Rash [ ] Warm [ ] Dry  MISC/LINES: [ ] ET tube [ ] Trach [ ]NGT/OGT [ ]PEG [ ]Hidalgo    LABS: reviewed by me                        10.4   15.26 )-----------( 411      ( 10 Jul 2023 06:06 )             34.4   07-10    140  |  103  |  24<H>  ----------------------------<  107<H>  4.6   |  26  |  0.9    Ca    10.1      10 Jul 2023 06:06  Mg     2.0     07-10    TPro  6.1  /  Alb  3.5  /  TBili  0.3  /  DBili  x   /  AST  17  /  ALT  14  /  AlkPhos  81  07-10      Urinalysis Basic - ( 10 Jul 2023 06:06 )    Color: x / Appearance: x / SG: x / pH: x  Gluc: 107 mg/dL / Ketone: x  / Bili: x / Urobili: x   Blood: x / Protein: x / Nitrite: x   Leuk Esterase: x / RBC: x / WBC x   Sq Epi: x / Non Sq Epi: x / Bacteria: x      RADIOLOGY & ADDITIONAL STUDIES: reviewed by me  < from: Xray Chest 1 View- PORTABLE-Routine (Xray Chest 1 View- PORTABLE-Routine .) (07.11.23 @ 11:03) >  IMPRESSION: Low lung volumes      Right basilar discoid atelectasis.    < end of copied text >      EKG: reviewed by me  < from: 12 Lead ECG (07.11.23 @ 11:24) >  Ventricular Rate 86 BPM    Atrial Rate 86 BPM    P-R Interval 148 ms    QRS Duration 92 ms    Q-T Interval 374 ms    QTC Calculation(Bazett) 447 ms    P Axis 42 degrees    R Axis -6 degrees    T Axis -27 degrees    Diagnosis Line Normal sinus rhythm with sinus arrhythmia  Left ventricular hypertrophy  Inferior infarct , age undetermined  T wave abnormality, consider lateral ischemia  Abnormal ECG    < end of copied text >      PROTEIN CALORIE MALNUTRITION PRESENT: [ ]mild [ ]moderate [ ]severe [ ]underweight [ ]morbid obesity  https://www.andeal.org/vault/2440/web/files/ONC/Table_Clinical%20Characteristics%20to%20Document%20Malnutrition-White%20JV%20et%20al%371319.pdf    Height (cm): 152.4 (06-24-23 @ 16:00), 154.9 (03-31-23 @ 13:49)  Weight (kg): 54.8 (06-24-23 @ 16:00), 79 (03-31-23 @ 13:49), 68 (12-20-22 @ 10:17)  BMI (kg/m2): 23.6 (06-24-23 @ 16:00), 32.9 (03-31-23 @ 13:49)  [ ]PPSV2 < or = to 30% [ ]significant weight loss  [ ]poor nutritional intake  [ ]anasarca      [ ]Artificial Nutrition      Palliative Care Spiritual/Emotional Screening Tool Question  Severity (0-4):                    OR                    [ x] Unable to determine/NA  Score of 2 or greater indicates recommendation of Chaplaincy referral  Chaplaincy Referral: [ ] Yes [ ] Refused [ ] Following     Caregiver [place]:  [ ] Yes [ ] No [x ] Deferred  Social Work Referral [x ]  Patient and Family Centered Care Referral [ ]    Anticipatory Grief Present: [ ] Yes [ ] No [ x] Deferred  Social Work Referral [x ]  Patient and Family Centered Care Referral [ ]    Patient discussed with primary medical team MD  Palliative care education provided to patient and/or family

## 2023-07-11 NOTE — CONSULT NOTE ADULT - PROBLEM SELECTOR RECOMMENDATION 9
In setting of recent hidalgo with suspected testicular infection  -cultures growing pseudomonas  -plan for surgical debridement with juan c tomorrow  -ampicillin/sulbactam  IVPB 3 Gram(s) IV Intermittent every 6 hours  -ciprofloxacin   IVPB 400 milliGRAM(s) IV Intermittent every 12 hours  -f/u blood cultures

## 2023-07-11 NOTE — PROGRESS NOTE ADULT - ASSESSMENT
ASSESSMENT  89 yo M with hx of HTN, HLD, DM II BPH and CAD s/p CABG x 4, Hypothyroidism presents to ED for abnormal labs result (hyperkalemia and hyponatremia).    IMPRESSION  #Sepsis present on admission  #Pseudomonas bacteremia from suspected UTI with Left Epididymitis/Scrotal abscess -- had recent hidalgo   - BLood Cx 6/24 +   - Urine Cx 6/23 > 3 organisms   - US Kidney and Bladder (06.23.23 @ 21:08): IMPRESSION: No sonographic evidence of hydronephrosis. Post void residual of 69 mL (46%).  - US Testicles (07.01.23 @ 08:52): Complex scrotal mixed echogenicity collection anterior to left testicle  measuring approximately 7.7 x 3 x 6.9 cm. 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  - s/p I and D 7/5 - Wound CX E faecalis     #Hyponatremia    #BPH  #DM II  #CAD s/p CABG    #Abx allergy: No Known Allergies    CrCl 31    RECOMMENDATIONS  - was on zosyn 3.375 mg q 12 hours -- now on unasyn + cipro   - no induration/fluctuance - minimal drainage from I and D site -- will follow-up burn in case taken to OR  - final duration pending OR      Please call or message on Microsoft Teams if with any questions.  Spectra [phone]

## 2023-07-11 NOTE — PROGRESS NOTE ADULT - ATTENDING COMMENTS
Patient is a 89 y/o Male with  PMHx HTN, DM2, BPH, CAD s/p CABG, hypothyroidism, recently dx. from [facility] , progressive ambulatory dysfunction, admitted   with hyperkalemia, hyponatremia noted as outpt. Found to have pseudomonas bacteremia.    # Acute mental status change:   - MR brain WNL  - EEG Done- 2nd EEG shows generalized slowing + Multifocal epileptic potentials. Started Keppra 500mg every 12 hours .   Neuro f/up 7/4- s/p VEEG - no epilepti-form activity   - 7/9/23- 7/11/23  patient is awake , tolerating diet well     #Bacteremia due to Pseudomonas.   # Leukocytosis   #in setting of acute Testicular abscess- due to Enterococcus infection  US Scrotum (for epididymitis) : Complex scrotal mixed echogenicity collection anterior to left testicle measuring approximately 7.7 x 3 x 6.9 cm. 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  ua positive; s/p urine cxs- more than 3 organisms, repeat urine cxs neg , repeat blood cxs 6/26, 28 Neg.  blood cxs pseudomonas 6/24, as per  ID - continue on IV Zosyn tx. , post ID f/up on 7/8/23- transitioned to PO Augmentin/Cipro tx. - due to noncompliance with po meds , transitioned back to IV abx. tx.   - consulted  - s/p Incision and drainage on 7/6/23- daily wound care, testicular  abscess cxs-grew Enterococcus- sensitive to ampicillin  , pain management ,  f/up   - Burn team f/up - rec. debridement, planned for tomorrow, Medically stable to undergo surgical debridement of left testicle, low risk for procedure.     # Hypomagnesemia- supplemented     #hyponatremia- stable levels   Per Nephro eval, likely [facility]? Urea 15gm QD,  Uric Acid = 4.5. No need to restrict po free water. continue on salt tabs  -daily BMP monitoring      #constipated: Senna, Miralax.     #HTN (hypertension)  -continue  lopressor 25mg PO twice daily with holding parameters.     #DM2 (diabetes mellitus, type 2)- uncontrolled, Hga1C- 9, improved bsfs  lantus 12 units subc. once daily ,  hold lispro - , continue sliding scale co. since patient's appetite fluctuates     #History of BPH. - continue finasteride 5 mg po once daily /tamsulosin 0.4 mg po once daily at bedtime     #h/o CAD (coronary artery disease). / h/o CABG  - plavix/zocor tx.  -patient is on supplemental oxygen via NC, 2 L , on RA sat 91- 92% at rest     #Hypothyroidism. continue synthroid 50mcg po once daily    # Iron def. anemia- started on PO Iron tx.     # Ambulatory dysfunction, physical decline- PT eval.     DVT ppX, heparin  DNR/DNI (MOLST signed 6/29)    #Progress Note Handoff: due to meds PO  non-compliance, transitioned to IV abx. tx, planned for left testicular debridement by Burn team tomorrow. NPO post midnight, hold am dose of heparin subc. tx.   Family discussion: yes, medical team  Disposition:  STR vs. Home with home care once medically stable    Total time spent to complete patient's bedside assessment, review medical chart, discuss medical plan of care with covering medical team was more than 35  minutes with >50% of time spent face to face with patient, discussion with patient/family and/or coordination of care .

## 2023-07-11 NOTE — PROGRESS NOTE ADULT - ASSESSMENT
Pt is a 89y Male with left scrotal abscess s/p bedside I&D on 7/5.    PLAN:  Patient seen and examined with William Barahona, plan as per attending below:  -Daily packing change -- changed today during rounds w/ 1/4in iodoform packing  -Wound cx -- e. faecalis  -C/w abx per ID -- on Unasyn & Cipro  -Burn c/s: local wound care, IV abx, plan for OR tomorrow for debridement  -Remainder of care per primary team Pt is a 89y Male with left scrotal abscess s/p bedside I&D on 7/5.  packing replaced, sofía is deep and took at least a 8 inches of packing  PLAN:  Patient seen and examined with William Barahona, plan as per attending below:  -Daily packing change -- changed today during rounds w/ 1/4in iodoform packing  -Wound cx -- e. faecalis  -C/w abx per ID -- on Unasyn & Cipro  -Burn c/s: local wound care, IV abx, plan for OR tomorrow for debridement  -Remainder of care per primary team

## 2023-07-11 NOTE — CONSULT NOTE ADULT - ASSESSMENT
89yMale with history as above presenting with abnormal labs found to have pseudomonas bacteremia. Palliative care consulted for [facility].    Please see palliative SW's note for full information, but in short, she called and spoke with patient's daughter. Family not interested in hospice at this time and wants ongoing medical management. All questions answered.     MEDD (morphine equivalent daily dose):    Education about palliative care provided to patient/family.  See Recs below.    Please call [phone] with questions or concerns 24/7.   We will sign off.

## 2023-07-11 NOTE — CHART NOTE - NSCHARTNOTEFT_GEN_A_CORE
PALLIATIVE MEDICINE INTERDISCIPLINARY TEAM NOTE    Provider:                                                 Met with: [ x  ] Patient  [ x  ] Family: dtr: Shayna  [   ] Other:    Primary Language: [ x  ] English [   ] Other*:                      *Interpretation provided by:    SUPPORT DIAGNOSES            (Check all that apply)    [   ] EOL issues  [ x  ] Advanced Illness  [   ] Cultural / spiritual concerns  [   ] Pain / suffering  [   ] Dementia / AMS  [   ] Other:  [   ] AD issues  [   ] Grief / loss / sadness  [   ] Discharge issues  [ x  ] Distress / coping    PSYCHOSOCIAL ASSESSMENT OF PATIENT         (Check all that apply)    [ x  ] Initial Assessment            [   ] Reassessment          [   ] Not Applicable this visit    Pain/suffering acuity:  [  x ] None to mild (0-3)           [   ] Moderate (4-6)        [   ] High (7-10)    Mental Status:  [ x  ] Alert/oriented (x2)          [   ] Confused/Altered(x2/x1)         [   ] Non-resp    Functional status:  [   ] Independent w ADLs      [  x ] Needs Assistance             [   ] Bedbound/Full Care    Coping:  [   ] Coping well                     [ x  ] Coping w/difficulty            [   ] Poor coping    Support system:  [   ] Strong                              [ x  ] Adequate                        [   ] Inadequate      Past history and medications for:     [ ] Anxiety       [ ] Depression    [ ] Sleep disorders       SERVICE PROVIDED  [   ]Discharge support / facilitation  [ x  ]AD / goals of care counseling                                  [   ]EOL / death / bereavement counseling  [ x  ]Counseling / support                                                [   ] Family meeting  [   ]Prayer / sacrament / ritual                                      [   ] Referral   [   ]Other                                                                       NOTE and Plan of Care (PoC):    patient is a 89 y/o M with pmhx of HTN, DM2, BPH, CAD, hypothyroidism, progressive ambulatory dysfunction, presenting with hyperkalemia, hyponatremia, found to hhave pseudomonas bacteria. visited patient earlier today. patient encountered resting in bed. awake and alert, however, slightly confused. he denied any pain or discomfort, and noted his daughter shayna helps him and should be contacted.     called and spoke with patient's dtr Shayna. reviewed role of palliative care and palliative SW. She discussed patient's hx leading up to recent admission. pior to admission patient was at [facility] for [facility] days. We discussed advance directives including hospice. She said she does not want hospice at this time. patient would not want that as his brother was hospice and did not have a good experience. She wants on going medical management. all questions answered. contact info provided.     [phone]

## 2023-07-11 NOTE — PROGRESS NOTE ADULT - NS ATTEND AMEND GEN_ALL_CORE FT
89 yo M w pmh of CABG, HTN, DM2, hypothyroidism admitted for electrolyte abnormalities with acute decreased responsiveness found to have abnormal EEG w/ epileptiform activities possible LRE currently improved but lethargic on ASM.  Recommend adjust ASM and f/u VEEG results.
I personally saw and examined the patient, reviewed the chart and available data. I discussed the situation with the patient and  team. I personally placed the packing. I also reviewed and/or amended the note as necessary.
89 yo M w pmh of CABG, HTN, DM2, hypothyroidism admitted for electrolyte abnormalities with acute decreased responsiveness found to have abnormal EEG w/ epileptiform activities possible LRE currently less lethargic on lower dose of keppra without significant worsening of VEEG.  Recommendations as above.

## 2023-07-11 NOTE — PROGRESS NOTE ADULT - SUBJECTIVE AND OBJECTIVE BOX
UROLOGY DAILY PROGRESS NOTE    Pt is a 89y Male with left scrotal abscess s/p bedside I&D on 7/5. Patient seen and examined with Dr. Thomas at bedside this afternoon, denies complaints at this time.    MEDICATIONS  (STANDING):  ampicillin/sulbactam  IVPB 3 Gram(s) IV Intermittent every 6 hours  ampicillin/sulbactam  IVPB      ciprofloxacin   IVPB 400 milliGRAM(s) IV Intermittent every 12 hours  clopidogrel Tablet 75 milliGRAM(s) Oral daily  dextrose 5%. 1000 milliLiter(s) (100 mL/Hr) IV Continuous <Continuous>  dextrose 5%. 1000 milliLiter(s) (50 mL/Hr) IV Continuous <Continuous>  dextrose 50% Injectable 25 Gram(s) IV Push once  dextrose 50% Injectable 25 Gram(s) IV Push once  dextrose 50% Injectable 12.5 Gram(s) IV Push once  DULoxetine 60 milliGRAM(s) Oral daily  ferrous    sulfate 325 milliGRAM(s) Oral two times a day  finasteride 5 milliGRAM(s) Oral daily  glucagon  Injectable 1 milliGRAM(s) IntraMuscular once  heparin   Injectable 5000 Unit(s) SubCutaneous every 8 hours  insulin glargine Injectable (LANTUS) 12 Unit(s) SubCutaneous every morning  insulin lispro (ADMELOG) corrective regimen sliding scale   SubCutaneous at bedtime  lactobacillus acidophilus 1 Tablet(s) Oral with breakfast  levETIRAcetam  IVPB 500 milliGRAM(s) IV Intermittent every 12 hours  levothyroxine 50 MICROGram(s) Oral daily  metoprolol tartrate 25 milliGRAM(s) Oral two times a day  multivitamin 1 Tablet(s) Oral every 24 hours  polyethylene glycol 3350 17 Gram(s) Oral two times a day  senna 2 Tablet(s) Oral at bedtime  simvastatin 20 milliGRAM(s) Oral at bedtime  sodium chloride 1 Gram(s) Oral two times a day  tamsulosin 0.4 milliGRAM(s) Oral at bedtime  urea Oral Powder 15 Gram(s) Oral daily    MEDICATIONS  (PRN):  acetaminophen     Tablet .. 650 milliGRAM(s) Oral every 6 hours PRN Temp greater or equal to 38C (100.4F), Mild Pain (1 - 3)  aluminum hydroxide/magnesium hydroxide/simethicone Suspension 30 milliLiter(s) Oral every 4 hours PRN Dyspepsia  dextrose Oral Gel 15 Gram(s) Oral once PRN Blood Glucose LESS THAN 70 milliGRAM(s)/deciliter  lidocaine 1%/epinephrine 1:100,000 Inj 1 Vial(s) Local Injection once PRN Bedside debridement  melatonin 3 milliGRAM(s) Oral at bedtime PRN Insomnia  ondansetron Injectable 4 milliGRAM(s) IV Push every 8 hours PRN Nausea and/or Vomiting    REVIEW OF SYSTEMS   [x] A ten-point review of systems was otherwise negative except as noted.    Vital Signs Last 24 Hrs  T(C): 36.3 (11 Jul 2023 14:00), Max: 36.8 (10 Jul 2023 20:06)  T(F): 97.4 (11 Jul 2023 14:00), Max: 98.2 (10 Jul 2023 20:06)  HR: 98 (11 Jul 2023 14:00) (98 - 106)  BP: 132/65 (11 Jul 2023 14:00) (122/62 - 145/68)  RR: 18 (11 Jul 2023 14:00) (18 - 18)  SpO2: 98% (11 Jul 2023 14:00) (98% - 98%)    Parameters below as of 10 Jul 2023 20:06  Patient On (Oxygen Delivery Method): room air    PHYSICAL EXAM:  GEN: Elderly male in no acute distress, awake.  SKIN: Non diaphoretic.  RESP: Non-labored breathing, no use of accessory muscles.  ABDO: Soft, nondistended, nontender to palpation. Unable to palpable bladder, no suprapubic tenderness.  : No meatal discharge. L hemiscrotum mildly edematous, prior I&D incision noted, no packing in place, expressed small amount of purulent drainage from incision.     I&O's Summary  10 Jul 2023 07:01  -  11 Jul 2023 07:00  --------------------------------------------------------  IN: 0 mL / OUT: 2 mL / NET: -2 mL    LABS:             10.4   15.26 )-----------( 411      ( 10 Jul 2023 06:06 )             34.4     07-10  140  |  103  |  24<H>  ----------------------------<  107<H>  4.6   |  26  |  0.9    Ca    10.1      10 Jul 2023 06:06  Mg     2.0     07-10    TPro  6.1  /  Alb  3.5  /  TBili  0.3  /  DBili  x   /  AST  17  /  ALT  14  /  AlkPhos  81  07-10    Urinalysis Basic - ( 10 Jul 2023 06:06 )  Color: x / Appearance: x / SG: x / pH: x  Gluc: 107 mg/dL / Ketone: x  / Bili: x / Urobili: x   Blood: x / Protein: x / Nitrite: x   Leuk Esterase: x / RBC: x / WBC x   Sq Epi: x / Non Sq Epi: x / Bacteria: x    Culture - Abscess with Gram Stain (07.05.23 @ 18:48)   Gram Stain:   Rare polymorphonuclear leukocytes seen per low power field   No organisms seen per oil power field  - Ampicillin: S <=2 Predicts results to ampicillin/sulbactam, amoxacillin-clavulanate and piperacillin-tazobactam.  - Tetracycline: R >8  - Vancomycin: S 2  Specimen Source: .Abscess LEFT scrotal abscess  Culture Results:   Moderate Enterococcus faecalis  Organism Identification: Enterococcus faecalis  Organism: Enterococcus faecalis  Method Type: KEVIN

## 2023-07-11 NOTE — CONSULT NOTE ADULT - REASON FOR ADMISSION
Abnormal Lab results

## 2023-07-12 LAB
-  AMPICILLIN: SIGNIFICANT CHANGE UP
-  AMPICILLIN: SIGNIFICANT CHANGE UP
-  DAPTOMYCIN: SIGNIFICANT CHANGE UP
-  LEVOFLOXACIN: SIGNIFICANT CHANGE UP
-  LINEZOLID: SIGNIFICANT CHANGE UP
-  TETRACYCLINE: SIGNIFICANT CHANGE UP
-  TETRACYCLINE: SIGNIFICANT CHANGE UP
-  VANCOMYCIN: SIGNIFICANT CHANGE UP
-  VANCOMYCIN: SIGNIFICANT CHANGE UP
ALBUMIN SERPL ELPH-MCNC: 3.3 G/DL — LOW (ref 3.5–5.2)
ALP SERPL-CCNC: 70 U/L — SIGNIFICANT CHANGE UP (ref 30–115)
ALT FLD-CCNC: 12 U/L — SIGNIFICANT CHANGE UP (ref 0–41)
ANION GAP SERPL CALC-SCNC: 13 MMOL/L — SIGNIFICANT CHANGE UP (ref 7–14)
AST SERPL-CCNC: 17 U/L — SIGNIFICANT CHANGE UP (ref 0–41)
BILIRUB SERPL-MCNC: 0.3 MG/DL — SIGNIFICANT CHANGE UP (ref 0.2–1.2)
BUN SERPL-MCNC: 19 MG/DL — SIGNIFICANT CHANGE UP (ref 10–20)
CALCIUM SERPL-MCNC: 9.3 MG/DL — SIGNIFICANT CHANGE UP (ref 8.4–10.5)
CHLORIDE SERPL-SCNC: 103 MMOL/L — SIGNIFICANT CHANGE UP (ref 98–110)
CO2 SERPL-SCNC: 23 MMOL/L — SIGNIFICANT CHANGE UP (ref 17–32)
CREAT SERPL-MCNC: 1 MG/DL — SIGNIFICANT CHANGE UP (ref 0.7–1.5)
CULTURE RESULTS: SIGNIFICANT CHANGE UP
EGFR: 68 ML/MIN/1.73M2 — SIGNIFICANT CHANGE UP
GLUCOSE BLDC GLUCOMTR-MCNC: 139 MG/DL — HIGH (ref 70–99)
GLUCOSE BLDC GLUCOMTR-MCNC: 165 MG/DL — HIGH (ref 70–99)
GLUCOSE BLDC GLUCOMTR-MCNC: 165 MG/DL — HIGH (ref 70–99)
GLUCOSE BLDC GLUCOMTR-MCNC: 261 MG/DL — HIGH (ref 70–99)
GLUCOSE SERPL-MCNC: 137 MG/DL — HIGH (ref 70–99)
HCT VFR BLD CALC: 31.5 % — LOW (ref 42–52)
HGB BLD-MCNC: 9.7 G/DL — LOW (ref 14–18)
MAGNESIUM SERPL-MCNC: 1.8 MG/DL — SIGNIFICANT CHANGE UP (ref 1.8–2.4)
MCHC RBC-ENTMCNC: 27.3 PG — SIGNIFICANT CHANGE UP (ref 27–31)
MCHC RBC-ENTMCNC: 30.8 G/DL — LOW (ref 32–37)
MCV RBC AUTO: 88.7 FL — SIGNIFICANT CHANGE UP (ref 80–94)
METHOD TYPE: SIGNIFICANT CHANGE UP
METHOD TYPE: SIGNIFICANT CHANGE UP
NRBC # BLD: 0 /100 WBCS — SIGNIFICANT CHANGE UP (ref 0–0)
ORGANISM # SPEC MICROSCOPIC CNT: SIGNIFICANT CHANGE UP
PLATELET # BLD AUTO: 346 K/UL — SIGNIFICANT CHANGE UP (ref 130–400)
PMV BLD: 9.9 FL — SIGNIFICANT CHANGE UP (ref 7.4–10.4)
POTASSIUM SERPL-MCNC: 4.5 MMOL/L — SIGNIFICANT CHANGE UP (ref 3.5–5)
POTASSIUM SERPL-SCNC: 4.5 MMOL/L — SIGNIFICANT CHANGE UP (ref 3.5–5)
PROT SERPL-MCNC: 5.5 G/DL — LOW (ref 6–8)
RBC # BLD: 3.55 M/UL — LOW (ref 4.7–6.1)
RBC # FLD: 19.5 % — HIGH (ref 11.5–14.5)
SODIUM SERPL-SCNC: 139 MMOL/L — SIGNIFICANT CHANGE UP (ref 135–146)
SPECIMEN SOURCE: SIGNIFICANT CHANGE UP
WBC # BLD: 11.38 K/UL — HIGH (ref 4.8–10.8)
WBC # FLD AUTO: 11.38 K/UL — HIGH (ref 4.8–10.8)

## 2023-07-12 PROCEDURE — 11046 DBRDMT MUSC&/FSCA EA ADDL: CPT

## 2023-07-12 PROCEDURE — 11043 DBRDMT MUSC&/FSCA 1ST 20/<: CPT

## 2023-07-12 PROCEDURE — 99232 SBSQ HOSP IP/OBS MODERATE 35: CPT

## 2023-07-12 RX ORDER — MAGNESIUM SULFATE 500 MG/ML
2 VIAL (ML) INJECTION ONCE
Refills: 0 | Status: COMPLETED | OUTPATIENT
Start: 2023-07-12 | End: 2023-07-12

## 2023-07-12 RX ORDER — HEPARIN SODIUM 5000 [USP'U]/ML
5000 INJECTION INTRAVENOUS; SUBCUTANEOUS EVERY 8 HOURS
Refills: 0 | Status: DISCONTINUED | OUTPATIENT
Start: 2023-07-12 | End: 2023-07-18

## 2023-07-12 RX ORDER — CIPROFLOXACIN LACTATE 400MG/40ML
750 VIAL (ML) INTRAVENOUS
Refills: 0 | Status: DISCONTINUED | OUTPATIENT
Start: 2023-07-12 | End: 2023-07-13

## 2023-07-12 RX ADMIN — Medication 25 GRAM(S): at 18:28

## 2023-07-12 RX ADMIN — HEPARIN SODIUM 5000 UNIT(S): 5000 INJECTION INTRAVENOUS; SUBCUTANEOUS at 05:07

## 2023-07-12 RX ADMIN — HEPARIN SODIUM 5000 UNIT(S): 5000 INJECTION INTRAVENOUS; SUBCUTANEOUS at 18:25

## 2023-07-12 RX ADMIN — LEVETIRACETAM 400 MILLIGRAM(S): 250 TABLET, FILM COATED ORAL at 18:28

## 2023-07-12 RX ADMIN — LEVETIRACETAM 400 MILLIGRAM(S): 250 TABLET, FILM COATED ORAL at 05:11

## 2023-07-12 RX ADMIN — Medication 200 MILLIGRAM(S): at 05:05

## 2023-07-12 RX ADMIN — AMPICILLIN SODIUM AND SULBACTAM SODIUM 200 GRAM(S): 250; 125 INJECTION, POWDER, FOR SUSPENSION INTRAMUSCULAR; INTRAVENOUS at 05:05

## 2023-07-12 RX ADMIN — AMPICILLIN SODIUM AND SULBACTAM SODIUM 200 GRAM(S): 250; 125 INJECTION, POWDER, FOR SUSPENSION INTRAMUSCULAR; INTRAVENOUS at 14:11

## 2023-07-12 RX ADMIN — HEPARIN SODIUM 5000 UNIT(S): 5000 INJECTION INTRAVENOUS; SUBCUTANEOUS at 21:55

## 2023-07-12 NOTE — PROGRESS NOTE ADULT - ASSESSMENT
Patient is a 89 y/o Male with  PMHx HTN, DM2, BPH, CAD s/p CABG, hypothyroidism, recently dx. from [facility] , progressive ambulatory dysfunction, admitted with hyperkalemia, hyponatremia noted as outpt. Found to have pseudomonas bacteremia.    # Acute mental status change:   - MR brain WNL  - EEG Done- 2nd EEG shows generalized slowing + Multifocal epileptic potentials. Started Keppra 500mg every 12 hours .   - Neuro f/up 7/4- placed on VEEG now.   - VEEG results nl  - F/u neuro for further recs, continue Neurochecks      #Bacteremia due to Pseudomonas.   # Leukocytosis   #in setting of recent hidalgo in snf; now out; possible uti, suspected testicular infection  US Scrotum (for epididymitis) : Complex scrotal mixed echogenicity collection anterior to left testicle measuring approximately 7.7 x 3 x 6.9 cm. 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  ua positive; f/u ucxs- more than 3 organisms, repeat urine cxs neg , repeat blood cxs 6/26, 28 Neg.  blood cxs pseudomonas 6/24, as per  ID - continue on IV Zosyn tx.   -  did I&D bedside 7/5   - wound cx - e.faecalis  - burn surgical debridement tomorrow  - preop labs ordered  - repeat CXR, rpt EKG  - f/u bcx    # Hypomagnesemia - resolved  - f/u mag level  - mag 2.1 stable now    #hyponatremia- stable levels   - Na 133 7/6  - continue on salt tabs  - daily BMP monitoring      #constipated:   - Senna, Miralax.     #HTN (hypertension)  - continue lopressor 25mg PO twice daily with holding parameters.     #DM2 (diabetes mellitus, type 2)  - uncontrolled, Hga1C- 9  - lantus 12 units subc. once daily ,   - lispro 4 units subc. before each meal three times daily   ssi.    #History of BPH.   - continue finasteride 5 mg po once daily /tamsulosin 0.4 mg po once daily at bedtime     #h/o CAD (coronary artery disease). / h/o CABG  - plavix/zocor tx.  -patient is on supplemental oxygen via NC, 2 L , on RA sat 91- 92% at rest     #Hypothyroidism.   - continue synthroid 50mcg po once daily    # Iron def. anemia  - started on PO Iron tx.     # Ambulatory dysfunction   - physical decline- PT - will f/u today    DVT ppX, heparin  DNR/DNI (MOLST signed 6/29)    #Progress Note Handoff: f/u white count, f/u bcx, OR tomorrow Patient is a 89 y/o Male with  PMHx HTN, DM2, BPH, CAD s/p CABG, hypothyroidism, recently dx. from [facility] , progressive ambulatory dysfunction, admitted with hyperkalemia, hyponatremia noted as outpt. Found to have pseudomonas bacteremia.    # Acute mental status change:   - MR brain WNL  - EEG Done- 2nd EEG shows generalized slowing + Multifocal epileptic potentials. Started Keppra 500mg every 12 hours .   - Neuro f/up 7/4- placed on VEEG now.   - VEEG results nl  - F/u neuro for further recs, continue Neurochecks      #Bacteremia due to Pseudomonas.   # Leukocytosis   #in setting of recent hidalgo in snf; now out; possible uti, testicular infection - e.faecalis  US Scrotum (for epididymitis) : Complex scrotal mixed echogenicity collection anterior to left testicle measuring approximately 7.7 x 3 x 6.9 cm. 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  ua positive; f/u ucxs- more than 3 organisms, repeat urine cxs neg , repeat blood cxs 6/26, 28 Neg.  blood cxs pseudomonas 6/24, as per  ID - continue on IV Zosyn tx.   -  did I&D bedside 7/5   - wound cx - e.faecalis  - burn surgical debridement today  - preop labs ordered      # Hypomagnesemia - resolved  - f/u mag level  - mag 2.1 stable now    #hyponatremia- stable levels   - Na 133 7/6  - continue on salt tabs  - daily BMP monitoring      #constipated:   - Senna, Miralax.     #HTN (hypertension)  - continue lopressor 25mg PO twice daily with holding parameters.     #DM2 (diabetes mellitus, type 2)  - uncontrolled, Hga1C- 9  - lantus 12 units subc. once daily ,   - lispro 4 units subc. before each meal three times daily   ssi.    #History of BPH.   - continue finasteride 5 mg po once daily /tamsulosin 0.4 mg po once daily at bedtime     #h/o CAD (coronary artery disease). / h/o CABG  - plavix/zocor tx.  -patient is on supplemental oxygen via [place], 2 L , on RA sat 91- 92% at rest     #Hypothyroidism.   - continue synthroid 50mcg po once daily    # Iron def. anemia  - started on PO Iron tx.     # Ambulatory dysfunction   - physical decline- PT - will f/u today    DVT ppX, heparin  DNR/DNI (MOLST signed 6/29)    #Progress Note Handoff: surgical debridement today Patient is a 89 y/o Male with  PMHx HTN, DM2, BPH, CAD s/p CABG, hypothyroidism, recently dx. from [facility] , progressive ambulatory dysfunction, admitted with hyperkalemia, hyponatremia noted as outpt. Found to have pseudomonas bacteremia.    # Acute mental status change:   - MR brain WNL  - EEG Done- 2nd EEG shows generalized slowing + Multifocal epileptic potentials. Started Keppra 500mg every 12 hours .   - Neuro f/up 7/4- placed on VEEG now.   - VEEG results nl  - F/u neuro for further recs, continue Neurochecks      #Bacteremia due to Pseudomonas.   # Leukocytosis   #in setting of recent hidalgo in snf; now out; possible uti, testicular infection - e.faecalis  US Scrotum (for epididymitis) : Complex scrotal mixed echogenicity collection anterior to left testicle measuring approximately 7.7 x 3 x 6.9 cm. 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  ua positive; f/u ucxs- more than 3 organisms, repeat urine cxs neg , repeat blood cxs 6/26, 28 Neg.  blood cxs pseudomonas 6/24, as per  ID - continue on IV Zosyn tx.   -  did I&D bedside 7/5   - wound cx - e.faecalis      # Hypomagnesemia - resolved  - f/u mag level  - mag 2.1 stable now    #hyponatremia- stable levels   - Na 133 7/6  - continue on salt tabs  - daily BMP monitoring      #constipated:   - Senna, Miralax.     #HTN (hypertension)  - continue lopressor 25mg PO twice daily with holding parameters.     #DM2 (diabetes mellitus, type 2)  - uncontrolled, Hga1C- 9  - lantus 12 units subc. once daily ,   - lispro 4 units subc. before each meal three times daily   ssi.    #History of BPH.   - continue finasteride 5 mg po once daily /tamsulosin 0.4 mg po once daily at bedtime     #h/o CAD (coronary artery disease). / h/o CABG  - plavix/zocor tx.  -patient is on supplemental oxygen via [place], 2 L , on RA sat 91- 92% at rest     #Hypothyroidism.   - continue synthroid 50mcg po once daily    # Iron def. anemia  - started on PO Iron tx.     # Ambulatory dysfunction   - physical decline- PT - will f/u today    DVT ppX, heparin  DNR/DNI (MOLST signed 6/29)

## 2023-07-12 NOTE — PROGRESS NOTE ADULT - ATTENDING COMMENTS
89 y/o Male with  PMHx HTN, DM2, BPH, CAD s/p CABG, hypothyroidism, recently dx. from [facility] , progressive ambulatory dysfunction, admitted   with hyperkalemia, hyponatremia noted as outpt. Found to have pseudomonas bacteremia.    # Acute mental status change:   - MR brain WNL  - EEG Done- 2nd EEG shows generalized slowing + Multifocal epileptic potentials. Started Keppra 500mg every 12 hours .   Neuro f/up 7/4- s/p VEEG - no epilepti-form activity   - 7/9/23 onwards:  patient is awake , tolerating diet well     #Bacteremia due to Pseudomonas.   # Leukocytosis   #in setting of acute Testicular abscess- due to Enterococcus infection  US Scrotum (for epididymitis) : Complex scrotal mixed echogenicity collection anterior to left testicle measuring approximately 7.7 x 3 x 6.9 cm. 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  ua positive; s/p urine cxs- more than 3 organisms, repeat urine cxs neg , repeat blood cxs 6/26, 28 Neg.  blood cxs pseudomonas 6/24, as per  ID - continue on IV Zosyn tx. , post ID f/up on 7/8/23- transitioned to PO Augmentin/Cipro tx. - due to noncompliance with po meds , transitioned back to IV abx. tx.   - consulted  - s/p Incision and drainage on 7/6/23- daily wound care, testicular  abscess cxs-grew Enterococcus fecalis- sensitive to ampicillin   c/w Cipro, Unasyn. No more Dbx per Burn. f/u ID for end date. Daughter needs to learn deep wound packing per Urology recs. ?Home w/ HCS eventually. Patient has no more NH days remaining. As of now daughter is refusing to take responsibility for his wound care. Will f/u.      # Hypomagnesemia- supplemented     #hyponatremia- stable levels   Per Nephro eval, likely [facility]? Urea 15gm QD,  Uric Acid = 4.5. No need to restrict po free water. continue on salt tabs  -daily BMP monitoring      #constipated: Senna, Miralax.     #HTN (hypertension)  -continue  lopressor 25mg PO twice daily with holding parameters.     #DM2 (diabetes mellitus, type 2)- uncontrolled, Hga1C- 9, improved bsfs  lantus 12 units subc. once daily ,  hold lispro - , continue sliding scale co. since patient's appetite fluctuates     #History of BPH. - continue finasteride 5 mg po once daily /tamsulosin 0.4 mg po once daily at bedtime     #h/o CAD (coronary artery disease). / h/o CABG  - plavix/zocor tx.  -patient is on supplemental oxygen via [place], 2 L , on RA sat 91- 92% at rest     #Hypothyroidism. continue synthroid 50mcg po once daily    # Iron def. anemia- started on PO Iron tx.     # Ambulatory dysfunction, physical decline- PT eval.     DVT ppX, heparin  DNR/DNI (MOLST signed 6/29)    #Progress Note Handoff:   c/w Cipro, Unasyn. No more Dbx per Burn. f/u ID for end date. Daughter needs to learn deep wound packing per Urology recs. ?Home w/ HCS eventually. Patient has no more NH days remaining. As of now daughter is refusing to take responsibility for his wound care. Will f/u.

## 2023-07-12 NOTE — PROGRESS NOTE ADULT - SUBJECTIVE AND OBJECTIVE BOX
CHEL CROFT 89y Male  MRN#: [id number]     Hospital Day: 19d    Pt is currently admitted with the primary diagnosis of  Hyperkalemia        SUBJECTIVE     Overnight events  None    Subjective complaints  Pt was evaluated this am. Patient denied any active complaints and per patient his symptoms are improving                                            ----------------------------------------------------------  OBJECTIVE  PAST MEDICAL & SURGICAL HISTORY  HTN (hypertension)    HLD (hyperlipidemia)    BPH (benign prostatic hyperplasia)    CAD (coronary artery disease)    Diabetes mellitus    Hypothyroidism    S/P CABG (coronary artery bypass graft)                                              -----------------------------------------------------------  ALLERGIES:  No Known Allergies                                            ------------------------------------------------------------    HOME MEDICATIONS  Home Medications:  DULoxetine 60 mg oral delayed release capsule: 1 cap(s) orally once a day (13 Dec 2022 22:03)  finasteride 5 mg oral tablet: 1 tab(s) orally once a day (13 Dec 2022 22:03)  lactobacillus acidophilus oral capsule: 1 tab(s) orally once a day (20 Dec 2022 10:18)  levothyroxine 50 mcg (0.05 mg) oral tablet: 1 tab(s) orally once a day (13 Dec 2022 22:03)  Metoprolol Tartrate 25 mg oral tablet: 1 tab(s) orally 2 times a day (13 Dec 2022 22:03)  Plavix 75 mg oral tablet: 1 orally once a day (23 Jun 2023 21:54)  simvastatin 20 mg oral tablet: 1 tab(s) orally once a day (at bedtime) (13 Dec 2022 22:03)  tamsulosin 0.4 mg oral capsule: 1 cap(s) orally once a day (13 Dec 2022 22:03)  Trent Davidson 300 units/mL subcutaneous solution: 12 unit(s) subcutaneous once a day (in the morning) (13 Dec 2022 22:03)                           MEDICATIONS:  STANDING MEDICATIONS  ampicillin/sulbactam  IVPB 3 Gram(s) IV Intermittent every 6 hours  ampicillin/sulbactam  IVPB      ciprofloxacin   IVPB 400 milliGRAM(s) IV Intermittent every 12 hours  clopidogrel Tablet 75 milliGRAM(s) Oral daily  dextrose 5%. 1000 milliLiter(s) IV Continuous <Continuous>  dextrose 5%. 1000 milliLiter(s) IV Continuous <Continuous>  dextrose 50% Injectable 25 Gram(s) IV Push once  dextrose 50% Injectable 25 Gram(s) IV Push once  dextrose 50% Injectable 12.5 Gram(s) IV Push once  DULoxetine 60 milliGRAM(s) Oral daily  ferrous    sulfate 325 milliGRAM(s) Oral two times a day  finasteride 5 milliGRAM(s) Oral daily  glucagon  Injectable 1 milliGRAM(s) IntraMuscular once  heparin   Injectable 5000 Unit(s) SubCutaneous every 8 hours  insulin glargine Injectable (LANTUS) 12 Unit(s) SubCutaneous every morning  insulin lispro (ADMELOG) corrective regimen sliding scale   SubCutaneous at bedtime  lactobacillus acidophilus 1 Tablet(s) Oral with breakfast  levETIRAcetam  IVPB 500 milliGRAM(s) IV Intermittent every 12 hours  levothyroxine 50 MICROGram(s) Oral daily  magnesium sulfate  IVPB 2 Gram(s) IV Intermittent once  metoprolol tartrate 25 milliGRAM(s) Oral two times a day  multivitamin 1 Tablet(s) Oral every 24 hours  polyethylene glycol 3350 17 Gram(s) Oral two times a day  senna 2 Tablet(s) Oral at bedtime  simvastatin 20 milliGRAM(s) Oral at bedtime  sodium chloride 1 Gram(s) Oral two times a day  tamsulosin 0.4 milliGRAM(s) Oral at bedtime  urea Oral Powder 15 Gram(s) Oral daily    PRN MEDICATIONS  acetaminophen     Tablet .. 650 milliGRAM(s) Oral every 6 hours PRN  aluminum hydroxide/magnesium hydroxide/simethicone Suspension 30 milliLiter(s) Oral every 4 hours PRN  dextrose Oral Gel 15 Gram(s) Oral once PRN  lidocaine 1%/epinephrine 1:100,000 Inj 1 Vial(s) Local Injection once PRN  melatonin 3 milliGRAM(s) Oral at bedtime PRN  ondansetron Injectable 4 milliGRAM(s) IV Push every 8 hours PRN                                            ------------------------------------------------------------  VITAL SIGNS: Last 24 Hours  T(C): 36.2 (12 Jul 2023 05:00), Max: 36.4 (11 Jul 2023 19:01)  T(F): 97.1 (12 Jul 2023 05:00), Max: 97.6 (11 Jul 2023 19:01)  HR: 89 (12 Jul 2023 05:00) (89 - 102)  BP: 127/59 (12 Jul 2023 05:00) (127/59 - 132/65)  BP(mean): --  RR: 18 (12 Jul 2023 05:00) (18 - 18)  SpO2: 98% (12 Jul 2023 05:00) (98% - 98%)                                             --------------------------------------------------------------  LABS:                        9.0    10.63 )-----------( 326      ( 11 Jul 2023 22:25 )             29.4     07-11    134<L>  |  102  |  23<H>  ----------------------------<  177<H>  4.8   |  22  |  0.9    Ca    9.1      11 Jul 2023 22:25  Mg     1.7     07-11    TPro  5.4<L>  /  Alb  3.2<L>  /  TBili  <0.2  /  DBili  x   /  AST  15  /  ALT  10  /  AlkPhos  70  07-11    PT/INR - ( 11 Jul 2023 22:25 )   PT: 13.30 sec;   INR: 1.16 ratio         PTT - ( 11 Jul 2023 22:25 )  PTT:41.8 sec  Urinalysis Basic - ( 11 Jul 2023 22:25 )    Color: x / Appearance: x / SG: x / pH: x  Gluc: 177 mg/dL / Ketone: x  / Bili: x / Urobili: x   Blood: x / Protein: x / Nitrite: x   Leuk Esterase: x / RBC: x / WBC x   Sq Epi: x / Non Sq Epi: x / Bacteria: x              Culture - Other (collected 10 Jul 2023 13:33)  Source: .Other scrotum  Preliminary Report (11 Jul 2023 20:48):    Numerous Enterococcus faecium    Moderate Enterococcus faecalis    Culture - Blood (collected 10 Jul 2023 12:44)  Source: .Blood Blood  Preliminary Report (11 Jul 2023 22:02):    No growth at 24 hours                                                    -------------------------------------------------------------  RADIOLOGY:                                            --------------------------------------------------------------    PHYSICAL EXAM:  GENERAL: NAD, lying in bed comfortably  HEAD:  Atraumatic, Normocephalic  CHEST/LUNG: Clear to auscultation bilaterally; No rales, rhonchi, wheezing, or rubs. Unlabored respirations  HEART: regular rate and rhythm; No murmurs, rubs, or gallops  ABDOMEN: Bowel sounds present; Soft, Nontender, Nondistended.    : Pt with bandage from bedside I&D procedure  EXTREMITIES: No clubbing, cyanosis, or edema  NERVOUS SYSTEM:  Alert & Oriented                                            --------------------------------------------------------------                 CHEL CROFT 89y Male  MRN#: [id number]     Hospital Day: 19d    Pt is currently admitted with the primary diagnosis of  Hyperkalemia        SUBJECTIVE     Overnight events  None                                            ----------------------------------------------------------  OBJECTIVE  PAST MEDICAL & SURGICAL HISTORY  HTN (hypertension)    HLD (hyperlipidemia)    BPH (benign prostatic hyperplasia)    CAD (coronary artery disease)    Diabetes mellitus    Hypothyroidism    S/P CABG (coronary artery bypass graft)                                              -----------------------------------------------------------  ALLERGIES:  No Known Allergies                                            ------------------------------------------------------------    HOME MEDICATIONS  Home Medications:  DULoxetine 60 mg oral delayed release capsule: 1 cap(s) orally once a day (13 Dec 2022 22:03)  finasteride 5 mg oral tablet: 1 tab(s) orally once a day (13 Dec 2022 22:03)  lactobacillus acidophilus oral capsule: 1 tab(s) orally once a day (20 Dec 2022 10:18)  levothyroxine 50 mcg (0.05 mg) oral tablet: 1 tab(s) orally once a day (13 Dec 2022 22:03)  Metoprolol Tartrate 25 mg oral tablet: 1 tab(s) orally 2 times a day (13 Dec 2022 22:03)  Plavix 75 mg oral tablet: 1 orally once a day (23 Jun 2023 21:54)  simvastatin 20 mg oral tablet: 1 tab(s) orally once a day (at bedtime) (13 Dec 2022 22:03)  tamsulosin 0.4 mg oral capsule: 1 cap(s) orally once a day (13 Dec 2022 22:03)  Toujeo SoloStar 300 units/mL subcutaneous solution: 12 unit(s) subcutaneous once a day (in the morning) (13 Dec 2022 22:03)                           MEDICATIONS:  STANDING MEDICATIONS  ampicillin/sulbactam  IVPB 3 Gram(s) IV Intermittent every 6 hours  ampicillin/sulbactam  IVPB      ciprofloxacin   IVPB 400 milliGRAM(s) IV Intermittent every 12 hours  clopidogrel Tablet 75 milliGRAM(s) Oral daily  dextrose 5%. 1000 milliLiter(s) IV Continuous <Continuous>  dextrose 5%. 1000 milliLiter(s) IV Continuous <Continuous>  dextrose 50% Injectable 25 Gram(s) IV Push once  dextrose 50% Injectable 25 Gram(s) IV Push once  dextrose 50% Injectable 12.5 Gram(s) IV Push once  DULoxetine 60 milliGRAM(s) Oral daily  ferrous    sulfate 325 milliGRAM(s) Oral two times a day  finasteride 5 milliGRAM(s) Oral daily  glucagon  Injectable 1 milliGRAM(s) IntraMuscular once  heparin   Injectable 5000 Unit(s) SubCutaneous every 8 hours  insulin glargine Injectable (LANTUS) 12 Unit(s) SubCutaneous every morning  insulin lispro (ADMELOG) corrective regimen sliding scale   SubCutaneous at bedtime  lactobacillus acidophilus 1 Tablet(s) Oral with breakfast  levETIRAcetam  IVPB 500 milliGRAM(s) IV Intermittent every 12 hours  levothyroxine 50 MICROGram(s) Oral daily  magnesium sulfate  IVPB 2 Gram(s) IV Intermittent once  metoprolol tartrate 25 milliGRAM(s) Oral two times a day  multivitamin 1 Tablet(s) Oral every 24 hours  polyethylene glycol 3350 17 Gram(s) Oral two times a day  senna 2 Tablet(s) Oral at bedtime  simvastatin 20 milliGRAM(s) Oral at bedtime  sodium chloride 1 Gram(s) Oral two times a day  tamsulosin 0.4 milliGRAM(s) Oral at bedtime  urea Oral Powder 15 Gram(s) Oral daily    PRN MEDICATIONS  acetaminophen     Tablet .. 650 milliGRAM(s) Oral every 6 hours PRN  aluminum hydroxide/magnesium hydroxide/simethicone Suspension 30 milliLiter(s) Oral every 4 hours PRN  dextrose Oral Gel 15 Gram(s) Oral once PRN  lidocaine 1%/epinephrine 1:100,000 Inj 1 Vial(s) Local Injection once PRN  melatonin 3 milliGRAM(s) Oral at bedtime PRN  ondansetron Injectable 4 milliGRAM(s) IV Push every 8 hours PRN                                            ------------------------------------------------------------  VITAL SIGNS: Last 24 Hours  T(C): 36.2 (12 Jul 2023 05:00), Max: 36.4 (11 Jul 2023 19:01)  T(F): 97.1 (12 Jul 2023 05:00), Max: 97.6 (11 Jul 2023 19:01)  HR: 89 (12 Jul 2023 05:00) (89 - 102)  BP: 127/59 (12 Jul 2023 05:00) (127/59 - 132/65)  BP(mean): --  RR: 18 (12 Jul 2023 05:00) (18 - 18)  SpO2: 98% (12 Jul 2023 05:00) (98% - 98%)                                             --------------------------------------------------------------  LABS:                        9.0    10.63 )-----------( 326      ( 11 Jul 2023 22:25 )             29.4     07-11    134<L>  |  102  |  23<H>  ----------------------------<  177<H>  4.8   |  22  |  0.9    Ca    9.1      11 Jul 2023 22:25  Mg     1.7     07-11    TPro  5.4<L>  /  Alb  3.2<L>  /  TBili  <0.2  /  DBili  x   /  AST  15  /  ALT  10  /  AlkPhos  70  07-11    PT/INR - ( 11 Jul 2023 22:25 )   PT: 13.30 sec;   INR: 1.16 ratio         PTT - ( 11 Jul 2023 22:25 )  PTT:41.8 sec  Urinalysis Basic - ( 11 Jul 2023 22:25 )    Color: x / Appearance: x / SG: x / pH: x  Gluc: 177 mg/dL / Ketone: x  / Bili: x / Urobili: x   Blood: x / Protein: x / Nitrite: x   Leuk Esterase: x / RBC: x / WBC x   Sq Epi: x / Non Sq Epi: x / Bacteria: x              Culture - Other (collected 10 Jul 2023 13:33)  Source: .Other scrotum  Preliminary Report (11 Jul 2023 20:48):    Numerous Enterococcus faecium    Moderate Enterococcus faecalis    Culture - Blood (collected 10 Jul 2023 12:44)  Source: .Blood Blood  Preliminary Report (11 Jul 2023 22:02):    No growth at 24 hours                                                    -------------------------------------------------------------  RADIOLOGY:                                            --------------------------------------------------------------    PHYSICAL EXAM:  GENERAL: NAD, lying in bed comfortably  HEAD:  Atraumatic, Normocephalic  CHEST/LUNG: Clear to auscultation bilaterally; No rales, rhonchi, wheezing, or rubs. Unlabored respirations  HEART: regular rate and rhythm; No murmurs, rubs, or gallops  ABDOMEN: Bowel sounds present; Soft, Nontender, Nondistended.    : Pt with bandage from bedside I&D procedure  EXTREMITIES: No clubbing, cyanosis, or edema  NERVOUS SYSTEM:  Alert & Oriented                                            --------------------------------------------------------------

## 2023-07-13 LAB
ANION GAP SERPL CALC-SCNC: 11 MMOL/L — SIGNIFICANT CHANGE UP (ref 7–14)
BUN SERPL-MCNC: 21 MG/DL — HIGH (ref 10–20)
CALCIUM SERPL-MCNC: 9.5 MG/DL — SIGNIFICANT CHANGE UP (ref 8.4–10.5)
CHLORIDE SERPL-SCNC: 105 MMOL/L — SIGNIFICANT CHANGE UP (ref 98–110)
CO2 SERPL-SCNC: 24 MMOL/L — SIGNIFICANT CHANGE UP (ref 17–32)
CREAT SERPL-MCNC: 1 MG/DL — SIGNIFICANT CHANGE UP (ref 0.7–1.5)
EGFR: 68 ML/MIN/1.73M2 — SIGNIFICANT CHANGE UP
GLUCOSE BLDC GLUCOMTR-MCNC: 170 MG/DL — HIGH (ref 70–99)
GLUCOSE BLDC GLUCOMTR-MCNC: 221 MG/DL — HIGH (ref 70–99)
GLUCOSE BLDC GLUCOMTR-MCNC: 222 MG/DL — HIGH (ref 70–99)
GLUCOSE BLDC GLUCOMTR-MCNC: 240 MG/DL — HIGH (ref 70–99)
GLUCOSE SERPL-MCNC: 173 MG/DL — HIGH (ref 70–99)
HCT VFR BLD CALC: 31.5 % — LOW (ref 42–52)
HGB BLD-MCNC: 9.5 G/DL — LOW (ref 14–18)
MAGNESIUM SERPL-MCNC: 2.2 MG/DL — SIGNIFICANT CHANGE UP (ref 1.8–2.4)
MCHC RBC-ENTMCNC: 26.8 PG — LOW (ref 27–31)
MCHC RBC-ENTMCNC: 30.2 G/DL — LOW (ref 32–37)
MCV RBC AUTO: 89 FL — SIGNIFICANT CHANGE UP (ref 80–94)
NRBC # BLD: 0 /100 WBCS — SIGNIFICANT CHANGE UP (ref 0–0)
PLATELET # BLD AUTO: 343 K/UL — SIGNIFICANT CHANGE UP (ref 130–400)
PMV BLD: 9.9 FL — SIGNIFICANT CHANGE UP (ref 7.4–10.4)
POTASSIUM SERPL-MCNC: 4.5 MMOL/L — SIGNIFICANT CHANGE UP (ref 3.5–5)
POTASSIUM SERPL-SCNC: 4.5 MMOL/L — SIGNIFICANT CHANGE UP (ref 3.5–5)
RBC # BLD: 3.54 M/UL — LOW (ref 4.7–6.1)
RBC # FLD: 19.5 % — HIGH (ref 11.5–14.5)
SODIUM SERPL-SCNC: 140 MMOL/L — SIGNIFICANT CHANGE UP (ref 135–146)
WBC # BLD: 8.23 K/UL — SIGNIFICANT CHANGE UP (ref 4.8–10.8)
WBC # FLD AUTO: 8.23 K/UL — SIGNIFICANT CHANGE UP (ref 4.8–10.8)

## 2023-07-13 PROCEDURE — 99231 SBSQ HOSP IP/OBS SF/LOW 25: CPT | Mod: FS

## 2023-07-13 PROCEDURE — 99232 SBSQ HOSP IP/OBS MODERATE 35: CPT

## 2023-07-13 RX ORDER — CIPROFLOXACIN LACTATE 400MG/40ML
400 VIAL (ML) INTRAVENOUS EVERY 12 HOURS
Refills: 0 | Status: COMPLETED | OUTPATIENT
Start: 2023-07-13 | End: 2023-07-18

## 2023-07-13 RX ORDER — AMPICILLIN SODIUM AND SULBACTAM SODIUM 250; 125 MG/ML; MG/ML
3 INJECTION, POWDER, FOR SUSPENSION INTRAMUSCULAR; INTRAVENOUS EVERY 6 HOURS
Refills: 0 | Status: COMPLETED | OUTPATIENT
Start: 2023-07-13 | End: 2023-07-18

## 2023-07-13 RX ADMIN — Medication 1 TABLET(S): at 13:04

## 2023-07-13 RX ADMIN — HEPARIN SODIUM 5000 UNIT(S): 5000 INJECTION INTRAVENOUS; SUBCUTANEOUS at 21:29

## 2023-07-13 RX ADMIN — LEVETIRACETAM 400 MILLIGRAM(S): 250 TABLET, FILM COATED ORAL at 06:20

## 2023-07-13 RX ADMIN — LEVETIRACETAM 400 MILLIGRAM(S): 250 TABLET, FILM COATED ORAL at 18:17

## 2023-07-13 RX ADMIN — FINASTERIDE 5 MILLIGRAM(S): 5 TABLET, FILM COATED ORAL at 13:04

## 2023-07-13 RX ADMIN — AMPICILLIN SODIUM AND SULBACTAM SODIUM 200 GRAM(S): 250; 125 INJECTION, POWDER, FOR SUSPENSION INTRAMUSCULAR; INTRAVENOUS at 18:24

## 2023-07-13 RX ADMIN — HEPARIN SODIUM 5000 UNIT(S): 5000 INJECTION INTRAVENOUS; SUBCUTANEOUS at 06:20

## 2023-07-13 RX ADMIN — AMPICILLIN SODIUM AND SULBACTAM SODIUM 200 GRAM(S): 250; 125 INJECTION, POWDER, FOR SUSPENSION INTRAMUSCULAR; INTRAVENOUS at 23:21

## 2023-07-13 RX ADMIN — SIMVASTATIN 20 MILLIGRAM(S): 20 TABLET, FILM COATED ORAL at 21:29

## 2023-07-13 RX ADMIN — CLOPIDOGREL BISULFATE 75 MILLIGRAM(S): 75 TABLET, FILM COATED ORAL at 13:04

## 2023-07-13 RX ADMIN — HEPARIN SODIUM 5000 UNIT(S): 5000 INJECTION INTRAVENOUS; SUBCUTANEOUS at 13:06

## 2023-07-13 RX ADMIN — TAMSULOSIN HYDROCHLORIDE 0.4 MILLIGRAM(S): 0.4 CAPSULE ORAL at 21:29

## 2023-07-13 RX ADMIN — INSULIN GLARGINE 12 UNIT(S): 100 INJECTION, SOLUTION SUBCUTANEOUS at 08:29

## 2023-07-13 RX ADMIN — SENNA PLUS 2 TABLET(S): 8.6 TABLET ORAL at 21:29

## 2023-07-13 RX ADMIN — Medication 200 MILLIGRAM(S): at 15:10

## 2023-07-13 NOTE — PROGRESS NOTE ADULT - SUBJECTIVE AND OBJECTIVE BOX
Patient is a 89y old  Male who presents with a chief complaint of Abnormal Lab results. Burn team is following for scrotal wound.  Pt s/p debridement of scrotum on 7/12    PM round  Pt seen at bedside, has no complains  No acute events overnight, pt afebrile  Daughter at bedside, has questions about recent debridement, wound care frequency and management    INTERVAL HPI/OVERNIGHT EVENTS:  ICU Vital Signs Last 24 Hrs  T(C): 35.6 (13 Jul 2023 12:28), Max: 36.1 (13 Jul 2023 05:34)  T(F): 96 (13 Jul 2023 12:28), Max: 96.9 (13 Jul 2023 05:34)  HR: 92 (13 Jul 2023 12:28) (92 - 100)  BP: 129/60 (13 Jul 2023 12:28) (110/54 - 129/60)  RR: 18 (13 Jul 2023 12:28) (18 - 18)  SpO2: 100% (13 Jul 2023 12:28) (96% - 100%)    O2 Parameters below as of 12 Jul 2023 20:30  Patient On (Oxygen Delivery Method): room air                          9.5    8.23  )-----------( 343      ( 13 Jul 2023 09:35 )             31.5       Culture - Other (07.10.23 @ 13:33)    -  Tetracycline: R >8   -  Tetracycline: R >8   -  Vancomycin: R >16   -  Vancomycin: S 2   -  Ampicillin: R >8 Predicts results to ampicillin/sulbactam, amoxacillin-clavulanate and  piperacillin-tazobactam.   -  Ampicillin: S <=2 Predicts results to ampicillin/sulbactam, amoxacillin-clavulanate and  piperacillin-tazobactam.   -  Daptomycin: SDD 4 The breakpoint for SDD (susceptible dose dependent)is based on a dosage regimen of 8-12 mg/kg administered every 24 h in adults and is intended for serious infections due to E. faecium. Consultation with an infectious diseases specialist is recommended.   -  Levofloxacin: R >4   -  Linezolid: S 2   Specimen Source: .Other scrotum   Culture Results:   Numerous Enterococcus faecium (vancomycin resistant)  Moderate Enterococcus faecalis   Organism Identification: Enterococcus faecium (vancomycin resistant)  Enterococcus faecalis   Organism: Enterococcus faecium (vancomycin resistant)   Organism: Enterococcus faecalis      PE:  scrotal full thickness wound about 1.5cmx1.5 cm x3cm pink and moist, no pus, no malodor, no erythema, no edema present  Wound cleaned and dressing changed, daughter present during dressing change

## 2023-07-13 NOTE — PROGRESS NOTE ADULT - ATTENDING COMMENTS
89 y/o Male with  PMHx HTN, DM2, BPH, CAD s/p CABG, hypothyroidism, recently dx. from [facility] , progressive ambulatory dysfunction, admitted   with hyperkalemia, hyponatremia noted as outpt. Found to have pseudomonas bacteremia.    # Acute mental status change:   - MR brain WNL  - EEG Done- 2nd EEG shows generalized slowing + Multifocal epileptic potentials. Started Keppra 500mg every 12 hours .   Neuro f/up 7/4- s/p VEEG - no epilepti-form activity   - 7/9/23 onwards:  patient is awake , tolerating diet well     #Bacteremia due to Pseudomonas.   # Leukocytosis   #in setting of acute Testicular abscess- due to Enterococcus infection  US Scrotum (for epididymitis) : Complex scrotal mixed echogenicity collection anterior to left testicle measuring approximately 7.7 x 3 x 6.9 cm. 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  ua positive; s/p urine cxs- more than 3 organisms, repeat urine cxs neg , repeat blood cxs 6/26, 28 Neg.  blood cxs pseudomonas 6/24, as per  ID - continue on IV Zosyn tx. , post ID f/up on 7/8/23- transitioned to PO Augmentin/Cipro tx. - due to noncompliance with po meds , transitioned back to IV abx. tx.   - consulted  - s/p Incision and drainage on 7/6/23- daily wound care, testicular  abscess cxs-grew Enterococcus fecalis- sensitive to ampicillin   c/w Cipro, Unasyn. Burn did bedside Dbx (7/12). Per ID, ok to complete Abx course with po cipro, augmentin till 7/18. For now keep IV (PO intake unreliable)  Burn advising daily packing change. About a 2 inch packing. They showed daughter how to do packing.   Patient has run out of his Nursing home days per insurance.   We are willing to arrange home visiting nurse to help daughter with packing every other day.   But Daughter is refusing to assist the home visiting nurse.    When I tried to talk to daugther about this, she said, "You are keeping him here or making the full arrangements for his packing at home. And I will not pay anyone out of my pocket. I will not do this myself. End of discussion."   I clearly told daughter that I cannot keep him in hospital just for packing change. Daughter argued with me and then requested to see a different doctor from 7/14 onwards.     # Hypomagnesemia- supplemented     #hyponatremia- stable levels   Per Nephro eval, likely [facility]? Urea 15gm QD,  Uric Acid = 4.5. No need to restrict po free water. continue on salt tabs  -daily BMP monitoring      #constipated: Senna, Miralax.     #HTN (hypertension)  -continue  lopressor 25mg PO twice daily with holding parameters.     #DM2 (diabetes mellitus, type 2)- uncontrolled, Hga1C- 9, improved bsfs  lantus 12 units subc. once daily ,  hold lispro - , continue sliding scale co. since patient's appetite fluctuates     #History of BPH. - continue finasteride 5 mg po once daily /tamsulosin 0.4 mg po once daily at bedtime     #h/o CAD (coronary artery disease). / h/o CABG  - plavix/zocor tx.  -patient is on supplemental oxygen via NC, 2 L , on RA sat 91- 92% at rest     #Hypothyroidism. continue synthroid 50mcg po once daily    # Iron def. anemia- started on PO Iron tx.     # Ambulatory dysfunction, physical decline- PT eval.     DVT ppX, heparin  DNR/DNI (MOLST signed 6/29)    #Progress Note Handoff:   c/w Cipro, Unasyn. On d/c po cipro, augmentin until 7/18. CM working on home VN for wound care.   Case transferred to different hospitalist tomorrow.

## 2023-07-13 NOTE — PROGRESS NOTE ADULT - ASSESSMENT
Pt is a 89 yo M with scrotal abscess, s/p I&D  -No further procedural debridements needed  -Continue current dressing changes with xeroform packing  -Wound care showed and explained to daughter  -IV antibiotics as per ID  -medical management as per primary team  Attending and PA answered all daughter's questions.   Primary team informed about daughter's concerns with discharge

## 2023-07-13 NOTE — PROGRESS NOTE ADULT - ASSESSMENT
Patient is a 89 y/o Male with  PMHx HTN, DM2, BPH, CAD s/p CABG, hypothyroidism, recently dx. from [facility] , progressive ambulatory dysfunction, admitted with hyperkalemia, hyponatremia noted as outpt. Found to have pseudomonas bacteremia.    # Acute mental status change:   - MR brain WNL  - EEG Done- 2nd EEG shows generalized slowing + Multifocal epileptic potentials. Started Keppra 500mg every 12 hours .   - Neuro f/up 7/4- placed on VEEG now.   - VEEG results nl  - continue with neurochecks      #Bacteremia due to Pseudomonas.   # Leukocytosis   #in setting of recent hidalgo in snf; now out; possible uti, testicular infection - e.faecalis  US Scrotum (for epididymitis) : Complex scrotal mixed echogenicity collection anterior to left testicle measuring approximately 7.7 x 3 x 6.9 cm. 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  ua positive; f/u ucxs- more than 3 organisms, repeat urine cxs neg , repeat blood cxs 6/26, 28 Neg.  blood cxs pseudomonas 6/24, as per  ID - continue on IV Zosyn tx.   -  did I&D bedside 7/5   - wound cx - e.faecalis  - c/w abx  - bedside debridement by burn team 7/13     # Hypomagnesemia - resolved  - f/u mag level  - mag 2.1 stable now    #hyponatremia- stable levels   - Na 133 7/6  - continue on salt tabs  - daily BMP monitoring      #constipated:   - Senna, Miralax.     #HTN (hypertension)  - continue lopressor 25mg PO twice daily with holding parameters.     #DM2 (diabetes mellitus, type 2)  - uncontrolled, Hga1C- 9  - lantus 12 units subc. once daily ,   - lispro 4 units subc. before each meal three times daily   ssi.    #History of BPH.   - continue finasteride 5 mg po once daily /tamsulosin 0.4 mg po once daily at bedtime     #h/o CAD (coronary artery disease). / h/o CABG  - plavix/zocor tx.  -patient is on supplemental oxygen via NC, 2 L , on RA sat 91- 92% at rest     #Hypothyroidism.   - continue synthroid 50mcg po once daily    # Iron def. anemia  - started on PO Iron tx.       DVT ppX, heparin  DNR/DNI (MOLST signed 6/29)

## 2023-07-13 NOTE — PROGRESS NOTE ADULT - SUBJECTIVE AND OBJECTIVE BOX
CHEL CROFT 89y Male  MRN#: [id number]     Hospital Day: 20d    Pt is currently admitted with the primary diagnosis of  Hyperkalemia        SUBJECTIVE     Overnight events  None    Subjective complaints  Pt was evaluated this am. Patient denied any active complaints and per patient his symptoms are improving                                            ----------------------------------------------------------  OBJECTIVE  PAST MEDICAL & SURGICAL HISTORY  HTN (hypertension)    HLD (hyperlipidemia)    BPH (benign prostatic hyperplasia)    CAD (coronary artery disease)    Diabetes mellitus    Hypothyroidism    S/P CABG (coronary artery bypass graft)                                              -----------------------------------------------------------  ALLERGIES:  No Known Allergies                                            ------------------------------------------------------------    HOME MEDICATIONS  Home Medications:  DULoxetine 60 mg oral delayed release capsule: 1 cap(s) orally once a day (13 Dec 2022 22:03)  finasteride 5 mg oral tablet: 1 tab(s) orally once a day (13 Dec 2022 22:03)  lactobacillus acidophilus oral capsule: 1 tab(s) orally once a day (20 Dec 2022 10:18)  levothyroxine 50 mcg (0.05 mg) oral tablet: 1 tab(s) orally once a day (13 Dec 2022 22:03)  Metoprolol Tartrate 25 mg oral tablet: 1 tab(s) orally 2 times a day (13 Dec 2022 22:03)  Plavix 75 mg oral tablet: 1 orally once a day (23 Jun 2023 21:54)  simvastatin 20 mg oral tablet: 1 tab(s) orally once a day (at bedtime) (13 Dec 2022 22:03)  tamsulosin 0.4 mg oral capsule: 1 cap(s) orally once a day (13 Dec 2022 22:03)  Trent Davidson 300 units/mL subcutaneous solution: 12 unit(s) subcutaneous once a day (in the morning) (13 Dec 2022 22:03)                           MEDICATIONS:  STANDING MEDICATIONS  amoxicillin  875 milliGRAM(s)/clavulanate 1 Tablet(s) Oral daily  ciprofloxacin     Tablet 750 milliGRAM(s) Oral two times a day  clopidogrel Tablet 75 milliGRAM(s) Oral daily  dextrose 5%. 1000 milliLiter(s) IV Continuous <Continuous>  dextrose 5%. 1000 milliLiter(s) IV Continuous <Continuous>  dextrose 50% Injectable 25 Gram(s) IV Push once  dextrose 50% Injectable 12.5 Gram(s) IV Push once  dextrose 50% Injectable 25 Gram(s) IV Push once  DULoxetine 60 milliGRAM(s) Oral daily  ferrous    sulfate 325 milliGRAM(s) Oral two times a day  finasteride 5 milliGRAM(s) Oral daily  glucagon  Injectable 1 milliGRAM(s) IntraMuscular once  heparin   Injectable 5000 Unit(s) SubCutaneous every 8 hours  insulin glargine Injectable (LANTUS) 12 Unit(s) SubCutaneous every morning  insulin lispro (ADMELOG) corrective regimen sliding scale   SubCutaneous at bedtime  lactobacillus acidophilus 1 Tablet(s) Oral with breakfast  levETIRAcetam  IVPB 500 milliGRAM(s) IV Intermittent every 12 hours  levothyroxine 50 MICROGram(s) Oral daily  metoprolol tartrate 25 milliGRAM(s) Oral two times a day  multivitamin 1 Tablet(s) Oral every 24 hours  polyethylene glycol 3350 17 Gram(s) Oral two times a day  senna 2 Tablet(s) Oral at bedtime  simvastatin 20 milliGRAM(s) Oral at bedtime  sodium chloride 1 Gram(s) Oral two times a day  tamsulosin 0.4 milliGRAM(s) Oral at bedtime  urea Oral Powder 15 Gram(s) Oral daily    PRN MEDICATIONS  acetaminophen     Tablet .. 650 milliGRAM(s) Oral every 6 hours PRN  aluminum hydroxide/magnesium hydroxide/simethicone Suspension 30 milliLiter(s) Oral every 4 hours PRN  dextrose Oral Gel 15 Gram(s) Oral once PRN  lidocaine 1%/epinephrine 1:100,000 Inj 1 Vial(s) Local Injection once PRN  melatonin 3 milliGRAM(s) Oral at bedtime PRN  ondansetron Injectable 4 milliGRAM(s) IV Push every 8 hours PRN                                            ------------------------------------------------------------  VITAL SIGNS: Last 24 Hours  T(C): 36.1 (13 Jul 2023 05:34), Max: 36.1 (13 Jul 2023 05:34)  T(F): 96.9 (13 Jul 2023 05:34), Max: 96.9 (13 Jul 2023 05:34)  HR: 100 (13 Jul 2023 05:34) (94 - 100)  BP: 127/60 (13 Jul 2023 05:34) (110/54 - 127/60)  BP(mean): --  RR: 18 (13 Jul 2023 05:34) (18 - 18)  SpO2: 96% (12 Jul 2023 20:30) (96% - 97%)                                             --------------------------------------------------------------  LABS:                        9.5    8.23  )-----------( 343      ( 13 Jul 2023 09:35 )             31.5     07-13    140  |  105  |  21<H>  ----------------------------<  173<H>  4.5   |  24  |  1.0    Ca    9.5      13 Jul 2023 09:35  Mg     2.2     07-13    TPro  5.5<L>  /  Alb  3.3<L>  /  TBili  0.3  /  DBili  x   /  AST  17  /  ALT  12  /  AlkPhos  70  07-12    PT/INR - ( 11 Jul 2023 22:25 )   PT: 13.30 sec;   INR: 1.16 ratio         PTT - ( 11 Jul 2023 22:25 )  PTT:41.8 sec  Urinalysis Basic - ( 13 Jul 2023 09:35 )    Color: x / Appearance: x / SG: x / pH: x  Gluc: 173 mg/dL / Ketone: x  / Bili: x / Urobili: x   Blood: x / Protein: x / Nitrite: x   Leuk Esterase: x / RBC: x / WBC x   Sq Epi: x / Non Sq Epi: x / Bacteria: x              Culture - Other (collected 10 Jul 2023 13:33)  Source: .Other scrotum  Final Report (12 Jul 2023 23:17):    Numerous Enterococcus faecium (vancomycin resistant)    Moderate Enterococcus faecalis  Organism: Enterococcus faecium (vancomycin resistant)  Enterococcus faecalis (12 Jul 2023 23:17)  Organism: Enterococcus faecalis (12 Jul 2023 23:17)  Organism: Enterococcus faecium (vancomycin resistant) (12 Jul 2023 23:17)    Culture - Blood (collected 10 Jul 2023 12:44)  Source: .Blood Blood  Preliminary Report (12 Jul 2023 22:01):    No growth at 48 Hours                                                    -------------------------------------------------------------  RADIOLOGY:                                            --------------------------------------------------------------    PHYSICAL EXAM:  GENERAL: NAD, lying in bed comfortably  HEAD:  Atraumatic, Normocephalic  CHEST/LUNG: Clear to auscultation bilaterally; No rales, rhonchi, wheezing, or rubs. Unlabored respirations  HEART: regular rate and rhythm  ABDOMEN: Bowel sounds present; Soft, Nontender, Nondistended.    EXTREMITIES: Warm. No clubbing, cyanosis, or edema  NERVOUS SYSTEM:  Alert & Oriented X3. No focal deficits                                            --------------------------------------------------------------

## 2023-07-14 ENCOUNTER — TRANSCRIPTION ENCOUNTER (OUTPATIENT)
Age: 88
End: 2023-07-14

## 2023-07-14 DIAGNOSIS — Z98.890 OTHER SPECIFIED POSTPROCEDURAL STATES: ICD-10-CM

## 2023-07-14 LAB
GLUCOSE BLDC GLUCOMTR-MCNC: 201 MG/DL — HIGH (ref 70–99)
GLUCOSE BLDC GLUCOMTR-MCNC: 317 MG/DL — HIGH (ref 70–99)
GLUCOSE BLDC GLUCOMTR-MCNC: 334 MG/DL — HIGH (ref 70–99)
GLUCOSE BLDC GLUCOMTR-MCNC: 334 MG/DL — HIGH (ref 70–99)
GLUCOSE BLDC GLUCOMTR-MCNC: 369 MG/DL — HIGH (ref 70–99)

## 2023-07-14 PROCEDURE — 99233 SBSQ HOSP IP/OBS HIGH 50: CPT

## 2023-07-14 RX ORDER — INSULIN LISPRO 100/ML
VIAL (ML) SUBCUTANEOUS
Refills: 0 | Status: DISCONTINUED | OUTPATIENT
Start: 2023-07-14 | End: 2023-07-18

## 2023-07-14 RX ORDER — FERROUS SULFATE 325(65) MG
1 TABLET ORAL
Qty: 0 | Refills: 0 | DISCHARGE
Start: 2023-07-14

## 2023-07-14 RX ORDER — SODIUM CHLORIDE 9 MG/ML
1 INJECTION INTRAMUSCULAR; INTRAVENOUS; SUBCUTANEOUS
Qty: 0 | Refills: 0 | DISCHARGE
Start: 2023-07-14

## 2023-07-14 RX ORDER — POLYETHYLENE GLYCOL 3350 17 G/17G
17 POWDER, FOR SOLUTION ORAL
Qty: 0 | Refills: 0 | DISCHARGE
Start: 2023-07-14

## 2023-07-14 RX ADMIN — Medication 1 TABLET(S): at 12:55

## 2023-07-14 RX ADMIN — SODIUM CHLORIDE 1 GRAM(S): 9 INJECTION INTRAMUSCULAR; INTRAVENOUS; SUBCUTANEOUS at 05:07

## 2023-07-14 RX ADMIN — Medication 25 MILLIGRAM(S): at 18:03

## 2023-07-14 RX ADMIN — LEVETIRACETAM 400 MILLIGRAM(S): 250 TABLET, FILM COATED ORAL at 18:03

## 2023-07-14 RX ADMIN — DULOXETINE HYDROCHLORIDE 60 MILLIGRAM(S): 30 CAPSULE, DELAYED RELEASE ORAL at 12:56

## 2023-07-14 RX ADMIN — LEVETIRACETAM 400 MILLIGRAM(S): 250 TABLET, FILM COATED ORAL at 05:09

## 2023-07-14 RX ADMIN — Medication 325 MILLIGRAM(S): at 05:07

## 2023-07-14 RX ADMIN — AMPICILLIN SODIUM AND SULBACTAM SODIUM 200 GRAM(S): 250; 125 INJECTION, POWDER, FOR SUSPENSION INTRAMUSCULAR; INTRAVENOUS at 05:09

## 2023-07-14 RX ADMIN — AMPICILLIN SODIUM AND SULBACTAM SODIUM 200 GRAM(S): 250; 125 INJECTION, POWDER, FOR SUSPENSION INTRAMUSCULAR; INTRAVENOUS at 18:16

## 2023-07-14 RX ADMIN — Medication 25 MILLIGRAM(S): at 05:08

## 2023-07-14 RX ADMIN — INSULIN GLARGINE 12 UNIT(S): 100 INJECTION, SOLUTION SUBCUTANEOUS at 07:52

## 2023-07-14 RX ADMIN — Medication 325 MILLIGRAM(S): at 18:03

## 2023-07-14 RX ADMIN — CLOPIDOGREL BISULFATE 75 MILLIGRAM(S): 75 TABLET, FILM COATED ORAL at 12:54

## 2023-07-14 RX ADMIN — Medication 50 MICROGRAM(S): at 05:07

## 2023-07-14 RX ADMIN — HEPARIN SODIUM 5000 UNIT(S): 5000 INJECTION INTRAVENOUS; SUBCUTANEOUS at 05:07

## 2023-07-14 RX ADMIN — AMPICILLIN SODIUM AND SULBACTAM SODIUM 200 GRAM(S): 250; 125 INJECTION, POWDER, FOR SUSPENSION INTRAMUSCULAR; INTRAVENOUS at 12:54

## 2023-07-14 RX ADMIN — HEPARIN SODIUM 5000 UNIT(S): 5000 INJECTION INTRAVENOUS; SUBCUTANEOUS at 21:26

## 2023-07-14 RX ADMIN — SODIUM CHLORIDE 1 GRAM(S): 9 INJECTION INTRAMUSCULAR; INTRAVENOUS; SUBCUTANEOUS at 18:03

## 2023-07-14 RX ADMIN — Medication 3: at 21:25

## 2023-07-14 RX ADMIN — Medication 200 MILLIGRAM(S): at 05:09

## 2023-07-14 RX ADMIN — AMPICILLIN SODIUM AND SULBACTAM SODIUM 200 GRAM(S): 250; 125 INJECTION, POWDER, FOR SUSPENSION INTRAMUSCULAR; INTRAVENOUS at 23:03

## 2023-07-14 RX ADMIN — Medication 200 MILLIGRAM(S): at 18:04

## 2023-07-14 RX ADMIN — FINASTERIDE 5 MILLIGRAM(S): 5 TABLET, FILM COATED ORAL at 12:55

## 2023-07-14 RX ADMIN — Medication 2: at 17:34

## 2023-07-14 NOTE — PROGRESS NOTE ADULT - SUBJECTIVE AND OBJECTIVE BOX
Pt seen and examined at bedside.       VITAL SIGNS (Last 24 hrs):  T(C): 36 (07-14-23 @ 12:30), Max: 36.4 (07-14-23 @ 04:30)  HR: 76 (07-14-23 @ 12:30) (76 - 100)  BP: 134/62 (07-14-23 @ 12:30) (114/76 - 134/62)  RR: 18 (07-14-23 @ 12:30) (18 - 18)  SpO2: 96% (07-14-23 @ 12:30) (96% - 100%)          I&O's Summary      PHYSICAL EXAM:  GENERAL: NAD   HEAD:  Atraumatic, Normocephalic  EYES:  conjunctiva and sclera clear  NECK: Supple, No JVD  CHEST/LUNG: Clear to auscultation bilaterally; No wheeze  HEART: Regular rate and rhythm; No murmurs, rubs, or gallops  ABDOMEN: Soft, Nontender, Nondistended; Bowel sounds present  : scrotal wound dressing  EXTREMITIES:  2+ Peripheral Pulses, No clubbing, cyanosis, or edema  SKIN: No rashes or lesions      Labs Reviewed        CBC Full  -  ( 13 Jul 2023 09:35 )  WBC Count : 8.23 K/uL  Hemoglobin : 9.5 g/dL  Hematocrit : 31.5 %  Platelet Count - Automated : 343 K/uL  Mean Cell Volume : 89.0 fL  Mean Cell Hemoglobin : 26.8 pg  Mean Cell Hemoglobin Concentration : 30.2 g/dL  Auto Neutrophil # : x  Auto Lymphocyte # : x  Auto Monocyte # : x  Auto Eosinophil # : x  Auto Basophil # : x  Auto Neutrophil % : x  Auto Lymphocyte % : x  Auto Monocyte % : x  Auto Eosinophil % : x  Auto Basophil % : x    BMP:    07-13 @ 09:35    Blood Urea Nitrogen - 21  Calcium - 9.5  Carbond Dioxide - 24  Chloride - 105  Creatinine - 1.0  Glucose - 173  Potassium - 4.5  Sodium - 140      Hemoglobin A1c -   PT/INR - ( 11 Jul 2023 22:25 )   PT: 13.30 sec;   INR: 1.16 ratio         PTT - ( 11 Jul 2023 22:25 )  PTT:41.8 sec  Urine Culture:  07-10 @ 13:33 Urine culture: --    Culture Results:   Numerous Enterococcus faecium (vancomycin resistant)  Moderate Enterococcus faecalis  Method Type: KEVIN  Organism: Enterococcus faecium (vancomycin resistant)  Organism Identification: Enterococcus faecium (vancomycin resistant)  Enterococcus faecalis  Specimen Source: .Other scrotum  07-10 @ 12:44 Urine culture: --    Culture Results:   No growth at 72 Hours  Method Type: --  Organism: --  Organism Identification: --  Specimen Source: .Blood Blood            MEDICATIONS  (STANDING):  ampicillin/sulbactam  IVPB 3 Gram(s) IV Intermittent every 6 hours  ciprofloxacin   IVPB 400 milliGRAM(s) IV Intermittent every 12 hours  clopidogrel Tablet 75 milliGRAM(s) Oral daily  dextrose 5%. 1000 milliLiter(s) (100 mL/Hr) IV Continuous <Continuous>  dextrose 5%. 1000 milliLiter(s) (50 mL/Hr) IV Continuous <Continuous>  dextrose 50% Injectable 25 Gram(s) IV Push once  dextrose 50% Injectable 25 Gram(s) IV Push once  dextrose 50% Injectable 12.5 Gram(s) IV Push once  DULoxetine 60 milliGRAM(s) Oral daily  ferrous    sulfate 325 milliGRAM(s) Oral two times a day  finasteride 5 milliGRAM(s) Oral daily  glucagon  Injectable 1 milliGRAM(s) IntraMuscular once  heparin   Injectable 5000 Unit(s) SubCutaneous every 8 hours  insulin glargine Injectable (LANTUS) 12 Unit(s) SubCutaneous every morning  insulin lispro (ADMELOG) corrective regimen sliding scale   SubCutaneous at bedtime  insulin lispro (ADMELOG) corrective regimen sliding scale   SubCutaneous three times a day before meals  lactobacillus acidophilus 1 Tablet(s) Oral with breakfast  levETIRAcetam  IVPB 500 milliGRAM(s) IV Intermittent every 12 hours  levothyroxine 50 MICROGram(s) Oral daily  metoprolol tartrate 25 milliGRAM(s) Oral two times a day  multivitamin 1 Tablet(s) Oral every 24 hours  polyethylene glycol 3350 17 Gram(s) Oral two times a day  senna 2 Tablet(s) Oral at bedtime  simvastatin 20 milliGRAM(s) Oral at bedtime  sodium chloride 1 Gram(s) Oral two times a day  tamsulosin 0.4 milliGRAM(s) Oral at bedtime  urea Oral Powder 15 Gram(s) Oral daily    MEDICATIONS  (PRN):  dextrose Oral Gel 15 Gram(s) Oral once PRN Blood Glucose LESS THAN 70 milliGRAM(s)/deciliter

## 2023-07-14 NOTE — PROGRESS NOTE ADULT - ASSESSMENT
89 y/o Male with  PMHx HTN, DM2, BPH, CAD s/p CABG, hypothyroidism, recently dx. from [facility] , progressive ambulatory dysfunction, admitted   with hyperkalemia, hyponatremia noted as outpt. Found to have pseudomonas bacteremia.    # Acute mental status change:   - MR brain WNL  - EEG Done- 2nd EEG shows generalized slowing + Multifocal epileptic potentials. Started Keppra 500mg every 12 hours .   Neuro f/up 7/4- s/p VEEG - no epilepti-form activity   - 7/9/23 onwards:  patient is awake , tolerating diet well     #Bacteremia due to Pseudomonas.   # Leukocytosis   #in setting of acute Testicular abscess- due to Enterococcus infection  US Scrotum (for epididymitis) : Complex scrotal mixed echogenicity collection anterior to left testicle measuring approximately 7.7 x 3 x 6.9 cm. 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  ua positive; s/p urine cxs- more than 3 organisms, repeat urine cxs neg , repeat blood cxs 6/26, 28 Neg.  blood cxs pseudomonas 6/24, as per  ID - continue on IV Zosyn tx. , post ID f/up on 7/8/23- transitioned to PO Augmentin/Cipro tx. - due to noncompliance with po meds , transitioned back to IV abx. tx.   - consulted  - s/p Incision and drainage on 7/6/23- daily wound care, testicular  abscess cxs-grew Enterococcus fecalis- sensitive to ampicillin   c/w Cipro, Unasyn. Burn did bedside Dbx (7/12). Per ID, ok to complete Abx course with po cipro, augmentin till 7/18. For now keep IV (PO intake unreliable)  Burn advising daily packing change. About a 2 inch packing.    Patient has run out of his Nursing home days per insurance.   Planned to arrange home visiting nurse to help daughter with packing every other day.   Daughter will need to learn to change dressing on days nurse will not come however daughter is refusing to change dressings        #hyponatremia- stable levels   Per Nephro eval, likely [facility]? Urea 15gm QD,  Uric Acid = 4.5. No need to restrict po free water. continue on salt tabs  -daily BMP monitoring      #constipated: Senna, Miralax.     #HTN (hypertension)  -continue  lopressor 25mg PO twice daily with holding parameters.     #DM2 (diabetes mellitus, type 2)- uncontrolled, Hga1C- 9  c/w insulin     #History of BPH. - continue finasteride 5 mg po once daily /tamsulosin 0.4 mg po once daily at bedtime     #h/o CAD (coronary artery disease). / h/o CABG  - plavix/zocor tx    #Hypothyroidism. continue synthroid 50mcg po once daily    # Iron def. anemia- started on PO Iron tx.     # Ambulatory dysfunction, physical decline- PT     DVT ppX, heparin  DNR/DNI      #Progress Note Handoff:   Pending: MONTSE working on home VN for wound care  Dispo: Home

## 2023-07-14 NOTE — DISCHARGE NOTE PROVIDER - NSDCCPCAREPLAN_GEN_ALL_CORE_FT
PRINCIPAL DISCHARGE DIAGNOSIS  Diagnosis: Altered mental status  Assessment and Plan of Treatment: During your stay, you were noted to have altered mental status. The neurology team had seen you and we had two different EEGs done. One of which showed seizure acitivity. For this you started recieving an anti-seizure medication. You then had a video EEG which was normal. Please follow up outpatient with a neurologist.      SECONDARY DISCHARGE DIAGNOSES  Diagnosis: Hyponatremia  Assessment and Plan of Treatment: You came to the hospital because of an abnormal potassium and sodium lab result of where you were found to have hyponatremia and hyperkalemia. You were seen by the nephrology team which helped bring your potassium and sodium levels back to baseline. Please follow up with your pcp in 1 week.    Diagnosis: Acute UTI  Assessment and Plan of Treatment: You were found to have a urinary tract infection during your stay. You were givcen antibiotics which improved the symptoms of your UTI. If you develop any pain with urination please contact your PCP.    Diagnosis: Hyperkalemia  Assessment and Plan of Treatment: You came to the hospital because of an abnormal potassium and sodium lab result of where you were found to have hyponatremia and hyperkalemia. You were seen by the nephrology team which helped bring your potassium and sodium levels back to baseline. Please follow up with your pcp in 1 week.     PRINCIPAL DISCHARGE DIAGNOSIS  Diagnosis: Altered mental status  Assessment and Plan of Treatment: During your stay, you were noted to have altered mental status. The neurology team had seen you and we had two different EEGs done. One of which showed seizure acitivity. For this you started recieving an anti-seizure medication. You then had a video EEG which was normal. You also had an MRI of your brain which was normal. Please follow up outpatient with a neurologist within 2 weeks of discharge. Please continue to take keppray 500mg two times a day.      SECONDARY DISCHARGE DIAGNOSES  Diagnosis: Hyponatremia  Assessment and Plan of Treatment: You came to the hospital because of an abnormal potassium and sodium lab result of where you were found to have low sodium and high potassium. You were seen by the nephrology team which helped bring your potassium and sodium levels back to baseline. Please follow up with your pcp in 1 week.    Diagnosis: Acute UTI  Assessment and Plan of Treatment: You were found to have a urinary tract infection during your stay. You were givcen antibiotics which improved the symptoms of your UTI. If you develop any pain with urination please contact your PCP.    Diagnosis: Pseudomonal bacteremia  Assessment and Plan of Treatment: You had bactereia in your blood that likely came from your other infections. You were treated with antibiotics and your blood has been cleared of the bacteria    Diagnosis: Scrotal abscess  Assessment and Plan of Treatment: You were noted to have a scrotal abscess during your stay here. You were seen by the urology team that did a bedside incision and drainage procedure. You were then seen by the burn team for a bedside debridement. Please follow the followign wound care:   Continue current dressing changes with xeroform packing 3-5x/ week.

## 2023-07-14 NOTE — DISCHARGE NOTE PROVIDER - PROVIDER TOKENS
PROVIDER:[TOKEN:[[id number]],FOLLOWUP:[1 week],ESTABLISHEDPATIENT:[T]] PROVIDER:[TOKEN:[[id number]],FOLLOWUP:[1 week],ESTABLISHEDPATIENT:[T]],PROVIDER:[TOKEN:[[id number]]]

## 2023-07-14 NOTE — DISCHARGE NOTE PROVIDER - ATTENDING DISCHARGE PHYSICAL EXAMINATION:
PHYSICAL EXAM:  GENERAL: NAD   HEAD:  Atraumatic, Normocephalic  EYES: conjunctiva and sclera clear  NECK: Supple, No JVD  CHEST/LUNG: Clear to auscultation bilaterally; No wheeze  HEART: Regular rate and rhythm; No murmurs, rubs, or gallops  ABDOMEN: Soft, Nontender, Nondistended; Bowel sounds present  EXTREMITIES:  2+ Peripheral Pulses, No clubbing, cyanosis, or edema  : scrotal dressing c/d/i   SKIN: No rashes or lesions

## 2023-07-14 NOTE — CHART NOTE - NSCHARTNOTEFT_GEN_A_CORE
Registered Dietitian Follow-Up   Patient Profile Reviewed                           Yes [x]   No []  Nutrition History Previously Obtained        Yes [x]  No []       Pertinent Medical Information: 89 y/o male with PMHx of HTN, DM2, BPH, CAD s/p CABG, hypothyroidism, recently dx. from [facility] , progressive ambulatory dysfunction, admitted   with hyperkalemia, hyponatremia noted as outpt. Found to have pseudomonas bacteremia.  # Hypomagnesemia- supplemented   # hyponatremia- stable levels   # Constipated - Senna, Miralax    Per SLP note on 2023, recommend soft and bite sized, mildly thick liquids.    Subjective Information:  patient with improving PO intake, had ~50-75% of breakfast meal tray today which is improvement from prior PO   having some of oral supplements     Diet, Soft and Bite Sized:   Consistent Carbohydrate {No Snacks}  Mildly Thick Liquids (MILDTHICKLIQS)  Free Water Flush Instructions:  please add 2 packets of thickener per ensure + MAGIC CUP 1x AT DINNER  Supplement Feeding Modality:  Oral  Ensure Plus High Protein Cans or Servings Per Day:  2       Frequency:  Daily (23 @ 09:29) [Active]    Anthropometrics:  Weight hx: 54.8 KG  Height: 152.4 cm  IBW: 48.2 KG  current admission weights (kg):   Daily weights in K - no other weights taken     MEDICATIONS  (STANDING):  ampicillin/sulbactam  IVPB 3 Gram(s) IV Intermittent every 6 hours  ciprofloxacin   IVPB 400 milliGRAM(s) IV Intermittent every 12 hours  clopidogrel Tablet 75 milliGRAM(s) Oral daily  dextrose 5%. 1000 milliLiter(s) (50 mL/Hr) IV Continuous <Continuous>  dextrose 5%. 1000 milliLiter(s) (100 mL/Hr) IV Continuous <Continuous>  dextrose 50% Injectable 25 Gram(s) IV Push once  dextrose 50% Injectable 25 Gram(s) IV Push once  dextrose 50% Injectable 12.5 Gram(s) IV Push once  DULoxetine 60 milliGRAM(s) Oral daily  ferrous    sulfate 325 milliGRAM(s) Oral two times a day  finasteride 5 milliGRAM(s) Oral daily  glucagon  Injectable 1 milliGRAM(s) IntraMuscular once  heparin   Injectable 5000 Unit(s) SubCutaneous every 8 hours  insulin glargine Injectable (LANTUS) 12 Unit(s) SubCutaneous every morning  insulin lispro (ADMELOG) corrective regimen sliding scale   SubCutaneous at bedtime  insulin lispro (ADMELOG) corrective regimen sliding scale   SubCutaneous three times a day before meals  lactobacillus acidophilus 1 Tablet(s) Oral with breakfast  levETIRAcetam  IVPB 500 milliGRAM(s) IV Intermittent every 12 hours  levothyroxine 50 MICROGram(s) Oral daily  metoprolol tartrate 25 milliGRAM(s) Oral two times a day  multivitamin 1 Tablet(s) Oral every 24 hours  polyethylene glycol 3350 17 Gram(s) Oral two times a day  senna 2 Tablet(s) Oral at bedtime  simvastatin 20 milliGRAM(s) Oral at bedtime  sodium chloride 1 Gram(s) Oral two times a day  tamsulosin 0.4 milliGRAM(s) Oral at bedtime  urea Oral Powder 15 Gram(s) Oral daily    MEDICATIONS  (PRN):  dextrose Oral Gel 15 Gram(s) Oral once PRN Blood Glucose LESS THAN 70 milliGRAM(s)/deciliter    LABS:                       9.5    8.23  )-----------( 343      ( 2023 09:35 )             31.5     07-13    140  |  105  |  21<H>  ----------------------------<  173<H>  4.5   |  24  |  1.0    Ca    9.5      2023 09:35  Mg     2.2     07-13    CAPILLARY BLOOD GLUCOSE  POCT Blood Glucose.: 201 mg/dL (2023 16:37)  POCT Blood Glucose.: 334 mg/dL (2023 11:43)  POCT Blood Glucose.: 317 mg/dL (2023 07:45)  POCT Blood Glucose.: 334 mg/dL (2023 06:38)  POCT Blood Glucose.: 221 mg/dL (2023 22:05)    Physical Findings:  - Appearance: lethargic/disoriented  - GI function: last BM on 7/11 x2  - Tubes: no feeding tube  - Oral/Mouth cavity: tolerating diet texture/consistency  - Skin: stage II to left buttocks  - Edema: no edema noted     Nutrition Requirements:  Weight Used: Weight Used: 54.8 kg with consideration for age, BMI, weight loss pta, malnutrition, wound healing    Estimated Energy Needs    Continue [x]  Adjust []  ENERGY: 2055-7777 kcal/day (MSJ x 1.2-1.5)  Estimated Protein Needs    Continue [x]  Adjust []  PROTEIN: 71-88 g/day (1.3-1.6 g/kg)  Estimated Fluid Needs        Continue [x]  Adjust []  FLUID: 1mL/kcal     [x] Previous Nutrition Diagnosis: Malnutrition            [x] Ongoing          [] Resolved  Goal/Expected Outcome: Pt to meet at least 50% of estimated needs within 5-7 days.  Indicator/Monitoring: Diet order, PO intake, weights, labs, NFPF, body composition, BM and tolerance to medical food supplements    Recommendation:  Continue with current diet order soft and bite size consistent carbohydrate diet + supplements  Encourage PO intake and assist during meals  Supplement with MVI daily; 500 mg VIT C daily; 220 mg ZINC SULFATE (10-14 days only) to aid in wound healing if medically feasible  insulin regimen to promote euglycemia  probiotic    Pt is at moderate nutrition risk; will f/u in 5-7 days or prn.

## 2023-07-14 NOTE — PROGRESS NOTE ADULT - SUBJECTIVE AND OBJECTIVE BOX
CHEL CROFT 89y Male  MRN#: [id number]     Hospital Day: 21d    Pt is currently admitted with the primary diagnosis of  Hyperkalemia        SUBJECTIVE     Overnight events  None    Subjective complaints  Pt was evaluated this am. Patient denied any active complaints and per patient his symptoms are improving                                            ----------------------------------------------------------  OBJECTIVE  PAST MEDICAL & SURGICAL HISTORY  HTN (hypertension)    HLD (hyperlipidemia)    BPH (benign prostatic hyperplasia)    CAD (coronary artery disease)    Diabetes mellitus    Hypothyroidism    S/P CABG (coronary artery bypass graft)                                              -----------------------------------------------------------  ALLERGIES:  No Known Allergies                                            ------------------------------------------------------------    HOME MEDICATIONS  Home Medications:  DULoxetine 60 mg oral delayed release capsule: 1 cap(s) orally once a day (13 Dec 2022 22:03)  finasteride 5 mg oral tablet: 1 tab(s) orally once a day (13 Dec 2022 22:03)  lactobacillus acidophilus oral capsule: 1 tab(s) orally once a day (20 Dec 2022 10:18)  levothyroxine 50 mcg (0.05 mg) oral tablet: 1 tab(s) orally once a day (13 Dec 2022 22:03)  Metoprolol Tartrate 25 mg oral tablet: 1 tab(s) orally 2 times a day (13 Dec 2022 22:03)  Plavix 75 mg oral tablet: 1 orally once a day (23 Jun 2023 21:54)  simvastatin 20 mg oral tablet: 1 tab(s) orally once a day (at bedtime) (13 Dec 2022 22:03)  tamsulosin 0.4 mg oral capsule: 1 cap(s) orally once a day (13 Dec 2022 22:03)  Ternt Davidson 300 units/mL subcutaneous solution: 12 unit(s) subcutaneous once a day (in the morning) (13 Dec 2022 22:03)                           MEDICATIONS:  STANDING MEDICATIONS  ampicillin/sulbactam  IVPB 3 Gram(s) IV Intermittent every 6 hours  ciprofloxacin   IVPB 400 milliGRAM(s) IV Intermittent every 12 hours  clopidogrel Tablet 75 milliGRAM(s) Oral daily  dextrose 5%. 1000 milliLiter(s) IV Continuous <Continuous>  dextrose 5%. 1000 milliLiter(s) IV Continuous <Continuous>  dextrose 50% Injectable 25 Gram(s) IV Push once  dextrose 50% Injectable 12.5 Gram(s) IV Push once  dextrose 50% Injectable 25 Gram(s) IV Push once  DULoxetine 60 milliGRAM(s) Oral daily  ferrous    sulfate 325 milliGRAM(s) Oral two times a day  finasteride 5 milliGRAM(s) Oral daily  glucagon  Injectable 1 milliGRAM(s) IntraMuscular once  heparin   Injectable 5000 Unit(s) SubCutaneous every 8 hours  insulin glargine Injectable (LANTUS) 12 Unit(s) SubCutaneous every morning  insulin lispro (ADMELOG) corrective regimen sliding scale   SubCutaneous at bedtime  lactobacillus acidophilus 1 Tablet(s) Oral with breakfast  levETIRAcetam  IVPB 500 milliGRAM(s) IV Intermittent every 12 hours  levothyroxine 50 MICROGram(s) Oral daily  metoprolol tartrate 25 milliGRAM(s) Oral two times a day  multivitamin 1 Tablet(s) Oral every 24 hours  polyethylene glycol 3350 17 Gram(s) Oral two times a day  senna 2 Tablet(s) Oral at bedtime  simvastatin 20 milliGRAM(s) Oral at bedtime  sodium chloride 1 Gram(s) Oral two times a day  tamsulosin 0.4 milliGRAM(s) Oral at bedtime  urea Oral Powder 15 Gram(s) Oral daily    PRN MEDICATIONS  dextrose Oral Gel 15 Gram(s) Oral once PRN                                            ------------------------------------------------------------  VITAL SIGNS: Last 24 Hours  T(C): 36.4 (14 Jul 2023 04:30), Max: 36.4 (14 Jul 2023 04:30)  T(F): 97.5 (14 Jul 2023 04:30), Max: 97.5 (14 Jul 2023 04:30)  HR: 100 (14 Jul 2023 04:30) (92 - 100)  BP: 114/76 (14 Jul 2023 04:30) (114/76 - 129/60)  BP(mean): --  RR: 18 (14 Jul 2023 04:30) (18 - 18)  SpO2: 100% (14 Jul 2023 04:30) (97% - 100%)                                             --------------------------------------------------------------  LABS:                        9.5    8.23  )-----------( 343      ( 13 Jul 2023 09:35 )             31.5     07-13    140  |  105  |  21<H>  ----------------------------<  173<H>  4.5   |  24  |  1.0    Ca    9.5      13 Jul 2023 09:35  Mg     2.2     07-13        Urinalysis Basic - ( 13 Jul 2023 09:35 )    Color: x / Appearance: x / SG: x / pH: x  Gluc: 173 mg/dL / Ketone: x  / Bili: x / Urobili: x   Blood: x / Protein: x / Nitrite: x   Leuk Esterase: x / RBC: x / WBC x   Sq Epi: x / Non Sq Epi: x / Bacteria: x                                                            -------------------------------------------------------------  RADIOLOGY:                                            --------------------------------------------------------------    PHYSICAL EXAM:  GENERAL: NAD, lying in bed comfortably  HEAD:  Atraumatic, Normocephalic  CHEST/LUNG: Clear to auscultation bilaterally; No rales, rhonchi, wheezing, or rubs. Unlabored respirations  HEART: regular rate and rhythm  ABDOMEN: Bowel sounds present; Soft, Nontender, Nondistended.    EXTREMITIES: Warm. No clubbing, cyanosis, or edema  NERVOUS SYSTEM:  Alert & Oriented.                                         --------------------------------------------------------------

## 2023-07-14 NOTE — DISCHARGE NOTE PROVIDER - CARE PROVIDER_API CALL
Javna Hoang  Internal Medicine  [address] [place] [id number]  Phone: [phone]  Fax: [phone]  Established Patient  Follow Up Time: 1 week   Javan Hoang  Internal Medicine  [address] [place] [address]  Phone: [phone]  Fax: [phone]  Established Patient  Follow Up Time: 1 week    Mark Ramos  Plastic Surgery  [address] [place] [address]  Phone: [phone]  Fax: [phone]  Follow Up Time:

## 2023-07-14 NOTE — DISCHARGE NOTE PROVIDER - NSDCMRMEDTOKEN_GEN_ALL_CORE_FT
DULoxetine 60 mg oral delayed release capsule: 1 cap(s) orally once a day  finasteride 5 mg oral tablet: 1 tab(s) orally once a day  lactobacillus acidophilus oral capsule: 1 tab(s) orally once a day  levothyroxine 50 mcg (0.05 mg) oral tablet: 1 tab(s) orally once a day  Metoprolol Tartrate 25 mg oral tablet: 1 tab(s) orally 2 times a day  Plavix 75 mg oral tablet: 1 orally once a day  simvastatin 20 mg oral tablet: 1 tab(s) orally once a day (at bedtime)  tamsulosin 0.4 mg oral capsule: 1 cap(s) orally once a day  [place] SoloStar 300 units/mL subcutaneous solution: 12 unit(s) subcutaneous once a day (in the morning)   DULoxetine 60 mg oral delayed release capsule: 1 cap(s) orally once a day  ferrous sulfate 325 mg (65 mg elemental iron) oral tablet: 1 tab(s) orally 2 times a day  finasteride 5 mg oral tablet: 1 tab(s) orally once a day  lactobacillus acidophilus oral capsule: 1 tab(s) orally once a day  levothyroxine 50 mcg (0.05 mg) oral tablet: 1 tab(s) orally once a day  Metoprolol Tartrate 25 mg oral tablet: 1 tab(s) orally 2 times a day  Plavix 75 mg oral tablet: 1 orally once a day  polyethylene glycol 3350 oral powder for reconstitution: 17 gram(s) orally 2 times a day  simvastatin 20 mg oral tablet: 1 tab(s) orally once a day (at bedtime)  sodium chloride 1 g oral tablet: 1 tab(s) orally 2 times a day  tamsulosin 0.4 mg oral capsule: 1 cap(s) orally once a day  Trent SoloStar 300 units/mL subcutaneous solution: 12 unit(s) subcutaneous once a day (in the morning)   amoxicillin-clavulanate 875 mg-125 mg oral tablet: 1 tab(s) orally once a day  ciprofloxacin 500 mg oral tablet: 1 tab(s) orally 2 times a day  DULoxetine 60 mg oral delayed release capsule: 1 cap(s) orally once a day  ferrous sulfate 325 mg (65 mg elemental iron) oral tablet: 1 tab(s) orally 2 times a day  finasteride 5 mg oral tablet: 1 tab(s) orally once a day  Keppra 500 mg oral tablet: 1 tab(s) orally 2 times a day  lactobacillus acidophilus oral capsule: 1 tab(s) orally once a day  levothyroxine 50 mcg (0.05 mg) oral tablet: 1 tab(s) orally once a day  Metoprolol Tartrate 25 mg oral tablet: 1 tab(s) orally 2 times a day  Plavix 75 mg oral tablet: 1 orally once a day  polyethylene glycol 3350 oral powder for reconstitution: 17 gram(s) orally 2 times a day  simvastatin 20 mg oral tablet: 1 tab(s) orally once a day (at bedtime)  tamsulosin 0.4 mg oral capsule: 1 cap(s) orally once a day  [place] SoloStar 300 units/mL subcutaneous solution: 12 unit(s) subcutaneous once a day (in the morning)  urea 15 g oral powder for reconstitution: 1 packet(s) orally once a day

## 2023-07-14 NOTE — CHART NOTE - NSCHARTNOTESELECT_GEN_ALL_CORE
Event Note
Event Note
Palliative Care - Social Work/Event Note
Event Note
Follow Up/Nutrition Services

## 2023-07-14 NOTE — DISCHARGE NOTE PROVIDER - HOSPITAL COURSE
89 yo M with hx of HTN, HLD, DM II BPH and CAD s/p CABG x 4, Hypothyroidism presents to ED for abnormal labs result (hyperkalemia and hyponatremia). Patient is a poor historian, however patient's daughter was at bedside for collateral. Per the daughter, patient was recently admitted for fall and discharged to [facility]. When patient was discharged from [facility] he did not have a hidalgo in. Per the daughter at the nursing home patient had a hidalgo catheter in despite family requesting removal of it multiple times. Patient was discharged from [facility] on May 26th. Per the daughter patient was on antibiotics at [facility] for UTI, but she is unsure which antibiotic it was.     Since previous discharge patient's daughter says the patient has been extremely lethargic and weak. His urine has been milky, dark since May 26th when he was discharged from [facility]. Over the past week patient has become incontinent, no able to make it to the restroom, and complaining that he has pain with urination. On Wednesday a doctor came to the house to examine the patient, at that time he ordered lab tests, which the patient had preformed today. Labs were significant for Na 126 and a K TNP. They were instructed to come straight to the ED.     In the ED labs were significant for a Na 131, K+ 5.1, Hgb 9.9 (up from previous admission), and a WBC count of 18K.     Vital Signs Last 24 Hrs  T(F): 98.7 (23 Jun 2023 17:00), Max: 98.7 (23 Jun 2023 17:00)  HR: 95 (23 Jun 2023 19:30) (80 - 95)  BP: 157/78 (23 Jun 2023 19:30) (121/60 - 157/78)  BP(mean): --  RR: 20 (23 Jun 2023 19:30) (16 - 20)  SpO2: 99% (23 Jun 2023 19:30) (96% - 99%)  Patient On (Oxygen Delivery Method): nasal cannula  O2 Flow (L/min): 2    UA was positive for many bacteria >720 WBC, moderate blood and large leukocyte esterase.     # Acute mental status change:   - MR brain WNL  - EEG Done- 2nd EEG shows generalized slowing + Multifocal epileptic potentials. Started Keppra 500mg every 12 hours .   - VEEG results nl     #Bacteremia due to Pseudomonas.   # Leukocytosis   #in setting of recent hidalgo in snf; now out; possible uti, testicular infection - e.faecalis  US Scrotum (for epididymitis) : Complex scrotal mixed echogenicity collection anterior to left testicle measuring approximately 7.7 x 3 x 6.9 cm. 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  ua positive; ucxs- more than 3 organisms, repeat urine cxs neg , repeat blood cxs 6/26, 28 Neg.  blood cxs pseudomonas 6/24  -  did I&D bedside 7/5   - wound cx - e.faecalis  - s/p multiple abx   - bedside debridement by burn team 7/13     # Hypomagnesemia - resolved  - mag 2.1 stable now    #hyponatremia- stable levels   - Na 133 7/6  - on salt tabs  - daily BMP monitoring      #constipated:   - Senna, Miralax.     #HTN (hypertension)  - continue lopressor 25mg PO twice daily with holding parameters.     #DM2 (diabetes mellitus, type 2)  - uncontrolled, Hga1C- 9  - lantus 12 units subc. once daily ,   - lispro 4 units subc. before each meal three times daily   ssi.    #History of BPH.   - finasteride 5 mg po once daily /tamsulosin 0.4 mg po once daily at bedtime     #h/o CAD (coronary artery disease). / h/o CABG  - plavix/zocor tx.    #Hypothyroidism.   - synthroid 50mcg po once daily    # Iron def. anemia  - started on PO Iron tx.     DVT ppX, heparin  DNR/DNI (MOLST signed 6/29)         89 yo M with hx of HTN, HLD, DM II BPH and CAD s/p CABG x 4, Hypothyroidism presents to ED for abnormal labs result (hyperkalemia and hyponatremia). Patient is a poor historian, however patient's daughter was at bedside for collateral. Per the daughter, patient was recently admitted for fall and discharged to [facility]. When patient was discharged from [facility] he did not have a hidalgo in. Per the daughter at the nursing home patient had a hidalgo catheter in despite family requesting removal of it multiple times. Patient was discharged from [facility] on May 26th. Per the daughter patient was on antibiotics at [facility] for UTI, but she is unsure which antibiotic it was.     Since previous discharge patient's daughter says the patient has been extremely lethargic and weak. His urine has been milky, dark since May 26th when he was discharged from [facility]. Over the past week patient has become incontinent, no able to make it to the restroom, and complaining that he has pain with urination. On Wednesday a doctor came to the house to examine the patient, at that time he ordered lab tests, which the patient had preformed today. Labs were significant for Na 126 and a K TNP. They were instructed to come straight to the ED.     In the ED labs were significant for a Na 131, K+ 5.1, Hgb 9.9 (up from previous admission), and a WBC count of 18K.     Vital Signs Last 24 Hrs  T(F): 98.7 (23 Jun 2023 17:00), Max: 98.7 (23 Jun 2023 17:00)  HR: 95 (23 Jun 2023 19:30) (80 - 95)  BP: 157/78 (23 Jun 2023 19:30) (121/60 - 157/78)  BP(mean): --  RR: 20 (23 Jun 2023 19:30) (16 - 20)  SpO2: 99% (23 Jun 2023 19:30) (96% - 99%)  Patient On (Oxygen Delivery Method): nasal cannula  O2 Flow (L/min): 2    UA was positive for many bacteria >720 WBC, moderate blood and large leukocyte esterase.     Pt was admitted initially for abnormal lab results and was found to have hyponatremia and hyperkalemia which were corrected. His stay was later complicated by a code stroke called on 7/1. Two separate  EEG's were ordered in which the second showed epileptiform activity. He was then started on keppra 500mg BID and later had a VEEg which was normal. Pt was later found to have a high white count and was found to have a scrotal abscess. The scrotal abscess was drained by urology and the burn team was then later consulted for bedside debridement.     # Acute mental status change:   - MR brain WNL  - EEG Done- 2nd EEG shows generalized slowing + Multifocal epileptic potentials. Started Keppra 500mg every 12 hours .   - VEEG results nl     #Bacteremia due to Pseudomonas.   # Leukocytosis   #in setting of recent hidalgo in snf; now out; possible uti, testicular infection - e.faecalis  US Scrotum (for epididymitis) : Complex scrotal mixed echogenicity collection anterior to left testicle measuring approximately 7.7 x 3 x 6.9 cm. 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  ua positive; ucxs- more than 3 organisms, repeat urine cxs neg , repeat blood cxs 6/26, 28 Neg.  blood cxs pseudomonas 6/24  -  did I&D bedside 7/5   - wound cx - e.faecalis  - s/p multiple abx   - bedside debridement by burn team 7/13     # Hypomagnesemia - resolved  - mag 2.1 stable now    #hyponatremia- stable levels   - Na 133 7/6  - on salt tabs  - daily BMP monitoring      #constipated:   - Senna, Miralax.     #HTN (hypertension)  - continue lopressor 25mg PO twice daily with holding parameters.     #DM2 (diabetes mellitus, type 2)  - uncontrolled, Hga1C- 9  - lantus 12 units subc. once daily ,   - lispro 4 units subc. before each meal three times daily   ssi.    #History of BPH.   - finasteride 5 mg po once daily /tamsulosin 0.4 mg po once daily at bedtime     #h/o CAD (coronary artery disease). / h/o CABG  - plavix/zocor tx.    #Hypothyroidism.   - synthroid 50mcg po once daily    # Iron def. anemia  - started on PO Iron tx.     DVT ppX, heparin  DNR/DNI (MOLST signed 6/29)         89 yo M with hx of HTN, HLD, DM II BPH and CAD s/p CABG x 4, Hypothyroidism presents to ED for abnormal labs result (hyperkalemia and hyponatremia). Patient is a poor historian, however patient's daughter was at bedside for collateral. Per the daughter, patient was recently admitted for fall and discharged to [facility]. When patient was discharged from [facility] he did not have a hidalgo in. Per the daughter at the nursing home patient had a hidalgo catheter in despite family requesting removal of it multiple times. Patient was discharged from [facility] on May 26th. Per the daughter patient was on antibiotics at [facility] for UTI, but she is unsure which antibiotic it was.     Since previous discharge patient's daughter says the patient has been extremely lethargic and weak. His urine has been milky, dark since May 26th when he was discharged from [facility]. Over the past week patient has become incontinent, no able to make it to the restroom, and complaining that he has pain with urination. On Wednesday a doctor came to the house to examine the patient, at that time he ordered lab tests, which the patient had preformed today. Labs were significant for Na 126 and a K TNP. They were instructed to come straight to the ED.     In the ED labs were significant for a Na 131, K+ 5.1, Hgb 9.9 (up from previous admission), and a WBC count of 18K.     Vital Signs Last 24 Hrs  T(F): 98.7 (23 Jun 2023 17:00), Max: 98.7 (23 Jun 2023 17:00)  HR: 95 (23 Jun 2023 19:30) (80 - 95)  BP: 157/78 (23 Jun 2023 19:30) (121/60 - 157/78)  BP(mean): --  RR: 20 (23 Jun 2023 19:30) (16 - 20)  SpO2: 99% (23 Jun 2023 19:30) (96% - 99%)  Patient On (Oxygen Delivery Method): nasal cannula  O2 Flow (L/min): 2    UA was positive for many bacteria >720 WBC, moderate blood and large leukocyte esterase.     Pt was admitted initially for abnormal lab results and was found to have hyponatremia and hyperkalemia which were corrected. His stay was later complicated by a code stroke called on 7/1. Two separate  EEG's were ordered in which the second showed epileptiform activity. He was then started on keppra 500mg BID and later had a VEEg which was normal. Pt was later found to have a high white count and was found to have a scrotal abscess. The scrotal abscess was drained by urology and the burn team was then later consulted for bedside debridement.     # Acute mental status change: resolved  - MR brain WNL  - EEG Done- 2nd EEG shows generalized slowing + Multifocal epileptic potentials. Started Keppra 500mg every 12 hours .   - VEEG results nl     #Bacteremia due to Pseudomonas.   # Leukocytosis   #in setting of recent hidalgo in snf; now out; possible uti, testicular infection - e.faecalis  US Scrotum (for epididymitis) : Complex scrotal mixed echogenicity collection anterior to left testicle measuring approximately 7.7 x 3 x 6.9 cm. 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  ua positive; ucxs- more than 3 organisms, repeat urine cxs neg , repeat blood cxs 6/26, 28 Neg.  blood cxs pseudomonas 6/24  -  did I&D bedside 7/5   - wound cx - e.faecalis  - s/p multiple abx. Currently on amp/sulbactam and ciprofloxacin. ID: can switch to PO augmentin and ciproflox  - bedside debridement by burn team 7/13     # Hypomagnesemia - resolved  - mag 2.1 stable now    #hyponatremia- stable levels   - Na 133 7/6  - on salt tabs  - daily BMP monitoring      #constipated: resolved  - Senna, Miralax.     #HTN (hypertension)  - continue lopressor 25mg PO twice daily with holding parameters.     #DM2 (diabetes mellitus, type 2)  - uncontrolled, Hga1C- 9  - lantus 12 units subc. once daily ,   - lispro 4 units subc. before each meal three times daily   ssi.    #History of BPH.   - finasteride 5 mg po once daily /tamsulosin 0.4 mg po once daily at bedtime     #h/o CAD (coronary artery disease). / h/o CABG  - plavix/zocor tx.    #Hypothyroidism.   - synthroid 50mcg po once daily    # Iron def. anemia  - started on PO Iron tx.     DVT ppX, heparin  DNR/DNI (MOLST signed 6/29)         89 yo M with hx of HTN, HLD, DM II BPH and CAD s/p CABG x 4, Hypothyroidism presents to ED for abnormal labs result (hyperkalemia and hyponatremia). Patient is a poor historian, however patient's daughter was at bedside for collateral. Per the daughter, patient was recently admitted for fall and discharged to [facility]. When patient was discharged from [facility] he did not have a hidalgo in. Per the daughter at the nursing home patient had a hidalgo catheter in despite family requesting removal of it multiple times. Patient was discharged from [facility] on May 26th. Per the daughter patient was on antibiotics at [facility] for UTI, but she is unsure which antibiotic it was.     Since previous discharge patient's daughter says the patient has been extremely lethargic and weak. His urine has been milky, dark since May 26th when he was discharged from [facility]. Over the past week patient has become incontinent, no able to make it to the restroom, and complaining that he has pain with urination. On Wednesday a doctor came to the house to examine the patient, at that time he ordered lab tests, which the patient had preformed today. Labs were significant for Na 126 and a K TNP. They were instructed to come straight to the ED.     In the ED labs were significant for a Na 131, K+ 5.1, Hgb 9.9 (up from previous admission), and a WBC count of 18K.     UA was positive for many bacteria >720 WBC, moderate blood and large leukocyte esterase.     Pt was admitted initially for abnormal lab results and was found to have hyponatremia and hyperkalemia which were corrected. His stay was later complicated by a code stroke called on 7/1. Two separate  EEG's were ordered in which the second showed epileptiform activity. He was then started on keppra 500mg BID and later had a VEEg which was normal. Pt was later found to have a high white count and was found to have a scrotal abscess. The scrotal abscess was drained by urology and the burn team was then later consulted for bedside debridement.     # Acute mental status change: resolved  - MR brain WNL  - EEG Done- 2nd EEG shows generalized slowing + Multifocal epileptic potentials. Started Keppra 500mg every 12 hours .   - VEEG results nl     #Bacteremia due to Pseudomonas.   # Leukocytosis   #in setting of recent hidalgo in snf; now out; possible uti, testicular infection - e.faecalis  US Scrotum (for epididymitis) : Complex scrotal mixed echogenicity collection anterior to left testicle measuring approximately 7.7 x 3 x 6.9 cm. 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  ua positive; ucxs- more than 3 organisms, repeat urine cxs neg , repeat blood cxs 6/26, 28 Neg.  blood cxs pseudomonas 6/24  -  did I&D bedside 7/5   - wound cx - e.faecalis  - s/p multiple abx. Currently on amp/sulbactam and ciprofloxacin. ID: can switch to PO augmentin and ciproflox  - bedside debridement by burn team 7/13     # Hypomagnesemia - resolved  - mag 2.1 stable now    #hyponatremia- stable levels   - Na 133 7/6  - on salt tabs  - daily BMP monitoring      #constipated: resolved  - Senna, Miralax.     #HTN (hypertension)  - continue lopressor 25mg PO twice daily with holding parameters.     #DM2 (diabetes mellitus, type 2)  - uncontrolled, Hga1C- 9  - lantus 12 units subc. once daily ,   - lispro 4 units subc. before each meal three times daily   ssi.    #History of BPH.   - finasteride 5 mg po once daily /tamsulosin 0.4 mg po once daily at bedtime     #h/o CAD (coronary artery disease). / h/o CABG  - plavix/zocor tx.    #Hypothyroidism.   - synthroid 50mcg po once daily    # Iron def. anemia  - started on PO Iron tx.     DNR/DNI (MOLST signed 6/29)    patient is stable for discharge home with home care services.      89 yo M with hx of HTN, HLD, DM II BPH and CAD s/p CABG x 4, Hypothyroidism presents to ED for abnormal labs result (hyperkalemia and hyponatremia). Patient is a poor historian, however patient's daughter was at bedside for collateral. Per the daughter, patient was recently admitted for fall and discharged to [facility]. When patient was discharged from [facility] he did not have a hidalgo in. Per the daughter at the nursing home patient had a hidalgo catheter in despite family requesting removal of it multiple times. Patient was discharged from [facility] on May 26th. Per the daughter patient was on antibiotics at [facility] for UTI, but she is unsure which antibiotic it was.     Since previous discharge patient's daughter says the patient has been extremely lethargic and weak. His urine has been milky, dark since May 26th when he was discharged from [facility]. Over the past week patient has become incontinent, no able to make it to the restroom, and complaining that he has pain with urination. On Wednesday a doctor came to the house to examine the patient, at that time he ordered lab tests, which the patient had preformed today. Labs were significant for Na 126 and a K TNP. They were instructed to come straight to the ED.     In the ED labs were significant for a Na 131, K+ 5.1, Hgb 9.9 (up from previous admission), and a WBC count of 18K.     UA was positive for many bacteria >720 WBC, moderate blood and large leukocyte esterase.     Pt was admitted initially for abnormal lab results and was found to have hyponatremia and hyperkalemia which were corrected. His stay was later complicated by a code stroke called on 7/1. Two separate  EEG's were ordered in which the second showed epileptiform activity. He was then started on keppra 500mg BID and later had a VEEg which was normal. Pt was later found to have a high white count and was found to have a scrotal abscess. The scrotal abscess was drained by urology and the burn team was then later consulted for bedside debridement.     # Acute mental status change: resolved  - MR brain WNL  - EEG Done- 2nd EEG shows generalized slowing + Multifocal epileptic potentials. Started Keppra 500mg every 12 hours .   - VEEG results nl     #Bacteremia due to Pseudomonas.   # Leukocytosis   #in setting of recent hidalgo in snf; now out; possible uti, testicular infection - e.faecalis  US Scrotum (for epididymitis) : Complex scrotal mixed echogenicity collection anterior to left testicle measuring approximately 7.7 x 3 x 6.9 cm. 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  ua positive; ucxs- more than 3 organisms, repeat urine cxs neg , repeat blood cxs 6/26, 28 Neg.  blood cxs pseudomonas 6/24  -  did I&D bedside 7/5   - wound cx - e.faecalis  - s/p multiple abx. Currently on amp/sulbactam and ciprofloxacin. ID: can switch to PO augmentin and ciproflox  - bedside debridement by burn team 7/13   - Burn team recommending continued wound care 3-5x/ week, Wash with soap and water, xeroform packing, abd and tape.    # Hypomagnesemia - resolved  - mag 2.1 stable now    #hyponatremia- stable levels   - Na 133 7/6  - on salt tabs  - daily BMP monitoring      #constipated: resolved  - Senna, Miralax.     #HTN (hypertension)  - continue lopressor 25mg PO twice daily with holding parameters.     #DM2 (diabetes mellitus, type 2)  - uncontrolled, Hga1C- 9  - lantus 12 units subc. once daily ,   - lispro 4 units subc. before each meal three times daily   ssi.    #History of BPH.   - finasteride 5 mg po once daily /tamsulosin 0.4 mg po once daily at bedtime     #h/o CAD (coronary artery disease). / h/o CABG  - plavix/zocor tx.    #Hypothyroidism.   - synthroid 50mcg po once daily    # Iron def. anemia  - started on PO Iron tx.     DNR/DNI (MOLST signed 6/29)    patient is stable for discharge home with home care services.

## 2023-07-15 LAB
ALBUMIN SERPL ELPH-MCNC: 3.4 G/DL — LOW (ref 3.5–5.2)
ALP SERPL-CCNC: 74 U/L — SIGNIFICANT CHANGE UP (ref 30–115)
ALT FLD-CCNC: 11 U/L — SIGNIFICANT CHANGE UP (ref 0–41)
ANION GAP SERPL CALC-SCNC: 13 MMOL/L — SIGNIFICANT CHANGE UP (ref 7–14)
AST SERPL-CCNC: 21 U/L — SIGNIFICANT CHANGE UP (ref 0–41)
BILIRUB SERPL-MCNC: 0.2 MG/DL — SIGNIFICANT CHANGE UP (ref 0.2–1.2)
BUN SERPL-MCNC: 25 MG/DL — HIGH (ref 10–20)
CALCIUM SERPL-MCNC: 9.3 MG/DL — SIGNIFICANT CHANGE UP (ref 8.4–10.5)
CHLORIDE SERPL-SCNC: 101 MMOL/L — SIGNIFICANT CHANGE UP (ref 98–110)
CO2 SERPL-SCNC: 24 MMOL/L — SIGNIFICANT CHANGE UP (ref 17–32)
CREAT SERPL-MCNC: 0.9 MG/DL — SIGNIFICANT CHANGE UP (ref 0.7–1.5)
CULTURE RESULTS: SIGNIFICANT CHANGE UP
EGFR: 77 ML/MIN/1.73M2 — SIGNIFICANT CHANGE UP
GLUCOSE BLDC GLUCOMTR-MCNC: 180 MG/DL — HIGH (ref 70–99)
GLUCOSE BLDC GLUCOMTR-MCNC: 203 MG/DL — HIGH (ref 70–99)
GLUCOSE BLDC GLUCOMTR-MCNC: 277 MG/DL — HIGH (ref 70–99)
GLUCOSE BLDC GLUCOMTR-MCNC: 288 MG/DL — HIGH (ref 70–99)
GLUCOSE SERPL-MCNC: 201 MG/DL — HIGH (ref 70–99)
HCT VFR BLD CALC: 33.4 % — LOW (ref 42–52)
HGB BLD-MCNC: 10 G/DL — LOW (ref 14–18)
MAGNESIUM SERPL-MCNC: 1.9 MG/DL — SIGNIFICANT CHANGE UP (ref 1.8–2.4)
MCHC RBC-ENTMCNC: 27 PG — SIGNIFICANT CHANGE UP (ref 27–31)
MCHC RBC-ENTMCNC: 29.9 G/DL — LOW (ref 32–37)
MCV RBC AUTO: 90 FL — SIGNIFICANT CHANGE UP (ref 80–94)
NRBC # BLD: 0 /100 WBCS — SIGNIFICANT CHANGE UP (ref 0–0)
PLATELET # BLD AUTO: 216 K/UL — SIGNIFICANT CHANGE UP (ref 130–400)
PMV BLD: 11.3 FL — HIGH (ref 7.4–10.4)
POTASSIUM SERPL-MCNC: 4.2 MMOL/L — SIGNIFICANT CHANGE UP (ref 3.5–5)
POTASSIUM SERPL-SCNC: 4.2 MMOL/L — SIGNIFICANT CHANGE UP (ref 3.5–5)
PROT SERPL-MCNC: 5.8 G/DL — LOW (ref 6–8)
RBC # BLD: 3.71 M/UL — LOW (ref 4.7–6.1)
RBC # FLD: 19.6 % — HIGH (ref 11.5–14.5)
SODIUM SERPL-SCNC: 138 MMOL/L — SIGNIFICANT CHANGE UP (ref 135–146)
SPECIMEN SOURCE: SIGNIFICANT CHANGE UP
WBC # BLD: 11.92 K/UL — HIGH (ref 4.8–10.8)
WBC # FLD AUTO: 11.92 K/UL — HIGH (ref 4.8–10.8)

## 2023-07-15 PROCEDURE — 99232 SBSQ HOSP IP/OBS MODERATE 35: CPT

## 2023-07-15 RX ADMIN — LEVETIRACETAM 400 MILLIGRAM(S): 250 TABLET, FILM COATED ORAL at 05:33

## 2023-07-15 RX ADMIN — TAMSULOSIN HYDROCHLORIDE 0.4 MILLIGRAM(S): 0.4 CAPSULE ORAL at 22:42

## 2023-07-15 RX ADMIN — Medication 3: at 13:24

## 2023-07-15 RX ADMIN — SIMVASTATIN 20 MILLIGRAM(S): 20 TABLET, FILM COATED ORAL at 22:42

## 2023-07-15 RX ADMIN — Medication 200 MILLIGRAM(S): at 17:41

## 2023-07-15 RX ADMIN — AMPICILLIN SODIUM AND SULBACTAM SODIUM 200 GRAM(S): 250; 125 INJECTION, POWDER, FOR SUSPENSION INTRAMUSCULAR; INTRAVENOUS at 05:33

## 2023-07-15 RX ADMIN — INSULIN GLARGINE 12 UNIT(S): 100 INJECTION, SOLUTION SUBCUTANEOUS at 08:36

## 2023-07-15 RX ADMIN — SODIUM CHLORIDE 1 GRAM(S): 9 INJECTION INTRAMUSCULAR; INTRAVENOUS; SUBCUTANEOUS at 05:37

## 2023-07-15 RX ADMIN — HEPARIN SODIUM 5000 UNIT(S): 5000 INJECTION INTRAVENOUS; SUBCUTANEOUS at 05:33

## 2023-07-15 RX ADMIN — HEPARIN SODIUM 5000 UNIT(S): 5000 INJECTION INTRAVENOUS; SUBCUTANEOUS at 13:49

## 2023-07-15 RX ADMIN — Medication 2: at 17:28

## 2023-07-15 RX ADMIN — AMPICILLIN SODIUM AND SULBACTAM SODIUM 200 GRAM(S): 250; 125 INJECTION, POWDER, FOR SUSPENSION INTRAMUSCULAR; INTRAVENOUS at 13:48

## 2023-07-15 RX ADMIN — SENNA PLUS 2 TABLET(S): 8.6 TABLET ORAL at 22:42

## 2023-07-15 RX ADMIN — AMPICILLIN SODIUM AND SULBACTAM SODIUM 200 GRAM(S): 250; 125 INJECTION, POWDER, FOR SUSPENSION INTRAMUSCULAR; INTRAVENOUS at 17:34

## 2023-07-15 RX ADMIN — LEVETIRACETAM 400 MILLIGRAM(S): 250 TABLET, FILM COATED ORAL at 17:42

## 2023-07-15 RX ADMIN — Medication 25 MILLIGRAM(S): at 05:36

## 2023-07-15 RX ADMIN — HEPARIN SODIUM 5000 UNIT(S): 5000 INJECTION INTRAVENOUS; SUBCUTANEOUS at 22:42

## 2023-07-15 RX ADMIN — Medication 3: at 08:38

## 2023-07-15 RX ADMIN — AMPICILLIN SODIUM AND SULBACTAM SODIUM 200 GRAM(S): 250; 125 INJECTION, POWDER, FOR SUSPENSION INTRAMUSCULAR; INTRAVENOUS at 23:34

## 2023-07-15 RX ADMIN — Medication 325 MILLIGRAM(S): at 05:37

## 2023-07-15 RX ADMIN — Medication 200 MILLIGRAM(S): at 05:34

## 2023-07-15 RX ADMIN — Medication 50 MICROGRAM(S): at 05:37

## 2023-07-15 NOTE — PROGRESS NOTE ADULT - ASSESSMENT
Patient is a 89 y/o Male with  PMHx HTN, DM2, BPH, CAD s/p CABG, hypothyroidism, recently dx. from [facility] , progressive ambulatory dysfunction, admitted with hyperkalemia, hyponatremia noted as outpt. Found to have pseudomonas bacteremia.    # Acute mental status change:   - MR brain WNL  - EEG Done- 2nd EEG shows generalized slowing + Multifocal epileptic potentials. Started Keppra 500mg every 12 hours .   - Neuro f/up 7/4- placed on VEEG now.   - VEEG results nl  - continue with neurochecks      #Bacteremia due to Pseudomonas.   # Leukocytosis   #in setting of recent hidalgo in snf; now out; possible uti, testicular infection - e.faecalis  US Scrotum (for epididymitis) : Complex scrotal mixed echogenicity collection anterior to left testicle measuring approximately 7.7 x 3 x 6.9 cm. 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  ua positive; f/u ucxs- more than 3 organisms, repeat urine cxs neg , repeat blood cxs 6/26, 28 Neg.  blood cxs pseudomonas 6/24, as per  ID - continue on IV Zosyn tx.   -  did I&D bedside 7/5   - wound cx - e.faecalis  - c/w abx  - bedside debridement by burn team 7/13     # Hypomagnesemia - resolved  - f/u mag level  - mag 2.1 stable now    #hyponatremia- stable levels   - Na 133 7/6  - continue on salt tabs  - daily BMP monitoring      #constipated:   - Senna, Miralax.     #HTN (hypertension)  - continue lopressor 25mg PO twice daily with holding parameters.     #DM2 (diabetes mellitus, type 2)  - uncontrolled, Hga1C- 9  - lantus 12 units subc. once daily ,   - lispro 4 units subc. before each meal three times daily   ssi.    #History of BPH.   - continue finasteride 5 mg po once daily /tamsulosin 0.4 mg po once daily at bedtime     #h/o CAD (coronary artery disease). / h/o CABG  - plavix/zocor tx.  -patient is on supplemental oxygen via NC, 2 L , on RA sat 91- 92% at rest     #Hypothyroidism.   - continue synthroid 50mcg po once daily    # Iron def. anemia  - started on PO Iron tx.       DVT ppX, heparin  DNR/DNI (MOLST signed 6/29 Patient is a 89 y/o Male with  PMHx HTN, DM2, BPH, CAD s/p CABG, hypothyroidism, recently dx. from [facility] , progressive ambulatory dysfunction, admitted with hyperkalemia, hyponatremia noted as outpt. Found to have pseudomonas bacteremia.    # Acute mental status change:   - MR brain WNL  - EEG Done- 2nd EEG shows generalized slowing + Multifocal epileptic potentials. Started Keppra 500mg every 12 hours .   - Neuro f/up 7/4- placed on VEEG now.   - VEEG results nl  - continue with neurochecks      #Bacteremia due to Pseudomonas.   # Leukocytosis   #in setting of recent hidalgo in snf; now out; possible uti, testicular infection - e.faecalis  US Scrotum (for epididymitis) : Complex scrotal mixed echogenicity collection anterior to left testicle measuring approximately 7.7 x 3 x 6.9 cm. 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  ua positive; f/u ucxs- more than 3 organisms, repeat urine cxs neg , repeat blood cxs 6/26, 28 Neg.  blood cxs pseudomonas 6/24, as per  ID - continue on IV Zosyn tx.   -  did I&D bedside 7/5   - wound cx - e.faecalis  - c/w abx  - bedside debridement by burn team 7/13     # Hypomagnesemia - resolved  - f/u mag level  - mag 2.1 stable now    #hyponatremia- stable levels   - Na 133 7/6  - continue on salt tabs  - daily BMP monitoring      #constipated:   - Senna, Miralax.     #HTN (hypertension)  - continue lopressor 25mg PO twice daily with holding parameters.     #DM2 (diabetes mellitus, type 2)  - uncontrolled, Hga1C- 9  - lantus 12 units subc. once daily ,   - lispro 4 units subc. before each meal three times daily   ssi.    #History of BPH.   - continue finasteride 5 mg po once daily /tamsulosin 0.4 mg po once daily at bedtime     #h/o CAD (coronary artery disease). / h/o CABG  - plavix/zocor tx.  -patient is on supplemental oxygen via NC, 2 L , on RA sat 91- 92% at rest     #Hypothyroidism.   - continue synthroid 50mcg po once daily    # Iron def. anemia  - started on PO Iron tx.     Pending placement with [facility] and communication with daughter regarding packing    DVT ppX, heparin  DNR/DNI (MOLST signed 6/29

## 2023-07-15 NOTE — PROGRESS NOTE ADULT - ATTENDING COMMENTS
89 y/o Male with  PMHx HTN, DM2, BPH, CAD s/p CABG, hypothyroidism, recently dx. from [facility] , progressive ambulatory dysfunction, admitted   with hyperkalemia, hyponatremia noted as outpt. Found to have pseudomonas bacteremia.    # Acute mental status change:   - MR brain WNL  - EEG Done- 2nd EEG shows generalized slowing + Multifocal epileptic potentials. Started Keppra 500mg every 12 hours .   Neuro f/up 7/4- s/p VEEG - no epilepti-form activity   - 7/9/23 onwards:  patient is awake , tolerating diet well     #Bacteremia due to Pseudomonas.   # Leukocytosis   #in setting of acute Testicular abscess- due to Enterococcus infection  US Scrotum (for epididymitis) : Complex scrotal mixed echogenicity collection anterior to left testicle measuring approximately 7.7 x 3 x 6.9 cm. 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  ua positive; s/p urine cxs- more than 3 organisms, repeat urine cxs neg , repeat blood cxs 6/26, 28 Neg.  blood cxs pseudomonas 6/24, as per  ID - continue on IV Zosyn tx. , post ID f/up on 7/8/23- transitioned to PO Augmentin/Cipro tx. - due to noncompliance with po meds , transitioned back to IV abx. tx.   - consulted  - s/p Incision and drainage on 7/6/23- daily wound care, testicular  abscess cxs-grew Enterococcus fecalis- sensitive to ampicillin   c/w Cipro, Unasyn. Burn did bedside Dbx (7/12). Per ID, ok to complete Abx course with po cipro, augmentin till 7/18. For now keep IV (PO intake unreliable)  Burn advising daily packing change. About a 2 inch packing.    Patient has run out of his Nursing home days per insurance.   Planned to arrange home visiting nurse to help daughter with packing every other day.   Daughter will need to learn to change dressing on days nurse will not come however daughter is refusing to change dressings        #hyponatremia- stable levels   Per Nephro eval, likely [facility]? Urea 15gm QD,  Uric Acid = 4.5. No need to restrict po free water. continue on salt tabs  -daily BMP monitoring      #constipated: Senna, Miralax.     #HTN (hypertension)  -continue  lopressor 25mg PO twice daily with holding parameters.     #DM2 (diabetes mellitus, type 2)- uncontrolled, Hga1C- 9  c/w insulin     #History of BPH. - continue finasteride 5 mg po once daily /tamsulosin 0.4 mg po once daily at bedtime     #h/o CAD (coronary artery disease). / h/o CABG  - plavix/zocor tx    #Hypothyroidism. continue synthroid 50mcg po once daily    # Iron def. anemia- started on PO Iron tx.     # Ambulatory dysfunction, physical decline- PT     DVT ppX, heparin  DNR/DNI      #Progress Note Handoff:   Pending: MONTSE working on home VN for wound care  Dispo: Home 89 y/o Male with  PMHx HTN, DM2, BPH, CAD s/p CABG, hypothyroidism, recently dx. from [facility] , progressive ambulatory dysfunction, admitted   with hyperkalemia, hyponatremia noted as outpt. Found to have pseudomonas bacteremia.    # Acute mental status change:   - MR brain WNL  - EEG Done- 2nd EEG shows generalized slowing + Multifocal epileptic potentials. Started Keppra 500mg every 12 hours .   Neuro f/up 7/4- s/p VEEG - no epilepti-form activity   - 7/9/23 onwards:  patient is awake , tolerating diet well     #Bacteremia due to Pseudomonas  # Testicular abscess- due to Enterococcus infection  US Scrotum : Complex scrotal mixed echogenicity collection anterior to left testicle measuring approximately 7.7 x 3 x 6.9 cm. 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  ua positive; s/p urine cxs- more than 3 organisms, repeat urine cxs neg , repeat blood cxs 6/26, 28 Neg.  blood cxs pseudomonas 6/24, as per  ID - continue on IV Zosyn tx. , post ID f/up on 7/8/23- transitioned to PO Augmentin/Cipro tx. - due to noncompliance with po meds , transitioned back to IV abx. tx.   - consulted  - s/p Incision and drainage on 7/6/23- daily wound care, testicular  abscess cxs-grew Enterococcus fecalis- sensitive to ampicillin   c/w Cipro, Unasyn. Burn did bedside Dbx (7/12). Per ID, ok to complete Abx course with po cipro, augmentin till 7/18. For now keep IV (PO intake unreliable)  New wound cultures growing VRE as well- ID f/u   Burn advising daily packing change. About a 2 inch packing.    Patient has run out of his Nursing home days per insurance.   Planned to arrange home visiting nurse to help daughter with packing every other day.   Daughter will need to learn to change dressing on days nurse will not come however daughter is refusing to change dressings        #hyponatremia- stable levels   Per Nephro eval, likely [facility]? Urea 15gm QD,  Uric Acid = 4.5. No need to restrict po free water. continue on salt tabs  -daily BMP monitoring      #constipated: Senna, Miralax.     #HTN (hypertension)  -continue  lopressor 25mg PO twice daily with holding parameters.     #DM2 (diabetes mellitus, type 2)- uncontrolled, Hga1C- 9  c/w insulin     #History of BPH. - continue finasteride 5 mg po once daily /tamsulosin 0.4 mg po once daily at bedtime     #h/o CAD (coronary artery disease). / h/o CABG  - plavix/zocor tx    #Hypothyroidism. continue synthroid 50mcg po once daily    # Iron def. anemia- started on PO Iron tx.     # Ambulatory dysfunction, physical decline- PT     DVT ppX, heparin  DNR/DNI      #Progress Note Handoff:   Pending: ID f/u regarding VRE from culture, CM working on home VN for wound care  Dispo: Home

## 2023-07-16 LAB
ALBUMIN SERPL ELPH-MCNC: 3 G/DL — LOW (ref 3.5–5.2)
ALP SERPL-CCNC: 70 U/L — SIGNIFICANT CHANGE UP (ref 30–115)
ALT FLD-CCNC: 9 U/L — SIGNIFICANT CHANGE UP (ref 0–41)
ANION GAP SERPL CALC-SCNC: 12 MMOL/L — SIGNIFICANT CHANGE UP (ref 7–14)
AST SERPL-CCNC: 15 U/L — SIGNIFICANT CHANGE UP (ref 0–41)
BILIRUB SERPL-MCNC: 0.4 MG/DL — SIGNIFICANT CHANGE UP (ref 0.2–1.2)
BUN SERPL-MCNC: 20 MG/DL — SIGNIFICANT CHANGE UP (ref 10–20)
CALCIUM SERPL-MCNC: 9.2 MG/DL — SIGNIFICANT CHANGE UP (ref 8.4–10.5)
CHLORIDE SERPL-SCNC: 100 MMOL/L — SIGNIFICANT CHANGE UP (ref 98–110)
CO2 SERPL-SCNC: 24 MMOL/L — SIGNIFICANT CHANGE UP (ref 17–32)
CREAT SERPL-MCNC: 1 MG/DL — SIGNIFICANT CHANGE UP (ref 0.7–1.5)
EGFR: 68 ML/MIN/1.73M2 — SIGNIFICANT CHANGE UP
GLUCOSE BLDC GLUCOMTR-MCNC: 114 MG/DL — HIGH (ref 70–99)
GLUCOSE BLDC GLUCOMTR-MCNC: 171 MG/DL — HIGH (ref 70–99)
GLUCOSE BLDC GLUCOMTR-MCNC: 191 MG/DL — HIGH (ref 70–99)
GLUCOSE BLDC GLUCOMTR-MCNC: 87 MG/DL — SIGNIFICANT CHANGE UP (ref 70–99)
GLUCOSE SERPL-MCNC: 176 MG/DL — HIGH (ref 70–99)
HCT VFR BLD CALC: 30.8 % — LOW (ref 42–52)
HGB BLD-MCNC: 9.4 G/DL — LOW (ref 14–18)
MAGNESIUM SERPL-MCNC: 1.5 MG/DL — LOW (ref 1.8–2.4)
MCHC RBC-ENTMCNC: 27.6 PG — SIGNIFICANT CHANGE UP (ref 27–31)
MCHC RBC-ENTMCNC: 30.5 G/DL — LOW (ref 32–37)
MCV RBC AUTO: 90.6 FL — SIGNIFICANT CHANGE UP (ref 80–94)
NRBC # BLD: 0 /100 WBCS — SIGNIFICANT CHANGE UP (ref 0–0)
PLATELET # BLD AUTO: 300 K/UL — SIGNIFICANT CHANGE UP (ref 130–400)
PMV BLD: 9.8 FL — SIGNIFICANT CHANGE UP (ref 7.4–10.4)
POTASSIUM SERPL-MCNC: 4.6 MMOL/L — SIGNIFICANT CHANGE UP (ref 3.5–5)
POTASSIUM SERPL-SCNC: 4.6 MMOL/L — SIGNIFICANT CHANGE UP (ref 3.5–5)
PROT SERPL-MCNC: 5.3 G/DL — LOW (ref 6–8)
RBC # BLD: 3.4 M/UL — LOW (ref 4.7–6.1)
RBC # FLD: 19.4 % — HIGH (ref 11.5–14.5)
SODIUM SERPL-SCNC: 136 MMOL/L — SIGNIFICANT CHANGE UP (ref 135–146)
WBC # BLD: 13.3 K/UL — HIGH (ref 4.8–10.8)
WBC # FLD AUTO: 13.3 K/UL — HIGH (ref 4.8–10.8)

## 2023-07-16 PROCEDURE — 73030 X-RAY EXAM OF SHOULDER: CPT | Mod: 26,LT

## 2023-07-16 PROCEDURE — 99232 SBSQ HOSP IP/OBS MODERATE 35: CPT

## 2023-07-16 RX ADMIN — Medication 1 TABLET(S): at 14:03

## 2023-07-16 RX ADMIN — SODIUM CHLORIDE 1 GRAM(S): 9 INJECTION INTRAMUSCULAR; INTRAVENOUS; SUBCUTANEOUS at 05:30

## 2023-07-16 RX ADMIN — INSULIN GLARGINE 12 UNIT(S): 100 INJECTION, SOLUTION SUBCUTANEOUS at 09:05

## 2023-07-16 RX ADMIN — HEPARIN SODIUM 5000 UNIT(S): 5000 INJECTION INTRAVENOUS; SUBCUTANEOUS at 22:24

## 2023-07-16 RX ADMIN — AMPICILLIN SODIUM AND SULBACTAM SODIUM 200 GRAM(S): 250; 125 INJECTION, POWDER, FOR SUSPENSION INTRAMUSCULAR; INTRAVENOUS at 14:02

## 2023-07-16 RX ADMIN — HEPARIN SODIUM 5000 UNIT(S): 5000 INJECTION INTRAVENOUS; SUBCUTANEOUS at 05:30

## 2023-07-16 RX ADMIN — CLOPIDOGREL BISULFATE 75 MILLIGRAM(S): 75 TABLET, FILM COATED ORAL at 14:02

## 2023-07-16 RX ADMIN — Medication 200 MILLIGRAM(S): at 05:29

## 2023-07-16 RX ADMIN — Medication 50 MICROGRAM(S): at 05:30

## 2023-07-16 RX ADMIN — LEVETIRACETAM 400 MILLIGRAM(S): 250 TABLET, FILM COATED ORAL at 05:28

## 2023-07-16 RX ADMIN — TAMSULOSIN HYDROCHLORIDE 0.4 MILLIGRAM(S): 0.4 CAPSULE ORAL at 22:24

## 2023-07-16 RX ADMIN — SENNA PLUS 2 TABLET(S): 8.6 TABLET ORAL at 22:24

## 2023-07-16 RX ADMIN — POLYETHYLENE GLYCOL 3350 17 GRAM(S): 17 POWDER, FOR SOLUTION ORAL at 05:30

## 2023-07-16 RX ADMIN — AMPICILLIN SODIUM AND SULBACTAM SODIUM 200 GRAM(S): 250; 125 INJECTION, POWDER, FOR SUSPENSION INTRAMUSCULAR; INTRAVENOUS at 18:46

## 2023-07-16 RX ADMIN — AMPICILLIN SODIUM AND SULBACTAM SODIUM 200 GRAM(S): 250; 125 INJECTION, POWDER, FOR SUSPENSION INTRAMUSCULAR; INTRAVENOUS at 23:05

## 2023-07-16 RX ADMIN — Medication 1: at 09:04

## 2023-07-16 RX ADMIN — AMPICILLIN SODIUM AND SULBACTAM SODIUM 200 GRAM(S): 250; 125 INJECTION, POWDER, FOR SUSPENSION INTRAMUSCULAR; INTRAVENOUS at 05:28

## 2023-07-16 RX ADMIN — SIMVASTATIN 20 MILLIGRAM(S): 20 TABLET, FILM COATED ORAL at 22:24

## 2023-07-16 RX ADMIN — FINASTERIDE 5 MILLIGRAM(S): 5 TABLET, FILM COATED ORAL at 14:03

## 2023-07-16 RX ADMIN — Medication 325 MILLIGRAM(S): at 05:30

## 2023-07-16 RX ADMIN — Medication 25 MILLIGRAM(S): at 05:30

## 2023-07-16 RX ADMIN — LEVETIRACETAM 400 MILLIGRAM(S): 250 TABLET, FILM COATED ORAL at 18:44

## 2023-07-16 RX ADMIN — Medication 200 MILLIGRAM(S): at 18:41

## 2023-07-16 NOTE — PROGRESS NOTE ADULT - SUBJECTIVE AND OBJECTIVE BOX
Pt seen and examined at bedside.        VITAL SIGNS (Last 24 hrs):  T(C): 37.2 (07-16-23 @ 14:00), Max: 37.5 (07-16-23 @ 05:42)  HR: 95 (07-16-23 @ 14:00) (95 - 105)  BP: 132/71 (07-16-23 @ 14:00) (127/76 - 158/72)  RR: 18 (07-16-23 @ 14:00) (18 - 18)  SpO2: 98% (07-16-23 @ 14:00) (96% - 98%)  Wt(kg): --  Daily     Daily     I&O's Summary      PHYSICAL EXAM:  GENERAL: NAD  HEAD:  Atraumatic, Normocephalic  EYES: conjunctiva and sclera clear  NECK: Supple, No JVD  CHEST/LUNG: Clear to auscultation bilaterally; No wheeze  HEART: Regular rate and rhythm; No murmurs, rubs, or gallops  ABDOMEN: Soft, Nontender, Nondistended; Bowel sounds present  EXTREMITIES:  2+ Peripheral Pulses, No clubbing, cyanosis, or edema  SKIN: No rashes or lesions    Labs Reviewed        CBC Full  -  ( 16 Jul 2023 08:17 )  WBC Count : 13.30 K/uL  Hemoglobin : 9.4 g/dL  Hematocrit : 30.8 %  Platelet Count - Automated : 300 K/uL  Mean Cell Volume : 90.6 fL  Mean Cell Hemoglobin : 27.6 pg  Mean Cell Hemoglobin Concentration : 30.5 g/dL  Auto Neutrophil # : x  Auto Lymphocyte # : x  Auto Monocyte # : x  Auto Eosinophil # : x  Auto Basophil # : x  Auto Neutrophil % : x  Auto Lymphocyte % : x  Auto Monocyte % : x  Auto Eosinophil % : x  Auto Basophil % : x    BMP:    07-16 @ 08:17    Blood Urea Nitrogen - 20  Calcium - 9.2  Carbond Dioxide - 24  Chloride - 100  Creatinine - 1.0  Glucose - 176  Potassium - 4.6  Sodium - 136      Hemoglobin A1c -     Urine Culture:            MEDICATIONS  (STANDING):  ampicillin/sulbactam  IVPB 3 Gram(s) IV Intermittent every 6 hours  ciprofloxacin   IVPB 400 milliGRAM(s) IV Intermittent every 12 hours  clopidogrel Tablet 75 milliGRAM(s) Oral daily  dextrose 5%. 1000 milliLiter(s) (50 mL/Hr) IV Continuous <Continuous>  dextrose 5%. 1000 milliLiter(s) (100 mL/Hr) IV Continuous <Continuous>  dextrose 50% Injectable 25 Gram(s) IV Push once  dextrose 50% Injectable 25 Gram(s) IV Push once  dextrose 50% Injectable 12.5 Gram(s) IV Push once  DULoxetine 60 milliGRAM(s) Oral daily  ferrous    sulfate 325 milliGRAM(s) Oral two times a day  finasteride 5 milliGRAM(s) Oral daily  glucagon  Injectable 1 milliGRAM(s) IntraMuscular once  heparin   Injectable 5000 Unit(s) SubCutaneous every 8 hours  insulin glargine Injectable (LANTUS) 12 Unit(s) SubCutaneous every morning  insulin lispro (ADMELOG) corrective regimen sliding scale   SubCutaneous at bedtime  insulin lispro (ADMELOG) corrective regimen sliding scale   SubCutaneous three times a day before meals  lactobacillus acidophilus 1 Tablet(s) Oral with breakfast  levETIRAcetam  IVPB 500 milliGRAM(s) IV Intermittent every 12 hours  levothyroxine 50 MICROGram(s) Oral daily  metoprolol tartrate 25 milliGRAM(s) Oral two times a day  multivitamin 1 Tablet(s) Oral every 24 hours  polyethylene glycol 3350 17 Gram(s) Oral two times a day  senna 2 Tablet(s) Oral at bedtime  simvastatin 20 milliGRAM(s) Oral at bedtime  sodium chloride 1 Gram(s) Oral two times a day  tamsulosin 0.4 milliGRAM(s) Oral at bedtime  urea Oral Powder 15 Gram(s) Oral daily    MEDICATIONS  (PRN):  dextrose Oral Gel 15 Gram(s) Oral once PRN Blood Glucose LESS THAN 70 milliGRAM(s)/deciliter

## 2023-07-16 NOTE — PROGRESS NOTE ADULT - ASSESSMENT
89 y/o Male with  PMHx HTN, DM2, BPH, CAD s/p CABG, hypothyroidism, recently dx. from [facility] , progressive ambulatory dysfunction, admitted   with hyperkalemia, hyponatremia noted as outpt. Found to have pseudomonas bacteremia.    # Acute mental status change:   - MR brain WNL  - EEG Done- 2nd EEG shows generalized slowing + Multifocal epileptic potentials. Started Keppra 500mg every 12 hours .   Neuro f/up 7/4- s/p VEEG - no epilepti-form activity      #Bacteremia due to Pseudomonas  # Testicular abscess- due to Enterococcus infection  US Scrotum : Complex scrotal mixed echogenicity collection anterior to left testicle measuring approximately 7.7 x 3 x 6.9 cm. 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  ua positive; s/p urine cxs- more than 3 organisms, repeat urine cxs neg , repeat blood cxs 6/26, 28 Neg.  blood cxs pseudomonas 6/24, as per  ID - continue on IV Zosyn tx. , post ID f/up on 7/8/23- transitioned to PO Augmentin/Cipro tx. - due to noncompliance with po meds , transitioned back to IV abx. tx.   - consulted  - s/p Incision and drainage on 7/6/23- daily wound care, testicular  abscess cxs-grew Enterococcus fecalis- sensitive to ampicillin   c/w Cipro, Unasyn. Burn did bedside Dbx (7/12). Per ID, ok to complete Abx course with po cipro, augmentin till 7/18. For now keep IV (PO intake unreliable)  New wound cultures growing VRE as well- ID f/u   Burn advising daily packing change. About a 2 inch packing.    Patient has run out of his Nursing home days per insurance.   Planned to arrange home visiting nurse to help daughter with packing every other day.   Daughter will need to learn to change dressing on days nurse will not come however daughter is refusing to change dressings      #hyponatremia- stable levels   Per Nephro eval, likely [facility]? Urea 15gm QD,  Uric Acid = 4.5. No need to restrict po free water. continue on salt tabs  -daily BMP monitoring      #constipated: Senna, Miralax.     #HTN (hypertension)  -continue  lopressor 25mg PO twice daily with holding parameters.     #DM2 (diabetes mellitus, type 2)- uncontrolled, Hga1C- 9  c/w insulin     #History of BPH. - continue finasteride 5 mg po once daily /tamsulosin 0.4 mg po once daily at bedtime     #h/o CAD (coronary artery disease). / h/o CABG  - plavix/zocor tx    #Hypothyroidism. continue synthroid 50mcg po once daily    # Iron def. anemia- started on PO Iron tx.     # Ambulatory dysfunction, physical decline- PT     DVT ppX, heparin  DNR/DNI      #Progress Note Handoff:   Pending: ID f/u regarding VRE from culture and rising leukocytosis, CM working on home VN for wound care  Dispo: Home

## 2023-07-17 LAB
ALBUMIN SERPL ELPH-MCNC: 2.8 G/DL — LOW (ref 3.5–5.2)
ALP SERPL-CCNC: 76 U/L — SIGNIFICANT CHANGE UP (ref 30–115)
ALT FLD-CCNC: 9 U/L — SIGNIFICANT CHANGE UP (ref 0–41)
ANION GAP SERPL CALC-SCNC: 12 MMOL/L — SIGNIFICANT CHANGE UP (ref 7–14)
AST SERPL-CCNC: 18 U/L — SIGNIFICANT CHANGE UP (ref 0–41)
BILIRUB SERPL-MCNC: 0.6 MG/DL — SIGNIFICANT CHANGE UP (ref 0.2–1.2)
BUN SERPL-MCNC: 22 MG/DL — HIGH (ref 10–20)
CALCIUM SERPL-MCNC: 8.8 MG/DL — SIGNIFICANT CHANGE UP (ref 8.4–10.4)
CHLORIDE SERPL-SCNC: 101 MMOL/L — SIGNIFICANT CHANGE UP (ref 98–110)
CO2 SERPL-SCNC: 24 MMOL/L — SIGNIFICANT CHANGE UP (ref 17–32)
CREAT SERPL-MCNC: 1 MG/DL — SIGNIFICANT CHANGE UP (ref 0.7–1.5)
EGFR: 68 ML/MIN/1.73M2 — SIGNIFICANT CHANGE UP
GLUCOSE BLDC GLUCOMTR-MCNC: 178 MG/DL — HIGH (ref 70–99)
GLUCOSE BLDC GLUCOMTR-MCNC: 212 MG/DL — HIGH (ref 70–99)
GLUCOSE BLDC GLUCOMTR-MCNC: 235 MG/DL — HIGH (ref 70–99)
GLUCOSE BLDC GLUCOMTR-MCNC: 273 MG/DL — HIGH (ref 70–99)
GLUCOSE SERPL-MCNC: 188 MG/DL — HIGH (ref 70–99)
HCT VFR BLD CALC: 28.1 % — LOW (ref 42–52)
HGB BLD-MCNC: 8.6 G/DL — LOW (ref 14–18)
MAGNESIUM SERPL-MCNC: 1.6 MG/DL — LOW (ref 1.8–2.4)
MCHC RBC-ENTMCNC: 27.7 PG — SIGNIFICANT CHANGE UP (ref 27–31)
MCHC RBC-ENTMCNC: 30.6 G/DL — LOW (ref 32–37)
MCV RBC AUTO: 90.4 FL — SIGNIFICANT CHANGE UP (ref 80–94)
NRBC # BLD: 0 /100 WBCS — SIGNIFICANT CHANGE UP (ref 0–0)
PLATELET # BLD AUTO: 292 K/UL — SIGNIFICANT CHANGE UP (ref 130–400)
PMV BLD: 10.1 FL — SIGNIFICANT CHANGE UP (ref 7.4–10.4)
POTASSIUM SERPL-MCNC: 4.8 MMOL/L — SIGNIFICANT CHANGE UP (ref 3.5–5)
POTASSIUM SERPL-SCNC: 4.8 MMOL/L — SIGNIFICANT CHANGE UP (ref 3.5–5)
PROT SERPL-MCNC: 5.2 G/DL — LOW (ref 6–8)
RBC # BLD: 3.11 M/UL — LOW (ref 4.7–6.1)
RBC # FLD: 19.4 % — HIGH (ref 11.5–14.5)
SODIUM SERPL-SCNC: 137 MMOL/L — SIGNIFICANT CHANGE UP (ref 135–146)
WBC # BLD: 10.65 K/UL — SIGNIFICANT CHANGE UP (ref 4.8–10.8)
WBC # FLD AUTO: 10.65 K/UL — SIGNIFICANT CHANGE UP (ref 4.8–10.8)

## 2023-07-17 PROCEDURE — 93010 ELECTROCARDIOGRAM REPORT: CPT

## 2023-07-17 PROCEDURE — 99232 SBSQ HOSP IP/OBS MODERATE 35: CPT

## 2023-07-17 RX ORDER — MAGNESIUM SULFATE 500 MG/ML
2 VIAL (ML) INJECTION
Refills: 0 | Status: COMPLETED | OUTPATIENT
Start: 2023-07-17 | End: 2023-07-17

## 2023-07-17 RX ADMIN — Medication 25 MILLIGRAM(S): at 18:23

## 2023-07-17 RX ADMIN — LEVETIRACETAM 400 MILLIGRAM(S): 250 TABLET, FILM COATED ORAL at 05:02

## 2023-07-17 RX ADMIN — Medication 200 MILLIGRAM(S): at 05:02

## 2023-07-17 RX ADMIN — SODIUM CHLORIDE 1 GRAM(S): 9 INJECTION INTRAMUSCULAR; INTRAVENOUS; SUBCUTANEOUS at 05:03

## 2023-07-17 RX ADMIN — Medication 50 MICROGRAM(S): at 05:03

## 2023-07-17 RX ADMIN — Medication 3: at 12:25

## 2023-07-17 RX ADMIN — LEVETIRACETAM 400 MILLIGRAM(S): 250 TABLET, FILM COATED ORAL at 18:21

## 2023-07-17 RX ADMIN — POLYETHYLENE GLYCOL 3350 17 GRAM(S): 17 POWDER, FOR SOLUTION ORAL at 18:22

## 2023-07-17 RX ADMIN — AMPICILLIN SODIUM AND SULBACTAM SODIUM 200 GRAM(S): 250; 125 INJECTION, POWDER, FOR SUSPENSION INTRAMUSCULAR; INTRAVENOUS at 17:54

## 2023-07-17 RX ADMIN — HEPARIN SODIUM 5000 UNIT(S): 5000 INJECTION INTRAVENOUS; SUBCUTANEOUS at 14:19

## 2023-07-17 RX ADMIN — Medication 200 MILLIGRAM(S): at 17:55

## 2023-07-17 RX ADMIN — SODIUM CHLORIDE 1 GRAM(S): 9 INJECTION INTRAMUSCULAR; INTRAVENOUS; SUBCUTANEOUS at 18:22

## 2023-07-17 RX ADMIN — HEPARIN SODIUM 5000 UNIT(S): 5000 INJECTION INTRAVENOUS; SUBCUTANEOUS at 05:04

## 2023-07-17 RX ADMIN — AMPICILLIN SODIUM AND SULBACTAM SODIUM 200 GRAM(S): 250; 125 INJECTION, POWDER, FOR SUSPENSION INTRAMUSCULAR; INTRAVENOUS at 12:30

## 2023-07-17 RX ADMIN — Medication 325 MILLIGRAM(S): at 05:03

## 2023-07-17 RX ADMIN — DULOXETINE HYDROCHLORIDE 60 MILLIGRAM(S): 30 CAPSULE, DELAYED RELEASE ORAL at 12:21

## 2023-07-17 RX ADMIN — AMPICILLIN SODIUM AND SULBACTAM SODIUM 200 GRAM(S): 250; 125 INJECTION, POWDER, FOR SUSPENSION INTRAMUSCULAR; INTRAVENOUS at 05:01

## 2023-07-17 RX ADMIN — Medication 25 MILLIGRAM(S): at 05:03

## 2023-07-17 RX ADMIN — SENNA PLUS 2 TABLET(S): 8.6 TABLET ORAL at 21:34

## 2023-07-17 RX ADMIN — Medication 1: at 08:58

## 2023-07-17 RX ADMIN — SIMVASTATIN 20 MILLIGRAM(S): 20 TABLET, FILM COATED ORAL at 21:34

## 2023-07-17 RX ADMIN — Medication 325 MILLIGRAM(S): at 18:23

## 2023-07-17 RX ADMIN — INSULIN GLARGINE 12 UNIT(S): 100 INJECTION, SOLUTION SUBCUTANEOUS at 08:59

## 2023-07-17 RX ADMIN — TAMSULOSIN HYDROCHLORIDE 0.4 MILLIGRAM(S): 0.4 CAPSULE ORAL at 21:34

## 2023-07-17 RX ADMIN — Medication 15 GRAM(S): at 12:23

## 2023-07-17 RX ADMIN — Medication 25 GRAM(S): at 21:34

## 2023-07-17 RX ADMIN — Medication 1 TABLET(S): at 09:00

## 2023-07-17 RX ADMIN — HEPARIN SODIUM 5000 UNIT(S): 5000 INJECTION INTRAVENOUS; SUBCUTANEOUS at 21:34

## 2023-07-17 RX ADMIN — CLOPIDOGREL BISULFATE 75 MILLIGRAM(S): 75 TABLET, FILM COATED ORAL at 12:24

## 2023-07-17 RX ADMIN — FINASTERIDE 5 MILLIGRAM(S): 5 TABLET, FILM COATED ORAL at 12:20

## 2023-07-17 RX ADMIN — Medication 1 TABLET(S): at 14:19

## 2023-07-17 RX ADMIN — Medication 2: at 18:23

## 2023-07-17 RX ADMIN — Medication 25 GRAM(S): at 16:52

## 2023-07-17 NOTE — PROGRESS NOTE ADULT - SUBJECTIVE AND OBJECTIVE BOX
CHEL CROFT  89y, Male  Allergy: No Known Allergies      LOS  24d    CHIEF COMPLAINT: Abnormal Lab results (16 Jul 2023 15:42)      INTERVAL EVENTS/HPI  - No acute events overnight  - T(F): , Max: 97.1 (07-16-23 @ 20:17)  - WBC trended down today   - WBC Count: 10.65 (07-17-23 @ 07:31)  WBC Count: 13.30 (07-16-23 @ 08:17)     - Creatinine: 1.0 (07-17-23 @ 07:31)  Creatinine: 1.0 (07-16-23 @ 08:17)       ROS  General: Denies rigors, nightsweats  HEENT: Denies headache, rhinorrhea, sore throat, eye pain  CV: Denies CP, palpitations  PULM: Denies wheezing, hemoptysis  GI: Denies hematemesis, hematochezia, melena  : Denies discharge, hematuria  MSK: Denies arthralgias, myalgias  SKIN: Denies rash, lesions  NEURO: Denies paresthesias, weakness  PSYCH: Denies depression, anxiety    VITALS:  T(F): 96.8, Max: 97.1 (07-16-23 @ 20:17)  HR: 107  BP: 120/56  RR: 18Vital Signs Last 24 Hrs  T(C): 36 (17 Jul 2023 05:25), Max: 36.2 (16 Jul 2023 20:17)  T(F): 96.8 (17 Jul 2023 05:25), Max: 97.1 (16 Jul 2023 20:17)  HR: 107 (17 Jul 2023 05:25) (104 - 107)  BP: 120/56 (17 Jul 2023 05:25) (120/56 - 128/58)  BP(mean): --  RR: 18 (17 Jul 2023 05:25) (18 - 19)  SpO2: 97% (17 Jul 2023 05:25) (97% - 98%)    Parameters below as of 17 Jul 2023 05:25  Patient On (Oxygen Delivery Method): room air        PHYSICAL EXAM:  Gen: NAD, resting in bed  HEENT: Normocephalic, atraumatic  Neck: supple, no lymphadenopathy  CV: Regular rate & regular rhythm  Lungs: decreased BS at bases, no fremitus  Abdomen: Soft, BS present  Ext: Warm, well perfused  Neuro: non focal, awake  Skin: no rash, no erythemal; scrotum with scant drainage by I and D site  Lines: no phlebitis    FH: Non-contributory  Social Hx: Non-contributory    TESTS & MEASUREMENTS:                        8.6    10.65 )-----------( 292      ( 17 Jul 2023 07:31 )             28.1     07-17    137  |  101  |  22<H>  ----------------------------<  188<H>  4.8   |  24  |  1.0    Ca    8.8      17 Jul 2023 07:31  Mg     1.6     07-17    TPro  5.2<L>  /  Alb  2.8<L>  /  TBili  0.6  /  DBili  x   /  AST  18  /  ALT  9   /  AlkPhos  76  07-17      LIVER FUNCTIONS - ( 17 Jul 2023 07:31 )  Alb: 2.8 g/dL / Pro: 5.2 g/dL / ALK PHOS: 76 U/L / ALT: 9 U/L / AST: 18 U/L / GGT: x           Urinalysis Basic - ( 17 Jul 2023 07:31 )    Color: x / Appearance: x / SG: x / pH: x  Gluc: 188 mg/dL / Ketone: x  / Bili: x / Urobili: x   Blood: x / Protein: x / Nitrite: x   Leuk Esterase: x / RBC: x / WBC x   Sq Epi: x / Non Sq Epi: x / Bacteria: x        Culture - Other (collected 07-10-23 @ 13:33)  Source: .Other scrotum  Final Report (07-12-23 @ 23:17):    Numerous Enterococcus faecium (vancomycin resistant)    Moderate Enterococcus faecalis  Organism: Enterococcus faecium (vancomycin resistant)  Enterococcus faecalis (07-12-23 @ 23:17)  Organism: Enterococcus faecalis (07-12-23 @ 23:17)      Method Type: KEVIN      -  Ampicillin: S <=2 Predicts results to ampicillin/sulbactam, amoxacillin-clavulanate and  piperacillin-tazobactam.      -  Tetracycline: R >8      -  Vancomycin: S 2  Organism: Enterococcus faecium (vancomycin resistant) (07-12-23 @ 23:17)      Method Type: KEVIN      -  Ampicillin: R >8 Predicts results to ampicillin/sulbactam, amoxacillin-clavulanate and  piperacillin-tazobactam.      -  Daptomycin: SDD 4 The breakpoint for SDD (susceptible dose dependent)is based on a dosage regimen of 8-12 mg/kg administered every 24 h in adults and is intended for serious infections due to E. faecium. Consultation with an infectious diseases specialist is recommended.      -  Levofloxacin: R >4      -  Linezolid: S 2      -  Tetracycline: R >8      -  Vancomycin: R >16    Culture - Blood (collected 07-10-23 @ 12:44)  Source: .Blood Blood  Final Report (07-15-23 @ 22:00):    No growth at 5 days    Culture - Abscess with Gram Stain (collected 07-05-23 @ 18:48)  Source: .Abscess LEFT scrotal abscess  Gram Stain (07-06-23 @ 05:04):    Rare polymorphonuclear leukocytes seen per low power field    No organisms seen per oil power field  Final Report (07-10-23 @ 16:57):    Moderate Enterococcus faecalis  Organism: Enterococcus faecalis (07-10-23 @ 16:57)  Organism: Enterococcus faecalis (07-10-23 @ 16:57)      Method Type: KEVIN      -  Ampicillin: S <=2 Predicts results to ampicillin/sulbactam, amoxacillin-clavulanate and  piperacillin-tazobactam.      -  Tetracycline: R >8      -  Vancomycin: S 2    Culture - Blood (collected 06-28-23 @ 04:30)  Source: .Blood Blood-Peripheral  Final Report (07-03-23 @ 18:01):    No growth at 5 days    Culture - Urine (collected 06-27-23 @ 12:50)  Source: Catheterized Catheterized  Final Report (06-28-23 @ 23:40):    <10,000 CFU/mL Normal Urogenital Roxana    Culture - Blood (collected 06-26-23 @ 06:02)  Source: .Blood None  Final Report (07-01-23 @ 18:00):    No growth at 5 days    Culture - Blood (collected 06-24-23 @ 00:28)  Source: .Blood None  Gram Stain (06-25-23 @ 17:54):    Growth in aerobic bottle: Gram Negative Rods    Growth in anaerobic bottle: Gram Negative Rods  Final Report (06-26-23 @ 14:45):    Growth in aerobic and anaerobic bottles: Pseudomonas aeruginosa    ***Blood Panel PCR results on this specimen are available    approximately 3 hours after the Gram stain result.***    Gram stain, PCR, and/or culture results may not always    correspond due to difference in methodologies.    ************************************************************    This PCR assay was performed by multiplex PCR. This    Assay tests for 66 bacterial and resistance gene targets.    Please refer to the [facility] test directory    at [email or web address] for details.  Organism: Blood Culture PCR  Pseudomonas aeruginosa (06-26-23 @ 14:45)  Organism: Pseudomonas aeruginosa (06-26-23 @ 14:45)      Method Type: KEVIN      -  Amikacin: S -[id number]      -  Aztreonam: S <=4      -  Cefepime: S <=2      -  Ceftazidime: S <=1      -  Ciprofloxacin: S <=0.25      -  Gentamicin: S <=2      -  Imipenem: S <=1      -  Levofloxacin: S <=0.5      -  Meropenem: S <=1      -  Piperacillin/Tazobactam: S <=8      -  Tobramycin: S <=2  Organism: Blood Culture PCR (06-26-23 @ 14:45)      Method Type: PCR      -  Pseudomonas aeruginosa: Detec    Culture - Urine (collected 06-23-23 @ 15:45)  Source: Clean Catch Clean Catch (Midstream)  Final Report (06-25-23 @ 16:47):    >=3 organisms. Probable collection contamination.            INFECTIOUS DISEASES TESTING  COVID-19 PCR: NotDetec (04-03-23 @ 10:23)  COVID-19 PCR: NotDetec (04-01-23 @ 18:46)  COVID-19 PCR: NotDetec (03-28-23 @ 21:55)  COVID-19 PCR: NotDetec (12-23-22 @ 10:23)  COVID-19 PCR: NotDetec (12-19-22 @ 12:54)  Legionella Antigen, Urine: Negative (12-17-22 @ 14:02)  MRSA PCR Result.: Negative (12-14-22 @ 16:33)  Procalcitonin, Serum: 0.16 (12-14-22 @ 05:42)  Procalcitonin, Serum: 0.16 (12-05-22 @ 13:48)      INFLAMMATORY MARKERS      RADIOLOGY & ADDITIONAL TESTS:  I have personally reviewed the last available Chest xray  CXR      CT      CARDIOLOGY TESTING  12 Lead ECG:   Ventricular Rate 86 BPM    Atrial Rate 86 BPM    P-R Interval 148 ms    QRS Duration 92 ms    Q-T Interval 374 ms    QTC Calculation(Bazett) 447 ms    P Axis 42 degrees    R Axis -6 degrees    T Axis -27 degrees    Diagnosis Line Normal sinus rhythm with sinus arrhythmia  Left ventricular hypertrophy  Inferior infarct , age undetermined  T wave abnormality, consider lateral ischemia  Abnormal ECG    Confirmed by Mohan Lin ([id number]) on 7/11/2023 2:21:11 PM (07-11-23 @ 11:24)      MEDICATIONS  ampicillin/sulbactam  IVPB 3 IV Intermittent every 6 hours  ciprofloxacin   IVPB 400 IV Intermittent every 12 hours  clopidogrel Tablet 75 Oral daily  dextrose 5%. 1000 IV Continuous <Continuous>  dextrose 5%. 1000 IV Continuous <Continuous>  dextrose 50% Injectable 25 IV Push once  dextrose 50% Injectable 25 IV Push once  dextrose 50% Injectable 12.5 IV Push once  DULoxetine 60 Oral daily  ferrous    sulfate 325 Oral two times a day  finasteride 5 Oral daily  glucagon  Injectable 1 IntraMuscular once  heparin   Injectable 5000 SubCutaneous every 8 hours  insulin glargine Injectable (LANTUS) 12 SubCutaneous every morning  insulin lispro (ADMELOG) corrective regimen sliding scale  SubCutaneous at bedtime  insulin lispro (ADMELOG) corrective regimen sliding scale  SubCutaneous three times a day before meals  lactobacillus acidophilus 1 Oral with breakfast  levETIRAcetam  IVPB 500 IV Intermittent every 12 hours  levothyroxine 50 Oral daily  metoprolol tartrate 25 Oral two times a day  multivitamin 1 Oral every 24 hours  polyethylene glycol 3350 17 Oral two times a day  senna 2 Oral at bedtime  simvastatin 20 Oral at bedtime  sodium chloride 1 Oral two times a day  tamsulosin 0.4 Oral at bedtime  urea Oral Powder 15 Oral daily      WEIGHT  Weight (kg): 54.8 (07-13-23 @ 17:03)  Creatinine: 1.0 mg/dL (07-17-23 @ 07:31)      ANTIBIOTICS:  ampicillin/sulbactam  IVPB 3 Gram(s) IV Intermittent every 6 hours  ciprofloxacin   IVPB 400 milliGRAM(s) IV Intermittent every 12 hours      All available historical records have been reviewed

## 2023-07-17 NOTE — PROGRESS NOTE ADULT - SUBJECTIVE AND OBJECTIVE BOX
OVERNIGHT EVENTS:     No new complaints.    Vital Signs Last 24 Hrs  T(C): 36 (17 Jul 2023 05:25), Max: 36.2 (16 Jul 2023 20:17)  T(F): 96.8 (17 Jul 2023 05:25), Max: 97.1 (16 Jul 2023 20:17)  HR: 107 (17 Jul 2023 05:25) (104 - 107)  BP: 120/56 (17 Jul 2023 05:25) (120/56 - 128/58)  BP(mean): --  ABP: --  ABP(mean): --  RR: 18 (17 Jul 2023 05:25) (18 - 19)  SpO2: 97% (17 Jul 2023 05:25) (97% - 98%)    O2 Parameters below as of 17 Jul 2023 05:25  Patient On (Oxygen Delivery Method): room air          I&O's Summary        PHYSICAL EXAM:    CCOx3  Reg S1S2, aortic stenosis murmur  GBAE  Soft non tender abdomen, BS+  No LLEx2    LABS:                        8.6    10.65 )-----------( 292      ( 17 Jul 2023 07:31 )             28.1     07-17    137  |  101  |  22<H>  ----------------------------<  188<H>  4.8   |  24  |  1.0    Ca    8.8      17 Jul 2023 07:31  Mg     1.6     07-17    TPro  5.2<L>  /  Alb  2.8<L>  /  TBili  0.6  /  DBili  x   /  AST  18  /  ALT  9   /  AlkPhos  76  07-17      Urinalysis Basic - ( 17 Jul 2023 07:31 )    Color: x / Appearance: x / SG: x / pH: x  Gluc: 188 mg/dL / Ketone: x  / Bili: x / Urobili: x   Blood: x / Protein: x / Nitrite: x   Leuk Esterase: x / RBC: x / WBC x   Sq Epi: x / Non Sq Epi: x / Bacteria: x      CAPILLARY BLOOD GLUCOSE      POCT Blood Glucose.: 273 mg/dL (17 Jul 2023 11:34)  POCT Blood Glucose.: 178 mg/dL (17 Jul 2023 08:07)  POCT Blood Glucose.: 191 mg/dL (16 Jul 2023 20:35)  POCT Blood Glucose.: 114 mg/dL (16 Jul 2023 17:06)        RADIOLOGY & ADDITIONAL TESTS:    Consultant(s) Notes Reviewed:  [x ] YES  [ ] NO    MEDICATIONS  (STANDING):  ampicillin/sulbactam  IVPB 3 Gram(s) IV Intermittent every 6 hours  ciprofloxacin   IVPB 400 milliGRAM(s) IV Intermittent every 12 hours  clopidogrel Tablet 75 milliGRAM(s) Oral daily  dextrose 5%. 1000 milliLiter(s) (100 mL/Hr) IV Continuous <Continuous>  dextrose 5%. 1000 milliLiter(s) (50 mL/Hr) IV Continuous <Continuous>  dextrose 50% Injectable 25 Gram(s) IV Push once  dextrose 50% Injectable 25 Gram(s) IV Push once  dextrose 50% Injectable 12.5 Gram(s) IV Push once  DULoxetine 60 milliGRAM(s) Oral daily  ferrous    sulfate 325 milliGRAM(s) Oral two times a day  finasteride 5 milliGRAM(s) Oral daily  glucagon  Injectable 1 milliGRAM(s) IntraMuscular once  heparin   Injectable 5000 Unit(s) SubCutaneous every 8 hours  insulin glargine Injectable (LANTUS) 12 Unit(s) SubCutaneous every morning  insulin lispro (ADMELOG) corrective regimen sliding scale   SubCutaneous at bedtime  insulin lispro (ADMELOG) corrective regimen sliding scale   SubCutaneous three times a day before meals  lactobacillus acidophilus 1 Tablet(s) Oral with breakfast  levETIRAcetam  IVPB 500 milliGRAM(s) IV Intermittent every 12 hours  levothyroxine 50 MICROGram(s) Oral daily  metoprolol tartrate 25 milliGRAM(s) Oral two times a day  multivitamin 1 Tablet(s) Oral every 24 hours  polyethylene glycol 3350 17 Gram(s) Oral two times a day  senna 2 Tablet(s) Oral at bedtime  simvastatin 20 milliGRAM(s) Oral at bedtime  sodium chloride 1 Gram(s) Oral two times a day  tamsulosin 0.4 milliGRAM(s) Oral at bedtime  urea Oral Powder 15 Gram(s) Oral daily    MEDICATIONS  (PRN):  dextrose Oral Gel 15 Gram(s) Oral once PRN Blood Glucose LESS THAN 70 milliGRAM(s)/deciliter      Home Medications:  DULoxetine 60 mg oral delayed release capsule: 1 cap(s) orally once a day (13 Dec 2022 22:03)  ferrous sulfate 325 mg (65 mg elemental iron) oral tablet: 1 tab(s) orally 2 times a day (14 Jul 2023 13:50)  finasteride 5 mg oral tablet: 1 tab(s) orally once a day (13 Dec 2022 22:03)  lactobacillus acidophilus oral capsule: 1 tab(s) orally once a day (20 Dec 2022 10:18)  levothyroxine 50 mcg (0.05 mg) oral tablet: 1 tab(s) orally once a day (13 Dec 2022 22:03)  Metoprolol Tartrate 25 mg oral tablet: 1 tab(s) orally 2 times a day (13 Dec 2022 22:03)  Plavix 75 mg oral tablet: 1 orally once a day (23 Jun 2023 21:54)  polyethylene glycol 3350 oral powder for reconstitution: 17 gram(s) orally 2 times a day (14 Jul 2023 13:50)  simvastatin 20 mg oral tablet: 1 tab(s) orally once a day (at bedtime) (13 Dec 2022 22:03)  sodium chloride 1 g oral tablet: 1 tab(s) orally 2 times a day (14 Jul 2023 13:50)  tamsulosin 0.4 mg oral capsule: 1 cap(s) orally once a day (13 Dec 2022 22:03)  Toujeo SoloStar 300 units/mL subcutaneous solution: 12 unit(s) subcutaneous once a day (in the morning) (13 Dec 2022 22:03)

## 2023-07-17 NOTE — PROGRESS NOTE ADULT - ASSESSMENT
ASSESSMENT  89 yo M with hx of HTN, HLD, DM II BPH and CAD s/p CABG x 4, Hypothyroidism presents to ED for abnormal labs result (hyperkalemia and hyponatremia).    IMPRESSION  #Sepsis present on admission  #Pseudomonas bacteremia from suspected UTI with Left Epididymitis/Scrotal abscess -- had recent hidalgo   - BLood Cx 6/24 +   - Urine Cx 6/23 > 3 organisms   - US Kidney and Bladder (06.23.23 @ 21:08): IMPRESSION: No sonographic evidence of hydronephrosis. Post void residual of 69 mL (46%).  - US Testicles (07.01.23 @ 08:52): Complex scrotal mixed echogenicity collection anterior to left testicle  measuring approximately 7.7 x 3 x 6.9 cm. 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  - s/p I and D 7/5 - Wound CX E faecalis     #Hyponatremia    #BPH  #DM II  #CAD s/p CABG    #Abx allergy: No Known Allergies    CrCl 31    RECOMMENDATIONS  - continue unasyn 3g q 6 hours  - continue Cipro 400 mg q 12 hours   - can switch to PO cipro 500 mg q 12 hours and Augmentin 875/125 mg BID to complete 5 more days       Please call or message on [employer] Teams if with any questions.  Spectra [phone]

## 2023-07-17 NOTE — PROGRESS NOTE ADULT - TIME BILLING
I have personally seen and examined this patient.    I have reviewed all pertinent clinical information and reviewed all relevant imaging and diagnostic studies personally.   I counseled the patient about diagnostic testing and treatment plan. All questions were answered.   I discussed recommendations with the primary team.
Patient seen and evaluated with PA  I agree with the plan of care as outlined
I have personally seen and examined this patient.    I have reviewed all pertinent clinical information and reviewed all relevant imaging and diagnostic studies personally.   I counseled the patient about diagnostic testing and treatment plan. All questions were answered.   I discussed recommendations with the primary team.
I have seen and evaluated the patient  I agree with the plan of care as outlined
discussed the care and agree with the plan of care as outlined

## 2023-07-17 NOTE — PROGRESS NOTE ADULT - ATTENDING COMMENTS
89 y/o Male with  PMHx HTN, DM2, BPH, CAD s/p CABG, hypothyroidism, recently dx. from [facility] , progressive ambulatory dysfunction, admitted   with hyperkalemia, hyponatremia noted as outpt. Found to have pseudomonas bacteremia.    # Acute mental status change:   - MR brain WNL  - EEG Done- 2nd EEG shows generalized slowing + Multifocal epileptic potentials. Started Keppra 500mg every 12 hours .   Neuro f/up 7/4- s/p VEEG - no epilepti-form activity      #Bacteremia due to Pseudomonas  #Testicular abscess- due to Enterococcus infection  US Scrotum : Complex scrotal mixed echogenicity collection anterior to left testicle measuring approximately 7.7 x 3 x 6.9 cm. 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  ua positive; s/p urine cxs- more than 3 organisms, repeat urine cxs neg , repeat blood cxs 6/26, 28 Neg.  blood cxs pseudomonas 6/24, as per  ID - continue on IV Zosyn tx. , post ID f/up on 7/8/23- transitioned to PO Augmentin/Cipro tx. - due to noncompliance with po meds, transitioned back to IV abx. tx.   - consulted  - s/p Incision and drainage on 7/6/23- daily wound care, testicular  abscess cxs-grew Enterococcus fecalis- sensitive to ampicillin   c/w Cipro, Unasyn. Burn did bedside Dbx (7/12). Per ID, ok to complete Abx course with po cipro, augmentin for 5 more days. For now keep IV (PO intake unreliable)  New wound cultures growing VRE as well- ID f/u noted, do not need to treat   Burn advising daily packing change. About a 2 inch packing.    Patient has run out of his Nursing home days per insurance.   Planned to arrange home visiting nurse to help daughter with packing every other day.       #hyponatremia- stable levels   Per Nephro eval, likely [facility]? Urea 15gm QD,  Uric Acid = 4.5. No need to restrict po free water. continue on salt tabs  -daily BMP monitoring      #constipated: Senna, Miralax.     #HTN (hypertension)  -continue  lopressor 25mg PO twice daily with holding parameters.     #DM2 (diabetes mellitus, type 2)- uncontrolled, Hga1C- 9  c/w insulin     #History of BPH. - continue finasteride 5 mg po once daily /tamsulosin 0.4 mg po once daily at bedtime     #h/o CAD (coronary artery disease). / h/o CABG  - plavix/zocor tx    #Hypothyroidism. continue synthroid 50mcg po once daily    # Iron def. anemia- started on PO Iron tx.     # Ambulatory dysfunction, physical decline- PT     DVT ppX, heparin  DNR/DNI      #Progress Note Handoff:   Pending: anticipate for tomorrow, daughter agreed to do wound care   Dispo: Home

## 2023-07-17 NOTE — PROGRESS NOTE ADULT - ASSESSMENT
Assessment and Plan:   · Assessment    Patient is a 89 y/o Male with  PMHx HTN, DM2, BPH, CAD s/p CABG, hypothyroidism, recently dx. from [facility] , progressive ambulatory dysfunction, admitted with hyperkalemia, hyponatremia noted as outpt. Found to have pseudomonas bacteremia.    # Acute mental status change:   - MR brain WNL  - EEG Done- 2nd EEG shows generalized slowing + Multifocal epileptic potentials. Started Keppra 500mg every 12 hours .   - Neuro f/up 7/4- placed on VEEG  - VEEG results nl  - continue with neurochecks      #Bacteremia due to Pseudomonas.   # Leukocytosis   #in setting of recent hidalgo in snf; now out; possible uti, testicular infection - e.faecalis  US Scrotum (for epididymitis) : Complex scrotal mixed echogenicity collection anterior to left testicle measuring approximately 7.7 x 3 x 6.9 cm. 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  ua positive; f/u ucxs- more than 3 organisms, repeat urine cxs neg , repeat blood cxs 6/26, 28 Neg.  blood cxs pseudomonas 6/24, as per  ID, continue amp/sulbactam and ciprofloxacin for 7/18  -  did I&D bedside 7/5   - wound cx - e.faecalis  - c/w abx  - bedside debridement by burn team 7/13     # Hypomagnesemia - resolved  - f/u mag level  - mag 2.1 stable now    #hyponatremia- stable levels   - Na 133 7/6  - continue on salt tabs  - daily BMP monitoring      #constipated:   - Senna, Miralax.     #HTN (hypertension)  - continue lopressor 25mg PO twice daily with holding parameters.     #DM2 (diabetes mellitus, type 2)  - uncontrolled, Hga1C- 9  - lantus 12 units subc. once daily ,   - lispro 4 units subc. before each meal three times daily   ssi.    #History of BPH.   - continue finasteride 5 mg po once daily /tamsulosin 0.4 mg po once daily at bedtime     #h/o CAD (coronary artery disease). / h/o CABG  - plavix/zocor tx.  -patient is on supplemental oxygen via NC, 2 L , on RA sat 91- 92% at rest     #Hypothyroidism.   - continue synthroid 50mcg po once daily    # Iron def. anemia  - started on PO Iron tx.     Pending placement with [facility] and communication with daughter regarding packing    DVT ppX, heparin  DNR/DNI ([facility]OL[facility] signed 6/29

## 2023-07-18 VITALS
TEMPERATURE: 96 F | HEART RATE: 77 BPM | RESPIRATION RATE: 18 BRPM | SYSTOLIC BLOOD PRESSURE: 101 MMHG | OXYGEN SATURATION: 96 % | DIASTOLIC BLOOD PRESSURE: 51 MMHG

## 2023-07-18 LAB
GLUCOSE BLDC GLUCOMTR-MCNC: 178 MG/DL — HIGH (ref 70–99)
GLUCOSE BLDC GLUCOMTR-MCNC: 219 MG/DL — HIGH (ref 70–99)
TROPONIN T SERPL-MCNC: 0.03 NG/ML — CRITICAL HIGH
TROPONIN T SERPL-MCNC: 0.03 NG/ML — CRITICAL HIGH

## 2023-07-18 PROCEDURE — 99231 SBSQ HOSP IP/OBS SF/LOW 25: CPT | Mod: FS

## 2023-07-18 PROCEDURE — 99239 HOSP IP/OBS DSCHRG MGMT >30: CPT

## 2023-07-18 RX ORDER — UREA 15 G
1 POWDER IN PACKET (EA) ORAL
Qty: 30 | Refills: 0
Start: 2023-07-18 | End: 2023-08-16

## 2023-07-18 RX ORDER — LEVETIRACETAM 250 MG/1
1 TABLET, FILM COATED ORAL
Qty: 60 | Refills: 0
Start: 2023-07-18 | End: 2023-08-16

## 2023-07-18 RX ORDER — CIPROFLOXACIN LACTATE 400MG/40ML
1 VIAL (ML) INTRAVENOUS
Qty: 10 | Refills: 0
Start: 2023-07-18 | End: 2023-07-22

## 2023-07-18 RX ADMIN — AMPICILLIN SODIUM AND SULBACTAM SODIUM 200 GRAM(S): 250; 125 INJECTION, POWDER, FOR SUSPENSION INTRAMUSCULAR; INTRAVENOUS at 06:05

## 2023-07-18 RX ADMIN — Medication 50 MICROGRAM(S): at 06:05

## 2023-07-18 RX ADMIN — Medication 1 TABLET(S): at 12:22

## 2023-07-18 RX ADMIN — HEPARIN SODIUM 5000 UNIT(S): 5000 INJECTION INTRAVENOUS; SUBCUTANEOUS at 06:05

## 2023-07-18 RX ADMIN — Medication 1: at 12:25

## 2023-07-18 RX ADMIN — AMPICILLIN SODIUM AND SULBACTAM SODIUM 200 GRAM(S): 250; 125 INJECTION, POWDER, FOR SUSPENSION INTRAMUSCULAR; INTRAVENOUS at 12:22

## 2023-07-18 RX ADMIN — Medication 200 MILLIGRAM(S): at 06:07

## 2023-07-18 RX ADMIN — Medication 25 MILLIGRAM(S): at 06:07

## 2023-07-18 RX ADMIN — Medication 2: at 08:53

## 2023-07-18 RX ADMIN — HEPARIN SODIUM 5000 UNIT(S): 5000 INJECTION INTRAVENOUS; SUBCUTANEOUS at 14:23

## 2023-07-18 RX ADMIN — Medication 325 MILLIGRAM(S): at 06:05

## 2023-07-18 RX ADMIN — LEVETIRACETAM 400 MILLIGRAM(S): 250 TABLET, FILM COATED ORAL at 06:07

## 2023-07-18 RX ADMIN — SODIUM CHLORIDE 1 GRAM(S): 9 INJECTION INTRAMUSCULAR; INTRAVENOUS; SUBCUTANEOUS at 06:06

## 2023-07-18 RX ADMIN — AMPICILLIN SODIUM AND SULBACTAM SODIUM 200 GRAM(S): 250; 125 INJECTION, POWDER, FOR SUSPENSION INTRAMUSCULAR; INTRAVENOUS at 00:57

## 2023-07-18 RX ADMIN — DULOXETINE HYDROCHLORIDE 60 MILLIGRAM(S): 30 CAPSULE, DELAYED RELEASE ORAL at 12:04

## 2023-07-18 RX ADMIN — INSULIN GLARGINE 12 UNIT(S): 100 INJECTION, SOLUTION SUBCUTANEOUS at 08:54

## 2023-07-18 RX ADMIN — CLOPIDOGREL BISULFATE 75 MILLIGRAM(S): 75 TABLET, FILM COATED ORAL at 12:05

## 2023-07-18 RX ADMIN — Medication 1 TABLET(S): at 08:53

## 2023-07-18 RX ADMIN — FINASTERIDE 5 MILLIGRAM(S): 5 TABLET, FILM COATED ORAL at 12:04

## 2023-07-18 NOTE — PROGRESS NOTE ADULT - SUBJECTIVE AND OBJECTIVE BOX
Burn recalled to see patient to evaluate scrotal wound and discharge wound care reccomendations.     AM rounds     Pt: no complaints                                  8.6    10.65 )-----------( 292      ( 17 Jul 2023 07:31 )             28.1       EXAM:   Gen: Elderly male, in NAD, lying comfortably in bed   Wound: scroum -  full thickness wound about 1.5cmx1.5 cm x3cm pink and moist, no pus, no malodor, no erythema  Wound cleaned and dressing changed. Patient tolerated well.

## 2023-07-18 NOTE — PROGRESS NOTE ADULT - NUTRITIONAL ASSESSMENT
This patient has been assessed with a concern for Malnutrition and has been determined to have a diagnosis/diagnoses of Severe protein-calorie malnutrition.    This patient is being managed with:   Diet Soft and Bite Sized-  Consistent Carbohydrate {No Snacks}  Mildly Thick Liquids (MILDTHICKLIQS)  Free Water Flush Instructions:  please add 2 packets of thickener per ensure + MAGIC CUP 1x AT DINNER  Supplement Feeding Modality:  Oral  Ensure Plus High Protein Cans or Servings Per Day:  2       Frequency:  Daily  Entered: Jul 14 2023  9:28AM  
This patient has been assessed with a concern for Malnutrition and has been determined to have a diagnosis/diagnoses of Severe protein-calorie malnutrition.    This patient is being managed with:   Diet Soft and Bite Sized-  Mildly Thick Liquids (MILDTHICKLIQS)  Free Water Flush Instructions:  please add 2 packets of thickener per ensure + MAGIC CUP 1x AT DINNER  Supplement Feeding Modality:  Oral  Ensure Plus High Protein Cans or Servings Per Day:  2       Frequency:  Daily  Entered: Jul 4 2023  3:54PM  
This patient has been assessed with a concern for Malnutrition and has been determined to have a diagnosis/diagnoses of Severe protein-calorie malnutrition.    This patient is being managed with:   Diet Soft and Bite Sized-  Mildly Thick Liquids (MILDTHICKLIQS)  Free Water Flush Instructions:  please add 2 packets of thickener per ensure + MAGIC CUP 1x AT DINNER  Supplement Feeding Modality:  Oral  Ensure Plus High Protein Cans or Servings Per Day:  2       Frequency:  Daily  Entered: Jul 4 2023  3:54PM  
This patient has been assessed with a concern for Malnutrition and has been determined to have a diagnosis/diagnoses of Severe protein-calorie malnutrition.    This patient is being managed with:   Diet Soft and Bite Sized-  Mildly Thick Liquids (MILDTHICKLIQS)  Free Water Flush Instructions:  please add 2 packets of thickener per ensure max  Supplement Feeding Modality:  Oral  Ensure Max Cans or Servings Per Day:  3       Frequency:  Daily  Entered: Jun 30 2023 11:17AM  
This patient has been assessed with a concern for Malnutrition and has been determined to have a diagnosis/diagnoses of Severe protein-calorie malnutrition.    This patient is being managed with:   Diet NPO after Midnight-     NPO Start Date: 11-Jul-2023   NPO Start Time: 23:59  Entered: Jul 11 2023  8:43AM    Diet Soft and Bite Sized-  Mildly Thick Liquids (MILDTHICKLIQS)  Free Water Flush Instructions:  please add 2 packets of thickener per ensure + MAGIC CUP 1x AT DINNER  Supplement Feeding Modality:  Oral  Ensure Plus High Protein Cans or Servings Per Day:  2       Frequency:  Daily  Entered: Jul 4 2023  3:54PM  
This patient has been assessed with a concern for Malnutrition and has been determined to have a diagnosis/diagnoses of Severe protein-calorie malnutrition.    This patient is being managed with:   Diet NPO after Midnight-     NPO Start Date: 11-Jul-2023   NPO Start Time: 23:59  Entered: Jul 11 2023  8:43AM    Diet Soft and Bite Sized-  Mildly Thick Liquids (MILDTHICKLIQS)  Free Water Flush Instructions:  please add 2 packets of thickener per ensure + MAGIC CUP 1x AT DINNER  Supplement Feeding Modality:  Oral  Ensure Plus High Protein Cans or Servings Per Day:  2       Frequency:  Daily  Entered: Jul 4 2023  3:54PM  
This patient has been assessed with a concern for Malnutrition and has been determined to have a diagnosis/diagnoses of Severe protein-calorie malnutrition.    This patient is being managed with:   Diet Soft and Bite Sized-  Consistent Carbohydrate {No Snacks}  Mildly Thick Liquids (MILDTHICKLIQS)  Free Water Flush Instructions:  please add 2 packets of thickener per ensure + MAGIC CUP 1x AT DINNER  Supplement Feeding Modality:  Oral  Ensure Plus High Protein Cans or Servings Per Day:  2       Frequency:  Daily  Entered: Jul 14 2023  9:28AM  
This patient has been assessed with a concern for Malnutrition and has been determined to have a diagnosis/diagnoses of Severe protein-calorie malnutrition.    This patient is being managed with:   Diet Soft and Bite Sized-  Mildly Thick Liquids (MILDTHICKLIQS)  Free Water Flush Instructions:  please add 2 packets of thickener per ensure + MAGIC CUP 1x AT DINNER  Supplement Feeding Modality:  Oral  Ensure Plus High Protein Cans or Servings Per Day:  2       Frequency:  Daily  Entered: Jul 4 2023  3:54PM  
This patient has been assessed with a concern for Malnutrition and has been determined to have a diagnosis/diagnoses of Severe protein-calorie malnutrition.    This patient is being managed with:   Diet Soft and Bite Sized-  Mildly Thick Liquids (MILDTHICKLIQS)  Free Water Flush Instructions:  please add 2 packets of thickener per ensure + MAGIC CUP 1x AT DINNER  Supplement Feeding Modality:  Oral  Ensure Plus High Protein Cans or Servings Per Day:  2       Frequency:  Daily  Entered: Jul 4 2023  3:54PM  
This patient has been assessed with a concern for Malnutrition and has been determined to have a diagnosis/diagnoses of Severe protein-calorie malnutrition.    This patient is being managed with:   Diet Soft and Bite Sized-  Mildly Thick Liquids (MILDTHICKLIQS)  Free Water Flush Instructions:  please add 2 packets of thickener per ensure max  Supplement Feeding Modality:  Oral  Ensure Max Cans or Servings Per Day:  3       Frequency:  Daily  Entered: Jun 30 2023 11:17AM  
This patient has been assessed with a concern for Malnutrition and has been determined to have a diagnosis/diagnoses of Severe protein-calorie malnutrition.    This patient is being managed with:   Diet Soft and Bite Sized-  Mildly Thick Liquids (MILDTHICKLIQS)  Free Water Flush Instructions:  please add 2 packets of thickener per ensure + MAGIC CUP 1x AT DINNER  Supplement Feeding Modality:  Oral  Ensure Plus High Protein Cans or Servings Per Day:  2       Frequency:  Daily  Entered: Jul 4 2023  3:54PM  
This patient has been assessed with a concern for Malnutrition and has been determined to have a diagnosis/diagnoses of Severe protein-calorie malnutrition.    This patient is being managed with:   Diet Soft and Bite Sized-  Mildly Thick Liquids (MILDTHICKLIQS)  Free Water Flush Instructions:  please add 2 packets of thickener per ensure + MAGIC CUP 1x AT DINNER  Supplement Feeding Modality:  Oral  Ensure Plus High Protein Cans or Servings Per Day:  2       Frequency:  Daily  Entered: Jul 4 2023  3:54PM  
This patient has been assessed with a concern for Malnutrition and has been determined to have a diagnosis/diagnoses of Severe protein-calorie malnutrition.    This patient is being managed with:   Diet Soft and Bite Sized-  Mildly Thick Liquids (MILDTHICKLIQS)  Free Water Flush Instructions:  please add 2 packets of thickener per ensure max  Supplement Feeding Modality:  Oral  Ensure Max Cans or Servings Per Day:  3       Frequency:  Daily  Entered: Jun 30 2023 11:17AM  
This patient has been assessed with a concern for Malnutrition and has been determined to have a diagnosis/diagnoses of Severe protein-calorie malnutrition.    This patient is being managed with:   Diet Soft and Bite Sized-  Mildly Thick Liquids (MILDTHICKLIQS)  Free Water Flush Instructions:  please add 2 packets of thickener per ensure max  Supplement Feeding Modality:  Oral  Ensure Max Cans or Servings Per Day:  3       Frequency:  Daily  Entered: Jun 30 2023 11:17AM  
This patient has been assessed with a concern for Malnutrition and has been determined to have a diagnosis/diagnoses of Severe protein-calorie malnutrition.    This patient is being managed with:   Diet Soft and Bite Sized-  Consistent Carbohydrate {No Snacks}  Mildly Thick Liquids (MILDTHICKLIQS)  Free Water Flush Instructions:  please add 2 packets of thickener per ensure + MAGIC CUP 1x AT DINNER  Supplement Feeding Modality:  Oral  Ensure Plus High Protein Cans or Servings Per Day:  2       Frequency:  Daily  Entered: Jul 14 2023  9:28AM  
This patient has been assessed with a concern for Malnutrition and has been determined to have a diagnosis/diagnoses of Severe protein-calorie malnutrition.    This patient is being managed with:   Diet Soft and Bite Sized-  Mildly Thick Liquids (MILDTHICKLIQS)  Free Water Flush Instructions:  please add 2 packets of thickener per ensure + MAGIC CUP 1x AT DINNER  Supplement Feeding Modality:  Oral  Ensure Plus High Protein Cans or Servings Per Day:  2       Frequency:  Daily  Entered: Jul 4 2023  3:54PM  

## 2023-07-18 NOTE — PROGRESS NOTE ADULT - SUBJECTIVE AND OBJECTIVE BOX
OVERNIGHT EVENTS:    Patient denies new complaints. However during the night, he had an episode of chest pain.    Vital Signs Last 24 Hrs  T(C): 36.1 (18 Jul 2023 05:15), Max: 36.1 (17 Jul 2023 20:21)  T(F): 96.9 (18 Jul 2023 05:15), Max: 96.9 (17 Jul 2023 20:21)  HR: 76 (18 Jul 2023 05:15) (76 - 93)  BP: 108/56 (18 Jul 2023 05:15) (108/56 - 110/56)  RR: 18 (18 Jul 2023 05:15) (18 - 18)  SpO2: 97% (18 Jul 2023 05:15) (96% - 97%)    O2 Parameters below as of 17 Jul 2023 20:21  Patient On (Oxygen Delivery Method): room air          I&O's Summary        PHYSICAL EXAM:    CCOx3  No JVD/ LDN  Reg S1S2, aortic stenosis murmur  GBAE  Soft non tender abdomen, BS+  No LLEx2    LABS:                        8.6    10.65 )-----------( 292      ( 17 Jul 2023 07:31 )             28.1     07-17    137  |  101  |  22<H>  ----------------------------<  188<H>  4.8   |  24  |  1.0    Ca    8.8      17 Jul 2023 07:31  Mg     1.6     07-17    TPro  5.2<L>  /  Alb  2.8<L>  /  TBili  0.6  /  DBili  x   /  AST  18  /  ALT  9   /  AlkPhos  76  07-17      Urinalysis Basic - ( 17 Jul 2023 07:31 )    Color: x / Appearance: x / SG: x / pH: x  Gluc: 188 mg/dL / Ketone: x  / Bili: x / Urobili: x   Blood: x / Protein: x / Nitrite: x   Leuk Esterase: x / RBC: x / WBC x   Sq Epi: x / Non Sq Epi: x / Bacteria: x      CAPILLARY BLOOD GLUCOSE      POCT Blood Glucose.: 212 mg/dL (17 Jul 2023 21:21)  POCT Blood Glucose.: 235 mg/dL (17 Jul 2023 17:32)  POCT Blood Glucose.: 273 mg/dL (17 Jul 2023 11:34)        RADIOLOGY & ADDITIONAL TESTS:    Consultant(s) Notes Reviewed:  [x ] YES  [ ] NO    MEDICATIONS  (STANDING):  ampicillin/sulbactam  IVPB 3 Gram(s) IV Intermittent every 6 hours  clopidogrel Tablet 75 milliGRAM(s) Oral daily  dextrose 5%. 1000 milliLiter(s) (50 mL/Hr) IV Continuous <Continuous>  dextrose 5%. 1000 milliLiter(s) (100 mL/Hr) IV Continuous <Continuous>  dextrose 50% Injectable 25 Gram(s) IV Push once  dextrose 50% Injectable 25 Gram(s) IV Push once  dextrose 50% Injectable 12.5 Gram(s) IV Push once  DULoxetine 60 milliGRAM(s) Oral daily  ferrous    sulfate 325 milliGRAM(s) Oral two times a day  finasteride 5 milliGRAM(s) Oral daily  glucagon  Injectable 1 milliGRAM(s) IntraMuscular once  heparin   Injectable 5000 Unit(s) SubCutaneous every 8 hours  insulin glargine Injectable (LANTUS) 12 Unit(s) SubCutaneous every morning  insulin lispro (ADMELOG) corrective regimen sliding scale   SubCutaneous at bedtime  insulin lispro (ADMELOG) corrective regimen sliding scale   SubCutaneous three times a day before meals  lactobacillus acidophilus 1 Tablet(s) Oral with breakfast  levETIRAcetam  IVPB 500 milliGRAM(s) IV Intermittent every 12 hours  levothyroxine 50 MICROGram(s) Oral daily  metoprolol tartrate 25 milliGRAM(s) Oral two times a day  multivitamin 1 Tablet(s) Oral every 24 hours  polyethylene glycol 3350 17 Gram(s) Oral two times a day  senna 2 Tablet(s) Oral at bedtime  simvastatin 20 milliGRAM(s) Oral at bedtime  sodium chloride 1 Gram(s) Oral two times a day  tamsulosin 0.4 milliGRAM(s) Oral at bedtime  urea Oral Powder 15 Gram(s) Oral daily    MEDICATIONS  (PRN):  dextrose Oral Gel 15 Gram(s) Oral once PRN Blood Glucose LESS THAN 70 milliGRAM(s)/deciliter      Home Medications:  DULoxetine 60 mg oral delayed release capsule: 1 cap(s) orally once a day (13 Dec 2022 22:03)  ferrous sulfate 325 mg (65 mg elemental iron) oral tablet: 1 tab(s) orally 2 times a day (14 Jul 2023 13:50)  finasteride 5 mg oral tablet: 1 tab(s) orally once a day (13 Dec 2022 22:03)  lactobacillus acidophilus oral capsule: 1 tab(s) orally once a day (20 Dec 2022 10:18)  levothyroxine 50 mcg (0.05 mg) oral tablet: 1 tab(s) orally once a day (13 Dec 2022 22:03)  Metoprolol Tartrate 25 mg oral tablet: 1 tab(s) orally 2 times a day (13 Dec 2022 22:03)  Plavix 75 mg oral tablet: 1 orally once a day (23 Jun 2023 21:54)  polyethylene glycol 3350 oral powder for reconstitution: 17 gram(s) orally 2 times a day (14 Jul 2023 13:50)  simvastatin 20 mg oral tablet: 1 tab(s) orally once a day (at bedtime) (13 Dec 2022 22:03)  sodium chloride 1 g oral tablet: 1 tab(s) orally 2 times a day (14 Jul 2023 13:50)  tamsulosin 0.4 mg oral capsule: 1 cap(s) orally once a day (13 Dec 2022 22:03)  Toujeo SoloStar 300 units/mL subcutaneous solution: 12 unit(s) subcutaneous once a day (in the morning) (13 Dec 2022 22:03)

## 2023-07-18 NOTE — PROGRESS NOTE ADULT - ASSESSMENT
Pt is a 89 yo M with scrotal abscess, s/p I&D, healing    - No further procedural debridements needed  - Continue current dressing changes with xeroform packing - cover with dry gauze/tape. Daily.   - Okay to change packing 3-5 times a week but change outer gauze covering every day. Unless patient is soiled will need to clean and replace packing.   - antibiotics per ID/as indicated   - medical management as per primary team

## 2023-07-18 NOTE — PROGRESS NOTE ADULT - PROVIDER SPECIALTY LIST ADULT
Internal Medicine
Internal Medicine
Neurology
Burn
Hospitalist
Internal Medicine
Neurology
Urology
Urology
Hospitalist
Internal Medicine
Urology
Hospitalist
Infectious Disease
Infectious Disease
Internal Medicine
Neurology
Urology
Hospitalist
Infectious Disease
Internal Medicine
Burn
Infectious Disease
Infectious Disease
Internal Medicine
Internal Medicine

## 2023-07-19 NOTE — CDI QUERY NOTE - NSCDIOTHERTXTBX2_GEN_ALL_CORE_FT
Based on your professional judgment and the clinical indicators, please clarify if the documentation “UTI” and “in the setting of recent hidalgo” can be further specified as:     - UTI associated with indwelling hidalgo catheter  - UTI (not associated with indwelling hidalgo catheter)  - Other (please specify)  - Clinically unable to determine        CLINICAL INDICATORS    6/23 H&P Adult: Acute cystitis; CAUTI from [facility]; Sepsis presents on admission – per family pt had hidalgo catheter at nursing home; since DC on 5/26 pts urine has been dark/milky; UA grossly positive;     6/26 Consult Note Adult-Infectious Disease Attending: Pseudomonas bacteremia from suspected UTI – had recent hidalgo;     6/25 Progress Note Adult-Internal Medicine Attending: Bacteremia d/t pseudomonas in setting of recent hidalgo in SNF; now out; possible UTI    7/17 Progress Note Adult-Infectious Disease Attending: Pseudomonas bacteremia from suspected UTI with Left Epididymitis/Scrotal abscess -- had recent hidalgo    7/18 Discharge Note: UA was positive for many bacteria > 720 WBC, moderate blood and large leukocyte esterase … Bacteremia due to pseudomonas … in setting of recent hidalgo in SNF; now out … Acute UTI    Labs/Microbiology:  6/23 UA: WBC > 720; Bacteria -many; Leukocyte Esterase – large  6/23 Urine culture: > = 3 organisms.  6/27 Urine culture: Normal nehemiah    Orders:   Rocephin (6/23 – 6/25) ind: Genitourinary infection, complicated [CAUTI-1 gram dose)  Cefepime (6/25 – 7/2)       Thank you,  Pratima CRUZ, RN, BSN  [phone]

## 2023-07-19 NOTE — CDI QUERY NOTE - NSCDIOTHERTXTBX_GEN_ALL_CORE_HH
Based on your professional judgment and the clinical indicators, please clarify if the diagnosis sepsis can be further specified as:    - Sepsis was evaluated, ruled in, and resolved at the time of discharge  - Sepsis was evaluated and unable to be ruled out at the time of discharge  - Sepsis was evaluated and ruled out at the time of discharge  - Other (please specify):  - Clinically unable to further specify sepsis         CLINICAL INDICATORS    6/23 H&P Adult: Acute cystitis; CAUTI from [facility]; Sepsis presents on admission – per family pt had hidalgo catheter at nursing home; since DC on 5/26 pts urine has been dark/milky; UA grossly positive;     6/26 Consult Note Adult-Infectious Disease Attending: Sepsis POA; Pseudomonas bacteremia from suspected UTI – had recent hidalgo;     7/17 Progress Note Adult-Infectious Disease Attending: Sepsis present on admission; Pseudomonas bacteremia from suspected UTI with Left Epididymitis/Scrotal abscess -- had recent hidalgo    7/18 Discharge Note: … Bacteremia due to pseudomonas … in setting of recent hidalgo in SNF; now out; possible UTI, testicular infection – e faecalis; Acute UTI    Labs/Microbiology:  6/23 UA: WBC > 720; Bacteria -many; Leukocyte Esterase – large  6/23 Urine culture: > = 3 organisms.  6/24 Blood culture: Pseudomonas aeruginosa  6/26 Blood culture: no growth    Orders:   Rocephin (6/23 – 6/25) ind: Genitourinary infection, complicated [CAUTI-1 gram dose)  Cefepime (6/25 – 7/2) ind: Bacteremia    Thank you,  Pratima CRUZ, RN, BSN  [phone]

## 2023-07-21 ENCOUNTER — APPOINTMENT (OUTPATIENT)
Dept: GERIATRICS | Facility: HOME HEALTH | Age: 88
End: 2023-07-21
Payer: MEDICARE

## 2023-07-21 PROCEDURE — 99496 TRANSJ CARE MGMT HIGH F2F 7D: CPT

## 2023-08-02 DIAGNOSIS — G92.8 OTHER TOXIC ENCEPHALOPATHY: ICD-10-CM

## 2023-08-02 DIAGNOSIS — G45.9 TRANSIENT CEREBRAL ISCHEMIC ATTACK, UNSPECIFIED: ICD-10-CM

## 2023-08-02 DIAGNOSIS — K59.00 CONSTIPATION, UNSPECIFIED: ICD-10-CM

## 2023-08-02 DIAGNOSIS — N30.00 ACUTE CYSTITIS WITHOUT HEMATURIA: ICD-10-CM

## 2023-08-02 DIAGNOSIS — Z66 DO NOT RESUSCITATE: ICD-10-CM

## 2023-08-02 DIAGNOSIS — I25.10 ATHEROSCLEROTIC HEART DISEASE OF NATIVE CORONARY ARTERY WITHOUT ANGINA PECTORIS: ICD-10-CM

## 2023-08-02 DIAGNOSIS — E11.65 TYPE 2 DIABETES MELLITUS WITH HYPERGLYCEMIA: ICD-10-CM

## 2023-08-02 DIAGNOSIS — E87.5 HYPERKALEMIA: ICD-10-CM

## 2023-08-02 DIAGNOSIS — N40.0 BENIGN PROSTATIC HYPERPLASIA WITHOUT LOWER URINARY TRACT SYMPTOMS: ICD-10-CM
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
## 2023-08-02 DIAGNOSIS — Z95.1 PRESENCE OF AORTOCORONARY BYPASS GRAFT: ICD-10-CM

## 2023-08-02 DIAGNOSIS — I10 ESSENTIAL (PRIMARY) HYPERTENSION: ICD-10-CM

## 2023-08-02 DIAGNOSIS — E78.5 HYPERLIPIDEMIA, UNSPECIFIED: ICD-10-CM

## 2023-08-02 DIAGNOSIS — E83.42 HYPOMAGNESEMIA: ICD-10-CM

## 2023-08-02 DIAGNOSIS — T83.511A INFECTION AND INFLAMMATORY REACTION DUE TO INDWELLING URETHRAL CATHETER, INITIAL ENCOUNTER: ICD-10-CM

## 2023-08-02 DIAGNOSIS — A41.52 SEPSIS DUE TO PSEUDOMONAS: ICD-10-CM

## 2023-08-02 DIAGNOSIS — E03.9 HYPOTHYROIDISM, UNSPECIFIED: ICD-10-CM

## 2023-08-02 DIAGNOSIS — L89.322 PRESSURE ULCER OF LEFT BUTTOCK, STAGE 2: ICD-10-CM

## 2023-08-02 DIAGNOSIS — E87.1 HYPO-OSMOLALITY AND HYPONATREMIA: ICD-10-CM

## 2023-08-02 DIAGNOSIS — D50.9 IRON DEFICIENCY ANEMIA, UNSPECIFIED: ICD-10-CM
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
## 2023-08-02 DIAGNOSIS — N45.4 ABSCESS OF EPIDIDYMIS OR TESTIS: ICD-10-CM

## 2023-08-02 DIAGNOSIS — E43 UNSPECIFIED SEVERE PROTEIN-CALORIE MALNUTRITION: ICD-10-CM

## 2023-09-28 NOTE — DISCHARGE NOTE PROVIDER - NSDCCONDITION_GEN_ALL_CORE
Stable Albendazole Pregnancy And Lactation Text: This medication is Pregnancy Category C and it isn't known if it is safe during pregnancy. It is also excreted in breast milk.

## 2023-10-09 ENCOUNTER — APPOINTMENT (OUTPATIENT)
Dept: NEUROLOGY | Facility: CLINIC | Age: 88
End: 2023-10-09
Payer: MEDICARE

## 2023-10-09 VITALS — WEIGHT: 116 LBS | BODY MASS INDEX: 21.9 KG/M2 | HEIGHT: 61 IN

## 2023-10-09 DIAGNOSIS — G40.909 EPILEPSY, UNSPECIFIED, NOT INTRACTABLE, W/OUT STATUS EPILEPTICUS: ICD-10-CM

## 2023-10-09 DIAGNOSIS — R56.9 UNSPECIFIED CONVULSIONS: ICD-10-CM

## 2023-10-09 DIAGNOSIS — G62.9 POLYNEUROPATHY, UNSPECIFIED: ICD-10-CM

## 2023-10-09 PROCEDURE — ZZZZZ: CPT

## 2023-10-09 PROCEDURE — 99204 OFFICE O/P NEW MOD 45 MIN: CPT

## 2023-10-09 RX ORDER — LEVETIRACETAM 500 MG/1
500 TABLET, FILM COATED ORAL
Qty: 60 | Refills: 1 | Status: ACTIVE | COMMUNITY
Start: 2023-10-09 | End: 1900-01-01

## 2023-10-09 RX ORDER — LEVETIRACETAM 500 MG/1
500 TABLET, FILM COATED ORAL
Refills: 0 | Status: ACTIVE | COMMUNITY

## 2023-10-09 RX ORDER — LEVOTHYROXINE SODIUM 0.17 MG/1
TABLET ORAL
Refills: 0 | Status: ACTIVE | COMMUNITY

## 2023-11-07 ENCOUNTER — APPOINTMENT (OUTPATIENT)
Dept: NEUROLOGY | Facility: CLINIC | Age: 88
End: 2023-11-07

## 2023-11-09 ENCOUNTER — APPOINTMENT (OUTPATIENT)
Dept: NEUROLOGY | Facility: CLINIC | Age: 88
End: 2023-11-09
Payer: MEDICARE

## 2023-11-09 PROCEDURE — 95721 EEG PHY/QHP>36<60 HR W/O VID: CPT

## 2023-11-20 ENCOUNTER — APPOINTMENT (OUTPATIENT)
Dept: NEUROLOGY | Facility: CLINIC | Age: 88
End: 2023-11-20
Payer: MEDICARE

## 2023-11-20 DIAGNOSIS — G40.409 OTHER GENERALIZED EPILEPSY AND EPILEPTIC SYNDROMES, NOT INTRACTABLE, W/OUT STATUS EPILEPTICUS: ICD-10-CM

## 2023-11-20 DIAGNOSIS — H91.93 UNSPECIFIED HEARING LOSS, BILATERAL: ICD-10-CM

## 2023-11-20 PROCEDURE — 99214 OFFICE O/P EST MOD 30 MIN: CPT

## 2023-11-20 RX ORDER — LEVETIRACETAM 500 MG/1
500 TABLET, FILM COATED ORAL
Qty: 60 | Refills: 3 | Status: ACTIVE | COMMUNITY
Start: 2023-11-20 | End: 1900-01-01

## 2024-01-03 ENCOUNTER — APPOINTMENT (OUTPATIENT)
Dept: GERIATRICS | Facility: HOME HEALTH | Age: 89
End: 2024-01-03
Payer: MEDICARE

## 2024-01-03 VITALS
SYSTOLIC BLOOD PRESSURE: 90 MMHG | DIASTOLIC BLOOD PRESSURE: 52 MMHG | OXYGEN SATURATION: 85 % | TEMPERATURE: 98.4 F | RESPIRATION RATE: 16 BRPM | HEART RATE: 76 BPM

## 2024-01-03 DIAGNOSIS — R53.83 OTHER FATIGUE: ICD-10-CM

## 2024-01-03 DIAGNOSIS — F03.90 UNSPECIFIED DEMENTIA W/OUT BEHAVIORAL DISTURBANCE: ICD-10-CM

## 2024-01-03 PROCEDURE — 99349 HOME/RES VST EST MOD MDM 40: CPT

## 2024-01-03 RX ORDER — LEVOTHYROXINE SODIUM 0.05 MG/1
50 TABLET ORAL
Refills: 0 | Status: ACTIVE | COMMUNITY

## 2024-01-03 RX ORDER — CEFPODOXIME PROXETIL 200 MG/1
200 TABLET, FILM COATED ORAL
Qty: 5 | Refills: 0 | Status: DISCONTINUED | COMMUNITY
Start: 2023-06-21 | End: 2024-01-03

## 2024-01-03 RX ORDER — TRAMADOL HYDROCHLORIDE 50 MG/1
50 TABLET, COATED ORAL TWICE DAILY
Qty: 60 | Refills: 0 | Status: DISCONTINUED | COMMUNITY
End: 2024-01-03

## 2024-01-03 RX ORDER — OMEPRAZOLE 40 MG/1
40 CAPSULE, DELAYED RELEASE ORAL
Refills: 0 | Status: DISCONTINUED | COMMUNITY
End: 2024-01-03

## 2024-01-03 NOTE — REASON FOR VISIT
[Follow-Up] : a follow-up visit [Formal Caregiver] : formal caregiver [FreeTextEntry1] : Dtr reports patient has been "nodding off" sleeping most of the time, "going downhill."  [FreeTextEntry3] : Kaitlyn Corral

## 2024-01-03 NOTE — END OF VISIT
[Time Spent: ___ minutes] : I have spent [unfilled] minutes of time on the encounter. [FreeTextEntry3] :  NP met and examined patient.  NP educated family member.

## 2024-01-03 NOTE — HISTORY OF PRESENT ILLNESS
[Completely Dependent] : Completely dependent. [FreeTextEntry1] : 89-year-old male, essentially bed/W/C bound dtr reports over past several days he is more lethargic, and less responsive.  She prefers a "palliative" conservative treatment plan and has a DNR in place. But she requests blood work.  Recent neuro appt with no changes noted. She monitors his sugar levels and adjusts his insulin on a sliding scale.  He has oxygen in the home, but she states she only puts it on 1-2 hours when needed if pulse ox <90. (she has a monitor)

## 2024-01-03 NOTE — PHYSICAL EXAM
[Alert] : alert [Sclera] : the sclera and conjunctiva were normal [Normal Outer Ear/Nose] : the ears and nose were normal in appearance [No Respiratory Distress] : no respiratory distress [Auscultation Breath Sounds / Voice Sounds] : lungs were clear to auscultation bilaterally [Normal S1, S2] : normal S1 and S2 [Edema] : edema was not present [Bowel Sounds] : normal bowel sounds [Abdomen Tenderness] : non-tender [Skin Lesions] : no skin lesions [Normal Gait] : abnormal gait [Normal Insight/Judgment] : insight and judgment were not intact [de-identified] : easily aroused BUT falls asleep during conversation, thin elderly male  [de-identified] : very dry mouth, dtr reports mouth breather.  [de-identified] : Pulse Ox at 85-89 with constant monitoring by NP.  Oxygen in the home, dtr reports not using.  Oxygen N/C 3 liters and pulse ox went to 90% during visit.   [de-identified] : bed bound to W/C with max assistance

## 2024-01-03 NOTE — ASSESSMENT
[FreeTextEntry1] : 89-year-old elderly, thin male homebound 2/2 advanced age and decreased ambulation.  Recent lethargy reported by daughter.   # Lethargy M/P 2/2 decreased oxygen levels.  USE oxygen dtr instructed on using O2 all the time for now.  Bring liters to 3 l/m. (she keeps at 1l/min)   She doesn't put oxygen on him unless pulse ox is <90% ...advised to keep it on him and keep pulse Ox up to 95%-100%.   Monitored pulse ox in home until up to 92% (from 85%)    Advised to increase hydration, he looks dry and she is agreeable to the same;   Due to debilitating state and advanced age long discussion with dtr regarding hospice referral for support and  palliative care.  She is receptive to the same, will refer.

## 2024-01-03 NOTE — REVIEW OF SYSTEMS
[Feeling Poorly] : feeling poorly [Feeling Tired] : feeling tired [Loss Of Hearing] : hearing loss [Incontinence] : incontinence [Arthralgias] : arthralgias [Joint Swelling] : joint swelling [Joint Stiffness] : joint stiffness [Limb Pain] : limb pain [Confused] : confusion [Negative] : Gastrointestinal [Chest Pain] : no chest pain [Palpitations] : no palpitations [Orthopnea] : no orthopnea [Skin Lesions] : no skin lesions [FreeTextEntry2] : No weight to compare, but he looks much thinner to NP from previous visit.  [FreeTextEntry4] : Dtr wants to take for hearing aids.  [FreeTextEntry8] : wears depends  [FreeTextEntry9] : On Celebrex for chronic arthritic pain, dtr reports very helpful for him  [de-identified] : Skin check, no open wounds.

## 2024-01-18 ENCOUNTER — NON-APPOINTMENT (OUTPATIENT)
Age: 89
End: 2024-01-18

## 2024-01-19 ENCOUNTER — APPOINTMENT (OUTPATIENT)
Dept: GERIATRICS | Facility: HOME HEALTH | Age: 89
End: 2024-01-19
Payer: MEDICARE

## 2024-01-19 LAB
ALBUMIN SERPL ELPH-MCNC: 3.3 G/DL
ALP BLD-CCNC: 69 U/L
ALT SERPL-CCNC: 10 U/L
ANION GAP SERPL CALC-SCNC: 10 MMOL/L
AST SERPL-CCNC: 15 U/L
BASOPHILS # BLD AUTO: 0.03 K/UL
BASOPHILS NFR BLD AUTO: 0.3 %
BILIRUB SERPL-MCNC: <0.2 MG/DL
BUN SERPL-MCNC: 37 MG/DL
CALCIUM SERPL-MCNC: 8.9 MG/DL
CHLORIDE SERPL-SCNC: 106 MMOL/L
CHOLEST SERPL-MCNC: 149 MG/DL
CO2 SERPL-SCNC: 22 MMOL/L
CREAT SERPL-MCNC: 1.2 MG/DL
EGFR: 54 ML/MIN/1.73M2
EOSINOPHIL # BLD AUTO: 0.31 K/UL
EOSINOPHIL NFR BLD AUTO: 3.1 %
ESTIMATED AVERAGE GLUCOSE: 197 MG/DL
GLUCOSE SERPL-MCNC: 150 MG/DL
HBA1C MFR BLD HPLC: 8.5 %
HCT VFR BLD CALC: 33.1 %
HDLC SERPL-MCNC: 25 MG/DL
HGB BLD-MCNC: 10.4 G/DL
IMM GRANULOCYTES NFR BLD AUTO: 1.9 %
LDLC SERPL CALC-MCNC: 72 MG/DL
LYMPHOCYTES # BLD AUTO: 3.27 K/UL
LYMPHOCYTES NFR BLD AUTO: 32.4 %
MAN DIFF?: NORMAL
MCHC RBC-ENTMCNC: 28.9 PG
MCHC RBC-ENTMCNC: 31.4 G/DL
MCV RBC AUTO: 91.9 FL
MONOCYTES # BLD AUTO: 0.98 K/UL
MONOCYTES NFR BLD AUTO: 9.7 %
NEUTROPHILS # BLD AUTO: 5.31 K/UL
NEUTROPHILS NFR BLD AUTO: 52.6 %
NONHDLC SERPL-MCNC: 124 MG/DL
PLATELET # BLD AUTO: 291 K/UL
POTASSIUM SERPL-SCNC: 5.5 MMOL/L
PROT SERPL-MCNC: 6 G/DL
RBC # BLD: 3.6 M/UL
RBC # FLD: 13.6 %
SODIUM SERPL-SCNC: 138 MMOL/L
TRIGL SERPL-MCNC: 261 MG/DL
TSH SERPL-ACNC: 2 UIU/ML
WBC # FLD AUTO: 10.09 K/UL

## 2024-01-19 PROCEDURE — 99441: CPT | Mod: 93

## 2024-05-08 NOTE — H&P ADULT - SOCIAL HISTORY: ALCOHOL USE
LEFT MESSAGE FOR PATIENT TO RESCHEDULE APPT HE HAS ON 05/24 WITH DR. BIGGS DUE TO CHANGE OF SCHEDULE  
None reported

## 2024-05-23 ENCOUNTER — APPOINTMENT (OUTPATIENT)
Dept: GERIATRICS | Facility: HOME HEALTH | Age: 89
End: 2024-05-23
Payer: MEDICARE

## 2024-05-23 DIAGNOSIS — E11.9 TYPE 2 DIABETES MELLITUS W/OUT COMPLICATIONS: ICD-10-CM

## 2024-05-23 DIAGNOSIS — R54 AGE-RELATED PHYSICAL DEBILITY: ICD-10-CM

## 2024-05-23 DIAGNOSIS — I10 ESSENTIAL (PRIMARY) HYPERTENSION: ICD-10-CM

## 2024-05-23 PROCEDURE — 99349 HOME/RES VST EST MOD MDM 40: CPT | Mod: GV

## 2024-05-24 VITALS
RESPIRATION RATE: 16 BRPM | SYSTOLIC BLOOD PRESSURE: 112 MMHG | HEART RATE: 82 BPM | TEMPERATURE: 97.9 F | OXYGEN SATURATION: 94 % | DIASTOLIC BLOOD PRESSURE: 62 MMHG

## 2024-05-24 PROBLEM — I10 HTN (HYPERTENSION): Status: ACTIVE | Noted: 2020-03-09

## 2024-05-24 PROBLEM — R54 ADVANCED AGE: Status: ACTIVE | Noted: 2024-01-03

## 2024-05-24 PROBLEM — E11.9 DIABETES: Status: ACTIVE | Noted: 2020-03-09

## 2024-05-24 NOTE — REASON FOR VISIT
[Follow-Up] : a follow-up visit [Formal Caregiver] : formal caregiver [FreeTextEntry1] : dtr requesting lab work and checkup for him  [FreeTextEntry3] : Kaitlyn Corral

## 2024-05-24 NOTE — END OF VISIT
[FreeTextEntry3] :  NP met and examined patient.  NP educated patient and family member. [Time Spent: ___ minutes] : I have spent [unfilled] minutes of time on the encounter.

## 2024-05-24 NOTE — HISTORY OF PRESENT ILLNESS
[Completely Dependent] : Completely dependent. [FreeTextEntry1] : 89-year-old male with PMH of CAD, diabetes, HTN and multiple fractures.  With dtr Shayna and private home health aide to assist with all ADLs.  Patient is non ambulatory and uses Oxygen as needed.  Dtr reports he has been doing really well, offers no new complaints.  He is on hospice services, helps with supplies and nurse visits every 2 weeks.  Dtr requests lab work "i want to see where his levels are at".  NP and dtr discuss no farther intervention if labs out of range, daughter understands the same, but she wants a baseline to compare to old labs to see if anything is changing.  She was not willing to be coaxed out of doing labs even after much discussion regarding the same.

## 2024-05-24 NOTE — PHYSICAL EXAM
[Alert] : alert [Well Nourished] : well nourished [No Acute Distress] : in no acute distress [Normal Voice/Communication] : normal voice/communication [Sclera] : the sclera and conjunctiva were normal [Normal Oral Mucosa] : normal oral mucosa [Normal Appearance] : the appearance of the neck was normal [No Respiratory Distress] : no respiratory distress [Auscultation Breath Sounds / Voice Sounds] : lungs were clear to auscultation bilaterally [Normal S1, S2] : normal S1 and S2 [Edema] : edema was not present [Bowel Sounds] : normal bowel sounds [Abdomen Tenderness] : non-tender [] : no rash [Skin Lesions] : no skin lesions [Oriented To Time, Place, And Person] : oriented to person, place, and time [Normal Gait] : abnormal gait [de-identified] : missing many teeth  [de-identified] : + murmur [de-identified] : non ambulatory, can stand and pivot with assistance of 2 people.  [de-identified] : hx of dementia, but very alert and oriented today slow recall but knows time, place, year

## 2024-06-03 LAB
ANION GAP SERPL CALC-SCNC: 15 MMOL/L
BUN SERPL-MCNC: 38 MG/DL
CALCIUM SERPL-MCNC: 8.8 MG/DL
CHLORIDE SERPL-SCNC: 104 MMOL/L
CHOLEST SERPL-MCNC: 201 MG/DL
CO2 SERPL-SCNC: 21 MMOL/L
CREAT SERPL-MCNC: 1.2 MG/DL
EGFR: 54 ML/MIN/1.73M2
FOLATE SERPL-MCNC: 14.4 NG/ML
GLUCOSE SERPL-MCNC: 117 MG/DL
HCT VFR BLD CALC: 32.8 %
HDLC SERPL-MCNC: 37 MG/DL
HGB BLD-MCNC: 10.1 G/DL
LDLC SERPL CALC-MCNC: 141 MG/DL
MCHC RBC-ENTMCNC: 27.7 PG
MCHC RBC-ENTMCNC: 30.8 G/DL
MCV RBC AUTO: 89.9 FL
NONHDLC SERPL-MCNC: 164 MG/DL
PLATELET # BLD AUTO: 241 K/UL
PMV BLD AUTO: 0 /100 WBCS
PMV BLD: 9.8 FL
POTASSIUM SERPL-SCNC: 5.6 MMOL/L
RBC # BLD: 3.65 M/UL
RBC # FLD: 14.7 %
SODIUM SERPL-SCNC: 140 MMOL/L
TRIGL SERPL-MCNC: 116 MG/DL
TSH SERPL-ACNC: 2.11 UIU/ML
VIT B12 SERPL-MCNC: 816 PG/ML
WBC # FLD AUTO: 7.41 K/UL

## 2024-10-09 ENCOUNTER — APPOINTMENT (OUTPATIENT)
Dept: GERIATRICS | Facility: HOME HEALTH | Age: 89
End: 2024-10-09
Payer: MEDICARE

## 2024-10-09 DIAGNOSIS — I10 ESSENTIAL (PRIMARY) HYPERTENSION: ICD-10-CM

## 2024-10-09 DIAGNOSIS — E11.9 TYPE 2 DIABETES MELLITUS W/OUT COMPLICATIONS: ICD-10-CM

## 2024-10-09 PROCEDURE — 90686 IIV4 VACC NO PRSV 0.5 ML IM: CPT | Mod: NC

## 2024-10-09 PROCEDURE — 99349 HOME/RES VST EST MOD MDM 40: CPT | Mod: 25

## 2024-10-09 PROCEDURE — G0008: CPT

## 2024-10-10 VITALS
HEART RATE: 68 BPM | RESPIRATION RATE: 16 BRPM | OXYGEN SATURATION: 95 % | TEMPERATURE: 97.9 F | SYSTOLIC BLOOD PRESSURE: 110 MMHG | DIASTOLIC BLOOD PRESSURE: 62 MMHG

## 2024-10-10 RX ORDER — INSULIN GLARGINE 300 U/ML
300 INJECTION, SOLUTION SUBCUTANEOUS
Qty: 1 | Refills: 3 | Status: ACTIVE | COMMUNITY
Start: 2024-10-10 | End: 1900-01-01

## 2025-01-06 ENCOUNTER — NON-APPOINTMENT (OUTPATIENT)
Age: 89
End: 2025-01-06

## 2025-01-16 ENCOUNTER — APPOINTMENT (OUTPATIENT)
Dept: GERIATRICS | Facility: HOME HEALTH | Age: 89
End: 2025-01-16

## 2025-01-16 DIAGNOSIS — E11.9 TYPE 2 DIABETES MELLITUS W/OUT COMPLICATIONS: ICD-10-CM

## 2025-01-16 DIAGNOSIS — H91.93 UNSPECIFIED HEARING LOSS, BILATERAL: ICD-10-CM

## 2025-01-16 DIAGNOSIS — Z98.61 ATHEROSCLEROTIC HEART DISEASE OF NATIVE CORONARY ARTERY W/OUT ANGINA PECTORIS: ICD-10-CM

## 2025-01-16 DIAGNOSIS — R54 AGE-RELATED PHYSICAL DEBILITY: ICD-10-CM

## 2025-01-16 DIAGNOSIS — I25.10 ATHEROSCLEROTIC HEART DISEASE OF NATIVE CORONARY ARTERY W/OUT ANGINA PECTORIS: ICD-10-CM

## 2025-01-16 PROCEDURE — 99349 HOME/RES VST EST MOD MDM 40: CPT

## 2025-01-17 LAB
ALBUMIN SERPL ELPH-MCNC: 3.8 G/DL
ALP BLD-CCNC: 78 U/L
ALT SERPL-CCNC: 10 U/L
ANION GAP SERPL CALC-SCNC: 13 MMOL/L
AST SERPL-CCNC: 13 U/L
BASOPHILS # BLD AUTO: 0.09 K/UL
BASOPHILS NFR BLD AUTO: 1 %
BILIRUB SERPL-MCNC: 0.2 MG/DL
BUN SERPL-MCNC: 28 MG/DL
CALCIUM SERPL-MCNC: 9.3 MG/DL
CHLORIDE SERPL-SCNC: 100 MMOL/L
CHOLEST SERPL-MCNC: 232 MG/DL
CO2 SERPL-SCNC: 21 MMOL/L
CREAT SERPL-MCNC: 1.1 MG/DL
EGFR: 60 ML/MIN/1.73M2
EOSINOPHIL # BLD AUTO: 0.49 K/UL
EOSINOPHIL NFR BLD AUTO: 5.6 %
ESTIMATED AVERAGE GLUCOSE: 237 MG/DL
GLUCOSE SERPL-MCNC: 226 MG/DL
HBA1C MFR BLD HPLC: 9.9 %
HCT VFR BLD CALC: 36.2 %
HDLC SERPL-MCNC: 33 MG/DL
HGB BLD-MCNC: 11.4 G/DL
IMM GRANULOCYTES NFR BLD AUTO: 0.7 %
LDLC SERPL CALC-MCNC: 168 MG/DL
LYMPHOCYTES # BLD AUTO: 2.65 K/UL
LYMPHOCYTES NFR BLD AUTO: 30.4 %
MAN DIFF?: NORMAL
MCHC RBC-ENTMCNC: 29.2 PG
MCHC RBC-ENTMCNC: 31.5 G/DL
MCV RBC AUTO: 92.6 FL
MONOCYTES # BLD AUTO: 0.96 K/UL
MONOCYTES NFR BLD AUTO: 11 %
NEUTROPHILS # BLD AUTO: 4.48 K/UL
NEUTROPHILS NFR BLD AUTO: 51.3 %
NONHDLC SERPL-MCNC: 199 MG/DL
PLATELET # BLD AUTO: 242 K/UL
PMV BLD AUTO: 0 /100 WBCS
POTASSIUM SERPL-SCNC: 5.7 MMOL/L
PROT SERPL-MCNC: 5.9 G/DL
RBC # BLD: 3.91 M/UL
RBC # FLD: 14.1 %
SODIUM SERPL-SCNC: 134 MMOL/L
TRIGL SERPL-MCNC: 168 MG/DL
TSH SERPL-ACNC: 3.25 UIU/ML
WBC # FLD AUTO: 8.73 K/UL

## 2025-01-20 VITALS
HEART RATE: 80 BPM | SYSTOLIC BLOOD PRESSURE: 110 MMHG | RESPIRATION RATE: 16 BRPM | TEMPERATURE: 98 F | OXYGEN SATURATION: 96 % | DIASTOLIC BLOOD PRESSURE: 70 MMHG

## 2025-02-28 NOTE — PROGRESS NOTE ADULT - ASSESSMENT
Continue present care no changes meds refilled.Routine screening labs ordered. Further recommendations will depend upon those results.    Patient is a 89 y/o Male with  PMHx HTN, DM2, BPH, CAD s/p CABG, hypothyroidism, recently dx. from [facility] , progressive ambulatory dysfunction, admitted with hyperkalemia, hyponatremia noted as outpt. Found to have pseudomonas bacteremia.    # Acute mental status change:   - MR brain WNL  - EEG Done- 2nd EEG shows generalized slowing + Multifocal epileptic potentials. Started Keppra 500mg every 12 hours .   - Neuro f/up 7/4- placed on VEEG now.   - VEEG results nl  - F/u neuro for further recs, continue Neurochecks      #Bacteremia due to Pseudomonas.   # Leukocytosis   #in setting of recent hidalgo in snf; now out; possible uti, suspected testicular infection  US Scrotum (for epididymitis) : Complex scrotal mixed echogenicity collection anterior to left testicle measuring approximately 7.7 x 3 x 6.9 cm. 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  ua positive; f/u ucxs- more than 3 organisms, repeat urine cxs neg , repeat blood cxs 6/26, 28 Neg.  blood cxs pseudomonas 6/24, as per  ID - continue on IV Zosyn tx.   -  did I&D bedside 7/5   - wound cx - e.faecalis  - f/u burn consult  - f/u bcx    # Hypomagnesemia - resolved  - f/u mag level  - mag 2.1 stable now    #hyponatremia- stable levels   Per Nephro eval, likely [facility]? Urea 15gm QD,  Uric Acid = 4.5. No need to restrict po free water.   - Na 133 7/6  - continue on salt tabs  - daily BMP monitoring      #constipated:   - Senna, Miralax.     #HTN (hypertension)  - currently borderline Hypotensive   - continue lopressor 25mg PO twice daily with holding parameters.     #DM2 (diabetes mellitus, type 2)  - uncontrolled, Hga1C- 9  - lantus 12 units subc. once daily ,   - lispro 4 units subc. before each meal three times daily   ssi.    #History of BPH.   - continue finasteride 5 mg po once daily /tamsulosin 0.4 mg po once daily at bedtime     #h/o CAD (coronary artery disease). / h/o CABG  - plavix/zocor tx.  -patient is on supplemental oxygen via [place], 2 L , on RA sat 91- 92% at rest     #Hypothyroidism.   - continue synthroid 50mcg po once daily    # Iron def. anemia  - started on PO Iron tx.     # Ambulatory dysfunction   - physical decline- PT - will f/u today    DVT ppX, heparin  DNR/DNI (MOLST signed 6/29)    #Progress Note Handoff: f/u white count, f/u bcx, OR tomorrow     Patient is a 89 y/o Male with  PMHx HTN, DM2, BPH, CAD s/p CABG, hypothyroidism, recently dx. from [facility] , progressive ambulatory dysfunction, admitted with hyperkalemia, hyponatremia noted as outpt. Found to have pseudomonas bacteremia.    # Acute mental status change:   - MR brain WNL  - EEG Done- 2nd EEG shows generalized slowing + Multifocal epileptic potentials. Started Keppra 500mg every 12 hours .   - Neuro f/up 7/4- placed on VEEG now.   - VEEG results nl  - F/u neuro for further recs, continue Neurochecks      #Bacteremia due to Pseudomonas.   # Leukocytosis   #in setting of recent hidalgo in snf; now out; possible uti, suspected testicular infection  US Scrotum (for epididymitis) : Complex scrotal mixed echogenicity collection anterior to left testicle measuring approximately 7.7 x 3 x 6.9 cm. 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  ua positive; f/u ucxs- more than 3 organisms, repeat urine cxs neg , repeat blood cxs 6/26, 28 Neg.  blood cxs pseudomonas 6/24, as per  ID - continue on IV Zosyn tx.   -  did I&D bedside 7/5   - wound cx - e.faecalis  - burn surgical debridement tomorrow  - preop labs ordered  - repeat CXR, rpt EKG  - f/u bcx    # Hypomagnesemia - resolved  - f/u mag level  - mag 2.1 stable now    #hyponatremia- stable levels   - Na 133 7/6  - continue on salt tabs  - daily BMP monitoring      #constipated:   - Senna, Miralax.     #HTN (hypertension)  - continue lopressor 25mg PO twice daily with holding parameters.     #DM2 (diabetes mellitus, type 2)  - uncontrolled, Hga1C- 9  - lantus 12 units subc. once daily ,   - lispro 4 units subc. before each meal three times daily   ssi.    #History of BPH.   - continue finasteride 5 mg po once daily /tamsulosin 0.4 mg po once daily at bedtime     #h/o CAD (coronary artery disease). / h/o CABG  - plavix/zocor tx.  -patient is on supplemental oxygen via [place], 2 L , on RA sat 91- 92% at rest     #Hypothyroidism.   - continue synthroid 50mcg po once daily    # Iron def. anemia  - started on PO Iron tx.     # Ambulatory dysfunction   - physical decline- PT - will f/u today    DVT ppX, heparin  DNR/DNI (MOLST signed 6/29)    #Progress Note Handoff: f/u white count, f/u bcx, OR tomorrow

## 2025-04-18 ENCOUNTER — NON-APPOINTMENT (OUTPATIENT)
Age: 89
End: 2025-04-18

## 2025-06-19 ENCOUNTER — APPOINTMENT (OUTPATIENT)
Dept: GERIATRICS | Facility: HOME HEALTH | Age: 89
End: 2025-06-19

## 2025-06-19 VITALS
OXYGEN SATURATION: 94 % | TEMPERATURE: 97 F | HEART RATE: 82 BPM | SYSTOLIC BLOOD PRESSURE: 126 MMHG | DIASTOLIC BLOOD PRESSURE: 68 MMHG | RESPIRATION RATE: 15 BRPM

## 2025-06-19 PROCEDURE — 99349 HOME/RES VST EST MOD MDM 40: CPT
